# Patient Record
Sex: FEMALE | URBAN - METROPOLITAN AREA
[De-identification: names, ages, dates, MRNs, and addresses within clinical notes are randomized per-mention and may not be internally consistent; named-entity substitution may affect disease eponyms.]

---

## 2017-02-06 ENCOUNTER — EMERGENCY (EMERGENCY)
Facility: HOSPITAL | Age: 82
LOS: 0 days | Discharge: HOME | End: 2017-02-06
Attending: EMERGENCY MEDICINE | Admitting: EMERGENCY MEDICINE
Payer: MEDICARE

## 2017-02-06 VITALS
HEART RATE: 69 BPM | TEMPERATURE: 98 F | DIASTOLIC BLOOD PRESSURE: 58 MMHG | OXYGEN SATURATION: 93 % | SYSTOLIC BLOOD PRESSURE: 141 MMHG | RESPIRATION RATE: 20 BRPM

## 2017-02-06 VITALS
RESPIRATION RATE: 18 BRPM | DIASTOLIC BLOOD PRESSURE: 63 MMHG | HEART RATE: 73 BPM | OXYGEN SATURATION: 98 % | SYSTOLIC BLOOD PRESSURE: 113 MMHG | TEMPERATURE: 98 F

## 2017-02-06 DIAGNOSIS — E03.9 HYPOTHYROIDISM, UNSPECIFIED: ICD-10-CM

## 2017-02-06 DIAGNOSIS — I48.91 UNSPECIFIED ATRIAL FIBRILLATION: ICD-10-CM

## 2017-02-06 DIAGNOSIS — R06.00 DYSPNEA, UNSPECIFIED: ICD-10-CM

## 2017-02-06 DIAGNOSIS — D68.51 ACTIVATED PROTEIN C RESISTANCE: ICD-10-CM

## 2017-02-06 DIAGNOSIS — Z79.01 LONG TERM (CURRENT) USE OF ANTICOAGULANTS: ICD-10-CM

## 2017-02-06 DIAGNOSIS — I10 ESSENTIAL (PRIMARY) HYPERTENSION: ICD-10-CM

## 2017-02-06 LAB
ALBUMIN SERPL ELPH-MCNC: 3.2 G/DL — LOW (ref 3.3–5)
ALP SERPL-CCNC: 57 U/L — SIGNIFICANT CHANGE UP (ref 40–120)
ALT FLD-CCNC: 21 U/L — SIGNIFICANT CHANGE UP (ref 12–78)
ANION GAP SERPL CALC-SCNC: 8 MMOL/L — SIGNIFICANT CHANGE UP (ref 5–17)
APTT BLD: 38.1 SEC — HIGH (ref 27.5–37.4)
AST SERPL-CCNC: 20 U/L — SIGNIFICANT CHANGE UP (ref 15–37)
BASOPHILS # BLD AUTO: 0.1 K/UL — SIGNIFICANT CHANGE UP (ref 0–0.2)
BASOPHILS NFR BLD AUTO: 1.1 % — SIGNIFICANT CHANGE UP (ref 0–2)
BILIRUB SERPL-MCNC: 0.4 MG/DL — SIGNIFICANT CHANGE UP (ref 0.2–1.2)
BUN SERPL-MCNC: 20 MG/DL — SIGNIFICANT CHANGE UP (ref 7–23)
CALCIUM SERPL-MCNC: 8.7 MG/DL — SIGNIFICANT CHANGE UP (ref 8.5–10.1)
CHLORIDE SERPL-SCNC: 108 MMOL/L — SIGNIFICANT CHANGE UP (ref 96–108)
CK SERPL-CCNC: 57 U/L — SIGNIFICANT CHANGE UP (ref 26–192)
CO2 SERPL-SCNC: 27 MMOL/L — SIGNIFICANT CHANGE UP (ref 22–31)
CREAT SERPL-MCNC: 1.11 MG/DL — SIGNIFICANT CHANGE UP (ref 0.5–1.3)
EOSINOPHIL # BLD AUTO: 0.4 K/UL — SIGNIFICANT CHANGE UP (ref 0–0.5)
EOSINOPHIL NFR BLD AUTO: 5.2 % — SIGNIFICANT CHANGE UP (ref 0–6)
GLUCOSE SERPL-MCNC: 119 MG/DL — HIGH (ref 70–99)
HCT VFR BLD CALC: 39.2 % — SIGNIFICANT CHANGE UP (ref 34.5–45)
HGB BLD-MCNC: 12.7 G/DL — SIGNIFICANT CHANGE UP (ref 11.5–15.5)
INR BLD: 2.33 RATIO — HIGH (ref 0.88–1.16)
LYMPHOCYTES # BLD AUTO: 2.1 K/UL — SIGNIFICANT CHANGE UP (ref 1–3.3)
LYMPHOCYTES # BLD AUTO: 26.6 % — SIGNIFICANT CHANGE UP (ref 13–44)
MCHC RBC-ENTMCNC: 29.5 PG — SIGNIFICANT CHANGE UP (ref 27–34)
MCHC RBC-ENTMCNC: 32.4 GM/DL — SIGNIFICANT CHANGE UP (ref 32–36)
MCV RBC AUTO: 91.1 FL — SIGNIFICANT CHANGE UP (ref 80–100)
MONOCYTES # BLD AUTO: 0.8 K/UL — SIGNIFICANT CHANGE UP (ref 0–0.9)
MONOCYTES NFR BLD AUTO: 9.9 % — SIGNIFICANT CHANGE UP (ref 2–14)
NEUTROPHILS # BLD AUTO: 4.6 K/UL — SIGNIFICANT CHANGE UP (ref 1.8–7.4)
NEUTROPHILS NFR BLD AUTO: 57.2 % — SIGNIFICANT CHANGE UP (ref 43–77)
NT-PROBNP SERPL-SCNC: 1982 PG/ML — HIGH (ref 0–450)
PLATELET # BLD AUTO: 274 K/UL — SIGNIFICANT CHANGE UP (ref 150–400)
POTASSIUM SERPL-MCNC: 4.5 MMOL/L — SIGNIFICANT CHANGE UP (ref 3.5–5.3)
POTASSIUM SERPL-SCNC: 4.5 MMOL/L — SIGNIFICANT CHANGE UP (ref 3.5–5.3)
PROT SERPL-MCNC: 6.7 GM/DL — SIGNIFICANT CHANGE UP (ref 6–8.3)
PROTHROM AB SERPL-ACNC: 25.9 SEC — HIGH (ref 10–13.1)
RBC # BLD: 4.3 M/UL — SIGNIFICANT CHANGE UP (ref 3.8–5.2)
RBC # FLD: 14.2 % — SIGNIFICANT CHANGE UP (ref 10.3–14.5)
SODIUM SERPL-SCNC: 143 MMOL/L — SIGNIFICANT CHANGE UP (ref 135–145)
TROPONIN I SERPL-MCNC: <0.015 NG/ML — SIGNIFICANT CHANGE UP (ref 0.01–0.04)
WBC # BLD: 8 K/UL — SIGNIFICANT CHANGE UP (ref 3.8–10.5)
WBC # FLD AUTO: 8 K/UL — SIGNIFICANT CHANGE UP (ref 3.8–10.5)

## 2017-02-06 PROCEDURE — 99285 EMERGENCY DEPT VISIT HI MDM: CPT

## 2017-02-06 PROCEDURE — 71275 CT ANGIOGRAPHY CHEST: CPT | Mod: 26

## 2017-02-06 PROCEDURE — 71010: CPT | Mod: 26

## 2017-02-06 RX ORDER — DRONEDARONE 400 MG/1
400 TABLET, FILM COATED ORAL
Qty: 0 | Refills: 0 | Status: DISCONTINUED | OUTPATIENT
Start: 2017-02-06 | End: 2017-02-07

## 2017-02-06 RX ORDER — WARFARIN SODIUM 2.5 MG/1
2.5 TABLET ORAL ONCE
Qty: 0 | Refills: 0 | Status: COMPLETED | OUTPATIENT
Start: 2017-02-06 | End: 2017-02-06

## 2017-02-06 RX ORDER — FUROSEMIDE 40 MG
40 TABLET ORAL ONCE
Qty: 0 | Refills: 0 | Status: COMPLETED | OUTPATIENT
Start: 2017-02-06 | End: 2017-02-06

## 2017-02-06 RX ORDER — METOPROLOL TARTRATE 50 MG
50 TABLET ORAL ONCE
Qty: 0 | Refills: 0 | Status: COMPLETED | OUTPATIENT
Start: 2017-02-06 | End: 2017-02-06

## 2017-02-06 RX ORDER — POTASSIUM CHLORIDE 20 MEQ
20 PACKET (EA) ORAL ONCE
Qty: 0 | Refills: 0 | Status: COMPLETED | OUTPATIENT
Start: 2017-02-06 | End: 2017-02-06

## 2017-02-06 RX ORDER — ASPIRIN/CALCIUM CARB/MAGNESIUM 324 MG
325 TABLET ORAL DAILY
Qty: 0 | Refills: 0 | Status: DISCONTINUED | OUTPATIENT
Start: 2017-02-06 | End: 2017-02-06

## 2017-02-06 RX ORDER — ASPIRIN/CALCIUM CARB/MAGNESIUM 324 MG
325 TABLET ORAL ONCE
Qty: 0 | Refills: 0 | Status: DISCONTINUED | OUTPATIENT
Start: 2017-02-06 | End: 2017-02-06

## 2017-02-06 RX ADMIN — Medication 325 MILLIGRAM(S): at 18:29

## 2017-02-06 RX ADMIN — WARFARIN SODIUM 2.5 MILLIGRAM(S): 2.5 TABLET ORAL at 20:23

## 2017-02-06 RX ADMIN — Medication 20 MILLIEQUIVALENT(S): at 20:23

## 2017-02-06 RX ADMIN — Medication 50 MILLIGRAM(S): at 20:23

## 2017-02-06 RX ADMIN — Medication 40 MILLIGRAM(S): at 20:23

## 2017-02-06 NOTE — ED PROVIDER NOTE - MEDICAL DECISION MAKING DETAILS
trop negative, no evidence of decompensated CHF, appropriated for discharge home with outpatient cardiology follow-up

## 2017-02-06 NOTE — ED PROVIDER NOTE - PROGRESS NOTE DETAILS
ED attending Dr. Newby met with patient and agrees with resident plan and assessment. 82 y/o female with PMHx of afib s/p ablation, factor V Leiden, on coumadin, hypothyroid, DVT, HTN presents to the ED sent in from Dr. Holloway office c/o worsening SOB over the last 3 weeks, worse with exertion but is now constant. She reports intermittent chest pain over the last 3 weeks. She states that she had a echocardiogram and her coumadin checked in Dr. Holloway office today. PMD Dr. Stacy, Cardiologist Dr. Holloway. Pts echo today showed EF of 55-60%. ALKA Greene am scribing for and in the presence of Dr. Newby for the following sections: Results CT and lab results discussed with pt and daughter at bedside. Pt without PNA, no evidence of PE, symptoms presents for approximately 2-3 weeks, negative troponin, pt appropriate for d/c home, advised to double dose of lasix for today, f/u with cardiologist tomorrow morning. Copy of ED work up provided to pt to bring to her doctor and precautions of when to return to the ED given and understood. ALKA Greene am scribing for and in the presence of Dr. Newby for the following sections: Results, Progress note

## 2017-02-06 NOTE — ED ADULT NURSE NOTE - OBJECTIVE STATEMENT
PT IS A/O 3 WAS SEND FROM HER DOCTOR OFFICE. PT C/O SOB,LUNGS B/L CLEAR ANKLE PITTING EDEMA +1 ,PT STATES SLIGHT SWELLING  THAN THE USUAL.MONITOR NSR HR 64.PT HAS NO C/O CHEST PAIN

## 2017-02-06 NOTE — ED PROVIDER NOTE - OBJECTIVE STATEMENT
81F hx afib s/p ablation on coumadin, hypothyroidism, HTN presents from cardiologist's office (Dr. Reynaga) for progressively worsening SOB.  Dyspnea was originally intermittent and worse with exertion, now constant.  Waxing and waning in severity.  Associated with intermittent chest pain.  Unchanged b/l LE swelling. 81F hx afib s/p ablation, Factor V Leiden c/o DVT/PE? on coumadin, hypothyroidism, HTN presents from cardiologist's office (Dr. Reynaga) for progressively worsening SOB.  Dyspnea was originally intermittent and worse with exertion, now constant.  Waxing and waning in severity.  Associated with intermittent chest pain.  Unchanged b/l LE swelling.

## 2017-02-06 NOTE — ED ADULT NURSE REASSESSMENT NOTE - NS ED NURSE REASSESS COMMENT FT1
Received patient in bed, stable, A&Ox4, family at bedside. CTA chest completed. Pending results. Medicated as ordered. Will continue monitoring patient.

## 2017-02-07 RX ORDER — ASPIRIN/CALCIUM CARB/MAGNESIUM 324 MG
325 TABLET ORAL DAILY
Qty: 0 | Refills: 0 | Status: DISCONTINUED | OUTPATIENT
Start: 2017-02-07 | End: 2017-02-06

## 2017-02-15 NOTE — ASU PATIENT PROFILE, ADULT - MEDICATIONS TO HOLD
pt previously instructed at MD office to hold coumadin night before cath; pt instructed to hold lasix in a.m. of cath

## 2017-02-16 ENCOUNTER — OUTPATIENT (OUTPATIENT)
Dept: OUTPATIENT SERVICES | Facility: HOSPITAL | Age: 82
LOS: 1 days | Discharge: HOME | End: 2017-02-16
Payer: MEDICARE

## 2017-02-16 VITALS
SYSTOLIC BLOOD PRESSURE: 95 MMHG | HEIGHT: 63 IN | HEART RATE: 68 BPM | TEMPERATURE: 97 F | DIASTOLIC BLOOD PRESSURE: 72 MMHG | WEIGHT: 195.11 LBS | RESPIRATION RATE: 18 BRPM | OXYGEN SATURATION: 95 %

## 2017-02-16 LAB
BLD GP AB SCN SERPL QL: SIGNIFICANT CHANGE UP
INR BLD: 1.7 RATIO — HIGH (ref 0.88–1.16)
PROTHROM AB SERPL-ACNC: 18.8 SEC — HIGH (ref 10–13.1)
TYPE + AB SCN PNL BLD: SIGNIFICANT CHANGE UP

## 2017-02-16 PROCEDURE — 93010 ELECTROCARDIOGRAM REPORT: CPT

## 2017-02-16 RX ORDER — WARFARIN SODIUM 2.5 MG/1
2.5 TABLET ORAL ONCE
Qty: 0 | Refills: 0 | Status: COMPLETED | OUTPATIENT
Start: 2017-02-16 | End: 2017-02-16

## 2017-02-16 RX ORDER — FUROSEMIDE 40 MG
40 TABLET ORAL
Qty: 0 | Refills: 0 | Status: DISCONTINUED | OUTPATIENT
Start: 2017-02-16 | End: 2017-02-17

## 2017-02-16 RX ORDER — LEVOTHYROXINE SODIUM 125 MCG
25 TABLET ORAL DAILY
Qty: 0 | Refills: 0 | Status: DISCONTINUED | OUTPATIENT
Start: 2017-02-16 | End: 2017-02-17

## 2017-02-16 RX ORDER — ASPIRIN/CALCIUM CARB/MAGNESIUM 324 MG
81 TABLET ORAL DAILY
Qty: 0 | Refills: 0 | Status: DISCONTINUED | OUTPATIENT
Start: 2017-02-16 | End: 2017-02-17

## 2017-02-16 RX ORDER — DRONEDARONE 400 MG/1
400 TABLET, FILM COATED ORAL
Qty: 0 | Refills: 0 | Status: DISCONTINUED | OUTPATIENT
Start: 2017-02-16 | End: 2017-02-17

## 2017-02-16 RX ORDER — POTASSIUM CHLORIDE 20 MEQ
20 PACKET (EA) ORAL
Qty: 0 | Refills: 0 | Status: DISCONTINUED | OUTPATIENT
Start: 2017-02-16 | End: 2017-02-17

## 2017-02-16 RX ORDER — METOPROLOL TARTRATE 50 MG
50 TABLET ORAL DAILY
Qty: 0 | Refills: 0 | Status: DISCONTINUED | OUTPATIENT
Start: 2017-02-16 | End: 2017-02-17

## 2017-02-16 RX ORDER — CLOPIDOGREL BISULFATE 75 MG/1
75 TABLET, FILM COATED ORAL DAILY
Qty: 0 | Refills: 0 | Status: DISCONTINUED | OUTPATIENT
Start: 2017-02-16 | End: 2017-02-17

## 2017-02-16 RX ADMIN — Medication 81 MILLIGRAM(S): at 18:40

## 2017-02-16 RX ADMIN — Medication 20 MILLIEQUIVALENT(S): at 19:44

## 2017-02-16 RX ADMIN — Medication 40 MILLIGRAM(S): at 19:44

## 2017-02-16 RX ADMIN — WARFARIN SODIUM 2.5 MILLIGRAM(S): 2.5 TABLET ORAL at 22:11

## 2017-02-16 RX ADMIN — Medication 0.5 MILLIGRAM(S): at 22:41

## 2017-02-16 RX ADMIN — DRONEDARONE 400 MILLIGRAM(S): 400 TABLET, FILM COATED ORAL at 22:10

## 2017-02-16 RX ADMIN — CLOPIDOGREL BISULFATE 75 MILLIGRAM(S): 75 TABLET, FILM COATED ORAL at 18:40

## 2017-02-16 NOTE — H&P ADULT - ASSESSMENT
This is a 81 year old female PMH COPD  HTN obesity Factor 5 leiden ,multiple PE's , Afib with ablation in 11/16 . Pt reports increasing SOB  leg edema with increase in wt 8- 10 lbsover the last few weeks. Pt to cardiologist cardiac testing was done. pt recommended to have a cardiac cath   a/p CAD   PRE-PROCEDURE ASSESSMENT    -Patient seen and examined  -Labs reviewed  -Pre-procedure teaching completed with patient and family  -Informed consent witnessed  -Questions answered

## 2017-02-16 NOTE — H&P ADULT - HISTORY OF PRESENT ILLNESS
This is a 81 year old female PMH COPD  HTN obesity Factor 5 leiden ,multiple PE's , Afib with ablation in 11/16 . Pt reports increasing SOB  leg edema with increase in wt 8- 10 lbsover the last few weeks. Pt to cardiologist cardiac testing was done. pt recommended to have a cardiac cath This is a 81 year old female PMH COPD  HTN obesity Factor 5 leiden ,multiple PE's , Afib with ablation in 11/16 . Pt reports increasing SOB  leg edema with increase in wt 8- 10 lbsover the last few weeks. Pt recently to  for SOB a recent CT of chest was negative for any new PE  Pt to cardiologist cardiac testing was done. pt recommended to have a cardiac cath

## 2017-02-16 NOTE — PROGRESS NOTE ADULT - SUBJECTIVE AND OBJECTIVE BOX
I feel ok mild chest discomfort   VSS EKG no changes   R groin chad- close arterial site no evidence of bleeding   r venous sheath in place no evidence of bleeding +pp + edema a t baseline   s/p Cardiac cath s/p LAD      One Vessel coronary artery disease (LAD) .   Normal LV systolic function. Estimated LV ejection fraction is 65 %.   No aortic valve stenosis.     Severe pulmonary artery hypertension.  a/p -observe overnight on tele    -vitals signs, labs, diet and activity per post procedure orders  -ambulate ad glo post bed rest  - asa/plavix reinforced   - asa/plavix/ coumadin therapy till INR >2 then plavix coumadin   -iv hydration  -encouraged PO fluids  -plan of care discussed with patient, family and MD  -likely d/c in AM  -post procedure and d/c instructions reviewed  -follow up with MD in 1-2 weeks

## 2017-02-16 NOTE — H&P ADULT - NSHPPHYSICALEXAM_GEN_ALL_CORE
:PHYSICAL EXAM:    Constitutional: NAD, well-groomed, well-developed  Neuro: Alert and oriented x 3 Gait steady Speech clear No focal deficits  Neck: No JVD No bruit  Respiratory: CTAB  Cardiovascular: S1 and S2, RRR, + 2/3 LETTY   Gastrointestinal: BS+, soft, NT/ND  Extremities: No clubbing cyanosis or 2 + pitting edema  Vascular: 2+ peripheral pulses  Psychiatric: Normal mood, normal affect  Musculoskeletal: 5/5 strength b/l upper and lower extremities  skin bruise on l knee

## 2017-02-16 NOTE — H&P ADULT - PMH
Atrial fibrillation    CHF (congestive heart failure)    Factor 5 Leiden mutation, heterozygous    Pulmonary emboli

## 2017-02-17 VITALS — HEART RATE: 63 BPM

## 2017-02-17 LAB
ANION GAP SERPL CALC-SCNC: 8 MMOL/L — SIGNIFICANT CHANGE UP (ref 5–17)
BASOPHILS # BLD AUTO: 0.1 K/UL — SIGNIFICANT CHANGE UP (ref 0–0.2)
BASOPHILS NFR BLD AUTO: 1 % — SIGNIFICANT CHANGE UP (ref 0–2)
BUN SERPL-MCNC: 24 MG/DL — HIGH (ref 7–23)
CALCIUM SERPL-MCNC: 8.6 MG/DL — SIGNIFICANT CHANGE UP (ref 8.5–10.1)
CHLORIDE SERPL-SCNC: 105 MMOL/L — SIGNIFICANT CHANGE UP (ref 96–108)
CHOLEST SERPL-MCNC: 192 MG/DL — SIGNIFICANT CHANGE UP (ref 10–199)
CO2 SERPL-SCNC: 30 MMOL/L — SIGNIFICANT CHANGE UP (ref 22–31)
CREAT SERPL-MCNC: 1.23 MG/DL — SIGNIFICANT CHANGE UP (ref 0.5–1.3)
EOSINOPHIL # BLD AUTO: 0.7 K/UL — HIGH (ref 0–0.5)
EOSINOPHIL NFR BLD AUTO: 7.2 % — HIGH (ref 0–6)
GLUCOSE SERPL-MCNC: 109 MG/DL — HIGH (ref 70–99)
HCT VFR BLD CALC: 38.2 % — SIGNIFICANT CHANGE UP (ref 34.5–45)
HDLC SERPL-MCNC: 56 MG/DL — SIGNIFICANT CHANGE UP (ref 40–125)
HGB BLD-MCNC: 12.4 G/DL — SIGNIFICANT CHANGE UP (ref 11.5–15.5)
INR BLD: 1.64 RATIO — HIGH (ref 0.88–1.16)
LIPID PNL WITH DIRECT LDL SERPL: 116 MG/DL — SIGNIFICANT CHANGE UP
LYMPHOCYTES # BLD AUTO: 2.5 K/UL — SIGNIFICANT CHANGE UP (ref 1–3.3)
LYMPHOCYTES # BLD AUTO: 26.2 % — SIGNIFICANT CHANGE UP (ref 13–44)
MCHC RBC-ENTMCNC: 30 PG — SIGNIFICANT CHANGE UP (ref 27–34)
MCHC RBC-ENTMCNC: 32.6 GM/DL — SIGNIFICANT CHANGE UP (ref 32–36)
MCV RBC AUTO: 92.2 FL — SIGNIFICANT CHANGE UP (ref 80–100)
MONOCYTES # BLD AUTO: 1 K/UL — HIGH (ref 0–0.9)
MONOCYTES NFR BLD AUTO: 10.4 % — SIGNIFICANT CHANGE UP (ref 2–14)
NEUTROPHILS # BLD AUTO: 5.3 K/UL — SIGNIFICANT CHANGE UP (ref 1.8–7.4)
NEUTROPHILS NFR BLD AUTO: 55.2 % — SIGNIFICANT CHANGE UP (ref 43–77)
PLATELET # BLD AUTO: 305 K/UL — SIGNIFICANT CHANGE UP (ref 150–400)
POTASSIUM SERPL-MCNC: 4.5 MMOL/L — SIGNIFICANT CHANGE UP (ref 3.5–5.3)
POTASSIUM SERPL-SCNC: 4.5 MMOL/L — SIGNIFICANT CHANGE UP (ref 3.5–5.3)
PROTHROM AB SERPL-ACNC: 18.1 SEC — HIGH (ref 10–13.1)
RBC # BLD: 4.15 M/UL — SIGNIFICANT CHANGE UP (ref 3.8–5.2)
RBC # FLD: 14.4 % — SIGNIFICANT CHANGE UP (ref 10.3–14.5)
SODIUM SERPL-SCNC: 143 MMOL/L — SIGNIFICANT CHANGE UP (ref 135–145)
TOTAL CHOLESTEROL/HDL RATIO MEASUREMENT: 3.4 RATIO — SIGNIFICANT CHANGE UP (ref 3.3–7.1)
TRIGL SERPL-MCNC: 98 MG/DL — SIGNIFICANT CHANGE UP (ref 10–149)
WBC # BLD: 9.6 K/UL — SIGNIFICANT CHANGE UP (ref 3.8–10.5)
WBC # FLD AUTO: 9.6 K/UL — SIGNIFICANT CHANGE UP (ref 3.8–10.5)

## 2017-02-17 PROCEDURE — 93010 ELECTROCARDIOGRAM REPORT: CPT

## 2017-02-17 RX ORDER — CLOPIDOGREL BISULFATE 75 MG/1
1 TABLET, FILM COATED ORAL
Qty: 90 | Refills: 3 | OUTPATIENT
Start: 2017-02-17 | End: 2018-02-11

## 2017-02-17 RX ORDER — ATORVASTATIN CALCIUM 80 MG/1
1 TABLET, FILM COATED ORAL
Qty: 30 | Refills: 1 | OUTPATIENT
Start: 2017-02-17 | End: 2017-04-17

## 2017-02-17 RX ORDER — DRONEDARONE 400 MG/1
1 TABLET, FILM COATED ORAL
Qty: 60 | Refills: 0 | OUTPATIENT
Start: 2017-02-17 | End: 2017-03-19

## 2017-02-17 RX ADMIN — DRONEDARONE 400 MILLIGRAM(S): 400 TABLET, FILM COATED ORAL at 10:02

## 2017-02-17 RX ADMIN — Medication 81 MILLIGRAM(S): at 10:02

## 2017-02-17 RX ADMIN — Medication 50 MILLIGRAM(S): at 05:53

## 2017-02-17 RX ADMIN — Medication 20 MILLIEQUIVALENT(S): at 05:36

## 2017-02-17 RX ADMIN — Medication 40 MILLIGRAM(S): at 05:36

## 2017-02-17 RX ADMIN — Medication 25 MICROGRAM(S): at 05:36

## 2017-02-17 RX ADMIN — CLOPIDOGREL BISULFATE 75 MILLIGRAM(S): 75 TABLET, FILM COATED ORAL at 10:02

## 2017-02-17 NOTE — DISCHARGE NOTE ADULT - PATIENT PORTAL LINK FT
“You can access the [employer] Patient Portal, offered by [facility], by registering with the following website: [email or web address]”

## 2017-02-17 NOTE — DISCHARGE NOTE ADULT - HOSPITAL COURSE
This is a 81 year old female PMH COPD  HTN obesity Factor 5 leiden ,multiple PE's , Afib with ablation in 11/16 . Pt reports increasing SOB  leg edema with increase in wt 8- 10 lbs over the few weeks. Pt recently to  for SOB a recent CT of chest was negative for any new PE  Pt to cardiologist cardiac testing was done. Patient  recommended to have a cardiac cath   s/p LHC and LUCY stent LAD   One Vessel coronary artery disease (LAD) .   Normal LV systolic function. Estimated LV ejection fraction is 65 %.   No aortic valve stenosis.  asa/plavix/ coumdin therapy   start a cholesterol medication   continue lasix, potassium and multaq as before

## 2017-02-17 NOTE — DISCHARGE NOTE ADULT - CARE PLAN
Principal Discharge DX:	CAD (coronary artery disease)  Goal:	no symptoms of chest pain or sob Principal Discharge DX:	CAD (coronary artery disease)  Goal:	no symptoms of chest pain or sob  Instructions for follow-up, activity and diet:	no salt diet low fat low cholesterol   exercise daily   if any increase shortness of breath or chest discomfort seek medical attention     Take Asprin 81 mg , Plavix 75 mg and  Coumadin 2.5 daily   have coumadin level check on 2/21 at Dr Kidd office   once the INR is 2 or greater the aspirin will be discontinued  start cholesterol medication   no salt diet   weight self daily notify cardiologist ifchange in condition

## 2017-02-17 NOTE — DISCHARGE NOTE ADULT - MEDICATION SUMMARY - MEDICATIONS TO TAKE
I will START or STAY ON the medications listed below when I get home from the hospital:    aspirin 81 mg oral tablet  -- 1 tab(s) by mouth once a day  -- Indication: For CORONARY ARTERY DISEASE    Multaq 400 mg oral tablet  -- 2 tab(s) by mouth once a day  -- Indication: For atrial fibrillation    Coumadin  -- 2.5 milligram(s) by mouth once a day  -- Indication: For Pulmonary emboli    LORazepam 0.5 mg oral tablet  -- 1 tab(s) by mouth every 12 hours, As needed, Anxiety  -- Indication: For mood    clopidogrel 75 mg oral tablet  -- 1 tab(s) by mouth once a day  -- Indication: For stent    metoprolol succinate 50 mg oral tablet, extended release  --  by mouth 2 times a day  -- Indication: For blood pressure     furosemide 40 mg oral tablet  --  by mouth 2 times a day  -- Indication: For water pill     Klor-Con M20 oral tablet, extended release  -- 1 tab(s) by mouth 2 times a day  -- Indication: For replacement    levothyroxine  -- 25 microgram(s) by mouth once a day  -- Indication: For thyroid I will START or STAY ON the medications listed below when I get home from the hospital:    aspirin 81 mg oral tablet  -- 1 tab(s) by mouth once a day  -- Indication: For CORONARY ARTERY DISEASE    Multaq 400 mg oral tablet  -- 2 tab(s) by mouth once a day  -- Indication: For atrial fibrillation    Coumadin  -- 2.5 milligram(s) by mouth once a day  -- Indication: For Pulmonary emboli    LORazepam 0.5 mg oral tablet  -- 1 tab(s) by mouth every 12 hours, As needed, Anxiety  -- Indication: For mood    atorvastatin 20 mg oral tablet  -- 1 tab(s) by mouth once a day  -- Avoid grapefruit and grapefruit juice while taking this medication.  Do not take this drug if you are pregnant.  It is very important that you take or use this exactly as directed.  Do not skip doses or discontinue unless directed by your doctor.  Obtain medical advice before taking any non-prescription drugs as some may affect the action of this medication.  Take with food or milk.    -- Indication: For Cholesterol    clopidogrel 75 mg oral tablet  -- 1 tab(s) by mouth once a day  -- Indication: For stent    metoprolol succinate 50 mg oral tablet, extended release  --  by mouth 2 times a day  -- Indication: For blood pressure     furosemide 40 mg oral tablet  --  by mouth 2 times a day  -- Indication: For water pill     Klor-Con M20 oral tablet, extended release  -- 1 tab(s) by mouth 2 times a day  -- Indication: For replacement    levothyroxine  -- 25 microgram(s) by mouth once a day  -- Indication: For thyroid

## 2017-02-25 DIAGNOSIS — I48.0 PAROXYSMAL ATRIAL FIBRILLATION: ICD-10-CM

## 2017-02-25 DIAGNOSIS — Z87.891 PERSONAL HISTORY OF NICOTINE DEPENDENCE: ICD-10-CM

## 2017-02-25 DIAGNOSIS — I34.0 NONRHEUMATIC MITRAL (VALVE) INSUFFICIENCY: ICD-10-CM

## 2017-02-25 DIAGNOSIS — Z79.01 LONG TERM (CURRENT) USE OF ANTICOAGULANTS: ICD-10-CM

## 2017-02-25 DIAGNOSIS — E66.9 OBESITY, UNSPECIFIED: ICD-10-CM

## 2017-02-25 DIAGNOSIS — I25.10 ATHEROSCLEROTIC HEART DISEASE OF NATIVE CORONARY ARTERY WITHOUT ANGINA PECTORIS: ICD-10-CM

## 2017-02-25 DIAGNOSIS — I27.2 OTHER SECONDARY PULMONARY HYPERTENSION: ICD-10-CM

## 2017-02-25 DIAGNOSIS — R06.02 SHORTNESS OF BREATH: ICD-10-CM

## 2017-02-25 DIAGNOSIS — I50.9 HEART FAILURE, UNSPECIFIED: ICD-10-CM

## 2017-02-25 DIAGNOSIS — E03.9 HYPOTHYROIDISM, UNSPECIFIED: ICD-10-CM

## 2017-02-25 DIAGNOSIS — Z86.711 PERSONAL HISTORY OF PULMONARY EMBOLISM: ICD-10-CM

## 2017-02-25 DIAGNOSIS — J44.9 CHRONIC OBSTRUCTIVE PULMONARY DISEASE, UNSPECIFIED: ICD-10-CM

## 2017-02-25 DIAGNOSIS — D68.2 HEREDITARY DEFICIENCY OF OTHER CLOTTING FACTORS: ICD-10-CM

## 2017-02-25 DIAGNOSIS — I11.0 HYPERTENSIVE HEART DISEASE WITH HEART FAILURE: ICD-10-CM

## 2017-05-22 PROBLEM — Z00.00 ENCOUNTER FOR PREVENTIVE HEALTH EXAMINATION: Status: ACTIVE | Noted: 2017-05-22

## 2017-11-04 RX ORDER — METOPROLOL TARTRATE 50 MG
0 TABLET ORAL
Qty: 0 | Refills: 0 | COMMUNITY

## 2017-11-04 RX ORDER — LEVOTHYROXINE SODIUM 125 MCG
0 TABLET ORAL
Qty: 0 | Refills: 0 | COMMUNITY

## 2017-11-04 RX ORDER — WARFARIN SODIUM 2.5 MG/1
0 TABLET ORAL
Qty: 0 | Refills: 0 | COMMUNITY

## 2018-04-29 ENCOUNTER — EMERGENCY (EMERGENCY)
Facility: HOSPITAL | Age: 83
LOS: 0 days | Discharge: HOME | End: 2018-04-29
Attending: EMERGENCY MEDICINE | Admitting: EMERGENCY MEDICINE
Payer: MEDICARE

## 2018-04-29 VITALS
SYSTOLIC BLOOD PRESSURE: 125 MMHG | HEART RATE: 68 BPM | OXYGEN SATURATION: 95 % | DIASTOLIC BLOOD PRESSURE: 51 MMHG | TEMPERATURE: 98 F | RESPIRATION RATE: 18 BRPM

## 2018-04-29 VITALS
DIASTOLIC BLOOD PRESSURE: 70 MMHG | HEART RATE: 73 BPM | SYSTOLIC BLOOD PRESSURE: 170 MMHG | OXYGEN SATURATION: 99 % | RESPIRATION RATE: 17 BRPM | TEMPERATURE: 97 F

## 2018-04-29 DIAGNOSIS — I48.91 UNSPECIFIED ATRIAL FIBRILLATION: ICD-10-CM

## 2018-04-29 DIAGNOSIS — Z79.01 LONG TERM (CURRENT) USE OF ANTICOAGULANTS: ICD-10-CM

## 2018-04-29 DIAGNOSIS — K80.20 CALCULUS OF GALLBLADDER WITHOUT CHOLECYSTITIS WITHOUT OBSTRUCTION: ICD-10-CM

## 2018-04-29 DIAGNOSIS — I25.10 ATHEROSCLEROTIC HEART DISEASE OF NATIVE CORONARY ARTERY WITHOUT ANGINA PECTORIS: ICD-10-CM

## 2018-04-29 DIAGNOSIS — I50.9 HEART FAILURE, UNSPECIFIED: ICD-10-CM

## 2018-04-29 DIAGNOSIS — Z95.5 PRESENCE OF CORONARY ANGIOPLASTY IMPLANT AND GRAFT: ICD-10-CM

## 2018-04-29 DIAGNOSIS — Z79.82 LONG TERM (CURRENT) USE OF ASPIRIN: ICD-10-CM

## 2018-04-29 DIAGNOSIS — Z86.711 PERSONAL HISTORY OF PULMONARY EMBOLISM: ICD-10-CM

## 2018-04-29 LAB
ADD ON TEST-SPECIMEN IN LAB: SIGNIFICANT CHANGE UP
ALBUMIN SERPL ELPH-MCNC: 3.4 G/DL — SIGNIFICANT CHANGE UP (ref 3.3–5)
ALP SERPL-CCNC: 54 U/L — SIGNIFICANT CHANGE UP (ref 40–120)
ALT FLD-CCNC: 17 U/L — SIGNIFICANT CHANGE UP (ref 12–78)
ANION GAP SERPL CALC-SCNC: 8 MMOL/L — SIGNIFICANT CHANGE UP (ref 5–17)
APPEARANCE UR: CLEAR — SIGNIFICANT CHANGE UP
APPEARANCE UR: CLEAR — SIGNIFICANT CHANGE UP
APTT BLD: 36.5 SEC — SIGNIFICANT CHANGE UP (ref 27.5–37.4)
AST SERPL-CCNC: 17 U/L — SIGNIFICANT CHANGE UP (ref 15–37)
BACTERIA # UR AUTO: (no result)
BASOPHILS # BLD AUTO: 0.08 K/UL — SIGNIFICANT CHANGE UP (ref 0–0.2)
BASOPHILS NFR BLD AUTO: 1.1 % — SIGNIFICANT CHANGE UP (ref 0–2)
BILIRUB SERPL-MCNC: 0.4 MG/DL — SIGNIFICANT CHANGE UP (ref 0.2–1.2)
BILIRUB UR-MCNC: NEGATIVE — SIGNIFICANT CHANGE UP
BILIRUB UR-MCNC: NEGATIVE — SIGNIFICANT CHANGE UP
BUN SERPL-MCNC: 28 MG/DL — HIGH (ref 7–23)
CALCIUM SERPL-MCNC: 8.8 MG/DL — SIGNIFICANT CHANGE UP (ref 8.5–10.1)
CHLORIDE SERPL-SCNC: 103 MMOL/L — SIGNIFICANT CHANGE UP (ref 96–108)
CK SERPL-CCNC: 76 U/L — SIGNIFICANT CHANGE UP (ref 26–192)
CO2 SERPL-SCNC: 31 MMOL/L — SIGNIFICANT CHANGE UP (ref 22–31)
COLOR SPEC: YELLOW — SIGNIFICANT CHANGE UP
COLOR SPEC: YELLOW — SIGNIFICANT CHANGE UP
CREAT SERPL-MCNC: 1.12 MG/DL — SIGNIFICANT CHANGE UP (ref 0.5–1.3)
DIFF PNL FLD: (no result)
DIFF PNL FLD: (no result)
EOSINOPHIL # BLD AUTO: 0.99 K/UL — HIGH (ref 0–0.5)
EOSINOPHIL NFR BLD AUTO: 13.1 % — HIGH (ref 0–6)
EPI CELLS # UR: (no result)
GLUCOSE SERPL-MCNC: 154 MG/DL — HIGH (ref 70–99)
GLUCOSE UR QL: NEGATIVE MG/DL — SIGNIFICANT CHANGE UP
GLUCOSE UR QL: NEGATIVE MG/DL — SIGNIFICANT CHANGE UP
HCT VFR BLD CALC: 40.5 % — SIGNIFICANT CHANGE UP (ref 34.5–45)
HGB BLD-MCNC: 13.2 G/DL — SIGNIFICANT CHANGE UP (ref 11.5–15.5)
IMM GRANULOCYTES NFR BLD AUTO: 0.1 % — SIGNIFICANT CHANGE UP (ref 0–1.5)
INR BLD: 1.93 RATIO — HIGH (ref 0.88–1.16)
KETONES UR-MCNC: NEGATIVE — SIGNIFICANT CHANGE UP
KETONES UR-MCNC: NEGATIVE — SIGNIFICANT CHANGE UP
LEUKOCYTE ESTERASE UR-ACNC: (no result)
LEUKOCYTE ESTERASE UR-ACNC: NEGATIVE — SIGNIFICANT CHANGE UP
LIDOCAIN IGE QN: 115 U/L — SIGNIFICANT CHANGE UP (ref 73–393)
LYMPHOCYTES # BLD AUTO: 2.5 K/UL — SIGNIFICANT CHANGE UP (ref 1–3.3)
LYMPHOCYTES # BLD AUTO: 33.2 % — SIGNIFICANT CHANGE UP (ref 13–44)
MCHC RBC-ENTMCNC: 29.8 PG — SIGNIFICANT CHANGE UP (ref 27–34)
MCHC RBC-ENTMCNC: 32.6 GM/DL — SIGNIFICANT CHANGE UP (ref 32–36)
MCV RBC AUTO: 91.4 FL — SIGNIFICANT CHANGE UP (ref 80–100)
MONOCYTES # BLD AUTO: 0.84 K/UL — SIGNIFICANT CHANGE UP (ref 0–0.9)
MONOCYTES NFR BLD AUTO: 11.2 % — SIGNIFICANT CHANGE UP (ref 2–14)
NEUTROPHILS # BLD AUTO: 3.11 K/UL — SIGNIFICANT CHANGE UP (ref 1.8–7.4)
NEUTROPHILS NFR BLD AUTO: 41.3 % — LOW (ref 43–77)
NITRITE UR-MCNC: NEGATIVE — SIGNIFICANT CHANGE UP
NITRITE UR-MCNC: NEGATIVE — SIGNIFICANT CHANGE UP
NRBC # BLD: 0 /100 WBCS — SIGNIFICANT CHANGE UP (ref 0–0)
NT-PROBNP SERPL-SCNC: 711 PG/ML — HIGH (ref 0–450)
PH UR: 7 — SIGNIFICANT CHANGE UP (ref 5–8)
PH UR: 7 — SIGNIFICANT CHANGE UP (ref 5–8)
PLATELET # BLD AUTO: 243 K/UL — SIGNIFICANT CHANGE UP (ref 150–400)
POTASSIUM SERPL-MCNC: 4.5 MMOL/L — SIGNIFICANT CHANGE UP (ref 3.5–5.3)
POTASSIUM SERPL-SCNC: 4.5 MMOL/L — SIGNIFICANT CHANGE UP (ref 3.5–5.3)
PROT SERPL-MCNC: 7.1 GM/DL — SIGNIFICANT CHANGE UP (ref 6–8.3)
PROT UR-MCNC: 15 MG/DL
PROT UR-MCNC: NEGATIVE MG/DL — SIGNIFICANT CHANGE UP
PROTHROM AB SERPL-ACNC: 21.1 SEC — HIGH (ref 9.8–12.7)
RBC # BLD: 4.43 M/UL — SIGNIFICANT CHANGE UP (ref 3.8–5.2)
RBC # FLD: 15 % — HIGH (ref 10.3–14.5)
RBC CASTS # UR COMP ASSIST: SIGNIFICANT CHANGE UP /HPF (ref 0–4)
SODIUM SERPL-SCNC: 142 MMOL/L — SIGNIFICANT CHANGE UP (ref 135–145)
SP GR SPEC: 1.01 — SIGNIFICANT CHANGE UP (ref 1.01–1.02)
SP GR SPEC: 1.01 — SIGNIFICANT CHANGE UP (ref 1.01–1.02)
TROPONIN I SERPL-MCNC: <0.015 NG/ML — SIGNIFICANT CHANGE UP (ref 0.01–0.04)
TROPONIN I SERPL-MCNC: <0.015 NG/ML — SIGNIFICANT CHANGE UP (ref 0.01–0.04)
UROBILINOGEN FLD QL: NEGATIVE MG/DL — SIGNIFICANT CHANGE UP
UROBILINOGEN FLD QL: NEGATIVE MG/DL — SIGNIFICANT CHANGE UP
WBC # BLD: 7.53 K/UL — SIGNIFICANT CHANGE UP (ref 3.8–10.5)
WBC # FLD AUTO: 7.53 K/UL — SIGNIFICANT CHANGE UP (ref 3.8–10.5)
WBC UR QL: (no result)

## 2018-04-29 PROCEDURE — 93010 ELECTROCARDIOGRAM REPORT: CPT

## 2018-04-29 PROCEDURE — 76705 ECHO EXAM OF ABDOMEN: CPT | Mod: 26

## 2018-04-29 PROCEDURE — 99285 EMERGENCY DEPT VISIT HI MDM: CPT

## 2018-04-29 PROCEDURE — 71045 X-RAY EXAM CHEST 1 VIEW: CPT | Mod: 26

## 2018-04-29 RX ORDER — SODIUM CHLORIDE 9 MG/ML
3 INJECTION INTRAMUSCULAR; INTRAVENOUS; SUBCUTANEOUS ONCE
Qty: 0 | Refills: 0 | Status: COMPLETED | OUTPATIENT
Start: 2018-04-29 | End: 2018-04-29

## 2018-04-29 RX ORDER — HYDROMORPHONE HYDROCHLORIDE 2 MG/ML
0.5 INJECTION INTRAMUSCULAR; INTRAVENOUS; SUBCUTANEOUS ONCE
Qty: 0 | Refills: 0 | Status: DISCONTINUED | OUTPATIENT
Start: 2018-04-29 | End: 2018-04-29

## 2018-04-29 RX ORDER — FAMOTIDINE 10 MG/ML
20 INJECTION INTRAVENOUS ONCE
Qty: 0 | Refills: 0 | Status: COMPLETED | OUTPATIENT
Start: 2018-04-29 | End: 2018-04-29

## 2018-04-29 RX ORDER — ONDANSETRON 8 MG/1
4 TABLET, FILM COATED ORAL ONCE
Qty: 0 | Refills: 0 | Status: COMPLETED | OUTPATIENT
Start: 2018-04-29 | End: 2018-04-29

## 2018-04-29 RX ORDER — SODIUM CHLORIDE 9 MG/ML
500 INJECTION INTRAMUSCULAR; INTRAVENOUS; SUBCUTANEOUS ONCE
Qty: 0 | Refills: 0 | Status: COMPLETED | OUTPATIENT
Start: 2018-04-29 | End: 2018-04-29

## 2018-04-29 RX ADMIN — SODIUM CHLORIDE 500 MILLILITER(S): 9 INJECTION INTRAMUSCULAR; INTRAVENOUS; SUBCUTANEOUS at 08:22

## 2018-04-29 RX ADMIN — FAMOTIDINE 20 MILLIGRAM(S): 10 INJECTION INTRAVENOUS at 09:21

## 2018-04-29 RX ADMIN — HYDROMORPHONE HYDROCHLORIDE 0.5 MILLIGRAM(S): 2 INJECTION INTRAMUSCULAR; INTRAVENOUS; SUBCUTANEOUS at 09:52

## 2018-04-29 RX ADMIN — ONDANSETRON 4 MILLIGRAM(S): 8 TABLET, FILM COATED ORAL at 09:21

## 2018-04-29 RX ADMIN — SODIUM CHLORIDE 3 MILLILITER(S): 9 INJECTION INTRAMUSCULAR; INTRAVENOUS; SUBCUTANEOUS at 08:22

## 2018-04-29 NOTE — ED PROVIDER NOTE - CHPI ED SYMPTOMS NEG
no abdominal distension/no chills/no diarrhea/no vomiting/no blood in stool/no burning urination/no fever/no hematuria/no dysuria

## 2018-04-29 NOTE — ED PROVIDER NOTE - MEDICAL DECISION MAKING DETAILS
81 yo WF, mult PMH incl. gallstones, CAD, PE on warfarin, p/w upper abd pain, N s/p ate greasy food, mena radiation into back, lower chest.  Plan: ekg, labs (incl. Summer, BNP, lipase, urine), npo, cxr, cautious IVF, IV Zofran/Pepcid.  Consider GB sono.

## 2018-04-29 NOTE — ED PROVIDER NOTE - PROGRESS NOTE DETAILS
Dr. Luna:  Labs results reviewed.  Reevaluated patient at bedside.  Patient feeling much improved, + tolerating po fluids & crackers well w/o pain, N/V.  troponin negative X 2, repeat U/A negative.  Abd omen non-tender.   Discussed the results of all diagnostic testing in ED and copies of all reports given.   An opportunity to ask questions was given.  Discussed the importance of prompt, close medical follow-up.  Patient will return with any changes, concerns or persistent / worsening symptoms.  Understanding of all instructions verbalized. Dr. Luna:  Labs results reviewed.  Reevaluated patient at bedside.  Patient feeling much improved, + tolerating po fluids & crackers well w/o pain, N/V.  troponin negative X 2, repeat U/A negative.  Abdomen non-tender, no mass palp.   Discussed the results of all diagnostic testing in ED and copies of all reports given.   An opportunity to ask questions was given.  Discussed the importance of prompt, close medical follow-up.  Patient will return with any changes, concerns or persistent / worsening symptoms.  Understanding of all instructions verbalized.

## 2018-04-29 NOTE — ED PROVIDER NOTE - CONSTITUTIONAL, MLM
normal... Overweight elderly WF, awake, alert, oriented to person, place, time/situation, no respiratory discomfort, no sentence shortening, + mildly ill-appearing though non-toxic.

## 2018-04-29 NOTE — ED PROVIDER NOTE - MUSCULOSKELETAL, MLM
Neck: NT, supple.  Back: no focal tender.  CORREIA x 4.  B/L LE nonpitting edema, R > L, w/o focal tenderness (pt states is chronic).

## 2018-04-29 NOTE — ED PROVIDER NOTE - OBJECTIVE STATEMENT
81 yo WF, PMH includes gallstones, s/p appy & ANJALI, CAD w/ + cor stent 2/2017, s/p ablation for AF 11/2016, R knee arthro. sx '12 complicated by DVT/PE on warfarin, BIB daughter ambulatory c/o'ing upper abdominal pain, N.   Onset 4/27 janie, ~ 4 hrs. s/p ate "greasy, oily" Chinese food.  Pain in LUQ, epigastrium, + radiates into back, + aching, waxing/waning, 4 - 6/10 severity, not exacerbated by movement, + loss appetite.  + Associated N.  No assoc. V, F/C, cp, SOB, urine c/o's, hematuria, blood in stools.  Last BM yesterday, normal.  Pt poorly able to sleep last night due to pain.  though pt denied cp, she admitted during my exam + epigastric pain radiating up somewhat into low chest.  Pt last ate 7 AM, toast.  Alls: sulfa, macrodantin, ibuprofen, flu vaccine.    PCP: Regis.   Cardio: Amie.

## 2018-04-29 NOTE — ED ADULT NURSE NOTE - CAS EDN DISCHARGE ASSESSMENT
Alert and oriented to person, place and time/Patient baseline mental status/No adverse reaction to first time med in ED/Awake/Symptoms improved

## 2018-04-29 NOTE — ED ADULT NURSE NOTE - OBJECTIVE STATEMENT
Pt is a 82y female, A &O x 3, VSS, presents to ED w/ epigastic pain since eating chinese food several days ago, radiates to back, pt states hx of "gallbladder Attacks" reports pain is exactly like prior episodes, + nausea, denies vomiting, denies chest pain, SOB. In no apparent distress, bed rails up, will continue to monitor.

## 2018-04-29 NOTE — ED ADULT TRIAGE NOTE - CHIEF COMPLAINT QUOTE
pt states left upper quad abd pain radiating into left upper flank since last night w/ nausea no vomiting or diarrhea.

## 2018-04-30 ENCOUNTER — INPATIENT (INPATIENT)
Facility: HOSPITAL | Age: 83
LOS: 4 days | Discharge: HOME | End: 2018-05-05
Attending: SURGERY | Admitting: SURGERY
Payer: MEDICARE

## 2018-04-30 VITALS
WEIGHT: 197.09 LBS | RESPIRATION RATE: 18 BRPM | HEART RATE: 68 BPM | SYSTOLIC BLOOD PRESSURE: 160 MMHG | DIASTOLIC BLOOD PRESSURE: 63 MMHG | HEIGHT: 63 IN | TEMPERATURE: 99 F | OXYGEN SATURATION: 94 %

## 2018-04-30 DIAGNOSIS — Z98.890 OTHER SPECIFIED POSTPROCEDURAL STATES: Chronic | ICD-10-CM

## 2018-04-30 DIAGNOSIS — Z90.710 ACQUIRED ABSENCE OF BOTH CERVIX AND UTERUS: Chronic | ICD-10-CM

## 2018-04-30 DIAGNOSIS — N60.09 SOLITARY CYST OF UNSPECIFIED BREAST: Chronic | ICD-10-CM

## 2018-04-30 LAB
ALBUMIN SERPL ELPH-MCNC: 3.1 G/DL — LOW (ref 3.3–5)
ALP SERPL-CCNC: 50 U/L — SIGNIFICANT CHANGE UP (ref 40–120)
ALT FLD-CCNC: 19 U/L — SIGNIFICANT CHANGE UP (ref 12–78)
ANION GAP SERPL CALC-SCNC: 5 MMOL/L — SIGNIFICANT CHANGE UP (ref 5–17)
APTT BLD: 38.4 SEC — HIGH (ref 27.5–37.4)
AST SERPL-CCNC: 20 U/L — SIGNIFICANT CHANGE UP (ref 15–37)
BASOPHILS # BLD AUTO: 0.09 K/UL — SIGNIFICANT CHANGE UP (ref 0–0.2)
BASOPHILS NFR BLD AUTO: 1.3 % — SIGNIFICANT CHANGE UP (ref 0–2)
BILIRUB SERPL-MCNC: 0.4 MG/DL — SIGNIFICANT CHANGE UP (ref 0.2–1.2)
BLD GP AB SCN SERPL QL: SIGNIFICANT CHANGE UP
BUN SERPL-MCNC: 21 MG/DL — SIGNIFICANT CHANGE UP (ref 7–23)
CALCIUM SERPL-MCNC: 9 MG/DL — SIGNIFICANT CHANGE UP (ref 8.5–10.1)
CHLORIDE SERPL-SCNC: 107 MMOL/L — SIGNIFICANT CHANGE UP (ref 96–108)
CO2 SERPL-SCNC: 31 MMOL/L — SIGNIFICANT CHANGE UP (ref 22–31)
CREAT SERPL-MCNC: 0.96 MG/DL — SIGNIFICANT CHANGE UP (ref 0.5–1.3)
CULTURE RESULTS: SIGNIFICANT CHANGE UP
EOSINOPHIL # BLD AUTO: 1.04 K/UL — HIGH (ref 0–0.5)
EOSINOPHIL NFR BLD AUTO: 15.2 % — HIGH (ref 0–6)
GLUCOSE SERPL-MCNC: 105 MG/DL — HIGH (ref 70–99)
HCT VFR BLD CALC: 40.5 % — SIGNIFICANT CHANGE UP (ref 34.5–45)
HGB BLD-MCNC: 12.8 G/DL — SIGNIFICANT CHANGE UP (ref 11.5–15.5)
IMM GRANULOCYTES NFR BLD AUTO: 0.1 % — SIGNIFICANT CHANGE UP (ref 0–1.5)
INR BLD: 2.05 RATIO — HIGH (ref 0.88–1.16)
LIDOCAIN IGE QN: 98 U/L — SIGNIFICANT CHANGE UP (ref 73–393)
LYMPHOCYTES # BLD AUTO: 2.07 K/UL — SIGNIFICANT CHANGE UP (ref 1–3.3)
LYMPHOCYTES # BLD AUTO: 30.2 % — SIGNIFICANT CHANGE UP (ref 13–44)
MAGNESIUM SERPL-MCNC: 2.6 MG/DL — SIGNIFICANT CHANGE UP (ref 1.6–2.6)
MCHC RBC-ENTMCNC: 29.1 PG — SIGNIFICANT CHANGE UP (ref 27–34)
MCHC RBC-ENTMCNC: 31.6 GM/DL — LOW (ref 32–36)
MCV RBC AUTO: 92 FL — SIGNIFICANT CHANGE UP (ref 80–100)
MONOCYTES # BLD AUTO: 0.89 K/UL — SIGNIFICANT CHANGE UP (ref 0–0.9)
MONOCYTES NFR BLD AUTO: 13 % — SIGNIFICANT CHANGE UP (ref 2–14)
NEUTROPHILS # BLD AUTO: 2.76 K/UL — SIGNIFICANT CHANGE UP (ref 1.8–7.4)
NEUTROPHILS NFR BLD AUTO: 40.2 % — LOW (ref 43–77)
NRBC # BLD: 0 /100 WBCS — SIGNIFICANT CHANGE UP (ref 0–0)
PLATELET # BLD AUTO: 244 K/UL — SIGNIFICANT CHANGE UP (ref 150–400)
POTASSIUM SERPL-MCNC: 4.7 MMOL/L — SIGNIFICANT CHANGE UP (ref 3.5–5.3)
POTASSIUM SERPL-SCNC: 4.7 MMOL/L — SIGNIFICANT CHANGE UP (ref 3.5–5.3)
PROT SERPL-MCNC: 6.7 GM/DL — SIGNIFICANT CHANGE UP (ref 6–8.3)
PROTHROM AB SERPL-ACNC: 22.5 SEC — HIGH (ref 9.8–12.7)
RBC # BLD: 4.4 M/UL — SIGNIFICANT CHANGE UP (ref 3.8–5.2)
RBC # FLD: 15.2 % — HIGH (ref 10.3–14.5)
SODIUM SERPL-SCNC: 143 MMOL/L — SIGNIFICANT CHANGE UP (ref 135–145)
SPECIMEN SOURCE: SIGNIFICANT CHANGE UP
TYPE + AB SCN PNL BLD: SIGNIFICANT CHANGE UP
WBC # BLD: 6.86 K/UL — SIGNIFICANT CHANGE UP (ref 3.8–10.5)
WBC # FLD AUTO: 6.86 K/UL — SIGNIFICANT CHANGE UP (ref 3.8–10.5)

## 2018-04-30 PROCEDURE — 78226 HEPATOBILIARY SYSTEM IMAGING: CPT | Mod: 26

## 2018-04-30 PROCEDURE — 99285 EMERGENCY DEPT VISIT HI MDM: CPT

## 2018-04-30 RX ORDER — ONDANSETRON 8 MG/1
4 TABLET, FILM COATED ORAL EVERY 6 HOURS
Qty: 0 | Refills: 0 | Status: DISCONTINUED | OUTPATIENT
Start: 2018-04-30 | End: 2018-05-02

## 2018-04-30 RX ORDER — LEVOTHYROXINE SODIUM 125 MCG
25 TABLET ORAL DAILY
Qty: 0 | Refills: 0 | Status: DISCONTINUED | OUTPATIENT
Start: 2018-04-30 | End: 2018-05-02

## 2018-04-30 RX ORDER — FUROSEMIDE 40 MG
40 TABLET ORAL
Qty: 0 | Refills: 0 | Status: DISCONTINUED | OUTPATIENT
Start: 2018-04-30 | End: 2018-05-01

## 2018-04-30 RX ORDER — FUROSEMIDE 40 MG
0 TABLET ORAL
Qty: 0 | Refills: 0 | COMMUNITY

## 2018-04-30 RX ORDER — METRONIDAZOLE 500 MG
500 TABLET ORAL EVERY 8 HOURS
Qty: 0 | Refills: 0 | Status: DISCONTINUED | OUTPATIENT
Start: 2018-04-30 | End: 2018-05-02

## 2018-04-30 RX ORDER — EZETIMIBE 10 MG/1
10 TABLET ORAL AT BEDTIME
Qty: 0 | Refills: 0 | Status: DISCONTINUED | OUTPATIENT
Start: 2018-04-30 | End: 2018-05-02

## 2018-04-30 RX ORDER — MORPHINE SULFATE 50 MG/1
2 CAPSULE, EXTENDED RELEASE ORAL EVERY 6 HOURS
Qty: 0 | Refills: 0 | Status: DISCONTINUED | OUTPATIENT
Start: 2018-04-30 | End: 2018-05-01

## 2018-04-30 RX ORDER — POTASSIUM CHLORIDE 20 MEQ
20 PACKET (EA) ORAL
Qty: 0 | Refills: 0 | Status: DISCONTINUED | OUTPATIENT
Start: 2018-04-30 | End: 2018-05-01

## 2018-04-30 RX ORDER — CIPROFLOXACIN LACTATE 400MG/40ML
VIAL (ML) INTRAVENOUS
Qty: 0 | Refills: 0 | Status: DISCONTINUED | OUTPATIENT
Start: 2018-04-30 | End: 2018-05-02

## 2018-04-30 RX ORDER — CIPROFLOXACIN LACTATE 400MG/40ML
400 VIAL (ML) INTRAVENOUS EVERY 12 HOURS
Qty: 0 | Refills: 0 | Status: DISCONTINUED | OUTPATIENT
Start: 2018-05-01 | End: 2018-05-02

## 2018-04-30 RX ORDER — SODIUM CHLORIDE 9 MG/ML
1000 INJECTION INTRAMUSCULAR; INTRAVENOUS; SUBCUTANEOUS
Qty: 0 | Refills: 0 | Status: DISCONTINUED | OUTPATIENT
Start: 2018-04-30 | End: 2018-05-02

## 2018-04-30 RX ORDER — POTASSIUM CHLORIDE 20 MEQ
1 PACKET (EA) ORAL
Qty: 0 | Refills: 0 | COMMUNITY

## 2018-04-30 RX ORDER — METRONIDAZOLE 500 MG
TABLET ORAL
Qty: 0 | Refills: 0 | Status: DISCONTINUED | OUTPATIENT
Start: 2018-04-30 | End: 2018-05-02

## 2018-04-30 RX ORDER — METRONIDAZOLE 500 MG
500 TABLET ORAL ONCE
Qty: 0 | Refills: 0 | Status: COMPLETED | OUTPATIENT
Start: 2018-04-30 | End: 2018-04-30

## 2018-04-30 RX ORDER — WARFARIN SODIUM 2.5 MG/1
2.5 TABLET ORAL
Qty: 0 | Refills: 0 | COMMUNITY

## 2018-04-30 RX ORDER — METOPROLOL TARTRATE 50 MG
0 TABLET ORAL
Qty: 0 | Refills: 0 | COMMUNITY

## 2018-04-30 RX ORDER — DRONEDARONE 400 MG/1
2 TABLET, FILM COATED ORAL
Qty: 0 | Refills: 0 | COMMUNITY

## 2018-04-30 RX ORDER — HYDROMORPHONE HYDROCHLORIDE 2 MG/ML
0.5 INJECTION INTRAMUSCULAR; INTRAVENOUS; SUBCUTANEOUS ONCE
Qty: 0 | Refills: 0 | Status: DISCONTINUED | OUTPATIENT
Start: 2018-04-30 | End: 2018-04-30

## 2018-04-30 RX ORDER — PHYTONADIONE (VIT K1) 5 MG
2.5 TABLET ORAL ONCE
Qty: 0 | Refills: 0 | Status: COMPLETED | OUTPATIENT
Start: 2018-04-30 | End: 2018-04-30

## 2018-04-30 RX ORDER — MORPHINE SULFATE 50 MG/1
2 CAPSULE, EXTENDED RELEASE ORAL ONCE
Qty: 0 | Refills: 0 | Status: DISCONTINUED | OUTPATIENT
Start: 2018-04-30 | End: 2018-04-30

## 2018-04-30 RX ORDER — HEPARIN SODIUM 5000 [USP'U]/ML
5000 INJECTION INTRAVENOUS; SUBCUTANEOUS EVERY 8 HOURS
Qty: 0 | Refills: 0 | Status: DISCONTINUED | OUTPATIENT
Start: 2018-04-30 | End: 2018-05-02

## 2018-04-30 RX ORDER — ONDANSETRON 8 MG/1
4 TABLET, FILM COATED ORAL ONCE
Qty: 0 | Refills: 0 | Status: COMPLETED | OUTPATIENT
Start: 2018-04-30 | End: 2018-04-30

## 2018-04-30 RX ORDER — SODIUM CHLORIDE 9 MG/ML
1000 INJECTION, SOLUTION INTRAVENOUS
Qty: 0 | Refills: 0 | Status: DISCONTINUED | OUTPATIENT
Start: 2018-04-30 | End: 2018-05-02

## 2018-04-30 RX ORDER — METOPROLOL TARTRATE 50 MG
50 TABLET ORAL DAILY
Qty: 0 | Refills: 0 | Status: DISCONTINUED | OUTPATIENT
Start: 2018-04-30 | End: 2018-05-02

## 2018-04-30 RX ORDER — FUROSEMIDE 40 MG
40 TABLET ORAL
Qty: 0 | Refills: 0 | Status: DISCONTINUED | OUTPATIENT
Start: 2018-04-30 | End: 2018-04-30

## 2018-04-30 RX ORDER — CIPROFLOXACIN LACTATE 400MG/40ML
400 VIAL (ML) INTRAVENOUS ONCE
Qty: 0 | Refills: 0 | Status: COMPLETED | OUTPATIENT
Start: 2018-04-30 | End: 2018-04-30

## 2018-04-30 RX ADMIN — Medication 2.5 MILLIGRAM(S): at 18:46

## 2018-04-30 RX ADMIN — HYDROMORPHONE HYDROCHLORIDE 0.5 MILLIGRAM(S): 2 INJECTION INTRAMUSCULAR; INTRAVENOUS; SUBCUTANEOUS at 08:58

## 2018-04-30 RX ADMIN — MORPHINE SULFATE 2 MILLIGRAM(S): 50 CAPSULE, EXTENDED RELEASE ORAL at 12:49

## 2018-04-30 RX ADMIN — Medication 200 MILLIGRAM(S): at 18:42

## 2018-04-30 RX ADMIN — Medication 40 MILLIGRAM(S): at 18:42

## 2018-04-30 RX ADMIN — ONDANSETRON 4 MILLIGRAM(S): 8 TABLET, FILM COATED ORAL at 06:50

## 2018-04-30 RX ADMIN — HYDROMORPHONE HYDROCHLORIDE 0.5 MILLIGRAM(S): 2 INJECTION INTRAMUSCULAR; INTRAVENOUS; SUBCUTANEOUS at 06:50

## 2018-04-30 RX ADMIN — Medication 50 MILLIGRAM(S): at 18:42

## 2018-04-30 RX ADMIN — Medication 20 MILLIEQUIVALENT(S): at 18:42

## 2018-04-30 RX ADMIN — HYDROMORPHONE HYDROCHLORIDE 0.5 MILLIGRAM(S): 2 INJECTION INTRAMUSCULAR; INTRAVENOUS; SUBCUTANEOUS at 07:11

## 2018-04-30 RX ADMIN — Medication 100 MILLIGRAM(S): at 17:56

## 2018-04-30 RX ADMIN — HEPARIN SODIUM 5000 UNIT(S): 5000 INJECTION INTRAVENOUS; SUBCUTANEOUS at 21:10

## 2018-04-30 RX ADMIN — HYDROMORPHONE HYDROCHLORIDE 0.5 MILLIGRAM(S): 2 INJECTION INTRAMUSCULAR; INTRAVENOUS; SUBCUTANEOUS at 11:51

## 2018-04-30 RX ADMIN — SODIUM CHLORIDE 75 MILLILITER(S): 9 INJECTION INTRAMUSCULAR; INTRAVENOUS; SUBCUTANEOUS at 18:44

## 2018-04-30 RX ADMIN — SODIUM CHLORIDE 50 MILLILITER(S): 9 INJECTION, SOLUTION INTRAVENOUS at 13:03

## 2018-04-30 NOTE — H&P ADULT - NSHPLABSRESULTS_GEN_ALL_CORE
Labs:                          12.8   6.86  )-----------( 244      ( 30 Apr 2018 06:38 )             40.5       04-30    143  |  107  |  21  ----------------------------<  105<H>  4.7   |  31  |  0.96    Ca    9.0      30 Apr 2018 06:38  Mg     2.6     04-30    TPro  6.7  /  Alb  3.1<L>  /  TBili  0.4  /  DBili  x   /  AST  20  /  ALT  19  /  AlkPhos  50  04-30      PT/INR - ( 30 Apr 2018 06:38 )   PT: 22.5 sec;   INR: 2.05 ratio      < from: NM Hepatobiliary Scan w/wo Gall Bladder (04.30.18 @ 16:03) >  < from: US Abdomen Limited (04.29.18 @ 11:10) >    IMPRESSION:    Large calcified gallstone without secondary signs of acute cholecystitis   or biliary dilatation.        < end of copied text >        COMPARISON: No previous hepatobiliary scan  for comparison    FINDINGS: There is prompt, homogeneous uptake of radiotracer by the   hepatocytes. Activity is first seen in the bowel at 20 minutes. The   gallbladder is not seen at any time during the study. There is good   clearance of activity from the liver by the end of the study.    IMPRESSION: Abnormal hepatobiliary scan.    In the proper clinical setting, findings are consistent with acute   cholecystitis.      < end of copied text >           PTT - ( 30 Apr 2018 06:38 )  PTT:38.4 sec

## 2018-04-30 NOTE — H&P ADULT - NSHPREVIEWOFSYSTEMS_GEN_ALL_CORE
ROS:.  [X] A ten-point review of systems was otherwise negative except as noted.  Systemic:	[ ] Fever	[ ] Chills	[ ] Night sweats    [ ] Fatigue	[ ] Other  [] Cardiovascular:  [] Pulmonary:  [] Renal/Urologic:  [] Gastrointestinal: abdominal pain, vomiting  [] Metabolic:  [] Neurologic:  [] Hematologic:  [] ENT:  [] Ophthalmologic:  [] Musculoskeletal:    [ ] Due to altered mental status/intubation, subjective information were not able to be obtained from the patient. History was obtained, to the extent possible, from review of the chart and collateral sources of information.

## 2018-04-30 NOTE — ED ADULT NURSE REASSESSMENT NOTE - NS ED NURSE REASSESS COMMENT FT1
Patient care received from MAGI SUNSHINE. Patient A&Ox4, resting comfortably in bed. VSS, denies pain/discomfort. Medications and IVF administered as prescribed. No s/s of distress present. Plan of care reviewed. Safety & comfort measures in place, will continue to monitor.

## 2018-04-30 NOTE — ED ADULT NURSE REASSESSMENT NOTE - NS ED NURSE REASSESS COMMENT FT1
Pt has no complaints of nausea / pain at this time. Aware of NPO status at midnight for sx tomorrow. No acute distress noted at this time.

## 2018-04-30 NOTE — ED PROVIDER NOTE - OBJECTIVE STATEMENT
81yo female c/o abdominal pain.  Patient has h/o Factor V Leiden, multiple PEs in the past, on life long coumadin, with h/o CAD, Afib, CHF returns to ED with abdominal pain. Patient seen 4/29 for same pain.  Patient ate chinese food Thursday evening.  Four hours later developed abdominal pain which has been increasing in intensity since then without breaks in pain.  Patient hasn't really eaten since Thursday.  Seen in ED yesterday, US showed gallstone, normal labs.  Pt went home, did not eat, but continued to have increasing pain.  took a left over Percocet around 11p which provided a little relief for her to sleep.  Woke at 4a with 10/10 pain.  Nausea without vomiting this morning.  No diarrhea.  No fever.  PMD Dr Stacy.

## 2018-04-30 NOTE — ED ADULT NURSE NOTE - OBJECTIVE STATEMENT
Pt presents to the ED with daughter complaining of worsening abdominal pain since being seen in the hospital yesterday.  Pt states that she was scheduled to see her PCP today "but the pain got too bad".  Complains of intermittent nausea but no vomiting. Denies dizziness, fever, chills, SOB or chest pain.

## 2018-04-30 NOTE — H&P ADULT - HISTORY OF PRESENT ILLNESS
82 Y old female with upper abdominal pain for 3-4 days was seen in ER yesterday was sent home presented again since she was not able to eat due to pain. Pain started  after eating chines pain, mostly in epigastric < RUQ radiate radiate to her back No vomiting but has nausea.  No fever may have had chills. No diarrhea. No sick contacts. H/O o off and on RUQ pain, H/O known gallstones for 30 years. No Chest pain ,no SOB, H/O PE X 3 on coumadin.

## 2018-04-30 NOTE — H&P ADULT - NSHPPHYSICALEXAM_GEN_ALL_CORE
Vital Signs Last 24 Hrs  T(C): 36.9 (30 Apr 2018 22:45), Max: 37.4 (30 Apr 2018 18:38)  T(F): 98.5 (30 Apr 2018 22:45), Max: 99.3 (30 Apr 2018 18:38)  HR: 75 (30 Apr 2018 22:45) (62 - 76)  BP: 117/55 (30 Apr 2018 22:45) (117/55 - 166/65)  BP(mean): --  RR: 20 (30 Apr 2018 18:38) (18 - 20)  SpO2: 95% (30 Apr 2018 22:45) (90% - 96%)    PHYSICAL EXAM:  Constitutional: NAD  Eyes:  WNL  ENMT:  WNL  Neck:  WNL, non tender  Back: Non tender  Respiratory: CTABL  Cardiovascular:  S1+S2+0  Gastrointestinal: Soft, ND , tenderness in RUQ .  Genitourinary:  WNL  Extremities: NV intact  Vascular:  Intact  Neurological: No focal neurological deficit,  CN, motor and sensory system grossly intact.  Skin: WNL  Musculoskeletal: WNL  Psychiatric: Grossly WNL

## 2018-04-30 NOTE — H&P ADULT - PSH
Cyst of breast, unspecified laterality  S/P excision  H/O cardiac radiofrequency ablation    H/O knee surgery    H/O: hysterectomy

## 2018-04-30 NOTE — H&P ADULT - ASSESSMENT
82 Y  old female with multiple medical problems, on coumadin, H/o CHP, PE, with cholelithiasis, and acute cholecystitis    Pain control  SVT prophylaxis  Hold Coumadin  IV hydration  IV antibiotics  medical consult  Cardiology consult  Po vitamin K  Resume other home meds  PT  Possible OR in am pending optimized INR and medial /cardiac clearenc

## 2018-05-01 LAB
-  AMIKACIN: SIGNIFICANT CHANGE UP
-  AMOXICILLIN/CLAVULANIC ACID: SIGNIFICANT CHANGE UP
-  AMPICILLIN/SULBACTAM: SIGNIFICANT CHANGE UP
-  AMPICILLIN: SIGNIFICANT CHANGE UP
-  AZTREONAM: SIGNIFICANT CHANGE UP
-  CEFAZOLIN: SIGNIFICANT CHANGE UP
-  CEFEPIME: SIGNIFICANT CHANGE UP
-  CEFOXITIN: SIGNIFICANT CHANGE UP
-  CEFTRIAXONE: SIGNIFICANT CHANGE UP
-  CIPROFLOXACIN: SIGNIFICANT CHANGE UP
-  ERTAPENEM: SIGNIFICANT CHANGE UP
-  GENTAMICIN: SIGNIFICANT CHANGE UP
-  IMIPENEM: SIGNIFICANT CHANGE UP
-  LEVOFLOXACIN: SIGNIFICANT CHANGE UP
-  MEROPENEM: SIGNIFICANT CHANGE UP
-  NITROFURANTOIN: SIGNIFICANT CHANGE UP
-  PIPERACILLIN/TAZOBACTAM: SIGNIFICANT CHANGE UP
-  TIGECYCLINE: SIGNIFICANT CHANGE UP
-  TOBRAMYCIN: SIGNIFICANT CHANGE UP
-  TRIMETHOPRIM/SULFAMETHOXAZOLE: SIGNIFICANT CHANGE UP
ALBUMIN SERPL ELPH-MCNC: 3.4 G/DL — SIGNIFICANT CHANGE UP (ref 3.3–5)
ALP SERPL-CCNC: 54 U/L — SIGNIFICANT CHANGE UP (ref 40–120)
ALT FLD-CCNC: 18 U/L — SIGNIFICANT CHANGE UP (ref 12–78)
ANION GAP SERPL CALC-SCNC: 6 MMOL/L — SIGNIFICANT CHANGE UP (ref 5–17)
AST SERPL-CCNC: 19 U/L — SIGNIFICANT CHANGE UP (ref 15–37)
BILIRUB SERPL-MCNC: 0.5 MG/DL — SIGNIFICANT CHANGE UP (ref 0.2–1.2)
BUN SERPL-MCNC: 19 MG/DL — SIGNIFICANT CHANGE UP (ref 7–23)
CALCIUM SERPL-MCNC: 8.5 MG/DL — SIGNIFICANT CHANGE UP (ref 8.5–10.1)
CHLORIDE SERPL-SCNC: 105 MMOL/L — SIGNIFICANT CHANGE UP (ref 96–108)
CO2 SERPL-SCNC: 31 MMOL/L — SIGNIFICANT CHANGE UP (ref 22–31)
CREAT SERPL-MCNC: 1.07 MG/DL — SIGNIFICANT CHANGE UP (ref 0.5–1.3)
CULTURE RESULTS: SIGNIFICANT CHANGE UP
GLUCOSE BLDC GLUCOMTR-MCNC: 99 MG/DL — SIGNIFICANT CHANGE UP (ref 70–99)
GLUCOSE SERPL-MCNC: 109 MG/DL — HIGH (ref 70–99)
HCT VFR BLD CALC: 41.9 % — SIGNIFICANT CHANGE UP (ref 34.5–45)
HGB BLD-MCNC: 13.3 G/DL — SIGNIFICANT CHANGE UP (ref 11.5–15.5)
INR BLD: 1.91 RATIO — HIGH (ref 0.88–1.16)
MCHC RBC-ENTMCNC: 29.5 PG — SIGNIFICANT CHANGE UP (ref 27–34)
MCHC RBC-ENTMCNC: 31.7 GM/DL — LOW (ref 32–36)
MCV RBC AUTO: 92.9 FL — SIGNIFICANT CHANGE UP (ref 80–100)
METHOD TYPE: SIGNIFICANT CHANGE UP
NRBC # BLD: 0 /100 WBCS — SIGNIFICANT CHANGE UP (ref 0–0)
ORGANISM # SPEC MICROSCOPIC CNT: SIGNIFICANT CHANGE UP
ORGANISM # SPEC MICROSCOPIC CNT: SIGNIFICANT CHANGE UP
PLATELET # BLD AUTO: 261 K/UL — SIGNIFICANT CHANGE UP (ref 150–400)
POTASSIUM SERPL-MCNC: 4.3 MMOL/L — SIGNIFICANT CHANGE UP (ref 3.5–5.3)
POTASSIUM SERPL-SCNC: 4.3 MMOL/L — SIGNIFICANT CHANGE UP (ref 3.5–5.3)
PROT SERPL-MCNC: 7.2 GM/DL — SIGNIFICANT CHANGE UP (ref 6–8.3)
PROTHROM AB SERPL-ACNC: 20.9 SEC — HIGH (ref 9.8–12.7)
RBC # BLD: 4.51 M/UL — SIGNIFICANT CHANGE UP (ref 3.8–5.2)
RBC # FLD: 15.1 % — HIGH (ref 10.3–14.5)
SODIUM SERPL-SCNC: 142 MMOL/L — SIGNIFICANT CHANGE UP (ref 135–145)
SPECIMEN SOURCE: SIGNIFICANT CHANGE UP
WBC # BLD: 8.35 K/UL — SIGNIFICANT CHANGE UP (ref 3.8–10.5)
WBC # FLD AUTO: 8.35 K/UL — SIGNIFICANT CHANGE UP (ref 3.8–10.5)

## 2018-05-01 PROCEDURE — 93010 ELECTROCARDIOGRAM REPORT: CPT

## 2018-05-01 PROCEDURE — 99232 SBSQ HOSP IP/OBS MODERATE 35: CPT | Mod: 57

## 2018-05-01 RX ORDER — DOCUSATE SODIUM 100 MG
100 CAPSULE ORAL
Qty: 0 | Refills: 0 | Status: DISCONTINUED | OUTPATIENT
Start: 2018-05-01 | End: 2018-05-02

## 2018-05-01 RX ORDER — MORPHINE SULFATE 50 MG/1
6 CAPSULE, EXTENDED RELEASE ORAL EVERY 4 HOURS
Qty: 0 | Refills: 0 | Status: DISCONTINUED | OUTPATIENT
Start: 2018-05-01 | End: 2018-05-02

## 2018-05-01 RX ORDER — SENNA PLUS 8.6 MG/1
2 TABLET ORAL AT BEDTIME
Qty: 0 | Refills: 0 | Status: DISCONTINUED | OUTPATIENT
Start: 2018-05-01 | End: 2018-05-02

## 2018-05-01 RX ORDER — MORPHINE SULFATE 50 MG/1
4 CAPSULE, EXTENDED RELEASE ORAL EVERY 4 HOURS
Qty: 0 | Refills: 0 | Status: DISCONTINUED | OUTPATIENT
Start: 2018-05-01 | End: 2018-05-02

## 2018-05-01 RX ADMIN — HEPARIN SODIUM 5000 UNIT(S): 5000 INJECTION INTRAVENOUS; SUBCUTANEOUS at 14:03

## 2018-05-01 RX ADMIN — Medication 100 MILLIGRAM(S): at 09:07

## 2018-05-01 RX ADMIN — Medication 25 MICROGRAM(S): at 05:44

## 2018-05-01 RX ADMIN — Medication 100 MILLIGRAM(S): at 21:07

## 2018-05-01 RX ADMIN — MORPHINE SULFATE 2 MILLIGRAM(S): 50 CAPSULE, EXTENDED RELEASE ORAL at 01:03

## 2018-05-01 RX ADMIN — SENNA PLUS 2 TABLET(S): 8.6 TABLET ORAL at 21:07

## 2018-05-01 RX ADMIN — Medication 100 MILLIGRAM(S): at 14:03

## 2018-05-01 RX ADMIN — Medication 50 MILLIGRAM(S): at 05:44

## 2018-05-01 RX ADMIN — Medication 200 MILLIGRAM(S): at 17:23

## 2018-05-01 RX ADMIN — MORPHINE SULFATE 2 MILLIGRAM(S): 50 CAPSULE, EXTENDED RELEASE ORAL at 09:06

## 2018-05-01 RX ADMIN — Medication 200 MILLIGRAM(S): at 05:50

## 2018-05-01 RX ADMIN — MORPHINE SULFATE 2 MILLIGRAM(S): 50 CAPSULE, EXTENDED RELEASE ORAL at 01:37

## 2018-05-01 RX ADMIN — MORPHINE SULFATE 4 MILLIGRAM(S): 50 CAPSULE, EXTENDED RELEASE ORAL at 10:24

## 2018-05-01 RX ADMIN — MORPHINE SULFATE 6 MILLIGRAM(S): 50 CAPSULE, EXTENDED RELEASE ORAL at 21:12

## 2018-05-01 RX ADMIN — EZETIMIBE 10 MILLIGRAM(S): 10 TABLET ORAL at 21:06

## 2018-05-01 RX ADMIN — Medication 20 MILLIEQUIVALENT(S): at 05:44

## 2018-05-01 RX ADMIN — Medication 40 MILLIGRAM(S): at 05:44

## 2018-05-01 RX ADMIN — HEPARIN SODIUM 5000 UNIT(S): 5000 INJECTION INTRAVENOUS; SUBCUTANEOUS at 21:07

## 2018-05-01 RX ADMIN — Medication 100 MILLIGRAM(S): at 17:23

## 2018-05-01 RX ADMIN — ONDANSETRON 4 MILLIGRAM(S): 8 TABLET, FILM COATED ORAL at 09:07

## 2018-05-01 RX ADMIN — SODIUM CHLORIDE 75 MILLILITER(S): 9 INJECTION INTRAMUSCULAR; INTRAVENOUS; SUBCUTANEOUS at 09:07

## 2018-05-01 NOTE — CONSULT NOTE ADULT - SUBJECTIVE AND OBJECTIVE BOX
CC: abd pain    82-year-old she has a history of hypertension, high cholesterol, moderate mitral regurgitation, factor V Leiden mutation, history of pulmonary embolism 3 and 6 years ago on A/C, COPD, paroxysmal atrial fibrillation status post ablation, including pulmonary vein isolation, coronary artery disease status post drug-eluting stent to mid LAD February 2017, moderate to severe pulmonary hypertension , she was admitted with complains of abdominal pain, nausea and loss of appetite. She's been diagnosed to have cholelithiasis and acute cholecystitis. And she will need surgery. She denies any exertional chest pain, palpitations or dizziness. Dyspnea with moderate exertion but overall exercise tolerance has been stable. No recent angina or congestive heart failure. Since admission her nausea has resolved but she still complains of right upper quadrant pain on and off. Patient states she's been having this right upper quadrant pain on and off for the past 3-4 days PTA. Currently she is comfortable and is in no acute distress.      5/1: only complaining of right shoulder pain-likely referred pain from acute ivana. VSS    ICU Vital Signs Last 24 Hrs  T(C): 36.6 (01 May 2018 11:51), Max: 37.4 (30 Apr 2018 18:38)  T(F): 97.8 (01 May 2018 11:51), Max: 99.3 (30 Apr 2018 18:38)  HR: 61 (01 May 2018 11:51) (61 - 79)  BP: 126/45 (01 May 2018 11:51) (117/55 - 166/65)  BP(mean): --  ABP: --  ABP(mean): --  RR: 18 (01 May 2018 11:51) (17 - 20)  SpO2: 96% (01 May 2018 11:51) (90% - 96%)          PHYSICAL EXAM:    Constitutional: NAD, awake and alert, well-developed  HEENT: PERR, EOMI, Normal Hearing, MMM  Neck: Soft and supple, No LAD, No JVD  Respiratory: Breath sounds are clear bilaterally, No wheezing, rales or rhonchi  Cardiovascular: S1 and S2, regular rate and rhythm, no Murmurs, gallops or rubs  Gastrointestinal: RUQ tenderness  Extremities: No peripheral edema  Vascular: 2+ peripheral pulses  Neurological: A/O x 3, no focal deficits  Musculoskeletal: 5/5 strength b/l upper and lower extremities  Skin: No rashes

## 2018-05-01 NOTE — PROGRESS NOTE ADULT - ATTENDING COMMENTS
Pt seen and examined at bedside with chaperone. Pt is AAOx3, pt in no acute distress. Pt has acute cholecystitis. Pt explained the surgical procedures in both medical terminology and in lay terms that the patient understood, laparascopic possible open cholecystectomy, any surgeries deemed medically necessary. Pt explained in both medical terminology and in lay terms that the patient understood, the benefits and alternatives of surgery including non-operative management. Pt explained at length in both medical terminology and in lay terms that the patient understood, the associated risks of surgery including but not limited to infection, bleeding, nerve/organ injury, postoperative pain, poor wound healing, scar formation both internally and externally, risk of seroma/hematoma/abcess/biloma formation, risk of hernia development, risk of injury to liver and/or biliary tree/ductal system, risk of need for further GI/IR/Surgery intervention as required. Pt explained in both medical terminology and in lay terms that the patient understood, the associated chad and post operative risks of cardiac/cns/respiratory insult or injury, risk of death. Pt understood all of the above. All questions were answered. Pt gave informed consent for surgery.

## 2018-05-01 NOTE — CONSULT NOTE ADULT - SUBJECTIVE AND OBJECTIVE BOX
Patient is a 82y old  Female who presents with a chief complaint of Abdominal pain  & nausea.       HPI:  Patient is a 82-year-old she has a history of hypertension, high cholesterol, moderate mitral regurgitation, factor V Leiden mutation, history of pulmonary embolism in the past, COPD, paroxysmal atrial fibrillation status post ablation, including pulmonary vein isolation, coronary artery disease status post drug-eluting stent to mid LAD 2017, moderate to severe pulmonary hypertension , she was admitted with complains of abdominal pain, nausea and loss of appetite. She's been diagnosed to have cholelithiasis and acute cholecystitis. And she will need surgery. She denies any exertional chest pain, palpitations or dizziness. Dyspnea with moderate exertion but overall exercise tolerance has been stable. No recent angina or congestive heart failure. Since admission her nausea has resolved but she still complains of right upper quadrant pain on and off. Patient states she's been having this right upper quadrant pain on and off for the past 3-4 days, but on the day of admission the symptoms worsen. Currently she is comfortable and is in no acute distress.     Xray Chest 1 View AP/PA. (18   Findings:    The heart is mildly enlarged.  The lungs are grossly clear. The apices   and hemidiaphragms are unremarkable. Degenerative changes of the   visualized osseous structures.        Impression:    No acute disease    No significant interval change as compared to  2017      PAST MEDICAL & SURGICAL HISTORY:  Factor 5 Leiden mutation, heterozygous  Pulmonary emboli in the past .  Atrial fibrillation  H/O cardiac radiofrequency ablation for A Fib  Cyst of breast, unspecified laterality: S/P excision  H/O knee surgery  H/O: hysterectomy  severe pulmonary hypertension  COPD      PREVIOUS DIAGNOSTIC TESTING:      ECHO  FINDINGS: 2018 lVEF 60-65%,, moderate mitral regurgitation, severe pulmonary hypertension      MEDICATIONS  (STANDING):  ciprofloxacin   IVPB      ciprofloxacin   IVPB 400 milliGRAM(s) IV Intermittent every 12 hours  ezetimibe 10 milliGRAM(s) Oral at bedtime  heparin  Injectable 5000 Unit(s) SubCutaneous every 8 hours  lactated ringers. 1000 milliLiter(s) (50 mL/Hr) IV Continuous <Continuous>  levothyroxine 25 MICROGram(s) Oral daily  metoprolol succinate ER 50 milliGRAM(s) Oral daily  metroNIDAZOLE  IVPB 500 milliGRAM(s) IV Intermittent every 8 hours  metroNIDAZOLE  IVPB      sodium chloride 0.9%. 1000 milliLiter(s) (75 mL/Hr) IV Continuous <Continuous>    MEDICATIONS  (PRN):  morphine  - Injectable 2 milliGRAM(s) IV Push every 6 hours PRN Moderate Pain (4 - 6)  ondansetron Injectable 4 milliGRAM(s) IV Push every 6 hours PRN Nausea      FAMILY HISTORY:  No pertinent family history in first degree relatives      SOCIAL HISTORY:  no recent history of smoking or any alcohol consumption.    REVIEW OF SYSTEM:  Pertinent items are noted in HPI.  Constitutional	Negative for chills, fevers, sweats.    Eyes: 	Negative for visual disturbance.  Ears, nose, mouth, throat, and face: Negative for epistaxis, nasal congestion, sore throat .  Neck:	Negative for enlargement, pain and difficulty in swallowing  Respiration : Negative for cough,  pleuritic chest pain and wheezing , dyspnea with moderate exertion.  Cardiovascular: Negative for chest pain,  and palpitations    Gastrointestinal : Notabl for abdominal pain & nausea . No  vomiting  Genitourinary: Negative for dysuria, frequency and urinary incontinence .  Skin: Negative for  rash, pruritus, swelling, dryness .  	  Hematologic/lymphatic: Negative for bleeding and easy bruising  Musculoskeletal: Negative for arthralgias, back pain and muscle weakness.  Neurological: Negative for dizziness, headaches, seizures and tremors  Behavioral/Psych: Negative for mood change, depression.  Endocrine:	Negative for blurry vision, polydipsia and polyuria, diaphoresis.   Allergic/Immunologic:	Negative for anaphylaxis, angioedema and urticaria.      Vital Signs Last 24 Hrs  T(C): 36.4 (01 May 2018 05:26), Max: 37.4 (2018 18:38)  T(F): 97.5 (01 May 2018 05:26), Max: 99.3 (2018 18:38)  HR: 61 (01 May 2018 09:06) (61 - 79)  BP: 137/52 (01 May 2018 09:06) (117/55 - 166/65)  BP(mean): --  RR: 17 (01 May 2018 05:26) (17 - 20)  SpO2: 94% (01 May 2018 05:26) (90% - 96%)        PHYSICAL EXAM  General Appearance: cooperative, no acute distress,   HEENT: PERRL, conjunctiva clear, EOM's intact, non injected pharynx, no exudate .   Neck: Supple, , no adenopathy, thyroid: not enlarged, no carotid bruit or JVD  Back: Symmetric, no  tenderness,no soft tissue tenderness  Lungs: Clear to auscultation bilateral,no adventitious breath sounds, normal   expiratory phase  Heart: Regular rate and rhythm, S1, S2 normal, 1/6 holosystolic murmur,  no rub or gallop  Abdomen: Soft,  RUQ tender, bowel sounds active , no hepatosplenomegaly  Extremities: no cyanosis or edema, no joint swelling  Skin: Skin color, texture normal, no rashes   Neurologic: Alert and oriented X3 , cranial nerves intact, sensory and motor normal,            ECG: NSR RBBB NSST changes        LABS:                          13.3   8.35  )-----------( 261      ( 01 May 2018 08:12 )             41.9     05-    142  |  105  |  19  ----------------------------<  109<H>  4.3   |  31  |  1.07    Ca    8.5      01 May 2018 08:12  Mg     2.6     04-30    TPro  7.2  /  Alb  3.4  /  TBili  0.5  /  DBili  x   /  AST  19  /  ALT  18  /  AlkPhos  54  05-01    CARDIAC MARKERS ( 2018 12:40 )  <0.015 ng/mL / x     / x     / x     / x            Pro BNP  711  @ 08:13  D Dimer  --  @ 08:13    PT/INR - ( 01 May 2018 08:12 )   PT: 20.9 sec;   INR: 1.91 ratio         PTT - ( 2018 06:38 )  PTT:38.4 sec  Urinalysis Basic - ( 2018 11:38 )    Color: Yellow / Appearance: Clear / S.010 / pH: x  Gluc: x / Ketone: Negative  / Bili: Negative / Urobili: Negative mg/dL   Blood: x / Protein: Negative mg/dL / Nitrite: Negative   Leuk Esterase: Negative / RBC: 0-2 /HPF / WBC x   Sq Epi: x / Non Sq Epi: x / Bacteria: x

## 2018-05-02 ENCOUNTER — RESULT REVIEW (OUTPATIENT)
Age: 83
End: 2018-05-02

## 2018-05-02 LAB
ALBUMIN SERPL ELPH-MCNC: 2.7 G/DL — LOW (ref 3.3–5)
ALP SERPL-CCNC: 47 U/L — SIGNIFICANT CHANGE UP (ref 40–120)
ALT FLD-CCNC: 23 U/L — SIGNIFICANT CHANGE UP (ref 12–78)
ANION GAP SERPL CALC-SCNC: 8 MMOL/L — SIGNIFICANT CHANGE UP (ref 5–17)
APTT BLD: 28.3 SEC — SIGNIFICANT CHANGE UP (ref 27.5–37.4)
AST SERPL-CCNC: 29 U/L — SIGNIFICANT CHANGE UP (ref 15–37)
BILIRUB SERPL-MCNC: 0.3 MG/DL — SIGNIFICANT CHANGE UP (ref 0.2–1.2)
BUN SERPL-MCNC: 15 MG/DL — SIGNIFICANT CHANGE UP (ref 7–23)
CALCIUM SERPL-MCNC: 8.5 MG/DL — SIGNIFICANT CHANGE UP (ref 8.5–10.1)
CHLORIDE SERPL-SCNC: 106 MMOL/L — SIGNIFICANT CHANGE UP (ref 96–108)
CO2 SERPL-SCNC: 24 MMOL/L — SIGNIFICANT CHANGE UP (ref 22–31)
CREAT SERPL-MCNC: 0.87 MG/DL — SIGNIFICANT CHANGE UP (ref 0.5–1.3)
GLUCOSE SERPL-MCNC: 104 MG/DL — HIGH (ref 70–99)
HCT VFR BLD CALC: 38.3 % — SIGNIFICANT CHANGE UP (ref 34.5–45)
HGB BLD-MCNC: 12.1 G/DL — SIGNIFICANT CHANGE UP (ref 11.5–15.5)
INR BLD: 1.48 RATIO — HIGH (ref 0.88–1.16)
MCHC RBC-ENTMCNC: 29.3 PG — SIGNIFICANT CHANGE UP (ref 27–34)
MCHC RBC-ENTMCNC: 31.6 GM/DL — LOW (ref 32–36)
MCV RBC AUTO: 92.7 FL — SIGNIFICANT CHANGE UP (ref 80–100)
NRBC # BLD: 0 /100 WBCS — SIGNIFICANT CHANGE UP (ref 0–0)
PLATELET # BLD AUTO: 193 K/UL — SIGNIFICANT CHANGE UP (ref 150–400)
POTASSIUM SERPL-MCNC: 4.1 MMOL/L — SIGNIFICANT CHANGE UP (ref 3.5–5.3)
POTASSIUM SERPL-SCNC: 4.1 MMOL/L — SIGNIFICANT CHANGE UP (ref 3.5–5.3)
PROT SERPL-MCNC: 6 GM/DL — SIGNIFICANT CHANGE UP (ref 6–8.3)
PROTHROM AB SERPL-ACNC: 16.1 SEC — HIGH (ref 9.8–12.7)
RBC # BLD: 4.13 M/UL — SIGNIFICANT CHANGE UP (ref 3.8–5.2)
RBC # FLD: 15 % — HIGH (ref 10.3–14.5)
SODIUM SERPL-SCNC: 138 MMOL/L — SIGNIFICANT CHANGE UP (ref 135–145)
WBC # BLD: 7.5 K/UL — SIGNIFICANT CHANGE UP (ref 3.8–10.5)
WBC # FLD AUTO: 7.5 K/UL — SIGNIFICANT CHANGE UP (ref 3.8–10.5)

## 2018-05-02 PROCEDURE — 47562 LAPAROSCOPIC CHOLECYSTECTOMY: CPT | Mod: 62

## 2018-05-02 PROCEDURE — 88304 TISSUE EXAM BY PATHOLOGIST: CPT | Mod: 26

## 2018-05-02 PROCEDURE — 93970 EXTREMITY STUDY: CPT | Mod: 26

## 2018-05-02 RX ORDER — DOCUSATE SODIUM 100 MG
100 CAPSULE ORAL
Qty: 0 | Refills: 0 | Status: DISCONTINUED | OUTPATIENT
Start: 2018-05-02 | End: 2018-05-05

## 2018-05-02 RX ORDER — PANTOPRAZOLE SODIUM 20 MG/1
40 TABLET, DELAYED RELEASE ORAL
Qty: 0 | Refills: 0 | Status: DISCONTINUED | OUTPATIENT
Start: 2018-05-02 | End: 2018-05-05

## 2018-05-02 RX ORDER — HEPARIN SODIUM 5000 [USP'U]/ML
5000 INJECTION INTRAVENOUS; SUBCUTANEOUS EVERY 12 HOURS
Qty: 0 | Refills: 0 | Status: DISCONTINUED | OUTPATIENT
Start: 2018-05-02 | End: 2018-05-04

## 2018-05-02 RX ORDER — ONDANSETRON 8 MG/1
4 TABLET, FILM COATED ORAL ONCE
Qty: 0 | Refills: 0 | Status: COMPLETED | OUTPATIENT
Start: 2018-05-02 | End: 2018-05-02

## 2018-05-02 RX ORDER — PROCHLORPERAZINE MALEATE 5 MG
10 TABLET ORAL ONCE
Qty: 0 | Refills: 0 | Status: COMPLETED | OUTPATIENT
Start: 2018-05-02 | End: 2018-05-02

## 2018-05-02 RX ORDER — OXYCODONE HYDROCHLORIDE 5 MG/1
5 TABLET ORAL EVERY 4 HOURS
Qty: 0 | Refills: 0 | Status: DISCONTINUED | OUTPATIENT
Start: 2018-05-02 | End: 2018-05-02

## 2018-05-02 RX ORDER — OXYCODONE HYDROCHLORIDE 5 MG/1
10 TABLET ORAL EVERY 4 HOURS
Qty: 0 | Refills: 0 | Status: DISCONTINUED | OUTPATIENT
Start: 2018-05-02 | End: 2018-05-02

## 2018-05-02 RX ORDER — METOPROLOL TARTRATE 50 MG
50 TABLET ORAL DAILY
Qty: 0 | Refills: 0 | Status: DISCONTINUED | OUTPATIENT
Start: 2018-05-02 | End: 2018-05-05

## 2018-05-02 RX ORDER — OXYCODONE HYDROCHLORIDE 5 MG/1
5 TABLET ORAL EVERY 4 HOURS
Qty: 0 | Refills: 0 | Status: CANCELLED | OUTPATIENT
Start: 2018-05-10 | End: 2018-05-05

## 2018-05-02 RX ORDER — SODIUM CHLORIDE 9 MG/ML
1000 INJECTION INTRAMUSCULAR; INTRAVENOUS; SUBCUTANEOUS
Qty: 0 | Refills: 0 | Status: DISCONTINUED | OUTPATIENT
Start: 2018-05-02 | End: 2018-05-04

## 2018-05-02 RX ORDER — MORPHINE SULFATE 50 MG/1
2 CAPSULE, EXTENDED RELEASE ORAL EVERY 4 HOURS
Qty: 0 | Refills: 0 | Status: DISCONTINUED | OUTPATIENT
Start: 2018-05-02 | End: 2018-05-05

## 2018-05-02 RX ORDER — LEVOTHYROXINE SODIUM 125 MCG
25 TABLET ORAL DAILY
Qty: 0 | Refills: 0 | Status: DISCONTINUED | OUTPATIENT
Start: 2018-05-02 | End: 2018-05-05

## 2018-05-02 RX ORDER — ACETAMINOPHEN 500 MG
1000 TABLET ORAL ONCE
Qty: 0 | Refills: 0 | Status: COMPLETED | OUTPATIENT
Start: 2018-05-02 | End: 2018-05-02

## 2018-05-02 RX ORDER — HYDROMORPHONE HYDROCHLORIDE 2 MG/ML
0.5 INJECTION INTRAMUSCULAR; INTRAVENOUS; SUBCUTANEOUS
Qty: 0 | Refills: 0 | Status: DISCONTINUED | OUTPATIENT
Start: 2018-05-02 | End: 2018-05-04

## 2018-05-02 RX ORDER — SENNA PLUS 8.6 MG/1
2 TABLET ORAL AT BEDTIME
Qty: 0 | Refills: 0 | Status: DISCONTINUED | OUTPATIENT
Start: 2018-05-02 | End: 2018-05-05

## 2018-05-02 RX ORDER — SODIUM CHLORIDE 9 MG/ML
1000 INJECTION INTRAMUSCULAR; INTRAVENOUS; SUBCUTANEOUS
Qty: 0 | Refills: 0 | Status: DISCONTINUED | OUTPATIENT
Start: 2018-05-02 | End: 2018-05-02

## 2018-05-02 RX ADMIN — Medication 100 MILLIGRAM(S): at 21:55

## 2018-05-02 RX ADMIN — Medication 100 MILLIGRAM(S): at 05:56

## 2018-05-02 RX ADMIN — HEPARIN SODIUM 5000 UNIT(S): 5000 INJECTION INTRAVENOUS; SUBCUTANEOUS at 21:59

## 2018-05-02 RX ADMIN — HYDROMORPHONE HYDROCHLORIDE 0.5 MILLIGRAM(S): 2 INJECTION INTRAMUSCULAR; INTRAVENOUS; SUBCUTANEOUS at 18:07

## 2018-05-02 RX ADMIN — SODIUM CHLORIDE 75 MILLILITER(S): 9 INJECTION INTRAMUSCULAR; INTRAVENOUS; SUBCUTANEOUS at 18:38

## 2018-05-02 RX ADMIN — ONDANSETRON 4 MILLIGRAM(S): 8 TABLET, FILM COATED ORAL at 06:29

## 2018-05-02 RX ADMIN — Medication 200 MILLIGRAM(S): at 05:56

## 2018-05-02 RX ADMIN — HYDROMORPHONE HYDROCHLORIDE 0.5 MILLIGRAM(S): 2 INJECTION INTRAMUSCULAR; INTRAVENOUS; SUBCUTANEOUS at 18:45

## 2018-05-02 RX ADMIN — MORPHINE SULFATE 4 MILLIGRAM(S): 50 CAPSULE, EXTENDED RELEASE ORAL at 14:49

## 2018-05-02 RX ADMIN — Medication 25 MICROGRAM(S): at 05:58

## 2018-05-02 RX ADMIN — Medication 10 MILLIGRAM(S): at 20:30

## 2018-05-02 RX ADMIN — Medication 50 MILLIGRAM(S): at 05:56

## 2018-05-02 RX ADMIN — Medication 1000 MILLIGRAM(S): at 22:09

## 2018-05-02 RX ADMIN — MORPHINE SULFATE 6 MILLIGRAM(S): 50 CAPSULE, EXTENDED RELEASE ORAL at 06:31

## 2018-05-02 RX ADMIN — HYDROMORPHONE HYDROCHLORIDE 0.5 MILLIGRAM(S): 2 INJECTION INTRAMUSCULAR; INTRAVENOUS; SUBCUTANEOUS at 18:17

## 2018-05-02 RX ADMIN — Medication 100 MILLIGRAM(S): at 07:10

## 2018-05-02 RX ADMIN — ONDANSETRON 4 MILLIGRAM(S): 8 TABLET, FILM COATED ORAL at 19:18

## 2018-05-02 RX ADMIN — Medication 400 MILLIGRAM(S): at 18:10

## 2018-05-02 RX ADMIN — Medication 100 MILLIGRAM(S): at 14:26

## 2018-05-02 NOTE — BRIEF OPERATIVE NOTE - PROCEDURE
<<-----Click on this checkbox to enter Procedure Laparoscopic cholecystectomy  05/02/2018    Active  SPERVEEN

## 2018-05-02 NOTE — CONSULT NOTE ADULT - SUBJECTIVE AND OBJECTIVE BOX
ICU Vital Signs Last 24 Hrs  T(C): 36.3 (02 May 2018 12:06), Max: 36.6 (02 May 2018 05:31)  T(F): 97.4 (02 May 2018 12:06), Max: 97.8 (02 May 2018 05:31)  HR: 61 (02 May 2018 12:06) (58 - 61)  BP: 138/55 (02 May 2018 12:06) (114/46 - 138/55)  BP(mean): --  ABP: --  ABP(mean): --  RR: 18 (02 May 2018 12:06) (17 - 18)  SpO2: 96% (02 May 2018 12:06) (92% - 97%)  CC: abd pain    82-year-old she has a history of hypertension, high cholesterol, moderate mitral regurgitation, factor V Leiden mutation, history of pulmonary embolism 3 and 6 years ago on A/C, COPD, paroxysmal atrial fibrillation status post ablation, including pulmonary vein isolation, coronary artery disease status post drug-eluting stent to mid LAD February 2017, moderate to severe pulmonary hypertension , she was admitted with complains of abdominal pain, nausea and loss of appetite. She's been diagnosed to have cholelithiasis and acute cholecystitis. And she will need surgery. She denies any exertional chest pain, palpitations or dizziness. Dyspnea with moderate exertion but overall exercise tolerance has been stable. No recent angina or congestive heart failure. Since admission her nausea has resolved but she still complains of right upper quadrant pain on and off. Patient states she's been having this right upper quadrant pain on and off for the past 3-4 days PTA. Currently she is comfortable and is in no acute distress.      5/1: only complaining of right shoulder pain-likely referred pain from acute ivana. VSS  5/2: no o/n events            PHYSICAL EXAM:    Constitutional: NAD, awake and alert, well-developed  HEENT: PERR, EOMI, Normal Hearing, MMM  Neck: Soft and supple, No LAD, No JVD  Respiratory: Breath sounds are clear bilaterally, No wheezing, rales or rhonchi  Cardiovascular: S1 and S2, regular rate and rhythm, no Murmurs, gallops or rubs  Gastrointestinal: RUQ tenderness  Extremities: No peripheral edema  Vascular: 2+ peripheral pulses  Neurological: A/O x 3, no focal deficits  Musculoskeletal: 5/5 strength b/l upper and lower extremities  Skin: No rashes

## 2018-05-02 NOTE — BRIEF OPERATIVE NOTE - PRE-OP DX
Calculus of gallbladder with acute cholecystitis and obstruction  05/02/2018    Active  Yen Emmanuel

## 2018-05-02 NOTE — CDI QUERY NOTE - NSCDICHFTXTBX_GEN_ALL_CORE_HH
Documentation on chart of CHF. Pt. on Lasix 20 mgs po at home and 40 mgs po in the hospital. Echo performed in April, 2018 showed EF of 60-65%.  on admission. Please indicate the type of CHF and acuity for your patient in the progress notes.  A) Chronic Diastolic CHF  B) Acute Diastolic CHF  C) No indication for CHF  D) Other ( Please specify condition)

## 2018-05-03 LAB
ANION GAP SERPL CALC-SCNC: 8 MMOL/L — SIGNIFICANT CHANGE UP (ref 5–17)
BUN SERPL-MCNC: 14 MG/DL — SIGNIFICANT CHANGE UP (ref 7–23)
CALCIUM SERPL-MCNC: 8.6 MG/DL — SIGNIFICANT CHANGE UP (ref 8.5–10.1)
CHLORIDE SERPL-SCNC: 110 MMOL/L — HIGH (ref 96–108)
CO2 SERPL-SCNC: 22 MMOL/L — SIGNIFICANT CHANGE UP (ref 22–31)
CREAT SERPL-MCNC: 0.85 MG/DL — SIGNIFICANT CHANGE UP (ref 0.5–1.3)
GLUCOSE SERPL-MCNC: 99 MG/DL — SIGNIFICANT CHANGE UP (ref 70–99)
HCT VFR BLD CALC: 39.3 % — SIGNIFICANT CHANGE UP (ref 34.5–45)
HGB BLD-MCNC: 12.5 G/DL — SIGNIFICANT CHANGE UP (ref 11.5–15.5)
INR BLD: 1.22 RATIO — HIGH (ref 0.88–1.16)
MCHC RBC-ENTMCNC: 30.1 PG — SIGNIFICANT CHANGE UP (ref 27–34)
MCHC RBC-ENTMCNC: 31.8 GM/DL — LOW (ref 32–36)
MCV RBC AUTO: 94.7 FL — SIGNIFICANT CHANGE UP (ref 80–100)
NRBC # BLD: 0 /100 WBCS — SIGNIFICANT CHANGE UP (ref 0–0)
PLATELET # BLD AUTO: 238 K/UL — SIGNIFICANT CHANGE UP (ref 150–400)
POTASSIUM SERPL-MCNC: 4.5 MMOL/L — SIGNIFICANT CHANGE UP (ref 3.5–5.3)
POTASSIUM SERPL-SCNC: 4.5 MMOL/L — SIGNIFICANT CHANGE UP (ref 3.5–5.3)
PROTHROM AB SERPL-ACNC: 13.2 SEC — HIGH (ref 9.8–12.7)
RBC # BLD: 4.15 M/UL — SIGNIFICANT CHANGE UP (ref 3.8–5.2)
RBC # FLD: 15.3 % — HIGH (ref 10.3–14.5)
SODIUM SERPL-SCNC: 140 MMOL/L — SIGNIFICANT CHANGE UP (ref 135–145)
WBC # BLD: 9.7 K/UL — SIGNIFICANT CHANGE UP (ref 3.8–10.5)
WBC # FLD AUTO: 9.7 K/UL — SIGNIFICANT CHANGE UP (ref 3.8–10.5)

## 2018-05-03 RX ORDER — OXYCODONE HYDROCHLORIDE 5 MG/1
5 TABLET ORAL ONCE
Qty: 0 | Refills: 0 | Status: DISCONTINUED | OUTPATIENT
Start: 2018-05-03 | End: 2018-05-03

## 2018-05-03 RX ORDER — OXYCODONE HYDROCHLORIDE 5 MG/1
5 TABLET ORAL EVERY 4 HOURS
Qty: 0 | Refills: 0 | Status: CANCELLED | OUTPATIENT
Start: 2018-05-18 | End: 2018-05-05

## 2018-05-03 RX ORDER — ASPIRIN/CALCIUM CARB/MAGNESIUM 324 MG
81 TABLET ORAL DAILY
Qty: 0 | Refills: 0 | Status: DISCONTINUED | OUTPATIENT
Start: 2018-05-03 | End: 2018-05-05

## 2018-05-03 RX ADMIN — SODIUM CHLORIDE 75 MILLILITER(S): 9 INJECTION INTRAMUSCULAR; INTRAVENOUS; SUBCUTANEOUS at 21:53

## 2018-05-03 RX ADMIN — MORPHINE SULFATE 2 MILLIGRAM(S): 50 CAPSULE, EXTENDED RELEASE ORAL at 22:03

## 2018-05-03 RX ADMIN — Medication 100 MILLIGRAM(S): at 05:23

## 2018-05-03 RX ADMIN — OXYCODONE HYDROCHLORIDE 5 MILLIGRAM(S): 5 TABLET ORAL at 10:30

## 2018-05-03 RX ADMIN — SODIUM CHLORIDE 75 MILLILITER(S): 9 INJECTION INTRAMUSCULAR; INTRAVENOUS; SUBCUTANEOUS at 07:54

## 2018-05-03 RX ADMIN — OXYCODONE HYDROCHLORIDE 5 MILLIGRAM(S): 5 TABLET ORAL at 17:19

## 2018-05-03 RX ADMIN — HYDROMORPHONE HYDROCHLORIDE 0.5 MILLIGRAM(S): 2 INJECTION INTRAMUSCULAR; INTRAVENOUS; SUBCUTANEOUS at 07:01

## 2018-05-03 RX ADMIN — HEPARIN SODIUM 5000 UNIT(S): 5000 INJECTION INTRAVENOUS; SUBCUTANEOUS at 05:22

## 2018-05-03 RX ADMIN — PANTOPRAZOLE SODIUM 40 MILLIGRAM(S): 20 TABLET, DELAYED RELEASE ORAL at 05:24

## 2018-05-03 RX ADMIN — HYDROMORPHONE HYDROCHLORIDE 0.5 MILLIGRAM(S): 2 INJECTION INTRAMUSCULAR; INTRAVENOUS; SUBCUTANEOUS at 06:28

## 2018-05-03 RX ADMIN — HEPARIN SODIUM 5000 UNIT(S): 5000 INJECTION INTRAVENOUS; SUBCUTANEOUS at 17:20

## 2018-05-03 RX ADMIN — Medication 100 MILLIGRAM(S): at 17:20

## 2018-05-03 RX ADMIN — Medication 25 MICROGRAM(S): at 05:23

## 2018-05-03 RX ADMIN — Medication 81 MILLIGRAM(S): at 12:07

## 2018-05-03 NOTE — CONSULT NOTE ADULT - SUBJECTIVE AND OBJECTIVE BOX
CC: abd pain    82-year-old she has a history of hypertension, high cholesterol, moderate mitral regurgitation, factor V Leiden mutation, history of pulmonary embolism 3 and 6 years ago on A/C, COPD, paroxysmal atrial fibrillation status post ablation, including pulmonary vein isolation, coronary artery disease status post drug-eluting stent to mid LAD February 2017, moderate to severe pulmonary hypertension , she was admitted with complains of abdominal pain, nausea and loss of appetite. She's been diagnosed to have cholelithiasis and acute cholecystitis. And she will need surgery. She denies any exertional chest pain, palpitations or dizziness. Dyspnea with moderate exertion but overall exercise tolerance has been stable. No recent angina or congestive heart failure. Since admission her nausea has resolved but she still complains of right upper quadrant pain on and off. Patient states she's been having this right upper quadrant pain on and off for the past 3-4 days PTA. Currently she is comfortable and is in no acute distress.      5/1: only complaining of right shoulder pain-likely referred pain from acute ivana. VSS  5/2: no o/n events  5/3: POD #1 for lap ivana. No o/n or procedural complications. complains of abd cramping from insufflation. No flatus as of yet          ICU Vital Signs Last 24 Hrs  T(C): 36.7 (03 May 2018 11:43), Max: 36.8 (03 May 2018 09:55)  T(F): 98 (03 May 2018 11:43), Max: 98.2 (03 May 2018 09:55)  HR: 71 (03 May 2018 11:43) (50 - 71)  BP: 139/43 (03 May 2018 11:43) (100/37 - 158/53)  BP(mean): --  ABP: --  ABP(mean): --  RR: 18 (03 May 2018 11:43) (9 - 19)  SpO2: 99% (03 May 2018 11:43) (94% - 100%)    PHYSICAL EXAM:    Constitutional: NAD, awake and alert, well-developed  HEENT: PERR, EOMI, Normal Hearing, MMM  Neck: Soft and supple, No LAD, No JVD  Respiratory: Breath sounds are clear bilaterally, No wheezing, rales or rhonchi  Cardiovascular: S1 and S2, regular rate and rhythm, no Murmurs, gallops or rubs  Gastrointestinal: mild distention, +BS  Extremities: No peripheral edema  Vascular: 2+ peripheral pulses  Neurological: A/O x 3, no focal deficits  Musculoskeletal: 5/5 strength b/l upper and lower extremities  Skin: No rashes

## 2018-05-04 LAB
HCT VFR BLD CALC: 40.3 % — SIGNIFICANT CHANGE UP (ref 34.5–45)
HGB BLD-MCNC: 12.8 G/DL — SIGNIFICANT CHANGE UP (ref 11.5–15.5)
MCHC RBC-ENTMCNC: 29.4 PG — SIGNIFICANT CHANGE UP (ref 27–34)
MCHC RBC-ENTMCNC: 31.8 GM/DL — LOW (ref 32–36)
MCV RBC AUTO: 92.6 FL — SIGNIFICANT CHANGE UP (ref 80–100)
NRBC # BLD: 0 /100 WBCS — SIGNIFICANT CHANGE UP (ref 0–0)
PLATELET # BLD AUTO: 239 K/UL — SIGNIFICANT CHANGE UP (ref 150–400)
RBC # BLD: 4.35 M/UL — SIGNIFICANT CHANGE UP (ref 3.8–5.2)
RBC # FLD: 15.3 % — HIGH (ref 10.3–14.5)
WBC # BLD: 9.21 K/UL — SIGNIFICANT CHANGE UP (ref 3.8–10.5)
WBC # FLD AUTO: 9.21 K/UL — SIGNIFICANT CHANGE UP (ref 3.8–10.5)

## 2018-05-04 PROCEDURE — 99223 1ST HOSP IP/OBS HIGH 75: CPT

## 2018-05-04 RX ORDER — ENOXAPARIN SODIUM 100 MG/ML
90 INJECTION SUBCUTANEOUS
Qty: 10 | Refills: 0 | OUTPATIENT
Start: 2018-05-04 | End: 2018-05-08

## 2018-05-04 RX ORDER — WARFARIN SODIUM 2.5 MG/1
5 TABLET ORAL DAILY
Qty: 0 | Refills: 0 | Status: DISCONTINUED | OUTPATIENT
Start: 2018-05-04 | End: 2018-05-05

## 2018-05-04 RX ORDER — WARFARIN SODIUM 2.5 MG/1
2.5 TABLET ORAL ONCE
Qty: 0 | Refills: 0 | Status: DISCONTINUED | OUTPATIENT
Start: 2018-05-04 | End: 2018-05-04

## 2018-05-04 RX ORDER — ENOXAPARIN SODIUM 100 MG/ML
90 INJECTION SUBCUTANEOUS EVERY 12 HOURS
Qty: 0 | Refills: 0 | Status: DISCONTINUED | OUTPATIENT
Start: 2018-05-04 | End: 2018-05-05

## 2018-05-04 RX ADMIN — Medication 100 MILLIGRAM(S): at 17:28

## 2018-05-04 RX ADMIN — MORPHINE SULFATE 2 MILLIGRAM(S): 50 CAPSULE, EXTENDED RELEASE ORAL at 23:47

## 2018-05-04 RX ADMIN — Medication 25 MICROGRAM(S): at 05:55

## 2018-05-04 RX ADMIN — MORPHINE SULFATE 2 MILLIGRAM(S): 50 CAPSULE, EXTENDED RELEASE ORAL at 15:00

## 2018-05-04 RX ADMIN — PANTOPRAZOLE SODIUM 40 MILLIGRAM(S): 20 TABLET, DELAYED RELEASE ORAL at 05:55

## 2018-05-04 RX ADMIN — MORPHINE SULFATE 2 MILLIGRAM(S): 50 CAPSULE, EXTENDED RELEASE ORAL at 14:25

## 2018-05-04 RX ADMIN — ENOXAPARIN SODIUM 90 MILLIGRAM(S): 100 INJECTION SUBCUTANEOUS at 17:28

## 2018-05-04 RX ADMIN — WARFARIN SODIUM 5 MILLIGRAM(S): 2.5 TABLET ORAL at 21:50

## 2018-05-04 RX ADMIN — Medication 50 MILLIGRAM(S): at 05:55

## 2018-05-04 RX ADMIN — Medication 100 MILLIGRAM(S): at 05:54

## 2018-05-04 RX ADMIN — SENNA PLUS 2 TABLET(S): 8.6 TABLET ORAL at 21:50

## 2018-05-04 RX ADMIN — Medication 81 MILLIGRAM(S): at 11:57

## 2018-05-04 RX ADMIN — HEPARIN SODIUM 5000 UNIT(S): 5000 INJECTION INTRAVENOUS; SUBCUTANEOUS at 05:54

## 2018-05-04 NOTE — CONSULT NOTE ADULT - SUBJECTIVE AND OBJECTIVE BOX
ICU Vital Signs Last 24 Hrs  T(C): 37.1 (04 May 2018 11:49), Max: 37.1 (04 May 2018 04:51)  T(F): 98.8 (04 May 2018 11:49), Max: 98.8 (04 May 2018 11:49)  HR: 69 (04 May 2018 11:49) (69 - 81)  BP: 138/53 (04 May 2018 11:49) (126/68 - 147/50)  BP(mean): --  ABP: --  ABP(mean): --  RR: 18 (04 May 2018 11:49) (18 - 19)  SpO2: 98% (04 May 2018 11:49) (94% - 100%)    CC: abd pain    82-year-old she has a history of hypertension, high cholesterol, moderate mitral regurgitation, factor V Leiden mutation, history of pulmonary embolism 3 and 6 years ago on A/C, COPD, paroxysmal atrial fibrillation status post ablation, including pulmonary vein isolation, coronary artery disease status post drug-eluting stent to mid LAD February 2017, moderate to severe pulmonary hypertension , she was admitted with complains of abdominal pain, nausea and loss of appetite. She's been diagnosed to have cholelithiasis and acute cholecystitis. And she will need surgery. She denies any exertional chest pain, palpitations or dizziness. Dyspnea with moderate exertion but overall exercise tolerance has been stable. No recent angina or congestive heart failure. Since admission her nausea has resolved but she still complains of right upper quadrant pain on and off. Patient states she's been having this right upper quadrant pain on and off for the past 3-4 days PTA. Currently she is comfortable and is in no acute distress.      5/1: only complaining of right shoulder pain-likely referred pain from acute ivana. VSS  5/2: no o/n events  5/3: POD #1 for lap ivana. No o/n or procedural complications. complains of abd cramping from insufflation. No flatus as of yet  5/4: POD #2. no bm, + flatus.             PHYSICAL EXAM:    Constitutional: NAD, awake and alert, well-developed  HEENT: PERR, EOMI, Normal Hearing, MMM  Neck: Soft and supple, No LAD, No JVD  Respiratory: Breath sounds are clear bilaterally, No wheezing, rales or rhonchi  Cardiovascular: S1 and S2, regular rate and rhythm, no Murmurs, gallops or rubs  Gastrointestinal: mild distention, +BS  Extremities: No peripheral edema  Vascular: 2+ peripheral pulses  Neurological: A/O x 3, no focal deficits  Musculoskeletal: 5/5 strength b/l upper and lower extremities  Skin: No rashes

## 2018-05-04 NOTE — PHYSICAL THERAPY INITIAL EVALUATION ADULT - LEVEL OF INDEPENDENCE: SIT/STAND, REHAB EVAL
supervision/stand-by assist/+transient incisional discomfort =2/10 upon coming to upright standing from seated position

## 2018-05-04 NOTE — PHYSICAL THERAPY INITIAL EVALUATION ADULT - MARITAL STATUS
/dtr & son in law [employer] are emergency contacts dtr & son in law [employer] are emergency contacts; has a son in [place] who will be visiting to assist pt during recuperative phase/

## 2018-05-04 NOTE — PHYSICAL THERAPY INITIAL EVALUATION ADULT - CRITERIA FOR SKILLED THERAPEUTIC INTERVENTIONS
anticipated equipment needs at discharge/rehab potential/predicted duration of therapy intervention/therapy frequency/functional limitations in following categories/anticipated discharge recommendation

## 2018-05-04 NOTE — PHYSICAL THERAPY INITIAL EVALUATION ADULT - GENERAL OBSERVATIONS, REHAB EVAL
seated out of bed in chair in afternoon on 3rd attempt to engage in PT EValuation,reports ambulated using RW in [place] to nursing station with NA earlier and feeling much better than yesterday

## 2018-05-05 ENCOUNTER — TRANSCRIPTION ENCOUNTER (OUTPATIENT)
Age: 83
End: 2018-05-05

## 2018-05-05 VITALS — WEIGHT: 216.05 LBS

## 2018-05-05 LAB
ANION GAP SERPL CALC-SCNC: 4 MMOL/L — LOW (ref 5–17)
APPEARANCE UR: (no result)
APTT BLD: 38.3 SEC — HIGH (ref 27.5–37.4)
BACTERIA # UR AUTO: (no result)
BILIRUB UR-MCNC: (no result)
BUN SERPL-MCNC: 12 MG/DL — SIGNIFICANT CHANGE UP (ref 7–23)
CALCIUM SERPL-MCNC: 8.8 MG/DL — SIGNIFICANT CHANGE UP (ref 8.5–10.1)
CHLORIDE SERPL-SCNC: 107 MMOL/L — SIGNIFICANT CHANGE UP (ref 96–108)
CO2 SERPL-SCNC: 30 MMOL/L — SIGNIFICANT CHANGE UP (ref 22–31)
COLOR SPEC: (no result)
CREAT SERPL-MCNC: 0.94 MG/DL — SIGNIFICANT CHANGE UP (ref 0.5–1.3)
DIFF PNL FLD: (no result)
EPI CELLS # UR: (no result)
GLUCOSE SERPL-MCNC: 108 MG/DL — HIGH (ref 70–99)
GLUCOSE UR QL: NEGATIVE MG/DL — SIGNIFICANT CHANGE UP
HCT VFR BLD CALC: 41.1 % — SIGNIFICANT CHANGE UP (ref 34.5–45)
HGB BLD-MCNC: 13 G/DL — SIGNIFICANT CHANGE UP (ref 11.5–15.5)
INR BLD: 1.26 RATIO — HIGH (ref 0.88–1.16)
KETONES UR-MCNC: (no result)
LEUKOCYTE ESTERASE UR-ACNC: (no result)
MCHC RBC-ENTMCNC: 29.3 PG — SIGNIFICANT CHANGE UP (ref 27–34)
MCHC RBC-ENTMCNC: 31.6 GM/DL — LOW (ref 32–36)
MCV RBC AUTO: 92.6 FL — SIGNIFICANT CHANGE UP (ref 80–100)
NITRITE UR-MCNC: POSITIVE
NRBC # BLD: 0 /100 WBCS — SIGNIFICANT CHANGE UP (ref 0–0)
PH UR: 5 — SIGNIFICANT CHANGE UP (ref 5–8)
PLATELET # BLD AUTO: 253 K/UL — SIGNIFICANT CHANGE UP (ref 150–400)
POTASSIUM SERPL-MCNC: 3.9 MMOL/L — SIGNIFICANT CHANGE UP (ref 3.5–5.3)
POTASSIUM SERPL-SCNC: 3.9 MMOL/L — SIGNIFICANT CHANGE UP (ref 3.5–5.3)
PROT UR-MCNC: 100 MG/DL
PROTHROM AB SERPL-ACNC: 13.7 SEC — HIGH (ref 9.8–12.7)
RBC # BLD: 4.44 M/UL — SIGNIFICANT CHANGE UP (ref 3.8–5.2)
RBC # FLD: 15.4 % — HIGH (ref 10.3–14.5)
RBC CASTS # UR COMP ASSIST: >50 /HPF (ref 0–4)
SODIUM SERPL-SCNC: 141 MMOL/L — SIGNIFICANT CHANGE UP (ref 135–145)
SP GR SPEC: 1.02 — SIGNIFICANT CHANGE UP (ref 1.01–1.02)
UROBILINOGEN FLD QL: 1 MG/DL
WBC # BLD: 9.45 K/UL — SIGNIFICANT CHANGE UP (ref 3.8–10.5)
WBC # FLD AUTO: 9.45 K/UL — SIGNIFICANT CHANGE UP (ref 3.8–10.5)
WBC UR QL: >50

## 2018-05-05 PROCEDURE — 99233 SBSQ HOSP IP/OBS HIGH 50: CPT

## 2018-05-05 RX ORDER — OXYCODONE HYDROCHLORIDE 5 MG/1
1 TABLET ORAL
Qty: 16 | Refills: 0 | OUTPATIENT
Start: 2018-05-05 | End: 2018-05-08

## 2018-05-05 RX ORDER — CIPROFLOXACIN LACTATE 400MG/40ML
400 VIAL (ML) INTRAVENOUS EVERY 12 HOURS
Qty: 0 | Refills: 0 | Status: DISCONTINUED | OUTPATIENT
Start: 2018-05-05 | End: 2018-05-05

## 2018-05-05 RX ORDER — ENOXAPARIN SODIUM 100 MG/ML
90 INJECTION SUBCUTANEOUS
Qty: 5 | Refills: 0 | OUTPATIENT
Start: 2018-05-05 | End: 2018-05-09

## 2018-05-05 RX ORDER — PANTOPRAZOLE SODIUM 20 MG/1
1 TABLET, DELAYED RELEASE ORAL
Qty: 0 | Refills: 0 | COMMUNITY
Start: 2018-05-05

## 2018-05-05 RX ORDER — WARFARIN SODIUM 2.5 MG/1
6 TABLET ORAL DAILY
Qty: 0 | Refills: 0 | Status: DISCONTINUED | OUTPATIENT
Start: 2018-05-05 | End: 2018-05-05

## 2018-05-05 RX ORDER — MOXIFLOXACIN HYDROCHLORIDE TABLETS, 400 MG 400 MG/1
1 TABLET, FILM COATED ORAL
Qty: 6 | Refills: 0 | OUTPATIENT
Start: 2018-05-05 | End: 2018-05-07

## 2018-05-05 RX ADMIN — ENOXAPARIN SODIUM 90 MILLIGRAM(S): 100 INJECTION SUBCUTANEOUS at 05:32

## 2018-05-05 RX ADMIN — Medication 50 MILLIGRAM(S): at 05:32

## 2018-05-05 RX ADMIN — MORPHINE SULFATE 2 MILLIGRAM(S): 50 CAPSULE, EXTENDED RELEASE ORAL at 00:30

## 2018-05-05 RX ADMIN — Medication 25 MICROGRAM(S): at 05:31

## 2018-05-05 RX ADMIN — PANTOPRAZOLE SODIUM 40 MILLIGRAM(S): 20 TABLET, DELAYED RELEASE ORAL at 05:35

## 2018-05-05 RX ADMIN — Medication 100 MILLIGRAM(S): at 05:31

## 2018-05-05 RX ADMIN — Medication 81 MILLIGRAM(S): at 11:05

## 2018-05-05 NOTE — DISCHARGE NOTE ADULT - MEDICATION SUMMARY - MEDICATIONS TO STOP TAKING
I will STOP taking the medications listed below when I get home from the hospital:    Coumadin  -- 2.5 milligram(s) by mouth once a day    Multaq 400 mg oral tablet  -- 2 tab(s) by mouth once a day    furosemide 40 mg oral tablet  --  by mouth 2 times a day    clopidogrel 75 mg oral tablet  -- 1 tab(s) by mouth once a day    LORazepam 0.5 mg oral tablet  -- 1 tab(s) by mouth every 12 hours, As needed, Anxiety    dronedarone 400 mg oral tablet  -- 1 tab(s) by mouth 2 times a day  -- Avoid grapefruit and grapefruit juice while taking this medication.  Do not take this drug if you are pregnant.  Obtain medical advice before taking any non-prescription drugs as some may affect the action of this medication.  Take with food.

## 2018-05-05 NOTE — CONSULT NOTE ADULT - CONSULT REASON
med clearance for OR
preoperative evaluation
DVT/PE prophylaxis, risk stratification and Anticoagulation Management
med clearance for OR

## 2018-05-05 NOTE — CONSULT NOTE ADULT - ASSESSMENT
82-year-old she has a history of hypertension, high cholesterol, moderate mitral regurgitation, factor V Leiden mutation, history of pulmonary embolism 3 and 6 years ago on A/C, COPD, paroxysmal atrial fibrillation status post ablation, including pulmonary vein isolation, coronary artery disease status post drug-eluting stent to mid LAD February 2017, moderate to severe pulmonary hypertension , she was admitted with complains of abdominal pain, nausea and loss of appetite. She's been diagnosed to have cholelithiasis and acute cholecystitis. And she will need surgery. She denies any exertional chest pain, palpitations or dizziness. Dyspnea with moderate exertion but overall exercise tolerance has been stable. No recent angina or congestive heart failure. Since admission her nausea has resolved but she still complains of right upper quadrant pain on and off. Patient states she's been having this right upper quadrant pain on and off for the past 3-4 days PTA. Currently she is comfortable and is in no acute distress.    #Acute cholecystitis->Care per primary team, pain control. OR today  No absolute CI for surgery at hand however patient stands at moderate thrombotic risk given hx of Factor V Leiden. Hx of PE was 3 and 6 years ago.   Resume A/C after sx clear  Pain control  Aggressive ambulation/PT    #PAF  s/p ablation    #COPD  has no active complaints  Cont meds    #Chronic diastolic CHF
82-year-old she has a history of hypertension, high cholesterol, moderate mitral regurgitation, factor V Leiden mutation, history of pulmonary embolism 3 and 6 years ago on A/C, COPD, paroxysmal atrial fibrillation status post ablation, including pulmonary vein isolation, coronary artery disease status post drug-eluting stent to mid LAD February 2017, moderate to severe pulmonary hypertension , she was admitted with complains of abdominal pain, nausea and loss of appetite. She's been diagnosed to have cholelithiasis and acute cholecystitis. And she will need surgery. She denies any exertional chest pain, palpitations or dizziness. Dyspnea with moderate exertion but overall exercise tolerance has been stable. No recent angina or congestive heart failure. Since admission her nausea has resolved but she still complains of right upper quadrant pain on and off. Patient states she's been having this right upper quadrant pain on and off for the past 3-4 days PTA. Currently she is comfortable and is in no acute distress.    #Acute cholecystitis->Care per primary team, pain control. For OR in am.   No absolute CI for surgery at hand however patient stands at moderate thrombotic risk given hx of Factor V Leiden. Hx of PE was 3 and 6 years ago. Can temporilry suspend A/C, cont DVT prophylaxis and resume anticoagulation soon after surgery (when cleared)    #PAF  s/p ablation    #COPD  has no active complaints  Cont meds
82-year-old she has a history of hypertension, high cholesterol, moderate mitral regurgitation, factor V Leiden mutation, history of pulmonary embolism 3 and 6 years ago on A/C, COPD, paroxysmal atrial fibrillation status post ablation, including pulmonary vein isolation, coronary artery disease status post drug-eluting stent to mid LAD February 2017, moderate to severe pulmonary hypertension , she was admitted with complains of abdominal pain, nausea and loss of appetite. She's been diagnosed to have cholelithiasis and acute cholecystitis. And she will need surgery. She denies any exertional chest pain, palpitations or dizziness. Dyspnea with moderate exertion but overall exercise tolerance has been stable. No recent angina or congestive heart failure. Since admission her nausea has resolved but she still complains of right upper quadrant pain on and off. Patient states she's been having this right upper quadrant pain on and off for the past 3-4 days PTA. Currently she is comfortable and is in no acute distress.    #Acute cholecystitis->Care per primary team, pain control. OR today  No absolute CI for surgery at hand however patient stands at moderate thrombotic risk given hx of Factor V Leiden. Hx of PE was 3 and 6 years ago. Can temporilry suspend A/C, cont DVT prophylaxis and resume anticoagulation soon after surgery (when cleared)    #PAF  s/p ablation    #COPD  has no active complaints  Cont meds    #Chronic diastolic CHF
82-year-old she has a history of hypertension, high cholesterol, moderate mitral regurgitation, factor V Leiden mutation, history of pulmonary embolism 3 and 6 years ago on A/C, COPD, paroxysmal atrial fibrillation status post ablation, including pulmonary vein isolation, coronary artery disease status post drug-eluting stent to mid LAD February 2017, moderate to severe pulmonary hypertension , she was admitted with complains of abdominal pain, nausea and loss of appetite. She's been diagnosed to have cholelithiasis and acute cholecystitis. And she will need surgery. She denies any exertional chest pain, palpitations or dizziness. Dyspnea with moderate exertion but overall exercise tolerance has been stable. No recent angina or congestive heart failure. Since admission her nausea has resolved but she still complains of right upper quadrant pain on and off. Patient states she's been having this right upper quadrant pain on and off for the past 3-4 days PTA. Currently she is comfortable and is in no acute distress.    #Acute cholecystitis->Care per primary team, pain control. OR today  No absolute CI for surgery at hand however patient stands at moderate thrombotic risk given hx of Factor V Leiden. Hx of PE was 3 and 6 years ago. Can temporilry suspend A/C, cont DVT prophylaxis and resume anticoagulation soon after surgery (when cleared)  Monitor H/H  Pain control  Aggressive ambulation/PT    #PAF  s/p ablation    #COPD  has no active complaints  Cont meds    #Chronic diastolic CHF
82-year-old she has a history of hypertension, high cholesterol, moderate mitral regurgitation, factor V Leiden mutation, history of pulmonary embolism 3 and 6 years ago on A/C, COPD, paroxysmal atrial fibrillation status post ablation, including pulmonary vein isolation, coronary artery disease status post drug-eluting stent to mid LAD February 2017, moderate to severe pulmonary hypertension , she was admitted with complains of abdominal pain, nausea and loss of appetite. She's been diagnosed to have cholelithiasis and acute cholecystitis. And she will need surgery. She denies any exertional chest pain, palpitations or dizziness. Dyspnea with moderate exertion but overall exercise tolerance has been stable. No recent angina or congestive heart failure. Since admission her nausea has resolved but she still complains of right upper quadrant pain on and off. Patient states she's been having this right upper quadrant pain on and off for the past 3-4 days PTA. Currently she is comfortable and is in no acute distress.    #Acute cholecystitis->Care per primary team, pain control. s/p lap ivana  resume LT AC  Pain control  Aggressive ambulation/PT    #PAF  s/p ablation    #COPD  has no active complaints  Cont meds    #Chronic diastolic CHF    #UTI  has hx of frequent utis  start cipro 500 po bid for 3 days duration  cultures to be sent and may be adjusted based on c/s if needed  pyridium prn  d/w surgery service
Acute cholecystitis.  History of coronary artery disease status post stent to mid LAD February 2017 no recent angina or congestive heart failure.  History of factor V Leiden mutation, history of pulmonary embolism in the past.  COPD.  History of severe pulmonary hypertension.  Hypertension.  Paroxysmal atrial fibrillation status post pulmonary vein isolation.  Suggest  Overall cardiac status is stable there is no absolute contraindication from cardiac standpoint of view for surgery.  Continue with metoprolol.  Hold Lasix for a few days.  Patient has been on warfarin use vitamin K or FFP if required.  Restart anticoagulation postoperatively.  Early eburnation.  All risks options discussed with patient and her daughter.  Postoperatively observe patient on monitored floor.
83 yo female S/P lap ivana, POD#2, h/o Factor V leiden with numerous DVT/PE    d/c sq heparin    start treatment dose of Lovenox 90 mg sq q 12h, Ok with Surgery    Coumadin 5 mg po tonight  daily Pt/INr, CBC    BLE venodynes    INC mobility as april

## 2018-05-05 NOTE — CONSULT NOTE ADULT - SUBJECTIVE AND OBJECTIVE BOX
ICU Vital Signs Last 24 Hrs  T(C): 37 (05 May 2018 12:18), Max: 37 (05 May 2018 12:18)  T(F): 98.6 (05 May 2018 12:18), Max: 98.6 (05 May 2018 12:18)  HR: 66 (05 May 2018 12:18) (66 - 71)  BP: 124/49 (05 May 2018 12:18) (124/49 - 148/64)  BP(mean): --  ABP: --  ABP(mean): --  RR: 20 (05 May 2018 12:18) (18 - 20)  SpO2: 95% (05 May 2018 12:18) (92% - 97%)      CC: abd pain    82-year-old she has a history of hypertension, high cholesterol, moderate mitral regurgitation, factor V Leiden mutation, history of pulmonary embolism 3 and 6 years ago on A/C, COPD, paroxysmal atrial fibrillation status post ablation, including pulmonary vein isolation, coronary artery disease status post drug-eluting stent to mid LAD February 2017, moderate to severe pulmonary hypertension , she was admitted with complains of abdominal pain, nausea and loss of appetite. She's been diagnosed to have cholelithiasis and acute cholecystitis. And she will need surgery. She denies any exertional chest pain, palpitations or dizziness. Dyspnea with moderate exertion but overall exercise tolerance has been stable. No recent angina or congestive heart failure. Since admission her nausea has resolved but she still complains of right upper quadrant pain on and off. Patient states she's been having this right upper quadrant pain on and off for the past 3-4 days PTA. Currently she is comfortable and is in no acute distress.      5/1: only complaining of right shoulder pain-likely referred pain from acute ivana. VSS  5/2: no o/n events  5/3: POD #1 for lap ivana. No o/n or procedural complications. complains of abd cramping from insufflation. No flatus as of yet  5/4: POD #2. no bm, + flatus.   5/5: has dysuria            PHYSICAL EXAM:    Constitutional: NAD, awake and alert, well-developed  HEENT: PERR, EOMI, Normal Hearing, MMM  Neck: Soft and supple, No LAD, No JVD  Respiratory: Breath sounds are clear bilaterally, No wheezing, rales or rhonchi  Cardiovascular: S1 and S2, regular rate and rhythm, no Murmurs, gallops or rubs  Gastrointestinal: mild distention, +BS  Extremities: No peripheral edema  Vascular: 2+ peripheral pulses  Neurological: A/O x 3, no focal deficits  Musculoskeletal: 5/5 strength b/l upper and lower extremities  Skin: No rashes

## 2018-05-05 NOTE — DISCHARGE NOTE ADULT - CARE PROVIDER_API CALL
Yen Emmanuel (JAYLEEN), Surgery; Surgical Critical Care  [address] [facility]  [address] [place] [address]  Phone: [phone]  Fax: [phone]    Praful Holloway), Cardiovascular Disease; Internal Medicine  [address] [place] [address]  Phone: [phone]  Fax: [phone]    Nancy Marc), Cardiology; Interventional Cardiology  [address] [facility]  [place] [address]  Phone: [phone]  Fax: [phone]    Amrit Stacy), Internal Medicine  [address] [place] [address]  Phone: [phone]  Fax: [phone]

## 2018-05-05 NOTE — PROGRESS NOTE ADULT - SUBJECTIVE AND OBJECTIVE BOX
CC:Patient is a 82y old  Female who presents with a chief complaint of Abdominal pain (30 Apr 2018 15:00)      Subjective:  Pt seen and examined at bedside with chaperone. Pt is AAOx3, pt in no acute distress. Pt states mild c/o right upper quadrant abd pain since admission. Pt denied c/o fever, chills, chest pain, SOB, N/V/D, extremity pain or dysfunction, hemoptysis, hematemesis, hematuria, hematochexia, headache, diplopia, vertigo, dizzyness. Pt tolerating diet, (+) void, (+) ambulation, (+) bowel function    ROS:  abd pain, otherwise negative ROS    Vital Signs Last 24 Hrs  T(C): 36.4 (01 May 2018 05:26), Max: 37.4 (30 Apr 2018 18:38)  T(F): 97.5 (01 May 2018 05:26), Max: 99.3 (30 Apr 2018 18:38)  HR: 61 (01 May 2018 09:06) (61 - 79)  BP: 137/52 (01 May 2018 09:06) (117/55 - 166/65)  BP(mean): --  RR: 17 (01 May 2018 05:26) (17 - 20)  SpO2: 94% (01 May 2018 05:26) (90% - 96%)    Labs:      CARDIAC MARKERS ( 29 Apr 2018 12:40 )  <0.015 ng/mL / x     / x     / x     / x                                13.3   8.35  )-----------( 261      ( 01 May 2018 08:12 )             41.9     CBC Full  -  ( 01 May 2018 08:12 )  WBC Count : 8.35 K/uL  Hemoglobin : 13.3 g/dL  Hematocrit : 41.9 %  Platelet Count - Automated : 261 K/uL  Mean Cell Volume : 92.9 fl  Mean Cell Hemoglobin : 29.5 pg  Mean Cell Hemoglobin Concentration : 31.7 gm/dL  Auto Neutrophil # : x  Auto Lymphocyte # : x  Auto Monocyte # : x  Auto Eosinophil # : x  Auto Basophil # : x  Auto Neutrophil % : x  Auto Lymphocyte % : x  Auto Monocyte % : x  Auto Eosinophil % : x  Auto Basophil % : x    05-01    142  |  105  |  19  ----------------------------<  109<H>  4.3   |  31  |  1.07    Ca    8.5      01 May 2018 08:12  Mg     2.6     04-30    TPro  7.2  /  Alb  3.4  /  TBili  0.5  /  DBili  x   /  AST  19  /  ALT  18  /  AlkPhos  54  05-01    LIVER FUNCTIONS - ( 01 May 2018 08:12 )  Alb: 3.4 g/dL / Pro: 7.2 gm/dL / ALK PHOS: 54 U/L / ALT: 18 U/L / AST: 19 U/L / GGT: x           PT/INR - ( 01 May 2018 08:12 )   PT: 20.9 sec;   INR: 1.91 ratio         PTT - ( 30 Apr 2018 06:38 )  PTT:38.4 sec      Meds:  ciprofloxacin   IVPB      ciprofloxacin   IVPB 400 milliGRAM(s) IV Intermittent every 12 hours  ezetimibe 10 milliGRAM(s) Oral at bedtime  heparin  Injectable 5000 Unit(s) SubCutaneous every 8 hours  lactated ringers. 1000 milliLiter(s) IV Continuous <Continuous>  levothyroxine 25 MICROGram(s) Oral daily  metoprolol succinate ER 50 milliGRAM(s) Oral daily  metroNIDAZOLE  IVPB 500 milliGRAM(s) IV Intermittent every 8 hours  metroNIDAZOLE  IVPB      morphine  - Injectable 4 milliGRAM(s) IV Push every 4 hours PRN  morphine  - Injectable 6 milliGRAM(s) IV Push every 4 hours PRN  ondansetron Injectable 4 milliGRAM(s) IV Push every 6 hours PRN  sodium chloride 0.9%. 1000 milliLiter(s) IV Continuous <Continuous>      Radiology:    < from: NM Hepatobiliary Scan w/wo Gall Bladder (04.30.18 @ 16:03) >  EXAM:  NM HEPATOBILIARY IMG                            PROCEDURE DATE:  04/30/2018          INTERPRETATION:  RADIOPHARMACEUTICAL: 3.4 mCi Tc-99m-Mebrofenin, I.V.    CLINICAL INFORMATION: 82 year old female with right upper quadrant   abdominal painand cholelithiasis    TECHNIQUE: Dynamic imaging of the anterior abdomen was performed for 2   hours following injection of radiotracer. Static images of the abdomen in   the anterior, right lateral and right anterior oblique views were   obtained immediately thereafter.    COMPARISON: No previous hepatobiliary scan  for comparison    FINDINGS: There is prompt, homogeneous uptake of radiotracer by the   hepatocytes. Activity is first seen in the bowel at 20 minutes. The   gallbladder is not seen at any time during the study. There is good   clearance of activity from the liver by the end of the study.    IMPRESSION: Abnormal hepatobiliary scan.    In the proper clinical setting, findings are consistent with acute   cholecystitis.      Dr. Bishnu Rossi was notified of these results by Dr. Winters via   telephone on 4/30/2018 at approximately 4:00 PM, with read back.      < end of copied text >    Physical exam:  Pt is aaox3  Pt in no acute distress  Resp: CTAB  CVS: S1S2(+)  ABD: bowel sounds (+), soft, non distended, no rebound, no guarding, no rigidity, no skin changes to exam. Mild right upper quadrant tenderness to deep palpation exam, negative cook's sign to exam  EXT: no calf tenderness or edema to exam b/l, on VTE prophylaxis  Skin: no skin changes to exam, no jaundice or icteric sclera b/l
HPI:  Patient is a 82-year-old she has a history of hypertension, high cholesterol, moderate mitral regurgitation, factor V Leiden mutation, history of pulmonary embolism in the past, COPD, paroxysmal atrial fibrillation status post ablation, including pulmonary vein isolation, coronary artery disease status post drug-eluting stent to mid LAD February 2017, moderate to severe pulmonary hypertension , she was admitted with complains of abdominal pain, nausea and loss of appetite. She's been diagnosed to have cholelithiasis and acute cholecystitis. She is status post surgery and he is hemodynamically stable. She denies any chest pain or shortness of breath. She is in NSR on tele.     Xray Chest 1 View AP/PA. (04.29.18   Findings:    The heart is mildly enlarged.  The lungs are grossly clear. The apices   and hemidiaphragms are unremarkable. Degenerative changes of the   visualized osseous structures.        Impression:    No acute disease    No significant interval change as compared to  2/6/2017      PAST MEDICAL & SURGICAL HISTORY:  Factor 5 Leiden mutation, heterozygous  Pulmonary emboli in the past .  Atrial fibrillation  H/O cardiac radiofrequency ablation for A Fib  Cyst of breast, unspecified laterality: S/P excision  H/O knee surgery  H/O: hysterectomy  severe pulmonary hypertension  COPD      PREVIOUS DIAGNOSTIC TESTING:      ECHO  FINDINGS: April 2018 lVEF 60-65%,, moderate mitral regurgitation, severe pulmonary hypertension      MEDICATIONS  (STANDING):  docusate sodium 100 milliGRAM(s) Oral two times a day  heparin  Injectable 5000 Unit(s) SubCutaneous every 12 hours  levothyroxine 25 MICROGram(s) Oral daily  metoprolol succinate ER 50 milliGRAM(s) Oral daily  oxyCODONE    IR 5 milliGRAM(s) Oral once  pantoprazole    Tablet 40 milliGRAM(s) Oral before breakfast  senna 2 Tablet(s) Oral at bedtime  sodium chloride 0.9%. 1000 milliLiter(s) (75 mL/Hr) IV Continuous <Continuous>    MEDICATIONS  (PRN):  HYDROmorphone  Injectable 0.5 milliGRAM(s) IV Push every 10 minutes PRN Moderate Pain (4 - 6)  morphine  - Injectable 2 milliGRAM(s) IV Push every 4 hours PRN Severe Pain (7 - 10)        REVIEW OF SYSTEM:  Pertinent items are noted in HPI.  Constitutional	Negative for chills, fevers, sweats.    Eyes: 	Negative for visual disturbance.  Ears, nose, mouth, throat, and face: Negative for epistaxis, nasal congestion, sore throat .  Neck:	Negative for enlargement, pain and difficulty in swallowing  Respiration : Negative for cough, dyspnea on exertion, pleuritic chest pain and wheezing  Cardiovascular: Negative for chest pain, dyspnea and palpitations    Gastrointestinal : Negative for abdominal pain, diarrhea, nausea and vomiting  Genitourinary: Negative for dysuria, frequency and urinary incontinence .  Skin: Negative for  rash, pruritus, swelling, dryness .  	  Hematologic/lymphatic: Negative for bleeding and easy bruising  Musculoskeletal: Negative for arthralgias, back pain and muscle weakness.  Neurological: Negative for dizziness, headaches, seizures and tremors  Behavioral/Psych: Negative for mood change, depression.  Endocrine:	Negative for blurry vision, polydipsia and polyuria, diaphoresis.   Allergic/Immunologic:	Negative for anaphylaxis, angioedema and urticaria.      Vital Signs Last 24 Hrs  T(C): 36.8 (03 May 2018 09:55), Max: 36.8 (03 May 2018 09:55)  T(F): 98.2 (03 May 2018 09:55), Max: 98.2 (03 May 2018 09:55)  HR: 67 (03 May 2018 09:00) (50 - 67)  BP: 134/41 (03 May 2018 09:00) (100/37 - 158/53)  BP(mean): --  RR: 9 (03 May 2018 09:00) (9 - 19)  SpO2: 99% (03 May 2018 09:00) (94% - 100%)          PHYSICAL EXAM  General Appearance: cooperative, no acute distress,   HEENT: PERRL, conjunctiva clear, EOM's intact, non injected pharynx, no exudate .  Neck: Supple, , no adenopathy, thyroid: not enlarged, no carotid bruit or JVD    Lungs: Clear to auscultation bilateral,no adventitious breath sounds, normal   expiratory phase  Heart: Regular rate and rhythm, S1, S2 normal, no murmur, rub or gallop  Abdomen: Soft, mildly tender, bowel sounds active , no hepatosplenomegaly  Extremities: no cyanosis or edema, no joint swelling  Skin: Skin color, texture normal, no rashes   Neurologic: Alert and oriented X3 , cranial nerves intact, sensory and motor normal,        INTERPRETATION OF TELEMETRY: NSR      LABS:                          12.5   9.70  )-----------( 238      ( 03 May 2018 07:15 )             39.3     05-03    140  |  110<H>  |  14  ----------------------------<  99  4.5   |  22  |  0.85    Ca    8.6      03 May 2018 07:15    TPro  6.0  /  Alb  2.7<L>  /  TBili  0.3  /  DBili  x   /  AST  29  /  ALT  23  /  AlkPhos  47  05-02          Pro BNP  711 04-29 @ 08:13  D Dimer  -- 04-29 @ 08:13    PT/INR - ( 03 May 2018 07:15 )   PT: 13.2 sec;   INR: 1.22 ratio         PTT - ( 02 May 2018 08:04 )  PTT:28.3 sec          RADIOLOGY & ADDITIONAL STUDIES:
Patient is a 82y old  Female who presents with a chief complaint of Abdominal pain (30 Apr 2018 15:00)      HPI:  82 Y old female with upper abdominal pain for 3-4 days was seen in ER yesterday was sent home presented again since she was not able to eat due to pain. Pain started  after eating chines pain, mostly in epigastric < RUQ radiate radiate to her back No vomiting but has nausea.  No fever may have had chills. No diarrhea. No sick contacts. H/O o off and on RUQ pain, H/O known gallstones for 30 years. No Chest pain ,no SOB, H/O PE X 3 on coumadin. (30 Apr 2018 15:00)  Pt seen and examined, NAD, mild RUQ pain, no fever no nausea.  ROS:.  [X] A ten-point review of systems was otherwise negative except as noted.  Systemic:	[ ] Fever	[ ] Chills	[ ] Night sweats    [ ] Fatigue	[ ] Other  [] Cardiovascular:  [] Pulmonary:  [] Renal/Urologic:  [] Gastrointestinal: abdominal pain, vomiting  [] Metabolic:  [] Neurologic:  [] Hematologic:  [] ENT:  [] Ophthalmologic:  [] Musculoskeletal:    [ ] Due to altered mental status/intubation, subjective information were not able to be obtained from the patient. History was obtained, to the extent possible, from review of the chart and collateral sources of information.    All other system review is negative .  PAST MEDICAL & SURGICAL HISTORY:  Factor 5 Leiden mutation, heterozygous  Pulmonary emboli  CHF (congestive heart failure)  Atrial fibrillation  H/O cardiac radiofrequency ablation  Cyst of breast, unspecified laterality: S/P excision  H/O knee surgery  H/O: hysterectomy    FAMILY HISTORY:  No pertinent family history in first degree relatives    Social HX:   Alcohol: Denied  Smoking: Denied  Drug Use: Denied        Vital Signs Last 24 Hrs  T(C): 36.3 (02 May 2018 12:06), Max: 36.6 (02 May 2018 05:31)  T(F): 97.4 (02 May 2018 12:06), Max: 97.8 (02 May 2018 05:31)  HR: 61 (02 May 2018 12:06) (58 - 61)  BP: 138/55 (02 May 2018 12:06) (114/46 - 138/55)  BP(mean): --  RR: 18 (02 May 2018 12:06) (17 - 18)  SpO2: 96% (02 May 2018 12:06) (92% - 97%)    I&O's Summary    01 May 2018 07:01  -  02 May 2018 07:00  --------------------------------------------------------  IN: 1000 mL / OUT: 0 mL / NET: 1000 mL        PHYSICAL EXAM:  Constitutional: NAD  Eyes:  WNL  ENMT:  WNL  Neck:  WNL, non tender  Back: Non tender  Respiratory: CTABL  Cardiovascular:  S1+S2+0  Gastrointestinal: Soft, ND , mild RUQ tenderness  Genitourinary:  WNL  Extremities: NV intact  Vascular:  Intact  Neurological: No focal neurological deficit,  CN, motor and sensory system grossly intact.  Skin: WNL  Musculoskeletal: WNL  Psychiatric: Grossly WNL        Labs:                          12.1   7.50  )-----------( 193      ( 02 May 2018 08:04 )             38.3       05-02    138  |  106  |  15  ----------------------------<  104<H>  4.1   |  24  |  0.87    Ca    8.5      02 May 2018 08:04    TPro  6.0  /  Alb  2.7<L>  /  TBili  0.3  /  DBili  x   /  AST  29  /  ALT  23  /  AlkPhos  47  05-02      PT/INR - ( 02 May 2018 08:04 )   PT: 16.1 sec;   INR: 1.48 ratio         PTT - ( 02 May 2018 08:04 )  PTT:28.3 sec
CC:Patient is a 82y old  Female who presents with a chief complaint of Abdominal pain (30 Apr 2018 15:00)      Subjective:  Pt seen and examined at bedside with chaperone. Pt is AAOx3, pt in no acute distress. (+) incisional pain control with meds. Pt denied c/o fever, chills, chest pain, SOB, N/V/D, extremity pain or dysfunction, hemoptysis, hematemesis, hematuria, hematochexia, headache, diplopia, vertigo, dizzyness. Pt tolerating diet, (+) void, (+) ambulation with assistance, (+) bowel function, incentive spirometry encouraged    ROS:  abd incisional pain, otherwise negative ROS    Vital Signs Last 24 Hrs  T(C): 36.7 (03 May 2018 11:43), Max: 36.8 (03 May 2018 09:55)  T(F): 98 (03 May 2018 11:43), Max: 98.2 (03 May 2018 09:55)  HR: 71 (03 May 2018 11:43) (50 - 71)  BP: 139/43 (03 May 2018 11:43) (100/37 - 158/53)  BP(mean): --  RR: 18 (03 May 2018 11:43) (9 - 19)  SpO2: 99% (03 May 2018 11:43) (94% - 100%)    Labs:                                12.5   9.70  )-----------( 238      ( 03 May 2018 07:15 )             39.3     CBC Full  -  ( 03 May 2018 07:15 )  WBC Count : 9.70 K/uL  Hemoglobin : 12.5 g/dL  Hematocrit : 39.3 %  Platelet Count - Automated : 238 K/uL  Mean Cell Volume : 94.7 fl  Mean Cell Hemoglobin : 30.1 pg  Mean Cell Hemoglobin Concentration : 31.8 gm/dL  Auto Neutrophil # : x  Auto Lymphocyte # : x  Auto Monocyte # : x  Auto Eosinophil # : x  Auto Basophil # : x  Auto Neutrophil % : x  Auto Lymphocyte % : x  Auto Monocyte % : x  Auto Eosinophil % : x  Auto Basophil % : x    05-03    140  |  110<H>  |  14  ----------------------------<  99  4.5   |  22  |  0.85    Ca    8.6      03 May 2018 07:15    TPro  6.0  /  Alb  2.7<L>  /  TBili  0.3  /  DBili  x   /  AST  29  /  ALT  23  /  AlkPhos  47  05-02    LIVER FUNCTIONS - ( 02 May 2018 08:04 )  Alb: 2.7 g/dL / Pro: 6.0 gm/dL / ALK PHOS: 47 U/L / ALT: 23 U/L / AST: 29 U/L / GGT: x           PT/INR - ( 03 May 2018 07:15 )   PT: 13.2 sec;   INR: 1.22 ratio         PTT - ( 02 May 2018 08:04 )  PTT:28.3 sec      Meds:  aspirin enteric coated 81 milliGRAM(s) Oral daily  docusate sodium 100 milliGRAM(s) Oral two times a day  heparin  Injectable 5000 Unit(s) SubCutaneous every 12 hours  HYDROmorphone  Injectable 0.5 milliGRAM(s) IV Push every 10 minutes PRN  levothyroxine 25 MICROGram(s) Oral daily  metoprolol succinate ER 50 milliGRAM(s) Oral daily  morphine  - Injectable 2 milliGRAM(s) IV Push every 4 hours PRN  oxyCODONE    IR 5 milliGRAM(s) Oral once  pantoprazole    Tablet 40 milliGRAM(s) Oral before breakfast  senna 2 Tablet(s) Oral at bedtime  sodium chloride 0.9%. 1000 milliLiter(s) IV Continuous <Continuous>      Radiology:      Physical exam:  Pt is aaox3  Pt in no acute distress  Resp: CTAB  CVS: S1S2(+)  ABD: bowel sounds (+), soft, non distended, no rebound, no guarding, no rigidity, no skin changes to exam. Incision sites are clean, dry, intact, no cellulitis, no d/c, no purulence, no fluctuance. (+) appropriate incisional tenderness to exam  EXT: no calf tenderness or edema to exam b/l, on VTE prophylaxis  Skin: no skin changes to exam, no jaundice or icteric sclera b/l
CC:Patient is a 82y old  Female who presents with a chief complaint of Abdominal pain (30 Apr 2018 15:00)      Subjective:  Pt seen and examined at bedside with chaperone. Pt is AAOx3, pt in no acute distress. Pt states tolerated abd incisional pain with meds, pt c/o dysuria, states she knows she has a UTI. Pt currently on cipro for UTI symptoms. Pt denied c/o fever, chills, chest pain, SOB, N/V/D, extremity pain or dysfunction, hemoptysis, hematemesis, hematuria, hematochexia, headache, diplopia, vertigo, dizzyness. Pt tolerating diet, (+) void, (+) ambulation, (+) bowel function, (+) incentive spirometry    ROS:  UTI symptoms, abd inicisional pain, otherwise negative ROS    Vital Signs Last 24 Hrs  T(C): 37 (05 May 2018 12:18), Max: 37 (05 May 2018 12:18)  T(F): 98.6 (05 May 2018 12:18), Max: 98.6 (05 May 2018 12:18)  HR: 66 (05 May 2018 12:18) (66 - 71)  BP: 124/49 (05 May 2018 12:18) (124/49 - 148/64)  BP(mean): --  RR: 20 (05 May 2018 12:18) (18 - 20)  SpO2: 95% (05 May 2018 12:18) (92% - 97%)    Labs:                      13.0   9.45  )-----------( 253      ( 05 May 2018 08:11 )             41.1     CBC Full  -  ( 05 May 2018 08:11 )  WBC Count : 9.45 K/uL  Hemoglobin : 13.0 g/dL  Hematocrit : 41.1 %  Platelet Count - Automated : 253 K/uL  Mean Cell Volume : 92.6 fl  Mean Cell Hemoglobin : 29.3 pg  Mean Cell Hemoglobin Concentration : 31.6 gm/dL  Auto Neutrophil # : x  Auto Lymphocyte # : x  Auto Monocyte # : x  Auto Eosinophil # : x  Auto Basophil # : x  Auto Neutrophil % : x  Auto Lymphocyte % : x  Auto Monocyte % : x  Auto Eosinophil % : x  Auto Basophil % : x    05-05    141  |  107  |  12  ----------------------------<  108<H>  3.9   |  30  |  0.94    Ca    8.8      05 May 2018 08:11        PT/INR - ( 05 May 2018 08:11 )   PT: 13.7 sec;   INR: 1.26 ratio         PTT - ( 05 May 2018 08:11 )  PTT:38.3 sec      Meds:  aspirin enteric coated 81 milliGRAM(s) Oral daily  ciprofloxacin   IVPB 400 milliGRAM(s) IV Intermittent every 12 hours  docusate sodium 100 milliGRAM(s) Oral two times a day  enoxaparin Injectable 90 milliGRAM(s) SubCutaneous every 12 hours  levothyroxine 25 MICROGram(s) Oral daily  metoprolol succinate ER 50 milliGRAM(s) Oral daily  morphine  - Injectable 2 milliGRAM(s) IV Push every 4 hours PRN  pantoprazole    Tablet 40 milliGRAM(s) Oral before breakfast  senna 2 Tablet(s) Oral at bedtime  warfarin 5 milliGRAM(s) Oral daily      Radiology:      Physical exam:  Pt is aaox3  Pt in no acute distress  Resp: CTAB  CVS: S1S2(+)  ABD: bowel sounds (+), soft, non distended, no rebound, no guarding, no rigidity, no skin changes to exam. Incision sites clean, dry, intact, no cellulitis, no d/c, no purulence, no fluctuance. (+) appropriate incisional tenderness to exam  EXT: no calf tenderness or edema to exam b/l, on VTE prophylaxis  Skin: no skin changes to exam, no jaundice or icteric sclera b/l
HPI:  82 Y old female with upper abdominal pain for 3-4 days was seen in ER yesterday was sent home presented again since she was not able to eat due to pain. Pain started  after eating chinese food, mostly in epigastric < RUQ radiate radiate to her back No vomiting but has nausea.  No fever may have had chills. No diarrhea. No sick contacts. H/O o off and on RUQ pain, H/O known gallstones for 30 years. No Chest pain ,no SOB, H/O PE X 3 on coumadin due to factor V leide, Now on 5 east s/p Lap ivana      Patient is a 82y old  Female who presents with a chief complaint of Abdominal pain (30 Apr 2018 15:00)      Consulted by Dr. Guillory  for VTE prophylaxis, risk stratification, and anticoagulation management.    PAST MEDICAL & SURGICAL HISTORY:  Factor 5 Leiden mutation, heterozygous  Pulmonary emboli  CHF (congestive heart failure)  Atrial fibrillation  H/O cardiac radiofrequency ablation  Cyst of breast, unspecified laterality: S/P excision  H/O knee surgery  H/O: hysterectomy      Dopplers neg for DVT    CrCl: 70.9    Caprini VTE Risk Score: #15  IMPROVE Bleeding Risk Score    Falls Risk:   High ( x )  Mod (  )  Low (  )    5/5/18: Patient seen at bedside with daughter- discussed coumadin- current INR 1.26---> to go home today- advised to take 6mg total then resume 3mg tomorrow- to be seen by Dr. Holloway on Monday for INR; continue Lovenox injections Q12hr    FAMILY HISTORY:  No pertinent family history in first degree relatives    Denies any personal or familial history of clotting or bleeding disorders.    Allergies    Ceftin (Other (Moderate))  eggs (Unknown)  flu vaccines (Other)  Macrodantin (Other)  Motrin (Other)  sulfa drugs (Other)    Intolerances        REVIEW OF SYSTEMS    (  )Fever	     (  )Constipation	(  )SOB				(  )Headache	(  )Dysuria  (  )Chills	     (  )Melena	(  )Dyspnea present on exertion	                    (  )Dizziness                    (  )Polyuria  (  )Nausea	     (  )Hematochezia	(  )Cough			                    (  )Syncope   	(  )Hematuria  (  )Vomiting    (  )Chest Pain	(  )Wheezing			(  )Weakness  (  )Diarrhea     (  )Palpitations	(  )Anorexia			(  )Myalgia    All other review of systems negative: Yes          PHYSICAL EXAM:    Constitutional: Appears Well    Neurological: A& O x 3    Skin: Warm    Respiratory and Thorax: normal effort; Breath sounds: normal; No rales/wheezing/rhonchi  	  Cardiovascular: S1, S2, regular, NMBR	    Gastrointestinal: BS + x 4Q, nontender	steristrips on abd clean dry and intact    Genitourinary:  Bladder nondistended, nontender    Musculoskeletal:   General Right:   no muscle/joint tenderness,   normal tone, no joint swelling,   ROM: limited/full	    General Left:   no muscle/joint tenderness,   normal tone, no joint swelling,   ROM: limited/full  ct    Lower extrems:   Right: no calf tenderness              negative abby's sign               + pedal pulses    Left:   no calf tenderness              negative abby's sign               + pedal pulses                          13.0   9.45  )-----------( 253      ( 05 May 2018 08:11 )             41.1       05-05    141  |  107  |  12  ----------------------------<  108<H>  3.9   |  30  |  0.94    Ca    8.8      05 May 2018 08:11        PT/INR - ( 05 May 2018 08:11 )   PT: 13.7 sec;   INR: 1.26 ratio         PTT - ( 05 May 2018 08:11 )  PTT:38.3 sec                          12.5   9.70  )-----------( 238      ( 03 May 2018 07:15 )             39.3       05-03    140  |  110<H>  |  14  ----------------------------<  99  4.5   |  22  |  0.85    Ca    8.6      03 May 2018 07:15        PT/INR - ( 03 May 2018 07:15 )   PT: 13.2 sec;   INR: 1.22 ratio         	MEDICATIONS  (STANDING):  aspirin enteric coated 81 milliGRAM(s) Oral daily  ciprofloxacin   IVPB 400 milliGRAM(s) IV Intermittent every 12 hours  docusate sodium 100 milliGRAM(s) Oral two times a day  enoxaparin Injectable 90 milliGRAM(s) SubCutaneous every 12 hours  levothyroxine 25 MICROGram(s) Oral daily  metoprolol succinate ER 50 milliGRAM(s) Oral daily  pantoprazole    Tablet 40 milliGRAM(s) Oral before breakfast  senna 2 Tablet(s) Oral at bedtime  warfarin 6 milliGRAM(s) Oral daily    DVT Prophylaxis:  LMWH                   ( x )  Heparin SQ           (  )  Coumadin             ( x )  Xarelto                  (  )  Eliquis                   (  )  Venodynes           ( x )  Ambulation          (  )  UFH                       (  )  Contraindicated  (  )
HPI:  Patient is a 82-year-old she has a history of hypertension, high cholesterol, moderate mitral regurgitation, factor V Leiden mutation, history of pulmonary embolism in the past, COPD, paroxysmal atrial fibrillation status post ablation, including pulmonary vein isolation, coronary artery disease status post drug-eluting stent to mid LAD February 2017, moderate to severe pulmonary hypertension , she was admitted with complains of abdominal pain, nausea and loss of appetite. She's been diagnosed to have cholelithiasis and acute cholecystitis. And she will need surgery. She denies any exertional chest pain, palpitations or dizziness. Dyspnea with moderate exertion but overall exercise tolerance has been stable. No recent angina or congestive heart failure. Since admission her nausea has resolved but she still complains of right upper quadrant pain on and off. Patient states she's been having this right upper quadrant pain on and off for the past 3-4 days prior to admission , she is awaiting surgery .     Xray Chest 1 View AP/PA. (04.29.18   Findings:    The heart is mildly enlarged.  The lungs are grossly clear. The apices   and hemidiaphragms are unremarkable. Degenerative changes of the   visualized osseous structures.        Impression:    No acute disease    No significant interval change as compared to  2/6/2017      PAST MEDICAL & SURGICAL HISTORY:  Factor 5 Leiden mutation, heterozygous  Pulmonary emboli in the past .  Atrial fibrillation  H/O cardiac radiofrequency ablation for A Fib  Cyst of breast, unspecified laterality: S/P excision  H/O knee surgery  H/O: hysterectomy  severe pulmonary hypertension  COPD      PREVIOUS DIAGNOSTIC TESTING:      ECHO  FINDINGS: April 2018 lVEF 60-65%,, moderate mitral regurgitation, severe pulmonary hypertension      MEDICATIONS  (STANDING):  ciprofloxacin   IVPB      ciprofloxacin   IVPB 400 milliGRAM(s) IV Intermittent every 12 hours  docusate sodium 100 milliGRAM(s) Oral two times a day  ezetimibe 10 milliGRAM(s) Oral at bedtime  heparin  Injectable 5000 Unit(s) SubCutaneous every 8 hours  lactated ringers. 1000 milliLiter(s) (50 mL/Hr) IV Continuous <Continuous>  levothyroxine 25 MICROGram(s) Oral daily  metoprolol succinate ER 50 milliGRAM(s) Oral daily  metroNIDAZOLE  IVPB 500 milliGRAM(s) IV Intermittent every 8 hours  metroNIDAZOLE  IVPB      senna 2 Tablet(s) Oral at bedtime  sodium chloride 0.9%. 1000 milliLiter(s) (75 mL/Hr) IV Continuous <Continuous>    MEDICATIONS  (PRN):  morphine  - Injectable 4 milliGRAM(s) IV Push every 4 hours PRN Moderate Pain (4 - 6)  morphine  - Injectable 6 milliGRAM(s) IV Push every 4 hours PRN Severe Pain (7 - 10)  ondansetron Injectable 4 milliGRAM(s) IV Push every 6 hours PRN Nausea        REVIEW OF SYSTEM:  Pertinent items are noted in HPI.  Constitutional	Negative for chills, fevers, sweats.    Eyes: 	Negative for visual disturbance.  Ears, nose, mouth, throat, and face: Negative for epistaxis, nasal congestion, sore throat .  Neck:	Negative for enlargement, pain and difficulty in swallowing  Respiration : Negative for cough, dyspnea on exertion, pleuritic chest pain and wheezing  Cardiovascular: Negative for chest pain, dyspnea and palpitations    Gastrointestinal : Negative for abdominal pain, diarrhea, nausea and vomiting  Genitourinary: Negative for dysuria, frequency and urinary incontinence .  Skin: Negative for  rash, pruritus, swelling, dryness .  	  Hematologic/lymphatic: Negative for bleeding and easy bruising  Musculoskeletal: Negative for arthralgias, back pain and muscle weakness.  Neurological: Negative for dizziness, headaches, seizures and tremors  Behavioral/Psych: Negative for mood change, depression.  Endocrine:	Negative for blurry vision, polydipsia and polyuria, diaphoresis.   Allergic/Immunologic:	Negative for anaphylaxis, angioedema and urticaria.      Vital Signs Last 24 Hrs  T(C): 36.6 (02 May 2018 05:31), Max: 36.6 (01 May 2018 11:51)  T(F): 97.8 (02 May 2018 05:31), Max: 97.8 (01 May 2018 11:51)  HR: 59 (02 May 2018 05:31) (58 - 61)  BP: 114/46 (02 May 2018 05:31) (114/46 - 129/81)  BP(mean): --  RR: 17 (02 May 2018 05:31) (17 - 18)  SpO2: 97% (02 May 2018 05:31) (92% - 97%)        PHYSICAL EXAM  General Appearance: cooperative, no acute distress,   HEENT: PERRL, conjunctiva clear, EOM's intact, non injected pharynx, no exudate .  Neck: Supple, , no adenopathy, thyroid: not enlarged, no carotid bruit or JVD  Back: Symmetric, no  tenderness,no soft tissue tenderness  Lungs: Clear to auscultation bilateral,no adventitious breath sounds, normal   expiratory phase  Heart: Regular rate and rhythm, S1, S2 normal, no murmur, rub or gallop  Abdomen: Soft, mild RUQ tenderness   , bowel sounds active , no hepatosplenomegaly  Extremities: no cyanosis or edema, no joint swelling  Skin: Skin color, texture normal, no rashes   Neurologic: Alert and oriented X3 , cranial nerves intact, sensory and motor normal,            LABS:                          12.1   7.50  )-----------( 193      ( 02 May 2018 08:04 )             38.3     05-02    138  |  106  |  15  ----------------------------<  104<H>  4.1   |  24  |  0.87    Ca    8.5      02 May 2018 08:04    TPro  6.0  /  Alb  2.7<L>  /  TBili  0.3  /  DBili  x   /  AST  29  /  ALT  23  /  AlkPhos  47  05-02          Pro BNP  711 04-29 @ 08:13  D Dimer  -- 04-29 @ 08:13    PT/INR - ( 02 May 2018 08:04 )   PT: 16.1 sec;   INR: 1.48 ratio         PTT - ( 02 May 2018 08:04 )  PTT:28.3 sec
Patient is a 82y old  Female who presents with a chief complaint of Abdominal pain (30 Apr 2018 15:00)      HPI:  82 Y old female with upper abdominal pain for 3-4 days was seen in ER yesterday was sent home presented again since she was not able to eat due to pain. Pain started  after eating chines pain, mostly in epigastric < RUQ radiate radiate to her back No vomiting but has nausea.  No fever may have had chills. No diarrhea. No sick contacts. H/O o off and on RUQ pain, H/O known gallstones for 30 years. No Chest pain ,no SOB, H/O PE X 3 on coumadin. (30 Apr 2018 15:00)  4/5: pt seen and examined, NAD, pain well controlled, no fever, no nausea, tolerating diet.  ROS:.  [X] A ten-point review of systems was otherwise negative except as noted.  Systemic:	[ ] Fever	[ ] Chills	[ ] Night sweats    [ ] Fatigue	[ ] Other  [] Cardiovascular:  [] Pulmonary:  [] Renal/Urologic:  [] Gastrointestinal: abdominal pain, vomiting  [] Metabolic:  [] Neurologic:  [] Hematologic:  [] ENT:  [] Ophthalmologic:  [] Musculoskeletal:    [ ] Due to altered mental status/intubation, subjective information were not able to be obtained from the patient. History was obtained, to the extent possible, from review of the chart and collateral sources of information.    All other system review is negative .  PAST MEDICAL & SURGICAL HISTORY:  Factor 5 Leiden mutation, heterozygous  Pulmonary emboli  CHF (congestive heart failure)  Atrial fibrillation  H/O cardiac radiofrequency ablation  Cyst of breast, unspecified laterality: S/P excision  H/O knee surgery  H/O: hysterectomy    FAMILY HISTORY:  No pertinent family history in first degree relatives    Social HX:   Alcohol: Denied  Smoking: Denied  Drug Use: Denied        Vital Signs Last 24 Hrs  T(C): 37.1 (04 May 2018 11:49), Max: 37.1 (04 May 2018 04:51)  T(F): 98.8 (04 May 2018 11:49), Max: 98.8 (04 May 2018 11:49)  HR: 69 (04 May 2018 11:49) (69 - 81)  BP: 138/53 (04 May 2018 11:49) (126/68 - 147/50)  BP(mean): --  RR: 18 (04 May 2018 11:49) (18 - 19)  SpO2: 98% (04 May 2018 11:49) (94% - 100%)    I&O's Summary    03 May 2018 07:01  -  04 May 2018 07:00  --------------------------------------------------------  IN: 823 mL / OUT: 0 mL / NET: 823 mL    04 May 2018 07:01  -  04 May 2018 13:46  --------------------------------------------------------  IN: 480 mL / OUT: 0 mL / NET: 480 mL        PHYSICAL EXAM:  Constitutional: NAD  Eyes:  WNL  ENMT:  WNL  Neck:  WNL, non tender  Back: Non tender  Respiratory: CTABL  Cardiovascular:  S1+S2+0  Gastrointestinal: Soft, ND , appropriately tender, dressing CDI.  Genitourinary:  WNL  Extremities: NV intact  Vascular:  Intact  Neurological: No focal neurological deficit,  CN, motor and sensory system grossly intact.  Skin: WNL, B/L LE swelling at base line.  Musculoskeletal: WNL  Psychiatric: Grossly WNL        Labs:                          12.5   9.70  )-----------( 238      ( 03 May 2018 07:15 )             39.3       05-03    140  |  110<H>  |  14  ----------------------------<  99  4.5   |  22  |  0.85    Ca    8.6      03 May 2018 07:15        PT/INR - ( 03 May 2018 07:15 )   PT: 13.2 sec;   INR: 1.22 ratio

## 2018-05-05 NOTE — DISCHARGE NOTE ADULT - NS AS ACTIVITY OBS
take all appropriate fall risk precautions/Walking-Indoors allowed/No Heavy lifting/straining/Do not make important decisions/Do not drive or operate machinery/Walking-Outdoors allowed/Stairs allowed

## 2018-05-05 NOTE — CONSULT NOTE ADULT - CONSULT REQUESTED DATE/TIME
01-May-2018 09:22
01-May-2018 16:37
02-May-2018 13:17
03-May-2018 12:44
05-May-2018 13:05
04-May-2018 15:09
04-May-2018 13:12

## 2018-05-05 NOTE — PROGRESS NOTE ADULT - ASSESSMENT
82 Y old female with cholelithiasis, acute cholecystitis, no fever, INR 1.48 today    Continue IV hydration  Continue IV antibiotics  Medicine following  Cardiology following  A/C on hold  Home meds  OR today for laparoscopic cholecystectomy
A/P:  Acute cholecystitis  Cardiology on consult, no contraindications for operative intervention  Pending medical clearance for operative intervention  NPO, IV hydration  IV antibiotics  GI/DVT prophylaxis  Pain control  Incentive spirometry  Serial abd exams  Medical comorbidities of Factor 5 Leiden mutation, Pulmonary emboli in the past, Atrial fibrillation, H/O cardiac radiofrequency ablation for A Fib, severe pulmonary hypertension, CAD, COPD  F/U labs, will reverse coagulopathy secondary to coumadin prior to operative intervention  Pt for surgical intervention of cholecystectomy  Pt aware of and agrees with all of the above
Acute cholecystitis s/p surgery  History of coronary artery disease status post stent to mid LAD February 2017 no recent angina or congestive heart failure.  History of factor V Leiden mutation, history of pulmonary embolism in the past.  COPD.  History of severe pulmonary hypertension.  Hypertension.  Paroxysmal atrial fibrillation status post pulmonary vein isolation.  Suggest  Continue with current cardiac medication.  Restart  Lasix  3 days. days.  Restart Warfarin to adjust INR 2.5  Ambulate.  Discussed with patient and her daughter.
82 Y old female with multiple medical problems, on A/C S/p laparoscopic cholecystectomy POD 2, doing well    Pain control  DVT prophylaxis  Start Coumadin, A/C  consult  medicine consult  Cardiology following  IVL  Diet as tolerated  PT, OOB, ambulate  Resume home meds  D/C planning pending therapeutic INR   Stable from surgical stand point.
83 yo female S/P lap ivana, POD#2, h/o Factor V leiden with numerous DVT/PE    d/c sq heparin    start treatment dose of Lovenox 90 mg sq q 12h, Ok with Surgery  Plan:  ::Increase coumadin to 6mg PO tonight at home then 3mg PO daily until seen by Dr. Holloway Monday for INR  ::INR Monday    Will follow.
A/P:  Acute cholecystitis, s/p laparascopic cholecystectomy 5/2/18  Cardiology on consult, cleared for med surg floor placement post operatively  Diet as tolerated  GI/DVT prophylaxis  Pain control  Incentive spirometry  Serial abd exams  Medical comorbidities of Factor 5 Leiden mutation, Pulmonary emboli in the past, Atrial fibrillation, H/O cardiac radiofrequency ablation for A Fib, severe pulmonary hypertension, CAD, COPD  Cont current care and meds  Anticoagulation service consult pending   for d/c planing  Pt aware of and agrees with all of the above
A/P:  Acute cholecystitis, s/p laparascopic cholecystectomy 5/2/18  UTI, on antibiotics  Cardiology on consult  Hospitalist on consult, antibiotics for UTI for 3 days (cipro)  Diet as tolerated  GI/DVT prophylaxis  Pain control  Incentive spirometry  Serial abd exams benign  Medical comorbidities of Factor 5 Leiden mutation, Pulmonary emboli in the past, Atrial fibrillation, H/O cardiac radiofrequency ablation for A Fib, severe pulmonary hypertension, CAD, COPD  Cont current care and meds  Anticoagulation service on consult, can go home with lovenox  Outpatient follow up with her cardiologist in 2-4 days for coumadin anticoagulation monitoring and treatment   for d/c planing  Pt aware of and agrees with all of the above
Acute cholecystitis.  History of coronary artery disease status post stent to mid LAD February 2017 no recent angina or congestive heart failure.  History of factor V Leiden mutation, history of pulmonary embolism in the past.  COPD.  History of severe pulmonary hypertension.  Hypertension.  Paroxysmal atrial fibrillation status post pulmonary vein isolation.  Suggest  Overall cardiac status is stable there is no absolute contraindication from cardiac standpoint of view for surgery.  Continue with metoprolol.  Hold Lasix for a few days.  Patient has been on warfarin check INR.   Restart anticoagulation postoperatively.  Early eburnation.  All risks options discussed with patient and her daughter.  Postoperatively observe patient on monitored floor.

## 2018-05-05 NOTE — DISCHARGE NOTE ADULT - CARE PLAN
Principal Discharge DX:	Calculus of gallbladder with acute cholecystitis without obstruction  Goal:	s/p cholecystectomy  Assessment and plan of treatment:	outpatient follow up with PMD, cardiology for coumadin anticoagulation monitoring and medical management of comorbidities

## 2018-05-05 NOTE — DISCHARGE NOTE ADULT - ADDITIONAL INSTRUCTIONS
outpatient follow up with PMD, and cardiology for coumadin anticoagulation monitoring and medical management of comorbidities. Please seek immediate medical attention for worsening abdominal pain, skin or eye color changes, chest pain, shortness of breath, any adverse changes to health. Please take all approrpitate fall risk precautions

## 2018-05-05 NOTE — DISCHARGE NOTE ADULT - PATIENT PORTAL LINK FT
You can access the [facility], offered by [facility], by registering with the following website: [email or web address]

## 2018-05-05 NOTE — DISCHARGE NOTE ADULT - HOSPITAL COURSE
Pt with cholecystitis, underwent laparascopic cholecystectomy. Pt developed UTI, on antibiotic therapy. pt stable and cleared for d/c by hospitalist, cardiac, surgical specialists. Outpatient follow up and instructions as relayed to pt and family

## 2018-05-05 NOTE — DISCHARGE NOTE ADULT - PLAN OF CARE
s/p cholecystectomy outpatient follow up with PMD, cardiology for coumadin anticoagulation monitoring and medical management of comorbidities

## 2018-05-05 NOTE — DISCHARGE NOTE ADULT - MEDICATION SUMMARY - MEDICATIONS TO TAKE
I will START or STAY ON the medications listed below when I get home from the hospital:    aspirin 81 mg oral tablet  -- 1 tab(s) by mouth once a day  -- Indication: For Cardioprotective    oxyCODONE 5 mg oral tablet  -- 1 tab(s) by mouth every 6 hours, As Needed -for moderate pain MDD:4 tabs   -- Caution federal law prohibits the transfer of this drug to any person other  than the person for whom it was prescribed.  It is very important that you take or use this exactly as directed.  Do not skip doses or discontinue unless directed by your doctor.  May cause drowsiness.  Alcohol may intensify this effect.  Use care when operating dangerous machinery.  This prescription cannot be refilled.  Using more of this medication than prescribed may cause serious breathing problems.    -- Indication: For pain    enoxaparin 100 mg/mL injectable solution  -- 90 milligram(s) subcutaneously every 12 hours   -- It is very important that you take or use this exactly as directed.  Do not skip doses or discontinue unless directed by your doctor.    -- Indication: For anticoagulation bridging    Coumadin  -- 3 milligram(s) by mouth once a day (at bedtime)  -- Indication: For anticoagulation    Ativan 0.5 mg oral tablet  -- 1 tab(s) by mouth 3 times a day, As Needed anxiety  -- Indication: For anxiety    ZyrTEC 10 mg oral tablet  -- 1 tab(s) by mouth once a day (at bedtime)  -- Indication: For allergy    Zetia 10 mg oral tablet  -- 1 tab(s) by mouth once a day (at bedtime)  -- Indication: For Hyperlipidemia    metoprolol succinate 50 mg oral tablet, extended release  -- 1 tab(s) by mouth once a day  -- Indication: For Htn    furosemide 20 mg oral tablet  -- 1 tab(s) by mouth once a day  -- Indication: For diuretic    Klor-Con 10 oral tablet, extended release  -- 1 tab(s) by mouth once a day  -- Indication: For nutritional supplement    CoQ10 300 mg oral capsule  -- 1 cap(s) by mouth once a day  -- Indication: For nutritional supplement    pantoprazole 40 mg oral delayed release tablet  -- 1 tab(s) by mouth once a day (before a meal)  -- Indication: For gerd    Cipro 500 mg oral tablet  -- 1 tab(s) by mouth 2 times a day   -- Avoid prolonged or excessive exposure to direct and/or artificial sunlight while taking this medication.  Check with your doctor before becoming pregnant.  Do not take dairy products, antacids, or iron preparations within one hour of this medication.  Finish all this medication unless otherwise directed by prescriber.  Medication should be taken with plenty of water.    -- Indication: For uti    levothyroxine  -- 25 microgram(s) by mouth once a day  -- Indication: For Hypothyroidism

## 2018-05-07 LAB
-  AMIKACIN: SIGNIFICANT CHANGE UP
-  AMOXICILLIN/CLAVULANIC ACID: SIGNIFICANT CHANGE UP
-  AMPICILLIN/SULBACTAM: SIGNIFICANT CHANGE UP
-  AMPICILLIN: SIGNIFICANT CHANGE UP
-  AZTREONAM: SIGNIFICANT CHANGE UP
-  CEFAZOLIN: SIGNIFICANT CHANGE UP
-  CEFEPIME: SIGNIFICANT CHANGE UP
-  CEFOXITIN: SIGNIFICANT CHANGE UP
-  CEFTRIAXONE: SIGNIFICANT CHANGE UP
-  CIPROFLOXACIN: SIGNIFICANT CHANGE UP
-  ERTAPENEM: SIGNIFICANT CHANGE UP
-  GENTAMICIN: SIGNIFICANT CHANGE UP
-  LEVOFLOXACIN: SIGNIFICANT CHANGE UP
-  MEROPENEM: SIGNIFICANT CHANGE UP
-  NITROFURANTOIN: SIGNIFICANT CHANGE UP
-  PIPERACILLIN/TAZOBACTAM: SIGNIFICANT CHANGE UP
-  TOBRAMYCIN: SIGNIFICANT CHANGE UP
-  TRIMETHOPRIM/SULFAMETHOXAZOLE: SIGNIFICANT CHANGE UP
CULTURE RESULTS: SIGNIFICANT CHANGE UP
METHOD TYPE: SIGNIFICANT CHANGE UP
ORGANISM # SPEC MICROSCOPIC CNT: SIGNIFICANT CHANGE UP
ORGANISM # SPEC MICROSCOPIC CNT: SIGNIFICANT CHANGE UP
SPECIMEN SOURCE: SIGNIFICANT CHANGE UP

## 2018-05-08 LAB — SURGICAL PATHOLOGY FINAL REPORT - CH: SIGNIFICANT CHANGE UP

## 2018-05-09 ENCOUNTER — APPOINTMENT (OUTPATIENT)
Dept: SURGERY | Facility: CLINIC | Age: 83
End: 2018-05-09

## 2018-05-10 RX ORDER — OXYCODONE HYDROCHLORIDE 5 MG/1
1 TABLET ORAL
Qty: 20 | Refills: 0 | OUTPATIENT
Start: 2018-05-10 | End: 2018-05-14

## 2018-05-11 ENCOUNTER — EMERGENCY (EMERGENCY)
Facility: HOSPITAL | Age: 83
LOS: 0 days | Discharge: HOME | End: 2018-05-11
Attending: EMERGENCY MEDICINE | Admitting: EMERGENCY MEDICINE
Payer: MEDICARE

## 2018-05-11 VITALS — WEIGHT: 199.96 LBS | HEIGHT: 63 IN

## 2018-05-11 VITALS
TEMPERATURE: 98 F | SYSTOLIC BLOOD PRESSURE: 160 MMHG | DIASTOLIC BLOOD PRESSURE: 76 MMHG | OXYGEN SATURATION: 97 % | RESPIRATION RATE: 17 BRPM | HEART RATE: 67 BPM

## 2018-05-11 DIAGNOSIS — D68.51 ACTIVATED PROTEIN C RESISTANCE: ICD-10-CM

## 2018-05-11 DIAGNOSIS — I50.9 HEART FAILURE, UNSPECIFIED: ICD-10-CM

## 2018-05-11 DIAGNOSIS — I11.0 HYPERTENSIVE HEART DISEASE WITH HEART FAILURE: ICD-10-CM

## 2018-05-11 DIAGNOSIS — Z90.710 ACQUIRED ABSENCE OF BOTH CERVIX AND UTERUS: Chronic | ICD-10-CM

## 2018-05-11 DIAGNOSIS — I27.20 PULMONARY HYPERTENSION, UNSPECIFIED: ICD-10-CM

## 2018-05-11 DIAGNOSIS — K80.01 CALCULUS OF GALLBLADDER WITH ACUTE CHOLECYSTITIS WITH OBSTRUCTION: ICD-10-CM

## 2018-05-11 DIAGNOSIS — Z95.5 PRESENCE OF CORONARY ANGIOPLASTY IMPLANT AND GRAFT: ICD-10-CM

## 2018-05-11 DIAGNOSIS — I50.32 CHRONIC DIASTOLIC (CONGESTIVE) HEART FAILURE: ICD-10-CM

## 2018-05-11 DIAGNOSIS — Z98.890 OTHER SPECIFIED POSTPROCEDURAL STATES: Chronic | ICD-10-CM

## 2018-05-11 DIAGNOSIS — N60.09 SOLITARY CYST OF UNSPECIFIED BREAST: Chronic | ICD-10-CM

## 2018-05-11 DIAGNOSIS — R07.9 CHEST PAIN, UNSPECIFIED: ICD-10-CM

## 2018-05-11 DIAGNOSIS — I48.0 PAROXYSMAL ATRIAL FIBRILLATION: ICD-10-CM

## 2018-05-11 DIAGNOSIS — R21 RASH AND OTHER NONSPECIFIC SKIN ERUPTION: ICD-10-CM

## 2018-05-11 DIAGNOSIS — I34.0 NONRHEUMATIC MITRAL (VALVE) INSUFFICIENCY: ICD-10-CM

## 2018-05-11 DIAGNOSIS — I48.91 UNSPECIFIED ATRIAL FIBRILLATION: ICD-10-CM

## 2018-05-11 DIAGNOSIS — Z86.711 PERSONAL HISTORY OF PULMONARY EMBOLISM: ICD-10-CM

## 2018-05-11 DIAGNOSIS — I25.10 ATHEROSCLEROTIC HEART DISEASE OF NATIVE CORONARY ARTERY WITHOUT ANGINA PECTORIS: ICD-10-CM

## 2018-05-11 DIAGNOSIS — B02.9 ZOSTER WITHOUT COMPLICATIONS: ICD-10-CM

## 2018-05-11 DIAGNOSIS — Z79.01 LONG TERM (CURRENT) USE OF ANTICOAGULANTS: ICD-10-CM

## 2018-05-11 DIAGNOSIS — N39.0 URINARY TRACT INFECTION, SITE NOT SPECIFIED: ICD-10-CM

## 2018-05-11 LAB
ALBUMIN SERPL ELPH-MCNC: 3.5 G/DL — SIGNIFICANT CHANGE UP (ref 3.3–5)
ALP SERPL-CCNC: 67 U/L — SIGNIFICANT CHANGE UP (ref 40–120)
ALT FLD-CCNC: 31 U/L — SIGNIFICANT CHANGE UP (ref 12–78)
ANION GAP SERPL CALC-SCNC: 7 MMOL/L — SIGNIFICANT CHANGE UP (ref 5–17)
APTT BLD: 42.3 SEC — HIGH (ref 27.5–37.4)
AST SERPL-CCNC: 47 U/L — HIGH (ref 15–37)
BASOPHILS # BLD AUTO: 0.06 K/UL — SIGNIFICANT CHANGE UP (ref 0–0.2)
BASOPHILS NFR BLD AUTO: 0.8 % — SIGNIFICANT CHANGE UP (ref 0–2)
BILIRUB SERPL-MCNC: 0.7 MG/DL — SIGNIFICANT CHANGE UP (ref 0.2–1.2)
BUN SERPL-MCNC: 12 MG/DL — SIGNIFICANT CHANGE UP (ref 7–23)
CALCIUM SERPL-MCNC: 8.7 MG/DL — SIGNIFICANT CHANGE UP (ref 8.5–10.1)
CHLORIDE SERPL-SCNC: 98 MMOL/L — SIGNIFICANT CHANGE UP (ref 96–108)
CK SERPL-CCNC: 107 U/L — SIGNIFICANT CHANGE UP (ref 26–192)
CO2 SERPL-SCNC: 27 MMOL/L — SIGNIFICANT CHANGE UP (ref 22–31)
CREAT SERPL-MCNC: 1 MG/DL — SIGNIFICANT CHANGE UP (ref 0.5–1.3)
EOSINOPHIL # BLD AUTO: 1.23 K/UL — HIGH (ref 0–0.5)
EOSINOPHIL NFR BLD AUTO: 15.8 % — HIGH (ref 0–6)
GLUCOSE SERPL-MCNC: 119 MG/DL — HIGH (ref 70–99)
HCT VFR BLD CALC: 41.6 % — SIGNIFICANT CHANGE UP (ref 34.5–45)
HGB BLD-MCNC: 13.6 G/DL — SIGNIFICANT CHANGE UP (ref 11.5–15.5)
IMM GRANULOCYTES NFR BLD AUTO: 0.3 % — SIGNIFICANT CHANGE UP (ref 0–1.5)
INR BLD: 2.97 RATIO — HIGH (ref 0.88–1.16)
LIDOCAIN IGE QN: 66 U/L — LOW (ref 73–393)
LYMPHOCYTES # BLD AUTO: 1.6 K/UL — SIGNIFICANT CHANGE UP (ref 1–3.3)
LYMPHOCYTES # BLD AUTO: 20.6 % — SIGNIFICANT CHANGE UP (ref 13–44)
MCHC RBC-ENTMCNC: 29.7 PG — SIGNIFICANT CHANGE UP (ref 27–34)
MCHC RBC-ENTMCNC: 32.7 GM/DL — SIGNIFICANT CHANGE UP (ref 32–36)
MCV RBC AUTO: 90.8 FL — SIGNIFICANT CHANGE UP (ref 80–100)
MONOCYTES # BLD AUTO: 1.2 K/UL — HIGH (ref 0–0.9)
MONOCYTES NFR BLD AUTO: 15.4 % — HIGH (ref 2–14)
NEUTROPHILS # BLD AUTO: 3.66 K/UL — SIGNIFICANT CHANGE UP (ref 1.8–7.4)
NEUTROPHILS NFR BLD AUTO: 47.1 % — SIGNIFICANT CHANGE UP (ref 43–77)
NRBC # BLD: 0 /100 WBCS — SIGNIFICANT CHANGE UP (ref 0–0)
NT-PROBNP SERPL-SCNC: 4872 PG/ML — HIGH (ref 0–450)
PLATELET # BLD AUTO: 236 K/UL — SIGNIFICANT CHANGE UP (ref 150–400)
POTASSIUM SERPL-MCNC: 4.6 MMOL/L — SIGNIFICANT CHANGE UP (ref 3.5–5.3)
POTASSIUM SERPL-SCNC: 4.6 MMOL/L — SIGNIFICANT CHANGE UP (ref 3.5–5.3)
PROT SERPL-MCNC: 7.9 GM/DL — SIGNIFICANT CHANGE UP (ref 6–8.3)
PROTHROM AB SERPL-ACNC: 32.8 SEC — HIGH (ref 9.8–12.7)
RBC # BLD: 4.58 M/UL — SIGNIFICANT CHANGE UP (ref 3.8–5.2)
RBC # FLD: 14.9 % — HIGH (ref 10.3–14.5)
SODIUM SERPL-SCNC: 132 MMOL/L — LOW (ref 135–145)
TROPONIN I SERPL-MCNC: <0.015 NG/ML — SIGNIFICANT CHANGE UP (ref 0.01–0.04)
WBC # BLD: 7.77 K/UL — SIGNIFICANT CHANGE UP (ref 3.8–10.5)
WBC # FLD AUTO: 7.77 K/UL — SIGNIFICANT CHANGE UP (ref 3.8–10.5)

## 2018-05-11 PROCEDURE — 93010 ELECTROCARDIOGRAM REPORT: CPT

## 2018-05-11 PROCEDURE — 99285 EMERGENCY DEPT VISIT HI MDM: CPT

## 2018-05-11 PROCEDURE — 74177 CT ABD & PELVIS W/CONTRAST: CPT | Mod: 26

## 2018-05-11 PROCEDURE — 71045 X-RAY EXAM CHEST 1 VIEW: CPT | Mod: 26

## 2018-05-11 PROCEDURE — 71275 CT ANGIOGRAPHY CHEST: CPT | Mod: 26

## 2018-05-11 RX ORDER — SODIUM CHLORIDE 9 MG/ML
3 INJECTION INTRAMUSCULAR; INTRAVENOUS; SUBCUTANEOUS ONCE
Qty: 0 | Refills: 0 | Status: COMPLETED | OUTPATIENT
Start: 2018-05-11 | End: 2018-05-11

## 2018-05-11 RX ORDER — VALACYCLOVIR 500 MG/1
1 TABLET, FILM COATED ORAL
Qty: 21 | Refills: 0 | OUTPATIENT
Start: 2018-05-11 | End: 2018-05-17

## 2018-05-11 RX ORDER — FUROSEMIDE 40 MG
40 TABLET ORAL ONCE
Qty: 0 | Refills: 0 | Status: COMPLETED | OUTPATIENT
Start: 2018-05-11 | End: 2018-05-11

## 2018-05-11 RX ORDER — VALACYCLOVIR 500 MG/1
1000 TABLET, FILM COATED ORAL ONCE
Qty: 0 | Refills: 0 | Status: COMPLETED | OUTPATIENT
Start: 2018-05-11 | End: 2018-05-11

## 2018-05-11 RX ORDER — TRAMADOL HYDROCHLORIDE 50 MG/1
50 TABLET ORAL ONCE
Qty: 0 | Refills: 0 | Status: DISCONTINUED | OUTPATIENT
Start: 2018-05-11 | End: 2018-05-11

## 2018-05-11 RX ORDER — GABAPENTIN 400 MG/1
300 CAPSULE ORAL ONCE
Qty: 0 | Refills: 0 | Status: COMPLETED | OUTPATIENT
Start: 2018-05-11 | End: 2018-05-11

## 2018-05-11 RX ORDER — MORPHINE SULFATE 50 MG/1
2 CAPSULE, EXTENDED RELEASE ORAL ONCE
Qty: 0 | Refills: 0 | Status: DISCONTINUED | OUTPATIENT
Start: 2018-05-11 | End: 2018-05-11

## 2018-05-11 RX ORDER — KETOROLAC TROMETHAMINE 30 MG/ML
30 SYRINGE (ML) INJECTION ONCE
Qty: 0 | Refills: 0 | Status: DISCONTINUED | OUTPATIENT
Start: 2018-05-11 | End: 2018-05-11

## 2018-05-11 RX ORDER — GABAPENTIN 400 MG/1
1 CAPSULE ORAL
Qty: 21 | Refills: 0 | OUTPATIENT
Start: 2018-05-11 | End: 2018-05-17

## 2018-05-11 RX ORDER — ONDANSETRON 8 MG/1
4 TABLET, FILM COATED ORAL ONCE
Qty: 0 | Refills: 0 | Status: COMPLETED | OUTPATIENT
Start: 2018-05-11 | End: 2018-05-11

## 2018-05-11 RX ADMIN — SODIUM CHLORIDE 3 MILLILITER(S): 9 INJECTION INTRAMUSCULAR; INTRAVENOUS; SUBCUTANEOUS at 05:14

## 2018-05-11 RX ADMIN — GABAPENTIN 300 MILLIGRAM(S): 400 CAPSULE ORAL at 12:08

## 2018-05-11 RX ADMIN — VALACYCLOVIR 1000 MILLIGRAM(S): 500 TABLET, FILM COATED ORAL at 05:39

## 2018-05-11 RX ADMIN — TRAMADOL HYDROCHLORIDE 50 MILLIGRAM(S): 50 TABLET ORAL at 05:40

## 2018-05-11 RX ADMIN — MORPHINE SULFATE 2 MILLIGRAM(S): 50 CAPSULE, EXTENDED RELEASE ORAL at 10:14

## 2018-05-11 RX ADMIN — Medication 30 MILLIGRAM(S): at 05:40

## 2018-05-11 RX ADMIN — ONDANSETRON 4 MILLIGRAM(S): 8 TABLET, FILM COATED ORAL at 10:15

## 2018-05-11 RX ADMIN — Medication 40 MILLIGRAM(S): at 05:40

## 2018-05-11 NOTE — ED PROVIDER NOTE - HEME LYMPH, MLM
No adenopathy or splenomegaly. No cervical or inguinal lymphadenopathy. 1+ pitting edema lower legs bilaterally

## 2018-05-11 NOTE — ED PROVIDER NOTE - CONSTITUTIONAL, MLM
normal... Well appearing, well nourished, awake, alert, oriented to person, place, time/situation and in no apparent distress.  Speaking full sentences

## 2018-05-11 NOTE — ED ADULT TRIAGE NOTE - CHIEF COMPLAINT QUOTE
dull chest pain starting tonight, also reports mid back pain starting one week ago. no respiratory distress, dull chest pain starting tonight, also reports mid back pain starting one week ago. no respiratory distress. cardiologist: dr. melton dull chest pain starting tonight, also reports mid back pain starting one week ago. no respiratory distress. cardiologist: dr. melton. recent admission for laparoscopic cholecystectomy on 5/2/18.

## 2018-05-11 NOTE — ED PROVIDER NOTE - SKIN, MLM
Skin normal color for race, warm, dry and intact. +7-8 vesicular lesions on LUQ of abdomen with surrounding erythema.  No rash on back.

## 2018-05-11 NOTE — ED ADULT NURSE NOTE - OBJECTIVE STATEMENT
pt c/o dull, midsternal chest pain starting tonight. no acute distress noted, no sob. recently had a lap ivana on 5/2/18. also reports back pain since the procedure. pt a&ox3.

## 2018-05-11 NOTE — ED PROVIDER NOTE - OBJECTIVE STATEMENT
Pt. is an 83 yo F with a hx of CAD, cardiac stent, CHF, HTN, hysterectomy, knee surgery, atrial fibrillation on coumadin, factor 5 leiden mutation, hx of PEs BIB daughter for left sided upper back pain X years but worsened over the past week.  Pt. is post op 7 days from cholecystectomy.  Noticing rash on left upper abdomen for a few days. Denies fever.  +decreased PO intake due to the pain.  Took oxycodone last night and still couldn't sleep.  Saw PMD Wishner in office yesterday for post op check.  +leg swelling  Pt. told daugher she is now having left sided chest pain and heaviness and c/o exertional SOB.  PMd Wishner; Cards Amie

## 2018-05-11 NOTE — ED ADULT TRIAGE NOTE - MODE OF ARRIVAL
02/16/18 1021   Child Life   Location Surgery  (Ear tubes)   Intervention Family Support   Family Support Comment This CCLS met Linares and parents upon arrival to the surgery center.  Linares was fussy in room and light wand was provided for distration.  Spoke with parents about surgery process, preparing dad for PPI experience. CCLS not present for induction.    Growth and Development Comment age appropriate   Anxiety Appropriate   Fears/Concerns medical procedures;new situations;separation   Techniques Used to [place]/Comfort/Calm family presence;diversional activity   Methods to Gain Cooperation distractions   Able to Shift Focus From Anxiety Easy   Outcomes/Follow Up Continue to Follow/Support      Private Vehicle

## 2018-05-11 NOTE — ED PROVIDER NOTE - PROGRESS NOTE DETAILS
Attending MD Cedillo: Sign out from Dr. Mandujano, CTA chest and 2nd trop pending.  If negative, pt can be d/c.

## 2018-05-11 NOTE — ED PROVIDER NOTE - MEDICAL DECISION MAKING DETAILS
Post op chest pain and left upper back pain.  Will check labs to r/o ACS, CHF exacerbation; will check CTA chest to r/o PE.  Will give valtrex for rash on left abdomen.  Pain could be early zoster infection.

## 2018-05-11 NOTE — ED ADULT NURSE NOTE - CHIEF COMPLAINT QUOTE
dull chest pain starting tonight, also reports mid back pain starting one week ago. no respiratory distress. cardiologist: dr. melton. recent admission for laparoscopic cholecystectomy on 5/2/18.

## 2018-08-13 PROBLEM — I26.99 OTHER PULMONARY EMBOLISM WITHOUT ACUTE COR PULMONALE: Chronic | Status: ACTIVE | Noted: 2017-02-16

## 2018-08-13 PROBLEM — I50.9 HEART FAILURE, UNSPECIFIED: Chronic | Status: ACTIVE | Noted: 2017-02-09

## 2018-08-13 PROBLEM — I48.91 UNSPECIFIED ATRIAL FIBRILLATION: Chronic | Status: ACTIVE | Noted: 2017-02-09

## 2018-08-13 PROBLEM — D68.51 ACTIVATED PROTEIN C RESISTANCE: Chronic | Status: ACTIVE | Noted: 2017-02-16

## 2018-08-17 ENCOUNTER — OUTPATIENT (OUTPATIENT)
Dept: OUTPATIENT SERVICES | Facility: HOSPITAL | Age: 83
LOS: 1 days | Discharge: HOME | End: 2018-08-17
Payer: MEDICARE

## 2018-08-17 DIAGNOSIS — Z98.890 OTHER SPECIFIED POSTPROCEDURAL STATES: Chronic | ICD-10-CM

## 2018-08-17 DIAGNOSIS — N60.09 SOLITARY CYST OF UNSPECIFIED BREAST: Chronic | ICD-10-CM

## 2018-08-17 DIAGNOSIS — Z90.710 ACQUIRED ABSENCE OF BOTH CERVIX AND UTERUS: Chronic | ICD-10-CM

## 2018-08-17 DIAGNOSIS — Z86.711 PERSONAL HISTORY OF PULMONARY EMBOLISM: ICD-10-CM

## 2018-08-17 PROCEDURE — 71046 X-RAY EXAM CHEST 2 VIEWS: CPT | Mod: 26

## 2018-08-17 PROCEDURE — 78582 LUNG VENTILAT&PERFUS IMAGING: CPT | Mod: 26

## 2018-09-20 RX ORDER — WARFARIN SODIUM 2.5 MG/1
3 TABLET ORAL
Qty: 0 | Refills: 0 | COMMUNITY

## 2018-09-20 RX ORDER — FUROSEMIDE 40 MG
1 TABLET ORAL
Qty: 0 | Refills: 0 | COMMUNITY

## 2018-09-20 NOTE — H&P ADULT - NSHPREVIEWOFSYSTEMS_GEN_ALL_CORE
CONSTITUTIONAL: No fever, weight loss, or fatigue  EYES: No eye pain, visual disturbances, or discharge  ENMT:  No difficulty hearing, tinnitus, vertigo; No sinus or throat pain  NECK: No pain or stiffness  BREASTS: No pain, masses, or nipple discharge  RESPIRATORY: +No shortness of breath on exertion  CARDIOVASCULAR: No chest pain, palpitations, dizziness, or leg swelling  GASTROINTESTINAL: No abdominal or epigastric pain. No nausea, vomiting, or hematemesis; No diarrhea or constipation. No melena or hematochezia.  GENITOURINARY: No dysuria, frequency, hematuria, or incontinence  NEUROLOGICAL: No headaches, memory loss, loss of strength, numbness, or tremors  SKIN: No itching, burning, rashes, or lesions   LYMPH NODES: No enlarged glands  ENDOCRINE: No heat or cold intolerance; No hair loss  MUSCULOSKELETAL: No joint pain or swelling; No muscle, back, or extremity pain  PSYCHIATRIC: No depression, anxiety, mood swings, or difficulty sleeping  HEME/LYMPH: No easy bruising, or bleeding gums  ALLERGY AND IMMUNOLOGIC: No hives or eczema

## 2018-09-20 NOTE — H&P ADULT - PMH
Atrial fibrillation    CAD (coronary artery disease)    CHF (congestive heart failure)    Factor 5 Leiden mutation, heterozygous    Pulmonary emboli    Stented coronary artery Atrial fibrillation    CAD (coronary artery disease)    CHF (congestive heart failure)    COPD (chronic obstructive pulmonary disease)    Factor 5 Leiden mutation, heterozygous    HTN (hypertension)    Hypothyroid    Mitral regurgitation    Pulmonary emboli    Stented coronary artery

## 2018-09-20 NOTE — ASU PATIENT PROFILE, ADULT - PMH
Atrial fibrillation    CAD (coronary artery disease)    CHF (congestive heart failure)    COPD (chronic obstructive pulmonary disease)    Factor 5 Leiden mutation, heterozygous    HTN (hypertension)    Hypothyroid    Mitral regurgitation    Pulmonary emboli    Stented coronary artery

## 2018-09-20 NOTE — ASU PATIENT PROFILE, ADULT - REASON FOR ADMISSION, PROFILE
I was getting more and more short of breath recently and had a stent in the past so Dr. Marc sent me here for an angiogram

## 2018-09-20 NOTE — H&P ADULT - ASSESSMENT
This is a 83 y.o female with PMHx of HTN, HLD, mod MR factor V Leiden, PE (on Eliquis) COPD, Afib, s/p ablation, CAD, s/p PCI (2017), severe Pul. HTN was referred for Rt heat cath.

## 2018-09-20 NOTE — H&P ADULT - NSHPPHYSICALEXAM_GEN_ALL_CORE
PHYSICAL EXAM:  GENERAL: NAD, well-groomed, well-developed  HEAD:  Atraumatic, Normocephalic  EYES: EOMI, PERRLA, conjunctiva and sclera clear  ENMT: No tonsillar erythema, exudates, or enlargement; Moist mucous membranes, Good dentition, No lesions  NECK: Supple, No JVD, Normal thyroid  NERVOUS SYSTEM:  Alert & Oriented X3, Good concentration; Motor Strength 5/5 B/L upper and lower extremities; DTRs 2+ intact and symmetric  CHEST/LUNG: Clear to auscultation bilaterally; No rales, rhonchi, wheezing, or rubs  HEART: Regular rate and rhythm; No murmurs, rubs, or gallops  ABDOMEN: Soft, Nontender, Nondistended; Bowel sounds present  EXTREMITIES:  2+ Peripheral Pulses, No clubbing, cyanosis, or edema  LYMPH: No lymphadenopathy noted  SKIN: No rashes or lesions

## 2018-09-20 NOTE — ASU PATIENT PROFILE, ADULT - PSH
Cyst of breast, unspecified laterality  S/P excision  H/O cardiac radiofrequency ablation    H/O knee surgery    H/O: hysterectomy    S/P ablation of atrial fibrillation

## 2018-09-20 NOTE — H&P ADULT - PROBLEM SELECTOR PLAN 1
- plan for Rt heart cath   - risks and benefits were discussed with patient, all questions were answered   - informed consent signed, pt verbalized understanding

## 2018-09-20 NOTE — H&P ADULT - HISTORY OF PRESENT ILLNESS
This is a 83 y.o female with PMHx of HTN, HLD, mod MR factor V Leiden, PE (on Eliquis), COPD, Afib, s/p ablation, CAD, s/p PCI (2017), severe Pul. HTN was referred for Rt heat cath. This is a 83 y.o female with PMHx of HTN, HLD, mod MR factor V Leiden, PE (on Eliquis), COPD, Afib, s/p ablation, CAD, s/p PCI (2017), severe Pul. HTN was referred for Rt heat cath for further evaluation

## 2018-09-21 ENCOUNTER — OUTPATIENT (OUTPATIENT)
Dept: OUTPATIENT SERVICES | Facility: HOSPITAL | Age: 83
LOS: 1 days | Discharge: HOME | End: 2018-09-21

## 2018-09-21 VITALS
HEART RATE: 57 BPM | RESPIRATION RATE: 18 BRPM | SYSTOLIC BLOOD PRESSURE: 142 MMHG | OXYGEN SATURATION: 93 % | DIASTOLIC BLOOD PRESSURE: 67 MMHG

## 2018-09-21 VITALS — WEIGHT: 190.92 LBS | HEIGHT: 63 IN

## 2018-09-21 DIAGNOSIS — N60.09 SOLITARY CYST OF UNSPECIFIED BREAST: Chronic | ICD-10-CM

## 2018-09-21 DIAGNOSIS — Z90.710 ACQUIRED ABSENCE OF BOTH CERVIX AND UTERUS: Chronic | ICD-10-CM

## 2018-09-21 DIAGNOSIS — Z98.890 OTHER SPECIFIED POSTPROCEDURAL STATES: Chronic | ICD-10-CM

## 2018-09-21 DIAGNOSIS — I26.99 OTHER PULMONARY EMBOLISM WITHOUT ACUTE COR PULMONALE: ICD-10-CM

## 2018-09-21 DIAGNOSIS — I27.20 PULMONARY HYPERTENSION, UNSPECIFIED: ICD-10-CM

## 2018-09-21 NOTE — PROGRESS NOTE ADULT - ASSESSMENT
Patient now s/p angiogram that revealed Moderate pulmonary artery hypertension.   Normal PCWP   Normal RA pressure and RVEDP   Normal resting CO.     No change in PA pressure with Nitric Oxide.     Recommendations     Manage with medical therapy.    - DC home  - f/u with Dr in 1-2 weeks

## 2018-09-21 NOTE — PACU DISCHARGE NOTE - COMMENTS
Pt. and dtr. verb. agreement and understanding to and teach back to written and verbal discharge instructions and medication list.  Pt. discharged to home via private auto accompanied by dtr.

## 2018-09-21 NOTE — PROGRESS NOTE ADULT - SUBJECTIVE AND OBJECTIVE BOX
Cardiology NP     Patient is a 83y old  Female who presents with a chief complaint of Rt. heart cath (20 Sep 2018 16:59)      HPI:  This is a 83 y.o female with PMHx of HTN, HLD, mod MR factor V Leiden, PE (on Eliquis), COPD, Afib, s/p ablation, CAD, s/p PCI (2017), severe Pul. HTN was referred for Rt heat cath for further evaluation (20 Sep 2018 16:59)      PAST MEDICAL & SURGICAL HISTORY:  HTN (hypertension)  Mitral regurgitation  COPD (chronic obstructive pulmonary disease)  Hypothyroid  Stented coronary artery  CAD (coronary artery disease)  Factor 5 Leiden mutation, heterozygous  Pulmonary emboli  CHF (congestive heart failure)  Atrial fibrillation  S/P ablation of atrial fibrillation  H/O cardiac radiofrequency ablation  Cyst of breast, unspecified laterality: S/P excision  H/O knee surgery  H/O: hysterectomy  No significant past surgical history      MEDICATIONS  (STANDING):    MEDICATIONS  (PRN):      Allergies    Ceftin (Other (Moderate))  eggs (Unknown)  flu vaccines (Other)  Macrodantin (Other)  Motrin (Other)  sulfa drugs (Other)    Intolerances        REVIEW OF SYSTEMS: As mentioned in HPI all others Negative     Vital Signs Last 24 Hrs  T(C): 36 (21 Sep 2018 07:44), Max: 36 (21 Sep 2018 07:44)  T(F): 96.8 (21 Sep 2018 07:44), Max: 96.8 (21 Sep 2018 07:44)  HR: 55 (21 Sep 2018 07:44) (55 - 55)  BP: 168/79 (21 Sep 2018 07:44) (168/79 - 168/79)  BP(mean): 101 (21 Sep 2018 07:44) (101 - 101)  RR: 18 (21 Sep 2018 07:44) (18 - 18)  SpO2: 93% (21 Sep 2018 07:44) (93% - 93%)    PHYSICAL EXAM:  NERVOUS SYSTEM:  Alert & Oriented X3  CHEST/LUNG: Clear to auscultation bilaterally  HEART: Regular rate and rhythm  ABDOMEN: Soft, Nontender  EXTREMITIES:  + Peripheral Pulses, No edema  SKIN: right femoral cath site without bleeding or hematoma

## 2018-09-25 DIAGNOSIS — D68.2 HEREDITARY DEFICIENCY OF OTHER CLOTTING FACTORS: ICD-10-CM

## 2018-09-25 DIAGNOSIS — I27.20 PULMONARY HYPERTENSION, UNSPECIFIED: ICD-10-CM

## 2018-09-25 DIAGNOSIS — J44.9 CHRONIC OBSTRUCTIVE PULMONARY DISEASE, UNSPECIFIED: ICD-10-CM

## 2018-09-25 DIAGNOSIS — I25.10 ATHEROSCLEROTIC HEART DISEASE OF NATIVE CORONARY ARTERY WITHOUT ANGINA PECTORIS: ICD-10-CM

## 2018-09-25 DIAGNOSIS — Z88.2 ALLERGY STATUS TO SULFONAMIDES: ICD-10-CM

## 2018-09-25 DIAGNOSIS — E78.5 HYPERLIPIDEMIA, UNSPECIFIED: ICD-10-CM

## 2018-09-25 DIAGNOSIS — Z88.8 ALLERGY STATUS TO OTHER DRUGS, MEDICAMENTS AND BIOLOGICAL SUBSTANCES: ICD-10-CM

## 2018-09-25 DIAGNOSIS — Z95.5 PRESENCE OF CORONARY ANGIOPLASTY IMPLANT AND GRAFT: ICD-10-CM

## 2018-09-25 DIAGNOSIS — I48.91 UNSPECIFIED ATRIAL FIBRILLATION: ICD-10-CM

## 2018-09-25 DIAGNOSIS — I10 ESSENTIAL (PRIMARY) HYPERTENSION: ICD-10-CM

## 2018-09-25 DIAGNOSIS — Z91.012 ALLERGY TO EGGS: ICD-10-CM

## 2018-09-25 DIAGNOSIS — E03.9 HYPOTHYROIDISM, UNSPECIFIED: ICD-10-CM

## 2019-01-21 NOTE — DISCHARGE NOTE ADULT - CARE PROVIDER_API CALL
PROGRESS NOTE    Subjective:       Patient ID: Arlene Webster is a 65 y.o. female.    Chief Complaint:  Follow-up    Left Breast Cancer  Dia2011  Bilateral mastectomy 10/3/2011([facility])  Completed TAC x 6 2012  28mm tumor, 20/21 LNs pos.  T2N3M0 Stage IIIC  ER70%, IL 90%, Her2 neg  Completed XRT 2012.  Began antiestrogen 2012    History of Present Illness:   Arlene Webster is a 65 y.o. female who presents with a history of breast cancer here for follow up. Patient is feeling well.  No new complaints.     Family and Social history reviewed and is unchanged from 10/21/2011      ROS:  Review of Systems   Constitutional: Negative for fever.   Respiratory: Negative for shortness of breath.    Cardiovascular: Negative for chest pain and leg swelling.   Gastrointestinal: Negative for abdominal pain and blood in stool.   Genitourinary: Negative for hematuria.   Skin: Negative for rash.          Current Outpatient Medications:     ascorbic acid (VITAMIN C) 1000 MG tablet, Take 1,000 mg by mouth once daily., Disp: , Rfl:     aspirin (ECOTRIN) 81 MG EC tablet, Take 81 mg by mouth once daily., Disp: , Rfl:     BIOTIN ORAL, Take by mouth., Disp: , Rfl:     calcium citrate-vitamin D 200-200 mg-unit Tab, Take 1 tablet by mouth once daily., Disp: , Rfl:     clonazePAM (KLONOPIN) 1 MG tablet, Take 2 mg by mouth every evening., Disp: , Rfl:     cod liver oil Cap, Take 1 capsule by mouth once daily., Disp: , Rfl:     cyanocobalamin (VITAMIN B-12) 1000 MCG tablet, Take 100 mcg by mouth once daily., Disp: , Rfl:     dapagliflozin (FARXIGA) 10 mg Tab, Take 10 mg by mouth once daily., Disp: , Rfl:     diclofenac sodium 1 % Gel, Apply 2 g topically once daily. 4 Gm on affected knee qid prn, Disp: 1 Tube, Rfl: 3    dulaglutide (TRULICITY) 1.5 mg/0.5 mL PnIj, Inject 1.5 mg into the skin every 7 days., Disp: , Rfl:     flaxseed oil 1,000 mg Cap, Take 1  capsule by mouth once daily., Disp: , Rfl:     folic acid (FOLVITE) 800 MCG Tab, Take 800 mcg by mouth once daily., Disp: , Rfl:     glucosamine-chondroitin 500-400 mg tablet, Take 2 tablets by mouth once daily., Disp: , Rfl:     LACTOBAC NO.41/BIFIDOBACT NO.7 (PROBIOTIC-10 ORAL), Take by mouth., Disp: , Rfl:     levothyroxine (SYNTHROID) 200 MCG tablet, Take 200 mcg by mouth before breakfast. , Disp: , Rfl:     lisinopril 10 MG tablet, , Disp: , Rfl:     MULTIVITS-MIN/FA/CA CARB/VIT K (ONE-A-DAY WOMEN'S 50+ ORAL), Take 1 tablet by mouth once daily., Disp: , Rfl:     pantoprazole (PROTONIX) 40 MG tablet, Take 1 tablet (40 mg total) by mouth 2 (two) times daily., Disp: 60 tablet, Rfl: 1    pregabalin (LYRICA) 75 MG capsule, Take 1 capsule (75 mg total) by mouth 2 (two) times daily., Disp: 180 capsule, Rfl: 0    pyridoxine (VITAMIN B-6) 200 mg Tab, Take 1 tablet by mouth once daily., Disp: , Rfl:     tamoxifen (NOLVADEX) 20 MG Tab, Take 1 tablet (20 mg total) by mouth once daily., Disp: 90 tablet, Rfl: 3    VIBERZI 100 mg Tab, , Disp: , Rfl:     vit C-vit E-copper-ZnOx-lutein (PRESERVISION LUTEIN) 226-200-5-0.8 mg-unit-mg-mg Cap, Take 1 tablet by mouth 2 (two) times daily., Disp: , Rfl:     VITAMIN B COMPLEX ORAL, Take 1 capsule by mouth., Disp: , Rfl:     vitamin E 400 UNIT capsule, Take 400 Units by mouth once daily., Disp: , Rfl:         Objective:       Physical Examination:     BP (!) 131/56   Pulse 79   Temp 98.4 °F (36.9 °C)   Resp 20   Wt 84.2 kg (185 lb 11.2 oz)   BMI 29.97 kg/m²     Physical Exam   Constitutional: She appears well-developed and well-nourished.   HENT:   Head: Normocephalic and atraumatic.   Right Ear: External ear normal.   Left Ear: External ear normal.   Mouth/Throat: Oropharynx is clear and moist.   Eyes: Conjunctivae are normal. Pupils are equal, round, and reactive to light.   Neck: No tracheal deviation present. No thyromegaly present.   Cardiovascular: Normal rate,  regular rhythm and normal heart sounds.   Pulmonary/Chest: Effort normal and breath sounds normal.       Abdominal: Soft. Bowel sounds are normal. She exhibits no distension and no mass. There is no tenderness.   Musculoskeletal: She exhibits no edema.   Neurological:   Neuro intact througout   Skin: No rash noted.   Psychiatric: She has a normal mood and affect. Her behavior is normal. Judgment and thought content normal.       Labs:   No results found for this or any previous visit (from the past 336 hour(s)).  CMP  Sodium   Date Value Ref Range Status   01/08/2019 138 134 - 144 mmol/L      Potassium   Date Value Ref Range Status   01/08/2019 4.1 3.5 - 5.0 mmol/L      Chloride   Date Value Ref Range Status   01/08/2019 102 98 - 110 mmol/L      CO2   Date Value Ref Range Status   01/08/2019 27.4 22.8 - 31.6 mmol/L      Glucose   Date Value Ref Range Status   01/08/2019 117 (H) 70 - 99 mg/dL      BUN, Bld   Date Value Ref Range Status   01/08/2019 15 8 - 20 mg/dL      BUN   Date Value Ref Range Status   01/09/2018 15 7 - 21 mg/dL Final     Creatinine   Date Value Ref Range Status   01/08/2019 0.85 0.60 - 1.40 mg/dL      Calcium   Date Value Ref Range Status   01/08/2019 9.4 7.7 - 10.4 mg/dL      Total Protein   Date Value Ref Range Status   01/08/2019 7.1 6.0 - 8.2 g/dL      Albumin   Date Value Ref Range Status   01/08/2019 3.9 3.1 - 4.7 g/dL      Total Bilirubin   Date Value Ref Range Status   01/08/2019 0.8 0.3 - 1.0 mg/dL      Alkaline Phosphatase   Date Value Ref Range Status   01/08/2019 71 40 - 104 IU/L      AST   Date Value Ref Range Status   01/08/2019 57 (H) 10 - 40 IU/L      ALT   Date Value Ref Range Status   01/09/2018 68 (H) 7 - 56 unit/L Final     Anion Gap   Date Value Ref Range Status   01/09/2018 18 9 - 18 mEq/L Final     eGFR if    Date Value Ref Range Status   07/07/2017 107 > OR = 60 mL/min/1.73m2 Final     eGFR if non    Date Value Ref Range Status   07/07/2017  92 > OR = 60 mL/min/1.73m2 Final     Lab Results   Component Value Date    CEA 1.9 07/10/2018     No results found for: PSA        Assessment/Plan:     Problem List Items Addressed This Visit     Malignant neoplasm of upper-outer quadrant of left breast in female, estrogen receptor positive (Chronic)     Patient is doing well.  Exam is negative and she remains on Tamoxifen which she is tolerating well. Will continue treatment and will have her back in six months.           Relevant Orders    CBC auto differential    Comprehensive metabolic panel    CEA    Cancer antigen 15-3    Cancer antigen 27.29    X-Ray Chest PA And Lateral          Discussion:       Follow-up in about 6 months (around 7/21/2019).      Electronically signed by Yunier De La O       Praful Holloway (MD), Cardiovascular Disease; Internal Medicine  [address] [place] [address]  Phone: [phone]  Fax: [phone]

## 2019-10-31 NOTE — DISCHARGE NOTE ADULT - NSCORESITESY/N_GEN_A_CORE_RD
October 31, 2019      Irma Chua MD  [address]  [place] [address]           [facility]  [address]  [facility] [address]  Phone: [phone]          Patient: Jared Brannon   MR Number: [id number]   YOB: 2019   Date of Visit: 2019       Dear Dr. Irma Chua:    Thank you for referring Jared Brannon to me for evaluation. Attached you will find relevant portions of my assessment and plan of care.    If you have questions, please do not hesitate to call me. I look forward to following Jared Brannno along with you.    Sincerely,    Cora Arroyo MD    Enclosure  CC:  No Recipients    If you would like to receive this communication electronically, please contact [email or web address] or [phone] to request more information on [facility] Link access.    For providers and/or their staff who would like to refer a patient to [facility], please contact us through our one-stop-shop provider referral line, [facility], at [phone].    If you feel you have received this communication in error or would no longer like to receive these types of communications, please e-mail [email or web address]          Yes

## 2019-12-19 PROBLEM — J44.9 CHRONIC OBSTRUCTIVE PULMONARY DISEASE, UNSPECIFIED: Chronic | Status: ACTIVE | Noted: 2018-09-21

## 2019-12-19 PROBLEM — I25.10 ATHEROSCLEROTIC HEART DISEASE OF NATIVE CORONARY ARTERY WITHOUT ANGINA PECTORIS: Chronic | Status: ACTIVE | Noted: 2018-09-20

## 2019-12-19 PROBLEM — I34.0 NONRHEUMATIC MITRAL (VALVE) INSUFFICIENCY: Chronic | Status: ACTIVE | Noted: 2018-09-21

## 2019-12-19 PROBLEM — E03.9 HYPOTHYROIDISM, UNSPECIFIED: Chronic | Status: ACTIVE | Noted: 2018-09-21

## 2019-12-19 PROBLEM — Z95.5 PRESENCE OF CORONARY ANGIOPLASTY IMPLANT AND GRAFT: Chronic | Status: ACTIVE | Noted: 2018-09-20

## 2019-12-19 PROBLEM — I10 ESSENTIAL (PRIMARY) HYPERTENSION: Chronic | Status: ACTIVE | Noted: 2018-09-21

## 2019-12-20 ENCOUNTER — APPOINTMENT (OUTPATIENT)
Dept: OPHTHALMOLOGY | Facility: CLINIC | Age: 84
End: 2019-12-20
Payer: MEDICARE

## 2019-12-20 ENCOUNTER — EMERGENCY (EMERGENCY)
Facility: HOSPITAL | Age: 84
LOS: 0 days | Discharge: HOME | End: 2019-12-20
Attending: EMERGENCY MEDICINE
Payer: MEDICARE

## 2019-12-20 ENCOUNTER — NON-APPOINTMENT (OUTPATIENT)
Age: 84
End: 2019-12-20

## 2019-12-20 VITALS
TEMPERATURE: 98 F | RESPIRATION RATE: 16 BRPM | HEIGHT: 63 IN | HEART RATE: 77 BPM | WEIGHT: 190.04 LBS | OXYGEN SATURATION: 98 % | DIASTOLIC BLOOD PRESSURE: 114 MMHG | SYSTOLIC BLOOD PRESSURE: 160 MMHG

## 2019-12-20 VITALS
OXYGEN SATURATION: 95 % | TEMPERATURE: 100 F | SYSTOLIC BLOOD PRESSURE: 188 MMHG | RESPIRATION RATE: 16 BRPM | HEART RATE: 85 BPM | DIASTOLIC BLOOD PRESSURE: 89 MMHG

## 2019-12-20 DIAGNOSIS — Z98.890 OTHER SPECIFIED POSTPROCEDURAL STATES: Chronic | ICD-10-CM
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
## 2019-12-20 DIAGNOSIS — J44.9 CHRONIC OBSTRUCTIVE PULMONARY DISEASE, UNSPECIFIED: ICD-10-CM

## 2019-12-20 DIAGNOSIS — Z88.2 ALLERGY STATUS TO SULFONAMIDES: ICD-10-CM

## 2019-12-20 DIAGNOSIS — E03.9 HYPOTHYROIDISM, UNSPECIFIED: ICD-10-CM

## 2019-12-20 DIAGNOSIS — J02.9 ACUTE PHARYNGITIS, UNSPECIFIED: ICD-10-CM

## 2019-12-20 DIAGNOSIS — I10 ESSENTIAL (PRIMARY) HYPERTENSION: ICD-10-CM

## 2019-12-20 DIAGNOSIS — Z90.710 ACQUIRED ABSENCE OF BOTH CERVIX AND UTERUS: Chronic | ICD-10-CM

## 2019-12-20 DIAGNOSIS — N60.09 SOLITARY CYST OF UNSPECIFIED BREAST: Chronic | ICD-10-CM

## 2019-12-20 DIAGNOSIS — Z95.5 PRESENCE OF CORONARY ANGIOPLASTY IMPLANT AND GRAFT: ICD-10-CM

## 2019-12-20 DIAGNOSIS — I25.10 ATHEROSCLEROTIC HEART DISEASE OF NATIVE CORONARY ARTERY WITHOUT ANGINA PECTORIS: ICD-10-CM

## 2019-12-20 DIAGNOSIS — Z86.711 PERSONAL HISTORY OF PULMONARY EMBOLISM: ICD-10-CM

## 2019-12-20 DIAGNOSIS — D68.51 ACTIVATED PROTEIN C RESISTANCE: ICD-10-CM
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
## 2019-12-20 DIAGNOSIS — T50.995A ADVERSE EFFECT OF OTHER DRUGS, MEDICAMENTS AND BIOLOGICAL SUBSTANCES, INITIAL ENCOUNTER: ICD-10-CM

## 2019-12-20 DIAGNOSIS — Z88.1 ALLERGY STATUS TO OTHER ANTIBIOTIC AGENTS STATUS: ICD-10-CM

## 2019-12-20 DIAGNOSIS — Z91.012 ALLERGY TO EGGS: ICD-10-CM

## 2019-12-20 DIAGNOSIS — T37.5X5A ADVERSE EFFECT OF ANTIVIRAL DRUGS, INITIAL ENCOUNTER: ICD-10-CM

## 2019-12-20 LAB
ANION GAP SERPL CALC-SCNC: 3 MMOL/L — LOW (ref 5–17)
BASOPHILS # BLD AUTO: 0.05 K/UL — SIGNIFICANT CHANGE UP (ref 0–0.2)
BASOPHILS NFR BLD AUTO: 0.3 % — SIGNIFICANT CHANGE UP (ref 0–2)
BUN SERPL-MCNC: 27 MG/DL — HIGH (ref 7–23)
CALCIUM SERPL-MCNC: 9 MG/DL — SIGNIFICANT CHANGE UP (ref 8.5–10.1)
CHLORIDE SERPL-SCNC: 108 MMOL/L — SIGNIFICANT CHANGE UP (ref 96–108)
CO2 SERPL-SCNC: 32 MMOL/L — HIGH (ref 22–31)
CREAT SERPL-MCNC: 1 MG/DL — SIGNIFICANT CHANGE UP (ref 0.5–1.3)
EOSINOPHIL # BLD AUTO: 0.54 K/UL — HIGH (ref 0–0.5)
EOSINOPHIL NFR BLD AUTO: 3.1 % — SIGNIFICANT CHANGE UP (ref 0–6)
GLUCOSE SERPL-MCNC: 105 MG/DL — HIGH (ref 70–99)
HCT VFR BLD CALC: 41.8 % — SIGNIFICANT CHANGE UP (ref 34.5–45)
HGB BLD-MCNC: 13.8 G/DL — SIGNIFICANT CHANGE UP (ref 11.5–15.5)
IMM GRANULOCYTES NFR BLD AUTO: 0.3 % — SIGNIFICANT CHANGE UP (ref 0–1.5)
LYMPHOCYTES # BLD AUTO: 1.11 K/UL — SIGNIFICANT CHANGE UP (ref 1–3.3)
LYMPHOCYTES # BLD AUTO: 6.4 % — LOW (ref 13–44)
MCHC RBC-ENTMCNC: 30.1 PG — SIGNIFICANT CHANGE UP (ref 27–34)
MCHC RBC-ENTMCNC: 33 GM/DL — SIGNIFICANT CHANGE UP (ref 32–36)
MCV RBC AUTO: 91.1 FL — SIGNIFICANT CHANGE UP (ref 80–100)
MONOCYTES # BLD AUTO: 1.04 K/UL — HIGH (ref 0–0.9)
MONOCYTES NFR BLD AUTO: 6 % — SIGNIFICANT CHANGE UP (ref 2–14)
NEUTROPHILS # BLD AUTO: 14.47 K/UL — HIGH (ref 1.8–7.4)
NEUTROPHILS NFR BLD AUTO: 83.9 % — HIGH (ref 43–77)
PLATELET # BLD AUTO: 254 K/UL — SIGNIFICANT CHANGE UP (ref 150–400)
POTASSIUM SERPL-MCNC: 4.6 MMOL/L — SIGNIFICANT CHANGE UP (ref 3.5–5.3)
POTASSIUM SERPL-SCNC: 4.6 MMOL/L — SIGNIFICANT CHANGE UP (ref 3.5–5.3)
RBC # BLD: 4.59 M/UL — SIGNIFICANT CHANGE UP (ref 3.8–5.2)
RBC # FLD: 14 % — SIGNIFICANT CHANGE UP (ref 10.3–14.5)
S PYO AG SPEC QL IA: NEGATIVE — SIGNIFICANT CHANGE UP
SODIUM SERPL-SCNC: 143 MMOL/L — SIGNIFICANT CHANGE UP (ref 135–145)
WBC # BLD: 17.27 K/UL — HIGH (ref 3.8–10.5)
WBC # FLD AUTO: 17.27 K/UL — HIGH (ref 3.8–10.5)

## 2019-12-20 PROCEDURE — 87880 STREP A ASSAY W/OPTIC: CPT

## 2019-12-20 PROCEDURE — 36415 COLL VENOUS BLD VENIPUNCTURE: CPT

## 2019-12-20 PROCEDURE — 85025 COMPLETE CBC W/AUTO DIFF WBC: CPT

## 2019-12-20 PROCEDURE — 99284 EMERGENCY DEPT VISIT MOD MDM: CPT

## 2019-12-20 PROCEDURE — 96375 TX/PRO/DX INJ NEW DRUG ADDON: CPT

## 2019-12-20 PROCEDURE — 96374 THER/PROPH/DIAG INJ IV PUSH: CPT

## 2019-12-20 PROCEDURE — 92004 COMPRE OPH EXAM NEW PT 1/>: CPT

## 2019-12-20 PROCEDURE — 70491 CT SOFT TISSUE NECK W/DYE: CPT | Mod: 26

## 2019-12-20 PROCEDURE — 70491 CT SOFT TISSUE NECK W/DYE: CPT

## 2019-12-20 PROCEDURE — 87070 CULTURE OTHR SPECIMN AEROBIC: CPT

## 2019-12-20 PROCEDURE — 99284 EMERGENCY DEPT VISIT MOD MDM: CPT | Mod: 25

## 2019-12-20 PROCEDURE — 80048 BASIC METABOLIC PNL TOTAL CA: CPT

## 2019-12-20 RX ORDER — NYSTATIN 500MM UNIT
500000 POWDER (EA) MISCELLANEOUS ONCE
Refills: 0 | Status: COMPLETED | OUTPATIENT
Start: 2019-12-20 | End: 2019-12-20

## 2019-12-20 RX ORDER — DEXAMETHASONE 0.5 MG/5ML
10 ELIXIR ORAL ONCE
Refills: 0 | Status: COMPLETED | OUTPATIENT
Start: 2019-12-20 | End: 2019-12-20

## 2019-12-20 RX ORDER — FAMOTIDINE 10 MG/ML
1 INJECTION INTRAVENOUS
Qty: 14 | Refills: 0
Start: 2019-12-20 | End: 2019-12-26

## 2019-12-20 RX ORDER — DIPHENHYDRAMINE HCL 50 MG
1 CAPSULE ORAL
Qty: 21 | Refills: 0
Start: 2019-12-20 | End: 2019-12-26

## 2019-12-20 RX ORDER — NYSTATIN 500MM UNIT
4 POWDER (EA) MISCELLANEOUS
Qty: 48 | Refills: 0
Start: 2019-12-20 | End: 2019-12-21

## 2019-12-20 RX ORDER — DEXAMETHASONE 0.5 MG/5ML
10 ELIXIR ORAL ONCE
Refills: 0 | Status: DISCONTINUED | OUTPATIENT
Start: 2019-12-20 | End: 2019-12-20

## 2019-12-20 RX ORDER — METOPROLOL TARTRATE 50 MG
50 TABLET ORAL DAILY
Refills: 0 | Status: DISCONTINUED | OUTPATIENT
Start: 2019-12-20 | End: 2019-12-20

## 2019-12-20 RX ORDER — DIPHENHYDRAMINE HCL 50 MG
50 CAPSULE ORAL ONCE
Refills: 0 | Status: COMPLETED | OUTPATIENT
Start: 2019-12-20 | End: 2019-12-20

## 2019-12-20 RX ORDER — FAMOTIDINE 10 MG/ML
20 INJECTION INTRAVENOUS ONCE
Refills: 0 | Status: COMPLETED | OUTPATIENT
Start: 2019-12-20 | End: 2019-12-20

## 2019-12-20 RX ORDER — SODIUM CHLORIDE 9 MG/ML
500 INJECTION INTRAMUSCULAR; INTRAVENOUS; SUBCUTANEOUS ONCE
Refills: 0 | Status: COMPLETED | OUTPATIENT
Start: 2019-12-20 | End: 2019-12-20

## 2019-12-20 RX ADMIN — Medication 500000 UNIT(S): at 18:07

## 2019-12-20 RX ADMIN — Medication 102 MILLIGRAM(S): at 14:29

## 2019-12-20 RX ADMIN — Medication 50 MILLIGRAM(S): at 14:28

## 2019-12-20 RX ADMIN — Medication 50 MILLIGRAM(S): at 19:35

## 2019-12-20 RX ADMIN — FAMOTIDINE 20 MILLIGRAM(S): 10 INJECTION INTRAVENOUS at 14:29

## 2019-12-20 RX ADMIN — SODIUM CHLORIDE 9999 MILLILITER(S): 9 INJECTION INTRAMUSCULAR; INTRAVENOUS; SUBCUTANEOUS at 14:29

## 2019-12-20 NOTE — ED ADULT NURSE NOTE - OBJECTIVE STATEMENT
Pt A&Ox4 BIBEMS for allergic reaction.  Pt states she was at the eye doctor receiving eye drops.  Pt reports feeling throat pain, swelling and difficulty swallowing approx 30 min later.  Pt states she was being treated with abx last week for a cold.  Pt denies fever and chills.  Pt denies chest pain and SOB.

## 2019-12-20 NOTE — ED PROVIDER NOTE - ENMT, MLM
Airway patent, Nasal mucosa clear. +throat erythematous and swelling. +submandibular swelling with adenopathy.

## 2019-12-20 NOTE — ED PROVIDER NOTE - PROGRESS NOTE DETAILS
83 yo female with a PMH of htn, copd, hypothyroid, cad, stent placement, Factor V leiden on coumadin, PE, A fib presents with trouble swallowing and neck swelling. Pt state she had a cold last week and was placed on a Z pack, developed thrush and placed on nystatin with resolution. Today she went to the eye doctor and had Zirgan and acyclovir placed in the R eye and felt it going down her throat. 15 min later she noticed the symptoms. Went out to eat and noticed there was trouble swallowing. Went to her PMD who sent her to the ER for evaluation. +cough, residual from the cold. Denies ear pain, fever, sob, trouble breathing, abd pain, n/v. Labs, CT neck, meds, Reeval. -Migel Kern PA-C Pt feels better. States the pain she had in her throat has resolved. States she will f/u with her PMD tomorrow and will give her ENT referral to reevaluate the throat pain. Breathing and swallowing without difficulties. Pt able to tolerate saliva and PO. Denies sob, cp, or throat swelling. Will d/c home with pepcid, benadryl, and prednisone and 2 days of nystatin so she can see her pmd in the meantime. Pt aware and agrees with plan. -Migel Kern PA-C Bernarda CHAUDHARY for ED attending, Dr. Liz: Pt feels much better, throat swelling subsided, is able to eat and drink, her voice has returned to normal, and she is requesting d/c home. Discussed possible admission for observation but pt refuses admission. Stressed necessity for continued use of steroids and antihistamines for the next few days, and that if anything worsens to return to ER. I Susan Monet attest that this documentation has been prepared under the direction and in the presence of Doctor Liz. documentation by scribe under my direction.--MD Sariah

## 2019-12-20 NOTE — ED PROVIDER NOTE - CARE PROVIDER_API CALL
Jose Miguel Harris)  Otolaryngology  [address] [place] [address]  Phone: [phone]  Fax: [phone]  Follow Up Time:

## 2019-12-20 NOTE — ED PROVIDER NOTE - NSFOLLOWUPINSTRUCTIONS_ED_ALL_ED_FT
Follow up with your doctor tomorrow and with the ENT specialist in 3 days. Take the medication as directed.   Return to the ER for any new or other concerns.      General Allergic Reaction    WHAT YOU NEED TO KNOW:    An allergic reaction is your body's response to an allergen. Allergens include medicines, food, insect stings, animal dander, mold, latex, chemicals, and dust mites. Pollen from trees, grass, and weeds can also cause an allergic reaction. An allergic reaction can range from mild to severe.    DISCHARGE INSTRUCTIONS:    Call 911 for signs or symptoms of anaphylaxis, such as trouble breathing, swelling in your mouth or throat, or wheezing. You may also have itching, a rash, hives, or feel like you are going to faint.    Return to the emergency department if:     You have a skin rash, hives, swelling, or itching that is starting to get worse.      Your throat tightens, or your lips or tongue swell.      You have trouble swallowing or speaking.      You have worsening nausea, diarrhea, or abdominal cramps, or you are vomiting.      You have chest pain or tightness.    Contact your healthcare provider if:     You have questions or concerns about your condition or care.        Medicines: You may need any of the following:     Medicines may be given to relieve certain allergy symptoms such as itching, sneezing, and swelling. You may take them as a pill or use drops in your nose or eyes. Topical treatments may be given to put directly on your skin to help decrease itching or swelling.      Take your medicine as directed. Contact your healthcare provider if you think your medicine is not helping or if you have side effects. Tell him of her if you are allergic to any medicine. Keep a list of the medicines, vitamins, and herbs you take. Include the amounts, and when and why you take them. Bring the list or the pill bottles to follow-up visits. Carry your medicine list with you in case of an emergency.    Follow up with your healthcare provider as directed: Write down your questions so you remember to ask them during your visits.

## 2019-12-20 NOTE — ED ADULT TRIAGE NOTE - CHIEF COMPLAINT QUOTE
Patient states she was at eye doctor today and had 2 different eye drops (Zyrgan and Acyclovir). Patient felt throat pain after receiving drop and now has redness and swelling of throat. Patient denies SOB.

## 2019-12-20 NOTE — ED PROVIDER NOTE - PATIENT PORTAL LINK FT
You can access the FollowMyHealth Patient Portal offered by [facility] by registering at the following website: [email or web address]. By joining [facility]’s FollowMyHealth portal, you will also be able to view your health information using other applications (apps) compatible with our system.

## 2019-12-20 NOTE — ED PROVIDER NOTE - OBJECTIVE STATEMENT
85 y/o F with a PMHx of HTN, COPD, hypothyroid, CAD s/p stent, Factor V Leiden, PE, CHF, Afib presents to the ED BIBEMS regarding an allergic reaction. Pt states she was at the eye doctor today and had 2 different eye drops (Zirgan and Acyclovir). Pt then felt throat pain after 30 minutes and now has redness and swelling of the throat. Pt sates she had a cold last week and was put on Azithromycin which she has since finished. Denies SOB.

## 2019-12-20 NOTE — ED PROVIDER NOTE - ATTENDING CONTRIBUTION TO CARE
Dr. Evans: I performed a face to face bedside interview with patient regarding history of present illness, review of symptoms and past medical history. I completed an independent physical exam.  I have discussed patient's plan of care with PA.   I agree with note as stated above, having amended the EMR as needed to reflect my findings.   This includes HISTORY OF PRESENT ILLNESS, HIV, PAST MEDICAL/SURGICAL/FAMILY/SOCIAL HISTORY, ALLERGIES AND HOME MEDICATIONS, REVIEW OF SYSTEMS, PHYSICAL EXAM, and any PROGRESS NOTES during the time I functioned as the attending physician for this patient.  Gen:  Well appearning in NAD  Head:  NC/AT. throat no erythema no swelling pt with hot potato voice, no drooling  Resp: No distress   Ext: no deformities  Skin: warm and dry as visualized

## 2019-12-22 LAB
CULTURE RESULTS: SIGNIFICANT CHANGE UP
SPECIMEN SOURCE: SIGNIFICANT CHANGE UP

## 2019-12-27 ENCOUNTER — NON-APPOINTMENT (OUTPATIENT)
Age: 84
End: 2019-12-27

## 2019-12-27 ENCOUNTER — APPOINTMENT (OUTPATIENT)
Dept: OPHTHALMOLOGY | Facility: CLINIC | Age: 84
End: 2019-12-27
Payer: MEDICARE

## 2019-12-27 PROCEDURE — 92012 INTRM OPH EXAM EST PATIENT: CPT

## 2020-01-03 NOTE — DISCHARGE NOTE ADULT - PLAN OF CARE
no symptoms of chest pain or sob no salt diet low fat low cholesterol   exercise daily   if any increase shortness of breath or chest discomfort seek medical attention     Take Asprin 81 mg , Plavix 75 mg and  Coumadin 2.5 daily   have coumadin level check on 2/21 at Dr Kidd office   once the INR is 2 or greater the aspirin will be discontinued  start cholesterol medication   no salt diet   weight self daily notify cardiologist ifchange in condition no

## 2020-01-10 ENCOUNTER — NON-APPOINTMENT (OUTPATIENT)
Age: 85
End: 2020-01-10

## 2020-01-10 ENCOUNTER — APPOINTMENT (OUTPATIENT)
Dept: OPHTHALMOLOGY | Facility: CLINIC | Age: 85
End: 2020-01-10
Payer: MEDICARE

## 2020-01-10 PROCEDURE — 92012 INTRM OPH EXAM EST PATIENT: CPT

## 2020-03-11 ENCOUNTER — APPOINTMENT (OUTPATIENT)
Dept: OPHTHALMOLOGY | Facility: CLINIC | Age: 85
End: 2020-03-11
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
## 2021-04-18 ENCOUNTER — EMERGENCY (EMERGENCY)
Facility: HOSPITAL | Age: 86
LOS: 0 days | Discharge: HOME | End: 2021-04-18
Attending: EMERGENCY MEDICINE
Payer: MEDICARE

## 2021-04-18 VITALS
HEART RATE: 56 BPM | TEMPERATURE: 98 F | RESPIRATION RATE: 15 BRPM | DIASTOLIC BLOOD PRESSURE: 48 MMHG | OXYGEN SATURATION: 98 % | SYSTOLIC BLOOD PRESSURE: 133 MMHG

## 2021-04-18 VITALS — HEIGHT: 63 IN | WEIGHT: 190.04 LBS

## 2021-04-18 DIAGNOSIS — J44.9 CHRONIC OBSTRUCTIVE PULMONARY DISEASE, UNSPECIFIED: ICD-10-CM

## 2021-04-18 DIAGNOSIS — Z79.01 LONG TERM (CURRENT) USE OF ANTICOAGULANTS: ICD-10-CM

## 2021-04-18 DIAGNOSIS — M79.10 MYALGIA, UNSPECIFIED SITE: ICD-10-CM

## 2021-04-18 DIAGNOSIS — Z88.7 ALLERGY STATUS TO SERUM AND VACCINE: ICD-10-CM

## 2021-04-18 DIAGNOSIS — I27.20 PULMONARY HYPERTENSION, UNSPECIFIED: ICD-10-CM

## 2021-04-18 DIAGNOSIS — Z98.890 OTHER SPECIFIED POSTPROCEDURAL STATES: Chronic | ICD-10-CM

## 2021-04-18 DIAGNOSIS — Z79.82 LONG TERM (CURRENT) USE OF ASPIRIN: ICD-10-CM

## 2021-04-18 DIAGNOSIS — Z88.2 ALLERGY STATUS TO SULFONAMIDES: ICD-10-CM

## 2021-04-18 DIAGNOSIS — Z86.711 PERSONAL HISTORY OF PULMONARY EMBOLISM: ICD-10-CM

## 2021-04-18 DIAGNOSIS — N60.09 SOLITARY CYST OF UNSPECIFIED BREAST: Chronic | ICD-10-CM

## 2021-04-18 DIAGNOSIS — I11.0 HYPERTENSIVE HEART DISEASE WITH HEART FAILURE: ICD-10-CM

## 2021-04-18 DIAGNOSIS — Z95.5 PRESENCE OF CORONARY ANGIOPLASTY IMPLANT AND GRAFT: ICD-10-CM

## 2021-04-18 DIAGNOSIS — Z99.81 DEPENDENCE ON SUPPLEMENTAL OXYGEN: ICD-10-CM

## 2021-04-18 DIAGNOSIS — I25.10 ATHEROSCLEROTIC HEART DISEASE OF NATIVE CORONARY ARTERY WITHOUT ANGINA PECTORIS: ICD-10-CM

## 2021-04-18 DIAGNOSIS — I48.91 UNSPECIFIED ATRIAL FIBRILLATION: ICD-10-CM

## 2021-04-18 DIAGNOSIS — Z90.710 ACQUIRED ABSENCE OF BOTH CERVIX AND UTERUS: Chronic | ICD-10-CM

## 2021-04-18 DIAGNOSIS — I50.9 HEART FAILURE, UNSPECIFIED: ICD-10-CM

## 2021-04-18 DIAGNOSIS — Z88.1 ALLERGY STATUS TO OTHER ANTIBIOTIC AGENTS STATUS: ICD-10-CM

## 2021-04-18 LAB
ALBUMIN SERPL ELPH-MCNC: 3.9 G/DL — SIGNIFICANT CHANGE UP (ref 3.3–5)
ALP SERPL-CCNC: 63 U/L — SIGNIFICANT CHANGE UP (ref 40–120)
ALT FLD-CCNC: 19 U/L — SIGNIFICANT CHANGE UP (ref 12–78)
ANION GAP SERPL CALC-SCNC: 4 MMOL/L — LOW (ref 5–17)
APPEARANCE UR: CLEAR — SIGNIFICANT CHANGE UP
APTT BLD: 31.1 SEC — SIGNIFICANT CHANGE UP (ref 27.5–35.5)
AST SERPL-CCNC: 21 U/L — SIGNIFICANT CHANGE UP (ref 15–37)
BASOPHILS # BLD AUTO: 0.06 K/UL — SIGNIFICANT CHANGE UP (ref 0–0.2)
BASOPHILS NFR BLD AUTO: 0.7 % — SIGNIFICANT CHANGE UP (ref 0–2)
BILIRUB SERPL-MCNC: 0.7 MG/DL — SIGNIFICANT CHANGE UP (ref 0.2–1.2)
BILIRUB UR-MCNC: NEGATIVE — SIGNIFICANT CHANGE UP
BUN SERPL-MCNC: 28 MG/DL — HIGH (ref 7–23)
CALCIUM SERPL-MCNC: 9.5 MG/DL — SIGNIFICANT CHANGE UP (ref 8.5–10.1)
CHLORIDE SERPL-SCNC: 102 MMOL/L — SIGNIFICANT CHANGE UP (ref 96–108)
CK SERPL-CCNC: 184 U/L — SIGNIFICANT CHANGE UP (ref 26–192)
CO2 SERPL-SCNC: 29 MMOL/L — SIGNIFICANT CHANGE UP (ref 22–31)
COLOR SPEC: YELLOW — SIGNIFICANT CHANGE UP
CREAT SERPL-MCNC: 1.05 MG/DL — SIGNIFICANT CHANGE UP (ref 0.5–1.3)
DIFF PNL FLD: ABNORMAL
EOSINOPHIL # BLD AUTO: 0.54 K/UL — HIGH (ref 0–0.5)
EOSINOPHIL NFR BLD AUTO: 6.2 % — HIGH (ref 0–6)
GLUCOSE SERPL-MCNC: 110 MG/DL — HIGH (ref 70–99)
GLUCOSE UR QL: NEGATIVE MG/DL — SIGNIFICANT CHANGE UP
HCT VFR BLD CALC: 41 % — SIGNIFICANT CHANGE UP (ref 34.5–45)
HGB BLD-MCNC: 13.3 G/DL — SIGNIFICANT CHANGE UP (ref 11.5–15.5)
IMM GRANULOCYTES NFR BLD AUTO: 0.1 % — SIGNIFICANT CHANGE UP (ref 0–1.5)
INR BLD: 1.31 RATIO — HIGH (ref 0.88–1.16)
KETONES UR-MCNC: NEGATIVE — SIGNIFICANT CHANGE UP
LEUKOCYTE ESTERASE UR-ACNC: ABNORMAL
LYMPHOCYTES # BLD AUTO: 1.68 K/UL — SIGNIFICANT CHANGE UP (ref 1–3.3)
LYMPHOCYTES # BLD AUTO: 19.2 % — SIGNIFICANT CHANGE UP (ref 13–44)
MAGNESIUM SERPL-MCNC: 2.4 MG/DL — SIGNIFICANT CHANGE UP (ref 1.6–2.6)
MCHC RBC-ENTMCNC: 29 PG — SIGNIFICANT CHANGE UP (ref 27–34)
MCHC RBC-ENTMCNC: 32.4 GM/DL — SIGNIFICANT CHANGE UP (ref 32–36)
MCV RBC AUTO: 89.5 FL — SIGNIFICANT CHANGE UP (ref 80–100)
MONOCYTES # BLD AUTO: 0.92 K/UL — HIGH (ref 0–0.9)
MONOCYTES NFR BLD AUTO: 10.5 % — SIGNIFICANT CHANGE UP (ref 2–14)
NEUTROPHILS # BLD AUTO: 5.55 K/UL — SIGNIFICANT CHANGE UP (ref 1.8–7.4)
NEUTROPHILS NFR BLD AUTO: 63.3 % — SIGNIFICANT CHANGE UP (ref 43–77)
NITRITE UR-MCNC: NEGATIVE — SIGNIFICANT CHANGE UP
PH UR: 5 — SIGNIFICANT CHANGE UP (ref 5–8)
PHOSPHATE SERPL-MCNC: 3.6 MG/DL — SIGNIFICANT CHANGE UP (ref 2.5–4.5)
PLATELET # BLD AUTO: 287 K/UL — SIGNIFICANT CHANGE UP (ref 150–400)
POTASSIUM SERPL-MCNC: 4.3 MMOL/L — SIGNIFICANT CHANGE UP (ref 3.5–5.3)
POTASSIUM SERPL-SCNC: 4.3 MMOL/L — SIGNIFICANT CHANGE UP (ref 3.5–5.3)
PROT SERPL-MCNC: 8.1 GM/DL — SIGNIFICANT CHANGE UP (ref 6–8.3)
PROT UR-MCNC: NEGATIVE MG/DL — SIGNIFICANT CHANGE UP
PROTHROM AB SERPL-ACNC: 15 SEC — HIGH (ref 10.6–13.6)
RBC # BLD: 4.58 M/UL — SIGNIFICANT CHANGE UP (ref 3.8–5.2)
RBC # FLD: 14.2 % — SIGNIFICANT CHANGE UP (ref 10.3–14.5)
SODIUM SERPL-SCNC: 135 MMOL/L — SIGNIFICANT CHANGE UP (ref 135–145)
SP GR SPEC: 1.01 — SIGNIFICANT CHANGE UP (ref 1.01–1.02)
UROBILINOGEN FLD QL: NEGATIVE MG/DL — SIGNIFICANT CHANGE UP
WBC # BLD: 8.76 K/UL — SIGNIFICANT CHANGE UP (ref 3.8–10.5)
WBC # FLD AUTO: 8.76 K/UL — SIGNIFICANT CHANGE UP (ref 3.8–10.5)

## 2021-04-18 PROCEDURE — 96374 THER/PROPH/DIAG INJ IV PUSH: CPT

## 2021-04-18 PROCEDURE — 87086 URINE CULTURE/COLONY COUNT: CPT

## 2021-04-18 PROCEDURE — 80053 COMPREHEN METABOLIC PANEL: CPT

## 2021-04-18 PROCEDURE — 85730 THROMBOPLASTIN TIME PARTIAL: CPT

## 2021-04-18 PROCEDURE — 99284 EMERGENCY DEPT VISIT MOD MDM: CPT | Mod: 25

## 2021-04-18 PROCEDURE — 82550 ASSAY OF CK (CPK): CPT

## 2021-04-18 PROCEDURE — 99285 EMERGENCY DEPT VISIT HI MDM: CPT

## 2021-04-18 PROCEDURE — 93010 ELECTROCARDIOGRAM REPORT: CPT

## 2021-04-18 PROCEDURE — 96376 TX/PRO/DX INJ SAME DRUG ADON: CPT

## 2021-04-18 PROCEDURE — 85610 PROTHROMBIN TIME: CPT

## 2021-04-18 PROCEDURE — 84100 ASSAY OF PHOSPHORUS: CPT

## 2021-04-18 PROCEDURE — 85025 COMPLETE CBC W/AUTO DIFF WBC: CPT

## 2021-04-18 PROCEDURE — 81001 URINALYSIS AUTO W/SCOPE: CPT

## 2021-04-18 PROCEDURE — 83735 ASSAY OF MAGNESIUM: CPT

## 2021-04-18 PROCEDURE — 93005 ELECTROCARDIOGRAM TRACING: CPT

## 2021-04-18 PROCEDURE — 36415 COLL VENOUS BLD VENIPUNCTURE: CPT

## 2021-04-18 RX ORDER — MORPHINE SULFATE 50 MG/1
4 CAPSULE, EXTENDED RELEASE ORAL ONCE
Refills: 0 | Status: DISCONTINUED | OUTPATIENT
Start: 2021-04-18 | End: 2021-04-18

## 2021-04-18 RX ORDER — OXYCODONE AND ACETAMINOPHEN 5; 325 MG/1; MG/1
1 TABLET ORAL
Qty: 15 | Refills: 0
Start: 2021-04-18

## 2021-04-18 RX ADMIN — MORPHINE SULFATE 4 MILLIGRAM(S): 50 CAPSULE, EXTENDED RELEASE ORAL at 10:29

## 2021-04-18 RX ADMIN — MORPHINE SULFATE 4 MILLIGRAM(S): 50 CAPSULE, EXTENDED RELEASE ORAL at 11:39

## 2021-04-18 RX ADMIN — MORPHINE SULFATE 4 MILLIGRAM(S): 50 CAPSULE, EXTENDED RELEASE ORAL at 11:01

## 2021-04-18 NOTE — ED PROVIDER NOTE - NSFOLLOWUPINSTRUCTIONS_ED_ALL_ED_FT
Please call and follow up with your doctor in 1-3 days.  Please discuss with your doctor about you medications.    use percocet 1 - 2pills every 6 hours as need for pain.      Musculoskeletal Pain    WHAT YOU NEED TO KNOW:    Musculoskeletal pain can occur in muscles, bones, ligaments, tendons, or nerves. The pain can be dull, achy, or sharp. You may have pain and tenderness to the touch as well. The pain can occur anywhere in your body. Musculoskeletal pain can be from an injury, or a medical condition such as polymyositis.    DISCHARGE INSTRUCTIONS:    Return to the emergency department if:   •You have severe pain when you move the area.      •You lose feeling in the area.      •You have new or worse pain or swelling in the area. Your skin may feel tight.      Call your doctor or pain specialist if:   •You have a fever.      •You have pain that does not get better with treatment.      •You have trouble sleeping because of your pain.      •Your painful area becomes more tender, red, and warm to the touch.      •You have less movement of the painful area.      •You have questions or concerns about your condition or care.      Self-care:   •Rest as directed. Avoid activity that causes pain. You may be able to return to normal activity when you can move without pain. Follow directions for rest and activity. You are at risk for injury for 3 weeks after your symptoms go away.      •Ice the painful area to decrease pain and swelling. Use an ice pack, or put ice in a plastic bag and cover it with a towel. Always put a cloth between the ice and your skin. Apply the ice as often as directed for the first 24 to 48 hours.      •Apply compression to the area, if directed. Your healthcare provider may want you to use a splint, brace, or elastic bandage. Compression helps decrease pain and swelling in an arm or leg. A splint, brace, or bandage will also help protect the painful area when you move around.  How to Wrap an Elastic Bandage           •Elevate a painful arm or leg to reduce swelling and pain. Elevate your limb while you are sitting or lying. Prop a painful leg on pillows to keep it above the level of your heart.         Elevate Leg           Medicines: You may need any of the following:  •NSAIDs help decrease swelling and pain or fever. This medicine is available with or without a doctor's order. NSAIDs can cause stomach bleeding or kidney problems in certain people. If you take blood thinner medicine, always ask your healthcare provider if NSAIDs are safe for you. Always read the medicine label and follow directions.      •Acetaminophen decreases pain and fever. It is available without a doctor's order. Ask how much to take and how often to take it. Follow directions. Read the labels of all other medicines you are using to see if they also contain acetaminophen, or ask your doctor or pharmacist. Acetaminophen can cause liver damage if not taken correctly. Do not use more than 4 grams (4,000 milligrams) total of acetaminophen in one day.       •Muscle relaxers help relax your muscles to decrease pain and muscle spasms.      •Steroids may be given to decrease redness, pain, and swelling.      •Take your medicine as directed. Contact your healthcare provider if you think your medicine is not helping or if you have side effects. Tell him or her if you are allergic to any medicine. Keep a list of the medicines, vitamins, and herbs you take. Include the amounts, and when and why you take them. Bring the list or the pill bottles to follow-up visits. Carry your medicine list with you in case of an emergency.      Follow up with your doctor or pain specialist as directed: You may need more tests to help healthcare providers find the cause of your muscle pain. You may need physical therapy to learn muscle strengthening exercises. Write down your questions so you remember to ask them during your visits.

## 2021-04-18 NOTE — ED PROVIDER NOTE - PROGRESS NOTE DETAILS
Bernarda SABA for ED attending Dr. Ortiz: Pt updated on results, pain is feeling better, awaiting UA, likely discharge with percocet. Attending Ortiz, Pt daughter called and updated on pt status.

## 2021-04-18 NOTE — ED PROVIDER NOTE - MUSCULOSKELETAL, MLM
Spine appears normal, range of motion is not limited, no muscle or joint tenderness. nontender palpation of spine.

## 2021-04-18 NOTE — ED PROVIDER NOTE - NS_ATTENDINGSCRIBE_ED_ALL_ED
Please try taking claritin 10mg every day. See if this helps your congestion.  It appears your bilirubin has been mildly elevated on and off since at least 1997.  I do not think we need to worry about it.    
I personally performed the service described in the documentation recorded by the scribe in my presence, and it accurately and completely records my words and actions.

## 2021-04-18 NOTE — ED PROVIDER NOTE - CLINICAL SUMMARY MEDICAL DECISION MAKING FREE TEXT BOX
pt p/w general body aches, possible rx to new medication, plan: check labs, pain control pt p/w general body aches, possible reaction to new medication, plan: check labs, pain control pt p/w general body aches, possible reaction to tadalfil, plan: check labs, pain control pt p/w general body aches, possible reaction to tadalafil, plan: check labs, pain control

## 2021-04-18 NOTE — ED ADULT TRIAGE NOTE - CHIEF COMPLAINT QUOTE
Pt. to the ED BIB Family C/O General Pain in legs and Hips- Pt. states she is Having a medication reaction-- Denies CP and SOB + Cardiac hx on Eliquis and Pulmonary Hypertension

## 2021-04-18 NOTE — ED ADULT NURSE NOTE - NSIMPLEMENTINTERV_GEN_ALL_ED
Implemented All Fall with Harm Risk Interventions:  [place] to call system. Call bell, personal items and telephone within reach. Instruct patient to call for assistance. Room bathroom lighting operational. Non-slip footwear when patient is off stretcher. Physically safe environment: no spills, clutter or unnecessary equipment. Stretcher in lowest position, wheels locked, appropriate side rails in place. Provide visual cue, wrist band, yellow gown, etc. Monitor gait and stability. Monitor for mental status changes and reorient to person, place, and time. Review medications for side effects contributing to fall risk. Reinforce activity limits and safety measures with patient and family. Provide visual clues: red socks.

## 2021-04-18 NOTE — ED PROVIDER NOTE - OBJECTIVE STATEMENT
86 y/o female with PMHx of pulmonary HTN, COPD, hypothyroid, mitral regurgitation, CAD, stented coronary artery, CHF, Afib presents to ED for body aches.  Pt had recent change in medications - started taking tadalafil for pulmonary HTN 3 days ago and had onset of diffuse body aches, worse in lower back and bilateral lower extremities and fatigue, persistent for past 3 days. Pt states pain is relieved by walking and worsened with rest. Pt states she felt a similar pain when she started taking statins. Denies exposure to COVID, did not take COVID vaccine. On 3L o2 at home. Cardiac cath x2 weeks ago. Denies any recent injuries, trauma, hx of chronic back pain, SOB, chest pain. No OTC pain medications taken PTA. Allergies: ceftin, macrodantin, motrin, sulfa, flu vaccines. 84 y/o female with PMHx of pulmonary HTN, COPD, hypothyroid, mitral regurgitation, CAD, stented coronary artery, CHF, Afib presents to ED for body aches.  Pt had recent change in medications - started taking tadalafil for pulmonary HTN 3 days ago and had onset of diffuse body aches, worse in lower back and bilateral lower extremities and fatigue, persistent for past 3 days. Pt states pain is relieved by walking and worsened with rest. States pain is similar to when she started taking statins. Denies exposure to COVID, did not take COVID vaccine. On 3L o2 at home. Cardiac cath x2 weeks ago. Denies any recent injuries, trauma, hx of chronic back pain, SOB, chest pain. No OTC pain medications taken PTA. Allergies: ceftin, macrodantin, motrin, sulfa, flu vaccines.

## 2021-04-18 NOTE — ED PROVIDER NOTE - PATIENT PORTAL LINK FT
You can access the FollowMyHealth Patient Portal offered by [facility] by registering at the following website: [email or web address]. By joining [employer]’s FollowMyHealth portal, you will also be able to view your health information using other applications (apps) compatible with our system.

## 2021-04-20 LAB
CULTURE RESULTS: SIGNIFICANT CHANGE UP
SPECIMEN SOURCE: SIGNIFICANT CHANGE UP

## 2021-04-21 NOTE — ED POST DISCHARGE NOTE - DETAILS
Spoke with pt. STates her pain has improved but still feels tired. Has called her PMD to set up an appointment. Denies any fever, abd pain, urinary symptoms but advised to go to her pmd to have her urine retested. Pt aware and agrees with plan. Strict return precautions given. Pt states she will not return but will go to her doctor. -Migel Kern PA-C

## 2021-04-30 NOTE — PACU DISCHARGE NOTE - HYDRATION STATUS:
Satisfactory
No respiratory distress. No stridor, Lungs sounds clear with good aeration bilaterally.

## 2021-07-11 DIAGNOSIS — U07.1 COVID-19 VIRUS DETECTED: ICD-10-CM

## 2021-09-03 ENCOUNTER — NON-APPOINTMENT (OUTPATIENT)
Age: 86
End: 2021-09-03

## 2021-09-03 ENCOUNTER — APPOINTMENT (OUTPATIENT)
Dept: OPHTHALMOLOGY | Facility: CLINIC | Age: 86
End: 2021-09-03
Payer: MEDICARE

## 2021-09-03 PROCEDURE — 92083 EXTENDED VISUAL FIELD XM: CPT

## 2021-09-03 PROCEDURE — 92133 CPTRZD OPH DX IMG PST SGM ON: CPT

## 2021-09-03 PROCEDURE — 99214 OFFICE O/P EST MOD 30 MIN: CPT

## 2022-01-12 NOTE — PHYSICAL THERAPY INITIAL EVALUATION ADULT - ADL SKILLS, REHAB EVAL
independent Dorsal Nasal Flap Text: The defect edges were debeveled with a #15 scalpel blade.  Given the location of the defect and the proximity to free margins a dorsal nasal flap was deemed most appropriate.  Using a sterile surgical marker, an appropriate dorsal nasal flap was drawn around the defect.    The area thus outlined was incised deep to adipose tissue with a #15 scalpel blade.  The skin margins were undermined to an appropriate distance in all directions utilizing iris scissors.

## 2022-03-15 NOTE — CONSULT NOTE ADULT - NSHPATTENDINGPLANDISCUSS_GEN_ALL_CORE
pt, family and nursing
pt, family and nursing, gen surg
Propranolol Pregnancy And Lactation Text: This medication is Pregnancy Category C and it isn't known if it is safe during pregnancy. It is excreted in breast milk.

## 2023-06-04 ENCOUNTER — INPATIENT (INPATIENT)
Facility: HOSPITAL | Age: 88
LOS: 1 days | Discharge: HOME | DRG: 177 | End: 2023-06-06
Attending: STUDENT IN AN ORGANIZED HEALTH CARE EDUCATION/TRAINING PROGRAM | Admitting: INTERNAL MEDICINE
Payer: MEDICARE

## 2023-06-04 VITALS
TEMPERATURE: 99 F | HEART RATE: 65 BPM | SYSTOLIC BLOOD PRESSURE: 154 MMHG | DIASTOLIC BLOOD PRESSURE: 39 MMHG | RESPIRATION RATE: 18 BRPM | OXYGEN SATURATION: 93 %

## 2023-06-04 DIAGNOSIS — Z98.890 OTHER SPECIFIED POSTPROCEDURAL STATES: Chronic | ICD-10-CM

## 2023-06-04 DIAGNOSIS — U07.1 COVID-19: ICD-10-CM

## 2023-06-04 DIAGNOSIS — Z90.710 ACQUIRED ABSENCE OF BOTH CERVIX AND UTERUS: Chronic | ICD-10-CM

## 2023-06-04 DIAGNOSIS — R05.9 COUGH, UNSPECIFIED: ICD-10-CM

## 2023-06-04 DIAGNOSIS — N60.09 SOLITARY CYST OF UNSPECIFIED BREAST: Chronic | ICD-10-CM

## 2023-06-04 LAB
ALBUMIN SERPL ELPH-MCNC: 3.4 G/DL — SIGNIFICANT CHANGE UP (ref 3.3–5)
ALP SERPL-CCNC: 50 U/L — SIGNIFICANT CHANGE UP (ref 40–120)
ALT FLD-CCNC: 18 U/L — SIGNIFICANT CHANGE UP (ref 12–78)
ANION GAP SERPL CALC-SCNC: 5 MMOL/L — SIGNIFICANT CHANGE UP (ref 5–17)
APTT BLD: 32.7 SEC — SIGNIFICANT CHANGE UP (ref 27.5–35.5)
AST SERPL-CCNC: 20 U/L — SIGNIFICANT CHANGE UP (ref 15–37)
BASE EXCESS BLDV CALC-SCNC: 2.8 MMOL/L — SIGNIFICANT CHANGE UP (ref -2–3)
BASOPHILS # BLD AUTO: 0.04 K/UL — SIGNIFICANT CHANGE UP (ref 0–0.2)
BASOPHILS NFR BLD AUTO: 0.5 % — SIGNIFICANT CHANGE UP (ref 0–2)
BILIRUB SERPL-MCNC: 0.5 MG/DL — SIGNIFICANT CHANGE UP (ref 0.2–1.2)
BUN SERPL-MCNC: 25 MG/DL — HIGH (ref 7–23)
CALCIUM SERPL-MCNC: 8.9 MG/DL — SIGNIFICANT CHANGE UP (ref 8.5–10.1)
CHLORIDE SERPL-SCNC: 103 MMOL/L — SIGNIFICANT CHANGE UP (ref 96–108)
CO2 SERPL-SCNC: 29 MMOL/L — SIGNIFICANT CHANGE UP (ref 22–31)
CREAT SERPL-MCNC: 1.25 MG/DL — SIGNIFICANT CHANGE UP (ref 0.5–1.3)
D DIMER BLD IA.RAPID-MCNC: <150 NG/ML DDU — SIGNIFICANT CHANGE UP
EGFR: 42 ML/MIN/1.73M2 — LOW
EOSINOPHIL # BLD AUTO: 0.05 K/UL — SIGNIFICANT CHANGE UP (ref 0–0.5)
EOSINOPHIL NFR BLD AUTO: 0.6 % — SIGNIFICANT CHANGE UP (ref 0–6)
FIBRINOGEN PPP-MCNC: 427 MG/DL — SIGNIFICANT CHANGE UP (ref 200–435)
GLUCOSE SERPL-MCNC: 92 MG/DL — SIGNIFICANT CHANGE UP (ref 70–99)
HCO3 BLDV-SCNC: 29 MMOL/L — SIGNIFICANT CHANGE UP (ref 22–29)
HCT VFR BLD CALC: 39.3 % — SIGNIFICANT CHANGE UP (ref 34.5–45)
HGB BLD-MCNC: 13.1 G/DL — SIGNIFICANT CHANGE UP (ref 11.5–15.5)
IMM GRANULOCYTES NFR BLD AUTO: 0.4 % — SIGNIFICANT CHANGE UP (ref 0–0.9)
INR BLD: 1.54 RATIO — HIGH (ref 0.88–1.16)
LACTATE SERPL-SCNC: 0.8 MMOL/L — SIGNIFICANT CHANGE UP (ref 0.7–2)
LDH SERPL L TO P-CCNC: 166 U/L — SIGNIFICANT CHANGE UP (ref 84–241)
LYMPHOCYTES # BLD AUTO: 0.94 K/UL — LOW (ref 1–3.3)
LYMPHOCYTES # BLD AUTO: 11.3 % — LOW (ref 13–44)
MCHC RBC-ENTMCNC: 30.6 PG — SIGNIFICANT CHANGE UP (ref 27–34)
MCHC RBC-ENTMCNC: 33.3 GM/DL — SIGNIFICANT CHANGE UP (ref 32–36)
MCV RBC AUTO: 91.8 FL — SIGNIFICANT CHANGE UP (ref 80–100)
MONOCYTES # BLD AUTO: 1.42 K/UL — HIGH (ref 0–0.9)
MONOCYTES NFR BLD AUTO: 17.1 % — HIGH (ref 2–14)
NEUTROPHILS # BLD AUTO: 5.84 K/UL — SIGNIFICANT CHANGE UP (ref 1.8–7.4)
NEUTROPHILS NFR BLD AUTO: 70.1 % — SIGNIFICANT CHANGE UP (ref 43–77)
NT-PROBNP SERPL-SCNC: 799 PG/ML — HIGH (ref 0–450)
PCO2 BLDV: 52 MMHG — HIGH (ref 39–42)
PH BLDV: 7.36 — SIGNIFICANT CHANGE UP (ref 7.32–7.43)
PLATELET # BLD AUTO: 195 K/UL — SIGNIFICANT CHANGE UP (ref 150–400)
PO2 BLDV: 51 MMHG — HIGH (ref 25–45)
POTASSIUM SERPL-MCNC: 4.1 MMOL/L — SIGNIFICANT CHANGE UP (ref 3.5–5.3)
POTASSIUM SERPL-SCNC: 4.1 MMOL/L — SIGNIFICANT CHANGE UP (ref 3.5–5.3)
PROT SERPL-MCNC: 7.4 GM/DL — SIGNIFICANT CHANGE UP (ref 6–8.3)
PROTHROM AB SERPL-ACNC: 17.9 SEC — HIGH (ref 10.5–13.4)
RAPID RVP RESULT: DETECTED
RBC # BLD: 4.28 M/UL — SIGNIFICANT CHANGE UP (ref 3.8–5.2)
RBC # FLD: 14.4 % — SIGNIFICANT CHANGE UP (ref 10.3–14.5)
SAO2 % BLDV: 83 % — SIGNIFICANT CHANGE UP (ref 67–88)
SARS-COV-2 RNA SPEC QL NAA+PROBE: DETECTED
SODIUM SERPL-SCNC: 137 MMOL/L — SIGNIFICANT CHANGE UP (ref 135–145)
TROPONIN I, HIGH SENSITIVITY RESULT: 23.36 NG/L — SIGNIFICANT CHANGE UP
WBC # BLD: 8.32 K/UL — SIGNIFICANT CHANGE UP (ref 3.8–10.5)
WBC # FLD AUTO: 8.32 K/UL — SIGNIFICANT CHANGE UP (ref 3.8–10.5)

## 2023-06-04 PROCEDURE — 71045 X-RAY EXAM CHEST 1 VIEW: CPT | Mod: 26

## 2023-06-04 PROCEDURE — 94761 N-INVAS EAR/PLS OXIMETRY MLT: CPT

## 2023-06-04 PROCEDURE — G0378: CPT

## 2023-06-04 PROCEDURE — 99285 EMERGENCY DEPT VISIT HI MDM: CPT

## 2023-06-04 PROCEDURE — 36415 COLL VENOUS BLD VENIPUNCTURE: CPT

## 2023-06-04 PROCEDURE — 87086 URINE CULTURE/COLONY COUNT: CPT

## 2023-06-04 PROCEDURE — 85025 COMPLETE CBC W/AUTO DIFF WBC: CPT

## 2023-06-04 PROCEDURE — 84145 PROCALCITONIN (PCT): CPT

## 2023-06-04 PROCEDURE — 80053 COMPREHEN METABOLIC PANEL: CPT

## 2023-06-04 PROCEDURE — 93010 ELECTROCARDIOGRAM REPORT: CPT

## 2023-06-04 PROCEDURE — 94640 AIRWAY INHALATION TREATMENT: CPT

## 2023-06-04 PROCEDURE — 81001 URINALYSIS AUTO W/SCOPE: CPT

## 2023-06-04 RX ORDER — ALBUTEROL 90 UG/1
0 AEROSOL, METERED ORAL
Qty: 0 | Refills: 0 | DISCHARGE

## 2023-06-04 RX ORDER — PREGABALIN 225 MG/1
1 CAPSULE ORAL
Qty: 0 | Refills: 0 | DISCHARGE

## 2023-06-04 RX ORDER — IPRATROPIUM BROMIDE 0.2 MG/ML
1 SOLUTION, NON-ORAL INHALATION ONCE
Refills: 0 | Status: DISCONTINUED | OUTPATIENT
Start: 2023-06-04 | End: 2023-06-04

## 2023-06-04 RX ORDER — ACETAMINOPHEN 500 MG
650 TABLET ORAL ONCE
Refills: 0 | Status: DISCONTINUED | OUTPATIENT
Start: 2023-06-04 | End: 2023-06-06

## 2023-06-04 RX ORDER — BUDESONIDE AND FORMOTEROL FUMARATE DIHYDRATE 160; 4.5 UG/1; UG/1
0 AEROSOL RESPIRATORY (INHALATION)
Qty: 0 | Refills: 0 | DISCHARGE

## 2023-06-04 RX ORDER — APIXABAN 2.5 MG/1
1 TABLET, FILM COATED ORAL
Qty: 0 | Refills: 0 | DISCHARGE

## 2023-06-04 RX ORDER — METOPROLOL TARTRATE 50 MG
1 TABLET ORAL
Qty: 0 | Refills: 0 | DISCHARGE

## 2023-06-04 RX ORDER — UBIDECARENONE 100 MG
1 CAPSULE ORAL
Qty: 0 | Refills: 0 | DISCHARGE

## 2023-06-04 RX ORDER — EZETIMIBE 10 MG/1
1 TABLET ORAL
Qty: 0 | Refills: 0 | DISCHARGE

## 2023-06-04 RX ORDER — LEVOTHYROXINE SODIUM 125 MCG
25 TABLET ORAL
Qty: 0 | Refills: 0 | DISCHARGE

## 2023-06-04 RX ORDER — POTASSIUM CHLORIDE 20 MEQ
1 PACKET (EA) ORAL
Qty: 0 | Refills: 0 | DISCHARGE

## 2023-06-04 RX ORDER — ACETAMINOPHEN 500 MG
650 TABLET ORAL ONCE
Refills: 0 | Status: COMPLETED | OUTPATIENT
Start: 2023-06-04 | End: 2023-06-04

## 2023-06-04 RX ORDER — CETIRIZINE HYDROCHLORIDE 10 MG/1
1 TABLET ORAL
Qty: 0 | Refills: 0 | DISCHARGE

## 2023-06-04 RX ORDER — DEXAMETHASONE 0.5 MG/5ML
6 ELIXIR ORAL ONCE
Refills: 0 | Status: COMPLETED | OUTPATIENT
Start: 2023-06-04 | End: 2023-06-04

## 2023-06-04 RX ORDER — ALBUTEROL 90 UG/1
2 AEROSOL, METERED ORAL ONCE
Refills: 0 | Status: COMPLETED | OUTPATIENT
Start: 2023-06-04 | End: 2023-06-04

## 2023-06-04 RX ORDER — TIOTROPIUM BROMIDE 18 UG/1
2 CAPSULE ORAL; RESPIRATORY (INHALATION) DAILY
Refills: 0 | Status: DISCONTINUED | OUTPATIENT
Start: 2023-06-04 | End: 2023-06-06

## 2023-06-04 RX ORDER — FUROSEMIDE 40 MG
2 TABLET ORAL
Qty: 0 | Refills: 0 | DISCHARGE

## 2023-06-04 RX ORDER — ASPIRIN/CALCIUM CARB/MAGNESIUM 324 MG
1 TABLET ORAL
Qty: 0 | Refills: 0 | DISCHARGE

## 2023-06-04 RX ADMIN — ALBUTEROL 2 PUFF(S): 90 AEROSOL, METERED ORAL at 21:59

## 2023-06-04 RX ADMIN — TIOTROPIUM BROMIDE 2 PUFF(S): 18 CAPSULE ORAL; RESPIRATORY (INHALATION) at 21:59

## 2023-06-04 RX ADMIN — Medication 100 MILLIGRAM(S): at 21:55

## 2023-06-04 RX ADMIN — Medication 6 MILLIGRAM(S): at 21:55

## 2023-06-04 NOTE — ED PROVIDER NOTE - SKIN, MLM
Skin normal color for race, warm, dry and intact. No evidence of rash. No cyanosis or clubbing. No tactile warmth.

## 2023-06-04 NOTE — ED ADULT TRIAGE NOTE - CHIEF COMPLAINT QUOTE
Patient presents to ED for shortness of breath that started today. Pt's daughter states that pt tested positive for COVID today with a home test kit. Pt endorses fever/chills. Denies chest pain. Pt hx of COPD, wears oxygen at night. SPO2 in triage is 92%

## 2023-06-04 NOTE — ED PROVIDER NOTE - CONSTITUTIONAL, MLM
normal... Elderly overweight white female, alert, mild respiratory distress, frequent dry cough, ill appearing, but non-toxic.

## 2023-06-04 NOTE — ED PROVIDER NOTE - MUSCULOSKELETAL, MLM
Spine appears normal, range of motion is not limited, no muscle or joint tenderness. MAEx4 no focal swelling, tenderness

## 2023-06-04 NOTE — ED PROVIDER NOTE - CLINICAL SUMMARY MEDICAL DECISION MAKING FREE TEXT BOX
88 y/o female multiple PMHx including COPD on Vaso O2 QHS, HTN, hypothyroid, CAD with coronary stent, F5 Leiden mutation with prior PE on Eliquis, Afib s/p ablation, CHF BIB private car via daughter c/o increased SOB, cough, flu/COVID type symptoms, onset yesterday. Pt hypoxic upon ED arrival, lungs decreased breath sounds diffusely. Pt mildly ill appearing. High clinical suspicion for COVID infection with COPD exacerbation. Plan EKG, CXR, labs including RVP/COVID swab, OVID inflammatory markers, BNP, troponin, blood cultures, lactate, BGP on room air. O2 supplement, Albuterol/Spiriva, MDI Tessalon Perles, Decadron IV, monitor observe, reassess, expect medicine admission if proves COVID positive. 86 y/o female multiple PMHx including COPD on Vaso O2 QHS, HTN, hypothyroid, CAD with coronary stent, F5 Leiden mutation with prior PE on Eliquis, Afib s/p ablation, CHF BIB private car via daughter c/o increased SOB, cough, flu/COVID type symptoms, onset yesterday. Pt hypoxic upon ED arrival, lungs decreased breath sounds diffusely. Pt mildly ill appearing. High clinical suspicion for COVID infection with COPD exacerbation, home rapid test +.   Plan: EKG, CXR, labs including RVP/COVID swab, OVID inflammatory markers, BNP, troponin, blood cultures, lactate, BGP on room air. O2 supplement, Albuterol/Spiriva, MDI Tessalon Perles, Decadron IV, monitor observe, reassess, expect medicine admission if proves COVID positive.    22:50, IVETT Luna MD:  Labs notable for INR 1.5 (+Eliquis), ; normal CBC, DD, Troponin, lactate, Fg.  CXR ? L ling. infilt.  VBG [id number] & PO2 51 on 2 lpm NC.  Official RVP/COVID still pending.  Pt not outpt candidate.  Alin Valerio aware of Med admission.

## 2023-06-04 NOTE — ED PROVIDER NOTE - ST/T WAVE
TWI anterior and inferolateral, maybe due to RBBB TWI anterior and inferolateral, may be due to RBBB

## 2023-06-04 NOTE — ED PROVIDER NOTE - CARE PLAN
1 Principal Discharge DX:	2019 novel coronavirus disease (COVID-19)  Secondary Diagnosis:	COPD with acute exacerbation  Secondary Diagnosis:	Acute respiratory failure with hypoxia

## 2023-06-04 NOTE — ED PROVIDER NOTE - NEUROLOGICAL, MLM
Alert and oriented, no focal deficits, no motor or sensory deficits. Cranial nerves intact, normal speech.

## 2023-06-04 NOTE — ED PROVIDER NOTE - OBJECTIVE STATEMENT
88 y/o female multiple PMHx including COPD on Vaso O2 QHS, HTN, hypothyroid, CAD with coronary stent, F5 Leiden mutation with prior PE on Eliquis, Afib s/p ablation, CHF BIB private car via daughter c/o increased SOB, cough, flu/COVID type symptoms, onset yesterday symptoms: increased cough, mildly productive of clear sputum, increased SOB, significant general weakness, fatigue, malaise, diffuse myalgias, HA, mild diarrhea. Pt returned from vacation at [place] May 31st not wearing mask in airport nor in plane, +COVID infection 2 years ago treated with MAB outpatient therapy. Pt not COVID vaccinated due to multiple allergies including to eggs and flu vaccines. PCP: Dr. Stacy. Pulmonary: [facility]icar. Walking to triage, pt noted to desaturate to 90-92%. 86 y/o female multiple PMHx including COPD on [place] O2 QHS, HTN, hypothyroid, CAD with coronary stent, F5 Leiden mutation with prior PE on Eliquis, AFib s/p ablation, CHF, BIB private car via daughter c/o increased SOB, cough, Flu/COVID type symptoms.  Symptoms onset yesterday: increased cough, mildly productive of clear sputum, increased SOB, significant general weakness, fatigue, malaise, diffuse myalgias, HA, mild diarrhea. Pt returned from vacation at [place] May 31st not wearing mask in airport nor in plane, +COVID infection 2 years ago treated with MAB outpatient therapy. Pt not COVID vaccinated due to multiple allergies including to eggs and flu vaccines.  Walking to triage, pt noted to desaturate to 90-92%.  PCP: Dr. Stacy. Pulmonary: [facility].

## 2023-06-04 NOTE — ED PROVIDER NOTE - NSICDXPASTMEDICALHX_GEN_ALL_CORE_FT
PAST MEDICAL HISTORY:  Atrial fibrillation     CAD (coronary artery disease)     CHF (congestive heart failure)     COPD (chronic obstructive pulmonary disease)     Factor 5 Leiden mutation, heterozygous     HTN (hypertension)     Hypothyroid     Mitral regurgitation     Pulmonary emboli     Stented coronary artery

## 2023-06-05 LAB
APPEARANCE UR: CLEAR — SIGNIFICANT CHANGE UP
BACTERIA # UR AUTO: ABNORMAL
BILIRUB UR-MCNC: NEGATIVE — SIGNIFICANT CHANGE UP
COLOR SPEC: YELLOW — SIGNIFICANT CHANGE UP
CRP SERPL-MCNC: 18 MG/L — HIGH
DIFF PNL FLD: ABNORMAL
EPI CELLS # UR: SIGNIFICANT CHANGE UP
FERRITIN SERPL-MCNC: 169 NG/ML — HIGH (ref 15–150)
GLUCOSE UR QL: NEGATIVE — SIGNIFICANT CHANGE UP
KETONES UR-MCNC: ABNORMAL
LEUKOCYTE ESTERASE UR-ACNC: ABNORMAL
NITRITE UR-MCNC: NEGATIVE — SIGNIFICANT CHANGE UP
PH UR: 6.5 — SIGNIFICANT CHANGE UP (ref 5–8)
PROCALCITONIN SERPL-MCNC: 0.09 NG/ML — SIGNIFICANT CHANGE UP (ref 0.02–0.1)
PROT UR-MCNC: NEGATIVE — SIGNIFICANT CHANGE UP
RBC CASTS # UR COMP ASSIST: ABNORMAL /HPF (ref 0–4)
SP GR SPEC: 1.01 — SIGNIFICANT CHANGE UP (ref 1.01–1.02)
UROBILINOGEN FLD QL: NEGATIVE — SIGNIFICANT CHANGE UP
WBC UR QL: SIGNIFICANT CHANGE UP /HPF (ref 0–5)

## 2023-06-05 PROCEDURE — 99223 1ST HOSP IP/OBS HIGH 75: CPT

## 2023-06-05 PROCEDURE — 99497 ADVNCD CARE PLAN 30 MIN: CPT | Mod: 25

## 2023-06-05 RX ORDER — METOPROLOL TARTRATE 50 MG
25 TABLET ORAL
Refills: 0 | Status: DISCONTINUED | OUTPATIENT
Start: 2023-06-05 | End: 2023-06-06

## 2023-06-05 RX ORDER — LEVOTHYROXINE SODIUM 125 MCG
25 TABLET ORAL DAILY
Refills: 0 | Status: DISCONTINUED | OUTPATIENT
Start: 2023-06-05 | End: 2023-06-06

## 2023-06-05 RX ORDER — APIXABAN 2.5 MG/1
5 TABLET, FILM COATED ORAL
Refills: 0 | Status: DISCONTINUED | OUTPATIENT
Start: 2023-06-05 | End: 2023-06-06

## 2023-06-05 RX ORDER — DEXAMETHASONE 0.5 MG/5ML
6 ELIXIR ORAL DAILY
Refills: 0 | Status: DISCONTINUED | OUTPATIENT
Start: 2023-06-06 | End: 2023-06-06

## 2023-06-05 RX ORDER — ALBUTEROL 90 UG/1
2 AEROSOL, METERED ORAL EVERY 4 HOURS
Refills: 0 | Status: DISCONTINUED | OUTPATIENT
Start: 2023-06-05 | End: 2023-06-06

## 2023-06-05 RX ORDER — LANOLIN ALCOHOL/MO/W.PET/CERES
3 CREAM (GRAM) TOPICAL AT BEDTIME
Refills: 0 | Status: DISCONTINUED | OUTPATIENT
Start: 2023-06-05 | End: 2023-06-06

## 2023-06-05 RX ORDER — ATORVASTATIN CALCIUM 80 MG/1
10 TABLET, FILM COATED ORAL AT BEDTIME
Refills: 0 | Status: DISCONTINUED | OUTPATIENT
Start: 2023-06-05 | End: 2023-06-06

## 2023-06-05 RX ORDER — FUROSEMIDE 40 MG
40 TABLET ORAL DAILY
Refills: 0 | Status: DISCONTINUED | OUTPATIENT
Start: 2023-06-05 | End: 2023-06-06

## 2023-06-05 RX ORDER — SPIRONOLACTONE 25 MG/1
25 TABLET, FILM COATED ORAL DAILY
Refills: 0 | Status: DISCONTINUED | OUTPATIENT
Start: 2023-06-05 | End: 2023-06-06

## 2023-06-05 RX ORDER — ONDANSETRON 8 MG/1
4 TABLET, FILM COATED ORAL EVERY 8 HOURS
Refills: 0 | Status: DISCONTINUED | OUTPATIENT
Start: 2023-06-05 | End: 2023-06-06

## 2023-06-05 RX ORDER — BUDESONIDE AND FORMOTEROL FUMARATE DIHYDRATE 160; 4.5 UG/1; UG/1
2 AEROSOL RESPIRATORY (INHALATION)
Refills: 0 | Status: DISCONTINUED | OUTPATIENT
Start: 2023-06-05 | End: 2023-06-06

## 2023-06-05 RX ADMIN — ALBUTEROL 2 PUFF(S): 90 AEROSOL, METERED ORAL at 15:39

## 2023-06-05 RX ADMIN — Medication 40 MILLIGRAM(S): at 10:59

## 2023-06-05 RX ADMIN — ATORVASTATIN CALCIUM 10 MILLIGRAM(S): 80 TABLET, FILM COATED ORAL at 20:56

## 2023-06-05 RX ADMIN — BUDESONIDE AND FORMOTEROL FUMARATE DIHYDRATE 2 PUFF(S): 160; 4.5 AEROSOL RESPIRATORY (INHALATION) at 20:09

## 2023-06-05 RX ADMIN — Medication 650 MILLIGRAM(S): at 00:00

## 2023-06-05 RX ADMIN — ALBUTEROL 2 PUFF(S): 90 AEROSOL, METERED ORAL at 20:08

## 2023-06-05 RX ADMIN — APIXABAN 5 MILLIGRAM(S): 2.5 TABLET, FILM COATED ORAL at 20:56

## 2023-06-05 RX ADMIN — SPIRONOLACTONE 25 MILLIGRAM(S): 25 TABLET, FILM COATED ORAL at 10:58

## 2023-06-05 RX ADMIN — Medication 25 MICROGRAM(S): at 10:58

## 2023-06-05 RX ADMIN — Medication 25 MILLIGRAM(S): at 10:58

## 2023-06-05 NOTE — H&P ADULT - NSHPLABSRESULTS_GEN_ALL_CORE
13.1   8.32  )-----------( 195      ( 2023 21:29 )             39.3       CBC Full  -  ( 2023 21:29 )  WBC Count : 8.32 K/uL  RBC Count : 4.28 M/uL  Hemoglobin : 13.1 g/dL  Hematocrit : 39.3 %  Platelet Count - Automated : 195 K/uL  Mean Cell Volume : 91.8 fl  Mean Cell Hemoglobin : 30.6 pg  Mean Cell Hemoglobin Concentration : 33.3 gm/dL  Auto Neutrophil # : 5.84 K/uL  Auto Lymphocyte # : 0.94 K/uL  Auto Monocyte # : 1.42 K/uL  Auto Eosinophil # : 0.05 K/uL  Auto Basophil # : 0.04 K/uL  Auto Neutrophil % : 70.1 %  Auto Lymphocyte % : 11.3 %  Auto Monocyte % : 17.1 %  Auto Eosinophil % : 0.6 %  Auto Basophil % : 0.5 %      06-04    137  |  103  |  25<H>  ----------------------------<  92  4.1   |  29  |  1.25    Ca    8.9      2023 21:29    TPro  7.4  /  Alb  3.4  /  TBili  0.5  /  DBili  x   /  AST  20  /  ALT  18  /  AlkPhos  50  06-04      LIVER FUNCTIONS - ( 2023 21:29 )  Alb: 3.4 g/dL / Pro: 7.4 gm/dL / ALK PHOS: 50 U/L / ALT: 18 U/L / AST: 20 U/L / GGT: x             PT/INR - ( 2023 21:29 )   PT: 17.9 sec;   INR: 1.54 ratio         PTT - ( 2023 21:29 )  PTT:32.7 sec          Urinalysis Basic - ( 2023 01:22 )    Color: Yellow / Appearance: Clear / S.010 / pH: x  Gluc: x / Ketone: Trace  / Bili: Negative / Urobili: Negative   Blood: x / Protein: Negative / Nitrite: Negative   Leuk Esterase: Trace / RBC: 11-25 /HPF / WBC 0-2 /HPF   Sq Epi: x / Non Sq Epi: x / Bacteria: Occasional            MEDICATIONS  (STANDING):  atorvastatin 10 milliGRAM(s) Oral at bedtime  budesonide  80 MICROgram(s)/formoterol 4.5 MICROgram(s) Inhaler 2 Puff(s) Inhalation two times a day  furosemide    Tablet 40 milliGRAM(s) Oral daily  levothyroxine 25 MICROGram(s) Oral daily  metoprolol tartrate 25 milliGRAM(s) Oral two times a day  spironolactone 25 milliGRAM(s) Oral daily  tiotropium 2.5 MICROgram(s) Inhaler 2 Puff(s) Inhalation daily

## 2023-06-05 NOTE — ED ADULT NURSE REASSESSMENT NOTE - NS ED NURSE REASSESS COMMENT FT1
Pt received from MAGI Slater. Pt VSS at this time, no fever. no complaints at this time, Safety and comfort measures maintained. Airborne precautions in place. Pt admitted, awaiting to go upstairs.

## 2023-06-05 NOTE — H&P ADULT - ASSESSMENT
88 y/o female multiple PMHx including COPD on Vaso O2 QHS, HTN, hypothyroid, CAD with coronary stent, F5 Leiden mutation with prior PE on Eliquis, Afib s/p ablation, CHF BIB private car via daughter c/o increased SOB, cough    A/P  Acute on chronic hypoxic respiratory failure secondary to COVID PNA   Mild COPD exacerbation  Hx CHF EF unknown - appears compensated , although BNP mildly elevated   Chronic B/L Lower extremity edema     Admit to med surg  declining remdesevir as she is allergic to many meds and does not want to try a new one- explained risks vs benefits  cw dexamethasone 6mg IV daily  albuterol, symbicort, spiriva  continue diuretics, lasix and aldactone   D dimer neg ,  on eliquis at home - unlikely acute  PE  Unlikely bacterial PNA( no leukocytosis, recently completed course of abx 10 days ago)  check procalcitonin  Pulm  and cardio consults      Hx CAD  stable, no chest pain  Hx paroxysmal Afib currently sinus   Hx PE , factor 5 Leiden  Occasional PAC on EKG ,likely from COVID PNA   trop neg  no acute ischemia on EKG   EKG sinus rhythm 65bpm, P RBBB TWI inferior lateral leads similarto EKG 4/18/21, has new PAC's  Cw BB, statin, eliquis     Abnormal UA  f/u Ucx  no dysuria    Vte prophylaxis  eliquis    [facility]- full code-                 86 y/o female multiple PMHx including COPD on Vaso O2 QHS, HTN, hypothyroid, CAD with coronary stent, F5 Leiden mutation with prior PE on Eliquis, Afib s/p ablation, CHF BIB private car via daughter c/o increased SOB, cough    A/P  Acute on chronic hypoxic respiratory failure secondary to COVID PNA   Mild COPD exacerbation  Hx CHF EF unknown - appears compensated , although   Chronic B/L Lower extremity edema     Admit to med surg  declining remdesevir as she is allergic to many meds and does not want to try a new one- explained risks vs benefits  cw dexamethasone 6mg IV daily  albuterol, symbicort, spiriva  continue diuretics, lasix and aldactone   D dimer neg ,  on eliquis at home - unlikely acute  PE  CXR reveiwed  Unlikely bacterial PNA( no leukocytosis, recently completed course of abx 10 days ago)  check procalcitonin  Pulm  and cardio consults      Hx CAD  stable, no chest pain  Hx paroxysmal Afib currently sinus   Hx PE , factor 5 Leiden  Occasional PAC on EKG ,likely from COVID PNA   trop neg  no acute ischemia on EKG   EKG sinus rhythm 65bpm, P RBBB TWI inferior lateral leads similarto EKG 4/18/21, has new PAC's  Cw BB, statin, eliquis     Abnormal UA  f/u Ucx  no dysuria    Vte prophylaxis  eliquis    GO- full code-

## 2023-06-05 NOTE — H&P ADULT - NSHPPHYSICALEXAM_GEN_ALL_CORE
PHYSICAL EXAM:    Daily Height in cm: 160.02 (04 Jun 2023 20:42)    Daily     ICU Vital Signs Last 24 Hrs  T(C): 36.5 (05 Jun 2023 07:27), Max: 37.4 (04 Jun 2023 20:40)  T(F): 97.7 (05 Jun 2023 07:27), Max: 99.4 (04 Jun 2023 20:40)  HR: 65 (05 Jun 2023 07:27) (58 - 66)  BP: 138/52 (05 Jun 2023 07:27) (117/48 - 155/68)  BP(mean): 68 (05 Jun 2023 05:44) (65 - 68)  ABP: --  ABP(mean): --  RR: 18 (05 Jun 2023 07:27) (16 - 18)  SpO2: 92% (05 Jun 2023 07:27) (92% - 95%)    O2 Parameters below as of 05 Jun 2023 07:27  Patient On (Oxygen Delivery Method): room air            Constitutional: NAD, comfortable , speaks in full stences on nasal cannula, sitting up by side of bed , has breakfast  HEENT: Atraumatic, ALBERT, Normal, No congestion  Respiratory: decreased breath sounds diffusely  no rhonchi/wheeze  Cardiovascular: S1S2; regular  Gastrointestinal: Abdomen soft, non tender, Bowel Ssounds present  Extremities: 1+ edema B/L LE chroni, peripheral pulses present  Neurological: AAO x 3, no gross focal motor deficits  Skin: Non cellulitic, no rash, ulcers

## 2023-06-05 NOTE — GOALS OF CARE CONVERSATION - ADVANCED CARE PLANNING - CONVERSATION DETAILS
Patient says she is not ready to talk about wishes regarding resuscitation yet as she is not in that situation now  I let her know that hence at this time she will be Full code meaning if her heart stops she will receive CPR and intubation mechanical ventilator- patient agreed    Full code

## 2023-06-05 NOTE — ED ADULT NURSE NOTE - NSFALLUNIVINTERV_ED_ALL_ED
Please fill out patient survey if you receive one. We would like to hear back from you, good or bad.     Bed/Stretcher in lowest position, wheels locked, appropriate side rails in place/Call bell, personal items and telephone in reach/Instruct patient to call for assistance before getting out of bed/chair/stretcher/Non-slip footwear applied when patient is off stretcher/[place] to call system/Physically safe environment - no spills, clutter or unnecessary equipment/Purposeful proactive rounding/Room/bathroom lighting operational, light cord in reach

## 2023-06-05 NOTE — ED ADULT NURSE NOTE - OBJECTIVE STATEMENT
Patient presents to ED for shortness of breath that started today. Pt's daughter states that pt tested positive for COVID today with a home test kit. Pt endorses fever/chills. Denies chest pain. Pt hx of COPD, wears oxygen at night

## 2023-06-05 NOTE — H&P ADULT - HISTORY OF PRESENT ILLNESS
88 y/o female multiple PMHx including COPD on Vaso O2 QHS, HTN, hypothyroid, CAD with coronary stent, F5 Leiden mutation with prior PE on Eliquis, Afib s/p ablation, CHF BIB private car via daughter c/o increased SOB, cough, flu/COVID type symptoms, onset yesterday symptoms: increased cough, mildly productive of clear sputum, increased SOB, significant general weakness, fatigue, malaise, diffuse myalgias, HA, mild diarrhea. Pt returned from vacation at [employer] May 31st not wearing mask in airport nor in plane, +COVID infection 2 years ago treated with MAB outpatient therapy. Pt not COVID vaccinated due to multiple allergies including to eggs and flu vaccines. PCP: Dr. Stacy. Pulmonary: M[facility]icar. Walking to triage, pt noted to desaturate to 90-92%         Pt hypoxic upon ED arrival, lungs decreased breath sounds diffusely. Pt mildly ill appearing. High clinical suspicion for COVID infection with COPD exacerbation, home rapid test +.   Plan: EKG, CXR, labs including RVP/COVID swab, OVID inflammatory markers, BNP, troponin, blood cultures, lactate, BGP on room air.  88 y/o female multiple PMHx including COPD on Vaso O2 QHS, HTN, hypothyroid, CAD with coronary stent, F5 Leiden mutation with prior PE on Eliquis, Afib s/p ablation, CHF BIB private car via daughter c/o increased SOB, cough, flu like symptoms, worsened symptoms  2 days ago  : increased cough, mildly productive of clear sputum, increased SOB, significant general weakness, fatigue, malaise, diffuse myalgias, HA, mild diarrhea. Pt returned from vacation at [place] May 31st not wearing mask in airport nor in plane, +COVID infection 2 years ago treated with MAB outpatient therapy. Pt not COVID vaccinated due to multiple allergies including to eggs and flu vaccines  Walking to triage, pt noted to desaturate to 90-92%  In [place] around 10 days ago completed treatment with  oral antibiotics as outpatient  for cold and cold symptoms   home rapid test +. covid        Pt hypoxic upon ED arrival, EKG reviewed, CXR reviewed , COVID positive , [id number], lactaete wnl    ROS currently feels better after inhaler treatment, comfortable speaks full snetences , has cough, no abd pain , no chest pain , no dizziness, no palpitations    family hx - Father  heart disease  social hx - non smoker, occasional wine socially , denies recreational drug use

## 2023-06-05 NOTE — ED ADULT NURSE NOTE - NSICDXPASTSURGICALHX_GEN_ALL_CORE_FT
PAST SURGICAL HISTORY:  Cyst of breast, unspecified laterality S/P excision    H/O cardiac radiofrequency ablation     H/O knee surgery     H/O: hysterectomy     S/P ablation of atrial fibrillation

## 2023-06-05 NOTE — PATIENT PROFILE ADULT - FALL HARM RISK - HARM RISK INTERVENTIONS

## 2023-06-06 ENCOUNTER — TRANSCRIPTION ENCOUNTER (OUTPATIENT)
Age: 88
End: 2023-06-06

## 2023-06-06 VITALS — OXYGEN SATURATION: 95 %

## 2023-06-06 LAB
ALBUMIN SERPL ELPH-MCNC: 3.1 G/DL — LOW (ref 3.3–5)
ALP SERPL-CCNC: 48 U/L — SIGNIFICANT CHANGE UP (ref 40–120)
ALT FLD-CCNC: 18 U/L — SIGNIFICANT CHANGE UP (ref 12–78)
ANION GAP SERPL CALC-SCNC: 5 MMOL/L — SIGNIFICANT CHANGE UP (ref 5–17)
AST SERPL-CCNC: 18 U/L — SIGNIFICANT CHANGE UP (ref 15–37)
BASOPHILS # BLD AUTO: 0.02 K/UL — SIGNIFICANT CHANGE UP (ref 0–0.2)
BASOPHILS NFR BLD AUTO: 0.4 % — SIGNIFICANT CHANGE UP (ref 0–2)
BILIRUB SERPL-MCNC: 0.3 MG/DL — SIGNIFICANT CHANGE UP (ref 0.2–1.2)
BUN SERPL-MCNC: 27 MG/DL — HIGH (ref 7–23)
CALCIUM SERPL-MCNC: 8.9 MG/DL — SIGNIFICANT CHANGE UP (ref 8.5–10.1)
CHLORIDE SERPL-SCNC: 107 MMOL/L — SIGNIFICANT CHANGE UP (ref 96–108)
CO2 SERPL-SCNC: 29 MMOL/L — SIGNIFICANT CHANGE UP (ref 22–31)
CREAT SERPL-MCNC: 1.18 MG/DL — SIGNIFICANT CHANGE UP (ref 0.5–1.3)
EGFR: 45 ML/MIN/1.73M2 — LOW
EOSINOPHIL # BLD AUTO: 0.06 K/UL — SIGNIFICANT CHANGE UP (ref 0–0.5)
EOSINOPHIL NFR BLD AUTO: 1.1 % — SIGNIFICANT CHANGE UP (ref 0–6)
GLUCOSE SERPL-MCNC: 107 MG/DL — HIGH (ref 70–99)
HCT VFR BLD CALC: 40.1 % — SIGNIFICANT CHANGE UP (ref 34.5–45)
HGB BLD-MCNC: 13.4 G/DL — SIGNIFICANT CHANGE UP (ref 11.5–15.5)
IMM GRANULOCYTES NFR BLD AUTO: 0.2 % — SIGNIFICANT CHANGE UP (ref 0–0.9)
LYMPHOCYTES # BLD AUTO: 1.69 K/UL — SIGNIFICANT CHANGE UP (ref 1–3.3)
LYMPHOCYTES # BLD AUTO: 29.8 % — SIGNIFICANT CHANGE UP (ref 13–44)
MCHC RBC-ENTMCNC: 30.7 PG — SIGNIFICANT CHANGE UP (ref 27–34)
MCHC RBC-ENTMCNC: 33.4 GM/DL — SIGNIFICANT CHANGE UP (ref 32–36)
MCV RBC AUTO: 91.8 FL — SIGNIFICANT CHANGE UP (ref 80–100)
MONOCYTES # BLD AUTO: 0.96 K/UL — HIGH (ref 0–0.9)
MONOCYTES NFR BLD AUTO: 16.9 % — HIGH (ref 2–14)
NEUTROPHILS # BLD AUTO: 2.94 K/UL — SIGNIFICANT CHANGE UP (ref 1.8–7.4)
NEUTROPHILS NFR BLD AUTO: 51.6 % — SIGNIFICANT CHANGE UP (ref 43–77)
PLATELET # BLD AUTO: 211 K/UL — SIGNIFICANT CHANGE UP (ref 150–400)
POTASSIUM SERPL-MCNC: 3.5 MMOL/L — SIGNIFICANT CHANGE UP (ref 3.5–5.3)
POTASSIUM SERPL-SCNC: 3.5 MMOL/L — SIGNIFICANT CHANGE UP (ref 3.5–5.3)
PROCALCITONIN SERPL-MCNC: 0.07 NG/ML — SIGNIFICANT CHANGE UP (ref 0.02–0.1)
PROT SERPL-MCNC: 7.4 GM/DL — SIGNIFICANT CHANGE UP (ref 6–8.3)
RBC # BLD: 4.37 M/UL — SIGNIFICANT CHANGE UP (ref 3.8–5.2)
RBC # FLD: 14.4 % — SIGNIFICANT CHANGE UP (ref 10.3–14.5)
SODIUM SERPL-SCNC: 141 MMOL/L — SIGNIFICANT CHANGE UP (ref 135–145)
WBC # BLD: 5.68 K/UL — SIGNIFICANT CHANGE UP (ref 3.8–10.5)
WBC # FLD AUTO: 5.68 K/UL — SIGNIFICANT CHANGE UP (ref 3.8–10.5)

## 2023-06-06 PROCEDURE — 99239 HOSP IP/OBS DSCHRG MGMT >30: CPT

## 2023-06-06 RX ORDER — POTASSIUM CHLORIDE 20 MEQ
20 PACKET (EA) ORAL
Refills: 0 | Status: COMPLETED | OUTPATIENT
Start: 2023-06-06 | End: 2023-06-06

## 2023-06-06 RX ORDER — DEXAMETHASONE 0.5 MG/5ML
1 ELIXIR ORAL
Qty: 6 | Refills: 0
Start: 2023-06-06 | End: 2023-06-11

## 2023-06-06 RX ORDER — TIOTROPIUM BROMIDE AND OLODATEROL 3.124; 2.736 UG/1; UG/1
2 SPRAY, METERED RESPIRATORY (INHALATION) DAILY
Refills: 0 | Status: DISCONTINUED | OUTPATIENT
Start: 2023-06-06 | End: 2023-06-06

## 2023-06-06 RX ADMIN — APIXABAN 5 MILLIGRAM(S): 2.5 TABLET, FILM COATED ORAL at 09:51

## 2023-06-06 RX ADMIN — Medication 6 MILLIGRAM(S): at 09:57

## 2023-06-06 RX ADMIN — Medication 25 MICROGRAM(S): at 05:24

## 2023-06-06 RX ADMIN — BUDESONIDE AND FORMOTEROL FUMARATE DIHYDRATE 2 PUFF(S): 160; 4.5 AEROSOL RESPIRATORY (INHALATION) at 09:23

## 2023-06-06 RX ADMIN — Medication 20 MILLIEQUIVALENT(S): at 11:55

## 2023-06-06 RX ADMIN — Medication 40 MILLIGRAM(S): at 05:24

## 2023-06-06 RX ADMIN — Medication 25 MILLIGRAM(S): at 09:51

## 2023-06-06 RX ADMIN — ALBUTEROL 2 PUFF(S): 90 AEROSOL, METERED ORAL at 09:20

## 2023-06-06 RX ADMIN — TIOTROPIUM BROMIDE 2 PUFF(S): 18 CAPSULE ORAL; RESPIRATORY (INHALATION) at 09:18

## 2023-06-06 RX ADMIN — Medication 20 MILLIEQUIVALENT(S): at 09:51

## 2023-06-06 NOTE — CONSULT NOTE ADULT - SUBJECTIVE AND OBJECTIVE BOX
Patient is a 87y old  Female who presents with a chief complaint of dyspnea, cough (2023 07:46)      HPI:  86 y/o female multiple PMHx including COPD,  F5 Leiden mutation with prior PE on Eliquis, Afib s/p ablation, CHF BIB private car via daughter c/o increased SOB, cough, flu like symptoms, worsened symptoms  2 days ago  : increased cough, mildly productive of clear sputum, increased SOB, significant general weakness, fatigue, malaise, diffuse myalgias, HA, mild diarrhea. Pt returned from vacation at [place] May 31st not wearing mask in airport nor in plane, +COVID infection 2 years ago treated with MAB outpatient therapy. Pt not COVID vaccinated due to multiple allergies including to eggs and flu vaccines  Walking to triage, pt noted to desaturate to 90-92%  Went to the ER in [place], returned ,  developed symptoms of COVID  Pt hypoxic upon ED arrival, EKG reviewed, CXR reviewed , COVID positive , [id number]  She is now on Eliquis 5mg BID, Decadron, Stiolto    family hx - Father  heart disease  social hx - non smoker, occasional wine socially , denies recreational drug use    (2023 07:46)      PAST MEDICAL & SURGICAL HISTORY:  Atrial fibrillation      CHF (congestive heart failure)      Pulmonary emboli      Factor 5 Leiden mutation, heterozygous      CAD (coronary artery disease)      Stented coronary artery      Hypothyroid      COPD (chronic obstructive pulmonary disease)      Mitral regurgitation      HTN (hypertension)      H/O: hysterectomy      H/O knee surgery      Cyst of breast, unspecified laterality  S/P excision      H/O cardiac radiofrequency ablation      S/P ablation of atrial fibrillation          PREVIOUS DIAGNOSTIC TESTING:      MEDICATIONS  (STANDING):  apixaban 5 milliGRAM(s) Oral two times a day  atorvastatin 10 milliGRAM(s) Oral at bedtime  budesonide  80 MICROgram(s)/formoterol 4.5 MICROgram(s) Inhaler 2 Puff(s) Inhalation two times a day  dexAMETHasone  Injectable 6 milliGRAM(s) IV Push daily  furosemide    Tablet 40 milliGRAM(s) Oral daily  levothyroxine 25 MICROGram(s) Oral daily  metoprolol tartrate 25 milliGRAM(s) Oral two times a day  potassium chloride    Tablet ER 20 milliEquivalent(s) Oral every 2 hours  spironolactone 25 milliGRAM(s) Oral daily  tiotropium 2.5 MICROgram(s) Inhaler 2 Puff(s) Inhalation daily    MEDICATIONS  (PRN):  acetaminophen     Tablet .. 650 milliGRAM(s) Oral once PRN Temp greater or equal to 38C (100.4F), Mild Pain (1 - 3)  albuterol    90 MICROgram(s) HFA Inhaler 2 Puff(s) Inhalation every 4 hours PRN for shortness of breath and/or wheezing  aluminum hydroxide/magnesium hydroxide/simethicone Suspension 30 milliLiter(s) Oral every 4 hours PRN Dyspepsia  LORazepam     Tablet 0.5 milliGRAM(s) Oral three times a day PRN Anxiety  melatonin 3 milliGRAM(s) Oral at bedtime PRN Insomnia  ondansetron Injectable 4 milliGRAM(s) IV Push every 8 hours PRN Nausea and/or Vomiting      FAMILY HISTORY:  No pertinent family history in first degree relatives        SOCIAL HISTORY:  ***    REVIEW OF SYSTEM:  rhinitis, cough, dyspnea (she states has resolved)    Vital Signs Last 24 Hrs  T(C): 37.1 (2023 20:53), Max: 37.1 (2023 20:53)  T(F): 98.8 (2023 20:53), Max: 98.8 (2023 20:53)  HR: 66 (2023 20:53) (58 - 70)  BP: 141/49 (2023 20:53) (141/49 - 149/63)  BP(mean): --  RR: 18 (2023 20:53) (17 - 18)  SpO2: 93% (2023 20:53) (93% - 95%)    Parameters below as of 2023 20:53  Patient On (Oxygen Delivery Method): room air        I&O's Summary    PHYSICAL EXAM  General Appearance: cooperative, no acute distress,   HEENT: PERRL, conjunctiva clear, EOM's intact, non injected pharynx, no exudate, TM   normal  Neck: Supple, , no adenopathy, thyroid: not enlarged, no carotid bruit or JVD  Back: Symmetric, no  tenderness,no soft tissue tenderness  Lungs: clear lexi  Heart: Regular rate and rhythm, S1, S2 normal, no murmur, rub or gallop  Abdomen: Soft, non-tender, bowel sounds active , no hepatosplenomegaly  Extremities: no cyanosis or edema, no joint swelling  Skin: Skin color, texture normal, no rashes   Neurologic: Alert and oriented X3 , cranial nerves intact, sensory and motor normal,    ECG:    LABS:                          13.4   5.68  )-----------( 211      ( 2023 06:55 )             40.1     -    141  |  107  |  27<H>  ----------------------------<  107<H>  3.5   |  29  |  1.18    Ca    8.9      2023 06:55    TPro  7.4  /  Alb  3.1<L>  /  TBili  0.3  /  DBili  x   /  AST  18  /  ALT  18  /  AlkPhos  48  -          Pro BNP  --  @ 21:29  D Dimer  <150  @ 21:29    PT/INR - ( 2023 21:29 )   PT: 17.9 sec;   INR: 1.54 ratio         PTT - ( 2023 21:29 )  PTT:32.7 sec  Urinalysis Basic - ( 2023 01:22 )    Color: Yellow / Appearance: Clear / S.010 / pH: x  Gluc: x / Ketone: Trace  / Bili: Negative / Urobili: Negative   Blood: x / Protein: Negative / Nitrite: Negative   Leuk Esterase: Trace / RBC: 11-25 /HPF / WBC 0-2 /HPF   Sq Epi: x / Non Sq Epi: x / Bacteria: Occasional            RADIOLOGY & ADDITIONAL STUDIES:    
   Patient is a 87y old  Female who presents with a chief complaint of dyspnea, cough (2023 08:43)    ________________________________  SKeegan DEL CASTILLO is a 87y year old Female with a past medical history of paroxysmal atrial fibrillation on anticoagulation status post ablation in the past, coronary disease status post PCI to the mid LAD  with repeat cardiac catheter  showing nonobstructive CAD, mitral valve regurgitation, COPD on oxygen at night, hypertension, who is unvaccinated for COVID.        She was brought in by her daughter daughter c/o increased SOB, cough, flu like symptoms, worsened symptoms  2 days ago  : increased cough, mildly productive of clear sputum, increased SOB, significant general weakness, fatigue, malaise, diffuse myalgias, HA, mild diarrhea. Pt returned from vacation at [place] May 31st not wearing mask in airport nor in plane, +COVID infection 2 years ago treated with MAB outpatient therapy. Pt not COVID vaccinated due to multiple allergies including to eggs and flu vaccines  Walking to triage, pt noted to desaturate to 90-92%  In [place] around 10 days ago completed treatment with  oral antibiotics as outpatient  for cold and cold symptoms   home rapid test +. covid        EKG shows sinus rhythm with PVCs.  Denies any chest discomfort or palpitations.  Shortness of breath improving.Troponin negative.  BNP mildly elevated.      PREVIOUS CARDIAC WORKUP:    Echocardiogram  Stress Test  Cardiac Catheterization  ________________________________  Review of systems: A 10 point review of system has been performed, and is negative except for what has been mentioned in the above history of present illness.     PAST MEDICAL & SURGICAL HISTORY:  Atrial fibrillation      CHF (congestive heart failure)      Pulmonary emboli      Factor 5 Leiden mutation, heterozygous      CAD (coronary artery disease)      Stented coronary artery      Hypothyroid      COPD (chronic obstructive pulmonary disease)      Mitral regurgitation      HTN (hypertension)      H/O: hysterectomy      H/O knee surgery      Cyst of breast, unspecified laterality  S/P excision      H/O cardiac radiofrequency ablation      S/P ablation of atrial fibrillation        FAMILY HISTORY:  No pertinent family history in first degree relatives         SOCIAL HISTORY: The patient denies any tobacco abuse, alcohol abuse or illicit drug use.    ALLERGIES:  eggs (Unknown)  Motrin (Other)  Macrodantin (Other)  flu vaccines (Other)  Ceftin (Other (Moderate))  sulfa drugs (Other)    Home Medications:  Albuterol (Eqv-ProAir HFA) 90 mcg/inh inhalation aerosol: 2 puff(s) inhaled every 4 hours as needed for  shortness of breath and/or wheezing (2023 23:50)  Ativan 0.5 mg oral tablet: 1 tab(s) orally 3 times a day, As Needed anxiety (2023 23:48)  Eliquis 5 mg oral tablet: 1 tab(s) orally 2 times a day (2023 09:13)  furosemide 40 mg oral tablet: 1 tab(s) orally once a day (2023 23:50)  levothyroxine 25 mcg (0.025 mg) oral tablet: 1 tab(s) orally once a day (2023 23:51)  Livalo 2 mg oral tablet: 1 tab(s) orally once a day (2023 23:50)  metoprolol tartrate 25 mg oral tablet: 1 tab(s) orally 2 times a day (2023 23:51)  spironolactone 25 mg oral tablet: 1 tab(s) orally once a day (2023 23:50)    MEDICATIONS  (STANDING):  apixaban 5 milliGRAM(s) Oral two times a day  atorvastatin 10 milliGRAM(s) Oral at bedtime  dexAMETHasone  Injectable 6 milliGRAM(s) IV Push daily  furosemide    Tablet 40 milliGRAM(s) Oral daily  levothyroxine 25 MICROGram(s) Oral daily  metoprolol tartrate 25 milliGRAM(s) Oral two times a day  potassium chloride    Tablet ER 20 milliEquivalent(s) Oral every 2 hours  spironolactone 25 milliGRAM(s) Oral daily  tiotropium 2.5 MICROgram(s)/olodaterol 2.5 MICROgram(s) (STIOLTO) Inhaler 2 Puff(s) Inhalation daily    MEDICATIONS  (PRN):  acetaminophen     Tablet .. 650 milliGRAM(s) Oral once PRN Temp greater or equal to 38C (100.4F), Mild Pain (1 - 3)  albuterol    90 MICROgram(s) HFA Inhaler 2 Puff(s) Inhalation every 4 hours PRN for shortness of breath and/or wheezing  aluminum hydroxide/magnesium hydroxide/simethicone Suspension 30 milliLiter(s) Oral every 4 hours PRN Dyspepsia  LORazepam     Tablet 0.5 milliGRAM(s) Oral three times a day PRN Anxiety  melatonin 3 milliGRAM(s) Oral at bedtime PRN Insomnia  ondansetron Injectable 4 milliGRAM(s) IV Push every 8 hours PRN Nausea and/or Vomiting    Vital Signs Last 24 Hrs  T(C): 36.5 (2023 08:15), Max: 37.1 (2023 20:53)  T(F): 97.7 (2023 08:15), Max: 98.8 (2023 20:53)  HR: 56 (2023 08:15) (56 - 66)  BP: 144/58 (2023 08:15) (141/49 - 144/58)  BP(mean): --  RR: 18 (2023 08:15) (17 - 18)  SpO2: 95% (2023 09:30) (93% - 95%)    Parameters below as of 2023 09:30  Patient On (Oxygen Delivery Method): room air      I&O's Summary    ________________________________  GENERAL APPEARANCE:  No acute distress  HEAD: normocephalic, atraumatic  NECK: supple, no jugular venous distention, no carotid bruit    HEART: Regular rate and rhythm, S1, S2 normal, 1/6 murmur    CHEST:  No anterior chest wall tenderness    LUNGS: Coarse    ABDOMEN: soft, nontender, nondistended, with positive bowel sounds appreciated  EXTREMITIES: no edema.   NEURO: Alert and oriented x3  PSYC:  Normal affect  SKIN:  Dry  ________________________________   TELEMETRY: Not on telemetry    ECG: Sinus rhythm, right bundle branch block    LABS:                        13.4   5.68  )-----------( 211      ( 2023 06:55 )             40.1             06-    141  |  107  |  27<H>  ----------------------------<  107<H>  3.5   |  29  |  1.18    Ca    8.9      2023 06:55    TPro  7.4  /  Alb  3.1<L>  /  TBili  0.3  /  DBili  x   /  AST  18  /  ALT  18  /  AlkPhos  48  06-06      LIVER FUNCTIONS - ( 2023 06:55 )  Alb: 3.1 g/dL / Pro: 7.4 gm/dL / ALK PHOS: 48 U/L / ALT: 18 U/L / AST: 18 U/L / GGT: x         PT/INR - ( 2023 21:29 )   PT: 17.9 sec;   INR: 1.54 ratio         PTT - ( 2023 21:29 )  PTT:32.7 sec  Urinalysis Basic - ( 2023 01:22 )    Color: Yellow / Appearance: Clear / S.010 / pH: x  Gluc: x / Ketone: Trace  / Bili: Negative / Urobili: Negative   Blood: x / Protein: Negative / Nitrite: Negative   Leuk Esterase: Trace / RBC: 11-25 /HPF / WBC 0-2 /HPF   Sq Epi: x / Non Sq Epi: x / Bacteria: Occasional      Pro BNP  -- 06-04 @ 21:29  D Dimer  <150 06-04 @ 21:29    PT/INR - ( 2023 21:29 )   PT: 17.9 sec;   INR: 1.54 ratio         PTT - ( 2023 21:29 )  PTT:32.7 sec  Urinalysis Basic - ( 2023 01:22 )    Color: Yellow / Appearance: Clear / S.010 / pH: x  Gluc: x / Ketone: Trace  / Bili: Negative / Urobili: Negative   Blood: x / Protein: Negative / Nitrite: Negative   Leuk Esterase: Trace / RBC: 11-25 /HPF / WBC 0-2 /HPF   Sq Epi: x / Non Sq Epi: x / Bacteria: Occasional       ________________________________    RADIOLOGY & ADDITIONAL STUDIES:   ________________________________    ASSESSMENT:  CAD status post PCI to the LAD in -cardiac cath in  showed nonobstructive disease  Paroxysmal atrial fibrillation status post ablation  COVID  Hypertension  Mitral valve regurgitation  History of factor V Leiden  Severe pulmonary hypertension-negative vasoreactivity study      PLAN:  In summary, this is a 87y Female with a past medical history of CAD, admitted for hypoxia due to COVID.  Shortness of breath improving.  No chest discomfort.  Prior coronary angiogram showed patent stent with severe pulm hypertension.  No need for repeat cardiac testing at this time.  Continue anticoagulation.  Continue metoprolol.  Continue oral diuretic, does not appear volume overloaded.  Management of COVID-19 per pulmonary and primary team.  I will follow as needed.        ____________________________________________  (Dragon Dictation software used). Thank you for allowing me to participate in the care of your patient. Please contact me should any questions arise.    NEFTALI Forte DO, FACC  Office: [phone]

## 2023-06-06 NOTE — DISCHARGE NOTE PROVIDER - NSDCMRMEDTOKEN_GEN_ALL_CORE_FT
Albuterol (Eqv-ProAir HFA) 90 mcg/inh inhalation aerosol: 2 puff(s) inhaled every 4 hours as needed for  shortness of breath and/or wheezing  Ativan 0.5 mg oral tablet: 1 tab(s) orally 3 times a day, As Needed anxiety  dexAMETHasone 6 mg oral tablet: 1 tab(s) orally once a day  Eliquis 5 mg oral tablet: 1 tab(s) orally 2 times a day  furosemide 40 mg oral tablet: 1 tab(s) orally once a day  levothyroxine 25 mcg (0.025 mg) oral tablet: 1 tab(s) orally once a day  Livalo 2 mg oral tablet: 1 tab(s) orally once a day  metoprolol tartrate 25 mg oral tablet: 1 tab(s) orally 2 times a day  spironolactone 25 mg oral tablet: 1 tab(s) orally once a day

## 2023-06-06 NOTE — CONSULT NOTE ADULT - ASSESSMENT
1) COPD  2) Dyspnea  3) COVID  4) PE  5) FVL    86 y/o female multiple PMHx including COPD,  F5 Leiden mutation with prior PE on Eliquis, Afib s/p ablation, CHF BIB private car via daughter c/o increased SOB, cough, flu like symptoms, worsened symptoms  2 days ago  : increased cough, mildly productive of clear sputum, increased SOB, significant general weakness, fatigue, malaise, diffuse myalgias, HA, mild diarrhea. Pt returned from vacation at [place] May 31st not wearing mask in airport nor in plane, +COVID infection 2 years ago treated with MAB outpatient therapy. Pt not COVID vaccinated due to multiple allergies including to eggs and flu vaccines  Walking to triage, pt noted to desaturate to 90-92%  Went to the ER in [place], returned 5/31, 6/2 developed symptoms of COVID  Pt hypoxic upon ED arrival, EKG reviewed, CXR reviewed , COVID positive , [id number]  She is now on Eliquis 5mg BID, Decadron, Stiolto  Agree with current treatment  Assess for Home O2 prior to discharge  Will follow up as an outpatient

## 2023-06-06 NOTE — DISCHARGE NOTE PROVIDER - CARE PROVIDER_API CALL
Mario Sinclair  Pulmonary Disease  [address] [place] [address]  Phone: [phone]  Fax: [phone]  Follow Up Time:     Amrit Stacy  Internal Medicine  [address] [place] [address]  Phone: [phone]  Fax: [phone]  Follow Up Time:

## 2023-06-06 NOTE — DISCHARGE NOTE PROVIDER - HOSPITAL COURSE
86 y/o female multiple PMHx including COPD on Vaso O2 QHS, HTN, hypothyroid, CAD with coronary stent, F5 Leiden mutation with prior PE on Eliquis, Afib s/p ablation, CHF BIB private car via daughter c/o increased SOB, cough, flu like symptoms, worsened symptoms  2 days ago  : increased cough, mildly productive of clear sputum, increased SOB, significant general weakness, fatigue, malaise, diffuse myalgias, HA, mild diarrhea. Pt returned from vacation at [place] May 31st not wearing mask in airport nor in plane, +COVID infection 2 years ago treated with MAB outpatient therapy. Pt not COVID vaccinated due to multiple allergies including to eggs and flu vaccines. Walking to triage, pt noted to desaturate to 90-92%. In [place] around 10 days ago completed treatment with  oral antibiotics as outpatient  for cold and cold symptoms, home rapid test +. covid  Pt hypoxic upon ED arrival, EKG reviewed, CXR reviewed , COVID positive , [id number], lactate  wnl    Subjective: Patient seen this AM, no overnight issues reported. Pulse ox exercise noted. No requirements for home O2.     Vital Signs Last 24 Hrs  T(C): 36.5 (06 Jun 2023 08:15), Max: 37.1 (05 Jun 2023 20:53)  T(F): 97.7 (06 Jun 2023 08:15), Max: 98.8 (05 Jun 2023 20:53)  HR: 56 (06 Jun 2023 08:15) (56 - 66)  BP: 144/58 (06 Jun 2023 08:15) (141/49 - 144/58)  RR: 18 (06 Jun 2023 08:15) (17 - 18)  SpO2: 95% (06 Jun 2023 09:30) (93% - 95%)    Parameters below as of 06 Jun 2023 09:30  Patient On (Oxygen Delivery Method): room air    PHYSICAL EXAM:  GENERAL: NAD, lying in bed comfortably  HEAD:  Atraumatic, Normocephalic  EYES: conjunctiva and sclera clear  ENT: Moist mucous membranes  NECK: Supple, No JVD  CHEST/LUNG: Clear to auscultation bilaterally; No rales, rhonchi. Unlabored respirations  HEART: Regular rate and rhythm; No murmurs, rubs, or gallops  ABDOMEN: Bowel sounds present; Soft, Nontender, Nondistended.   EXTREMITIES:  2+ Peripheral Pulses, brisk capillary refill. No clubbing, cyanosis, or edema  NERVOUS SYSTEM:  Alert & Oriented X3, speech clear. No deficits   MSK: FROM all 4 extremities, full and equal strength

## 2023-06-06 NOTE — DISCHARGE NOTE NURSING/CASE MANAGEMENT/SOCIAL WORK - NSDCPEFALRISK_GEN_ALL_CORE
For information on Fall & Injury Prevention, visit: [email or web address] OR  [email or web address] OR  https://www.cdc.gov/steadi/patient.html

## 2023-06-06 NOTE — DISCHARGE NOTE PROVIDER - NSDCCPCAREPLAN_GEN_ALL_CORE_FT
PRINCIPAL DISCHARGE DIAGNOSIS  Diagnosis: Acute respiratory failure with hypoxia  Assessment and Plan of Treatment: Admitted for hypoxic, found to have covid-19. Treated with dexamethasone with improvement. Continue symptomatic treatment, tylenol as needed for pain/fever, robitussin for cough, adequate hydration. Continue Dexamethasone for 6 more days. Return to ER for worsening symptoms. You are being discharged with  pulse oximeter to monitor your oxygen levels.      SECONDARY DISCHARGE DIAGNOSES  Diagnosis: 2019 novel coronavirus disease (COVID-19)  Assessment and Plan of Treatment:

## 2023-06-06 NOTE — DISCHARGE NOTE NURSING/CASE MANAGEMENT/SOCIAL WORK - PATIENT PORTAL LINK FT
You can access the FollowMyHealth Patient Portal offered by [facility] by registering at the following website: [email or web address]. By joining [employer]’s FollowMyHealth portal, you will also be able to view your health information using other applications (apps) compatible with our system.

## 2023-06-07 LAB
CULTURE RESULTS: SIGNIFICANT CHANGE UP
SPECIMEN SOURCE: SIGNIFICANT CHANGE UP

## 2023-06-10 LAB
CULTURE RESULTS: SIGNIFICANT CHANGE UP
CULTURE RESULTS: SIGNIFICANT CHANGE UP
SPECIMEN SOURCE: SIGNIFICANT CHANGE UP
SPECIMEN SOURCE: SIGNIFICANT CHANGE UP

## 2023-06-12 DIAGNOSIS — I34.0 NONRHEUMATIC MITRAL (VALVE) INSUFFICIENCY: ICD-10-CM

## 2023-06-12 DIAGNOSIS — Z95.5 PRESENCE OF CORONARY ANGIOPLASTY IMPLANT AND GRAFT: ICD-10-CM

## 2023-06-12 DIAGNOSIS — R05.9 COUGH, UNSPECIFIED: ICD-10-CM

## 2023-06-12 DIAGNOSIS — I50.9 HEART FAILURE, UNSPECIFIED: ICD-10-CM

## 2023-06-12 DIAGNOSIS — Z79.82 LONG TERM (CURRENT) USE OF ASPIRIN: ICD-10-CM

## 2023-06-12 DIAGNOSIS — I11.0 HYPERTENSIVE HEART DISEASE WITH HEART FAILURE: ICD-10-CM

## 2023-06-12 DIAGNOSIS — D68.51 ACTIVATED PROTEIN C RESISTANCE: ICD-10-CM

## 2023-06-12 DIAGNOSIS — J12.82 PNEUMONIA DUE TO CORONAVIRUS DISEASE 2019: ICD-10-CM

## 2023-06-12 DIAGNOSIS — I25.10 ATHEROSCLEROTIC HEART DISEASE OF NATIVE CORONARY ARTERY WITHOUT ANGINA PECTORIS: ICD-10-CM

## 2023-06-12 DIAGNOSIS — Z88.2 ALLERGY STATUS TO SULFONAMIDES: ICD-10-CM

## 2023-06-12 DIAGNOSIS — E03.9 HYPOTHYROIDISM, UNSPECIFIED: ICD-10-CM

## 2023-06-12 DIAGNOSIS — Z79.52 LONG TERM (CURRENT) USE OF SYSTEMIC STEROIDS: ICD-10-CM

## 2023-06-12 DIAGNOSIS — Z79.01 LONG TERM (CURRENT) USE OF ANTICOAGULANTS: ICD-10-CM

## 2023-06-12 DIAGNOSIS — U07.1 COVID-19: ICD-10-CM

## 2023-06-12 DIAGNOSIS — Z88.6 ALLERGY STATUS TO ANALGESIC AGENT: ICD-10-CM

## 2023-06-12 DIAGNOSIS — J96.21 ACUTE AND CHRONIC RESPIRATORY FAILURE WITH HYPOXIA: ICD-10-CM

## 2023-06-12 DIAGNOSIS — Z88.1 ALLERGY STATUS TO OTHER ANTIBIOTIC AGENTS STATUS: ICD-10-CM

## 2023-06-12 DIAGNOSIS — J44.1 CHRONIC OBSTRUCTIVE PULMONARY DISEASE WITH (ACUTE) EXACERBATION: ICD-10-CM

## 2023-06-12 DIAGNOSIS — Z91.012 ALLERGY TO EGGS: ICD-10-CM

## 2023-06-12 DIAGNOSIS — J44.0 CHRONIC OBSTRUCTIVE PULMONARY DISEASE WITH (ACUTE) LOWER RESPIRATORY INFECTION: ICD-10-CM

## 2023-06-12 DIAGNOSIS — Z86.711 PERSONAL HISTORY OF PULMONARY EMBOLISM: ICD-10-CM

## 2023-06-12 DIAGNOSIS — I48.0 PAROXYSMAL ATRIAL FIBRILLATION: ICD-10-CM

## 2023-06-12 DIAGNOSIS — Z88.7 ALLERGY STATUS TO SERUM AND VACCINE: ICD-10-CM

## 2023-06-12 DIAGNOSIS — Z28.310 UNVACCINATED FOR COVID-19: ICD-10-CM

## 2023-10-03 NOTE — ASU PATIENT PROFILE, ADULT - PRO ARRIVE FROM
home Hydroquinone Counseling:  Patient advised that medication may result in skin irritation, lightening (hypopigmentation), dryness, and burning.  In the event of skin irritation, the patient was advised to reduce the amount of the drug applied or use it less frequently.  Rarely, spots that are treated with hydroquinone can become darker (pseudoochronosis).  Should this occur, patient instructed to stop medication and call the office. The patient verbalized understanding of the proper use and possible adverse effects of hydroquinone.  All of the patient's questions and concerns were addressed.

## 2023-10-06 RX ORDER — CIPROFLOXACIN LACTATE 400MG/40ML
1 VIAL (ML) INTRAVENOUS
Refills: 0 | DISCHARGE
Start: 2023-10-06 | End: 2023-10-11

## 2023-10-18 ENCOUNTER — OUTPATIENT (OUTPATIENT)
Dept: OUTPATIENT SERVICES | Facility: HOSPITAL | Age: 88
LOS: 1 days | End: 2023-10-18
Payer: MEDICARE

## 2023-10-18 ENCOUNTER — APPOINTMENT (OUTPATIENT)
Dept: ULTRASOUND IMAGING | Facility: CLINIC | Age: 88
End: 2023-10-18
Payer: MEDICARE

## 2023-10-18 DIAGNOSIS — Z98.890 OTHER SPECIFIED POSTPROCEDURAL STATES: Chronic | ICD-10-CM

## 2023-10-18 DIAGNOSIS — Z90.710 ACQUIRED ABSENCE OF BOTH CERVIX AND UTERUS: Chronic | ICD-10-CM

## 2023-10-18 DIAGNOSIS — N60.09 SOLITARY CYST OF UNSPECIFIED BREAST: Chronic | ICD-10-CM

## 2023-10-18 DIAGNOSIS — R10.9 UNSPECIFIED ABDOMINAL PAIN: ICD-10-CM

## 2023-10-18 PROCEDURE — 76775 US EXAM ABDO BACK WALL LIM: CPT | Mod: 26

## 2023-10-18 PROCEDURE — 76775 US EXAM ABDO BACK WALL LIM: CPT

## 2023-11-07 ENCOUNTER — EMERGENCY (EMERGENCY)
Facility: HOSPITAL | Age: 88
LOS: 0 days | Discharge: HOME | End: 2023-11-08
Attending: EMERGENCY MEDICINE
Payer: MEDICARE

## 2023-11-07 VITALS — WEIGHT: 179.02 LBS

## 2023-11-07 DIAGNOSIS — Z98.890 OTHER SPECIFIED POSTPROCEDURAL STATES: Chronic | ICD-10-CM

## 2023-11-07 DIAGNOSIS — Z91.012 ALLERGY TO EGGS: ICD-10-CM

## 2023-11-07 DIAGNOSIS — Z95.5 PRESENCE OF CORONARY ANGIOPLASTY IMPLANT AND GRAFT: ICD-10-CM

## 2023-11-07 DIAGNOSIS — I48.91 UNSPECIFIED ATRIAL FIBRILLATION: ICD-10-CM

## 2023-11-07 DIAGNOSIS — R06.02 SHORTNESS OF BREATH: ICD-10-CM

## 2023-11-07 DIAGNOSIS — R59.9 ENLARGED LYMPH NODES, UNSPECIFIED: ICD-10-CM

## 2023-11-07 DIAGNOSIS — Z88.2 ALLERGY STATUS TO SULFONAMIDES: ICD-10-CM

## 2023-11-07 DIAGNOSIS — Z86.711 PERSONAL HISTORY OF PULMONARY EMBOLISM: ICD-10-CM

## 2023-11-07 DIAGNOSIS — N60.09 SOLITARY CYST OF UNSPECIFIED BREAST: Chronic | ICD-10-CM

## 2023-11-07 DIAGNOSIS — Z88.8 ALLERGY STATUS TO OTHER DRUGS, MEDICAMENTS AND BIOLOGICAL SUBSTANCES: ICD-10-CM

## 2023-11-07 DIAGNOSIS — D68.51 ACTIVATED PROTEIN C RESISTANCE: ICD-10-CM

## 2023-11-07 DIAGNOSIS — I25.10 ATHEROSCLEROTIC HEART DISEASE OF NATIVE CORONARY ARTERY WITHOUT ANGINA PECTORIS: ICD-10-CM

## 2023-11-07 DIAGNOSIS — Z79.01 LONG TERM (CURRENT) USE OF ANTICOAGULANTS: ICD-10-CM

## 2023-11-07 DIAGNOSIS — I50.32 CHRONIC DIASTOLIC (CONGESTIVE) HEART FAILURE: ICD-10-CM

## 2023-11-07 DIAGNOSIS — R07.89 OTHER CHEST PAIN: ICD-10-CM

## 2023-11-07 DIAGNOSIS — J44.9 CHRONIC OBSTRUCTIVE PULMONARY DISEASE, UNSPECIFIED: ICD-10-CM

## 2023-11-07 DIAGNOSIS — I27.20 PULMONARY HYPERTENSION, UNSPECIFIED: ICD-10-CM

## 2023-11-07 DIAGNOSIS — I11.0 HYPERTENSIVE HEART DISEASE WITH HEART FAILURE: ICD-10-CM

## 2023-11-07 DIAGNOSIS — E03.9 HYPOTHYROIDISM, UNSPECIFIED: ICD-10-CM

## 2023-11-07 DIAGNOSIS — Z90.710 ACQUIRED ABSENCE OF BOTH CERVIX AND UTERUS: Chronic | ICD-10-CM

## 2023-11-07 LAB
ALBUMIN SERPL ELPH-MCNC: 2.8 G/DL — LOW (ref 3.3–5)
ALBUMIN SERPL ELPH-MCNC: 2.8 G/DL — LOW (ref 3.3–5)
ALP SERPL-CCNC: 46 U/L — SIGNIFICANT CHANGE UP (ref 40–120)
ALP SERPL-CCNC: 46 U/L — SIGNIFICANT CHANGE UP (ref 40–120)
ALT FLD-CCNC: 12 U/L — SIGNIFICANT CHANGE UP (ref 12–78)
ALT FLD-CCNC: 12 U/L — SIGNIFICANT CHANGE UP (ref 12–78)
ANION GAP SERPL CALC-SCNC: 4 MMOL/L — LOW (ref 5–17)
ANION GAP SERPL CALC-SCNC: 4 MMOL/L — LOW (ref 5–17)
AST SERPL-CCNC: 11 U/L — LOW (ref 15–37)
AST SERPL-CCNC: 11 U/L — LOW (ref 15–37)
BASE EXCESS BLDV CALC-SCNC: 5.2 MMOL/L — HIGH (ref -2–3)
BASE EXCESS BLDV CALC-SCNC: 5.2 MMOL/L — HIGH (ref -2–3)
BASOPHILS # BLD AUTO: 0.05 K/UL — SIGNIFICANT CHANGE UP (ref 0–0.2)
BASOPHILS # BLD AUTO: 0.05 K/UL — SIGNIFICANT CHANGE UP (ref 0–0.2)
BASOPHILS NFR BLD AUTO: 0.5 % — SIGNIFICANT CHANGE UP (ref 0–2)
BASOPHILS NFR BLD AUTO: 0.5 % — SIGNIFICANT CHANGE UP (ref 0–2)
BILIRUB SERPL-MCNC: 0.6 MG/DL — SIGNIFICANT CHANGE UP (ref 0.2–1.2)
BILIRUB SERPL-MCNC: 0.6 MG/DL — SIGNIFICANT CHANGE UP (ref 0.2–1.2)
BUN SERPL-MCNC: 28 MG/DL — HIGH (ref 7–23)
BUN SERPL-MCNC: 28 MG/DL — HIGH (ref 7–23)
CALCIUM SERPL-MCNC: 8.9 MG/DL — SIGNIFICANT CHANGE UP (ref 8.5–10.1)
CALCIUM SERPL-MCNC: 8.9 MG/DL — SIGNIFICANT CHANGE UP (ref 8.5–10.1)
CHLORIDE SERPL-SCNC: 104 MMOL/L — SIGNIFICANT CHANGE UP (ref 96–108)
CHLORIDE SERPL-SCNC: 104 MMOL/L — SIGNIFICANT CHANGE UP (ref 96–108)
CO2 SERPL-SCNC: 30 MMOL/L — SIGNIFICANT CHANGE UP (ref 22–31)
CO2 SERPL-SCNC: 30 MMOL/L — SIGNIFICANT CHANGE UP (ref 22–31)
CREAT SERPL-MCNC: 1.22 MG/DL — SIGNIFICANT CHANGE UP (ref 0.5–1.3)
CREAT SERPL-MCNC: 1.22 MG/DL — SIGNIFICANT CHANGE UP (ref 0.5–1.3)
EGFR: 43 ML/MIN/1.73M2 — LOW
EGFR: 43 ML/MIN/1.73M2 — LOW
EOSINOPHIL # BLD AUTO: 0.35 K/UL — SIGNIFICANT CHANGE UP (ref 0–0.5)
EOSINOPHIL # BLD AUTO: 0.35 K/UL — SIGNIFICANT CHANGE UP (ref 0–0.5)
EOSINOPHIL NFR BLD AUTO: 3.7 % — SIGNIFICANT CHANGE UP (ref 0–6)
EOSINOPHIL NFR BLD AUTO: 3.7 % — SIGNIFICANT CHANGE UP (ref 0–6)
GAS PNL BLDV: SIGNIFICANT CHANGE UP
GAS PNL BLDV: SIGNIFICANT CHANGE UP
GLUCOSE SERPL-MCNC: 127 MG/DL — HIGH (ref 70–99)
GLUCOSE SERPL-MCNC: 127 MG/DL — HIGH (ref 70–99)
HCO3 BLDV-SCNC: 32 MMOL/L — HIGH (ref 22–29)
HCO3 BLDV-SCNC: 32 MMOL/L — HIGH (ref 22–29)
HCT VFR BLD CALC: 36.6 % — SIGNIFICANT CHANGE UP (ref 34.5–45)
HCT VFR BLD CALC: 36.6 % — SIGNIFICANT CHANGE UP (ref 34.5–45)
HGB BLD-MCNC: 12.2 G/DL — SIGNIFICANT CHANGE UP (ref 11.5–15.5)
HGB BLD-MCNC: 12.2 G/DL — SIGNIFICANT CHANGE UP (ref 11.5–15.5)
IMM GRANULOCYTES NFR BLD AUTO: 0.2 % — SIGNIFICANT CHANGE UP (ref 0–0.9)
IMM GRANULOCYTES NFR BLD AUTO: 0.2 % — SIGNIFICANT CHANGE UP (ref 0–0.9)
LIDOCAIN IGE QN: 18 U/L — SIGNIFICANT CHANGE UP (ref 13–75)
LIDOCAIN IGE QN: 18 U/L — SIGNIFICANT CHANGE UP (ref 13–75)
LYMPHOCYTES # BLD AUTO: 1.1 K/UL — SIGNIFICANT CHANGE UP (ref 1–3.3)
LYMPHOCYTES # BLD AUTO: 1.1 K/UL — SIGNIFICANT CHANGE UP (ref 1–3.3)
LYMPHOCYTES # BLD AUTO: 11.6 % — LOW (ref 13–44)
LYMPHOCYTES # BLD AUTO: 11.6 % — LOW (ref 13–44)
MAGNESIUM SERPL-MCNC: 2.4 MG/DL — SIGNIFICANT CHANGE UP (ref 1.6–2.6)
MAGNESIUM SERPL-MCNC: 2.4 MG/DL — SIGNIFICANT CHANGE UP (ref 1.6–2.6)
MCHC RBC-ENTMCNC: 30.3 PG — SIGNIFICANT CHANGE UP (ref 27–34)
MCHC RBC-ENTMCNC: 30.3 PG — SIGNIFICANT CHANGE UP (ref 27–34)
MCHC RBC-ENTMCNC: 33.3 GM/DL — SIGNIFICANT CHANGE UP (ref 32–36)
MCHC RBC-ENTMCNC: 33.3 GM/DL — SIGNIFICANT CHANGE UP (ref 32–36)
MCV RBC AUTO: 90.8 FL — SIGNIFICANT CHANGE UP (ref 80–100)
MCV RBC AUTO: 90.8 FL — SIGNIFICANT CHANGE UP (ref 80–100)
MONOCYTES # BLD AUTO: 1.34 K/UL — HIGH (ref 0–0.9)
MONOCYTES # BLD AUTO: 1.34 K/UL — HIGH (ref 0–0.9)
MONOCYTES NFR BLD AUTO: 14.1 % — HIGH (ref 2–14)
MONOCYTES NFR BLD AUTO: 14.1 % — HIGH (ref 2–14)
NEUTROPHILS # BLD AUTO: 6.61 K/UL — SIGNIFICANT CHANGE UP (ref 1.8–7.4)
NEUTROPHILS # BLD AUTO: 6.61 K/UL — SIGNIFICANT CHANGE UP (ref 1.8–7.4)
NEUTROPHILS NFR BLD AUTO: 69.9 % — SIGNIFICANT CHANGE UP (ref 43–77)
NEUTROPHILS NFR BLD AUTO: 69.9 % — SIGNIFICANT CHANGE UP (ref 43–77)
NT-PROBNP SERPL-SCNC: 943 PG/ML — HIGH (ref 0–450)
NT-PROBNP SERPL-SCNC: 943 PG/ML — HIGH (ref 0–450)
PCO2 BLDV: 52 MMHG — HIGH (ref 39–42)
PCO2 BLDV: 52 MMHG — HIGH (ref 39–42)
PH BLDV: 7.39 — SIGNIFICANT CHANGE UP (ref 7.32–7.43)
PH BLDV: 7.39 — SIGNIFICANT CHANGE UP (ref 7.32–7.43)
PLATELET # BLD AUTO: 223 K/UL — SIGNIFICANT CHANGE UP (ref 150–400)
PLATELET # BLD AUTO: 223 K/UL — SIGNIFICANT CHANGE UP (ref 150–400)
PO2 BLDV: 44 MMHG — SIGNIFICANT CHANGE UP (ref 25–45)
PO2 BLDV: 44 MMHG — SIGNIFICANT CHANGE UP (ref 25–45)
POTASSIUM SERPL-MCNC: 4.3 MMOL/L — SIGNIFICANT CHANGE UP (ref 3.5–5.3)
POTASSIUM SERPL-MCNC: 4.3 MMOL/L — SIGNIFICANT CHANGE UP (ref 3.5–5.3)
POTASSIUM SERPL-SCNC: 4.3 MMOL/L — SIGNIFICANT CHANGE UP (ref 3.5–5.3)
POTASSIUM SERPL-SCNC: 4.3 MMOL/L — SIGNIFICANT CHANGE UP (ref 3.5–5.3)
PROT SERPL-MCNC: 6.8 GM/DL — SIGNIFICANT CHANGE UP (ref 6–8.3)
PROT SERPL-MCNC: 6.8 GM/DL — SIGNIFICANT CHANGE UP (ref 6–8.3)
RBC # BLD: 4.03 M/UL — SIGNIFICANT CHANGE UP (ref 3.8–5.2)
RBC # BLD: 4.03 M/UL — SIGNIFICANT CHANGE UP (ref 3.8–5.2)
RBC # FLD: 13.7 % — SIGNIFICANT CHANGE UP (ref 10.3–14.5)
RBC # FLD: 13.7 % — SIGNIFICANT CHANGE UP (ref 10.3–14.5)
SAO2 % BLDV: 78 % — SIGNIFICANT CHANGE UP (ref 67–88)
SAO2 % BLDV: 78 % — SIGNIFICANT CHANGE UP (ref 67–88)
SODIUM SERPL-SCNC: 138 MMOL/L — SIGNIFICANT CHANGE UP (ref 135–145)
SODIUM SERPL-SCNC: 138 MMOL/L — SIGNIFICANT CHANGE UP (ref 135–145)
TROPONIN I, HIGH SENSITIVITY RESULT: 19.28 NG/L — SIGNIFICANT CHANGE UP
TROPONIN I, HIGH SENSITIVITY RESULT: 19.28 NG/L — SIGNIFICANT CHANGE UP
TROPONIN I, HIGH SENSITIVITY RESULT: 19.59 NG/L — SIGNIFICANT CHANGE UP
TROPONIN I, HIGH SENSITIVITY RESULT: 19.59 NG/L — SIGNIFICANT CHANGE UP
WBC # BLD: 9.47 K/UL — SIGNIFICANT CHANGE UP (ref 3.8–10.5)
WBC # BLD: 9.47 K/UL — SIGNIFICANT CHANGE UP (ref 3.8–10.5)
WBC # FLD AUTO: 9.47 K/UL — SIGNIFICANT CHANGE UP (ref 3.8–10.5)
WBC # FLD AUTO: 9.47 K/UL — SIGNIFICANT CHANGE UP (ref 3.8–10.5)

## 2023-11-07 PROCEDURE — 83880 ASSAY OF NATRIURETIC PEPTIDE: CPT

## 2023-11-07 PROCEDURE — 80053 COMPREHEN METABOLIC PANEL: CPT

## 2023-11-07 PROCEDURE — 93306 TTE W/DOPPLER COMPLETE: CPT | Mod: 26

## 2023-11-07 PROCEDURE — 84484 ASSAY OF TROPONIN QUANT: CPT

## 2023-11-07 PROCEDURE — 36415 COLL VENOUS BLD VENIPUNCTURE: CPT

## 2023-11-07 PROCEDURE — 93970 EXTREMITY STUDY: CPT | Mod: 26

## 2023-11-07 PROCEDURE — 93005 ELECTROCARDIOGRAM TRACING: CPT

## 2023-11-07 PROCEDURE — 96374 THER/PROPH/DIAG INJ IV PUSH: CPT | Mod: XU

## 2023-11-07 PROCEDURE — 82803 BLOOD GASES ANY COMBINATION: CPT

## 2023-11-07 PROCEDURE — 97116 GAIT TRAINING THERAPY: CPT | Mod: GP

## 2023-11-07 PROCEDURE — 93010 ELECTROCARDIOGRAM REPORT: CPT

## 2023-11-07 PROCEDURE — 71275 CT ANGIOGRAPHY CHEST: CPT | Mod: 26,MD

## 2023-11-07 PROCEDURE — 99285 EMERGENCY DEPT VISIT HI MDM: CPT | Mod: 25

## 2023-11-07 PROCEDURE — 83690 ASSAY OF LIPASE: CPT

## 2023-11-07 PROCEDURE — 93970 EXTREMITY STUDY: CPT

## 2023-11-07 PROCEDURE — 80048 BASIC METABOLIC PNL TOTAL CA: CPT

## 2023-11-07 PROCEDURE — 83735 ASSAY OF MAGNESIUM: CPT

## 2023-11-07 PROCEDURE — 93306 TTE W/DOPPLER COMPLETE: CPT

## 2023-11-07 PROCEDURE — 71275 CT ANGIOGRAPHY CHEST: CPT | Mod: MD

## 2023-11-07 PROCEDURE — 85025 COMPLETE CBC W/AUTO DIFF WBC: CPT

## 2023-11-07 PROCEDURE — 99285 EMERGENCY DEPT VISIT HI MDM: CPT

## 2023-11-07 PROCEDURE — 85027 COMPLETE CBC AUTOMATED: CPT

## 2023-11-07 PROCEDURE — 97162 PT EVAL MOD COMPLEX 30 MIN: CPT | Mod: GP

## 2023-11-07 PROCEDURE — 99053 MED SERV 10PM-8AM 24 HR FAC: CPT

## 2023-11-07 PROCEDURE — G0378: CPT

## 2023-11-07 PROCEDURE — 71045 X-RAY EXAM CHEST 1 VIEW: CPT | Mod: 26

## 2023-11-07 PROCEDURE — 71045 X-RAY EXAM CHEST 1 VIEW: CPT

## 2023-11-07 PROCEDURE — 99233 SBSQ HOSP IP/OBS HIGH 50: CPT

## 2023-11-07 RX ORDER — ACETAMINOPHEN 500 MG
650 TABLET ORAL EVERY 6 HOURS
Refills: 0 | Status: DISCONTINUED | OUTPATIENT
Start: 2023-11-07 | End: 2023-11-08

## 2023-11-07 RX ORDER — APIXABAN 2.5 MG/1
1 TABLET, FILM COATED ORAL
Refills: 0 | DISCHARGE

## 2023-11-07 RX ORDER — LEVOTHYROXINE SODIUM 125 MCG
1 TABLET ORAL
Refills: 0 | DISCHARGE

## 2023-11-07 RX ORDER — ATORVASTATIN CALCIUM 80 MG/1
10 TABLET, FILM COATED ORAL AT BEDTIME
Refills: 0 | Status: DISCONTINUED | OUTPATIENT
Start: 2023-11-07 | End: 2023-11-08

## 2023-11-07 RX ORDER — SPIRONOLACTONE 25 MG/1
1 TABLET, FILM COATED ORAL
Refills: 0 | DISCHARGE

## 2023-11-07 RX ORDER — ALBUTEROL 90 UG/1
2 AEROSOL, METERED ORAL
Refills: 0 | DISCHARGE

## 2023-11-07 RX ORDER — LEVOTHYROXINE SODIUM 125 MCG
25 TABLET ORAL DAILY
Refills: 0 | Status: DISCONTINUED | OUTPATIENT
Start: 2023-11-07 | End: 2023-11-08

## 2023-11-07 RX ORDER — PITAVASTATIN CALCIUM 1.04 MG/1
1 TABLET, FILM COATED ORAL
Refills: 0 | DISCHARGE

## 2023-11-07 RX ORDER — FUROSEMIDE 40 MG
1 TABLET ORAL
Refills: 0 | DISCHARGE

## 2023-11-07 RX ORDER — METOPROLOL TARTRATE 50 MG
50 TABLET ORAL DAILY
Refills: 0 | Status: DISCONTINUED | OUTPATIENT
Start: 2023-11-07 | End: 2023-11-08

## 2023-11-07 RX ORDER — METOPROLOL TARTRATE 50 MG
1 TABLET ORAL
Refills: 0 | DISCHARGE

## 2023-11-07 RX ORDER — SPIRONOLACTONE 25 MG/1
25 TABLET, FILM COATED ORAL DAILY
Refills: 0 | Status: DISCONTINUED | OUTPATIENT
Start: 2023-11-07 | End: 2023-11-08

## 2023-11-07 RX ORDER — FUROSEMIDE 40 MG
40 TABLET ORAL DAILY
Refills: 0 | Status: DISCONTINUED | OUTPATIENT
Start: 2023-11-07 | End: 2023-11-08

## 2023-11-07 RX ORDER — TRAMADOL HYDROCHLORIDE 50 MG/1
25 TABLET ORAL EVERY 6 HOURS
Refills: 0 | Status: DISCONTINUED | OUTPATIENT
Start: 2023-11-07 | End: 2023-11-08

## 2023-11-07 RX ORDER — ACETAMINOPHEN 500 MG
1000 TABLET ORAL ONCE
Refills: 0 | Status: COMPLETED | OUTPATIENT
Start: 2023-11-07 | End: 2023-11-07

## 2023-11-07 RX ORDER — APIXABAN 2.5 MG/1
5 TABLET, FILM COATED ORAL ONCE
Refills: 0 | Status: COMPLETED | OUTPATIENT
Start: 2023-11-07 | End: 2023-11-07

## 2023-11-07 RX ORDER — ALBUTEROL 90 UG/1
2 AEROSOL, METERED ORAL EVERY 4 HOURS
Refills: 0 | Status: DISCONTINUED | OUTPATIENT
Start: 2023-11-07 | End: 2023-11-08

## 2023-11-07 RX ORDER — APIXABAN 2.5 MG/1
5 TABLET, FILM COATED ORAL EVERY 12 HOURS
Refills: 0 | Status: DISCONTINUED | OUTPATIENT
Start: 2023-11-07 | End: 2023-11-08

## 2023-11-07 RX ORDER — POTASSIUM CHLORIDE 20 MEQ
1 PACKET (EA) ORAL
Refills: 0 | DISCHARGE

## 2023-11-07 RX ADMIN — ATORVASTATIN CALCIUM 10 MILLIGRAM(S): 80 TABLET, FILM COATED ORAL at 21:13

## 2023-11-07 RX ADMIN — Medication 1 DROP(S): at 22:00

## 2023-11-07 RX ADMIN — Medication 50 MILLIGRAM(S): at 12:58

## 2023-11-07 RX ADMIN — Medication 40 MILLIGRAM(S): at 12:58

## 2023-11-07 RX ADMIN — Medication 650 MILLIGRAM(S): at 14:57

## 2023-11-07 RX ADMIN — TRAMADOL HYDROCHLORIDE 25 MILLIGRAM(S): 50 TABLET ORAL at 12:59

## 2023-11-07 RX ADMIN — APIXABAN 5 MILLIGRAM(S): 2.5 TABLET, FILM COATED ORAL at 14:56

## 2023-11-07 RX ADMIN — Medication 25 MICROGRAM(S): at 14:56

## 2023-11-07 RX ADMIN — Medication 400 MILLIGRAM(S): at 05:38

## 2023-11-07 RX ADMIN — SPIRONOLACTONE 25 MILLIGRAM(S): 25 TABLET, FILM COATED ORAL at 12:58

## 2023-11-07 NOTE — ED PROVIDER NOTE - PROGRESS NOTE DETAILS
Marcela CARTER: Patient with hx of CAD, stents, patient of Dr. Holloway, here with substernal cp, SOB, ESTEVES, given hx of stents, high pre test probability for ACS, will admit. CT angio of chest was done to rule out PE, which was negative. Patient agreeable with admission. Spoke with Dr. Henri Armenta who is agreeable with admission, OBS tele, chuck.

## 2023-11-07 NOTE — CONSULT NOTE ADULT - SUBJECTIVE AND OBJECTIVE BOX
Patient is a 88y old  Female who presents with a chief complaint of Chest pain (07 Nov 2023 11:04)    ________________________________  SKeegan DEL CASTILLO is a 88y year old Female with a past medical history of mitral valve regurgitation, history of PE with factor V Leiden, paroxysmal atrial fibrillation on anticoagulation status post ablation, coronary disease status post PCI to the LAD in February 2017 with repeat cardiac catheterization in March 2022 showing nonobstructive coronary disease with patent stents, severe pulmonary hypertension, with no response to nitric oxide, systemic hypertension, hyperlipidemia who presented for evaluation of left-sided chest pain.  She noted a lump in the left breast, which has been tender.  She admits to pleuritic chest pain.  Cardiac enzymes negative.  EKG shows sinus rhythm with chronic right bundle branch block.  Recently treated with antibiotic for URI.    PREVIOUS CARDIAC WORKUP:    Echocardiogram 12/15/22  --The interventricular septum is mildly hypertrophied.  --The inferolateral (posterior) wall is mild hypertrophied.  --There is mild left ventricular hypertrophy.  --LV global wall motion is normal.  --LV ejection fraction (65 %) is normal.  --Indeterminate left ventricular diastolic function.  --The global LV longitudinal strain using the speckle tracking technology is -17.6 %.  -- Right atrium is normal in size (RA volume index is 19 ml/m²).  --The aortic root is normal in size (3.10 cm). Ascending aorta is normal in size; its diameter   is 2.80 cm (1.5 cm/m²).  --The aortic valve is mildly calcified. There is no aortic regurgitation.  --There is mild to moderate mitral regurgitation.  --There is mild to moderate tricuspid regurgitation.  --There is mild pulmonic regurgitation.  --The right atrial pressure is 6 - 10 mm Hg. There is moderate pulmonary hypertension.  --There is no pericardial effusion.      ________________________________  Review of systems: A 10 point review of system has been performed, and is negative except for what has been mentioned in the above history of present illness.     PAST MEDICAL & SURGICAL HISTORY:  Atrial fibrillation      CHF (congestive heart failure)      Pulmonary emboli      Factor 5 Leiden mutation, heterozygous      CAD (coronary artery disease)      Stented coronary artery      Hypothyroid      COPD (chronic obstructive pulmonary disease)      Mitral regurgitation      HTN (hypertension)      H/O: hysterectomy      H/O knee surgery      Cyst of breast, unspecified laterality  S/P excision      H/O cardiac radiofrequency ablation      S/P ablation of atrial fibrillation        FAMILY HISTORY:  FHx: diabetes mellitus (Father)    FHx: heart disease (Father)     SOCIAL HISTORY: Noncontributory  ALLERGIES:  flu vaccines (Other)  Ceftin (Other (Moderate))  nisoldipine (Unknown)  sulfa drugs (Other)  tadalafil (Muscle Pain)  Macrodantin (Other)  Repatha (Unknown)  eggs (Anaphylaxis)  Motrin (Other)    Home Medications:  Albuterol (Eqv-ProAir HFA) 90 mcg/inh inhalation aerosol: 2 puff(s) inhaled every 4 hours as needed for  shortness of breath and/or wheezing (07 Nov 2023 08:04)  Artificial Tears ophthalmic solution: 1 drop(s) in each eye 3 times a day as needed for  dry eyes (07 Nov 2023 08:02)  Ativan 0.5 mg oral tablet: 1 tab(s) orally 3 times a day, As Needed anxiety (07 Nov 2023 08:04)  ciprofloxacin 500 mg oral tablet: 1 tab(s) orally 2 times a day x 7 days ***Course Complete*** (07 Nov 2023 08:02)  Eliquis 5 mg oral tablet: 1 tab(s) orally 2 times a day (07 Nov 2023 08:04)  furosemide 40 mg oral tablet: 1 tab(s) orally once a day (07 Nov 2023 08:04)  levothyroxine 25 mcg (0.025 mg) oral tablet: 1 tab(s) orally once a day (07 Nov 2023 08:04)  Livalo 2 mg oral tablet: 1 tab(s) orally once a day (07 Nov 2023 08:04)  Metoprolol Succinate ER 50 mg oral tablet, extended release: 1 tab(s) orally once a day (07 Nov 2023 08:05)  potassium chloride 99 mg oral tablet: 1 tab(s) orally 2 times a day (07 Nov 2023 08:05)  predniSONE 10 mg oral tablet: 1 tab(s) orally as directed x 4 days ***Course Complete*** (07 Nov 2023 08:05)  spironolactone 25 mg oral tablet: 1 tab(s) orally once a day (07 Nov 2023 08:04)    MEDICATIONS  (STANDING):  apixaban 5 milliGRAM(s) Oral every 12 hours  artificial tears (preservative free) Ophthalmic Solution 1 Drop(s) Both EYES three times a day  atorvastatin 10 milliGRAM(s) Oral at bedtime  furosemide    Tablet 40 milliGRAM(s) Oral daily  levothyroxine 25 MICROGram(s) Oral daily  metoprolol succinate ER 50 milliGRAM(s) Oral daily  spironolactone 25 milliGRAM(s) Oral daily    MEDICATIONS  (PRN):  acetaminophen     Tablet .. 650 milliGRAM(s) Oral every 6 hours PRN Mild Pain (1 - 3)  albuterol    90 MICROgram(s) HFA Inhaler 2 Puff(s) Inhalation every 4 hours PRN for shortness of breath and/or wheezing  traMADol 25 milliGRAM(s) Oral every 6 hours PRN Moderate Pain (4 - 6)    Vital Signs Last 24 Hrs  T(C): 36.8 (07 Nov 2023 08:55), Max: 36.9 (07 Nov 2023 06:00)  T(F): 98.2 (07 Nov 2023 08:55), Max: 98.4 (07 Nov 2023 06:00)  HR: 68 (07 Nov 2023 08:55) (63 - 75)  BP: 146/54 (07 Nov 2023 08:55) (125/52 - 146/54)  BP(mean): 81 (07 Nov 2023 07:31) (74 - 81)  RR: 18 (07 Nov 2023 08:55) (17 - 18)  SpO2: 96% (07 Nov 2023 08:55) (94% - 98%)    Parameters below as of 07 Nov 2023 08:55  Patient On (Oxygen Delivery Method): nasal cannula  O2 Flow (L/min): 2    I&O's Summary    ________________________________  GENERAL APPEARANCE:  No acute distress  HEAD: normocephalic, atraumatic  NECK: supple, no jugular venous distention, no carotid bruit    HEART: Regular rate and rhythm, S1, S2 normal, 1/6 murmur    CHEST:  No anterior chest wall tenderness    LUNGS: Minimal rales    ABDOMEN: soft, nontender, nondistended, with positive bowel sounds appreciated  EXTREMITIES: no edema.   NEURO: Alert and oriented x3  PSYC:  Normal affect  SKIN:  Dry  ________________________________   TELEMETRY: Sinus rhythm    ECG: Sinus rhythm, right bundle branch block (chronic)    LABS:                        12.2   9.47  )-----------( 223      ( 07 Nov 2023 03:28 )             36.6             11-07    138  |  104  |  28<H>  ----------------------------<  127<H>  4.3   |  30  |  1.22    Ca    8.9      07 Nov 2023 03:28  Mg     2.4     11-07    TPro  6.8  /  Alb  2.8<L>  /  TBili  0.6  /  DBili  x   /  AST  11<L>  /  ALT  12  /  AlkPhos  46  11-07      LIVER FUNCTIONS - ( 07 Nov 2023 03:28 )  Alb: 2.8 g/dL / Pro: 6.8 gm/dL / ALK PHOS: 46 U/L / ALT: 12 U/L / AST: 11 U/L / GGT: x           Urinalysis Basic - ( 07 Nov 2023 03:28 )    Color: x / Appearance: x / SG: x / pH: x  Gluc: 127 mg/dL / Ketone: x  / Bili: x / Urobili: x   Blood: x / Protein: x / Nitrite: x   Leuk Esterase: x / RBC: x / WBC x   Sq Epi: x / Non Sq Epi: x / Bacteria: x          Urinalysis Basic - ( 07 Nov 2023 03:28 )    Color: x / Appearance: x / SG: x / pH: x  Gluc: 127 mg/dL / Ketone: x  / Bili: x / Urobili: x   Blood: x / Protein: x / Nitrite: x   Leuk Esterase: x / RBC: x / WBC x   Sq Epi: x / Non Sq Epi: x / Bacteria: x             ________________________________    RADIOLOGY & ADDITIONAL STUDIES:    IMPRESSION:  No evidence of deep venous thrombosis in either lower extremity.    1. No pulmonary embolism.  2. Scattered areas of mosaic attenuation throughout the lungs which is   due to air trapping versus small airways or small vessels disease.  3. Left axillary lymphadenopathy which is infectious, inflammatory or   neoplastic in etiology.    ________________________________    ASSESSMENT:    Chest pain, likely noncardiac  CAD status post PCI to the LAD in February 2017 with cardiac catheterization March 2022 showing patent stent  Paroxysmal atrial fibrillation on anticoagulation: Status post ablation  History of PE with factor V Leiden on anticoagulation  Chronic diastolic heart failure  Severe pulm hypertension: Negative vasoreactivity study  Systemic hypertension  Hyperlipidemia      PLAN:  In summary, this is a 88y Female with a past medical history of CAD, admitted for chest pain.  Echo pending.  Doubt ACS.  Continue anticoagulation.  In sinus rhythm.  Continue beta-blocker.  Continue Lasix and Aldactone.  Appears euvolemic.      ____________________________________________  (Dragon Dictation software used). Thank you for allowing me to participate in the care of your patient. Please contact me should any questions arise.    NEFTALI Forte DO, [facility]  Office: [phone]

## 2023-11-07 NOTE — ED ADULT NURSE NOTE - OBJECTIVE STATEMENT
pt presents to er c/o shortness of breath. pt reports she has hx of PE, DVT on eliquis and thinks she has another clot in her lungs. pt reports pain in chest b/l and across her back. pt reports normally wearing 3L NC at home to bed- pt satting 100% on room air at this time

## 2023-11-07 NOTE — H&P ADULT - HISTORY OF PRESENT ILLNESS
incomplete note  incomplete note    87 y/o F with PMH of Hypertension, HLD, coronary artery disease s/p stent in 2017, MR, Factor V Leiden mutation, presents to  ED with chest pain.     In ED, vitals stable. Labs stable. Trops negative x2. CTA negative for PE. Admitted to OBS TELE due to high pre test probability for ACS. incomplete    89 y/o F with PMH of Hypertension, HLD, CAD s/p stent in 2017, ?HF (unspecified type), Atrial Fibrillation s/p ablation, MR, Factor V Leiden mutation with previous PE, presents to  ED with chest pain. Patient reports her chest pain started yesterday but continued to worsen throughout the day. She was afraid she had a PE which is what prompted her to get evaluated. The chest pain is mostly present below her breasts and wraps around her torso. The pain is present at all times. Not better or worse with any particular movement. She denies SOB but reportedly had it when she first arrived to ED. No injury/trauma prior to onset of pain. ROS negative for headache, dizziness, palpitations SOB, abdominal pain, N/V/D, orthopnea, LE swelling. Of note she started noticing a painful lump in her left axilla on Saturday. She saw her PCP yesterday who ordered further imaging to evaluate. No recent URI. She did complete a course of Cipro for UTI 1-2 weeks ago.     In ED, vitals stable. Labs stable. Trops negative x2. CTA negative for PE. Admitted to OBS TELE due to high pre test probability for ACS. 89 y/o F with PMH of Hypertension, HLD, CAD s/p stent in 2017, ?HF (unspecified type), Atrial Fibrillation s/p ablation, MR, Factor V Leiden mutation with previous PE, presents to  ED with chest pain. Patient reports her chest pain started yesterday but continued to worsen throughout the day. She was afraid she had a PE which is what prompted her to get evaluated. The chest pain is mostly present below her breasts and wraps around her torso. The pain is present at all times. Not better or worse with any particular movement. She denies SOB but reportedly had it when she first arrived to ED. No injury/trauma prior to onset of pain. ROS negative for headache, dizziness, palpitations SOB, abdominal pain, N/V/D, orthopnea, LE swelling. Of note she started noticing a painful lump in her left axilla on Saturday. She saw her PCP yesterday who ordered further imaging to evaluate. No recent URI. She did complete a course of Cipro for UTI 1-2 weeks ago.     In ED, vitals stable. Labs stable. Trops negative x2. CTA negative for PE. EKG similar to previous, sinus rhythm with no acute changes. Admitted to OBS TELE due to high pre test probability for ACS.

## 2023-11-07 NOTE — PATIENT PROFILE ADULT - NSPROPTRIGHTNOTIFY_GEN_A_NUR
Jono is a 34 year old year old female here for an OB check.  She is      .  Gestational Age: 35w3d.  Concerns today include:     She reports fetal movement.   She denies bleeding.  She denies cramping.  She denies nausea.  She denies breast tenderness.    GLU:1+  KUSUM: neg  KET: trace  S.020  BLO:neg  PH: 7.0  PRO: neg  URO: 0.2  NIT: neg LEUK:   1+    If your visit includes an exam today, would you like an assistant to be present in the room during that time?no       declines

## 2023-11-07 NOTE — H&P ADULT - NSICDXFAMILYHX_GEN_ALL_CORE_FT
FAMILY HISTORY:  Father  Still living? Unknown  FHx: diabetes mellitus, Age at diagnosis: Age Unknown  FHx: heart disease, Age at diagnosis: Age Unknown

## 2023-11-07 NOTE — PHYSICAL THERAPY INITIAL EVALUATION ADULT - WEIGHT-BEARING RESTRICTIONS: GAIT, REHAB EVAL
Referred to Kayleigh Fransico.  
full weight-bearing

## 2023-11-07 NOTE — ED PROVIDER NOTE - CLINICAL SUMMARY MEDICAL DECISION MAKING FREE TEXT BOX
chest pain, hx of stents, r/o ACS. Patient with hx of CAD, stents, patient of Dr. Holloway, here with substernal cp, SOB, ESTEVES, given hx of stents, high pre test probability for ACS, will admit. CT angio of chest was done to rule out PE, which was negative. Patient agreeable with admission. Spoke with Dr. Henri Armenta who is agreeable with admission, OBS tele, chuck.

## 2023-11-07 NOTE — PHARMACOTHERAPY INTERVENTION NOTE - COMMENTS
Medication history complete, reviewed medication and allergies with patient and and list provided and confirmed with DrFirst.

## 2023-11-07 NOTE — H&P ADULT - CONVERSATION DETAILS
Reviewed code status. Patient reports she is NOT willing to fill out a MOLST or document code status at this time. She does report she likely would not want CPR or end up on a ventilator in the future, HOWEVER she is not ready to document this. I did make her aware that she will be full code at this time since she is not ready to give verbal or written consent to fill out a MOLST. Patient understands and agrees. Revisit MOLST when patient is ready.     Code status: FULL CODE

## 2023-11-07 NOTE — ED ADULT TRIAGE NOTE - CHIEF COMPLAINT QUOTE
pt bib wheelchair to triage c/o sob since this evening. pt wears 3L nc to sleep. allergic to motrin, tadalafil. pmh CHF, HTN, blood clotts.

## 2023-11-07 NOTE — ED PROVIDER NOTE - RESPIRATORY, MLM
bilateral breath sounds, bilateral basilar wheezing bilateral breath sounds, bilateral basilar wheezing. Tachypnea. No retractions.

## 2023-11-07 NOTE — H&P ADULT - ASSESSMENT
87 y/o F with PMH of Hypertension, HLD, coronary artery disease s/p stent in 2017, MR, Factor V Leiden mutation, presents to  ED with chest pain.  87 y/o F with PMH of Hypertension, HLD, CAD s/p stent in 2017, ?HR, unspecified type, Atrial Fibrillation s/p ablation, MR, Factor V Leiden mutation with previous PE, presents to  ED with chest pain. Admitted for ACS workup.     #Chest pain  - Less likely ACS. Trops neg x2. EKG ____  - CTA ruled out PE  - Given persistent chest pain and location, would r/o pericarditis or effusion   - Echocardiogram ordered   - Cardiology recommendations appreciated  - Admit to OBS TELE    #History of atrial fibrillation s/p ablation  - Continue Eliquis 5 BID and Toprol 50     #Factor 5 Leiden with prior PE  - Continue Eliquis     #Heart failure, unspecified type  - Unspecified type, no previous TTE in chart  - Home medications include Toprol, Spironolactone and Lasix  - Continue home mediations  - Obtain TTE    #Hypothyroidism  - Continue home Synthroid    ADMIT TO OBS TELE   87 y/o F with PMH of Hypertension, HLD, CAD s/p stent in 2017, ?HR, unspecified type, Atrial Fibrillation s/p ablation, MR, Factor V Leiden mutation with previous PE, presents to  ED with chest pain. Admitted for ACS workup.     #Chest pain  - Less likely ACS. Trops neg x2. EKG similar to previous, sinus rhythm with no acute changes  - CTA ruled out PE  - Given persistent chest pain and location, would r/o pericarditis or effusion   - Echocardiogram ordered   - Cardiology consulted, recs appreciated  - Admit to OBS TELE    #History of atrial fibrillation s/p ablation  - Continue Eliquis 5 BID and Toprol 50     #Factor 5 Leiden with prior PE  - Continue Eliquis     #Heart failure, unspecified type  - Unspecified type, no previous TTE in chart  - Home medications include Toprol, Spironolactone and Lasix  - Continue home mediations  - Obtain TTE    #Hypothyroidism  - Continue home Synthroid    ADMIT TO OBS TELE  FULL CODE  DVT ppx: Eliquis

## 2023-11-07 NOTE — ED PROVIDER NOTE - DIFFERENTIAL DIAGNOSIS
Differential Diagnosis chest pain, hx of stents, r/o ACS. Patient with hx of CAD, stents, patient of Dr. Holloway, here with substernal cp, SOB, ESTEVES, given hx of stents, high pre test probability for ACS, will admit. CT angio of chest was done to rule out PE, which was negative. Patient agreeable with admission. Spoke with Dr. Henri Armenta who is agreeable with admission, OBS tele, chuck.

## 2023-11-07 NOTE — ED PROVIDER NOTE - EYES, MLM
Clear bilaterally, pupils equal, round and reactive to light. Clear bilaterally, pupils equal, round and reactive to accomodation. Visual fields intact x 4 quadrants.

## 2023-11-07 NOTE — ED PROVIDER NOTE - OBJECTIVE STATEMENT
88 year old female with PMH of PE on Eliquis, Afib, CHF, factor 5 leyden, CAD, stents who presents with cc of substernal cp, rad to left shoulder, worsened on exertion. No fever or chills. No melena or hematochezia. No visual or focal neurological complaints. No recent trauma. No saddle anesthesia. Ambulatory. Tolerating PO. No recent trauma. 88 year old female with PMH of PE on Eliquis, Afib, CHF, factor 5 leyden, CAD, stents who presents with cc of substernal cp, rad to left shoulder, worsened on exertion. No fever or chills. No melena or hematochezia. No visual or focal neurological complaints. No recent trauma. No saddle anesthesia. Ambulatory. Tolerating PO. No recent trauma. No abdominal pain. no motor weakness of arms or legs. No syncope. No recent trauma. No fever or chills.

## 2023-11-07 NOTE — H&P ADULT - NSHPLABSRESULTS_GEN_ALL_CORE
LABS:                          12.2   9.47  )-----------( 223      ( 07 Nov 2023 03:28 )             36.6     11-07    138  |  104  |  28<H>  ----------------------------<  127<H>  4.3   |  30  |  1.22    Ca    8.9      07 Nov 2023 03:28  Mg     2.4     11-07    TPro  6.8  /  Alb  2.8<L>  /  TBili  0.6  /  DBili  x   /  AST  11<L>  /  ALT  12  /  AlkPhos  46  11-07      CT Angio Chest PE Protocol w/ IV Cont (11.07.23 @ 04:19)   FINDINGS:    LUNGS AND AIRWAYS: Patent central airways. Scattered areas of mosaic   attenuation throughout the lungs which is due to air trapping versus   smallairways or small vessels disease. Bilateral upper lobe linear   atelectasis. Bibasilar subsegmental atelectasis.  PLEURA: No pleural effusion.  MEDIASTINUM AND KASSI: No lymphadenopathy.  VESSELS: No pulmonary embolism.  HEART: Heart size is enlarged.No pericardial effusion. Left anterior   descending coronary artery stent.  CHEST WALL AND LOWER NECK: Enlarged left axillary lymph nodes measuring   up to 2.7 x 2.6 cm.  VISUALIZED UPPER ABDOMEN: Right renal contour defects likely due to   scarring.Small hiatal hernia.  BONES: Degenerative changes of the spine.    IMPRESSION:  1. No pulmonary embolism.  2. Scattered areas of mosaic attenuation throughout the lungs which is   due to air trapping versus small airways or small vessels disease.  3. Left axillary lymphadenopathy which is infectious, inflammatory or   neoplastic in etiology.

## 2023-11-07 NOTE — H&P ADULT - NSHPPHYSICALEXAM_GEN_ALL_CORE
VITALS:   T(C): 36.8 (11-07-23 @ 08:55), Max: 36.9 (11-07-23 @ 06:00)  HR: 68 (11-07-23 @ 08:55) (63 - 75)  BP: 146/54 (11-07-23 @ 08:55) (125/52 - 146/54)  RR: 18 (11-07-23 @ 08:55) (17 - 18)  SpO2: 96% (11-07-23 @ 08:55) (94% - 98%)    PHYSICAL EXAM:  GENERAL: NAD, well-groomed, well-developed  HEAD:  Atraumatic, Normocephalic  EYES: EOMI, PERRLA, conjunctiva and sclera clear  ENMT: No tonsillar erythema, exudates, or enlargement; Moist mucous membranes  NECK: Supple, No JVD, Normal thyroid  HEART: Regular rate and rhythm; No murmurs, rubs, or gallops  RESPIRATORY: Unlabored respirations. CTA B/L, No W/R/R  ABDOMEN: Soft, Nontender, Nondistended; Bowel sounds present  NEUROLOGY: A&Ox3, nonfocal, moving all extremities  EXTREMITIES:  2+ Peripheral Pulses, No clubbing, cyanosis, or edema  SKIN: warm, dry, normal color, no rash or abnormal lesions VITALS:   T(C): 36.8 (11-07-23 @ 08:55), Max: 36.9 (11-07-23 @ 06:00)  HR: 68 (11-07-23 @ 08:55) (63 - 75)  BP: 146/54 (11-07-23 @ 08:55) (125/52 - 146/54)  RR: 18 (11-07-23 @ 08:55) (17 - 18)  SpO2: 96% (11-07-23 @ 08:55) (94% - 98%)    PHYSICAL EXAM:  GENERAL: Appears well, no acute distress  HEAD:  Atraumatic, Normocephalic  EYES: EOMI, PERRLA, conjunctiva and sclera clear  ENMT: No tonsillar erythema, exudates, or enlargement; MMM  NECK: Supple, No JVD  HEART: Regular rate and rhythm; No murmurs, rubs, or gallops  RESPIRATORY: Unlabored respirations. CTA B/L, No W/R/R  ABDOMEN: Soft, Nontender, Nondistended; +BS  NEUROLOGY: A&Ox3, nonfocal, moving all extremities  EXTREMITIES:  2+ Peripheral Pulses, No clubbing, cyanosis, or edema. Tender lymph node L axilla   SKIN: warm, dry, normal color, no rash or abnormal lesions

## 2023-11-07 NOTE — PATIENT PROFILE ADULT - NSTRANSFEREYEGLASSESPAIRS_GEN_A_NUR
ED Nurse Note:



patient brought into ED by grandmother c/o fever. temp at triage is 99.9 F 

patient has been ahving coughing and runny nose, sorethroat for 3 weeks, on and 
off. 

alert and awake, interacts with grandmother,
1 pair

## 2023-11-07 NOTE — PHYSICAL THERAPY INITIAL EVALUATION ADULT - PERTINENT HX OF CURRENT PROBLEM, REHAB EVAL
88 year old female with PMH of PE on Eliquis, Afib, CHF, factor 5 leyden, CAD, stents who presents with cc of substernal cp, rad to left shoulder, worsened on exertion. No fever or chills. No melena or hematochezia. No visual or focal neurological complaints. No recent trauma. No saddle anesthesia. Ambulatory. Tolerating PO. No recent trauma.

## 2023-11-07 NOTE — ED ADULT NURSE NOTE - NSFALLRISKASMTTYPE_ED_ALL_ED
"SUBJECTIVE:   Erich Craven is a 81 year old male who presents for Preventive Visit.  Weight loss noted with associated diuresis and reduction of his peripheral edema.  His muscle cramping has resolved.  Scheduled for follow-up reevaluation of the transition from excessive potassium replacement and the initiation of Spironolactone.  Patient reports that his reduced leg swelling is associated with less fatigue and he would like to resume walking and wishes to have a letter to have a service dog.  Bilateral shoulder soreness, right significantly greater than the left.  Previous shoulder injection on the right helped with increasing range of motion and functional capacity.  Are you in the first 12 months of your Medicare coverage?  No    Healthy Habits:    In general, how would you rate your overall health?  Fair    Frequency of exercise:  4-5 days/week    Duration of exercise:  15-30 minutes    Do you usually eat at least 4 servings of fruit and vegetables a day, include whole grains    & fiber and avoid regularly eating high fat or \"junk\" foods?  Yes    Taking medications regularly:  Yes    Barriers to taking medications:  None    Medication side effects:  Not applicable    Ability to successfully perform activities of daily living:  No assistance needed    Home Safety:  No safety concerns identified    Hearing Impairment:  No hearing concerns    In the past 6 months, have you been bothered by leaking of urine?  No    In general, how would you rate your overall mental or emotional health?  Very good      PHQ-2 Total Score:    Additional concerns today:  Yes    Do you feel safe in your environment? Yes    Have you ever done Advance Care Planning? (For example, a Health Directive, POLST, or a discussion with a medical provider or your loved ones about your wishes): No, advance care planning information given to patient to review.  Patient plans to discuss their wishes with loved ones or provider.        Fall risk   "     Cognitive Screening   1) Repeat 3 items (Leader, Season, Table)    2) Clock draw: NORMAL  3) 3 item recall: Recalls 2 objects   Results: NORMAL clock, 1-2 items recalled: COGNITIVE IMPAIRMENT LESS LIKELY    Mini-CogTM Copyright S Jese. Licensed by the author for use in [facility]; reprinted with permission ([email or web address]). All rights reserved.      Do you have sleep apnea, excessive snoring or daytime drowsiness?: yes    Reviewed and updated as needed this visit by clinical staff         Reviewed and updated as needed this visit by Provider        Social History     Tobacco Use     Smoking status: Former Smoker     Packs/day: 0.50     Years: 25.00     Pack years: 12.50     Smokeless tobacco: Never Used     Tobacco comment: quit in 1989   Substance Use Topics     Alcohol use: No     Frequency: Never     Comment: quit in 1989         No flowsheet data found.            Current providers sharing in care for this patient include:   Patient Care Team:  Dontrell Gómez MD as PCP - General (Internal Medicine)  Dontrell Gómez MD as Assigned PCP  Speaker, SEFERINO Parks as Cardiac Rehabilitation Therapist    The following health maintenance items are reviewed in Epic and correct as of today:  Health Maintenance   Topic Date Due     HF ACTION PLAN  1939     ADVANCE CARE PLANNING  1939     COPD ACTION PLAN  1939     ZOSTER IMMUNIZATION (2 of 3) 08/18/2008     MEDICARE ANNUAL WELLNESS VISIT  03/26/2020     INFLUENZA VACCINE (1) 09/01/2020     BMP  02/03/2021     CBC  02/10/2021     FALL RISK ASSESSMENT  07/13/2021     ALT  07/14/2021     LIPID  07/14/2021     DTAP/TDAP/TD IMMUNIZATION (4 - Td) 04/23/2022     TSH W/FREE T4 REFLEX  Completed     SPIROMETRY  Completed     PHQ-2  Completed     PNEUMOCOCCAL IMMUNIZATION 65+ LOW/MEDIUM RISK  Completed     IPV IMMUNIZATION  Aged Out     MENINGITIS IMMUNIZATION  Aged Out     HEPATITIS B IMMUNIZATION  Aged Out     Current Outpatient Medications    Medication Sig Dispense Refill     acetaminophen (TYLENOL) 325 MG tablet Take 325-650 mg by mouth every 6 hours as needed for mild pain       Acetylcarnitine HCl (ACETYL L-CARNITINE) 500 MG CAPS Take 1 capsule by mouth 2 times daily       albuterol (PROAIR HFA/PROVENTIL HFA/VENTOLIN HFA) 108 (90 Base) MCG/ACT inhaler Inhale 2 puffs into the lungs every 4 hours as needed for shortness of breath / dyspnea or wheezing 1 Inhaler 0     albuterol (PROVENTIL) (2.5 MG/3ML) 0.083% neb solution INHALE 1 VIAL BY NEBULIZATION EVERY 4 HOURS AS NEEDED FOR SHORTNESS OF BREATH/DYSPNEA OR WHEEZING. 75 mL 3     aspirin (ASA) 81 MG chewable tablet Take 1 tablet by mouth daily       atorvastatin (LIPITOR) 40 MG tablet Take 1 tablet (40 mg) by mouth every evening 90 tablet 3     carvedilol (COREG) 12.5 MG tablet Take 1 tablet (12.5 mg) by mouth 2 times daily 180 tablet 3     clonazePAM (KLONOPIN) 0.5 MG tablet TAKE 1 TABLET BY MOUTH TWICE A DAY AS NEEDED 60 tablet 2     co-enzyme Q-10 100 MG CAPS capsule Take 100 mg by mouth daily       diltiazem ER COATED BEADS (CARDIZEM CD/CARTIA XT) 300 MG 24 hr capsule Take 1 capsule (300 mg) by mouth daily 90 capsule 0     furosemide (LASIX) 20 MG tablet TAKE 3 TABLETS BY MOUTH TWICE DAILY. (Patient taking differently: TAKE 2 TABLETS BY MOUTH TWICE DAILY.) 540 tablet 3     isosorbide mononitrate (IMDUR) 30 MG 24 hr tablet Take 1 tablet (30 mg) by mouth daily 90 tablet 3     levothyroxine (SYNTHROID/LEVOTHROID) 175 MCG tablet Take 1 tablet (175 mcg) by mouth daily Additional 1/2 tablet on Sundays 90 tablet 3     lisinopril (PRINIVIL/ZESTRIL) 40 MG tablet Take 1 tablet (40 mg) by mouth daily 90 tablet 3     metolazone (ZAROXOLYN) 2.5 MG tablet Take 1 tablet (2.5 mg) by mouth 2 times daily 180 tablet 3     Multiple vitamin TABS Take 1 tablet by mouth daily       order for DME Oxygen 2 Li/min  Continuous. At night bled into PAP device. In the daytime, NC with bubbler: requiring portability 1 Device 0      potassium chloride ER (K-TAB) 20 MEQ CR tablet Take 2 tablets (40 mEq) by mouth 2 times daily (Patient taking differently: Take 40 mEq by mouth 2 times daily Patient reports taking 3 times) 360 tablet 3     Psyllium (METAMUCIL PO) Take by mouth 2 times daily       spironolactone (ALDACTONE) 25 MG tablet 2 tabs po qd 60 tablet 4     tamsulosin (FLOMAX) 0.4 MG capsule Take 1 capsule (0.4 mg) by mouth daily 90 capsule 3     Taurine 500 MG CAPS Take 500 mg by mouth 2 times daily       venlafaxine (EFFEXOR) 37.5 MG tablet Take 1 tablet (37.5 mg) by mouth 2 times daily 180 tablet 3     warfarin ANTICOAGULANT (COUMADIN) 5 MG tablet TAKE 1.5 TABLETS ON WEDNESDAYS AND TAKE 1 TABLET ALL OTHER DAYS OR AS DIRECTED BY THE INR CLINIC 95 tablet 1     Allergies   Allergen Reactions     No Known Allergies          Review of Systems  CONSTITUTIONAL: NEGATIVE for fever, chills, ongoing weight loss   INTEGUMENTARY/SKIN: NEGATIVE for worrisome rashes, moles or lesions  HEENT frontal stress headaches relieved with Tylenol, anticipating seasonal allergies in August treated with Claritin  EYES: NEGATIVE for vision changes or irritation  Reviewed need for a follow-up eye exam  ENT/MOUTH: NEGATIVE for ear, mouth and throat problems  RESP: NEGATIVE for significant cough or SOB  BREAST: NEGATIVE for masses, tenderness or discharge  CV: NEGATIVE for chest pain, palpitations or peripheral edema  Walking in the neighborhood every morning for 15 minutes  GI: NEGATIVE for nausea, abdominal pain, heartburn treated with over-the-counter medications, or change in bowel habits  : NEGATIVE for frequency, dysuria, or hematuria, nocturia x3  MUSCULOSKELETAL: NEGATIVE for significant arthralgias or myalgia  NEURO: NEGATIVE for weakness, dizziness or paresthesias  ENDOCRINE: NEGATIVE for temperature intolerance, skin/hair changes  HEME: NEGATIVE for bleeding problems  PSYCHIATRIC: NEGATIVE for changes in mood or affect    OBJECTIVE:   There were no  vitals taken for this visit. Estimated body mass index is 40.6 kg/m  as calculated from the following:    Height as of 7/29/20: 1.829 m (6').    Weight as of 7/29/20: 135.8 kg (299 lb 6 oz).  Physical Exam /70 (BP Location: Right arm, Patient Position: Sitting, Cuff Size: Adult Large)   Pulse 74   Temp 97.1  F (36.2  C) (Temporal)   Ht 1.829 m (6')   Wt 133.6 kg (294 lb 8 oz)   SpO2 96%   BMI 39.94 kg/m      GENERAL: Morbidly obese, alert and no distress  EYES: Eyes grossly normal to inspection, PERRL and conjunctivae and sclerae normal  HENT: ear canals and TM's normal, nose and mouth without ulcers or lesions  NECK: no adenopathy, no asymmetry, masses, or scars and thyroid normal to palpation  RESP: lungs clear to auscultation - no rales, rhonchi or wheezes  CV: regular rate and rhythm, normal S1 S2, no S3 or S4, no murmur, click or rub, 2+ pretibial peripheral edema and peripheral pulses strong  ABDOMEN: soft, nontender, no hepatosplenomegaly, no masses and bowel sounds normal   testicles were normal bilaterally without inguinal masses or herniorrhaphy  Rectal anus was unremarkable except for a lax sphincter prostate gland was smooth but incomplete  MS: no gross musculoskeletal defects noted, no edema  SKIN: no suspicious lesions or rashes  NEURO: Normal strength and tone, mentation intact and speech normal  PSYCH: mentation appears normal, affect normal/bright  Feet clean and dry    Procedure right shoulder: Landmarks were evaluated for a posterior approach to the glenohumeral joint with skin marking.  Skin was prepped with Betadine and alcohol and using a 21-gauge 2 inch needle 60 mg of Medrol with Marcaine and lidocaine were injected without difficulty    ASSESSMENT / PLAN:   1. Morbid obesity (H)  Resuming a form of activity will help with weight reduction, strengthening and fall risk  - Urine Microscopic  - Urine Culture Aerobic Bacterial    2. Chronic diastolic heart failure (H)  Reevaluate  labs as the patient is at significant risk of prerenal azotemia secondary to his needed diuretic therapy  - CBC with platelets  - BNP-N terminal pro  - Urine Culture Aerobic Bacterial  - Magnesium    3. Chronic obstructive pulmonary disease, unspecified COPD type (H)  Complicating his ongoing heart failure  - CBC with platelets  - Spirometry, Breathing Capacity: Normal Order, Clinic Performed  - Urine Culture Aerobic Bacterial    4. Paroxysmal A-fib (H)  Managing well with current therapy  - Urine Culture Aerobic Bacterial    5. Lymphedema  Ongoing diuresis  - CBC with platelets  - Urine Culture Aerobic Bacterial    6. RIN (obstructive sleep apnea)  Continue CPAP  - Urine Culture Aerobic Bacterial    7. Essential hypertension  Well-controlled with current therapy- CBC with platelets  - UA reflex to Microscopic and Culture  - Comprehensive metabolic panel  - Urine Culture Aerobic Bacterial    8. Deep vein thrombosis (DVT) of distal vein of lower extremity, unspecified chronicity, unspecified laterality (H)    - Urine Culture Aerobic Bacterial    9. Coronary artery disease involving native heart without angina pectoris, unspecified vessel or lesion type  Congestive heart failure with ongoing diuresis without symptoms of coronary insufficiency  - Urine Culture Aerobic Bacterial    10. Hyperglycemia    - Hemoglobin A1c  - Urine Culture Aerobic Bacterial    11. Hyperlipidemia LDL goal <100    - Lipid panel reflex to direct LDL Fasting  - Urine Culture Aerobic Bacterial    12. Screening for hypothyroidism    - TSH with free T4 reflex  - Hemoglobin A1c  - Urine Culture Aerobic Bacterial    13. Routine general medical examination at a health care facility    - Urine Culture Aerobic Bacterial    14. Dysuria    - Urine Culture Aerobic Bacterial    15. Muscle cramps    - CRP, inflammation  - CK total   16.  Primary osteoarthritis of the right shoulder  Joint injection with Medrol and the patient was recommended to resume his  shoulder exercises.    COUNSELING:  Reviewed preventive health counseling, as reflected in patient instructions    Estimated body mass index is 40.6 kg/m  as calculated from the following:    Height as of 7/29/20: 1.829 m (6').    Weight as of 7/29/20: 135.8 kg (299 lb 6 oz).         reports that he has quit smoking. He has a 12.50 pack-year smoking history. He has never used smokeless tobacco.      Appropriate preventive services were discussed with this patient, including applicable screening as appropriate for cardiovascular disease, diabetes, osteopenia/osteoporosis, and glaucoma.  As appropriate for age/gender, discussed screening for colorectal cancer, prostate cancer, breast cancer, and cervical cancer. Checklist reviewing preventive services available has been given to the patient.    Reviewed patients plan of care and provided an AVS. The  for Erich meets the Care Plan requirement. This Care Plan has been established and reviewed with the Patient.    Counseling Resources:  ATP IV Guidelines  Pooled Cohorts Equation Calculator  Breast Cancer Risk Calculator  FRAX Risk Assessment  ICSI Preventive Guidelines  Dietary Guidelines for Americans, 2010  USDA's MyPlate  ASA Prophylaxis  Lung CA Screening    Dontrell Gómez MD  [facility]    Identified Health Risks:   Initial (On Arrival)

## 2023-11-08 ENCOUNTER — TRANSCRIPTION ENCOUNTER (OUTPATIENT)
Age: 88
End: 2023-11-08

## 2023-11-08 VITALS
TEMPERATURE: 98 F | HEART RATE: 56 BPM | RESPIRATION RATE: 18 BRPM | DIASTOLIC BLOOD PRESSURE: 47 MMHG | OXYGEN SATURATION: 97 % | SYSTOLIC BLOOD PRESSURE: 143 MMHG

## 2023-11-08 LAB
ANION GAP SERPL CALC-SCNC: 2 MMOL/L — LOW (ref 5–17)
ANION GAP SERPL CALC-SCNC: 2 MMOL/L — LOW (ref 5–17)
BUN SERPL-MCNC: 20 MG/DL — SIGNIFICANT CHANGE UP (ref 7–23)
BUN SERPL-MCNC: 20 MG/DL — SIGNIFICANT CHANGE UP (ref 7–23)
CALCIUM SERPL-MCNC: 8.7 MG/DL — SIGNIFICANT CHANGE UP (ref 8.5–10.1)
CALCIUM SERPL-MCNC: 8.7 MG/DL — SIGNIFICANT CHANGE UP (ref 8.5–10.1)
CHLORIDE SERPL-SCNC: 104 MMOL/L — SIGNIFICANT CHANGE UP (ref 96–108)
CHLORIDE SERPL-SCNC: 104 MMOL/L — SIGNIFICANT CHANGE UP (ref 96–108)
CO2 SERPL-SCNC: 29 MMOL/L — SIGNIFICANT CHANGE UP (ref 22–31)
CO2 SERPL-SCNC: 29 MMOL/L — SIGNIFICANT CHANGE UP (ref 22–31)
CREAT SERPL-MCNC: 1.09 MG/DL — SIGNIFICANT CHANGE UP (ref 0.5–1.3)
CREAT SERPL-MCNC: 1.09 MG/DL — SIGNIFICANT CHANGE UP (ref 0.5–1.3)
EGFR: 49 ML/MIN/1.73M2 — LOW
EGFR: 49 ML/MIN/1.73M2 — LOW
GLUCOSE SERPL-MCNC: 119 MG/DL — HIGH (ref 70–99)
GLUCOSE SERPL-MCNC: 119 MG/DL — HIGH (ref 70–99)
HCT VFR BLD CALC: 35.6 % — SIGNIFICANT CHANGE UP (ref 34.5–45)
HCT VFR BLD CALC: 35.6 % — SIGNIFICANT CHANGE UP (ref 34.5–45)
HGB BLD-MCNC: 11.7 G/DL — SIGNIFICANT CHANGE UP (ref 11.5–15.5)
HGB BLD-MCNC: 11.7 G/DL — SIGNIFICANT CHANGE UP (ref 11.5–15.5)
MAGNESIUM SERPL-MCNC: 2.5 MG/DL — SIGNIFICANT CHANGE UP (ref 1.6–2.6)
MAGNESIUM SERPL-MCNC: 2.5 MG/DL — SIGNIFICANT CHANGE UP (ref 1.6–2.6)
MCHC RBC-ENTMCNC: 30.5 PG — SIGNIFICANT CHANGE UP (ref 27–34)
MCHC RBC-ENTMCNC: 30.5 PG — SIGNIFICANT CHANGE UP (ref 27–34)
MCHC RBC-ENTMCNC: 32.9 GM/DL — SIGNIFICANT CHANGE UP (ref 32–36)
MCHC RBC-ENTMCNC: 32.9 GM/DL — SIGNIFICANT CHANGE UP (ref 32–36)
MCV RBC AUTO: 93 FL — SIGNIFICANT CHANGE UP (ref 80–100)
MCV RBC AUTO: 93 FL — SIGNIFICANT CHANGE UP (ref 80–100)
PLATELET # BLD AUTO: 220 K/UL — SIGNIFICANT CHANGE UP (ref 150–400)
PLATELET # BLD AUTO: 220 K/UL — SIGNIFICANT CHANGE UP (ref 150–400)
POTASSIUM SERPL-MCNC: 4.7 MMOL/L — SIGNIFICANT CHANGE UP (ref 3.5–5.3)
POTASSIUM SERPL-MCNC: 4.7 MMOL/L — SIGNIFICANT CHANGE UP (ref 3.5–5.3)
POTASSIUM SERPL-SCNC: 4.7 MMOL/L — SIGNIFICANT CHANGE UP (ref 3.5–5.3)
POTASSIUM SERPL-SCNC: 4.7 MMOL/L — SIGNIFICANT CHANGE UP (ref 3.5–5.3)
RBC # BLD: 3.83 M/UL — SIGNIFICANT CHANGE UP (ref 3.8–5.2)
RBC # BLD: 3.83 M/UL — SIGNIFICANT CHANGE UP (ref 3.8–5.2)
RBC # FLD: 13.9 % — SIGNIFICANT CHANGE UP (ref 10.3–14.5)
RBC # FLD: 13.9 % — SIGNIFICANT CHANGE UP (ref 10.3–14.5)
SODIUM SERPL-SCNC: 135 MMOL/L — SIGNIFICANT CHANGE UP (ref 135–145)
SODIUM SERPL-SCNC: 135 MMOL/L — SIGNIFICANT CHANGE UP (ref 135–145)
WBC # BLD: 8.32 K/UL — SIGNIFICANT CHANGE UP (ref 3.8–10.5)
WBC # BLD: 8.32 K/UL — SIGNIFICANT CHANGE UP (ref 3.8–10.5)
WBC # FLD AUTO: 8.32 K/UL — SIGNIFICANT CHANGE UP (ref 3.8–10.5)
WBC # FLD AUTO: 8.32 K/UL — SIGNIFICANT CHANGE UP (ref 3.8–10.5)

## 2023-11-08 PROCEDURE — 99233 SBSQ HOSP IP/OBS HIGH 50: CPT

## 2023-11-08 RX ORDER — CYCLOBENZAPRINE HYDROCHLORIDE 10 MG/1
5 TABLET, FILM COATED ORAL THREE TIMES A DAY
Refills: 0 | Status: DISCONTINUED | OUTPATIENT
Start: 2023-11-08 | End: 2023-11-08

## 2023-11-08 RX ORDER — LIDOCAINE 4 G/100G
1 CREAM TOPICAL EVERY 24 HOURS
Refills: 0 | Status: DISCONTINUED | OUTPATIENT
Start: 2023-11-08 | End: 2023-11-08

## 2023-11-08 RX ORDER — CYCLOBENZAPRINE HYDROCHLORIDE 10 MG/1
1 TABLET, FILM COATED ORAL
Qty: 15 | Refills: 0
Start: 2023-11-08 | End: 2023-11-12

## 2023-11-08 RX ADMIN — TRAMADOL HYDROCHLORIDE 25 MILLIGRAM(S): 50 TABLET ORAL at 06:25

## 2023-11-08 RX ADMIN — TRAMADOL HYDROCHLORIDE 25 MILLIGRAM(S): 50 TABLET ORAL at 00:17

## 2023-11-08 RX ADMIN — LIDOCAINE 1 PATCH: 4 CREAM TOPICAL at 14:06

## 2023-11-08 RX ADMIN — Medication 40 MILLIGRAM(S): at 11:35

## 2023-11-08 RX ADMIN — CYCLOBENZAPRINE HYDROCHLORIDE 5 MILLIGRAM(S): 10 TABLET, FILM COATED ORAL at 16:16

## 2023-11-08 RX ADMIN — TRAMADOL HYDROCHLORIDE 25 MILLIGRAM(S): 50 TABLET ORAL at 17:52

## 2023-11-08 RX ADMIN — TRAMADOL HYDROCHLORIDE 25 MILLIGRAM(S): 50 TABLET ORAL at 01:17

## 2023-11-08 RX ADMIN — Medication 25 MICROGRAM(S): at 06:25

## 2023-11-08 RX ADMIN — Medication 50 MILLIGRAM(S): at 11:34

## 2023-11-08 RX ADMIN — SPIRONOLACTONE 25 MILLIGRAM(S): 25 TABLET, FILM COATED ORAL at 11:34

## 2023-11-08 RX ADMIN — APIXABAN 5 MILLIGRAM(S): 2.5 TABLET, FILM COATED ORAL at 11:35

## 2023-11-08 RX ADMIN — Medication 1 DROP(S): at 06:17

## 2023-11-08 RX ADMIN — APIXABAN 5 MILLIGRAM(S): 2.5 TABLET, FILM COATED ORAL at 00:11

## 2023-11-08 NOTE — DISCHARGE NOTE PROVIDER - CARE PROVIDERS DIRECT ADDRESSES
,DirectAddress_Unknown,DirectAddress_Unknown
L foot: diffuse swelling, erythema and tenderness/warmth. incision site at base of 5th intact with steristrip in place. no purulence or crepitus.

## 2023-11-08 NOTE — DISCHARGE NOTE PROVIDER - NSDCCPTREATMENT_GEN_ALL_CORE_FT
PRINCIPAL PROCEDURE  Procedure: CT chest for pulmonary embolism  Findings and Treatment: FINDINGS:  LUNGS AND AIRWAYS: Patent central airways. Scattered areas of mosaic   attenuation throughout the lungs which is due to air trapping versus   smallairways or small vessels disease. Bilateral upper lobe linear   atelectasis. Bibasilar subsegmental atelectasis.  PLEURA: No pleural effusion.  MEDIASTINUM AND KASSI: No lymphadenopathy.  VESSELS: No pulmonary embolism.  HEART: Heart size is enlarged.No pericardial effusion. Left anterior   descending coronary artery stent.  CHEST WALL AND LOWER NECK: Enlarged left axillary lymph nodes measuring   up to 2.7 x 2.6 cm.  VISUALIZED UPPER ABDOMEN: Right renal contour defects likely due to   scarring.Small hiatal hernia.  BONES: Degenerative changes of the spine.  IMPRESSION:  1. No pulmonary embolism.  2. Scattered areas of mosaic attenuation throughout the lungs which is due to air trapping versus small airways or small vessels disease.  3. Left axillary lymphadenopathy which is infectious, inflammatory or   neoplastic in etiology.      SECONDARY PROCEDURE  Procedure: Transthoracic echocardiography (TTE)  Findings and Treatment: Findings   Mitral Valve   The mitral valve leaflets appear thickened.   Moderate mitral regurgitation.   Aortic Valve   Normal aortic valve structure and function.   Tricuspid Valve   Normal appearing tricuspid valve structure.   Moderate tricuspid valve regurgitation.   Severe pulmonary hypertension.   Pulmonic Valve   Normal appearing pulmonic valve structure.   Mild pulmonic valvular regurgitation.   Left Atrium   The left atrium is moderately dilated.   Left Ventricle   The left ventricle is normal in size, wall thickness, wall motion and   contractility. Estimated left ventricular ejection fraction is 55-60 %.   Right Atrium   Normal appearing right atrium.   Right Ventricle   Normal appearing right ventricle structure and function.   Pericardial Effusion   No evidence of pericardial effusion.   Pleural Effusion   No evidence of pleural effusion.   Miscellaneous   The IVC appears normal.   Impression   Summary:   The left ventricle is normal in size, wall thickness, wall motion and   contractility. Estimated left ventricular ejection fraction is 55-60 %.   Normal appearing right ventricle structure and function.   The left atrium is moderately dilated.   Moderate mitral regurgitation.   Mild pulmonic valvular regurgitation.   Moderate tricuspid valve regurgitation.   Severe pulmonary hypertension.

## 2023-11-08 NOTE — DISCHARGE NOTE PROVIDER - HOSPITAL COURSE
INCOMPLETE NOTE    89 y/o F with PMH of Hypertension, HLD, CAD s/p stent in 2017, ?HR, unspecified type, Atrial Fibrillation s/p ablation, MR, Factor V Leiden mutation with previous PE, presents to  ED with chest pain. CTA negative for PE. Trops negative, EKG sinus rhythm with chronic RBBB. TTE showed severe Pulmonary HTN.     Hospital course (by problem):   #Chest pain  - Less likely ACS. Trops neg x2. EKG similar to previous, sinus rhythm with no acute changes  - CTA ruled out PE  - Echocardiogram showed Severe Pulmonary HTN, normal LV size, motion, contractility. EF 55-60%. +moderately dilated left atrium. +Moderate MR and TR. Mild Pulmonic Regurgitation.   - Cardiology following inpatient, cleared for discharge    #Tender lymphadenopathy, Left Axilla  - PCP aware, plan for Mammogram and Ultrasound this week     #History of atrial fibrillation s/p ablation  - Continue Eliquis 5 BID and Toprol 50     #Factor 5 Leiden with prior PE  - No PE on CTA  - Continue Eliquis     #Chronic diastolic heart failure  #Severe pulm hypertension  TTE done 11/7/23:  The left ventricle is normal in size, wall thickness, wall motion and contractility. Estimated left ventricular ejection fraction is 55-60 %.  The left atrium is moderately dilated.  Moderate mitral regurgitation.  Mild pulmonic valvular regurgitation.  Moderate tricuspid valve regurgitation.  Severe pulmonary hypertension.  - Continue home Toprol, Lasix, Spironolactone     #Hypothyroidism  - Continue home Synthroid    Patient was discharged to: Home    New medications: n/a  Changes to old medications: n/a  Medications that were stopped: n/a    Items to follow up as outpatient: Follow up with PCP for left axilla lymphadenopathy. Cardiology follow up    Physical exam at the time of discharge:  VITALS:   T(C): 36.6 (11-07-23 @ 20:20), Max: 36.6 (11-07-23 @ 20:20)  HR: 65 (11-07-23 @ 20:20) (65 - 65)  BP: 140/55 (11-07-23 @ 20:20) (140/55 - 140/55)  RR: 18 (11-07-23 @ 20:20) (18 - 18)  SpO2: 95% (11-07-23 @ 20:20) (95% - 95%)    PHYSICAL EXAM:  GENERAL: Appears well, no acute distress  HEAD:  Atraumatic, Normocephalic  EYES: EOMI, PERRLA, conjunctiva and sclera clear  ENMT: No tonsillar erythema, exudates, or enlargement; MMM  NECK: Supple, No JVD  HEART: Regular rate and rhythm; No murmurs, rubs, or gallops  RESPIRATORY: Unlabored respirations. CTA B/L, No W/R/R  ABDOMEN: Soft, Nontender, Nondistended; +BS  NEUROLOGY: A&Ox3, nonfocal, moving all extremities  EXTREMITIES:  2+ Peripheral Pulses, No clubbing, cyanosis, or edema. Tender lymph node L axilla   SKIN: warm, dry, normal color, no rash or abnormal lesions 87 y/o F with PMH of Hypertension, HLD, CAD s/p stent in 2017, ?HR, unspecified type, Atrial Fibrillation s/p ablation, MR, Factor V Leiden mutation with previous PE, presents to  ED with chest pain. CTA negative for PE. Trops negative, EKG sinus rhythm with chronic RBBB. TTE showed severe Pulmonary HTN.     Hospital course (by problem):   #Chest pain  - Less likely ACS. Trops neg x2. EKG similar to previous, sinus rhythm with no acute changes  - CTA ruled out PE  - Echocardiogram showed Severe Pulmonary HTN, normal LV size, motion, contractility. EF 55-60%. +moderately dilated left atrium. +Moderate MR and TR. Mild Pulmonic Regurgitation.   - Cardiology following inpatient, cleared for discharge  - Chest pain likely MSK related as her mid back muscles were tender to touch. Responded to Flexeril 5 mg. Will discharge on 5 days of Flexeril. Discussed with patient and daughter the risks of drowsiness/confusion in elderly patients, they understand and will be cautious. No adverse reactions while inpatient.   - Discussed discharge planning with daughter and patient     #Tender lymphadenopathy, Left Axilla  - PCP aware, plan for Mammogram and Ultrasound this week     #History of atrial fibrillation s/p ablation  - Continue Eliquis 5 BID and Toprol 50     #Factor 5 Leiden with prior PE  - No PE on CTA  - Continue Eliquis     #Chronic diastolic heart failure  #Severe pulm hypertension  TTE done 11/7/23:  The left ventricle is normal in size, wall thickness, wall motion and contractility. Estimated left ventricular ejection fraction is 55-60 %.  The left atrium is moderately dilated.  Moderate mitral regurgitation.  Mild pulmonic valvular regurgitation.  Moderate tricuspid valve regurgitation.  Severe pulmonary hypertension.  - Similar to previous echo   - Continue home Toprol, Lasix, Spironolactone     #Hypothyroidism  - Continue home Synthroid    Patient was discharged to: Home    New medications: Flexeril 5 mg tid prn for 5 days   Changes to old medications: n/a  Medications that were stopped: n/a    Items to follow up as outpatient: Follow up with PCP for left axilla lymphadenopathy. Cardiology follow up    Physical exam at the time of discharge:  VITALS:   T(C): 36.6 (11-07-23 @ 20:20), Max: 36.6 (11-07-23 @ 20:20)  HR: 65 (11-07-23 @ 20:20) (65 - 65)  BP: 140/55 (11-07-23 @ 20:20) (140/55 - 140/55)  RR: 18 (11-07-23 @ 20:20) (18 - 18)  SpO2: 95% (11-07-23 @ 20:20) (95% - 95%)    PHYSICAL EXAM:  GENERAL: Appears well, no acute distress  HEAD:  Atraumatic, Normocephalic  EYES: EOMI, PERRLA, conjunctiva and sclera clear  ENMT: No tonsillar erythema, exudates, or enlargement; MMM  NECK: Supple, No JVD  HEART: Regular rate and rhythm; No murmurs, rubs, or gallops  RESPIRATORY: Unlabored respirations. CTA B/L, No W/R/R  ABDOMEN: Soft, Nontender, Nondistended; +BS  NEUROLOGY: A&Ox3, nonfocal, moving all extremities  EXTREMITIES:  2+ Peripheral Pulses, No clubbing, cyanosis, or edema. Tender lymph node L axilla   SKIN: warm, dry, normal color, no rash or abnormal lesions

## 2023-11-08 NOTE — DISCHARGE NOTE PROVIDER - CARE PROVIDER_API CALL
Praful Holloway  Cardiovascular Disease  [address] [place] [address]  Phone: [phone]  Fax: [phone]  Established Patient  Follow Up Time: 7-10 Days    Karla Tracy  [address] [address]  Phone: [phone]  Fax: [phone]  Established Patient  Follow Up Time: 7-10 Days

## 2023-11-08 NOTE — DISCHARGE NOTE NURSING/CASE MANAGEMENT/SOCIAL WORK - PATIENT PORTAL LINK FT
You can access the FollowMyHealth Patient Portal offered by [facility] by registering at the following website: [email or web address]. By joining [employer]’s FollowMyHealth portal, you will also be able to view your health information using other applications (apps) compatible with our system.

## 2023-11-08 NOTE — PROGRESS NOTE ADULT - SUBJECTIVE AND OBJECTIVE BOX
The patient was seen and examined.  No acute events overnight.  EF normal echo.  Continues have pain.  Cardiac work-up essentially negative.    ________________________________  SKeegan DEL CASTILLO is a 88y year old Female with a past medical history of mitral valve regurgitation, history of PE with factor V Leiden, paroxysmal atrial fibrillation on anticoagulation status post ablation, coronary disease status post PCI to the LAD in February 2017 with repeat cardiac catheterization in March 2022 showing nonobstructive coronary disease with patent stents, severe pulmonary hypertension, with no response to nitric oxide, systemic hypertension, hyperlipidemia who presented for evaluation of left-sided chest pain.  She noted a lump in the left breast, which has been tender.  She admits to pleuritic chest pain.  Cardiac enzymes negative.  EKG shows sinus rhythm with chronic right bundle branch block.  Recently treated with antibiotic for URI.    PREVIOUS CARDIAC WORKUP:    Echocardiogram 12/15/22  --The interventricular septum is mildly hypertrophied.  --The inferolateral (posterior) wall is mild hypertrophied.  --There is mild left ventricular hypertrophy.  --LV global wall motion is normal.  --LV ejection fraction (65 %) is normal.  --Indeterminate left ventricular diastolic function.  --The global LV longitudinal strain using the speckle tracking technology is -17.6 %.  -- Right atrium is normal in size (RA volume index is 19 ml/m²).  --The aortic root is normal in size (3.10 cm). Ascending aorta is normal in size; its diameter   is 2.80 cm (1.5 cm/m²).  --The aortic valve is mildly calcified. There is no aortic regurgitation.  --There is mild to moderate mitral regurgitation.  --There is mild to moderate tricuspid regurgitation.  --There is mild pulmonic regurgitation.  --The right atrial pressure is 6 - 10 mm Hg. There is moderate pulmonary hypertension.  --There is no pericardial effusion.      ________________________________  Review of systems: A 10 point review of system has been performed, and is negative except for what has been mentioned in the above history of present illness.     PAST MEDICAL & SURGICAL HISTORY:  Atrial fibrillation      CHF (congestive heart failure)      Pulmonary emboli      Factor 5 Leiden mutation, heterozygous      CAD (coronary artery disease)      Stented coronary artery      Hypothyroid      COPD (chronic obstructive pulmonary disease)      Mitral regurgitation      HTN (hypertension)      H/O: hysterectomy      H/O knee surgery      Cyst of breast, unspecified laterality  S/P excision      H/O cardiac radiofrequency ablation      S/P ablation of atrial fibrillation        FAMILY HISTORY:  FHx: diabetes mellitus (Father)    FHx: heart disease (Father)     SOCIAL HISTORY: Noncontributory  ALLERGIES:  flu vaccines (Other)  Ceftin (Other (Moderate))  nisoldipine (Unknown)  sulfa drugs (Other)  tadalafil (Muscle Pain)  Macrodantin (Other)  Repatha (Unknown)  eggs (Anaphylaxis)  Motrin (Other)    Home Medications:  Albuterol (Eqv-ProAir HFA) 90 mcg/inh inhalation aerosol: 2 puff(s) inhaled every 4 hours as needed for  shortness of breath and/or wheezing (07 Nov 2023 08:04)  Artificial Tears ophthalmic solution: 1 drop(s) in each eye 3 times a day as needed for  dry eyes (07 Nov 2023 08:02)  Ativan 0.5 mg oral tablet: 1 tab(s) orally 3 times a day, As Needed anxiety (07 Nov 2023 08:04)  ciprofloxacin 500 mg oral tablet: 1 tab(s) orally 2 times a day x 7 days ***Course Complete*** (07 Nov 2023 08:02)  Eliquis 5 mg oral tablet: 1 tab(s) orally 2 times a day (07 Nov 2023 08:04)  furosemide 40 mg oral tablet: 1 tab(s) orally once a day (07 Nov 2023 08:04)  levothyroxine 25 mcg (0.025 mg) oral tablet: 1 tab(s) orally once a day (07 Nov 2023 08:04)  Livalo 2 mg oral tablet: 1 tab(s) orally once a day (07 Nov 2023 08:04)  Metoprolol Succinate ER 50 mg oral tablet, extended release: 1 tab(s) orally once a day (07 Nov 2023 08:05)  potassium chloride 99 mg oral tablet: 1 tab(s) orally 2 times a day (07 Nov 2023 08:05)  predniSONE 10 mg oral tablet: 1 tab(s) orally as directed x 4 days ***Course Complete*** (07 Nov 2023 08:05)  spironolactone 25 mg oral tablet: 1 tab(s) orally once a day (07 Nov 2023 08:04)     MEDICATIONS  (STANDING):  apixaban 5 milliGRAM(s) Oral every 12 hours  artificial tears (preservative free) Ophthalmic Solution 1 Drop(s) Both EYES three times a day  atorvastatin 10 milliGRAM(s) Oral at bedtime  furosemide    Tablet 40 milliGRAM(s) Oral daily  levothyroxine 25 MICROGram(s) Oral daily  lidocaine   4% Patch 1 Patch Transdermal every 24 hours  metoprolol succinate ER 50 milliGRAM(s) Oral daily  spironolactone 25 milliGRAM(s) Oral daily    MEDICATIONS  (PRN):  acetaminophen     Tablet .. 650 milliGRAM(s) Oral every 6 hours PRN Mild Pain (1 - 3)  albuterol    90 MICROgram(s) HFA Inhaler 2 Puff(s) Inhalation every 4 hours PRN for shortness of breath and/or wheezing  cyclobenzaprine 5 milliGRAM(s) Oral three times a day PRN Muscle Spasm  traMADol 25 milliGRAM(s) Oral every 6 hours PRN Moderate Pain (4 - 6)      Vital Signs Last 24 Hrs  T(C): 36.8 (08 Nov 2023 09:05), Max: 36.8 (08 Nov 2023 09:05)  T(F): 98.2 (08 Nov 2023 09:05), Max: 98.2 (08 Nov 2023 09:05)  HR: 56 (08 Nov 2023 09:05) (56 - 65)  BP: 143/47 (08 Nov 2023 09:05) (140/55 - 143/47)  BP(mean): --  RR: 18 (08 Nov 2023 09:05) (18 - 18)  SpO2: 97% (08 Nov 2023 09:05) (95% - 97%)    Parameters below as of 08 Nov 2023 09:05  Patient On (Oxygen Delivery Method): room air  O2 Flow (L/min): 2    I&O's Summary    ________________________________  GENERAL APPEARANCE:  No acute distress  HEAD: normocephalic, atraumatic  NECK: supple, no jugular venous distention, no carotid bruit    HEART: Regular rate and rhythm, S1, S2 normal, 1/6 murmur    CHEST:  No anterior chest wall tenderness    LUNGS: Minimal rales    ABDOMEN: soft, nontender, nondistended, with positive bowel sounds appreciated  EXTREMITIES: no edema.   NEURO: Alert and oriented x3  PSYC:  Normal affect  SKIN:  Dry  ________________________________   TELEMETRY: Sinus rhythm    ECG: Sinus rhythm, right bundle branch block (chronic)    LABS:                        12.2   9.47  )-----------( 223      ( 07 Nov 2023 03:28 )             36.6             11-07    138  |  104  |  28<H>  ----------------------------<  127<H>  4.3   |  30  |  1.22    Ca    8.9      07 Nov 2023 03:28  Mg     2.4     11-07    TPro  6.8  /  Alb  2.8<L>  /  TBili  0.6  /  DBili  x   /  AST  11<L>  /  ALT  12  /  AlkPhos  46  11-07      LIVER FUNCTIONS - ( 07 Nov 2023 03:28 )  Alb: 2.8 g/dL / Pro: 6.8 gm/dL / ALK PHOS: 46 U/L / ALT: 12 U/L / AST: 11 U/L / GGT: x           Urinalysis Basic - ( 07 Nov 2023 03:28 )    Color: x / Appearance: x / SG: x / pH: x  Gluc: 127 mg/dL / Ketone: x  / Bili: x / Urobili: x   Blood: x / Protein: x / Nitrite: x   Leuk Esterase: x / RBC: x / WBC x   Sq Epi: x / Non Sq Epi: x / Bacteria: x          Urinalysis Basic - ( 07 Nov 2023 03:28 )    Color: x / Appearance: x / SG: x / pH: x  Gluc: 127 mg/dL / Ketone: x  / Bili: x / Urobili: x   Blood: x / Protein: x / Nitrite: x   Leuk Esterase: x / RBC: x / WBC x   Sq Epi: x / Non Sq Epi: x / Bacteria: x             ________________________________    RADIOLOGY & ADDITIONAL STUDIES:    IMPRESSION:  No evidence of deep venous thrombosis in either lower extremity.    1. No pulmonary embolism.  2. Scattered areas of mosaic attenuation throughout the lungs which is   due to air trapping versus small airways or small vessels disease.  3. Left axillary lymphadenopathy which is infectious, inflammatory or   neoplastic in etiology.    ________________________________    ASSESSMENT:    Chest pain, likely noncardiac  CAD status post PCI to the LAD in February 2017 with cardiac catheterization March 2022 showing patent stent  Paroxysmal atrial fibrillation on anticoagulation: Status post ablation  History of PE with factor V Leiden on anticoagulation  Chronic diastolic heart failure  Severe pulm hypertension: Negative vasoreactivity study  Systemic hypertension  Hyperlipidemia      PLAN:  In summary, this is a 88y Female with a past medical history of CAD, admitted for chest pain.  Pain likely noncardiac.  EF normal echo.  Enzymes negative.Pain management per primary.  Pulmonary hypertension chronic. Similar to prior echo.  Continue Lasix and Aldactone.  Appears euvolemic.  Can be discharged from cardiac standpoint.  ____________________________________________  (Dragon Dictation software used). Thank you for allowing me to participate in the care of your patient. Please contact me should any questions arise.    NEFTALI Forte DO, [facility]  Office: [phone]

## 2023-11-08 NOTE — DISCHARGE NOTE PROVIDER - NSDCFUSCHEDAPPT_GEN_ALL_CORE_FT
[facility]  [address]  Scheduled Appointment: 11/09/2023    [facility]  [id number]  [place]  Scheduled Appointment: 11/09/2023

## 2023-11-08 NOTE — DISCHARGE NOTE PROVIDER - NSDCFUADDAPPT_GEN_ALL_CORE_FT
Please follow up with your Cardiologist, Dr. Holloway, within 2 weeks of discharge.    Please follow up with your PCP within 2 weeks of discharge.

## 2023-11-08 NOTE — DISCHARGE NOTE PROVIDER - NSDCCPCAREPLAN_GEN_ALL_CORE_FT
PRINCIPAL DISCHARGE DIAGNOSIS  Diagnosis: Chest pain  Assessment and Plan of Treatment: Call your local emergency number (911 in the [place]) or have someone call if:  You have any of the following signs of a heart attack:  -Squeezing, pressure, or pain in your chest  You may also have any of the following:  -Discomfort or pain in your back, neck, jaw, stomach, or arm  -Shortness of breath  -Nausea or vomiting  -Lightheadedness or a sudden cold sweat  When should I seek immediate care?  -You have chest discomfort that gets worse, even with medicine.  -You cough or vomit blood.  -Your bowel movements are black or bloody.  -You cannot stop vomiting, or it hurts to swallow.  When should I call my doctor?  You have questions or concerns about your condition or care.      SECONDARY DISCHARGE DIAGNOSES  Diagnosis: Lymphadenopathy, axillary  Assessment and Plan of Treatment: Please follow up with your Primary Care Doctor for further workup of your tender lymph node. Please follow up with your Mammogram and Ultrasound scheduled for tomorrow 11/9/23.     PRINCIPAL DISCHARGE DIAGNOSIS  Diagnosis: Chest pain  Assessment and Plan of Treatment: You presented to the hospital with chest pain. We did a full workup which was negative for a cardiac cause. You had tenderness in your mid back muscles which is likely what is causing the pain. You responded to the pain medication given. You can take the Flexeril 5 mg every 8 hours as needed. We reviewed the side effects, some of which include drowsiness and confusion. Please stop taking this medication if you develop any serious side effects.   Call your local emergency number (911 in the [place]) or have someone call if:  You have any of the following signs of a heart attack:  -Squeezing, pressure, or pain in your chest  You may also have any of the following:  -Discomfort or pain in your back, neck, jaw, stomach, or arm  -Shortness of breath  -Nausea or vomiting  -Lightheadedness or a sudden cold sweat  When should I seek immediate care?  -You have chest discomfort that gets worse, even with medicine.  -You cough or vomit blood.  -Your bowel movements are black or bloody.  -You cannot stop vomiting, or it hurts to swallow.  When should I call my doctor?  You have questions or concerns about your condition or care.      SECONDARY DISCHARGE DIAGNOSES  Diagnosis: Lymphadenopathy, axillary  Assessment and Plan of Treatment: Please follow up with your Primary Care Doctor for further workup of your tender lymph node. Please follow up with your Mammogram and Ultrasound scheduled for tomorrow 11/9/23.

## 2023-11-08 NOTE — DISCHARGE NOTE PROVIDER - NSDCMRMEDTOKEN_GEN_ALL_CORE_FT
Albuterol (Eqv-ProAir HFA) 90 mcg/inh inhalation aerosol: 2 puff(s) inhaled every 4 hours as needed for  shortness of breath and/or wheezing  Artificial Tears ophthalmic solution: 1 drop(s) in each eye 3 times a day as needed for  dry eyes  Ativan 0.5 mg oral tablet: 1 tab(s) orally 3 times a day, As Needed anxiety  ciprofloxacin 500 mg oral tablet: 1 tab(s) orally 2 times a day x 7 days ***Course Complete***  Eliquis 5 mg oral tablet: 1 tab(s) orally 2 times a day  furosemide 40 mg oral tablet: 1 tab(s) orally once a day  levothyroxine 25 mcg (0.025 mg) oral tablet: 1 tab(s) orally once a day  Livalo 2 mg oral tablet: 1 tab(s) orally once a day  Metoprolol Succinate ER 50 mg oral tablet, extended release: 1 tab(s) orally once a day  potassium chloride 99 mg oral tablet: 1 tab(s) orally 2 times a day  predniSONE 10 mg oral tablet: 1 tab(s) orally as directed x 4 days ***Course Complete***  spironolactone 25 mg oral tablet: 1 tab(s) orally once a day   Albuterol (Eqv-ProAir HFA) 90 mcg/inh inhalation aerosol: 2 puff(s) inhaled every 4 hours as needed for  shortness of breath and/or wheezing  Artificial Tears ophthalmic solution: 1 drop(s) in each eye 3 times a day as needed for  dry eyes  Ativan 0.5 mg oral tablet: 1 tab(s) orally 3 times a day, As Needed anxiety  cyclobenzaprine 5 mg oral tablet: 1 tab(s) orally 3 times a day as needed for Muscle Spasm MDD: 15 mg  Eliquis 5 mg oral tablet: 1 tab(s) orally 2 times a day  furosemide 40 mg oral tablet: 1 tab(s) orally once a day  levothyroxine 25 mcg (0.025 mg) oral tablet: 1 tab(s) orally once a day  Livalo 2 mg oral tablet: 1 tab(s) orally once a day  Metoprolol Succinate ER 50 mg oral tablet, extended release: 1 tab(s) orally once a day  potassium chloride 99 mg oral tablet: 1 tab(s) orally 2 times a day  spironolactone 25 mg oral tablet: 1 tab(s) orally once a day

## 2023-11-08 NOTE — DISCHARGE NOTE PROVIDER - PROVIDER TOKENS
PROVIDER:[TOKEN:[[id number]],FOLLOWUP:[7-10 Days],ESTABLISHEDPATIENT:[T]],PROVIDER:[TOKEN:[[id number]],FOLLOWUP:[7-10 Days],ESTABLISHEDPATIENT:[T]]

## 2023-11-15 ENCOUNTER — NON-APPOINTMENT (OUTPATIENT)
Age: 88
End: 2023-11-15

## 2023-11-27 PROBLEM — Z00.00 ENCOUNTER FOR PREVENTIVE HEALTH EXAMINATION: Status: ACTIVE | Noted: 2023-11-27

## 2023-11-30 ENCOUNTER — APPOINTMENT (OUTPATIENT)
Dept: ULTRASOUND IMAGING | Facility: CLINIC | Age: 88
End: 2023-11-30

## 2023-11-30 ENCOUNTER — APPOINTMENT (OUTPATIENT)
Dept: ULTRASOUND IMAGING | Facility: CLINIC | Age: 88
End: 2023-11-30
Payer: MEDICARE

## 2023-11-30 ENCOUNTER — APPOINTMENT (OUTPATIENT)
Dept: MAMMOGRAPHY | Facility: CLINIC | Age: 88
End: 2023-11-30

## 2023-11-30 ENCOUNTER — OUTPATIENT (OUTPATIENT)
Dept: OUTPATIENT SERVICES | Facility: HOSPITAL | Age: 88
LOS: 1 days | End: 2023-11-30
Payer: MEDICARE

## 2023-11-30 DIAGNOSIS — Z12.39 ENCOUNTER FOR OTHER SCREENING FOR MALIGNANT NEOPLASM OF BREAST: ICD-10-CM

## 2023-11-30 DIAGNOSIS — Z98.890 OTHER SPECIFIED POSTPROCEDURAL STATES: Chronic | ICD-10-CM

## 2023-11-30 DIAGNOSIS — Z90.710 ACQUIRED ABSENCE OF BOTH CERVIX AND UTERUS: Chronic | ICD-10-CM

## 2023-11-30 DIAGNOSIS — N60.09 SOLITARY CYST OF UNSPECIFIED BREAST: Chronic | ICD-10-CM

## 2023-11-30 PROCEDURE — 88312 SPECIAL STAINS GROUP 1: CPT

## 2023-11-30 PROCEDURE — A4648: CPT

## 2023-11-30 PROCEDURE — 77065 DX MAMMO INCL CAD UNI: CPT

## 2023-11-30 PROCEDURE — 88312 SPECIAL STAINS GROUP 1: CPT | Mod: 26

## 2023-11-30 PROCEDURE — 19083 BX BREAST 1ST LESION US IMAG: CPT

## 2023-11-30 PROCEDURE — 88305 TISSUE EXAM BY PATHOLOGIST: CPT | Mod: 26

## 2023-11-30 PROCEDURE — 19083 BX BREAST 1ST LESION US IMAG: CPT | Mod: LT

## 2023-11-30 PROCEDURE — 77065 DX MAMMO INCL CAD UNI: CPT | Mod: 26,LT

## 2023-11-30 PROCEDURE — 88305 TISSUE EXAM BY PATHOLOGIST: CPT

## 2024-07-14 NOTE — ED PROVIDER NOTE - NS ED MD EM SELECTION
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 Complex

## 2024-11-11 ENCOUNTER — INPATIENT (INPATIENT)
Facility: HOSPITAL | Age: 89
LOS: 2 days | Discharge: NURSING, REHAB OR LONG-TERM CARE | DRG: 66 | End: 2024-11-14
Attending: HOSPITALIST | Admitting: FAMILY MEDICINE
Payer: MEDICARE

## 2024-11-11 VITALS — WEIGHT: 194.45 LBS

## 2024-11-11 DIAGNOSIS — Z90.710 ACQUIRED ABSENCE OF BOTH CERVIX AND UTERUS: Chronic | ICD-10-CM

## 2024-11-11 DIAGNOSIS — Z98.890 OTHER SPECIFIED POSTPROCEDURAL STATES: Chronic | ICD-10-CM

## 2024-11-11 DIAGNOSIS — I63.9 CEREBRAL INFARCTION, UNSPECIFIED: ICD-10-CM

## 2024-11-11 DIAGNOSIS — N60.09 SOLITARY CYST OF UNSPECIFIED BREAST: Chronic | ICD-10-CM

## 2024-11-11 LAB
ALBUMIN SERPL ELPH-MCNC: 3.7 G/DL — SIGNIFICANT CHANGE UP (ref 3.3–5)
ALP SERPL-CCNC: 47 U/L — SIGNIFICANT CHANGE UP (ref 40–120)
ALT FLD-CCNC: 12 U/L — SIGNIFICANT CHANGE UP (ref 12–78)
ANION GAP SERPL CALC-SCNC: 8 MMOL/L — SIGNIFICANT CHANGE UP (ref 5–17)
APPEARANCE UR: CLEAR — SIGNIFICANT CHANGE UP
APTT BLD: 31.3 SEC — SIGNIFICANT CHANGE UP (ref 24.5–35.6)
AST SERPL-CCNC: 16 U/L — SIGNIFICANT CHANGE UP (ref 15–37)
BACTERIA # UR AUTO: NEGATIVE /HPF — SIGNIFICANT CHANGE UP
BASOPHILS # BLD AUTO: 0.07 K/UL — SIGNIFICANT CHANGE UP (ref 0–0.2)
BASOPHILS NFR BLD AUTO: 0.7 % — SIGNIFICANT CHANGE UP (ref 0–2)
BILIRUB SERPL-MCNC: 0.4 MG/DL — SIGNIFICANT CHANGE UP (ref 0.2–1.2)
BILIRUB UR-MCNC: NEGATIVE — SIGNIFICANT CHANGE UP
BUN SERPL-MCNC: 38 MG/DL — HIGH (ref 7–23)
CALCIUM SERPL-MCNC: 9.6 MG/DL — SIGNIFICANT CHANGE UP (ref 8.5–10.1)
CAST: 0 /LPF — SIGNIFICANT CHANGE UP (ref 0–4)
CHLORIDE SERPL-SCNC: 108 MMOL/L — SIGNIFICANT CHANGE UP (ref 96–108)
CO2 SERPL-SCNC: 27 MMOL/L — SIGNIFICANT CHANGE UP (ref 22–31)
COLOR SPEC: YELLOW — SIGNIFICANT CHANGE UP
CREAT SERPL-MCNC: 1.33 MG/DL — HIGH (ref 0.5–1.3)
DIFF PNL FLD: ABNORMAL
EGFR: 38 ML/MIN/1.73M2 — LOW
EOSINOPHIL # BLD AUTO: 0.33 K/UL — SIGNIFICANT CHANGE UP (ref 0–0.5)
EOSINOPHIL NFR BLD AUTO: 3.3 % — SIGNIFICANT CHANGE UP (ref 0–6)
GLUCOSE SERPL-MCNC: 102 MG/DL — HIGH (ref 70–99)
GLUCOSE UR QL: NEGATIVE MG/DL — SIGNIFICANT CHANGE UP
HCT VFR BLD CALC: 39.8 % — SIGNIFICANT CHANGE UP (ref 34.5–45)
HGB BLD-MCNC: 13 G/DL — SIGNIFICANT CHANGE UP (ref 11.5–15.5)
IMM GRANULOCYTES NFR BLD AUTO: 0.3 % — SIGNIFICANT CHANGE UP (ref 0–0.9)
INR BLD: 1.4 RATIO — HIGH (ref 0.85–1.16)
KETONES UR-MCNC: NEGATIVE MG/DL — SIGNIFICANT CHANGE UP
LEUKOCYTE ESTERASE UR-ACNC: ABNORMAL
LYMPHOCYTES # BLD AUTO: 1.31 K/UL — SIGNIFICANT CHANGE UP (ref 1–3.3)
LYMPHOCYTES # BLD AUTO: 13.3 % — SIGNIFICANT CHANGE UP (ref 13–44)
MCHC RBC-ENTMCNC: 30.7 PG — SIGNIFICANT CHANGE UP (ref 27–34)
MCHC RBC-ENTMCNC: 32.7 G/DL — SIGNIFICANT CHANGE UP (ref 32–36)
MCV RBC AUTO: 94.1 FL — SIGNIFICANT CHANGE UP (ref 80–100)
MONOCYTES # BLD AUTO: 1.03 K/UL — HIGH (ref 0–0.9)
MONOCYTES NFR BLD AUTO: 10.4 % — SIGNIFICANT CHANGE UP (ref 2–14)
NEUTROPHILS # BLD AUTO: 7.09 K/UL — SIGNIFICANT CHANGE UP (ref 1.8–7.4)
NEUTROPHILS NFR BLD AUTO: 72 % — SIGNIFICANT CHANGE UP (ref 43–77)
NITRITE UR-MCNC: NEGATIVE — SIGNIFICANT CHANGE UP
PH UR: 7 — SIGNIFICANT CHANGE UP (ref 5–8)
PLATELET # BLD AUTO: 291 K/UL — SIGNIFICANT CHANGE UP (ref 150–400)
POTASSIUM SERPL-MCNC: 4.4 MMOL/L — SIGNIFICANT CHANGE UP (ref 3.5–5.3)
POTASSIUM SERPL-SCNC: 4.4 MMOL/L — SIGNIFICANT CHANGE UP (ref 3.5–5.3)
PROT SERPL-MCNC: 7.5 GM/DL — SIGNIFICANT CHANGE UP (ref 6–8.3)
PROT UR-MCNC: NEGATIVE MG/DL — SIGNIFICANT CHANGE UP
PROTHROM AB SERPL-ACNC: 16 SEC — HIGH (ref 9.9–13.4)
RBC # BLD: 4.23 M/UL — SIGNIFICANT CHANGE UP (ref 3.8–5.2)
RBC # FLD: 14.3 % — SIGNIFICANT CHANGE UP (ref 10.3–14.5)
RBC CASTS # UR COMP ASSIST: 14 /HPF — HIGH (ref 0–4)
SODIUM SERPL-SCNC: 143 MMOL/L — SIGNIFICANT CHANGE UP (ref 135–145)
SP GR SPEC: >1.03 — HIGH (ref 1–1.03)
SQUAMOUS # UR AUTO: 2 /HPF — SIGNIFICANT CHANGE UP (ref 0–5)
TROPONIN I, HIGH SENSITIVITY RESULT: 22.88 NG/L — SIGNIFICANT CHANGE UP
UROBILINOGEN FLD QL: 0.2 MG/DL — SIGNIFICANT CHANGE UP (ref 0.2–1)
WBC # BLD: 9.86 K/UL — SIGNIFICANT CHANGE UP (ref 3.8–10.5)
WBC # FLD AUTO: 9.86 K/UL — SIGNIFICANT CHANGE UP (ref 3.8–10.5)
WBC UR QL: 3 /HPF — SIGNIFICANT CHANGE UP (ref 0–5)

## 2024-11-11 PROCEDURE — 0042T: CPT | Mod: MC

## 2024-11-11 PROCEDURE — 97163 PT EVAL HIGH COMPLEX 45 MIN: CPT | Mod: GP

## 2024-11-11 PROCEDURE — 71045 X-RAY EXAM CHEST 1 VIEW: CPT | Mod: 26

## 2024-11-11 PROCEDURE — 99291 CRITICAL CARE FIRST HOUR: CPT

## 2024-11-11 PROCEDURE — 80061 LIPID PANEL: CPT

## 2024-11-11 PROCEDURE — 70450 CT HEAD/BRAIN W/O DYE: CPT | Mod: 26,59,MC

## 2024-11-11 PROCEDURE — 92526 ORAL FUNCTION THERAPY: CPT | Mod: GN

## 2024-11-11 PROCEDURE — 80053 COMPREHEN METABOLIC PANEL: CPT

## 2024-11-11 PROCEDURE — 83036 HEMOGLOBIN GLYCOSYLATED A1C: CPT

## 2024-11-11 PROCEDURE — 70551 MRI BRAIN STEM W/O DYE: CPT | Mod: MC

## 2024-11-11 PROCEDURE — 70498 CT ANGIOGRAPHY NECK: CPT | Mod: 26,MC

## 2024-11-11 PROCEDURE — 97530 THERAPEUTIC ACTIVITIES: CPT | Mod: GP

## 2024-11-11 PROCEDURE — 92507 TX SP LANG VOICE COMM INDIV: CPT | Mod: GN

## 2024-11-11 PROCEDURE — 97535 SELF CARE MNGMENT TRAINING: CPT | Mod: GO

## 2024-11-11 PROCEDURE — 83735 ASSAY OF MAGNESIUM: CPT

## 2024-11-11 PROCEDURE — 84100 ASSAY OF PHOSPHORUS: CPT

## 2024-11-11 PROCEDURE — 70496 CT ANGIOGRAPHY HEAD: CPT | Mod: 26,MC

## 2024-11-11 PROCEDURE — 93010 ELECTROCARDIOGRAM REPORT: CPT

## 2024-11-11 PROCEDURE — 85027 COMPLETE CBC AUTOMATED: CPT

## 2024-11-11 PROCEDURE — 36415 COLL VENOUS BLD VENIPUNCTURE: CPT

## 2024-11-11 PROCEDURE — 97166 OT EVAL MOD COMPLEX 45 MIN: CPT | Mod: GO

## 2024-11-11 PROCEDURE — 93306 TTE W/DOPPLER COMPLETE: CPT

## 2024-11-11 PROCEDURE — 97116 GAIT TRAINING THERAPY: CPT | Mod: GP

## 2024-11-11 RX ORDER — SODIUM CHLORIDE 9 MG/ML
3 INJECTION, SOLUTION INTRAMUSCULAR; INTRAVENOUS; SUBCUTANEOUS ONCE
Refills: 0 | Status: COMPLETED | OUTPATIENT
Start: 2024-11-11 | End: 2024-11-11

## 2024-11-11 RX ORDER — SODIUM CHLORIDE 9 MG/ML
1000 INJECTION, SOLUTION INTRAMUSCULAR; INTRAVENOUS; SUBCUTANEOUS ONCE
Refills: 0 | Status: COMPLETED | OUTPATIENT
Start: 2024-11-11 | End: 2024-11-11

## 2024-11-11 RX ADMIN — SODIUM CHLORIDE 3 MILLILITER(S): 9 INJECTION, SOLUTION INTRAMUSCULAR; INTRAVENOUS; SUBCUTANEOUS at 20:25

## 2024-11-11 RX ADMIN — SODIUM CHLORIDE 1000 MILLILITER(S): 9 INJECTION, SOLUTION INTRAMUSCULAR; INTRAVENOUS; SUBCUTANEOUS at 20:35

## 2024-11-11 NOTE — ED ADULT TRIAGE NOTE - CHIEF COMPLAINT QUOTE
pt bib daughter from home alone c/o L facial droop at 7pm as per daughter. as per daughter pt slid out of bed at 1830. pts last known well by daughter was at 1230 this afternoon. -loc -head strike. on eliquis. allergic to motrin, sulfa, nisoldipine, tadalafil. dr. thorpe present for neuro evaluation. code stroke ordered.

## 2024-11-11 NOTE — ED PROVIDER NOTE - CLINICAL SUMMARY MEDICAL DECISION MAKING FREE TEXT BOX
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 Code stroke called on arrival. Not candidate for tpa. Neuro consult labs ekg admission

## 2024-11-11 NOTE — ED ADULT NURSE NOTE - NS ED NOTE  TALK SOMEONE YN
[FreeTextEntry1] : 48 yo male with ankylosing spondylitis, who presents today for cardiac evaluation of intermittent chest pain, which began in Dec 2022. Patient denies dyspnea, palpitations, syncope, edema, melena, hematochezia, or hematemesis. He reports undergoing a stress test in 2009, which was negative. He also reports intermittent elevated BP readings, but does not carry a diagnosis of hypertension.\par \par Rheum: Veronica Chavez
No

## 2024-11-11 NOTE — ED PROVIDER NOTE - PROGRESS NOTE DETAILS
Spoke to Angelica Martinez telestroke. States pt is out of window and stroke is nondisabling. Will lookd at angio and call me if any actionable findings. Reji CARTER

## 2024-11-11 NOTE — PHARMACOTHERAPY INTERVENTION NOTE - COMMENTS
Medication history complete. Medications and allergies reviewed with patient and confirmed with . List provided.

## 2024-11-11 NOTE — ED PROVIDER NOTE - CRITICAL CARE ATTENDING CONTRIBUTION TO CARE
Critical care time spent evaluating and reevaluating patient, ordering and interpreeting diagnostics, ordering therapeutics, speaking to admitting and consulting MD/PAs, speaking to family, reviewing old records where available and documenting. Reji CARTER

## 2024-11-11 NOTE — ED PROVIDER NOTE - OBJECTIVE STATEMENT
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

## 2024-11-11 NOTE — ED ADULT NURSE NOTE - NSFALLHARMRISKINTERV_ED_ALL_ED

## 2024-11-11 NOTE — ED PROVIDER NOTE - ENMT, MLM
Airway patent, Nasal mucosa clear. Mouth with normal mucosa. Throat has no vesicles, no oropharyngeal exudates and uvula is midline.facial droop noted

## 2024-11-11 NOTE — ED ADULT NURSE NOTE - OBJECTIVE STATEMENT
pt. is a 89 y/o female a&o x4 ambulatory into ED with complaints left side facial droop. 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 pt. denies headaches, endorsing overall weakness. pt. FAIL dysphagia screening, MD Mendoza made aware. NIH stroke scale 2

## 2024-11-12 ENCOUNTER — RESULT REVIEW (OUTPATIENT)
Age: 89
End: 2024-11-12

## 2024-11-12 LAB
A1C WITH ESTIMATED AVERAGE GLUCOSE RESULT: 6.1 % — HIGH (ref 4–5.6)
ALBUMIN SERPL ELPH-MCNC: 3.2 G/DL — LOW (ref 3.3–5)
ALP SERPL-CCNC: 50 U/L — SIGNIFICANT CHANGE UP (ref 40–120)
ALT FLD-CCNC: 18 U/L — SIGNIFICANT CHANGE UP (ref 12–78)
ANION GAP SERPL CALC-SCNC: 7 MMOL/L — SIGNIFICANT CHANGE UP (ref 5–17)
AST SERPL-CCNC: 20 U/L — SIGNIFICANT CHANGE UP (ref 15–37)
BILIRUB SERPL-MCNC: 0.6 MG/DL — SIGNIFICANT CHANGE UP (ref 0.2–1.2)
BUN SERPL-MCNC: 27 MG/DL — HIGH (ref 7–23)
CALCIUM SERPL-MCNC: 9.1 MG/DL — SIGNIFICANT CHANGE UP (ref 8.5–10.1)
CHLORIDE SERPL-SCNC: 112 MMOL/L — HIGH (ref 96–108)
CHOLEST SERPL-MCNC: 154 MG/DL — SIGNIFICANT CHANGE UP
CO2 SERPL-SCNC: 24 MMOL/L — SIGNIFICANT CHANGE UP (ref 22–31)
CREAT SERPL-MCNC: 1.1 MG/DL — SIGNIFICANT CHANGE UP (ref 0.5–1.3)
EGFR: 48 ML/MIN/1.73M2 — LOW
ESTIMATED AVERAGE GLUCOSE: 128 MG/DL — HIGH (ref 68–114)
GLUCOSE SERPL-MCNC: 101 MG/DL — HIGH (ref 70–99)
HCT VFR BLD CALC: 36.5 % — SIGNIFICANT CHANGE UP (ref 34.5–45)
HDLC SERPL-MCNC: 53 MG/DL — SIGNIFICANT CHANGE UP
HGB BLD-MCNC: 11.9 G/DL — SIGNIFICANT CHANGE UP (ref 11.5–15.5)
LIPID PNL WITH DIRECT LDL SERPL: 85 MG/DL — SIGNIFICANT CHANGE UP
MAGNESIUM SERPL-MCNC: 2.3 MG/DL — SIGNIFICANT CHANGE UP (ref 1.6–2.6)
MCHC RBC-ENTMCNC: 30.5 PG — SIGNIFICANT CHANGE UP (ref 27–34)
MCHC RBC-ENTMCNC: 32.6 G/DL — SIGNIFICANT CHANGE UP (ref 32–36)
MCV RBC AUTO: 93.6 FL — SIGNIFICANT CHANGE UP (ref 80–100)
NON HDL CHOLESTEROL: 101 MG/DL — SIGNIFICANT CHANGE UP
PHOSPHATE SERPL-MCNC: 2.4 MG/DL — LOW (ref 2.5–4.5)
PLATELET # BLD AUTO: 273 K/UL — SIGNIFICANT CHANGE UP (ref 150–400)
POTASSIUM SERPL-MCNC: 3.8 MMOL/L — SIGNIFICANT CHANGE UP (ref 3.5–5.3)
POTASSIUM SERPL-SCNC: 3.8 MMOL/L — SIGNIFICANT CHANGE UP (ref 3.5–5.3)
PROT SERPL-MCNC: 6.6 GM/DL — SIGNIFICANT CHANGE UP (ref 6–8.3)
RBC # BLD: 3.9 M/UL — SIGNIFICANT CHANGE UP (ref 3.8–5.2)
RBC # FLD: 14.5 % — SIGNIFICANT CHANGE UP (ref 10.3–14.5)
SODIUM SERPL-SCNC: 143 MMOL/L — SIGNIFICANT CHANGE UP (ref 135–145)
TRIGL SERPL-MCNC: 82 MG/DL — SIGNIFICANT CHANGE UP
WBC # BLD: 8.96 K/UL — SIGNIFICANT CHANGE UP (ref 3.8–10.5)
WBC # FLD AUTO: 8.96 K/UL — SIGNIFICANT CHANGE UP (ref 3.8–10.5)

## 2024-11-12 PROCEDURE — 99223 1ST HOSP IP/OBS HIGH 75: CPT

## 2024-11-12 PROCEDURE — 93306 TTE W/DOPPLER COMPLETE: CPT | Mod: 26

## 2024-11-12 PROCEDURE — 70551 MRI BRAIN STEM W/O DYE: CPT | Mod: 26

## 2024-11-12 RX ORDER — GABAPENTIN 300 MG/1
200 CAPSULE ORAL AT BEDTIME
Refills: 0 | Status: DISCONTINUED | OUTPATIENT
Start: 2024-11-12 | End: 2024-11-14

## 2024-11-12 RX ORDER — FUROSEMIDE 40 MG
40 TABLET ORAL DAILY
Refills: 0 | Status: DISCONTINUED | OUTPATIENT
Start: 2024-11-12 | End: 2024-11-14

## 2024-11-12 RX ORDER — ASPIRIN/MAG CARB/ALUMINUM AMIN 325 MG
81 TABLET ORAL DAILY
Refills: 0 | Status: DISCONTINUED | OUTPATIENT
Start: 2024-11-12 | End: 2024-11-12

## 2024-11-12 RX ORDER — ALBUTEROL 90 MCG
2 AEROSOL (GRAM) INHALATION EVERY 6 HOURS
Refills: 0 | Status: DISCONTINUED | OUTPATIENT
Start: 2024-11-12 | End: 2024-11-14

## 2024-11-12 RX ORDER — METOPROLOL TARTRATE 50 MG
25 TABLET ORAL
Refills: 0 | Status: DISCONTINUED | OUTPATIENT
Start: 2024-11-12 | End: 2024-11-14

## 2024-11-12 RX ORDER — GABAPENTIN 300 MG/1
100 CAPSULE ORAL DAILY
Refills: 0 | Status: DISCONTINUED | OUTPATIENT
Start: 2024-11-12 | End: 2024-11-14

## 2024-11-12 RX ORDER — POTASSIUM CHLORIDE 10 MEQ
20 TABLET, EXTENDED RELEASE ORAL DAILY
Refills: 0 | Status: DISCONTINUED | OUTPATIENT
Start: 2024-11-12 | End: 2024-11-14

## 2024-11-12 RX ORDER — ASPIRIN/MAG CARB/ALUMINUM AMIN 325 MG
81 TABLET ORAL DAILY
Refills: 0 | Status: DISCONTINUED | OUTPATIENT
Start: 2024-11-12 | End: 2024-11-14

## 2024-11-12 RX ORDER — APIXABAN 5 MG/1
5 TABLET, FILM COATED ORAL
Refills: 0 | Status: DISCONTINUED | OUTPATIENT
Start: 2024-11-12 | End: 2024-11-14

## 2024-11-12 RX ORDER — LEVOTHYROXINE SODIUM 88 MCG
25 TABLET ORAL DAILY
Refills: 0 | Status: DISCONTINUED | OUTPATIENT
Start: 2024-11-12 | End: 2024-11-14

## 2024-11-12 RX ORDER — CLOPIDOGREL 75 MG/1
75 TABLET ORAL DAILY
Refills: 0 | Status: DISCONTINUED | OUTPATIENT
Start: 2024-11-12 | End: 2024-11-12

## 2024-11-12 RX ORDER — LORAZEPAM 2 MG
0.5 TABLET ORAL DAILY
Refills: 0 | Status: DISCONTINUED | OUTPATIENT
Start: 2024-11-12 | End: 2024-11-14

## 2024-11-12 RX ORDER — SPIRONOLACTONE 100 MG
25 TABLET ORAL DAILY
Refills: 0 | Status: DISCONTINUED | OUTPATIENT
Start: 2024-11-12 | End: 2024-11-14

## 2024-11-12 RX ADMIN — Medication 25 MILLIGRAM(S): at 21:57

## 2024-11-12 RX ADMIN — GABAPENTIN 200 MILLIGRAM(S): 300 CAPSULE ORAL at 21:57

## 2024-11-12 RX ADMIN — GABAPENTIN 100 MILLIGRAM(S): 300 CAPSULE ORAL at 14:33

## 2024-11-12 RX ADMIN — Medication 25 MILLIGRAM(S): at 14:33

## 2024-11-12 RX ADMIN — Medication 20 MILLIEQUIVALENT(S): at 14:34

## 2024-11-12 RX ADMIN — APIXABAN 5 MILLIGRAM(S): 5 TABLET, FILM COATED ORAL at 14:33

## 2024-11-12 RX ADMIN — Medication 20 MILLIGRAM(S): at 21:57

## 2024-11-12 RX ADMIN — Medication 25 MILLIGRAM(S): at 14:34

## 2024-11-12 RX ADMIN — Medication 40 MILLIGRAM(S): at 14:33

## 2024-11-12 RX ADMIN — APIXABAN 5 MILLIGRAM(S): 5 TABLET, FILM COATED ORAL at 21:57

## 2024-11-12 NOTE — H&P ADULT - ASSESSMENT
#Rule out Stroke/TIA  - Initial CT head without acute intracranial hemorrhage, mass effect or transcortical/territorial infarct and perfusion studies without LVO, hemodynamically significant stenosis, and no notable perfusion deficit  - Passed dysphagia screen   - Continue tele monitoring and neuro checks q4  - Continue Plavix 75 mg QD and ASA 81 mg QD for 21 days followed by ASA monotherapy   - Continue  high-intensity statin to a goal LDL of less than 70  - Allow for permissive HTN to 220/110 x 24 hours then gradual normotension  - F/u lipid panel and A1C  - F/u ECHO   - F/u MRI brain   - F/u neuro consult   - F/u cardio consult   - F/u PT consult    #Paroxysmal A-fib  – Continue metoprolol tartrate 25 mg twice daily  – Continue Eliquis 5 mg twice daily    #Chronic diastolic heart failure  – Continue spironolactone 25 mg daily  – Continue metoprolol tartrate 25 mg twice daily  – Continue furosemide 40 mg daily  - ECHO from November 2023 with EF of 55 to 60% - severe pulmonary HTN     #Hyperlipidemia  – At home is on Livalo 2 mg daily  – Will continue atorvastatin 10 mg while inpatient    #DVT prophylaxis  – On Eliquis

## 2024-11-12 NOTE — SWALLOW BEDSIDE ASSESSMENT ADULT - COMMENTS
The pt was admitted to  after falling out of bed at which time a left facial droop/slurred speech were demonstrated. 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 This profile is superimposed upon a history of Factor V Leiden with past PE, atrial fibrillation status post ablation, CAD s/p stent placement, pulmonary hypertension, mitral regurgitation, CHF, COPD, HTN, HLD, hypothyroidism, prior knee surgery and past hysterectomy. See below for additional prior medical information.

## 2024-11-12 NOTE — SWALLOW BEDSIDE ASSESSMENT ADULT - ASR SWALLOW LINGUAL MOBILITY
Lingual ROM/strength/agility were mildly reduced on the left and a mild left tongue drift was apparent.

## 2024-11-12 NOTE — SWALLOW BEDSIDE ASSESSMENT ADULT - NS SPL SWALLOW CLINIC TRIAL FT
The pt demonstrated neurogenic Oropharyngeal Dysphagia of mild to moderate severity which subjectively appeared to be a grossly functional condition with a restricted inventory of modified food consistencies. Labial grading on utensils was somewhat reduced on the left. Bolus formation/transfer were mildly to moderately prolonged but mechanically functional with some of the above modified food textures. Piecemeal deglutition was evident. Mild+ tongue debris noted with easy to chew foods on left > right.  Swallow trigger timely to minimally latent and laryngeal lift on palpation during swallowing trials was mildly to moderately reduced but felt to be grossly functional with some of the above modified food textures as well. Overt aspiration signs/coughing demonstrated with thin liquids, despite cues to employ compensatory swallowing maneuvers. NO behavioral aspiration signs exhibited with mildly thick liquids, puree foods, & easy to chew foods. Coarse solids not offered given severity of Dysphagia. Suggest an Easy To Chew Diet with Mildly Thick Liquids as patient appeared clinically tolerant of these food consistencies from an oropharyngeal swallowing perspective and food textures on this diet accommodates her dysphagic features. Pt's Oropharyngeal Dysphagia is in setting of acute infarct in right Corona Radiata region.

## 2024-11-12 NOTE — CONSULT NOTE ADULT - NS ATTEND AMEND GEN_ALL_CORE FT
Patient discussed with MAYELA Leblanc, and examined at the bedside.     Daughter was at the bedside providing collateral.      On exam:   GEN: NAD  CV: rRR, S1, S2  PULM :CTAB  NEURO:   AWake, alert, oriented, speaking fluently.   Pupils 3-2mm, symmetric, full EOM, full VF's, L sided facial weakness, and dysarthria.  Tongue midline  MOTOR: L arm drift, L leg drift.   SENSORY: inatct symmetrically to light touch    MRi brain images reviewed: R basal ganglia and caudate stroke.    CTA H&N images reviewed: no significant cervical carotid stenosis, no large vessel occlusion.    AP: 89 year with afib on eliquis, factor V leiden, HTN, HLD, pulmonary HTN, prsenting with new onset L sided weakness.  On exam, has weakness in the L face, arm, and leg.  imaging showing a R basal ganglia stroke.    Has multiple risk factors for stroke.    -continue eliquis 5mg BId  -increase statin, goal LDL <70mg/DL  -PT/OT/SLP appreciated  -please page or call neurology with any acute neuro changes, or other issues we can help address.

## 2024-11-12 NOTE — PROGRESS NOTE ADULT - ASSESSMENT
#Acute CVA  - MRI: + CVA in R corona radiata  - Initial CT head without acute intracranial hemorrhage, mass effect or transcortical/territorial infarct and perfusion studies without LVO, hemodynamically significant stenosis, and no notable perfusion deficit  - Case d/w neurology-> pt already on DOAC  - No indication for addition of anti-plt at this will not provide added benefit and increase her liklihood of hemorrhagic conversion  - increase to high intensity statin  - Allow for permissive HTN to 220/110 x 24 hours then gradual normotension  - F/u lipid panel and A1C  - F/u ECHO   - F/u cardio consult , case discussed brisfly with cardio, will review prior echo  - Acute rehab?..    #Paroxysmal A-fib  – Continue metoprolol tartrate 25 mg twice daily  – Continue Eliquis 5 mg twice daily    #Chronic diastolic heart failure  – Continue spironolactone 25 mg daily  – Continue metoprolol tartrate 25 mg twice daily  – Continue furosemide 40 mg daily  - ECHO from November 2023 with EF of 55 to 60% - severe pulmonary HTN , unchanged from prior echo    #Hyperlipidemia  – At home is on Livalo 2 mg daily  – Will continue atorvastatin 10 mg while inpatient    #DVT prophylaxis  – On Eliquis    Dispo: speech and swallow, PT eval for acute rehab. Hopeful for dc in next 24 hours #Acute CVA  - MRI: + CVA in R corona radiata  - Initial CT head without acute intracranial hemorrhage, mass effect or transcortical/territorial infarct and perfusion studies without LVO, hemodynamically significant stenosis, and no notable perfusion deficit  - Case d/w neurology-> pt already on DOAC  - asa 81 po qd  - increase to high intensity statin  - Allow for permissive HTN to 220/110 x 24 hours then gradual normotension  - F/u lipid panel and A1C  - F/u ECHO   - F/u cardio consult , case discussed briefly with cardio, will review prior echo  - Acute rehab?..    #Paroxysmal A-fib  – Continue metoprolol tartrate 25 mg twice daily  – Continue Eliquis 5 mg twice daily    #Chronic diastolic heart failure  – Continue spironolactone 25 mg daily  – Continue metoprolol tartrate 25 mg twice daily  – Continue furosemide 40 mg daily  - ECHO from November 2023 with EF of 55 to 60% - severe pulmonary HTN , unchanged from prior echo    #Hyperlipidemia  – At home is on Livalo 2 mg daily  – Will continue atorvastatin 10 mg while inpatient    #DVT prophylaxis  – On Eliquis    Dispo: speech and swallow, PT eval for acute rehab. Hopeful for dc in next 24 hours

## 2024-11-12 NOTE — H&P ADULT - NSHPPOADEEPVENOUSTHROMB_GEN_A_CORE
Jarad Macias is a 57 year old male.  HPI:   Patient is here for follow-up appointment for essential hypertension.  Denies any symptoms.  He does not exercise regularly but he has very physical job.  He denies any chest pain, shortness of breath, dizziness, lightheadedness, syncope or near syncope.    He continues to use testosterone which some physician prescribes him.  Current Outpatient Medications   Medication Sig Dispense Refill   • candesartan-hydrochlorothiazide (ATACAND HCT) 16-12.5 MG per tablet Take 1 tablet by mouth daily. 90 tablet 3   • testosterone 1% (ANDROGEL) 25mg/2.5g gel packet      • pantoprazole (PROTONIX) 40 MG tablet Take 40 mg by mouth daily.     • ipratropium (ATROVENT) 0.06 % nasal spray      • fluticasone (FLONASE) 50 MCG/ACT nasal spray SHAKE LIQUID AND USE 2 SPRAYS IN EACH NOSTRIL DAILY     • Cholecalciferol (VITAMIN D-3) 5000 units Tab Take 1 tablet by mouth daily.     • budesonide-formoterol (SYMBICORT) 160-4.5 MCG/ACT inhaler TAKE 2 PUFFS BY MOUTH TWICE DAILY AS NEEDED     • Ascorbic Acid (VITAMIN C) 1000 MG tablet Take 1,000 mg by mouth daily.     • albuterol (PROVENTIL HFA) 108 (90 Base) MCG/ACT inhaler Inhale 2 puffs into the lungs.     • 7-keto DHEA Powder 100 mg.     • lisinopril-hydroCHLOROthiazide (PRINZIDE,ZESTORETIC) 20-12.5 MG per tablet Take 0.5 tablets by mouth daily.     • hydrOXYzine (ATARAX) 10 MG tablet Take 10 mg by mouth daily.       No current facility-administered medications for this visit.       Past Medical History:   Diagnosis Date   • Essential hypertension       Social History:  Social History     Tobacco Use   • Smoking status: Never Smoker   • Smokeless tobacco: Never Used   • Tobacco comment: Never used tobacco. denies smoking   Substance Use Topics   • Alcohol use: Never     Frequency: Never   • Drug use: Not on file        REVIEW OF SYSTEMS:   GENERAL HEALTH: feels well otherwise  SKIN: denies any unusual skin lesions or rashes  RESPIRATORY: denies  shortness of breath with exertion  CARDIOVASCULAR: denies chest pain on exertion  GI: denies abdominal pain and denies heartburn  NEURO: denies headaches  All other systems reviewed and negative.  EXAM:     Visit Vitals  /80 (BP Location: [facility], Patient Position: Sitting, Cuff Size: Regular)   Pulse 78   Ht 6' 3\" (1.905 m)   Wt 98.9 kg (218 lb)   SpO2 95%   BMI 27.25 kg/m²     GENERAL: well developed, well nourished,in no apparent distress  SKIN: no rashes,no suspicious lesions  HEENT: atraumatic, normocephalic,ears and throat are clear  NECK: supple,no adenopathy,no bruits  LUNGS: clear to auscultation  CARDIO: RRR without murmur  GI: good BS's,no masses, HSM or tenderness  EXTREMITIES: no cyanosis, clubbing or edema        ASSESSMENT AND PLAN:     1. Essential hypertension  Well-controlled.  Will check a fasting lipid profile.  Goal LDL less than 130  - LIPID PANEL WITH REFLEX; Future         The patient indicates understanding of these issues and agrees to the plan.  The patient is asked to return in 12 months or sooner if needed.        Dom Wetzel MD  4/19/2019  9:49 AM    Digital transcription software was utilized to produce this note. The note may not have been fully proofread for transcription errors to expedite patient care. Also typographical errors will likely remain.     no

## 2024-11-12 NOTE — SWALLOW BEDSIDE ASSESSMENT ADULT - ASR SWALLOW RECOMMEND DIAG
DEFER MBS GIVEN OBSERVED CLINICAL TOLERANCE TO SUGGESTED FOOD TEXTURES, AND CONSIDERING HER REDUCED STIMULABILITY FOR EFFECTIVE USE OF COMPENSATORY SWALLOW MANEUVERS.

## 2024-11-12 NOTE — H&P ADULT - NSHPPHYSICALEXAM_GEN_ALL_CORE
Vital Signs Last 24 Hrs  T(C): 36.4 (12 Nov 2024 02:13), Max: 37.2 (11 Nov 2024 20:47)  T(F): 97.5 (12 Nov 2024 02:13), Max: 98.9 (11 Nov 2024 20:47)  HR: 66 (12 Nov 2024 02:13) (66 - 89)  BP: 169/58 (12 Nov 2024 02:13) (154/62 - 184/64)  BP(mean): 101 (12 Nov 2024 01:25) (86 - 103)  RR: 18 (12 Nov 2024 02:13) (13 - 20)  SpO2: 92% (12 Nov 2024 02:13) (92% - 96%)    Parameters below as of 12 Nov 2024 02:13  Patient On (Oxygen Delivery Method): room air    GENERAL: NAD, lying in bed comfortably NC in place   HEAD:  Atraumatic  ENT: Moist mucous membranes  NECK: Supple  CHEST/LUNG:  Unlabored respirations  HEART: Regular rate and rhythm  ABDOMEN:  Soft, Nontender  EXTREMITIES:  No clubbing, cyanosis, or edema  NERVOUS SYSTEM:  Alert & Oriented X3, speech clear. No deficits   SKIN: No rashes or lesions

## 2024-11-12 NOTE — H&P ADULT - HISTORY OF PRESENT ILLNESS
Patient is an 88 y/o F with a PMH of R eye blindness, mitral valve regurgitation, history of PE with factor V Leiden, chronic   diastolic HF, paroxysmal atrial fibrillation on anticoagulation status post ablation, coronary disease status post PCI to the LAD in February 2017 with repeat cardiac catheterization in March 2022 showing nonobstructive coronary disease with patent stents,   severe pulmonary hypertension (with no response to nitric oxide), HTN and hyperlipidemia presenting to  ED on 11/11/24 brought in by   daughter for L sided facial droop. Patient went out to lunch at 12 pm the day of admission with her daughter. Daughter reports that   the patient was in her normal state of health until 12:30 pm when she last saw her mother. Around 6 pm the patient slipped out of bed  and was unable to get back up. She called her daughter and when the she arrived noted her mother did not seem like herself and had a   L sided facial droop.       Upon arrival to the ED vitals were, /62 HR 71 RR 20 T98.9F and SpO2 of 92% on room air.  HEAD CT: No evidence of an acute intracranial hemorhage, midline shift or hydrocephalus.  NECK CTA: No hemodynamic significant narowing within the neck.  BRAIN CTA: No proximal large vessel occlusion.  CT PERFUSION: No areas of ischemia identified.  She received 1 L normal saline bolus x 1.   Patient is an 88 y/o F with a PMH of  mitral valve regurgitation, history of PE with factor V Leiden, chronic diastolic HF, paroxysmal atrial fibrillation on anticoagulation status post ablation, coronary disease status post PCI to the LAD in February 2017 with repeat cardiac catheterization in March 2022 showing nonobstructive coronary disease with patent stents, severe pulmonary hypertension (with no response to nitric oxide), HTN and hyperlipidemia presenting to  ED on 11/11/24 brought in by daughter for L sided facial droop.     Patient went out to lunch at 12 pm the day of admission with her daughter. Daughter reports that the patient was in her normal state of health until 12:30 pm when she last saw her mother. Around 6 pm the patient slipped out of bedand was unable to get back up. She called her daughter and when the she arrived noted her mother did not seem like herself and had a L sided facial droop.     Upon arrival to the ED vitals were, /62 HR 71 RR 20 T98.9F and SpO2 of 92% on room air.  HEAD CT: No evidence of an acute intracranial hemorhage, midline shift or hydrocephalus.  NECK CTA: No hemodynamic significant narowing within the neck.  BRAIN CTA: No proximal large vessel occlusion.  CT PERFUSION: No areas of ischemia identified.  She received 1 L normal saline bolus x 1.    Currently, patient denies chest pain, palpitations, shortness of breath, headache, dizziness, nausea, vomiting, numbness or tingling. Reports she feels like she has returned to her baseline, Concerned about her restless legs medication - which her daughter will be bringing in the AM. Lives alone at home - daughter close by and checks in on her. Uses 3L of oxygen when sleeping. Uses a cane as her assistive walking device. Does not note increased swelling in her lower extremities.

## 2024-11-12 NOTE — SWALLOW BEDSIDE ASSESSMENT ADULT - SWALLOW EVAL: PROGNOSIS
2) The pt demonstrates neurogenic Oropharyngeal Dysphagia of mild to moderate severity which subjectively appeared to be a grossly functional condition with a restricted inventory of modified food consistencies. Overt aspiration signs/coughing demonstrated with thin liquids, despite cues to employ compensatory swallowing maneuvers. NO behavioral aspiration signs exhibited with mildly thick liquids, puree foods, & easy to chew foods. Coarse solids not offered given severity of Dysphagia. Pt's Oropharyngeal Dysphagia is in setting of acute infarct in right Corona Radiata region.

## 2024-11-12 NOTE — SWALLOW BEDSIDE ASSESSMENT ADULT - SWALLOW EVAL: DIAGNOSIS
1) On encounter, a left facial droop was evident. The pt was alert. She was interactive but internally distractible at times. The pt was able to verbalize during communicative probes and during Q/A dyads. At these times, pt's verbalizations were linguistically intact and contextually appropriate. However, her speech was marked by mild articulatory slurring/distortion consistent with functional Dysarthria which is in setting of acute infarct in right Corona Radiata region. 1) On encounter, a left facial droop was evident. The pt was alert. She was interactive but internally distractible at times. The pt was able to verbalize during communicative probes and during Q/A dyads. At these times, pt's verbalizations were linguistically intact and contextually appropriate. However, her speech output was marked by mild articulatory slurring/distortion consistent with functional Dysarthria which is in setting of acute infarct in right Corona Radiata region.

## 2024-11-12 NOTE — PHYSICAL THERAPY INITIAL EVALUATION ADULT - GENERAL OBSERVATIONS, REHAB EVAL
Pt. found supine in bed with restless B/LLE's noted +tele L facial drop agreeable to PT. Bed mob with Min A, sit to stand to RW Mod A, amb with RW Mod A 15 ft vc's to keep L knee in ext and wgt shifting.

## 2024-11-12 NOTE — PHYSICAL THERAPY INITIAL EVALUATION ADULT - PERTINENT HX OF CURRENT PROBLEM, REHAB EVAL
Patient is an 88 y/o F with a PMH of  mitral valve regurgitation, history of PE with factor V Leiden, chronic diastolic HF, paroxysmal atrial fibrillation on anticoagulation status post ablation, coronary disease status post PCI to the LAD in February 2017 with repeat cardiac catheterization in March 2022 showing nonobstructive coronary disease with patent stents, severe pulmonary hypertension (with no response to nitric oxide), HTN and hyperlipidemia presenting to  ED on 11/11/24 brought in by daughter for L sided facial droop.   Initial CT head without acute intracranial hemorrhage, mass effect or transcortical/territorial infarct and perfusion studies without LVO, hemodynamically significant stenosis, and no notable perfusion deficit  F/u MRI brain Patient is an 88 y/o F with a PMH of  mitral valve regurgitation, history of PE with factor V Leiden, chronic diastolic HF, paroxysmal atrial fibrillation on anticoagulation status post ablation, coronary disease status post PCI to the LAD in February 2017 with repeat cardiac catheterization in March 2022 showing nonobstructive coronary disease with patent stents, severe pulmonary hypertension (with no response to nitric oxide), HTN and hyperlipidemia presenting to  ED on 11/11/24 brought in by daughter for L sided facial droop.   Initial CT head without acute intracranial hemorrhage, mass effect or transcortical/territorial infarct and perfusion studies without LVO, hemodynamically significant stenosis, and no notable perfusion deficit. NIHSS: 6  MRI brain is + for R CR acute stroke

## 2024-11-12 NOTE — PATIENT PROFILE ADULT - FUNCTIONAL ASSESSMENT - DAILY ACTIVITY 3.
Occupational Therapy    Visit Type: treatment  SUBJECTIVE  Patient agreed to participate in therapy this date.  RN in agreement to work with patient for therapy session.  Patient verbally agrees to allow the following to be present during session: student  \"I'm feeling better than yesterday.\"  Patient / Family Goal: maximize function    Pain     Location: Left abdomen    Pain severity now: Did not rate.    OBJECTIVE     Cognitive Status   Orientation    - Oriented to: person, place, time and situation  Functional Communication   - Overall Communication Status: within functional limits   - Forms of Communication: verbal  Attention Span    - Attention: intact  Following Direction   - follows all commands and directions consistently  Transition Between Tasks   - transitions without difficulty  Memory   - intact  Safety Awareness/Insight   - intact and good awareness of safety precautions  Awareness of Deficits   - fully aware of deficits      Sitting Balance  (JUAN JOSE = base of support)  Static      - Trial 1 details: modified independent, without UE support and with back unsupported  Dynamic      - Trial 1 details: reaches forward, reaches across midline, reaches with 2 hands, with back unsupported, without UE support and modified independent    Standing Balance  (JUAN JOSE = base of support)  Firm Surface: Double Leg      - Static, Eyes Open       - Trial 1 details: stand by assist, with double UE support and supervision       Bed Mobility  Sidelying to sit: Head of bed raised.  - Supine to sit: modified independent (Head of bed raised)  Transfers  Assistive devices: gait belt, 2-wheeled walker  - Sit to stand: contact guard/touching/steadying assist  - Stand to sit: contact guard/touching/steadying assist      Functional Ambulation  - Assistance: contact guard/touching/steadying assist  - Assistive device: gait belt and 2-wheeled walker  - Distance (ft):15; 15  - Surface: even  Activities of Daily Living (ADLs)  Grooming/Oral  Hygiene:   - Grooming assist: stand by assist and supervision       - Oral hygiene assist: stand by assist  - Position: standing at sink  - Assist needed for: supervision/safety and standing with assistive device  Lower Body Dressing:   - Footwear:       - Assistance: modified independent       - Position: chair       - Type: socks  - Assist needed for: supervision/safety  Toileting:   - Toilet transfer:        - Assist: contact guard/touching/steadying assist, with tactile cues and with verbal cues       - Device: gait belt and 2-wheeled walker  - Assist: contact guard/touching/steadying assist  - Position: sitting and standing  - Equipment: commode over toilet  Tactile and verbal cues used to encourage proper body positioning prior to descending during toilet transfer.     Interventions    Treatment provided: activity tolerance, ADL training, balance retraining, body mechanics, compensatory techniques, energy conservation, functional ambulation, gait training, postural re-education, safety training, positioning and transfer training Patient educated on the importance of using his shower seat while at home including decreased risk for falls and improved participation within bathing tasks.  Skilled input: tactile instruction/cues, verbal instruction/cues and facilitation  Verbal Consent: Writer verbally educated and received verbal consent for hand placement, positioning of patient, and techniques to be performed today from patient for clothing adjustments for techniques, therapist position for techniques and hand placement and palpation for techniques as described above and how they are pertinent to the patient's plan of care.         Education:   - Present and ready to learn: patient  Education provided during session:  - Role of occupational therapy, plan of care, fall and safety precautions while in hospital    - Results of above outlined education: Verbalizes understanding and Demonstrates  understanding    ASSESSMENT   Progress: progressing toward goals and goals met    Discharge needs based on today's assessment:  - Current level of function: slightly below baseline level of function  - Therapy needs at discharge: therapy 1-3 times per week  - Activities of daily living (ADLs) requiring support at discharge: ambulation, transfers, dressing and toileting  - Impairments that require further therapy intervention: activity tolerance and balance  AM-PAC  - Prior Level of Function: IND/MOD I ([facility] 22-24)       Key: MOD A=moderate assistance, IND/MOD I=independent/modified independent  - Generalized Current Level of Function     - Current Self-Cares: 22       Scoring Key= >21 Modified Independent; 20-21 Supervision; 18-19 Minimal assist; 13-18 Moderate assist; 9-12 Max assist; <9 Total assist    1=unable, 2=a lot, 3=a little, 4=none  1. Understand 10 minute speech  2. Understand familiar people during conversation  3. Remember to take meds  4. Remember where items are placed  5. Remember list of 4-5 errands w/out writing it down  6. Completing complicated task (checkbook, med management)  AM-PAC Cognition Screen:  Cognition Score: 24/24  Cognition Interpretation: no impairment suspected    Therapy Diagnosis:   Decreased ability to perform self care tasks and functional transfers. While patient is slightly below baseline level of function and did not meet all of his goals, he is being discharged from occupational therapy due to having necessary ADL equipment and spouse present at home.        PLAN (while hospitalized)  Suggestions for next session as indicated:   OT Frequency: DC OT      PT/OT Mobility Equipment for Discharge: patient owns walker  PT/OT ADL Equipment for Discharge: owns shower seat, grab bars in shower      Agreement to plan and goals: patient agrees with goals and treatment plan      GOALS  Long Term Goals: (to be met by time of discharge from hospital)  Grooming: Patient will complete  grooming tasks in standing and at sink supervision.  Status: met   Toileting: Patient will complete toileting supervision.  Status: progressing/ongoing  Toilet transfer: Patient will complete toilet transfer with gait belt and least restrictive device, supervision.   Status: progressing/ongoing    Documented in the chart in the following areas: Assessment/Plan.    Patient at End of Session:   Location: in chair  Safety measures: call light within reach and alarm system in place/re-engaged  Handoff to: nurse (Tiffanie-in person)      Care approved by and performed under the direction of on-site therapist. Student’s note read and approved.  Alexandra Zepeda, OTR/L        Therapy procedure time and total treatment time can be found documented on the Time Entry flowsheet   3 = A little assistance

## 2024-11-12 NOTE — SWALLOW BEDSIDE ASSESSMENT ADULT - SWALLOW EVAL: SECRETION MANAGEMENT
Oral saliva management was somewhat reduced on the left and strength of volitional cough was mildly+ decreased.

## 2024-11-12 NOTE — SWALLOW BEDSIDE ASSESSMENT ADULT - SWALLOW EVAL: RECOMMENDED FEEDING/EATING TECHNIQUES
present PO to right of ora cavity/check mouth frequently for oral residue/pocketing/crush medication (when feasible)/position upright (90 degrees)/small sips/bites

## 2024-11-12 NOTE — CONSULT NOTE ADULT - SUBJECTIVE AND OBJECTIVE BOX
Patient is a 89y old  Female who presents with a chief complaint of rule out stroke/tia (12 Nov 2024 13:05)    ________________________________  SKeegan DEL CASTILLO is a 89y year old Female with a past medical history of mitral valve regurgitation, history of factor V Leiden with prior PE, history of A-fib status post ablation, on anticoagulation with Eliquis, chronic diastolic heart failure, severe pulm hypertension, coronary disease status post PCI to the LAD in February 2017 with repeat cardiac catheterization in March 2022 showing nonobstructive coronary disease with patent stents, severe pulmonary hypertension (with no response to nitric oxide), HTN and hyperlipidemia presenting to  ED on 11/11/24 brought in by daughter for L sided facial droop.     Patient seen and echo.  Weakness resolved.  Echo showed severe pulm hypertension consistent with prior study.  No chest discomfort.  No palpitations.  No shortness of breath.    PREVIOUS CARDIAC WORKUP:    Echocardiogram 11/12/24      1. Left ventricular cavity is normal in size. Left ventricular systolic function is normal with an ejection fraction of 60 % by Manley's method of disks.   2. Normal right ventricular systolic function.   3. Moderate to severe mitral regurgitation.   4. Mildto moderate pulmonic regurgitation.   5. Moderate tricuspid regurgitation.   6. Severe pulmonary hypertension.   7. There is mild calcification of the mitral valve annulus.   8. Findings were discussed with  and  on 11/12/2024 at 9:20 am.       ________________________________  Review of systems: A 10 point review of system has been performed, and is negative except for what has been mentioned in the above history of present illness.     PAST MEDICAL & SURGICAL HISTORY:  Atrial fibrillation      CHF (congestive heart failure)      Pulmonary emboli      Factor 5 Leiden mutation, heterozygous      CAD (coronary artery disease)      Stented coronary artery      Hypothyroid      COPD (chronic obstructive pulmonary disease)      Mitral regurgitation      HTN (hypertension)      H/O: hysterectomy      H/O knee surgery      Cyst of breast, unspecified laterality  S/P excision      H/O cardiac radiofrequency ablation      S/P ablation of atrial fibrillation        FAMILY HISTORY:  FHx: diabetes mellitus (Father)    FHx: heart disease (Father)         SOCIAL HISTORY: The patient denies any tobacco abuse, alcohol abuse or illicit drug use.    ALLERGIES:  Motrin (Other)  sulfa drugs (Other)  Ceftin (Other (Moderate))  tadalafil (Muscle Pain)  eggs (Anaphylaxis)  flu vaccines (Other)  Macrodantin (Other)  nisoldipine (Unknown)  Repatha (Unknown)    Home Medications:  Albuterol (Eqv-Proventil HFA) 90 mcg/inh inhalation aerosol: 2 puff(s) inhaled every 6 hours (11 Nov 2024 23:40)  Ativan 0.5 mg oral tablet: 1 tab(s) orally once a day as needed for (11 Nov 2024 23:35)  Eliquis 5 mg oral tablet: 1 tab(s) orally 2 times a day (11 Nov 2024 23:37)  furosemide 40 mg oral tablet: 1 tab(s) orally once a day (11 Nov 2024 23:37)  gabapentin 100 mg oral capsule: 1 cap(s) orally once a day (in the morning) (11 Nov 2024 23:41)  gabapentin 100 mg oral capsule: 2 cap(s) orally once a day (in the evening) (11 Nov 2024 23:39)  levothyroxine 25 mcg (0.025 mg) oral tablet: 1 tab(s) orally once a day (11 Nov 2024 23:37)  Livalo 2 mg oral tablet: 1 tab(s) orally once a day (11 Nov 2024 23:37)  metoprolol tartrate 25 mg oral tablet: 1 tab(s) orally 2 times a day (11 Nov 2024 23:40)  potassium chloride 20 mEq oral tablet, extended release: 1 tab(s) orally once a day (11 Nov 2024 23:40)  spironolactone 25 mg oral tablet: 1 tab(s) orally once a day (11 Nov 2024 23:37)    MEDICATIONS  (STANDING):  apixaban 5 milliGRAM(s) Oral two times a day  atorvastatin 20 milliGRAM(s) Oral at bedtime  furosemide    Tablet 40 milliGRAM(s) Oral daily  gabapentin 100 milliGRAM(s) Oral daily  gabapentin 200 milliGRAM(s) Oral at bedtime  levothyroxine 25 MICROGram(s) Oral daily  metoprolol tartrate 25 milliGRAM(s) Oral two times a day  potassium chloride    Tablet ER 20 milliEquivalent(s) Oral daily  spironolactone 25 milliGRAM(s) Oral daily    MEDICATIONS  (PRN):  albuterol    90 MICROgram(s) HFA Inhaler 2 Puff(s) Inhalation every 6 hours PRN Shortness of Breath and/or Wheezing  LORazepam     Tablet 0.5 milliGRAM(s) Oral daily PRN Anxiety    Vital Signs Last 24 Hrs  T(C): 36.3 (12 Nov 2024 15:45), Max: 37.2 (11 Nov 2024 20:47)  T(F): 97.3 (12 Nov 2024 15:45), Max: 98.9 (11 Nov 2024 20:47)  HR: 57 (12 Nov 2024 15:45) (57 - 89)  BP: 144/62 (12 Nov 2024 15:45) (144/62 - 184/64)  BP(mean): 88 (12 Nov 2024 15:45) (86 - 103)  RR: 18 (12 Nov 2024 15:45) (13 - 20)  SpO2: 94% (12 Nov 2024 15:45) (92% - 96%)    Parameters below as of 12 Nov 2024 15:45  Patient On (Oxygen Delivery Method): room air      I&O's Summary    ________________________________  GENERAL APPEARANCE:  No acute distress  HEAD: normocephalic, atraumatic  NECK: supple, no jugular venous distention, no carotid bruit    HEART: Regular rate and rhythm, S1, S2 normal, 1/6 murmur    CHEST:  No anterior chest wall tenderness    LUNGS:  Clear to auscultation, without any wheezing, rhonchi or rales    ABDOMEN: soft, nontender, nondistended, with positive bowel sounds appreciated  EXTREMITIES: Mild lower extremity edema edema.   NEURO: Alert and oriented x3  PSYC:  Normal affect  SKIN:  Dry  ________________________________   TELEMETRY: Sinus rhythm    ECG: Sinus rhythm, right bundle branch block    LABS:                        11.9   8.96  )-----------( 273      ( 12 Nov 2024 07:39 )             36.5             11-12    143  |  112[H]  |  27[H]  ----------------------------<  101[H]  3.8   |  24  |  1.10    Ca    9.1      12 Nov 2024 07:39  Phos  2.4     11-12  Mg     2.3     11-12    TPro  6.6  /  Alb  3.2[L]  /  TBili  0.6  /  DBili  x   /  AST  20  /  ALT  18  /  AlkPhos  50  11-12      Lipid Panel  Chl 154  HDL 53  LDL --  Trg 82  LIVER FUNCTIONS - ( 12 Nov 2024 07:39 )  Alb: 3.2 g/dL / Pro: 6.6 gm/dL / ALK PHOS: 50 U/L / ALT: 18 U/L / AST: 20 U/L / GGT: x         PT/INR - ( 11 Nov 2024 20:12 )   PT: 16.0 sec;   INR: 1.40 ratio         PTT - ( 11 Nov 2024 20:12 )  PTT:31.3 sec  Urinalysis Basic - ( 12 Nov 2024 07:39 )    Color: x / Appearance: x / SG: x / pH: x  Gluc: 101 mg/dL / Ketone: x  / Bili: x / Urobili: x   Blood: x / Protein: x / Nitrite: x   Leuk Esterase: x / RBC: x / WBC x   Sq Epi: x / Non Sq Epi: x / Bacteria: x        PT/INR - ( 11 Nov 2024 20:12 )   PT: 16.0 sec;   INR: 1.40 ratio         PTT - ( 11 Nov 2024 20:12 )  PTT:31.3 sec  Urinalysis Basic - ( 12 Nov 2024 07:39 )    Color: x / Appearance: x / SG: x / pH: x  Gluc: 101 mg/dL / Ketone: x  / Bili: x / Urobili: x   Blood: x / Protein: x / Nitrite: x   Leuk Esterase: x / RBC: x / WBC x   Sq Epi: x / Non Sq Epi: x / Bacteria: x             ________________________________    RADIOLOGY & ADDITIONAL STUDIES: INTERPRETATION:  Clinical indication: TIA.    MRI of the brain was from sagittal T1 axial T1 and T2 T2 FLAIR diffusion   and gradient echo sequence. Coronal T2-weighted sequence was performed as   well.    Parenchymal volume loss and chronic microvessel ischemic changes are   identified    There is no acute hemorrhage mass or mass effect seen.    There is abnormal T2 prolongation restricted diffusion seen involving the   right corona radiata region. These findings are compatible with an acute   infarct. No hemorrhagic transformation is seen. No significant shift or   herniation is seen.    The large vessels demonstrate normal flow voids    Partial empty sella is seen.    IMPRESSION: Acute infarct as described above.        IMPRESSION:  HEAD CT: No evidence of an acute intracranial hemorhage, midline shift or   hydrocephalus.  NECK CTA: No hemodynamic significant narowing within the neck.  BRAIN CTA: No proximal large vessel occlusion.  CT PERFUSION: No areas of ischemia identified.    Preliminary head CT findings discussed with Dr. HUDSON  at 8:23 PM on   11/11/2024    --- End of Report ---      INTERPRETATION:  TECHNIQUE: Single portable view of the chest.    COMPARISON:  11/7/2023    CLINICAL HISTORY: Stroke Code    FINDINGS:    Single frontal view of the chest demonstrates left basilar atelectasis.   The cardiomediastinal silhouette is enlarged. No acute osseous   abnormalities. Overlying EKG leads and wires are noted    IMPRESSION: Left basilar atelectasis.      ________________________________    ASSESSMENT:  Paroxysmal atrial fibrillation on anticoagulation  Acute CVA  History of CAD status post prior PCI  Severe pulm hypertension with no response to nitric oxide  History of PE with factor V Leiden    PLAN:  In summary, this is a 89y Female with a past medical history of severe pulm hypertension, coronary disease, paroxysmal atrial fibrillation status post ablation, admitted for weakness.  MRI showed CVA.  On anticoagulation with Eliquis For Hx of PE and atrial fibrillation.  Possible atherosclerotic stroke. Add aspirin.  Has a history of statin intolerance.  Monitor on atorvastatin.  Allergic to Repatha.  ____________________________________________  (Dragon Dictation software used). Thank you for allowing me to participate in the care of your patient. Please contact me should any questions arise.    NEFTALI Forte DO, [facility]  Office: [phone]

## 2024-11-12 NOTE — SWALLOW BEDSIDE ASSESSMENT ADULT - SWALLOW EVAL: RECOMMENDED DIET
SUGGEST AN EASY TO CHEW DIET WITH MILDLY THICK LIQUIDS AS THIS IS CURRENTLY THE LEAST RESTRICTIVE ORAL DIET CONSISTENCIES FROM AN OROPHARYNGEAL SWALLOWING PERSPECTIVE ON EXAM.

## 2024-11-12 NOTE — CONSULT NOTE ADULT - SUBJECTIVE AND OBJECTIVE BOX
CC: R/O TIA/Stroke      HPI:  88 y/o F with a PMH of PE with factor V Leiden def, paroxysmal atrial fibrillation on Apixaban, CAD, severe pulm HTN on home O2, HTN, HLD, RLS, presents to  ED on 11/11/24 brought in by daughter for L sided facial droop. Patient went out to lunch at 12 pm the day of admission with her daughter. Daughter reports that the patient was in her normal state of health until 12:30 pm when she last saw her mother. Around 6 pm the patient slipped out of bed and was unable to get back up. She called her daughter and when the she arrived noted her mother did not seem like herself and had a L sided facial droop. In the ED code stroke was called.  CT head/CTA/CTP was neg for acute bleed, ischemia, LVO, infarct.  Telestroke was called.  Patient was OOTW for IV thrombolytics.  MRI brain is + for R CR acute stroke.            PAST MEDICAL & SURGICAL HISTORY:  Atrial fibrillation      CHF (congestive heart failure)      Pulmonary emboli      Factor 5 Leiden mutation, heterozygous      CAD (coronary artery disease)      Stented coronary artery      Hypothyroid      COPD (chronic obstructive pulmonary disease)      Mitral regurgitation      HTN (hypertension)      H/O: hysterectomy      H/O knee surgery      Cyst of breast, unspecified laterality  S/P excision      H/O cardiac radiofrequency ablation      S/P ablation of atrial fibrillation          FAMILY HISTORY:  FHx: diabetes mellitus (Father)  FHx: heart disease (Father)        Social Hx:  Drinks 1/2 bottle wine on the weekends.  Denies smoking, drug use.  Lives alone, daughter lives close by.  Walks with cane at baseline.      MEDICATIONS  (STANDING):  apixaban 5 milliGRAM(s) Oral two times a day  atorvastatin 10 milliGRAM(s) Oral at bedtime  furosemide    Tablet 40 milliGRAM(s) Oral daily  gabapentin 200 milliGRAM(s) Oral at bedtime  gabapentin 100 milliGRAM(s) Oral daily  levothyroxine 25 MICROGram(s) Oral daily  metoprolol tartrate 25 milliGRAM(s) Oral two times a day  potassium chloride    Tablet ER 20 milliEquivalent(s) Oral daily  spironolactone 25 milliGRAM(s) Oral daily       Allergies  Motrin (Other)  sulfa drugs (Other)  Ceftin (Other (Moderate))  tadalafil (Muscle Pain)  eggs (Anaphylaxis)  flu vaccines (Other)  Macrodantin (Other)  nisoldipine (Unknown)  Repatha (Unknown)        ROS: Pertinent positives in HPI, all other ROS were reviewed and are negative.      Vital Signs Last 24 Hrs  T(C): 36.9 (12 Nov 2024 06:17), Max: 37.2 (11 Nov 2024 20:47)  T(F): 98.4 (12 Nov 2024 06:17), Max: 98.9 (11 Nov 2024 20:47)  HR: 66 (12 Nov 2024 06:17) (66 - 89)  BP: 149/54 (12 Nov 2024 06:17) (149/54 - 184/64)  BP(mean): 101 (12 Nov 2024 01:25) (86 - 103)  RR: 18 (12 Nov 2024 06:17) (13 - 20)  SpO2: 92% (12 Nov 2024 06:17) (92% - 96%)    Parameters below as of 12 Nov 2024 06:17  Patient On (Oxygen Delivery Method): room air      GEN:   Constitutional: awake, fatigued, NAD  HEAD: Normocephalic  Neck: Supple.  Extremities:  no edema  Musculoskeletal:  no abnormal movements  Skin: No rashes    Neurological exam:  HF: A x O x 3. Appropriately interactive, normal affect. Speech fluent, No Aphasia or paraphasic errors. Naming /repetition intact   CN: IMELDA, R eyelid slight ptosis, EOMI, VFF, facial sensation normal, L facial weakness, tongue midline, Palate moves equally  Motor: LUE/LLE drift, Strength 3+/5 LUE, 4-/5 LLE, R side 5-/5, normal bulk and tone, no tremors  Sens: Intact to light touch   Reflexes: Symmetric,  BJ 2+, BR 2+, KJ absent+, AJ absent+, downgoing toes b/l  Coord:  L FNFA, LLE dysmetria  Gait/Balance: Cannot test    NIHSS: 6          Labs:   11-12    143  |  112[H]  |  27[H]  ----------------------------<  101[H]  3.8   |  24  |  1.10    Ca    9.1      12 Nov 2024 07:39  Phos  2.4     11-12  Mg     2.3     11-12    TPro  6.6  /  Alb  3.2[L]  /  TBili  0.6  /  DBili  x   /  AST  20  /  ALT  18  /  AlkPhos  50  11-12    11-12 Chol 154 LDL -- HDL 53 Trig 82                          11.9   8.96  )-----------( 273      ( 12 Nov 2024 07:39 )             36.5       Radiology:  < from: MR Head No Cont (11.12.24 @ 11:06) >    MRI of the brain was from sagittal T1 axial T1 and T2 T2 FLAIR diffusion   and gradient echo sequence. Coronal T2-weighted sequence was performed as   well.    Parenchymal volume loss and chronic microvessel ischemic changes are   identified    There is no acute hemorrhage mass or mass effect seen.    There is abnormal T2 prolongation restricted diffusion seen involving the   right corona radiata region. These findings are compatible with an acute   infarct. No hemorrhagic transformation is seen. No significant shift or   herniation is seen.    The large vessels demonstrate normal flow voids    Partial empty sella is seen.    IMPRESSION: Acute infarct as described above.    < from: CT Brain Perfusion Maps Stroke (11.11.24 @ 20:24) >  IMPRESSION:  HEAD CT: No evidence of an acute intracranial hemorhage, midline shift or   hydrocephalus.  NECK CTA: No hemodynamic significant narowing within the neck.  BRAIN CTA: No proximal large vessel occlusion.  CT PERFUSION: No areas of ischemia identified.    < from: TTE W or WO Ultrasound Enhancing Agent (11.12.24 @ 09:47) >  CONCLUSIONS:      1. Left ventricular cavity is normal in size. Left ventricular systolic function is normal with an ejection fraction of 60 % by Manley's method of disks.   2. Normal right ventricular systolic function.   3. Moderate to severe mitral regurgitation.   4. Mildto moderate pulmonic regurgitation.   5. Moderate tricuspid regurgitation.   6. Severe pulmonary hypertension.   7. There is mild calcification of the mitral valve annulus.   8. Findings were discussed with  and  on 11/12/2024 at 9:20 am.

## 2024-11-12 NOTE — PATIENT PROFILE ADULT - FALL HARM RISK - RISK INTERVENTIONS

## 2024-11-12 NOTE — CONSULT NOTE ADULT - ASSESSMENT
89 y/o F with a PMH of PE with factor V Leiden def, paroxysmal atrial fibrillation on Apixaban, CAD, severe pulm HTN on home O2, HTN, HLD, RLS, presents to  ED on 11/11/24 brought in by daughter for L sided facial droop. Patient went out to lunch at 12 pm the day of admission with her daughter. Daughter reports that the patient was in her normal state of health until 12:30 pm when she last saw her mother. Around 6 pm the patient slipped out of bed and was unable to get back up. She called her daughter and when the she arrived noted her mother did not seem like herself and had a L sided facial droop. In the ED code stroke was called.  CT head/CTA/CTP was neg for acute bleed, ischemia, LVO, infarct.  Telestroke was called.  Patient was OOTW for IV thrombolytics.  MRI brain is + for R CR acute stroke.        #Acute R CR stroke      Recommendations  -Continue on Eliquis  -Increase Atorvastatin to 20mg QD-LDL is 85-needs to be <70  -Check A1c (goal should be <5.7  -permissive HTN x 24 hours then gradual normotension to goal SBP <140  -F/U outpatient with Neurology  --DVT prophylaxis  -Acute rehab/PT/OT/SLP eval  -will sign off-please call or page for any questions    D/W Dr. Bass, patient, Dr. Contreras

## 2024-11-12 NOTE — SWALLOW BEDSIDE ASSESSMENT ADULT - SLP GENERAL OBSERVATIONS
On encounter, a left facial droop was evident. The pt was alert. She was interactive but internally distractible at times. The pt was able to verbalize during Q/A dyads. At these times, pt's verbalizations were linguistically intact and contextually appropriate. However, her speech output was marked by mild articulatory slurring/distortion consistent with functional Dysarthria which is in setting of acute infarct in right Corona Radiata region. Response effectiveness during Q/A dyads was functional. Speech integrity was enhanced when over articulation strategies employed.

## 2024-11-12 NOTE — SWALLOW BEDSIDE ASSESSMENT ADULT - ASR SWALLOW LABIAL MOBILITY
Labial ROM/strength/agility were mildly to moderately reduced on the left and a left lip droop was apparent.

## 2024-11-13 ENCOUNTER — TRANSCRIPTION ENCOUNTER (OUTPATIENT)
Age: 89
End: 2024-11-13

## 2024-11-13 PROCEDURE — 99239 HOSP IP/OBS DSCHRG MGMT >30: CPT

## 2024-11-13 PROCEDURE — 99221 1ST HOSP IP/OBS SF/LOW 40: CPT

## 2024-11-13 RX ORDER — ACETAMINOPHEN 500 MG
650 TABLET ORAL EVERY 6 HOURS
Refills: 0 | Status: COMPLETED | OUTPATIENT
Start: 2024-11-13 | End: 2024-11-13

## 2024-11-13 RX ORDER — ASPIRIN/MAG CARB/ALUMINUM AMIN 325 MG
1 TABLET ORAL
Qty: 0 | Refills: 0 | DISCHARGE
Start: 2024-11-13

## 2024-11-13 RX ADMIN — Medication 40 MILLIGRAM(S): at 10:53

## 2024-11-13 RX ADMIN — APIXABAN 5 MILLIGRAM(S): 5 TABLET, FILM COATED ORAL at 21:21

## 2024-11-13 RX ADMIN — GABAPENTIN 100 MILLIGRAM(S): 300 CAPSULE ORAL at 10:53

## 2024-11-13 RX ADMIN — Medication 650 MILLIGRAM(S): at 21:21

## 2024-11-13 RX ADMIN — APIXABAN 5 MILLIGRAM(S): 5 TABLET, FILM COATED ORAL at 10:53

## 2024-11-13 RX ADMIN — Medication 20 MILLIEQUIVALENT(S): at 10:53

## 2024-11-13 RX ADMIN — Medication 20 MILLIGRAM(S): at 21:21

## 2024-11-13 RX ADMIN — Medication 81 MILLIGRAM(S): at 06:18

## 2024-11-13 RX ADMIN — Medication 25 MICROGRAM(S): at 06:06

## 2024-11-13 RX ADMIN — Medication 25 MILLIGRAM(S): at 10:53

## 2024-11-13 RX ADMIN — Medication 25 MILLIGRAM(S): at 21:21

## 2024-11-13 RX ADMIN — GABAPENTIN 200 MILLIGRAM(S): 300 CAPSULE ORAL at 21:21

## 2024-11-13 NOTE — CONSULT NOTE ADULT - ASSESSMENT
ASSESSMENT/PLAN  89yFemale with functional deficits after  Pain - Tylenol  DVT PPX - SCDs  Rehab -    Recommend ACUTE inpatient rehabilitation for the functional deficits consisting of 3 hours of therapy/day & 24 hour RN/daily PMR physician for comorbid medical management. Patient will be able to tolerate 3 hours a day.   Recommend [facility], patient DOES NOT meet acute inpatient rehabilitation criteria. Patient needs a more prolonged stay to achieve transition to community.    Expect patient to achieve functional goals for DC HOME with OUTPATIENT   Expect patient to achieve functional goals for DC HOME with HOME CARE   Follow up with CONCUSSION PROGRAM - Call [phone] for an appointment    Will continue to follow. Functional progress will determine ongoing rehab dispo recommendations, which may change.    Continue bedside therapy as well as OOB throughout the day with mobilization by staff to maintain cardiopulmonary function and prevention of secondary complications related to debility.     Discussed with rehab team.  ASSESSMENT/PLAN  89yFemale with functional deficits after right CVA  Pain - Tylenol  DVT PPX - SCDs  Rehab -    Recommend ACUTE inpatient rehabilitation for the functional deficits consisting of 3 hours of therapy/day & 24 hour RN/daily PMR physician for comorbid medical management. Patient will be able to tolerate 3 hours a day.     Will continue to follow. Functional progress will determine ongoing rehab dispo recommendations, which may change.    Continue bedside therapy as well as OOB throughout the day with mobilization by staff to maintain cardiopulmonary function and prevention of secondary complications related to debility.

## 2024-11-13 NOTE — DISCHARGE NOTE PROVIDER - CARE PROVIDER_API CALL
Praful Holloway  Cardiovascular Disease  [address] [place] [address]  Phone: [phone]  Fax: [phone]  Follow Up Time:

## 2024-11-13 NOTE — CONSULT NOTE ADULT - SUBJECTIVE AND OBJECTIVE BOX
89yF was admitted on 11-11    Patient is a 89y old  Female who presents with a chief complaint of rule out stroke/tia (12 Nov 2024 16:42)    HPI:  Patient is an 89 y/o F with a PMH of  mitral valve regurgitation, history of PE with factor V Leiden, chronic diastolic HF, paroxysmal atrial fibrillation on anticoagulation status post ablation, coronary disease status post PCI to the LAD in February 2017 with repeat cardiac catheterization in March 2022 showing nonobstructive coronary disease with patent stents, severe pulmonary hypertension (with no response to nitric oxide), HTN and hyperlipidemia presenting to  ED on 11/11/24 brought in by daughter for L sided facial droop.     Patient went out to lunch at 12 pm the day of admission with her daughter. Daughter reports that the patient was in her normal state of health until 12:30 pm when she last saw her mother. Around 6 pm the patient slipped out of bedand was unable to get back up. She called her daughter and when the she arrived noted her mother did not seem like herself and had a L sided facial droop.     Upon arrival to the ED vitals were, /62 HR 71 RR 20 T98.9F and SpO2 of 92% on room air.  HEAD CT: No evidence of an acute intracranial hemorhage, midline shift or hydrocephalus.  NECK CTA: No hemodynamic significant narowing within the neck.  BRAIN CTA: No proximal large vessel occlusion.  CT PERFUSION: No areas of ischemia identified.  She received 1 L normal saline bolus x 1.    Currently, patient denies chest pain, palpitations, shortness of breath, headache, dizziness, nausea, vomiting, numbness or tingling. Reports she feels like she has returned to her baseline, Concerned about her restless legs medication - which her daughter will be bringing in the AM. Lives alone at home - daughter close by and checks in on her. Uses 3L of oxygen when sleeping. Uses a cane as her assistive walking device. Does not note increased swelling in her lower extremities.     Imaging performed:  CT ANGIO NECK STROKE PROTCL 11/11/2024  CT BRAIN PERFUSION MAPS STROKE   CT BRAIN STROKE PROTOCOL   CT ANGIO BRAIN STROKE     HEAD CT: No evidence of an acute intracranial hemorhage, midline shift or hydrocephalus.  NECK CTA: No hemodynamic significant narowing within the neck.  BRAIN CTA: No proximal large vessel occlusion.  CT PERFUSION: No areas of ischemia identified.    MRI Brain 11/12/2024  There is abnormal T2 prolongation restricted diffusion seen involving the right corona radiata region. These findings are compatible with an acute infarct. No hemorrhagic transformation is seen. No significant shift or herniation is seen.    REVIEW OF SYSTEMS  Constitutional - No fever, No weight loss, No fatigue  HEENT - No eye pain, No visual disturbances, No difficulty hearing, No tinnitus, No vertigo, No neck pain  Respiratory - No cough, No wheezing, No shortness of breath  Cardiovascular - No chest pain, No palpitations  Gastrointestinal - No abdominal pain, No nausea, No vomiting, No diarrhea, No constipation  Genitourinary - No dysuria, No frequency, No hematuria, No incontinence  Neurological - No headaches, No memory loss, No loss of strength, No numbness, No tremors  Skin - No itching, No rashes, No lesions   Endocrine - No temperature intolerance  Musculoskeletal - No joint pain, No joint swelling, No muscle pain  Psychiatric - No depression, No anxiety    VITALS  T(C): 36.6 (11-13-24 @ 04:38), Max: 36.8 (11-12-24 @ 20:40)  HR: 62 (11-13-24 @ 04:38) (57 - 74)  BP: 151/66 (11-13-24 @ 04:38) (144/62 - 157/51)  RR: 17 (11-13-24 @ 04:38) (17 - 18)  SpO2: 92% (11-13-24 @ 04:38) (92% - 95%)  Wt(kg): --    PAST MEDICAL & SURGICAL HISTORY  Atrial fibrillation    CHF (congestive heart failure)    Pulmonary emboli    Factor 5 Leiden mutation, heterozygous    CAD (coronary artery disease)    Stented coronary artery    Hypothyroid    COPD (chronic obstructive pulmonary disease)    Mitral regurgitation    HTN (hypertension)    No significant past surgical history    H/O: hysterectomy    H/O knee surgery    Cyst of breast, unspecified laterality    H/O cardiac radiofrequency ablation    S/P ablation of atrial fibrillation      SOCIAL HISTORY - as per documentation/history  Smoking - None  EtOH - None  Drugs - None    FUNCTIONAL HISTORY  Lives   Independent    CURRENT FUNCTIONAL STATUS      FAMILY HISTORY   No pertinent family history in first degree relatives    FHx: diabetes mellitus (Father)    FHx: heart disease (Father)        RECENT LABS - Reviewed  CBC Full  -  ( 12 Nov 2024 07:39 )  WBC Count : 8.96 K/uL  RBC Count : 3.90 M/uL  Hemoglobin : 11.9 g/dL  Hematocrit : 36.5 %  Platelet Count - Automated : 273 K/uL  Mean Cell Volume : 93.6 fl  Mean Cell Hemoglobin : 30.5 pg  Mean Cell Hemoglobin Concentration : 32.6 g/dL  Auto Neutrophil # : x  Auto Lymphocyte # : x  Auto Monocyte # : x  Auto Eosinophil # : x  Auto Basophil # : x  Auto Neutrophil % : x  Auto Lymphocyte % : x  Auto Monocyte % : x  Auto Eosinophil % : x  Auto Basophil % : x    11-12    143  |  112[H]  |  27[H]  ----------------------------<  101[H]  3.8   |  24  |  1.10    Ca    9.1      12 Nov 2024 07:39  Phos  2.4     11-12  Mg     2.3     11-12    TPro  6.6  /  Alb  3.2[L]  /  TBili  0.6  /  DBili  x   /  AST  20  /  ALT  18  /  AlkPhos  50  11-12    Urinalysis Basic - ( 12 Nov 2024 07:39 )    Color: x / Appearance: x / SG: x / pH: x  Gluc: 101 mg/dL / Ketone: x  / Bili: x / Urobili: x   Blood: x / Protein: x / Nitrite: x   Leuk Esterase: x / RBC: x / WBC x   Sq Epi: x / Non Sq Epi: x / Bacteria: x        ALLERGIES  Motrin (Other)  sulfa drugs (Other)  Ceftin (Other (Moderate))  tadalafil (Muscle Pain)  eggs (Anaphylaxis)  flu vaccines (Other)  Macrodantin (Other)  nisoldipine (Unknown)  Repatha (Unknown)      MEDICATIONS   albuterol    90 MICROgram(s) HFA Inhaler 2 Puff(s) Inhalation every 6 hours PRN  apixaban 5 milliGRAM(s) Oral two times a day  aspirin  chewable 81 milliGRAM(s) Oral daily  atorvastatin 20 milliGRAM(s) Oral at bedtime  furosemide    Tablet 40 milliGRAM(s) Oral daily  gabapentin 200 milliGRAM(s) Oral at bedtime  gabapentin 100 milliGRAM(s) Oral daily  levothyroxine 25 MICROGram(s) Oral daily  LORazepam     Tablet 0.5 milliGRAM(s) Oral daily PRN  metoprolol tartrate 25 milliGRAM(s) Oral two times a day  potassium chloride    Tablet ER 20 milliEquivalent(s) Oral daily  spironolactone 25 milliGRAM(s) Oral daily      ----------------------------------------------------------------------------------------  PHYSICAL EXAM  Constitutional - NAD, Comfortable  HEENT - NCAT, EOMI  Neck - Supple, No limited ROM  Chest - Breathing comfortably, No wheezing  Cardiovascular - S1S2   Abdomen - Soft   Extremities - No C/C/E, No calf tenderness   Neurologic Exam -                    Cognitive - AAO to self, place, date, year, situation     Communication - Fluent, No dysarthria     Cranial Nerves - CN 2-12 intact     Motor - No focal deficits                    LEFT    UE - ShAB 5/5, EF 5/5, EE 5/5, WE 5/5,  5/5                    RIGHT UE - ShAB 5/5, EF 5/5, EE 5/5, WE 5/5,  5/5                    LEFT    LE - HF 5/5, KE 5/5, DF 5/5, PF 5/5                    RIGHT LE - HF 5/5, KE 5/5, DF 5/5, PF 5/5        Sensory - Intact to LT     Reflexes - DTR Intact, No primitive reflexive     Coordination - FTN intact     OculoVestibular - No saccades, No nystagmus, VOR         Balance - WNL Static  Psychiatric - Mood stable, Affect WNL  ----------------------------------------------------------------------------------------   89yF was admitted on 11-11    Patient is a 89y old  Female who presents with a chief complaint of rule out stroke/tia (12 Nov 2024 16:42)    HPI:  Patient is an 89 y/o F with a PMH of  mitral valve regurgitation, history of PE with factor V Leiden, chronic diastolic HF, paroxysmal atrial fibrillation on anticoagulation status post ablation, coronary disease status post PCI to the LAD in February 2017 with repeat cardiac catheterization in March 2022 showing nonobstructive coronary disease with patent stents, severe pulmonary hypertension (with no response to nitric oxide), HTN and hyperlipidemia presenting to  ED on 11/11/24 brought in by daughter for L sided facial droop.     Patient went out to lunch at 12 pm the day of admission with her daughter. Daughter reports that the patient was in her normal state of health until 12:30 pm when she last saw her mother. Around 6 pm the patient slipped out of bedand was unable to get back up. She called her daughter and when the she arrived noted her mother did not seem like herself and had a L sided facial droop.     Upon arrival to the ED vitals were, /62 HR 71 RR 20 T98.9F and SpO2 of 92% on room air.  HEAD CT: No evidence of an acute intracranial hemorhage, midline shift or hydrocephalus.  NECK CTA: No hemodynamic significant narowing within the neck.  BRAIN CTA: No proximal large vessel occlusion.  CT PERFUSION: No areas of ischemia identified.  She received 1 L normal saline bolus x 1.    Currently, patient denies chest pain, palpitations, shortness of breath, headache, dizziness, nausea, vomiting, numbness or tingling. Reports she feels like she has returned to her baseline, Concerned about her restless legs medication - which her daughter will be bringing in the AM. Lives alone at home - daughter close by and checks in on her. Uses 3L of oxygen when sleeping. Uses a cane as her assistive walking device. Does not note increased swelling in her lower extremities.     Imaging performed:  CT ANGIO NECK STROKE PROTCL 11/11/2024  CT BRAIN PERFUSION MAPS STROKE   CT BRAIN STROKE PROTOCOL   CT ANGIO BRAIN STROKE     HEAD CT: No evidence of an acute intracranial hemorhage, midline shift or hydrocephalus.  NECK CTA: No hemodynamic significant narowing within the neck.  BRAIN CTA: No proximal large vessel occlusion.  CT PERFUSION: No areas of ischemia identified.    MRI Brain 11/12/2024  There is abnormal T2 prolongation restricted diffusion seen involving the right corona radiata region. These findings are compatible with an acute infarct. No hemorrhagic transformation is seen. No significant shift or herniation is seen.    REVIEW OF SYSTEMS  Constitutional - No fever, No weight loss, No fatigue  HEENT - No eye pain, No visual disturbances, No difficulty hearing, No tinnitus, No vertigo, No neck pain  Respiratory - No cough, No wheezing, No shortness of breath  Cardiovascular - No chest pain, No palpitations  Gastrointestinal - No abdominal pain, No nausea, No vomiting, No diarrhea, No constipation  Genitourinary - No dysuria, No frequency, No hematuria, No incontinence  Neurological - No headaches, No memory loss, No loss of strength, No numbness, No tremors  Skin - No itching, No rashes, No lesions   Endocrine - No temperature intolerance  Musculoskeletal - No joint pain, No joint swelling, No muscle pain  Psychiatric - No depression, No anxiety    VITALS  T(C): 36.6 (11-13-24 @ 04:38), Max: 36.8 (11-12-24 @ 20:40)  HR: 62 (11-13-24 @ 04:38) (57 - 74)  BP: 151/66 (11-13-24 @ 04:38) (144/62 - 157/51)  RR: 17 (11-13-24 @ 04:38) (17 - 18)  SpO2: 92% (11-13-24 @ 04:38) (92% - 95%)  Wt(kg): --    PAST MEDICAL & SURGICAL HISTORY  Atrial fibrillation    CHF (congestive heart failure)    Pulmonary emboli    Factor 5 Leiden mutation, heterozygous    CAD (coronary artery disease)    Stented coronary artery    Hypothyroid    COPD (chronic obstructive pulmonary disease)    Mitral regurgitation    HTN (hypertension)    No significant past surgical history    H/O: hysterectomy    H/O knee surgery    Cyst of breast, unspecified laterality    H/O cardiac radiofrequency ablation    S/P ablation of atrial fibrillation      SOCIAL HISTORY - as per documentation/history  Smoking - None  EtOH - None  Drugs - None    FUNCTIONAL HISTORY  Lives alone in an apartment. 0 steps to enter    CURRENT FUNCTIONAL STATUS  Stands with minimum assist    FAMILY HISTORY   No pertinent family history in first degree relatives    FHx: diabetes mellitus (Father)    FHx: heart disease (Father)      RECENT LABS - Reviewed  CBC Full  -  ( 12 Nov 2024 07:39 )  WBC Count : 8.96 K/uL  RBC Count : 3.90 M/uL  Hemoglobin : 11.9 g/dL  Hematocrit : 36.5 %  Platelet Count - Automated : 273 K/uL  Mean Cell Volume : 93.6 fl  Mean Cell Hemoglobin : 30.5 pg  Mean Cell Hemoglobin Concentration : 32.6 g/dL  Auto Neutrophil # : x  Auto Lymphocyte # : x  Auto Monocyte # : x  Auto Eosinophil # : x  Auto Basophil # : x  Auto Neutrophil % : x  Auto Lymphocyte % : x  Auto Monocyte % : x  Auto Eosinophil % : x  Auto Basophil % : x    11-12    143  |  112[H]  |  27[H]  ----------------------------<  101[H]  3.8   |  24  |  1.10    Ca    9.1      12 Nov 2024 07:39  Phos  2.4     11-12  Mg     2.3     11-12    TPro  6.6  /  Alb  3.2[L]  /  TBili  0.6  /  DBili  x   /  AST  20  /  ALT  18  /  AlkPhos  50  11-12    Urinalysis Basic - ( 12 Nov 2024 07:39 )    Color: x / Appearance: x / SG: x / pH: x  Gluc: 101 mg/dL / Ketone: x  / Bili: x / Urobili: x   Blood: x / Protein: x / Nitrite: x   Leuk Esterase: x / RBC: x / WBC x   Sq Epi: x / Non Sq Epi: x / Bacteria: x        ALLERGIES  Motrin (Other)  sulfa drugs (Other)  Ceftin (Other (Moderate))  tadalafil (Muscle Pain)  eggs (Anaphylaxis)  flu vaccines (Other)  Macrodantin (Other)  nisoldipine (Unknown)  Repatha (Unknown)      MEDICATIONS   albuterol    90 MICROgram(s) HFA Inhaler 2 Puff(s) Inhalation every 6 hours PRN  apixaban 5 milliGRAM(s) Oral two times a day  aspirin  chewable 81 milliGRAM(s) Oral daily  atorvastatin 20 milliGRAM(s) Oral at bedtime  furosemide    Tablet 40 milliGRAM(s) Oral daily  gabapentin 200 milliGRAM(s) Oral at bedtime  gabapentin 100 milliGRAM(s) Oral daily  levothyroxine 25 MICROGram(s) Oral daily  LORazepam     Tablet 0.5 milliGRAM(s) Oral daily PRN  metoprolol tartrate 25 milliGRAM(s) Oral two times a day  potassium chloride    Tablet ER 20 milliEquivalent(s) Oral daily  spironolactone 25 milliGRAM(s) Oral daily      ----------------------------------------------------------------------------------------  PHYSICAL EXAM  Constitutional - NAD, Comfortable  HEENT - NCAT, EOMI  Neck - Supple, No limited ROM  Chest - Breathing comfortably, No wheezing  Cardiovascular - S1S2   Abdomen - Soft   Extremities - No C/C/E, No calf tenderness   Neurologic Exam -                    Cognitive - AAO to self, place, date, year, situation     Communication - Fluent, No dysarthria     Cranial Nerves - CN 2-12 intact     Motor - No focal deficits                    LEFT    UE - ShAB 3-/5, EF 3-/5, EE 3-/5, WE 3-/5,  3-/5                    RIGHT UE - ShAB 4/5, EF 4/5, EE 4/5, WE 4/5,  4/5                    LEFT    LE - HF 3-/5, KE 3-/5, DF 3-/5, PF 3-/5                    RIGHT LE - HF 4/5, KE 4/5, DF 4/5, PF 4/5        Sensory - Intact to LT     Reflexes - DTR Intact, No primitive reflexive     Coordination - FTN intact right poor left     OculoVestibular - No saccades, No nystagmus, VOR         Balance - retropulsion on standing  Psychiatric - Mood stable, Affect WNL  ----------------------------------------------------------------------------------------

## 2024-11-13 NOTE — DISCHARGE NOTE PROVIDER - NSDCCPCAREPLAN_GEN_ALL_CORE_FT
PRINCIPAL DISCHARGE DIAGNOSIS  Diagnosis: Cerebrovascular accident (CVA)  Assessment and Plan of Treatment:

## 2024-11-13 NOTE — DISCHARGE NOTE PROVIDER - HOSPITAL COURSE
Patient is an 89 y/o F with a PMH of  mitral valve regurgitation, history of PE with factor V Leiden, chronic diastolic HF, paroxysmal atrial fibrillation on anticoagulation status post ablation, coronary disease status post PCI to the LAD in February 2017 with repeat cardiac catheterization in March 2022 showing nonobstructive coronary disease with patent stents, severe pulmonary hypertension (with no response to nitric oxide), HTN and hyperlipidemia presenting to  ED on 11/11/24 brought in by daughter for L sided facial droop.     Patient went out to lunch at 12 pm the day of admission with her daughter. Daughter reports that the patient was in her normal state of health until 12:30 pm when she last saw her mother. Around 6 pm the patient slipped out of bedand was unable to get back up. She called her daughter and when the she arrived noted her mother did not seem like herself and had a L sided facial droop.     Upon arrival to the ED vitals were, /62 HR 71 RR 20 T98.9F and SpO2 of 92% on room air.  HEAD CT: No evidence of an acute intracranial hemorhage, midline shift or hydrocephalus.  NECK CTA: No hemodynamic significant narowing within the neck.  BRAIN CTA: No proximal large vessel occlusion.  CT PERFUSION: No areas of ischemia identified.  She received 1 L normal saline bolus x 1.  PHYSICAL EXAM:    Constitutional: NAD, awake but readily falls back to sleep, follows all simple commands and answers Qs appropriately  HEENT: PERR, EOMI, Normal Hearing, MMM  Neck: Soft and supple, No LAD, No JVD  Respiratory: Breath sounds are clear bilaterally, No wheezing, rales or rhonchi  Cardiovascular: S1 and S2, regular rate and rhythm, no Murmurs, gallops or rubs  Gastrointestinal: Bowel Sounds present, soft, nontender, nondistended, no guarding, no rebound  Extremities: No peripheral edema  Vascular: 2+ peripheral pulses  Neurological: A/O x 3, no focal deficits  Musculoskeletal: 4/5 motor to LUE/LLE. + L eft facial droop  Skin: No rashesLABS: All Labs Reviewed:< from: MR Head No Cont (11.12.24 @ 11:06) >    MRI of the brain was from sagittal T1 axial T1 and T2 T2 FLAIR diffusion   and gradient echo sequence. Coronal T2-weighted sequence was performed as   well.    Parenchymal volume loss and chronic microvessel ischemic changes are   identified    There is no acute hemorrhage mass or mass effect seen.    There is abnormal T2 prolongation restricted diffusion seen involving the   right corona radiata region. These findings are compatible with an acute   infarct. No hemorrhagic transformation is seen. No significant shift or   herniation is seen.    The large vessels demonstrate normal flow voids    Partial empty sella is seen.    IMPRESSION: Acute infarct as described above.  < from: TTE W or WO Ultrasound Enhancing Agent (11.12.24 @ 09:47) >        1. Left ventricular cavity is normal in size. Left ventricular systolic function is normal with an ejection fraction of 60 % by Manley's method of disks.   2. Normal right ventricular systolic function.   3. Moderate to severe mitral regurgitation.   4. Mildto moderate pulmonic regurgitation.   5. Moderate tricuspid regurgitation.   6. Severe pulmonary hypertension.   7. There is mild calcification of the mitral valve annulus.      Assessment and Plan:   · Assessment    #Acute CVA  - MRI: + CVA in R corona radiata  - Initial CT head without acute intracranial hemorrhage, mass effect or transcortical/territorial infarct and perfusion studies without LVO, hemodynamically significant stenosis, and no notable perfusion deficit  - asa 81 po qd  - increase to high intensity statin. D/W PT and dtr, pt has intolerance to statin, will need close monitoring. (has true allergy to Repatha)  - BP control  - echo reviewed  - Low Afib burden. Currently in NSR. No indication for NEAL. D/W Cardiology at length  - Acute rehab, awaiting auth    #Paroxysmal A-fib  – Continue metoprolol tartrate 25 mg twice daily  – Continue Eliquis 5 mg twice daily    #Chronic diastolic heart failure  – Continue spironolactone 25 mg daily  – Continue metoprolol tartrate 25 mg twice daily  – Continue furosemide 40 mg daily  - ECHO from November 2023 with EF of 55 to 60% - severe pulmonary HTN , unchanged from prior echo    #Hyperlipidemia  – Will continue atorvastatin 20 mg while inpatient    #DVT prophylaxis  – On Eliquis    Time spent: 75 min  DC  today to Acute rehab

## 2024-11-13 NOTE — DISCHARGE NOTE PROVIDER - NSDCMRMEDTOKEN_GEN_ALL_CORE_FT
Albuterol (Eqv-Proventil HFA) 90 mcg/inh inhalation aerosol: 2 puff(s) inhaled every 6 hours  aspirin 81 mg oral tablet, chewable: 1 tab(s) orally once a day  Ativan 0.5 mg oral tablet: 1 tab(s) orally once a day as needed for  atorvastatin 20 mg oral tablet: 1 tab(s) orally once a day (at bedtime)  Eliquis 5 mg oral tablet: 1 tab(s) orally 2 times a day  furosemide 40 mg oral tablet: 1 tab(s) orally once a day  gabapentin 100 mg oral capsule: 1 cap(s) orally once a day (in the morning)  gabapentin 100 mg oral capsule: 2 cap(s) orally once a day (in the evening)  levothyroxine 25 mcg (0.025 mg) oral tablet: 1 tab(s) orally once a day  metoprolol tartrate 25 mg oral tablet: 1 tab(s) orally 2 times a day  potassium chloride 20 mEq oral tablet, extended release: 1 tab(s) orally once a day  spironolactone 25 mg oral tablet: 1 tab(s) orally once a day

## 2024-11-14 ENCOUNTER — INPATIENT (INPATIENT)
Facility: HOSPITAL | Age: 88
LOS: 14 days | Discharge: OTHER INSTITUTION | DRG: 66 | End: 2024-11-29
Attending: PHYSICAL MEDICINE & REHABILITATION | Admitting: PHYSICAL MEDICINE & REHABILITATION
Payer: MEDICARE

## 2024-11-14 ENCOUNTER — TRANSCRIPTION ENCOUNTER (OUTPATIENT)
Age: 89
End: 2024-11-14

## 2024-11-14 VITALS
TEMPERATURE: 98 F | HEART RATE: 68 BPM | OXYGEN SATURATION: 94 % | RESPIRATION RATE: 18 BRPM | DIASTOLIC BLOOD PRESSURE: 54 MMHG | SYSTOLIC BLOOD PRESSURE: 152 MMHG

## 2024-11-14 VITALS
RESPIRATION RATE: 16 BRPM | SYSTOLIC BLOOD PRESSURE: 147 MMHG | WEIGHT: 187.83 LBS | TEMPERATURE: 98 F | HEART RATE: 62 BPM | DIASTOLIC BLOOD PRESSURE: 83 MMHG | OXYGEN SATURATION: 96 % | HEIGHT: 63 IN

## 2024-11-14 DIAGNOSIS — Z90.710 ACQUIRED ABSENCE OF BOTH CERVIX AND UTERUS: Chronic | ICD-10-CM

## 2024-11-14 DIAGNOSIS — Z98.890 OTHER SPECIFIED POSTPROCEDURAL STATES: Chronic | ICD-10-CM

## 2024-11-14 DIAGNOSIS — N60.09 SOLITARY CYST OF UNSPECIFIED BREAST: Chronic | ICD-10-CM

## 2024-11-14 DIAGNOSIS — I63.9 CEREBRAL INFARCTION, UNSPECIFIED: ICD-10-CM

## 2024-11-14 LAB — SARS-COV-2 RNA SPEC QL NAA+PROBE: SIGNIFICANT CHANGE UP

## 2024-11-14 PROCEDURE — 99232 SBSQ HOSP IP/OBS MODERATE 35: CPT

## 2024-11-14 RX ORDER — SPIRONOLACTONE 25 MG
25 TABLET ORAL DAILY
Refills: 0 | Status: DISCONTINUED | OUTPATIENT
Start: 2024-11-15 | End: 2024-11-15

## 2024-11-14 RX ORDER — SENNOSIDES 8.6 MG
2 TABLET ORAL AT BEDTIME
Refills: 0 | Status: DISCONTINUED | OUTPATIENT
Start: 2024-11-14 | End: 2024-11-29

## 2024-11-14 RX ORDER — CALCIUM CARBONATE 500(1250)
1 TABLET ORAL
Refills: 0 | Status: DISCONTINUED | OUTPATIENT
Start: 2024-11-14 | End: 2024-11-29

## 2024-11-14 RX ORDER — FUROSEMIDE 40 MG/1
40 TABLET ORAL DAILY
Refills: 0 | Status: DISCONTINUED | OUTPATIENT
Start: 2024-11-15 | End: 2024-11-15

## 2024-11-14 RX ORDER — METOPROLOL TARTRATE 100 MG/1
25 TABLET, FILM COATED ORAL
Refills: 0 | Status: DISCONTINUED | OUTPATIENT
Start: 2024-11-14 | End: 2024-11-15

## 2024-11-14 RX ORDER — PANTOPRAZOLE SODIUM 40 MG/1
40 TABLET, DELAYED RELEASE ORAL
Refills: 0 | Status: DISCONTINUED | OUTPATIENT
Start: 2024-11-14 | End: 2024-11-14

## 2024-11-14 RX ORDER — POLYETHYLENE GLYCOL 3350 17 G/17G
17 POWDER, FOR SOLUTION ORAL DAILY
Refills: 0 | Status: DISCONTINUED | OUTPATIENT
Start: 2024-11-15 | End: 2024-11-29

## 2024-11-14 RX ORDER — POTASSIUM CHLORIDE 600 MG/1
20 TABLET, EXTENDED RELEASE ORAL DAILY
Refills: 0 | Status: DISCONTINUED | OUTPATIENT
Start: 2024-11-15 | End: 2024-11-15

## 2024-11-14 RX ORDER — ACETAMINOPHEN 500 MG
650 TABLET ORAL EVERY 6 HOURS
Refills: 0 | Status: DISCONTINUED | OUTPATIENT
Start: 2024-11-14 | End: 2024-11-14

## 2024-11-14 RX ORDER — ALBUTEROL 90 MCG
2 AEROSOL (GRAM) INHALATION EVERY 6 HOURS
Refills: 0 | Status: DISCONTINUED | OUTPATIENT
Start: 2024-11-14 | End: 2024-11-29

## 2024-11-14 RX ORDER — FAMOTIDINE 20 MG/1
20 TABLET, FILM COATED ORAL DAILY
Refills: 0 | Status: DISCONTINUED | OUTPATIENT
Start: 2024-11-14 | End: 2024-11-19

## 2024-11-14 RX ORDER — POLYETHYLENE GLYCOL 3350 17 G/17G
17 POWDER, FOR SOLUTION ORAL DAILY
Refills: 0 | Status: DISCONTINUED | OUTPATIENT
Start: 2024-11-14 | End: 2024-11-14

## 2024-11-14 RX ORDER — ACETAMINOPHEN 500MG 500 MG/1
650 TABLET, COATED ORAL EVERY 6 HOURS
Refills: 0 | Status: DISCONTINUED | OUTPATIENT
Start: 2024-11-14 | End: 2024-11-17

## 2024-11-14 RX ORDER — ACETAMINOPHEN, DIPHENHYDRAMINE HCL, PHENYLEPHRINE HCL 325; 25; 5 MG/1; MG/1; MG/1
3 TABLET ORAL AT BEDTIME
Refills: 0 | Status: DISCONTINUED | OUTPATIENT
Start: 2024-11-14 | End: 2024-11-29

## 2024-11-14 RX ORDER — APIXABAN 2.5 MG/1
5 TABLET, FILM COATED ORAL
Refills: 0 | Status: DISCONTINUED | OUTPATIENT
Start: 2024-11-14 | End: 2024-11-29

## 2024-11-14 RX ORDER — LORAZEPAM 2 MG/1
0.5 TABLET ORAL DAILY
Refills: 0 | Status: DISCONTINUED | OUTPATIENT
Start: 2024-11-14 | End: 2024-11-21

## 2024-11-14 RX ORDER — GABAPENTIN 300 MG/1
200 CAPSULE ORAL AT BEDTIME
Refills: 0 | Status: DISCONTINUED | OUTPATIENT
Start: 2024-11-14 | End: 2024-11-17

## 2024-11-14 RX ORDER — GABAPENTIN 300 MG/1
100 CAPSULE ORAL DAILY
Refills: 0 | Status: DISCONTINUED | OUTPATIENT
Start: 2024-11-15 | End: 2024-11-17

## 2024-11-14 RX ORDER — LEVOTHYROXINE SODIUM 150 MCG
25 TABLET ORAL DAILY
Refills: 0 | Status: DISCONTINUED | OUTPATIENT
Start: 2024-11-15 | End: 2024-11-29

## 2024-11-14 RX ADMIN — APIXABAN 5 MILLIGRAM(S): 2.5 TABLET, FILM COATED ORAL at 21:33

## 2024-11-14 RX ADMIN — Medication 2 TABLET(S): at 21:32

## 2024-11-14 RX ADMIN — Medication 40 MILLIGRAM(S): at 09:32

## 2024-11-14 RX ADMIN — ACETAMINOPHEN, DIPHENHYDRAMINE HCL, PHENYLEPHRINE HCL 3 MILLIGRAM(S): 325; 25; 5 TABLET ORAL at 21:33

## 2024-11-14 RX ADMIN — ACETAMINOPHEN 500MG 650 MILLIGRAM(S): 500 TABLET, COATED ORAL at 20:49

## 2024-11-14 RX ADMIN — GABAPENTIN 200 MILLIGRAM(S): 300 CAPSULE ORAL at 21:32

## 2024-11-14 RX ADMIN — Medication 25 MILLIGRAM(S): at 09:32

## 2024-11-14 RX ADMIN — METOPROLOL TARTRATE 25 MILLIGRAM(S): 100 TABLET, FILM COATED ORAL at 18:24

## 2024-11-14 RX ADMIN — Medication 25 MILLIGRAM(S): at 09:33

## 2024-11-14 RX ADMIN — APIXABAN 5 MILLIGRAM(S): 5 TABLET, FILM COATED ORAL at 09:33

## 2024-11-14 RX ADMIN — Medication 81 MILLIGRAM(S): at 06:19

## 2024-11-14 RX ADMIN — Medication 2 PUFF(S): at 22:12

## 2024-11-14 RX ADMIN — ACETAMINOPHEN 500MG 650 MILLIGRAM(S): 500 TABLET, COATED ORAL at 21:49

## 2024-11-14 RX ADMIN — Medication 25 MICROGRAM(S): at 06:19

## 2024-11-14 RX ADMIN — Medication 20 MILLIEQUIVALENT(S): at 09:33

## 2024-11-14 RX ADMIN — GABAPENTIN 100 MILLIGRAM(S): 300 CAPSULE ORAL at 09:33

## 2024-11-14 RX ADMIN — Medication 10 MILLIGRAM(S): at 21:33

## 2024-11-14 NOTE — OCCUPATIONAL THERAPY INITIAL EVALUATION ADULT - MODALITIES TREATMENT COMMENTS
Pt left seated in bedside chair, RN notified, chair alarmed, VSS, lines intact, items in reach, needs met.

## 2024-11-14 NOTE — H&P ADULT - ATTENDING COMMENTS
Progress note amended to include my discussions with patient, resident, hospitalist, RN, SW, PT and my findings    Patient seen in room. She was sitting in wheelchair, left LE restless, moving it around, reports having generalized discomfort/myalgia ("when you touch me like that") that she insists is from statin. Pain is not radicular, present in both legs. She states it's from the statin--states she has been on statins in the past, has tried different meds within group including an injectable and has always had reaction, from myalgia to swelling "the size of a grapefruit" (injectable). We discussed that statin/ASA combination is more effective in reducing stroke risk, she verbalizes understanding but does not want the statin regardless. She reports this is a common problem and  she's familiar. Last LDL 85 on 11/12    BP currently within acceptable range. HR 48-74, asymptomatic. 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 Has discomfort with ER and IR rotation bilaterally, right at 25 degrees and left 35. motor right elbow flexor/ext and wrist flex/ext grasp 5/5. left elbow flexor 4/5 extensor 4/5 gross grasp 4-/5. left HF 4+/5 quad 5/5 ankle PF and DF 5/5. RLE 5/5. no ecchymoses, no swelling no erythema calves. No cord palpable,. sensation intact to LT.    EOMI, no nystagmus. +trace left facial droop, tongue midline word finding for conversational speech WFL, no significant dysarthria. Mood mildly irritable, frustrated, and some of the restlessness in the LE resolves with distraction. Neuropsychology and rec therapy added for support    - Labs reviewed, ANNALEE developing/inc CR. Discussed with hospitalist, will hold diuretics (lasix and spironolactone). Renal consult requested  - BMP ordered fo Gisselle  - statin dc as above  - comprehensive rehab evaluation in progress    RECENT LABS    Vital Signs Last 24 Hrs  T(C): 36.3 (15 Nov 2024 07:37), Max: 36.7 (14 Nov 2024 19:54)  T(F): 97.4 (15 Nov 2024 07:37), Max: 98.1 (14 Nov 2024 19:54)  HR: 48 (15 Nov 2024 07:37) (48 - 74)  BP: 143/60 (15 Nov 2024 07:37) (134/73 - 147/83)  BP(mean): --  RR: 16 (15 Nov 2024 07:37) (15 - 16)  SpO2: 98% (15 Nov 2024 07:37) (94% - 98%)    Parameters below as of 15 Nov 2024 07:37  Patient On (Oxygen Delivery Method): room air                              13.5   10.30 )-----------( 280      ( 15 Nov 2024 09:55 )             40.7     11-15    140  |  102  |  31[H]  ----------------------------<  189[H]  3.7   |  26  |  1.50[H]    Ca    9.5      15 Nov 2024 09:55    TPro  7.4  /  Alb  3.4  /  TBili  0.5  /  DBili  x   /  AST  20  /  ALT  18  /  AlkPhos  53  11-15      Urinalysis Basic - ( 15 Nov 2024 09:55 )    Color: x / Appearance: x / SG: x / pH: x  Gluc: 189 mg/dL / Ketone: x  / Bili: x / Urobili: x   Blood: x / Protein: x / Nitrite: x   Leuk Esterase: x / RBC: x / WBC x   Sq Epi: x / Non Sq Epi: x / Bacteria: x      CAPILLARY BLOOD GLUCOSE

## 2024-11-14 NOTE — OCCUPATIONAL THERAPY INITIAL EVALUATION ADULT - VISUAL ASSESSMENT: TRACKING
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 L eye visual field appears WFL. smooth pursuits intact.

## 2024-11-14 NOTE — H&P ADULT - NSHPREVIEWOFSYSTEMS_GEN_ALL_CORE
REVIEW OF SYSTEMS  Constitutional: No fever, No Chills, No fatigue  HEENT: No eye pain, No difficulty hearing. Reports peripheral loss of vision in right eye   Pulm: No cough,  No shortness of breath  Cardio: No chest pain, No palpitations  GI:  No abdominal pain, No nausea, No vomiting, No diarrhea, +constipation, last BM 11/11  : No dysuria, No frequency, No hematuria  Neuro: No headaches, No memory loss, No loss of strength, No numbness, No tremors  Skin: No itching, No rashes, No lesions   Endo: No temperature intolerance  MSK: No joint pain, No joint swelling,, No Neck or back pain, States that she is starting to have bilat LE pain since being placed on statin this hospital admission (reports a h/o muscle pain in legs in the past due to statin use and thus stopped taking statins)  Psych:  No depression, No anxiety REVIEW OF SYSTEMS  Constitutional: No fever, No Chills, +fatgiue  HEENT: No eye pain, No difficulty hearing. Reports peripheral loss of vision in right eye   Pulm: No cough,  No shortness of breath  Cardio: No chest pain, No palpitations  GI:  No abdominal pain, No nausea, No vomiting, No diarrhea, +constipation, last BM 11/11  : No dysuria, No frequency, No hematuria  Neuro: No headaches, No memory loss, , No tremors +left sided weakness   Skin: No itching, No rashes, No lesions   Endo: No temperature intolerance  MSK: No joint pain, No joint swelling,, +myaglia LE  States that she is starting to have bilat LE pain since being placed on statin this hospital admission (reports a h/o muscle pain in legs in the past due to statin use and thus stopped taking statins)  Psych:  No depression, No anxiety

## 2024-11-14 NOTE — H&P ADULT - NSHPLABSRESULTS_GEN_ALL_CORE
< from: MR Head No Cont (11.12.24 @ 11:06) >    PROCEDURE DATE:  11/12/2024          INTERPRETATION:  Clinical indication: TIA.    MRI of the brain was from sagittal T1 axial T1 and T2 T2 FLAIR diffusion   and gradient echo sequence. Coronal T2-weighted sequence was performed as   well.    Parenchymal volume loss and chronic microvessel ischemic changes are   identified    There is no acute hemorrhage mass or mass effect seen.    There is abnormal T2 prolongation restricted diffusion seen involving the   right corona radiata region. These findings are compatible with an acute   infarct. No hemorrhagic transformation is seen. No significant shift or   herniation is seen.    The large vessels demonstrate normal flow voids    Partial empty sella is seen.    IMPRESSION: Acute infarct as described above.    --- End of Report ---            DIMAS GARCIA MD; Attending Radiologist  This document has been electronically signed. Nov 12 2024 11:10AM    < end of copied text >    Echocardiogram 11/12/24      1. Left ventricular cavity is normal in size. Left ventricular systolic function is normal with an ejection fraction of 60 % by Manley's method of disks.   2. Normal right ventricular systolic function.   3. Moderate to severe mitral regurgitation.   4. Mildto moderate pulmonic regurgitation.   5. Moderate tricuspid regurgitation.   6. Severe pulmonary hypertension.   7. There is mild calcification of the mitral valve annulus.   8. Findings were discussed with  and  on 11/12/2024 at 9:20 am.      Comprehensive Metabolic Panel in AM (11.12.24 @ 07:39)   Sodium: 143 mmol/L  Potassium: 3.8 mmol/L  Chloride: 112 mmol/L  Carbon Dioxide: 24 mmol/L  Anion Gap: 7 mmol/L  Blood Urea Nitrogen: 27 mg/dL  Creatinine: 1.10 mg/dL  Glucose: 101 mg/dL  Calcium: 9.1 mg/dL  Protein Total: 6.6 gm/dL  Albumin: 3.2 g/dL  Bilirubin Total: 0.6 mg/dL  Alkaline Phosphatase: 50 U/L  Aspartate Aminotransferase (AST/SGOT): 20 U/L  Alanine Aminotransferase (ALT/SGPT): 18 U/L  eGFR: 48: The estimated glomerular filtration rate (eGFR) calculation is based on Complete Blood Count in AM (11.12.24 @ 07:39)   WBC Count: 8.96 K/uL  RBC Count: 3.90 M/uL  Hemoglobin: 11.9 g/dL  Hematocrit: 36.5 %  Mean Cell Volume: 93.6 fl  Mean Cell Hemoglobin: 30.5 pg  Mean Cell Hemoglobin Conc: 32.6 g/dL  Red Cell Distrib Width: 14.5 %  Platelet Count - Automated: 273 K/uL

## 2024-11-14 NOTE — PROGRESS NOTE ADULT - ASSESSMENT
#Acute CVA  - -ct supportive care   -d/c today to rehab   -medrec updated, chart reviewed     #Paroxysmal A-fib  – Continue metoprolol tartrate 25 mg twice daily  – Continue Eliquis 5 mg twice daily    #Chronic diastolic heart failure  – Continue spironolactone 25 mg daily  – Continue metoprolol tartrate 25 mg twice daily  – Continue furosemide 40 mg daily  - ECHO from November 2023 with EF of 55 to 60% - severe pulmonary HTN , unchanged from prior echo    #Hyperlipidemia  – At home is on Livalo 2 mg daily  – Will continue atorvastatin 10 mg while inpatient    #DVT prophylaxis  – On Eliqu      #d/c today, time spent 51 minutes

## 2024-11-14 NOTE — PATIENT PROFILE ADULT - FALL HARM RISK - RISK INTERVENTIONS

## 2024-11-14 NOTE — OCCUPATIONAL THERAPY INITIAL EVALUATION ADULT - PERTINENT HX OF CURRENT PROBLEM, REHAB EVAL
Per EMR, pt is an 88 y/o female with a PMHx of mitral valve regurgitation, history of PE with factor V Leiden, chronic diastolic HF, paroxysmal atrial fibrillation on anticoagulation status post ablation, coronary disease status post PCI to the LAD in February 2017 with repeat cardiac catheterization in March 2022 showing nonobstructive coronary disease with patent stents, severe pulmonary hypertension (with no response to nitric oxide), HTN and hyperlipidemia presenting to  ED on 11/11/24 brought in by daughter for L sided facial droop. Initial CT head without acute intracranial hemorrhage, mass effect or transcortical/territorial infarct and perfusion studies without LVO, hemodynamically significant stenosis, and no notable perfusion deficit.    MRI head: Acute infarct as described above.

## 2024-11-14 NOTE — H&P ADULT - NSHPPHYSICALEXAM_GEN_ALL_CORE
PHYSICAL EXAM  Constitutional - NAD, Comfortable  HEENT -  EOMI  Neck - Supple, No limited ROM  Chest - CTA bilaterally, No wheeze, No rhonchi, No crackles  Cardiovascular - RRR, S1S2, No murmurs  Abdomen - BS+, Soft, NTND  Extremities - No C/C/E, No calf tenderness   Skin-no rash or skin breakdown       Neurologic Exam -                   HIF  - Awake, Alert, AAO to self, place, date, year, situation      No aphasia, normal attention, normal concentration, +mild dysarthria      Cranial Nerves - left sided facial droop, EOMI, tongue midline, PERRLA     Motor - bilat shoulders limited arom (old injuries), Right UE and LE 5-/5.   Left UE 4-/5,  Right LE 4/5.  Negative babinski   Negative hoffmans                             Sensory - Intact to LT bilat uE/OE                      Cortical sensory modalities intact     Reflexes - DTR Intact, 2+ bilat BR/biceps, no hyperreflexia  in LEs             Psychiatric - Mood stable, Affect WNL PHYSICAL EXAM  Constitutional - NAD, mildly anxious and restless, reports discomfort in LE  HEENT -  EOMI    Chest - CTA bilaterally, No wheeze, No rhonchi, No crackles  Cardiovascular - RRR, S1S2, No murmurs  Abdomen - BS+, Soft, NTND  Extremities - No C/C/E, No calf tenderness   Skin-no rash or skin breakdown     Neurologic Exam -                   HIF  - Awake, Alert, AAO to self, place, date, year, situation      No aphasia, normal attention, normal concentration, +mild dysarthria      Cranial Nerves - left sided facial droop, EOMI, tongue midline, PERRLA     Motor - bilat shoulders limited arom (old RTC injuries), right shoulder abduction 3/.5 left 3/5, PROM with limitations in ER/IR bilaterally due to pain, Right elbow flexor/ext, wrist flex/ext and grasp 5/5 and LE 5-/5.   Left UE 4-/5 (elbow distally),  Right LE 4/5.  Negative babinski   Negative hoffmans                             Sensory - Intact to LT bilat uE/OE                      Cortical sensory modalities intact     Reflexes - DTR Intact, 2+ bilat BR/biceps, no hyperreflexia  in LEs

## 2024-11-14 NOTE — OCCUPATIONAL THERAPY INITIAL EVALUATION ADULT - PRECAUTIONS/LIMITATIONS, REHAB EVAL
diet: moderately thick liquids- easy to chew/cardiac precautions/fall precautions/seizure precautions/vision precautions

## 2024-11-14 NOTE — DISCHARGE NOTE NURSING/CASE MANAGEMENT/SOCIAL WORK - FINANCIAL ASSISTANCE
[facility] provides services at a reduced cost to those who are determined to be eligible through [facility]’s financial assistance program. Information regarding [facility]’s financial assistance program can be found by going to [email or web address] or by calling [phone].

## 2024-11-14 NOTE — H&P ADULT - ASSESSMENT
ASSESSMENT/PLAN  89 year-old female with a PMH of mitral valve regurgitation, history of PE with factor V Leiden, chronic diastolic HF, paroxysmal atrial fibrillation on anticoagulation status post ablation, coronary disease status post PCI to the LAD in February 2017 with repeat cardiac catheterization in March 2022 showing nonobstructive coronary disease with patent stents, severe pulmonary hypertension (with no response to nitric oxide), HTN and hyperlipidemia  with Gait Instability, ADL impairments and Functional impairments.    · Assessment	      #Gait Instability, ADL impairments and Functional impairments  - Start Comprehensive Rehab Program of PT/OT/SLP    #Acute CVA  - MRI: + CVA in R corona radiata  - Initial CT head without acute intracranial hemorrhage, mass effect or transcortical/territorial infarct and perfusion studies without LVO, hemodynamically significant stenosis, and no notable perfusion deficit  - ASA 81 po qd added by cardiology   - Lipitor increased by neurology   - BP control, s/p 24 hours of permissive hypertension, goal SBP < 140  - Start comprehensive PT, OT, SLP program    #Paroxysmal A-fib  – Continue metoprolol tartrate 25 mg twice daily  – Continue Eliquis 5 mg twice daily    #Chronic diastolic heart failure/Pulm HTN  – Continue spironolactone 25 mg daily  – Continue metoprolol tartrate 25 mg twice daily  – Continue furosemide 40 mg daily  - ECHO from November 2023 with EF of 55 to 60% - severe pulmonary HTN , echo done at  unchanged from prior echo in 11/2023  -3L oxygen via [place] overnight at home???      #Hyperlipidemia  – Lipitor   -On Livalo 2mg at home, allergic to Repatha    #Hypothyroidism  -Synthroid 25mcg AM    #DVT prophylaxis  – On Eliquis      #Pain control  - Tylenol PRN    #GI/Bowel Mgmt   - Continue Senna at bedtime   - Miralax     #Bladder management  - Continue to monitor PVR q 8 hours (SC if > 400)  -Monitor UO      #Skin:  -***    FEN   - Diet - **   - Dysphagia  SLP - evaluation and treatment    Precautions / PROPHYLAXIS:   - Falls, Cardiac, Seizure   - ortho: Weight bearing status: WBAT   - Lungs: Aspiration, Incentive Spirometer   - Pressure injury/Skin: Turn Q2hrs while in bed, OOB to Chair, PT/OT        MEDICAL PROGNOSIS: GOOD              REHAB POTENTIAL: GOOD              ESTIMATED DISPOSITION: HOME WITH HOME CARE              ELOS: 10-14 Days   EXPECTED THERAPY:     P.T. 1hr/day       O.T. 1hr/day      S.L.P. 1hr/day       P&O Unnecessary       EXP FREQUENCY: 5 days per 7 day period     PRESCREEN COMPARISON:   I have reviewed the prescreen information and I have found no relevant changes between the preadmission screening and my post admission evaluation     RATIONALE FOR INPATIENT ADMISSION - Patient demonstrates the following: (check all that apply)  [X] Medically appropriate for rehabilitation admission  [X] Has attainable rehab goals with an appropriate initial discharge plan  [X] Has rehabilitation potential (expected to make a significant improvement within a reasonable period of time)   [X] Requires close medical management by a rehab physician, rehab nursing care, Hospitalist and comprehensive interdisciplinary team (including PT, OT, & or SLP, Prosthetics and Orthotics)   89 year-old female with a PMH HTN, HLD, hypothyroidism, PAF, chronic diastolic HF, CAD s/p PCI, PE with factor V Leiden, severe pulmonary hypertension who presented to  11/11/24 with left facial droop, left sided weakness secondary to right corona radiata CVA    # CVA in R corona radiata  - MRI: There is abnormal T2 prolongation restricted diffusion seen involving the right corona radiata region. These findings are compatible with an acute infarct. No hemorrhagic transformation is seen. No significant shift or herniation is seen.  - ASA 81 po qd added by cardiology   - Lipitor increased by neurology   - BP goal SBP < 140  - Start comprehensive PT, OT, SLP program 3 hours daily 5 x week  - Precautions: cardiac, fall, AC    # Paroxysmal A-fib  – Continue metoprolol tartrate 25 mg twice daily  – Continue Eliquis 5 mg twice daily  - monitor vitals, hospitalist consult    # Chronic diastolic heart failure  # Pulm HTN  - ECHO from November 2023 with EF of 55 to 60% - severe pulmonary HTN , echo done at  unchanged from prior echo in 11/2023  – Continue spironolactone 25 mg daily  – Continue metoprolol tartrate 25 mg twice daily  – Continue furosemide 40 mg daily  - 3L oxygen via [place] overnight at home???  - hospitalist consult    # Hyperlipidemia  - On Livalo 2mg at home, allergic to Repatha  – Lipitor     # Hypothyroidism  -Synthroid 25mcg AM    # Pain control  - Tylenol PRN    # GI/Bowel Mgmt   - Continue Senna at bedtime   - Miralax     # Bladder management  - Continue to monitor PVR q 8 hours (SC if > 400)  - Monitor UO    # Skin:  -***    # FEN   - Diet - seen by SLP 11/12: Easy To Chew Diet with Mildly Thick Liquids  - Dysphagia  SLP - evaluation and treatment  - monitor hydration status on thickened liquids    # DVT prophylaxis  – On Eliquis    # LABS  CBC CMP 11/15    Praful Holloway  Cardiovascular Disease  [address] [place] [address]  Phone: [phone]  Fax: [phone]  Follow Up Time:   89 year-old female with a PMH HTN, HLD, hypothyroidism, PAF, chronic diastolic HF, CAD s/p PCI, PE with factor V Leiden, severe pulmonary hypertension who presented to  11/11/24 with left facial droop, left sided weakness secondary to right corona radiata CVA    # CVA in R corona radiata  - MRI: There is abnormal T2 prolongation restricted diffusion seen involving the right corona radiata region. These findings are compatible with an acute infarct. No hemorrhagic transformation is seen. No significant shift or herniation is seen.  - ASA 81 po qd added by cardiology   - Lipitor added by neurology   - BP goal SBP < 140  - Start comprehensive PT, OT, SLP program 3 hours daily 5 x week  - Precautions: cardiac, fall, AC    # Paroxysmal A-fib  – Continue metoprolol tartrate 25 mg twice daily  – Continue Eliquis 5 mg twice daily  - monitor vitals, hospitalist consult    # Chronic diastolic heart failure  # Pulm HTN  - ECHO from November 2023 with EF of 55 to 60% - severe pulmonary HTN , echo done at  unchanged from prior echo in 11/2023  – Continue spironolactone 25 mg daily  – Continue metoprolol tartrate 25 mg twice daily  – Continue furosemide 40 mg daily  - 3L oxygen via [place] overnight at home, no need for oxygen during daytime hours  - hospitalist consult  -Daily weights     # Hyperlipidemia  - On Livalo 2mg at home, allergic to Repatha  – Lipitor 20mg recommended by neurology. LDL 85, LDL needs to be under 70 per neuro. Patient refusing 20mg lipitor tonight due to intermittent leg cramping/pain. Will accept 10mg lipitor tonight-order placed and will need to address with medicine and primary rehab team  -RD consult to provide education on Heart Healthy diet     # Hypothyroidism  -Synthroid 25mcg AM    # Pain control  - Tylenol PRN    # GI/Bowel Mgmt   - Continue Senna at bedtime   - Miralax daily    # Bladder management  - Continue to monitor PVR q 8 hours (SC if > 400)  - Monitor UO    # Skin:  -No skin breakdown on admission   [place] pressure relieving measures    # FEN   - Diet - seen by SLP 11/12: Easy To Chew Diet with Mildly Thick Liquids  - Dysphagia  SLP - evaluation and treatment  - monitor hydration status on thickened liquids    # DVT prophylaxis  – On Eliquis    # LABS  CBC CMP 11/15    Praful Holloway  Cardiovascular Disease  [address] [place] [address]  Phone: [phone]  Fax: [phone]  Follow Up Time:   89 year-old female with a PMH HTN, HLD, hypothyroidism, PAF, chronic diastolic HF, CAD s/p PCI, PE with factor V Leiden, severe pulmonary hypertension who presented to  11/11/24 with left facial droop, left sided weakness secondary to right corona radiata CVA    # CVA in R corona radiata  - MRI: There is abnormal T2 prolongation restricted diffusion seen involving the right corona radiata region. These findings are compatible with an acute infarct. No hemorrhagic transformation is seen. No significant shift or herniation is seen.  - ASA 81 po qd added by cardiology   - Lipitor added by neurology. Patient refuses statin; states she has been on multiple variations of statin including injectable in past , and has always had poor reaction. We discussed the increased risk of secondary stroke on ASA only; she verbalizes understanding but refuses. DC 11/15  - BP goal SBP < 140  - Start comprehensive PT, OT, SLP program 3 hours daily 5 x week  - Precautions: cardiac, fall, AC    # frustration, irritability, anxiety, exacerbated by use of statin and myalgia  - statin dc after discussion this morning risks/benefits, patient's prior history and wishes  - neuropsychology support  - rec therapy    # Paroxysmal A-fib  – Continue metoprolol tartrate 25 mg twice daily  – Continue Eliquis 5 mg twice daily  - monitor vitals, hospitalist consult    # Chronic diastolic heart failure  # Pulm HTN  - ECHO from November 2023 with EF of 55 to 60% - severe pulmonary HTN , echo done at  unchanged from prior echo in 11/2023  – Continue spironolactone 25 mg daily--> held 11/15 due to ANNALEE  – Continue metoprolol tartrate 25 mg twice daily  – Continue furosemide 40 mg daily --> held 11/15 due to ANNALEE  - 3L oxygen via [place] overnight at home, no need for oxygen during daytime hours  - hospitalist consult  - BP  (134/73 - 147/83) 11/15    # Hyperlipidemia  - On Livalo 2mg at home, allergic to Repatha  – Lipitor 20mg recommended by neurology. LDL 85, LDL needs to be under 70 per neuro. Patient refusing, aware of increased stroke risk off statin, dc 11/15  - RD consult to provide education on Heart Healthy diet     # Hypothyroidism  - Synthroid 25mcg AM    # ANNALEE  - BUn/Cr 31/1.5 11/15 (prior 27/1.0)  - Hospitalist appreciated. Hold lasix and spironolactone 11/15  - renal consult    # Pain control  - Tylenol PRN    # GI/Bowel Mgmt   - Continue Senna at bedtime   - Miralax daily    # Bladder management  - Continue to monitor PVR q 8 hours (SC if > 400)  - Monitor UO    # Skin:  -No skin breakdown on admission   [place] pressure relieving measures    # FEN   - Diet - seen by SLP 11/12: Easy To Chew Diet with Mildly Thick Liquids  - Dysphagia  SLP - evaluation and treatment  - monitor hydration status on thickened liquids    # DVT prophylaxis  – On Eliquis    # LABS  CBC CMP 11/15  renal consult  BMP 11/16    Praful Holloway  Cardiovascular Disease  [address] [place] [address]  Phone: [phone]  Fax: [phone]  Follow Up Time:   89 year-old female with a PMH HTN, HLD, hypothyroidism, PAF, chronic diastolic HF, CAD s/p PCI, PE with factor V Leiden, severe pulmonary hypertension who presented to  11/11/24 with left facial droop, left sided weakness secondary to right corona radiata CVA    # 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 These findings are compatible with an acute infarct. No hemorrhagic transformation is seen. No significant shift or herniation is seen.  - ASA 81 po qd added by cardiology   - Lipitor added by neurology. Patient refuses statin; states she has been on multiple variations of statin including injectable in past , and has always had poor reaction. We discussed the increased risk of secondary stroke on ASA only; she verbalizes understanding but refuses. DC 11/15  - BP goal SBP < 140  - Start comprehensive PT, OT, SLP program 3 hours daily 5 x week  - Precautions: cardiac, fall, AC    # frustration, irritability, anxiety, exacerbated by use of statin and myalgia  - statin dc after discussion this morning risks/benefits, patient's prior history and wishes  - neuropsychology support  - rec therapy    # Paroxysmal A-fib  – Continue metoprolol tartrate 25 mg twice daily  – Continue Eliquis 5 mg twice daily  - monitor vitals, hospitalist consult    # Chronic diastolic heart failure  # Pulm HTN  - ECHO from November 2023 with EF of 55 to 60% - severe pulmonary HTN , echo done at  unchanged from prior echo in 11/2023  – Continue spironolactone 25 mg daily--> held 11/15 due to ANNALEE  – Continue metoprolol tartrate 25 mg twice daily  – Continue furosemide 40 mg daily --> held 11/15 due to ANNALEE  - 3L oxygen via [place] overnight at home, no need for oxygen during daytime hours  - hospitalist consult  - BP  (134/73 - 147/83) 11/15    # Hyperlipidemia  - On Livalo 2mg at home, allergic to Repatha  – Lipitor 20mg recommended by neurology. LDL 85, LDL needs to be under 70 per neuro. Patient refusing, aware of increased stroke risk off statin, dc 11/15  - RD consult to provide education on Heart Healthy diet     # Hypothyroidism  - Synthroid 25mcg AM    # ANNALEE  - BUn/Cr 31/1.5 11/15 (prior 27/1.0)  - Hospitalist appreciated. Hold lasix and spironolactone 11/15  - renal consult    # Pain control  - Tylenol PRN    # GI/Bowel Mgmt   - Continue Senna at bedtime   - Miralax daily    # Bladder management  - Continue to monitor PVR q 8 hours (SC if > 400)  - Monitor UO    # Skin:  -No skin breakdown on admission   [place] pressure relieving measures    # FEN   - Diet - seen by SLP 11/12: Easy To Chew Diet with Mildly Thick Liquids  - Dysphagia  SLP - evaluation and treatment  - monitor hydration status on thickened liquids    # DVT prophylaxis  – On Eliquis    # LABS  CBC CMP 11/15  renal consult  BMP 11/16    Praful Holloway  Cardiovascular Disease  [address] [place] [address]  Phone: [phone]  Fax: [phone]  Follow Up Time:

## 2024-11-14 NOTE — H&P ADULT - NSHPSOCIALHISTORY_GEN_ALL_CORE
SOCIAL HISTORY  Smoking - Denied  EtOH - Denied   Drugs - Denied    FUNCTIONAL HISTORY  Lives in alone in an apartment and has 0 stairs to enter.   Prior Level of Function: Independent in ADLs and ambulation mod I with cane    CURRENT FUNCTIONAL STATUS  - Bed Mobility: min A   - Transfers: min to mod A   - Gait: mod A 15ft with RW   - ADLs: mod A for UBD/LBD, mod A for toileting, min A for grooming SOCIAL HISTORY  Smoking - Denied  EtOH - Denied   Drugs - Denied    FUNCTIONAL HISTORY  Lives in alone in an apartment and has 0 stairs to enter. + drives  Prior Level of Function: Independent in ADLs and ambulation mod I with cane    CURRENT FUNCTIONAL STATUS  - Bed Mobility: min A   - Transfers: min to mod A   - Gait: mod A 15ft with RW   - ADLs: mod A for UBD/LBD, mod A for toileting, min A for grooming

## 2024-11-14 NOTE — H&P ADULT - HISTORY OF PRESENT ILLNESS
This is an 89 y/o Female with a PMHx of  mitral valve regurgitation, history of PE with factor V Leiden, hypothyroidism, chronic diastolic HF, paroxysmal atrial fibrillation on anticoagulation status post ablation, coronary disease status post PCI to the LAD in February 2017 with repeat cardiac catheterization in March 2022 showing nonobstructive coronary disease with patent stents, severe pulmonary hypertension (with no response to nitric oxide), HTN and hyperlipidemia presenting to  ED on 11/11/24 brought in by daughter for Left sided facial droop.     Patient went out to lunch at 12 pm the day of admission with her daughter. Daughter reported that the patient was in her normal state of health until 12:30 pm when she last saw her mother. Around 6 pm the patient slipped out of bed and was unable to get back up. She called her daughter and when the she arrived noted her mother did not seem like herself and had a Left sided facial droop.   In the ED CT/CTA head and CTA of neck obtained: No evidence of an acute intracranial hemorrhage midline shift or hydrocephalus. No hemodynamic significant narrowing within the neck. No proximal large vessel occlusion.  Subsequently an MRI of the brain revealed an "abnormal T2 prolongation restricted diffusion seen involving the right corona radiata region. These findings are compatible with an acute infarct. No hemorrhagic transformation is seen. No significant shift or herniation is seen."  Patient was admitted. Hospitalist, cardiology and neurology saw patient. Patient was managed medically. She remained medically stable and was seen by PT and OT with recommendations for acute inpatient rehab.   Patient deemed medically stable for discharge to [facility] on 11/14/24 for acute IPR.          This is an 89 y/o Female with a PMHx HTN, HLD, chronic diastolic HF, paroxysmal atrial fibrillation (on anticoagulation, s/p ablation), CAD status post PCI to the LAD 2017 with repeat cardiac catheterization in 3/2022 showing nonobstructive coronary disease with patent stents, severe pulmonary hypertension,, mitral valve regurgitation, history of PE with factor V Leiden, hypothyroidism, who presented to  ED on 11/11/24 with Left sided facial droop.     Patient went out to lunch at 12 pm the day of admission with her daughter. Around 6 pm the patient slipped out of bed and was unable to get back up. She called her daughter and when the she arrived noted her mother did not seem like herself and had a Left sided facial droop.  CT/CTA head and CTA of neck without evidence of an acute intracranial hemorrhage midline shift or hydrocephalus. There was no hemodynamic significant narrowing within the neck.or proximal large vessel occlusion noted.  MRI showed "abnormal T2 prolongation restricted diffusion seen involving the right corona radiata region. These findings are compatible with an acute infarct. No hemorrhagic transformation is seen. No significant shift or herniation is seen."    Patient was managed medically and was deemed stable for discharge to [facility] on 11/14/24 for acute IRF.          This is an 89 y/o Female with a PMHx HTN, HLD, chronic diastolic HF, paroxysmal atrial fibrillation (on anticoagulation, s/p ablation), CAD status post PCI to the LAD 2017 with repeat cardiac catheterization in 3/2022 showing nonobstructive coronary disease with patent stents, severe pulmonary hypertension, hypothyroidism, mitral valve regurgitation, history of PE with factor V Leiden, hypothyroidism, who presented to  ED on 11/11/24 with Left sided facial droop.     Patient went out to lunch at 12 pm the day of admission with her daughter. Around 6 pm the patient slipped out of bed and was unable to get back up. She called her daughter and when the she arrived noted her mother did not seem like herself and had a Left sided facial droop.  CT/CTA head and CTA of neck without evidence of an acute intracranial hemorrhage midline shift or hydrocephalus. There was no hemodynamic significant narrowing within the neck. or proximal large vessel occlusion noted.  MRI showed "abnormal T2 prolongation restricted diffusion seen involving the right corona radiata region. These findings are compatible with an acute infarct. No hemorrhagic transformation is seen. No significant shift or herniation is seen."    Patient was managed medically and was deemed stable for discharge to [facility] on 11/14/24 for acute IRF.          This is an 88 y/o Female RH dominant with a PMHx HTN, HLD, chronic diastolic HF, paroxysmal atrial fibrillation (on anticoagulation, s/p ablation), CAD status post PCI to the LAD 2017 with repeat cardiac catheterization in 3/2022 showing nonobstructive coronary disease with patent stents, severe pulmonary hypertension, hypothyroidism, mitral valve regurgitation, history of PE with factor V Leiden, hypothyroidism, who presented to  ED on 11/11/24 with Left sided facial droop.     Patient went out to lunch at 12 pm the day of admission with her daughter. Around 6 pm the patient slipped out of bed and was unable to get back up. She called her daughter and when the she arrived noted her mother did not seem like herself and had a Left sided facial droop.  CT/CTA head and CTA of neck without evidence of an acute intracranial hemorrhage midline shift or hydrocephalus. There was no hemodynamic significant narrowing within the neck. or proximal large vessel occlusion noted.  MRI showed "abnormal T2 prolongation restricted diffusion seen involving the right corona radiata region. These findings are compatible with an acute infarct. No hemorrhagic transformation is seen. No significant shift or herniation is seen."    Patient was managed medically and was deemed stable for discharge to [facility] on 11/14/24 for acute IRF.

## 2024-11-14 NOTE — PROGRESS NOTE ADULT - NSPROGADDITIONALINFOA_GEN_ALL_CORE
I spent a total of 51  minutes in face-to-face time with the patient and on the floor managing patient including coordination of care. Overt 50% of the time spent in discussion of the diagnosis, counseling and treatment plan.

## 2024-11-14 NOTE — OCCUPATIONAL THERAPY INITIAL EVALUATION ADULT - FINE MOTOR COORDINATION TRAINING, OT EVAL
Pt will perform left hand manual dexterity tasks with 80% accuracy in order to increase independence with fxl tasks like managing containers/zippers etc in 2 weeks.

## 2024-11-14 NOTE — DISCHARGE NOTE NURSING/CASE MANAGEMENT/SOCIAL WORK - PATIENT PORTAL LINK FT
You can access the FollowMyHealth Patient Portal offered by [facility] by registering at the following website: [email or web address]. By joining [facility]’s FollowMyHealth portal, you will also be able to view your health information using other applications (apps) compatible with our system.

## 2024-11-14 NOTE — OCCUPATIONAL THERAPY INITIAL EVALUATION ADULT - SHORT TERM MEMORY, REHAB EVAL
pt scored a 26/30 on MMSE indicating no cognitive impairment, however pt easily distractible with impaired delayed recall/intact

## 2024-11-14 NOTE — OCCUPATIONAL THERAPY INITIAL EVALUATION ADULT - MD ORDER
"OT Evaluate and Treat"- MD orders received. Chart reviewed, contents noted, conferred with RN- cleared for OT IE, VS: 163/69. Please refer to Therapeutic Interventions under Adult A & I for future documentation and treatment notes. "OT Evaluate and Treat"- MD orders received. Chart reviewed, contents noted, conferred with RN- cleared for OT IE, VS: 163/69. Please refer to Therapeutic Interventions under Adult A & I for future documentation and treatment notes. Pt when performing fxl mobility with LOB, L knee buckling, heavy left lateral lean, decreased insight to situation when occurring- moderate multimodal cues to correct.

## 2024-11-14 NOTE — PROGRESS NOTE ADULT - SUBJECTIVE AND OBJECTIVE BOX
Patient is an 88 y/o F with a PMH of  mitral valve regurgitation, history of PE with factor V Leiden, chronic diastolic HF, paroxysmal atrial fibrillation on anticoagulation status post ablation, coronary disease status post PCI to the LAD in February 2017 with repeat cardiac catheterization in March 2022 showing nonobstructive coronary disease with patent stents, severe pulmonary hypertension (with no response to nitric oxide), HTN and hyperlipidemia presenting to  ED on 11/11/24 brought in by daughter for L sided facial droop.     Patient went out to lunch at 12 pm the day of admission with her daughter. Daughter reports that the patient was in her normal state of health until 12:30 pm when she last saw her mother. Around 6 pm the patient slipped out of bedand was unable to get back up. She called her daughter and when the she arrived noted her mother did not seem like herself and had a L sided facial droop.     Upon arrival to the ED vitals were, /62 HR 71 RR 20 T98.9F and SpO2 of 92% on room air.  HEAD CT: No evidence of an acute intracranial hemorhage, midline shift or hydrocephalus.  NECK CTA: No hemodynamic significant narowing within the neck.  BRAIN CTA: No proximal large vessel occlusion.  CT PERFUSION: No areas of ischemia identified.  She received 1 L normal saline bolus x 1.    Currently, patient denies chest pain, palpitations, shortness of breath, headache, dizziness, nausea, vomiting, numbness or tingling. Reports she feels like she has returned to her baseline, Concerned about her restless legs medication - which her daughter will be bringing in the AM. Lives alone at home - daughter close by and checks in on her. Uses 3L of oxygen when sleeping. Uses a cane as her assistive walking device. Does not note increased swelling in her lower extremities.    (12 Nov 2024 02:03)        ICU Vital Signs Last 24 Hrs  T(C): 36.9 (12 Nov 2024 06:17), Max: 37.2 (11 Nov 2024 20:47)  T(F): 98.4 (12 Nov 2024 06:17), Max: 98.9 (11 Nov 2024 20:47)  HR: 66 (12 Nov 2024 06:17) (66 - 89)  BP: 149/54 (12 Nov 2024 06:17) (149/54 - 184/64)  BP(mean): 101 (12 Nov 2024 01:25) (86 - 103)  ABP: --  ABP(mean): --  RR: 18 (12 Nov 2024 06:17) (13 - 20)  SpO2: 92% (12 Nov 2024 06:17) (92% - 96%)    O2 Parameters below as of 12 Nov 2024 06:17  Patient On (Oxygen Delivery Method): room air          PHYSICAL EXAM:    Constitutional: NAD, awake but readily falls back to sleep, follows all simple commands and answers Qs appropriately  HEENT: PERR, EOMI, Normal Hearing, MMM  Neck: Soft and supple, No LAD, No JVD  Respiratory: Breath sounds are clear bilaterally, No wheezing, rales or rhonchi  Cardiovascular: S1 and S2, regular rate and rhythm, no Murmurs, gallops or rubs  Gastrointestinal: Bowel Sounds present, soft, nontender, nondistended, no guarding, no rebound  Extremities: No peripheral edema  Vascular: 2+ peripheral pulses  Neurological: A/O x 3, no focal deficits  Musculoskeletal: 4/5 motor to LUE/LLE. + L eft facial droop  Skin: No rashes    MEDICATIONS:  MEDICATIONS  (STANDING):  apixaban 5 milliGRAM(s) Oral two times a day  atorvastatin 20 milliGRAM(s) Oral at bedtime  furosemide    Tablet 40 milliGRAM(s) Oral daily  gabapentin 100 milliGRAM(s) Oral daily  gabapentin 200 milliGRAM(s) Oral at bedtime  levothyroxine 25 MICROGram(s) Oral daily  metoprolol tartrate 25 milliGRAM(s) Oral two times a day  potassium chloride    Tablet ER 20 milliEquivalent(s) Oral daily  spironolactone 25 milliGRAM(s) Oral daily      LABS: All Labs Reviewed:                        11.9   8.96  )-----------( 273      ( 12 Nov 2024 07:39 )             36.5     11-12    143  |  112[H]  |  27[H]  ----------------------------<  101[H]  3.8   |  24  |  1.10    Ca    9.1      12 Nov 2024 07:39  Phos  2.4     11-12  Mg     2.3     11-12    TPro  6.6  /  Alb  3.2[L]  /  TBili  0.6  /  DBili  x   /  AST  20  /  ALT  18  /  AlkPhos  50  11-12    PT/INR - ( 11 Nov 2024 20:12 )   PT: 16.0 sec;   INR: 1.40 ratio         PTT - ( 11 Nov 2024 20:12 )  PTT:31.3 sec          Blood Culture:     RADIOLOGY/EKG: reviewed        < from: MR Head No Cont (11.12.24 @ 11:06) >    MRI of the brain was from sagittal T1 axial T1 and T2 T2 FLAIR diffusion   and gradient echo sequence. Coronal T2-weighted sequence was performed as   well.    Parenchymal volume loss and chronic microvessel ischemic changes are   identified    There is no acute hemorrhage mass or mass effect seen.    There is abnormal T2 prolongation restricted diffusion seen involving the   right corona radiata region. These findings are compatible with an acute   infarct. No hemorrhagic transformation is seen. No significant shift or   herniation is seen.    The large vessels demonstrate normal flow voids    Partial empty sella is seen.    IMPRESSION: Acute infarct as described above.    --- End of Report ---    < end of copied text >      < from: TTE W or WO Ultrasound Enhancing Agent (11.12.24 @ 09:47) >     CONCLUSIONS:      1. Left ventricular cavity is normal in size. Left ventricular systolic function is normal with an ejection fraction of 60 % by Manley's method of disks.   2. Normal right ventricular systolic function.   3. Moderate to severe mitral regurgitation.   4. Mildto moderate pulmonic regurgitation.   5. Moderate tricuspid regurgitation.   6. Severe pulmonary hypertension.   7. There is mild calcification of the mitral valve annulus.   8. Findings were discussed with  and  on 11/12/2024 at 9:20 am.    < end of copied text >  
The patient was seen and examined. No acute events overnight.  No chest pain.  No shortness of breath.  No palpitations.  c/o leg pain    Initial Consult HPI    ________________________________  SKeegan DEL CASTILLO is a 89y year old Female with a past medical history of mitral valve regurgitation, history of factor V Leiden with prior PE, history of A-fib status post ablation, on anticoagulation with Eliquis, chronic diastolic heart failure, severe pulm hypertension, coronary disease status post PCI to the LAD in February 2017 with repeat cardiac catheterization in March 2022 showing nonobstructive coronary disease with patent stents, severe pulmonary hypertension (with no response to nitric oxide), HTN and hyperlipidemia presenting to  ED on 11/11/24 brought in by daughter for L sided facial droop.     Patient seen and echo.  Weakness resolved.  Echo showed severe pulm hypertension consistent with prior study.  No chest discomfort.  No palpitations.  No shortness of breath.    PREVIOUS CARDIAC WORKUP:    Echocardiogram 11/12/24      1. Left ventricular cavity is normal in size. Left ventricular systolic function is normal with an ejection fraction of 60 % by Manley's method of disks.   2. Normal right ventricular systolic function.   3. Moderate to severe mitral regurgitation.   4. Mildto moderate pulmonic regurgitation.   5. Moderate tricuspid regurgitation.   6. Severe pulmonary hypertension.   7. There is mild calcification of the mitral valve annulus.   8. Findings were discussed with  and  on 11/12/2024 at 9:20 am.       ________________________________  Review of systems: A 10 point review of system has been performed, and is negative except for what has been mentioned in the above history of present illness.     PAST MEDICAL & SURGICAL HISTORY:  Atrial fibrillation      CHF (congestive heart failure)      Pulmonary emboli      Factor 5 Leiden mutation, heterozygous      CAD (coronary artery disease)      Stented coronary artery      Hypothyroid      COPD (chronic obstructive pulmonary disease)      Mitral regurgitation      HTN (hypertension)      H/O: hysterectomy      H/O knee surgery      Cyst of breast, unspecified laterality  S/P excision      H/O cardiac radiofrequency ablation      S/P ablation of atrial fibrillation        FAMILY HISTORY:  FHx: diabetes mellitus (Father)    FHx: heart disease (Father)         SOCIAL HISTORY: The patient denies any tobacco abuse, alcohol abuse or illicit drug use.    ALLERGIES:  Motrin (Other)  sulfa drugs (Other)  Ceftin (Other (Moderate))  tadalafil (Muscle Pain)  eggs (Anaphylaxis)  flu vaccines (Other)  Macrodantin (Other)  nisoldipine (Unknown)  Repatha (Unknown)    Home Medications:  Albuterol (Eqv-Proventil HFA) 90 mcg/inh inhalation aerosol: 2 puff(s) inhaled every 6 hours (11 Nov 2024 23:40)  Ativan 0.5 mg oral tablet: 1 tab(s) orally once a day as needed for (11 Nov 2024 23:35)  Eliquis 5 mg oral tablet: 1 tab(s) orally 2 times a day (11 Nov 2024 23:37)  furosemide 40 mg oral tablet: 1 tab(s) orally once a day (11 Nov 2024 23:37)  gabapentin 100 mg oral capsule: 1 cap(s) orally once a day (in the morning) (11 Nov 2024 23:41)  gabapentin 100 mg oral capsule: 2 cap(s) orally once a day (in the evening) (11 Nov 2024 23:39)  levothyroxine 25 mcg (0.025 mg) oral tablet: 1 tab(s) orally once a day (11 Nov 2024 23:37)  Livalo 2 mg oral tablet: 1 tab(s) orally once a day (11 Nov 2024 23:37)  metoprolol tartrate 25 mg oral tablet: 1 tab(s) orally 2 times a day (11 Nov 2024 23:40)  potassium chloride 20 mEq oral tablet, extended release: 1 tab(s) orally once a day (11 Nov 2024 23:40)  spironolactone 25 mg oral tablet: 1 tab(s) orally once a day (11 Nov 2024 23:37)    MEDICATIONS  (STANDING):  apixaban 5 milliGRAM(s) Oral two times a day  aspirin  chewable 81 milliGRAM(s) Oral daily  atorvastatin 20 milliGRAM(s) Oral at bedtime  furosemide    Tablet 40 milliGRAM(s) Oral daily  gabapentin 200 milliGRAM(s) Oral at bedtime  gabapentin 100 milliGRAM(s) Oral daily  levothyroxine 25 MICROGram(s) Oral daily  metoprolol tartrate 25 milliGRAM(s) Oral two times a day  potassium chloride    Tablet ER 20 milliEquivalent(s) Oral daily  spironolactone 25 milliGRAM(s) Oral daily    MEDICATIONS  (PRN):  acetaminophen     Tablet .. 650 milliGRAM(s) Oral every 6 hours PRN Mild Pain (1 - 3)  albuterol    90 MICROgram(s) HFA Inhaler 2 Puff(s) Inhalation every 6 hours PRN Shortness of Breath and/or Wheezing  LORazepam     Tablet 0.5 milliGRAM(s) Oral daily PRN Anxiety      Vital Signs Last 24 Hrs  T(C): 36.7 (14 Nov 2024 19:54), Max: 36.7 (14 Nov 2024 08:21)  T(F): 98.1 (14 Nov 2024 19:54), Max: 98.1 (14 Nov 2024 08:21)  HR: 60 (14 Nov 2024 19:54) (60 - 74)  BP: 146/77 (14 Nov 2024 19:54) (134/73 - 159/63)  BP(mean): --  RR: 15 (14 Nov 2024 19:54) (15 - 18)  SpO2: 94% (14 Nov 2024 19:54) (94% - 96%)    Parameters below as of 14 Nov 2024 08:21  Patient On (Oxygen Delivery Method): room air      I&O's Summary      ________________________________  GENERAL APPEARANCE:  No acute distress  HEAD: normocephalic, atraumatic  NECK: supple, no jugular venous distention, no carotid bruit    HEART: Regular rate and rhythm, S1, S2 normal, 1/6 murmur    CHEST:  No anterior chest wall tenderness    LUNGS:  Clear to auscultation, without any wheezing, rhonchi or rales    ABDOMEN: soft, nontender, nondistended, with positive bowel sounds appreciated  EXTREMITIES: Mild lower extremity edema edema.   NEURO: Alert and oriented x3  PSYC:  Normal affect  SKIN:  Dry  ________________________________   TELEMETRY: Sinus rhythm    ECG: Sinus rhythm, right bundle branch block    LABS:              PT/INR - ( 11 Nov 2024 20:12 )   PT: 16.0 sec;   INR: 1.40 ratio         PTT - ( 11 Nov 2024 20:12 )  PTT:31.3 sec  Urinalysis Basic - ( 12 Nov 2024 07:39 )    Color: x / Appearance: x / SG: x / pH: x  Gluc: 101 mg/dL / Ketone: x  / Bili: x / Urobili: x   Blood: x / Protein: x / Nitrite: x   Leuk Esterase: x / RBC: x / WBC x   Sq Epi: x / Non Sq Epi: x / Bacteria: x             ________________________________    RADIOLOGY & ADDITIONAL STUDIES: INTERPRETATION:  Clinical indication: TIA.    MRI of the brain was from sagittal T1 axial T1 and T2 T2 FLAIR diffusion   and gradient echo sequence. Coronal T2-weighted sequence was performed as   well.    Parenchymal volume loss and chronic microvessel ischemic changes are   identified    There is no acute hemorrhage mass or mass effect seen.    There is abnormal T2 prolongation restricted diffusion seen involving the   right corona radiata region. These findings are compatible with an acute   infarct. No hemorrhagic transformation is seen. No significant shift or   herniation is seen.    The large vessels demonstrate normal flow voids    Partial empty sella is seen.    IMPRESSION: Acute infarct as described above.        IMPRESSION:  HEAD CT: No evidence of an acute intracranial hemorhage, midline shift or   hydrocephalus.  NECK CTA: No hemodynamic significant narowing within the neck.  BRAIN CTA: No proximal large vessel occlusion.  CT PERFUSION: No areas of ischemia identified.    Preliminary head CT findings discussed with Dr. HUDSON  at 8:23 PM on   11/11/2024    --- End of Report ---      INTERPRETATION:  TECHNIQUE: Single portable view of the chest.    COMPARISON:  11/7/2023    CLINICAL HISTORY: Stroke Code    FINDINGS:    Single frontal view of the chest demonstrates left basilar atelectasis.   The cardiomediastinal silhouette is enlarged. No acute osseous   abnormalities. Overlying EKG leads and wires are noted    IMPRESSION: Left basilar atelectasis.      ________________________________    ASSESSMENT:  Paroxysmal atrial fibrillation on anticoagulation  Acute CVA  History of CAD status post prior PCI  Severe pulm hypertension with no response to nitric oxide  History of PE with factor V Leiden    PLAN:  In summary, this is a 89y Female with a past medical history of severe pulm hypertension, coronary disease, paroxysmal atrial fibrillation status post ablation, admitted for weakness. MRI showed CVA.  On anticoagulation with Eliquis and ASA. No bleeding  Declined statin  Cannot tolerate repatha  Zetia on DC   ____________________________________________  (Dragon Dictation software used). Thank you for allowing me to participate in the care of your patient. Please contact me should any questions arise.    NEFTALI Forte DO, [facility]  Office: [phone]     
  Patient is an 89 y/o F with a PMH of  mitral valve regurgitation, history of PE with factor V Leiden, chronic diastolic HF, paroxysmal atrial fibrillation on anticoagulation status post ablation, coronary disease status post PCI to the LAD in February 2017 with repeat cardiac catheterization in March 2022 showing nonobstructive coronary disease with patent stents, severe pulmonary hypertension (with no response to nitric oxide), HTN and hyperlipidemia presenting to  ED on 11/11/24 brought in by daughter for L sided facial droop.      ICU Vital Signs Last 24 Hrs  Vital Signs Last 24 Hrs  T(C): 36.7 (14 Nov 2024 08:21), Max: 36.7 (14 Nov 2024 08:21)  T(F): 98.1 (14 Nov 2024 08:21), Max: 98.1 (14 Nov 2024 08:21)  HR: 68 (14 Nov 2024 08:21) (59 - 68)  BP: 152/54 (14 Nov 2024 08:21) (149/53 - 159/63)  BP(mean): --  RR: 18 (14 Nov 2024 08:21) (18 - 18)  SpO2: 94% (14 Nov 2024 08:21) (94% - 95%)    Parameters below as of 14 Nov 2024 08:21  Patient On (Oxygen Delivery Method): room air            PHYSICAL EXAM:    Constitutional: NAD, awake but readily falls back to sleep, follows all simple commands and answers Qs appropriately  HEENT: PERR, EOMI, Normal Hearing, MMM  Neck: Soft and supple, No LAD, No JVD  Respiratory: Breath sounds are clear bilaterally, No wheezing, rales or rhonchi  Cardiovascular: S1 and S2, regular rate and rhythm, no Murmurs, gallops or rubs  Gastrointestinal: Bowel Sounds present, soft, nontender, nondistended, no guarding, no rebound  Extremities: No peripheral edema  Vascular: 2+ peripheral pulses  Neurological: A/O x 3  Musculoskeletal: 4/5 motor to LUE/LLE. + L eft facial droop  Skin: No rashes  potassium chloride    Tablet ER 20 milliEquivalent(s) Oral daily  spironolactone 25 milliGRAM(s) Oral daily                  Urinalysis with Rflx Culture (collected 11 Nov 2024 20:12)              CAPILLARY BLOOD GLUCOSE          Urinalysis with Rflx Culture (collected 11 Nov 2024 20:12)      MEDICATIONS  (STANDING):  apixaban 5 milliGRAM(s) Oral two times a day  aspirin  chewable 81 milliGRAM(s) Oral daily  atorvastatin 20 milliGRAM(s) Oral at bedtime  furosemide    Tablet 40 milliGRAM(s) Oral daily  gabapentin 100 milliGRAM(s) Oral daily  gabapentin 200 milliGRAM(s) Oral at bedtime  levothyroxine 25 MICROGram(s) Oral daily  metoprolol tartrate 25 milliGRAM(s) Oral two times a day  potassium chloride    Tablet ER 20 milliEquivalent(s) Oral daily  spironolactone 25 milliGRAM(s) Oral daily    MEDICATIONS  (PRN):  albuterol    90 MICROgram(s) HFA Inhaler 2 Puff(s) Inhalation every 6 hours PRN Shortness of Breath and/or Wheezing  LORazepam     Tablet 0.5 milliGRAM(s) Oral daily PRN Anxiety

## 2024-11-15 LAB
ALBUMIN SERPL ELPH-MCNC: 3.4 G/DL — SIGNIFICANT CHANGE UP (ref 3.3–5)
ALP SERPL-CCNC: 53 U/L — SIGNIFICANT CHANGE UP (ref 40–120)
ALT FLD-CCNC: 18 U/L — SIGNIFICANT CHANGE UP (ref 10–45)
ANION GAP SERPL CALC-SCNC: 12 MMOL/L — SIGNIFICANT CHANGE UP (ref 5–17)
AST SERPL-CCNC: 20 U/L — SIGNIFICANT CHANGE UP (ref 10–40)
BILIRUB SERPL-MCNC: 0.5 MG/DL — SIGNIFICANT CHANGE UP (ref 0.2–1.2)
BUN SERPL-MCNC: 31 MG/DL — HIGH (ref 7–23)
CALCIUM SERPL-MCNC: 9.5 MG/DL — SIGNIFICANT CHANGE UP (ref 8.4–10.5)
CHLORIDE SERPL-SCNC: 102 MMOL/L — SIGNIFICANT CHANGE UP (ref 96–108)
CO2 SERPL-SCNC: 26 MMOL/L — SIGNIFICANT CHANGE UP (ref 22–31)
CREAT SERPL-MCNC: 1.5 MG/DL — HIGH (ref 0.5–1.3)
EGFR: 33 ML/MIN/1.73M2 — LOW
GLUCOSE SERPL-MCNC: 189 MG/DL — HIGH (ref 70–99)
HCT VFR BLD CALC: 40.7 % — SIGNIFICANT CHANGE UP (ref 34.5–45)
HGB BLD-MCNC: 13.5 G/DL — SIGNIFICANT CHANGE UP (ref 11.5–15.5)
MCHC RBC-ENTMCNC: 31.2 PG — SIGNIFICANT CHANGE UP (ref 27–34)
MCHC RBC-ENTMCNC: 33.2 G/DL — SIGNIFICANT CHANGE UP (ref 32–36)
MCV RBC AUTO: 94 FL — SIGNIFICANT CHANGE UP (ref 80–100)
NRBC # BLD: 0 /100 WBCS — SIGNIFICANT CHANGE UP (ref 0–0)
PLATELET # BLD AUTO: 280 K/UL — SIGNIFICANT CHANGE UP (ref 150–400)
POTASSIUM SERPL-MCNC: 3.7 MMOL/L — SIGNIFICANT CHANGE UP (ref 3.5–5.3)
POTASSIUM SERPL-SCNC: 3.7 MMOL/L — SIGNIFICANT CHANGE UP (ref 3.5–5.3)
PROT SERPL-MCNC: 7.4 G/DL — SIGNIFICANT CHANGE UP (ref 6–8.3)
RBC # BLD: 4.33 M/UL — SIGNIFICANT CHANGE UP (ref 3.8–5.2)
RBC # FLD: 14.4 % — SIGNIFICANT CHANGE UP (ref 10.3–14.5)
SODIUM SERPL-SCNC: 140 MMOL/L — SIGNIFICANT CHANGE UP (ref 135–145)
WBC # BLD: 10.3 K/UL — SIGNIFICANT CHANGE UP (ref 3.8–10.5)
WBC # FLD AUTO: 10.3 K/UL — SIGNIFICANT CHANGE UP (ref 3.8–10.5)

## 2024-11-15 PROCEDURE — 99223 1ST HOSP IP/OBS HIGH 75: CPT

## 2024-11-15 PROCEDURE — 90832 PSYTX W PT 30 MINUTES: CPT

## 2024-11-15 PROCEDURE — 99222 1ST HOSP IP/OBS MODERATE 55: CPT

## 2024-11-15 RX ORDER — METOPROLOL TARTRATE 100 MG/1
12.5 TABLET, FILM COATED ORAL
Refills: 0 | Status: DISCONTINUED | OUTPATIENT
Start: 2024-11-16 | End: 2024-11-29

## 2024-11-15 RX ADMIN — Medication 81 MILLIGRAM(S): at 12:49

## 2024-11-15 RX ADMIN — Medication 25 MICROGRAM(S): at 05:32

## 2024-11-15 RX ADMIN — Medication 25 MILLIGRAM(S): at 05:32

## 2024-11-15 RX ADMIN — GABAPENTIN 200 MILLIGRAM(S): 300 CAPSULE ORAL at 21:50

## 2024-11-15 RX ADMIN — APIXABAN 5 MILLIGRAM(S): 2.5 TABLET, FILM COATED ORAL at 21:50

## 2024-11-15 RX ADMIN — APIXABAN 5 MILLIGRAM(S): 2.5 TABLET, FILM COATED ORAL at 10:09

## 2024-11-15 RX ADMIN — Medication 2 PUFF(S): at 08:28

## 2024-11-15 RX ADMIN — GABAPENTIN 100 MILLIGRAM(S): 300 CAPSULE ORAL at 12:54

## 2024-11-15 RX ADMIN — Medication 2 TABLET(S): at 21:50

## 2024-11-15 RX ADMIN — FUROSEMIDE 40 MILLIGRAM(S): 40 TABLET ORAL at 05:32

## 2024-11-15 RX ADMIN — METOPROLOL TARTRATE 25 MILLIGRAM(S): 100 TABLET, FILM COATED ORAL at 17:41

## 2024-11-15 RX ADMIN — FAMOTIDINE 20 MILLIGRAM(S): 20 TABLET, FILM COATED ORAL at 12:49

## 2024-11-15 RX ADMIN — ACETAMINOPHEN 500MG 650 MILLIGRAM(S): 500 TABLET, COATED ORAL at 06:33

## 2024-11-15 RX ADMIN — POLYETHYLENE GLYCOL 3350 17 GRAM(S): 17 POWDER, FOR SOLUTION ORAL at 12:49

## 2024-11-15 RX ADMIN — ACETAMINOPHEN 500MG 650 MILLIGRAM(S): 500 TABLET, COATED ORAL at 05:33

## 2024-11-15 RX ADMIN — POTASSIUM CHLORIDE 20 MILLIEQUIVALENT(S): 600 TABLET, EXTENDED RELEASE ORAL at 12:49

## 2024-11-15 RX ADMIN — METOPROLOL TARTRATE 25 MILLIGRAM(S): 100 TABLET, FILM COATED ORAL at 05:32

## 2024-11-15 RX ADMIN — Medication 2 PUFF(S): at 15:49

## 2024-11-15 NOTE — DIETITIAN INITIAL EVALUATION ADULT - ORAL INTAKE PTA/DIET HISTORY
Pt reports low appetite/intake PTA. Pt reports cooking all her meals herself and typically following a lower carb diet. Pt reports star fruit allergy - hives around mouth.

## 2024-11-15 NOTE — CONSULT NOTE ADULT - SUBJECTIVE AND OBJECTIVE BOX
Patient is a 89y old  Female who presents with a chief complaint of Cerebral infarction     (15 Nov 2024 11:21)      HPI:  This is an 89 y/o Female with a PMHx HTN, HLD, chronic diastolic HF, paroxysmal atrial fibrillation (on anticoagulation, s/p ablation), CAD status post PCI to the LAD 2017 with repeat cardiac catheterization in 3/2022 showing nonobstructive coronary disease with patent stents, severe pulmonary hypertension, hypothyroidism, mitral valve regurgitation, history of PE with factor V Leiden, hypothyroidism, who presented to  ED on 11/11/24 with Left sided facial droop.     Patient went out to lunch at 12 pm the day of admission with her daughter. Around 6 pm the patient slipped out of bed and was unable to get back up. She called her daughter and when the she arrived noted her mother did not seem like herself and had a Left sided facial droop.  CT/CTA head and CTA of neck without evidence of an acute intracranial hemorrhage midline shift or hydrocephalus. There was no hemodynamic significant narrowing within the neck. or proximal large vessel occlusion noted.  MRI showed "abnormal T2 prolongation restricted diffusion seen involving the right corona radiata region. These findings are compatible with an acute infarct. No hemorrhagic transformation is seen. No significant shift or herniation is seen."    Patient was managed medically and was deemed stable for discharge to [facility] on 11/14/24 for acute IRF.          (14 Nov 2024 13:35)      TODAY:  Patient seen and examined in [facility]  No overnight event    PAST MEDICAL & SURGICAL HISTORY:  Atrial fibrillation  CHF (congestive heart failure)  Pulmonary emboli  Factor 5 Leiden mutation, heterozygous  CAD (coronary artery disease)  Stented coronary artery  Hypothyroid  COPD (chronic obstructive pulmonary disease)  Mitral regurgitation  HTN (hypertension)  H/O: hysterectomy  H/O knee surgery  Cyst of breast, unspecified laterality  S/P excision  H/O cardiac radiofrequency ablation  S/P ablation of atrial fibrillation      Family history- non contributory   Smoking - Denied  EtOH - Denied   Drugs - Denied          ALLERGIES:  Allergies    Macrodantin (Other)  Motrin (Other)  statins (Muscle Pain)  Repatha (Unknown)  flu vaccines (Other)  nisoldipine (Unknown)  sulfa drugs (Other)  Ceftin (Other (Moderate))  tadalafil (Muscle Pain)      REVIEW OF SYSTEMS:  CONSTITUTIONAL: No fever, weight loss, or fatigue  EYES: No eye pain, visual disturbances, or discharge  RESPIRATORY: No cough, wheezing, chills or hemoptysis; No shortness of breath  CARDIOVASCULAR: No chest pain, palpitations, dizziness, or leg swelling  GASTROINTESTINAL: No abdominal or epigastric pain. No nausea, vomiting, or hematemesis; No diarrhea or constipation. No melena or hematochezia.  GENITOURINARY: No dysuria, frequency, hematuria, or incontinence  NEUROLOGICAL: No headaches, memory loss, loss of strength, numbness, or tremors  SKIN: No itching, burning, rashes, or lesions   MUSCULOSKELETAL: No joint pain or swelling; No muscle, back, or extremity pain  PSYCHIATRIC: No depression, anxiety, mood swings, or difficulty sleeping    ALL OTHER SYSTEMS REVIEWED AND ARE NEGATIVE    VITAL SIGNS:  T(C): 36.3 (11-15-24 @ 07:37), Max: 36.7 (11-14-24 @ 19:54)  HR: 48 (11-15-24 @ 07:37) (48 - 74)  BP: 143/60 (11-15-24 @ 07:37) (134/73 - 147/83)  RR: 16 (11-15-24 @ 07:37) (15 - 16)  SpO2: 98% (11-15-24 @ 07:37) (94% - 98%)  Wt(kg): --Vital Signs Last 24 Hrs  T(C): 36.3 (15 Nov 2024 07:37), Max: 36.7 (14 Nov 2024 19:54)  T(F): 97.4 (15 Nov 2024 07:37), Max: 98.1 (14 Nov 2024 19:54)  HR: 48 (15 Nov 2024 07:37) (48 - 74)  BP: 143/60 (15 Nov 2024 07:37) (134/73 - 147/83)  BP(mean): --  RR: 16 (15 Nov 2024 07:37) (15 - 16)  SpO2: 98% (15 Nov 2024 07:37) (94% - 98%)    Parameters below as of 15 Nov 2024 07:37  Patient On (Oxygen Delivery Method): room air        PHYSICAL EXAM  Constitutional - NAD, Comfortable  HEENT -  EOMI  Neck - Supple, No limited ROM  Chest - CTA bilaterally, No wheeze, No rhonchi, No crackles  Cardiovascular - RRR, S1S2, No murmurs  Abdomen - BS+, Soft, NTND  Extremities - No C/C/E, No calf tenderness   Skin-no rash or skin breakdown   neurology-  Awake, Alert, AAO to self, place, +mild dysarthria   Psychiatric - Mood stable, Affect WNL    LABS:                        13.5   10.30 )-----------( 280      ( 15 Nov 2024 09:55 )             40.7     11-15    140  |  102  |  31[H]  ----------------------------<  189[H]  3.7   |  26  |  1.50[H]    Ca    9.5      15 Nov 2024 09:55    TPro  7.4  /  Alb  3.4  /  TBili  0.5  /  DBili  x   /  AST  20  /  ALT  18  /  AlkPhos  53  11-15      Urinalysis Basic - ( 15 Nov 2024 09:55 )    Color: x / Appearance: x / SG: x / pH: x  Gluc: 189 mg/dL / Ketone: x  / Bili: x / Urobili: x   Blood: x / Protein: x / Nitrite: x   Leuk Esterase: x / RBC: x / WBC x   Sq Epi: x / Non Sq Epi: x / Bacteria: x       CAPILLARY BLOOD GLUCOSE            Urinalysis Basic - ( 15 Nov 2024 09:55 )    Color: x / Appearance: x / SG: x / pH: x  Gluc: 189 mg/dL / Ketone: x  / Bili: x / Urobili: x   Blood: x / Protein: x / Nitrite: x   Leuk Esterase: x / RBC: x / WBC x   Sq Epi: x / Non Sq Epi: x / Bacteria: x          Previous Consultant(s) Notes Reviewed:  YES  Care Discussed with Consultants/Other Providers YES  Previous Imaging Personally Reviewed:  YES   -3

## 2024-11-15 NOTE — CONSULT NOTE ADULT - REASON FOR ADMISSION
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

## 2024-11-15 NOTE — DIETITIAN INITIAL EVALUATION ADULT - ADD RECOMMEND
1. Continue Current Nutrition Care Regimen at This Time.   2. Continue with Current Diet; Defer texture/consistency to Speech Language Pathologist prn.   3. RD to remain available.

## 2024-11-15 NOTE — PROGRESS NOTE ADULT - SUBJECTIVE AND OBJECTIVE BOX
Pt was seen for 30 minutes for supportive tx. Pt c/o fatigue. Reviewed chart, approached Pt for an initial consult, introduced the role of neuropsychology in the rehab team, and explained the nature and purpose of the consult. Pt is an 88 y/o female with a PMHx of HTN, HLD, hypothyroidism, PAF, chronic diastolic HF, CAD s/p PCI, PE with factor V Leiden, severe pulmonary hypertension who presented to  11/11/24 with left facial droop, left sided weakness secondary to right corona radiata CVA. Pt agreed to participate; she was well related and open in discussing her current medical condition and resulting limitations. Session focused on establishing rapport with Pt, collecting relevant biographical information, and assessing her current emotional functioning. Pt provided significant information about her medical hx and social hx. Moreover, Pt answered all questions during the clinical interview in order to elicit emotional symptoms. Pt reported experiencing fatigue, poor sleep, low energy and decreased appetite since she suffered this, her 2nd CVA (1st took place in 2021).  Support and encouragement were provided.

## 2024-11-15 NOTE — DIETITIAN INITIAL EVALUATION ADULT - NS FNS DIET ORDER
Diet, Regular:   DASH/TLC {Sodium & Cholesterol Restricted}  Easy to Chew (EASYTOCHEW)  Mildly Thick Liquids (MILDTHICKLIQS) (11-14-24 @ 17:44)

## 2024-11-15 NOTE — PROGRESS NOTE ADULT - ASSESSMENT
Pt alert, fair attentive, relatively intact expressive language, thought processes - goal-directed, no abnormal thought contents were noticed, depressed affect, fatigued mood, denied AH/VH, denied SI/HI/I/P, calm behavior. Plan: Continue the assessment process.

## 2024-11-15 NOTE — CONSULT NOTE ADULT - SUBJECTIVE AND OBJECTIVE BOX
Encounter Date: 6/19/2024    SCRIBE #1 NOTE: I, Marcial Fields, am scribing for, and in the presence of,  Alayna Holdsworth, PA-C. I have scribed the following portions of the note - Other sections scribed: HPI, ROS.       History     Chief Complaint   Patient presents with    Chest Pain     C/O CHEST DISCOMFORT SINCE LAST WEEK AFTER BEING STUNG BY BEES     55 y.o. male, with a PMHx of DM I, HLD, HTN, presents to the ED with fluctuating right sided chest pain x 1 week. Patient reports associated headache, SOB, polydipsia, polyuria, urinary frequency and urgency, and intermittent visual disturbances.  Pt states the pain is an intermittent sharp/stabbing pain that is an 8-9/10 pain intensity but is currently a 7/10. He has not attempted Tx and his triggers are unknown. He is moderately compliant with his medications. No other exacerbating or alleviating factors. Denies trauma, recent falls, recent long car rides, heavy lifting, or recent surgeries. He also denies, fever, gait problem, n/v, abdominal pain, constipation, dizziness, lightheadedness, body aches, LE edema, or other associated symptoms. He has not followed up with PCP regarding uncontrolled blood sugar levels.      He notes previous visit last week for similar complaint sp bee stings    Per Chart Review: 6/12/24  Angioedema sp bee sting with associated SOB that was treated and patient was discharged.     The history is provided by the patient. No  was used.     Review of patient's allergies indicates:  No Known Allergies  Past Medical History:   Diagnosis Date    Diabetes mellitus     Diabetes mellitus type I     Hyperlipidemia associated with type 2 diabetes mellitus 9/27/2023    Hypertension      Past Surgical History:   Procedure Laterality Date    COLONOSCOPY  01/28/2022    COLONOSCOPY N/A 01/28/2022    Procedure: COLONOSCOPY;  Surgeon: Don Mon MD;  Location: [facility];  Service: Endoscopy;  Laterality: N/A;    INCISION AND  DRAINAGE OF HAND Right 3/4/2024    Procedure: INCISION AND DRAINAGE, HAND;  Surgeon: Wilfrido Prado MD;  Location: [facility];  Service: Orthopedics;  Laterality: Right;  possible I&D depending on how incision looks morning of, cultures    INCISION AND DRAINAGE OF WOUND  03/04/2024    Middle finger and thumb    OPEN REDUCTION AND INTERNAL FIXATION (ORIF) OF INJURY OF FINGER  03/04/2024    Middle finger    OPEN REDUCTION AND INTERNAL FIXATION (ORIF) OF INJURY OF FINGER Right 3/4/2024    Procedure: ORIF, FINGER;  Surgeon: Wilfrido Prado MD;  Location: [facility] OR;  Service: Orthopedics;  Laterality: Right;  right middle finger, c-arm, accumed    right knee       Family History   Problem Relation Name Age of Onset    Kidney disease Mother      Hypertension Father      Diabetes type II Father       Social History     Tobacco Use    Smoking status: Never    Smokeless tobacco: Never   Substance Use Topics    Alcohol use: Yes     Comment: occasionally    Drug use: No     Review of Systems   Constitutional:  Negative for chills, diaphoresis and fever.   HENT:  Negative for congestion, rhinorrhea and sore throat.    Eyes:  Positive for visual disturbance (Intermittent blurry; not currently).   Respiratory:  Positive for shortness of breath. Negative for cough.    Cardiovascular:  Positive for chest pain (fluctuating right side). Negative for leg swelling.   Gastrointestinal:  Negative for abdominal pain, constipation, diarrhea, nausea and vomiting.   Endocrine: Positive for polydipsia and polyuria.   Genitourinary:  Positive for frequency and urgency. Negative for decreased urine volume, difficulty urinating, dysuria and hematuria.   Musculoskeletal:  Negative for gait problem and myalgias.   Neurological:  Positive for headaches. Negative for dizziness, weakness, light-headedness and numbness.       Physical Exam     Initial Vitals [06/19/24 2101]   BP Pulse Resp Temp SpO2   (!) 197/83 76 18 98 °F (36.7 °C) 98 %      MAP  NEPHROLOGY CONSULTATION    CHIEF COMPLAINT: CVA    HPI:  Pt is 89 yo F with a PMH HTN, HLD, chronic diastolic HF, paroxysmal atrial fibrillation (on anticoagulation, s/p ablation), CAD status post PCI to the LAD 2017 with repeat cardiac catheterization in 3/2022 showing nonobstructive coronary disease with patent stents, severe pulmonary hypertension, hypothyroidism, mitral valve regurgitation, history of PE with factor V Leiden, hypothyroidism, who presented to  ED on 11/11/24 with L facial droop. CT/CTA head and CTA of neck without evidence of an acute intracranial hemorrhage midline shift or hydrocephalus. There was no hemodynamic significant narrowing within the neck or proximal large vessel occlusion noted. MRI showed infarct involving R corona radiata region. Adm to AR at [facility] 11/14. Noted to have rising Cr.    ROS:  as above    Allergies:  Macrodantin (Other)  Motrin (Other)  statins (Muscle Pain)  Repatha (Unknown)  flu vaccines (Other)  nisoldipine (Unknown)  sulfa drugs (Other)  Ceftin (Other (Moderate))  tadalafil (Muscle Pain)    PAST MEDICAL & SURGICAL HISTORY:  Atrial fibrillation  CHF (congestive heart failure)  Pulmonary emboli  Factor 5 Leiden mutation, heterozygous  CAD (coronary artery disease)  Stented coronary artery  Hypothyroid  COPD (chronic obstructive pulmonary disease)  Mitral regurgitation  HTN (hypertension)  H/O: hysterectomy  H/O knee surgery  Cyst of breast, unspecified laterality  S/P excision  H/O cardiac radiofrequency ablation  S/P ablation of atrial fibrillation    SOCIAL HISTORY:  negative    FAMILY HISTORY:  diabetes mellitus (Father)  heart disease (Father)    MEDICATIONS  (STANDING):  albuterol    90 MICROgram(s) HFA Inhaler 2 Puff(s) Inhalation every 6 hours  apixaban 5 milliGRAM(s) Oral two times a day  aspirin  chewable 81 milliGRAM(s) Oral daily  famotidine    Tablet 20 milliGRAM(s) Oral daily  gabapentin 200 milliGRAM(s) Oral at bedtime  gabapentin 100 milliGRAM(s) Oral daily  levothyroxine 25 MICROGram(s) Oral daily  metoprolol tartrate 25 milliGRAM(s) Oral two times a day  polyethylene glycol 3350 17 Gram(s) Oral daily  potassium chloride    Tablet ER 20 milliEquivalent(s) Oral daily  senna 2 Tablet(s) Oral at bedtime    Vital Signs Last 24 Hrs  T(C): 36.3 (11-15-24 @ 07:37), Max: 36.7 (11-14-24 @ 19:54)  T(F): 97.4 (11-15-24 @ 07:37), Max: 98.1 (11-14-24 @ 19:54)  HR: 48 (11-15-24 @ 07:37) (48 - 74)  BP: 143/60 (11-15-24 @ 07:37) (134/73 - 147/83)  RR: 16 (11-15-24 @ 07:37) (15 - 16)  SpO2: 98% (11-15-24 @ 07:37) (94% - 98%)    I&O's Detail    14 Nov 2024 07:01  -  15 Nov 2024 07:00  --------------------------------------------------------  OUT:    Voided (mL): 900 mL  Total OUT: 900 mL    LABS:                        13.5   10.30 )-----------( 280      ( 15 Nov 2024 09:55 )             40.7     11-15    140  |  102  |  31[H]  ----------------------------<  189[H]  3.7   |  26  |  1.50[H]    Ca    9.5      15 Nov 2024 09:55    TPro  7.4  /  Alb  3.4  /  TBili  0.5  /  DBili  x   /  AST  20  /  ALT  18  /  AlkPhos  53  11-15    LIVER FUNCTIONS - ( 15 Nov 2024 09:55 )  Alb: 3.4 g/dL / Pro: 7.4 g/dL / ALK PHOS: 53 U/L / ALT: 18 U/L / AST: 20 U/L / GGT: x           A/P:    full consult to follow    [phone]               --         Physical Exam    Nursing note and vitals reviewed.  Constitutional: He appears well-developed and well-nourished. He is not diaphoretic. He is active. He does not appear ill. No distress.   HENT:   Head: Normocephalic and atraumatic.   Right Ear: External ear normal.   Left Ear: External ear normal.   Nose: Nose normal.   Mouth/Throat: Uvula is midline, oropharynx is clear and moist and mucous membranes are normal. No uvula swelling.   No angioedema   Eyes: Lids are normal. Pupils are equal, round, and reactive to light. Right eye exhibits no discharge. Left eye exhibits no discharge. No scleral icterus.   Neck: Phonation normal. Neck supple.   Normal range of motion.   Full passive range of motion without pain.     Cardiovascular:  Normal rate and regular rhythm.           Pulses:       Radial pulses are 2+ on the right side and 2+ on the left side.   Pulmonary/Chest: Effort normal and breath sounds normal. No respiratory distress. He exhibits no tenderness and no bony tenderness.   Abdominal: He exhibits no distension.   Musculoskeletal:         General: Normal range of motion.      Cervical back: Full passive range of motion without pain, normal range of motion and neck supple.      Right lower leg: Normal. No swelling, tenderness or bony tenderness. No edema.      Left lower leg: Normal. No swelling, tenderness or bony tenderness. No edema.     Neurological: He is alert and oriented to person, place, and time. He has normal strength. No sensory deficit. GCS eye subscore is 4. GCS verbal subscore is 5. GCS motor subscore is 6.   Skin: Skin is dry. Capillary refill takes less than 2 seconds.         ED Course   Procedures  Labs Reviewed   POCT URINALYSIS W/O SCOPE - Abnormal; Notable for the following components:       Result Value    Glucose, UA 3+ (*)     Blood, UA Trace-intact (*)     All other components within normal limits   POCT GLUCOSE - Abnormal; Notable for the following components:     POCT Glucose 397 (*)     All other components within normal limits   POCT CMP - Abnormal; Notable for the following components:    POC BUN 25 (*)     POC Creatinine 1.4 (*)     POC Glucose 373 (*)     All other components within normal limits   ISTAT PROCEDURE - Abnormal; Notable for the following components:    POC HCO3 28.2 (*)     POC BE 3 (*)     POC TCO2 30 (*)     All other components within normal limits   ISTAT PROCEDURE - Abnormal; Notable for the following components:    POC PTWBT 14.8 (*)     All other components within normal limits   POCT GLUCOSE - Abnormal; Notable for the following components:    POCT Glucose 306 (*)     All other components within normal limits   POCT GLUCOSE - Abnormal; Notable for the following components:    POCT Glucose 299 (*)     All other components within normal limits   POCT GLUCOSE - Abnormal; Notable for the following components:    POCT Glucose 214 (*)     All other components within normal limits   TROPONIN ISTAT   POCT CBC   POCT URINALYSIS W/O SCOPE   POCT CMP   POCT PROTIME-INR   POCT TROPONIN   POCT B-TYPE NATRIURETIC PEPTIDE (BNP)   POCT B-TYPE NATRIURETIC PEPTIDE (BNP)   POCT GLUCOSE MONITORING CONTINUOUS   POCT GLUCOSE MONITORING CONTINUOUS     EKG Readings: (Independently Interpreted)   EKG showed normal sinus rhythm with 69 bpm. MA interval 136 ms. QRS 80 ms.  ms. No stemi noted. Signed by Dr. Lima.       Imaging Results              X-Ray Chest PA And Lateral (Final result)  Result time 06/19/24 22:28:19      Final result by David Ruiz MD (06/19/24 22:28:19)                   Impression:      No acute process.      Electronically signed by: David Ruiz MD  Date:    06/19/2024  Time:    22:28               Narrative:    EXAMINATION:  XR CHEST PA AND LATERAL    CLINICAL HISTORY:  Chest Pain.    TECHNIQUE:  PA and lateral views of the chest were performed.    COMPARISON:  06/12/2024.    FINDINGS:  The trachea is unremarkable.  The  cardiomediastinal silhouette is within normal limits.  The hilar structures are unremarkable.  There is no evidence of free air beneath the hemidiaphragms.  There are no pleural effusions.  There is no evidence of a pneumothorax.  There is no evidence of pneumomediastinum.  No airspace opacity is present.  There are degenerative changes in the osseous structures.                                       Medications   sodium chloride 0.9% bolus 1,000 mL 1,000 mL (0 mLs Intravenous Stopped 6/19/24 2300)   acetaminophen tablet 1,000 mg (1,000 mg Oral Given 6/19/24 2156)   sodium chloride 0.9% bolus 1,000 mL 1,000 mL (0 mLs Intravenous Stopped 6/20/24 0020)   insulin regular injection 6 Units 0.06 mL (6 Units Intravenous Given 6/20/24 0046)     Medical Decision Making  55 y.o. male, with a PMHx of DM I, HLD, HTN, presents to the ED with fluctuating right sided chest pain x 1 week.  Patient's chart and medical history reviewed.    Ddx:  STEMI  NSTEMI  Unstable angina   Costochondritis   Pneumonia   Bronchitis  PE  Pleural effusion   Pneumothorax  CHF  Pericarditis   Myocarditis   Pleuritis   DKA    Patient's vitals reviewed.  Afebrile, no respiratory distress, and nontoxic-appearing in the ED. patient was physical exam was unremarkable.  Patient overall well-appearing.  Patient had no chest wall tenderness or pain with movement, unlikely costochondritis.  Patient had bilateral normal breath sounds, no tachycardia, no lower extremity edema, no calf swelling; speaking in full sentences; shortness of breath chronic; 98% O2 on room air; and overall well-appearing - unlikely PE.  Patient's started on fluids and given Tylenol pain.  Point of care glucose is 397. EKG showed normal sinus rhythm with 69 bpm. CT interval 136 ms. QRS 80 ms.  ms. No stemi noted. Signed by Dr. Lima.  No evidence of pericarditis on EKG.  Chest x-ray was unremarkable per my personal interpretation. Official chest x-ray interpretation  showed no acute process -unlikely pneumonia, pneumothorax, or pleural effusion.  Troponin in normal range, MI unlikely.  BNP in normal range, CHF unlikely.  UA showed 3+ glucose, no ketones.  CBC unremarkable.  CMP showed elevated BUN and creatinine 25 and 1.4 respectively, patient already on a L fluids.  Glucose 373.  PT INR slightly elevated to 14.8 and 1.2. VBG showed normal pH amd bicarb. Unlikely DKA - no ketones in urine, no acidosis, normal bicarb, and no anion gap. Repeat glucose after 1 L fluids 306.  Patient given 1 more L fluids. Repeat glucose showed 214. Discussed all results with patient, he verbalized understanding. Patient will be sent a referral to cardiology for further management of chest pain. Patient states he needs a new PCP; referral sent. Patient sent home with tylenol and robaxin for pain control. Patient agrees with this plan. Discussed with him strict return precautions, he verbalized understanding. Patient is stable for discharge.     Amount and/or Complexity of Data Reviewed  External Data Reviewed: labs, radiology, ECG and notes.  Labs: ordered.  Radiology: ordered.  ECG/medicine tests: ordered.    Risk  OTC drugs.  Prescription drug management.            Scribe Attestation:   Scribe #1: I performed the above scribed service and the documentation accurately describes the services I performed. I attest to the accuracy of the note.                             I, Alayna Holdsworth,PA-C, personally performed the services described in this documentation. All medical record entries made by the scribe were at my direction and in my presence. I have reviewed the chart and agree that the record reflects my personal performance and is accurate and complete.     Clinical Impression:  Final diagnoses:  [R07.9] Chest pain  [R73.09] Elevated glucose  [E10.65] Uncontrolled type 1 diabetes mellitus with hyperglycemia (Primary)          ED Disposition Condition    Discharge Stable          ED  NEPHROLOGY CONSULTATION    CHIEF COMPLAINT: CVA    HPI:  Pt is 88 yo F with a PMH HTN, HLD, chronic diastolic HF, paroxysmal atrial fibrillation (on anticoagulation, s/p ablation), CAD status post PCI to the LAD 2017 with repeat cardiac catheterization in 3/2022 showing nonobstructive coronary disease with patent stents, severe pulmonary hypertension, hypothyroidism, mitral valve regurgitation, history of PE with factor V Leiden, hypothyroidism, who presented to  ED on 11/11/24 with L facial droop. CT/CTA head and CTA of neck without evidence of an acute intracranial hemorrhage midline shift or hydrocephalus. There was no hemodynamic significant narrowing within the neck or proximal large vessel occlusion noted. MRI showed infarct involving R corona radiata region. Adm to AR at [facility] 11/14. S/p CT w/IV dye 11/11. Noted to have rising Cr. Poor historian. No CP, SOB, N/V/F/C.     ROS:  as above    Allergies:  Macrodantin (Other)  Motrin (Other)  statins (Muscle Pain)  Repatha (Unknown)  flu vaccines (Other)  nisoldipine (Unknown)  sulfa drugs (Other)  Ceftin (Other (Moderate))  tadalafil (Muscle Pain)    PAST MEDICAL & SURGICAL HISTORY:  Atrial fibrillation  CHF (congestive heart failure)  Pulmonary emboli  Factor 5 Leiden mutation, heterozygous  CAD (coronary artery disease)  Stented coronary artery  Hypothyroid  COPD (chronic obstructive pulmonary disease)  Mitral regurgitation  HTN (hypertension)  H/O: hysterectomy  H/O knee surgery  Cyst of breast, unspecified laterality  S/P excision  H/O cardiac radiofrequency ablation  S/P ablation of atrial fibrillation    SOCIAL HISTORY:  negative    FAMILY HISTORY:  diabetes mellitus (Father)  heart disease (Father)    MEDICATIONS  (STANDING):  albuterol    90 MICROgram(s) HFA Inhaler 2 Puff(s) Inhalation every 6 hours  apixaban 5 milliGRAM(s) Oral two times a day  aspirin  chewable 81 milliGRAM(s) Oral daily  famotidine    Tablet 20 milliGRAM(s) Oral daily  gabapentin 200 milliGRAM(s) Oral at bedtime  gabapentin 100 milliGRAM(s) Oral daily  levothyroxine 25 MICROGram(s) Oral daily  metoprolol tartrate 25 milliGRAM(s) Oral two times a day  polyethylene glycol 3350 17 Gram(s) Oral daily  potassium chloride    Tablet ER 20 milliEquivalent(s) Oral daily  senna 2 Tablet(s) Oral at bedtime    Vital Signs Last 24 Hrs  T(C): 36.3 (11-15-24 @ 07:37), Max: 36.7 (11-14-24 @ 19:54)  T(F): 97.4 (11-15-24 @ 07:37), Max: 98.1 (11-14-24 @ 19:54)  HR: 48 (11-15-24 @ 07:37) (48 - 74)  BP: 143/60 (11-15-24 @ 07:37) (134/73 - 147/83)  RR: 16 (11-15-24 @ 07:37) (15 - 16)  SpO2: 98% (11-15-24 @ 07:37) (94% - 98%)    I&O's Detail    14 Nov 2024 07:01  -  15 Nov 2024 07:00  --------------------------------------------------------  OUT:    Voided (mL): 900 mL  Total OUT: 900 mL    s1s2  b/l air entry  soft, ND  b/l LE lymphedema    LABS:                        13.5   10.30 )-----------( 280      ( 15 Nov 2024 09:55 )             40.7     11-15    140  |  102  |  31[H]  ----------------------------<  189[H]  3.7   |  26  |  1.50[H]    Ca    9.5      15 Nov 2024 09:55    TPro  7.4  /  Alb  3.4  /  TBili  0.5  /  DBili  x   /  AST  20  /  ALT  18  /  AlkPhos  53  11-15    LIVER FUNCTIONS - ( 15 Nov 2024 09:55 )  Alb: 3.4 g/dL / Pro: 7.4 g/dL / ALK PHOS: 53 U/L / ALT: 18 U/L / AST: 20 U/L / GGT: x           A/P:    CM, mod TR, mod-severe MR  S/p CVA involving R corona radiata region  S/p CT w/IV dye 11/11  Was on Lasix, Aldactone (last dose 11/14)  Mild contrast ANNALEE  Hold diuretics  Avoid nephrotoxins  F/u BMP, UA  If Cr is rising, would check renal SONO    [phone]         Prescriptions       Medication Sig Dispense Start Date End Date Auth. Provider    methocarbamoL (ROBAXIN) 500 MG Tab Take 2 tablets (1,000 mg total) by mouth 3 (three) times daily as needed (Pain). 30 tablet 6/19/2024 -- Holdsworth, Alayna, PA-C    acetaminophen (TYLENOL) 500 MG tablet Take 1 tablet (500 mg total) by mouth every 6 (six) hours as needed for Pain. 30 tablet 6/19/2024 -- Holdsworth, Alayna, PA-C          Follow-up Information       Follow up With Specialties Details Why Contact Info    [facility] -  Call   [address]  [place] [address]  [phone]               Holdsworth, Alayna, PA-C  06/20/24 2259

## 2024-11-15 NOTE — DIETITIAN INITIAL EVALUATION ADULT - EDUCATION DIETARY MODIFICATIONS
Pt with lethargy during visit - falling asleep during interview however able to understand and participate accurate responses./(1) partially meets; needs review/practice/verbalization

## 2024-11-15 NOTE — CONSULT NOTE ADULT - ASSESSMENT
89 year-old female with a PMH HTN, HLD, hypothyroidism, PAF, chronic diastolic HF, CAD s/p PCI, PE with factor V Leiden, severe pulmonary hypertension who presented to  11/11/24 with left facial droop, left sided weakness secondary to right corona radiata CVA      # CVA in R corona radiata  - MRI: There is abnormal T2 prolongation restricted diffusion seen involving the right corona radiata region. These findings are compatible with an acute infarct. No hemorrhagic transformation is seen. No significant shift or herniation is seen.  - ASA 81 po qd added by cardiology   - Lipitor added by neurology but she refused citing myalgia   - BP goal SBP < 140      # Paroxysmal A-fib  – Continue metoprolol tartrate 25 mg twice daily  – Continue Eliquis 5 mg twice daily    # ANNALEE  cr 1.5  holding lasix and spirolactone  nephrology to see  trend cr      # Chronic diastolic heart failure  # Pulm HTN  - ECHO from November 2023 with EF of 55 to 60% - severe pulmonary HTN , echo done at  unchanged from prior echo in 11/2023  –  holding  spironolactone 25 mg daily due to ANNALEE  – Continue metoprolol tartrate 25 mg twice daily  –  holding furosemide 40 mg daily due to ANNALEE  - 3L oxygen via NC overnight at home, no need for oxygen during daytime hours  -Daily weights     # Hyperlipidemia  - On Livalo 2mg at home, allergic to Repatha  – Lipitor 20mg recommended by neurology. LDL 85, LDL needs to be under 70 per neuro. Patient refusing 20mg lipitor tonight due to intermittent leg cramping/pain.    # Hypothyroidism  -Synthroid 25mcg AM      # DVT prophylaxis  – On Eliquis

## 2024-11-15 NOTE — CHART NOTE - NSCHARTNOTEFT_GEN_A_CORE
Family Contact Note    Contacted daughter over phone at 1:30pm. Explained that patient wanted to stop statin medication due to leg cramps. We explained the risks, including higher stroke risk, to her, and she agreed. Daughter was agreeable to plan, and I encouraged her to follow up with PCP or endocrinologist after her hospital stay to evaluate for alternative cholesterol reducing medication for long-term use.     Daughter also mentions that mother has restless leg syndrome and often will shake legs at night and sometimes even her head. Family Contact Note    Contacted daughter over phone at 1:30pm. Explained that patient wanted to stop statin medication due to leg cramps. We explained the risks, including higher stroke risk, to her, and she agreed. Daughter was agreeable to plan, and I encouraged her to follow up with PCP or endocrinologist after her hospital stay to evaluate for alternative cholesterol reducing medication for long-term use.     Daughter also mentions that mother has restless leg syndrome and often will shake legs at night and sometimes even her head.    addendum 2:20 PM  Resident note read and greatly appreciated. REstless leg syndrome more in keeping with presentation this morning, may benefit from medication management if worsens, however for now will monitor off med, especially in light of several active medical issues. Family is agreeable with patient's wishes to hold off on statin

## 2024-11-15 NOTE — DIETITIAN INITIAL EVALUATION ADULT - PERTINENT LABORATORY DATA
11-15    140  |  102  |  31[H]  ----------------------------<  189[H]  3.7   |  26  |  1.50[H]    Ca    9.5      15 Nov 2024 09:55    TPro  7.4  /  Alb  3.4  /  TBili  0.5  /  DBili  x   /  AST  20  /  ALT  18  /  AlkPhos  53  11-15  A1C with Estimated Average Glucose Result: 6.1 % (11-12-24 @ 07:39)

## 2024-11-15 NOTE — DIETITIAN INITIAL EVALUATION ADULT - PERTINENT MEDS FT
MEDICATIONS  (STANDING):  albuterol    90 MICROgram(s) HFA Inhaler 2 Puff(s) Inhalation every 6 hours  apixaban 5 milliGRAM(s) Oral two times a day  aspirin  chewable 81 milliGRAM(s) Oral daily  famotidine    Tablet 20 milliGRAM(s) Oral daily  furosemide    Tablet 40 milliGRAM(s) Oral daily  gabapentin 200 milliGRAM(s) Oral at bedtime  gabapentin 100 milliGRAM(s) Oral daily  levothyroxine 25 MICROGram(s) Oral daily  metoprolol tartrate 25 milliGRAM(s) Oral two times a day  polyethylene glycol 3350 17 Gram(s) Oral daily  potassium chloride    Tablet ER 20 milliEquivalent(s) Oral daily  senna 2 Tablet(s) Oral at bedtime  spironolactone 25 milliGRAM(s) Oral daily    MEDICATIONS  (PRN):  acetaminophen     Tablet .. 650 milliGRAM(s) Oral every 6 hours PRN Mild Pain (1 - 3), Moderate Pain (4 - 6), Severe Pain (7 - 10)  calcium carbonate    500 mG (Tums) Chewable 1 Tablet(s) Chew four times a day PRN Indigestion  LORazepam     Tablet 0.5 milliGRAM(s) Oral daily PRN Anxiety  melatonin 3 milliGRAM(s) Oral at bedtime PRN Insomnia

## 2024-11-16 LAB
ANION GAP SERPL CALC-SCNC: 7 MMOL/L — SIGNIFICANT CHANGE UP (ref 5–17)
BUN SERPL-MCNC: 33 MG/DL — HIGH (ref 7–23)
CALCIUM SERPL-MCNC: 9.4 MG/DL — SIGNIFICANT CHANGE UP (ref 8.4–10.5)
CHLORIDE SERPL-SCNC: 105 MMOL/L — SIGNIFICANT CHANGE UP (ref 96–108)
CHLORIDE UR-SCNC: 36 MMOL/L — SIGNIFICANT CHANGE UP
CO2 SERPL-SCNC: 29 MMOL/L — SIGNIFICANT CHANGE UP (ref 22–31)
CREAT SERPL-MCNC: 1.14 MG/DL — SIGNIFICANT CHANGE UP (ref 0.5–1.3)
EGFR: 46 ML/MIN/1.73M2 — LOW
GLUCOSE SERPL-MCNC: 97 MG/DL — SIGNIFICANT CHANGE UP (ref 70–99)
POTASSIUM SERPL-MCNC: 4.2 MMOL/L — SIGNIFICANT CHANGE UP (ref 3.5–5.3)
POTASSIUM SERPL-SCNC: 4.2 MMOL/L — SIGNIFICANT CHANGE UP (ref 3.5–5.3)
SODIUM SERPL-SCNC: 141 MMOL/L — SIGNIFICANT CHANGE UP (ref 135–145)
SODIUM UR-SCNC: 57 MMOL/L — SIGNIFICANT CHANGE UP

## 2024-11-16 PROCEDURE — 99232 SBSQ HOSP IP/OBS MODERATE 35: CPT

## 2024-11-16 RX ORDER — LACTULOSE 10 G/15ML
10 SOLUTION ORAL ONCE
Refills: 0 | Status: COMPLETED | OUTPATIENT
Start: 2024-11-16 | End: 2024-11-16

## 2024-11-16 RX ORDER — ROPINIROLE 8 MG/1
0.25 TABLET, FILM COATED, EXTENDED RELEASE ORAL AT BEDTIME
Refills: 0 | Status: DISCONTINUED | OUTPATIENT
Start: 2024-11-16 | End: 2024-11-17

## 2024-11-16 RX ADMIN — ACETAMINOPHEN 500MG 650 MILLIGRAM(S): 500 TABLET, COATED ORAL at 08:20

## 2024-11-16 RX ADMIN — APIXABAN 5 MILLIGRAM(S): 2.5 TABLET, FILM COATED ORAL at 10:04

## 2024-11-16 RX ADMIN — METOPROLOL TARTRATE 12.5 MILLIGRAM(S): 100 TABLET, FILM COATED ORAL at 19:07

## 2024-11-16 RX ADMIN — ACETAMINOPHEN 500MG 650 MILLIGRAM(S): 500 TABLET, COATED ORAL at 07:41

## 2024-11-16 RX ADMIN — Medication 25 MICROGRAM(S): at 05:36

## 2024-11-16 RX ADMIN — Medication 2 PUFF(S): at 08:14

## 2024-11-16 RX ADMIN — Medication 2 PUFF(S): at 15:15

## 2024-11-16 RX ADMIN — POLYETHYLENE GLYCOL 3350 17 GRAM(S): 17 POWDER, FOR SOLUTION ORAL at 12:25

## 2024-11-16 RX ADMIN — ACETAMINOPHEN 500MG 650 MILLIGRAM(S): 500 TABLET, COATED ORAL at 20:37

## 2024-11-16 RX ADMIN — Medication 2 TABLET(S): at 22:11

## 2024-11-16 RX ADMIN — Medication 81 MILLIGRAM(S): at 12:27

## 2024-11-16 RX ADMIN — GABAPENTIN 200 MILLIGRAM(S): 300 CAPSULE ORAL at 22:11

## 2024-11-16 RX ADMIN — FAMOTIDINE 20 MILLIGRAM(S): 20 TABLET, FILM COATED ORAL at 12:26

## 2024-11-16 RX ADMIN — GABAPENTIN 100 MILLIGRAM(S): 300 CAPSULE ORAL at 12:24

## 2024-11-16 RX ADMIN — ACETAMINOPHEN 500MG 650 MILLIGRAM(S): 500 TABLET, COATED ORAL at 21:10

## 2024-11-16 RX ADMIN — Medication 1 TABLET(S): at 15:31

## 2024-11-16 RX ADMIN — METOPROLOL TARTRATE 12.5 MILLIGRAM(S): 100 TABLET, FILM COATED ORAL at 06:18

## 2024-11-16 RX ADMIN — LACTULOSE 10 GRAM(S): 10 SOLUTION ORAL at 13:01

## 2024-11-16 RX ADMIN — APIXABAN 5 MILLIGRAM(S): 2.5 TABLET, FILM COATED ORAL at 22:11

## 2024-11-16 RX ADMIN — ROPINIROLE 0.25 MILLIGRAM(S): 8 TABLET, FILM COATED, EXTENDED RELEASE ORAL at 22:11

## 2024-11-16 NOTE — PROGRESS NOTE ADULT - SUBJECTIVE AND OBJECTIVE BOX
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 No acute overnight events. Pt c/o LLE pain    ALLERGIES:  Macrodantin (Other)  Motrin (Other)  statins (Muscle Pain)  Repatha (Unknown)  flu vaccines (Other)  nisoldipine (Unknown)  sulfa drugs (Other)  Ceftin (Other (Moderate))  tadalafil (Muscle Pain)    MEDICATIONS  (STANDING):  albuterol    90 MICROgram(s) HFA Inhaler 2 Puff(s) Inhalation every 6 hours  apixaban 5 milliGRAM(s) Oral two times a day  aspirin  chewable 81 milliGRAM(s) Oral daily  famotidine    Tablet 20 milliGRAM(s) Oral daily  gabapentin 200 milliGRAM(s) Oral at bedtime  gabapentin 100 milliGRAM(s) Oral daily  lactulose Syrup 10 Gram(s) Oral once  levothyroxine 25 MICROGram(s) Oral daily  metoprolol tartrate 12.5 milliGRAM(s) Oral two times a day  polyethylene glycol 3350 17 Gram(s) Oral daily  rOPINIRole 0.25 milliGRAM(s) Oral at bedtime  senna 2 Tablet(s) Oral at bedtime    MEDICATIONS  (PRN):  acetaminophen     Tablet .. 650 milliGRAM(s) Oral every 6 hours PRN Mild Pain (1 - 3), Moderate Pain (4 - 6), Severe Pain (7 - 10)  calcium carbonate    500 mG (Tums) Chewable 1 Tablet(s) Chew four times a day PRN Indigestion  LORazepam     Tablet 0.5 milliGRAM(s) Oral daily PRN Anxiety  melatonin 3 milliGRAM(s) Oral at bedtime PRN Insomnia    Vital Signs Last 24 Hrs  T(F): 97.9 (16 Nov 2024 07:54), Max: 97.9 (15 Nov 2024 20:19)  HR: 60 (16 Nov 2024 07:54) (60 - 85)  BP: 147/59 (16 Nov 2024 07:54) (121/70 - 148/83)  RR: 16 (16 Nov 2024 07:54) (16 - 16)  SpO2: 93% (16 Nov 2024 07:54) (93% - 96%)  I&O's Summary    BMI (kg/m2): 33.3 (11-14-24 @ 17:41)    PHYSICAL EXAM:  GENERAL: pt sitting in chair in NAD  CHEST/LUNG: Clear to percussion bilaterally; No rales, rhonchi, wheezing, or rubs; normal respiratory effort   HEART: Regular rate and rhythm; No murmurs noted  ABDOMEN: Soft, Nontender, Nondistended; Bowel sounds present   MUSCULOSKELETAL/EXTREMITIES:  no cyanosis, no edema noted to BLE  NERVOUS SYSTEM:   Sensation intact; follows commands  PSYCH: Appropriate affect, Alert & Oriented x 3     LABS:                        13.5   10.30 )-----------( 280      ( 15 Nov 2024 09:55 )             40.7       11-16    141  |  105  |  33  ----------------------------<  97  4.2   |  29  |  1.14    Ca    9.4      16 Nov 2024 05:55    TPro  7.4  /  Alb  3.4  /  TBili  0.5  /  DBili  x   /  AST  20  /  ALT  18  /  AlkPhos  53  11-15                11-12 Chol 154 mg/dL LDL -- HDL 53 mg/dL Trig 82 mg/dL                  Urinalysis Basic - ( 16 Nov 2024 05:55 )    Color: x / Appearance: x / SG: x / pH: x  Gluc: 97 mg/dL / Ketone: x  / Bili: x / Urobili: x   Blood: x / Protein: x / Nitrite: x   Leuk Esterase: x / RBC: x / WBC x   Sq Epi: x / Non Sq Epi: x / Bacteria: x        COVID-19 PCR: NotDetec (11-14-24 @ 20:35)      Care Discussed with Consultants/Other Providers: Yes

## 2024-11-16 NOTE — PROVIDER CONTACT NOTE (OTHER) - RECOMMENDATIONS
Concerned for aspiration risk. NPO diet until SLP reevaluation. Chest Xray. Family educated on mildly thick liquids restriction for pt.

## 2024-11-16 NOTE — PROGRESS NOTE ADULT - ASSESSMENT
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 These findings are compatible with an acute infarct. No hemorrhagic transformation is seen. No significant shift or herniation is seen.  - ASA 81 po qd added by cardiology   - Lipitor added by neurology but she refused citing myalgia   - BP goal SBP < 140    # Paroxysmal A-fib  – Continue metoprolol tartrate 25 mg twice daily  – Continue Eliquis 5 mg twice daily    # Mild contrast ANNALEE  holding lasix and spirolactone  nephrology consulted - Hold diuretics. Avoid nephrotoxins    # Chronic diastolic heart failure  # Pulm HTN  - ECHO from November 2023 with EF of 55 to 60% - severe pulmonary HTN , echo done at  unchanged from prior echo in 11/2023  –  holding  spironolactone 25 mg daily due to ANNALEE  – Continue metoprolol tartrate 25 mg twice daily  –  holding furosemide 40 mg daily due to ANNALEE  - 3L oxygen via NC overnight at home, no need for oxygen during daytime hours  -Daily weights     # Hyperlipidemia  - On Livalo 2mg at home, allergic to Repatha  – Lipitor 20mg recommended by neurology. LDL 85, LDL needs to be under 70 per neuro. Patient refusing 20mg lipitor due to intermittent leg cramping/pain. Spoke with Dr. Patel who will start Requip 11/16    # Hypothyroidism  -Synthroid 25mcg AM    # DVT prophylaxis  – On Eliquis  Discussed treatment plan with IDT.  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 These findings are compatible with an acute infarct. No hemorrhagic transformation is seen. No significant shift or herniation is seen.  - ASA 81 po qd added by cardiology   - Lipitor added by neurology but she refused citing myalgia   - BP goal SBP < 140    # Paroxysmal A-fib  – Continue metoprolol tartrate twice daily  – Continue Eliquis 5 mg twice daily    # Mild contrast ANNALEE  holding lasix and spirolactone  nephrology consulted - Hold diuretics. Avoid nephrotoxins    # Chronic diastolic heart failure  # Pulm HTN  - ECHO from November 2023 with EF of 55 to 60% - severe pulmonary HTN , echo done at  unchanged from prior echo in 11/2023  –  holding  spironolactone 25 mg daily due to ANNALEE  – Continue metoprolol tartrate 25 mg twice daily  –  holding furosemide 40 mg daily due to ANNALEE  - 3L oxygen via NC overnight at home, no need for oxygen during daytime hours  -Daily weights     # Hyperlipidemia  - On Livalo 2mg at home, allergic to Repatha  – Lipitor 20mg recommended by neurology. LDL 85, LDL needs to be under 70 per neuro. Patient refusing 20mg lipitor due to intermittent leg cramping/pain. Spoke with Dr. Patel who will start Requip 11/16    # Hypothyroidism  -Synthroid 25mcg AM    # DVT prophylaxis  – On Eliquis  Discussed treatment plan with IDT.

## 2024-11-16 NOTE — PROVIDER CONTACT NOTE (OTHER) - SITUATION
Pt having intermittent coughing more freq  during meals despite supervised feedings and redirection to take smaller bites and chew food more thoroughly. Pt in upright position >45degrees while eating.

## 2024-11-16 NOTE — PROGRESS NOTE ADULT - COMMENTS
Patient in bed. She still has restless movement of leg, which she claims is from statin, however she is distractable and movement is not persistent. We discussed restless leg syndrome, which her family reports occurred prior to admission; patient also agrees she has RLS but never was on medications. We discussed possible requip, which she is agreeable to. Fili trial. Encouraged to continue oral hydration. She still has not had BM since 11/11

## 2024-11-16 NOTE — PROVIDER CONTACT NOTE (OTHER) - BACKGROUND
CVA. Pt is currently on a regular easy to chew diet (DASH/TLC) w/ mildy thick liquids. Recent MBS+Pt evaluated by SLP today noting similar observations but no change to diet made. Pt has COPD w/cough

## 2024-11-16 NOTE — PROGRESS NOTE ADULT - ASSESSMENT
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 These findings are compatible with an acute infarct. No hemorrhagic transformation is seen. No significant shift or herniation is seen.  - ASA 81 po qd added by cardiology   -  Patient refuses statin, even low dose; states she has been on multiple variations of statin including injectable in past , and has always had poor reaction. We discussed the increased risk of secondary stroke on ASA only; she verbalizes understanding but refuses. Also reviewed with family. DC 11/15  - BP goal SBP < 140  - Start comprehensive PT, OT, SLP program 3 hours daily 5 x week  - Precautions: cardiac, fall, AC    # frustration, irritability, anxiety, exacerbated by use of statin and myalgia  - statin dc per patient's wishes  - neuropsychology support  - rec therapy    # Paroxysmal A-fib  – Continue metoprolol tartrate 25 mg twice daily  – Continue Eliquis 5 mg twice daily  - HR controlled    # Chronic diastolic heart failure  # Pulm HTN  - ECHO from November 2023 with EF of 55 to 60% - severe pulmonary HTN , echo done at  unchanged from prior echo in 11/2023  –  spironolactone 25 mg daily- held 11/15 due to ANNALEE  – Continue metoprolol tartrate 25 mg twice daily  – furosemide 40 mg daily held 11/15 due to ANNALEE  - 3L oxygen via NC overnight at home, no need for oxygen during daytime hours  - BP  (121/70 - 148/83) 11.16. O2 sat 93-96%    # Hyperlipidemia  - On Livalo 2mg at home, allergic to Repatha  – Lipitor dc per patient's wishes after education  - RD consult to provide education on Heart Healthy diet     # Hypothyroidism  - Synthroid 25mcg AM    # ANNALEE  - Hospitalist appreciated. Hold lasix and spironolactone 11/15  - renal consult appreciated 11/15. Agree with holding diuretics. Renal sono if Cr worsens   - BUn/Cr 31/1.5 11/15 (prior 27/1.0)--> 33/1.14 11/16 improved    # restless leg syndrome  - requip qhs  - patient agreeable    # Pain control  - Tylenol PRN    # GI/Bowel Mgmt   - Continue Senna at bedtime   - Miralax daily    # FEN   - Diet - seen by SLP 11/12: Easy To Chew Diet with Mildly Thick Liquids    # DVT prophylaxis  – On Eliquis    # LABS  CBC CMP 11/18    [facility] Holloway  Cardiovascular Disease  [address] [place] [address]  Phone: [phone]  Fax: [phone]  Follow Up Time:

## 2024-11-16 NOTE — PROVIDER CONTACT NOTE (OTHER) - ASSESSMENT
Encouraged pt to take small sips between solid foods but is still observed to coughing during meals. Pt observed able to swallow mildly thin liquids with no  coughing without food.

## 2024-11-16 NOTE — PROGRESS NOTE ADULT - SUBJECTIVE AND OBJECTIVE BOX
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     Patient went out to lunch at 12 pm the day of admission with her daughter. Around 6 pm the patient slipped out of bed and was unable to get back up. She called her daughter and when the she arrived noted her mother did not seem like herself and had a Left sided facial droop.  CT/CTA head and CTA of neck without evidence of an acute intracranial hemorrhage midline shift or hydrocephalus. There was no hemodynamic significant narrowing within the neck. or proximal large vessel occlusion noted.  MRI showed "abnormal T2 prolongation restricted diffusion seen involving the right corona radiata region. These findings are compatible with an acute infarct. No hemorrhagic transformation is seen. No significant shift or herniation is seen."    Patient was managed medically and was deemed stable for discharge to [facility] on 11/14/24 for acute IRF.          (14 Nov 2024 13:35)      PAST MEDICAL & SURGICAL HISTORY:  Atrial fibrillation      CHF (congestive heart failure)      Pulmonary emboli      Factor 5 Leiden mutation, heterozygous      CAD (coronary artery disease)      Stented coronary artery      Hypothyroid      COPD (chronic obstructive pulmonary disease)      Mitral regurgitation      HTN (hypertension)      H/O: hysterectomy      H/O knee surgery      Cyst of breast, unspecified laterality  S/P excision      H/O cardiac radiofrequency ablation      S/P ablation of atrial fibrillation          MEDICATIONS  (STANDING):  albuterol    90 MICROgram(s) HFA Inhaler 2 Puff(s) Inhalation every 6 hours  apixaban 5 milliGRAM(s) Oral two times a day  aspirin  chewable 81 milliGRAM(s) Oral daily  famotidine    Tablet 20 milliGRAM(s) Oral daily  gabapentin 200 milliGRAM(s) Oral at bedtime  gabapentin 100 milliGRAM(s) Oral daily  lactulose Syrup 10 Gram(s) Oral once  levothyroxine 25 MICROGram(s) Oral daily  metoprolol tartrate 12.5 milliGRAM(s) Oral two times a day  polyethylene glycol 3350 17 Gram(s) Oral daily  rOPINIRole 0.25 milliGRAM(s) Oral at bedtime  senna 2 Tablet(s) Oral at bedtime    MEDICATIONS  (PRN):  acetaminophen     Tablet .. 650 milliGRAM(s) Oral every 6 hours PRN Mild Pain (1 - 3), Moderate Pain (4 - 6), Severe Pain (7 - 10)  calcium carbonate    500 mG (Tums) Chewable 1 Tablet(s) Chew four times a day PRN Indigestion  LORazepam     Tablet 0.5 milliGRAM(s) Oral daily PRN Anxiety  melatonin 3 milliGRAM(s) Oral at bedtime PRN Insomnia      Allergies    Macrodantin (Other)  Motrin (Other)  statins (Muscle Pain)  Repatha (Unknown)  flu vaccines (Other)  nisoldipine (Unknown)  sulfa drugs (Other)  Ceftin (Other (Moderate))  tadalafil (Muscle Pain)    Intolerances          VITALS  89y  Vital Signs Last 24 Hrs  T(C): 36.6 (16 Nov 2024 07:54), Max: 36.6 (15 Nov 2024 20:19)  T(F): 97.9 (16 Nov 2024 07:54), Max: 97.9 (15 Nov 2024 20:19)  HR: 60 (16 Nov 2024 07:54) (60 - 85)  BP: 147/59 (16 Nov 2024 07:54) (121/70 - 148/83)  BP(mean): --  RR: 16 (16 Nov 2024 07:54) (16 - 16)  SpO2: 93% (16 Nov 2024 07:54) (93% - 96%)    Parameters below as of 16 Nov 2024 07:54  Patient On (Oxygen Delivery Method): room air      Daily     Daily         RECENT LABS:                          13.5   10.30 )-----------( 280      ( 15 Nov 2024 09:55 )             40.7     11-16    141  |  105  |  33[H]  ----------------------------<  97  4.2   |  29  |  1.14    Ca    9.4      16 Nov 2024 05:55    TPro  7.4  /  Alb  3.4  /  TBili  0.5  /  DBili  x   /  AST  20  /  ALT  18  /  AlkPhos  53  11-15    LIVER FUNCTIONS - ( 15 Nov 2024 09:55 )  Alb: 3.4 g/dL / Pro: 7.4 g/dL / ALK PHOS: 53 U/L / ALT: 18 U/L / AST: 20 U/L / GGT: x             Urinalysis Basic - ( 16 Nov 2024 05:55 )    Color: x / Appearance: x / SG: x / pH: x  Gluc: 97 mg/dL / Ketone: x  / Bili: x / Urobili: x   Blood: x / Protein: x / Nitrite: x   Leuk Esterase: x / RBC: x / WBC x   Sq Epi: x / Non Sq Epi: x / Bacteria: x        Urinalysis with Rflx Culture (collected 11-11-24 @ 20:12)        CAPILLARY BLOOD GLUCOSE

## 2024-11-17 PROCEDURE — 99232 SBSQ HOSP IP/OBS MODERATE 35: CPT

## 2024-11-17 RX ORDER — ACETAMINOPHEN 500MG 500 MG/1
975 TABLET, COATED ORAL EVERY 8 HOURS
Refills: 0 | Status: DISCONTINUED | OUTPATIENT
Start: 2024-11-17 | End: 2024-11-29

## 2024-11-17 RX ORDER — GABAPENTIN 300 MG/1
200 CAPSULE ORAL
Refills: 0 | Status: DISCONTINUED | OUTPATIENT
Start: 2024-11-17 | End: 2024-11-20

## 2024-11-17 RX ORDER — GABAPENTIN 300 MG/1
100 CAPSULE ORAL ONCE
Refills: 0 | Status: COMPLETED | OUTPATIENT
Start: 2024-11-17 | End: 2024-11-17

## 2024-11-17 RX ORDER — ROPINIROLE 8 MG/1
0.5 TABLET, FILM COATED, EXTENDED RELEASE ORAL AT BEDTIME
Refills: 0 | Status: DISCONTINUED | OUTPATIENT
Start: 2024-11-17 | End: 2024-11-21

## 2024-11-17 RX ADMIN — Medication 2 PUFF(S): at 19:23

## 2024-11-17 RX ADMIN — APIXABAN 5 MILLIGRAM(S): 2.5 TABLET, FILM COATED ORAL at 21:46

## 2024-11-17 RX ADMIN — METOPROLOL TARTRATE 12.5 MILLIGRAM(S): 100 TABLET, FILM COATED ORAL at 06:12

## 2024-11-17 RX ADMIN — ACETAMINOPHEN 500MG 650 MILLIGRAM(S): 500 TABLET, COATED ORAL at 09:50

## 2024-11-17 RX ADMIN — ACETAMINOPHEN 500MG 975 MILLIGRAM(S): 500 TABLET, COATED ORAL at 15:05

## 2024-11-17 RX ADMIN — APIXABAN 5 MILLIGRAM(S): 2.5 TABLET, FILM COATED ORAL at 09:53

## 2024-11-17 RX ADMIN — Medication 81 MILLIGRAM(S): at 11:20

## 2024-11-17 RX ADMIN — Medication 25 MICROGRAM(S): at 05:13

## 2024-11-17 RX ADMIN — ACETAMINOPHEN 500MG 650 MILLIGRAM(S): 500 TABLET, COATED ORAL at 08:53

## 2024-11-17 RX ADMIN — ROPINIROLE 0.5 MILLIGRAM(S): 8 TABLET, FILM COATED, EXTENDED RELEASE ORAL at 21:46

## 2024-11-17 RX ADMIN — GABAPENTIN 100 MILLIGRAM(S): 300 CAPSULE ORAL at 15:04

## 2024-11-17 RX ADMIN — ACETAMINOPHEN, DIPHENHYDRAMINE HCL, PHENYLEPHRINE HCL 3 MILLIGRAM(S): 325; 25; 5 TABLET ORAL at 21:46

## 2024-11-17 RX ADMIN — METOPROLOL TARTRATE 12.5 MILLIGRAM(S): 100 TABLET, FILM COATED ORAL at 17:43

## 2024-11-17 RX ADMIN — FAMOTIDINE 20 MILLIGRAM(S): 20 TABLET, FILM COATED ORAL at 11:21

## 2024-11-17 RX ADMIN — ACETAMINOPHEN 500MG 975 MILLIGRAM(S): 500 TABLET, COATED ORAL at 16:00

## 2024-11-17 RX ADMIN — GABAPENTIN 100 MILLIGRAM(S): 300 CAPSULE ORAL at 11:21

## 2024-11-17 RX ADMIN — GABAPENTIN 200 MILLIGRAM(S): 300 CAPSULE ORAL at 21:45

## 2024-11-17 RX ADMIN — Medication 2 PUFF(S): at 08:33

## 2024-11-17 NOTE — PROGRESS NOTE ADULT - SUBJECTIVE AND OBJECTIVE BOX
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     Patient went out to lunch at 12 pm the day of admission with her daughter. Around 6 pm the patient slipped out of bed and was unable to get back up. She called her daughter and when the she arrived noted her mother did not seem like herself and had a Left sided facial droop.  CT/CTA head and CTA of neck without evidence of an acute intracranial hemorrhage midline shift or hydrocephalus. There was no hemodynamic significant narrowing within the neck. or proximal large vessel occlusion noted.  MRI showed "abnormal T2 prolongation restricted diffusion seen involving the right corona radiata region. These findings are compatible with an acute infarct. No hemorrhagic transformation is seen. No significant shift or herniation is seen."    Patient was managed medically and was deemed stable for discharge to [facility] on 11/14/24 for acute IRF.          (14 Nov 2024 13:35)      PAST MEDICAL & SURGICAL HISTORY:  Atrial fibrillation      CHF (congestive heart failure)      Pulmonary emboli      Factor 5 Leiden mutation, heterozygous      CAD (coronary artery disease)      Stented coronary artery      Hypothyroid      COPD (chronic obstructive pulmonary disease)      Mitral regurgitation      HTN (hypertension)      H/O: hysterectomy      H/O knee surgery      Cyst of breast, unspecified laterality  S/P excision      H/O cardiac radiofrequency ablation      S/P ablation of atrial fibrillation          MEDICATIONS  (STANDING):  albuterol    90 MICROgram(s) HFA Inhaler 2 Puff(s) Inhalation every 6 hours  apixaban 5 milliGRAM(s) Oral two times a day  aspirin  chewable 81 milliGRAM(s) Oral daily  famotidine    Tablet 20 milliGRAM(s) Oral daily  gabapentin 200 milliGRAM(s) Oral at bedtime  gabapentin 100 milliGRAM(s) Oral daily  levothyroxine 25 MICROGram(s) Oral daily  metoprolol tartrate 12.5 milliGRAM(s) Oral two times a day  polyethylene glycol 3350 17 Gram(s) Oral daily  rOPINIRole 0.25 milliGRAM(s) Oral at bedtime  senna 2 Tablet(s) Oral at bedtime    MEDICATIONS  (PRN):  acetaminophen     Tablet .. 650 milliGRAM(s) Oral every 6 hours PRN Mild Pain (1 - 3), Moderate Pain (4 - 6), Severe Pain (7 - 10)  calcium carbonate    500 mG (Tums) Chewable 1 Tablet(s) Chew four times a day PRN Indigestion  LORazepam     Tablet 0.5 milliGRAM(s) Oral daily PRN Anxiety  melatonin 3 milliGRAM(s) Oral at bedtime PRN Insomnia  saline laxative (FLEET) Rectal Enema 1 Enema Rectal once PRN constipation      Allergies    Macrodantin (Other)  Motrin (Other)  statins (Muscle Pain)  Repatha (Unknown)  flu vaccines (Other)  nisoldipine (Unknown)  sulfa drugs (Other)  Ceftin (Other (Moderate))  tadalafil (Muscle Pain)    Intolerances          VITALS  89y  Vital Signs Last 24 Hrs  T(C): 36.6 (16 Nov 2024 20:35), Max: 36.6 (16 Nov 2024 20:35)  T(F): 97.9 (16 Nov 2024 20:35), Max: 97.9 (16 Nov 2024 20:35)  HR: 60 (17 Nov 2024 06:11) (60 - 74)  BP: 143/82 (17 Nov 2024 06:11) (140/81 - 145/74)  BP(mean): --  RR: 16 (16 Nov 2024 20:35) (16 - 16)  SpO2: 98% (16 Nov 2024 20:35) (94% - 98%)    Parameters below as of 16 Nov 2024 20:35  Patient On (Oxygen Delivery Method): nasal cannula  O2 Flow (L/min): 2    Daily     Daily         RECENT LABS:                          13.5   10.30 )-----------( 280      ( 15 Nov 2024 09:55 )             40.7     11-16    141  |  105  |  33[H]  ----------------------------<  97  4.2   |  29  |  1.14    Ca    9.4      16 Nov 2024 05:55    TPro  7.4  /  Alb  3.4  /  TBili  0.5  /  DBili  x   /  AST  20  /  ALT  18  /  AlkPhos  53  11-15    LIVER FUNCTIONS - ( 15 Nov 2024 09:55 )  Alb: 3.4 g/dL / Pro: 7.4 g/dL / ALK PHOS: 53 U/L / ALT: 18 U/L / AST: 20 U/L / GGT: x             Urinalysis Basic - ( 16 Nov 2024 05:55 )    Color: x / Appearance: x / SG: x / pH: x  Gluc: 97 mg/dL / Ketone: x  / Bili: x / Urobili: x   Blood: x / Protein: x / Nitrite: x   Leuk Esterase: x / RBC: x / WBC x   Sq Epi: x / Non Sq Epi: x / Bacteria: x          CAPILLARY BLOOD GLUCOSE

## 2024-11-17 NOTE — PROGRESS NOTE ADULT - SUBJECTIVE AND OBJECTIVE BOX
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 No acute overnight events. No complaints this morning.  Nurse reports pt coughing with eating yesterday, possibly eating too fast. Discussed  supervision with meals or assistance to ensure pt with smaller bites.    ALLERGIES:  Macrodantin (Other)  Motrin (Other)  statins (Muscle Pain)  Repatha (Unknown)  flu vaccines (Other)  nisoldipine (Unknown)  sulfa drugs (Other)  Ceftin (Other (Moderate))  tadalafil (Muscle Pain)    MEDICATIONS  (STANDING):  albuterol    90 MICROgram(s) HFA Inhaler 2 Puff(s) Inhalation every 6 hours  apixaban 5 milliGRAM(s) Oral two times a day  aspirin  chewable 81 milliGRAM(s) Oral daily  famotidine    Tablet 20 milliGRAM(s) Oral daily  gabapentin 200 milliGRAM(s) Oral at bedtime  gabapentin 100 milliGRAM(s) Oral daily  levothyroxine 25 MICROGram(s) Oral daily  metoprolol tartrate 12.5 milliGRAM(s) Oral two times a day  polyethylene glycol 3350 17 Gram(s) Oral daily  rOPINIRole 0.25 milliGRAM(s) Oral at bedtime  senna 2 Tablet(s) Oral at bedtime    MEDICATIONS  (PRN):  acetaminophen     Tablet .. 650 milliGRAM(s) Oral every 6 hours PRN Mild Pain (1 - 3), Moderate Pain (4 - 6), Severe Pain (7 - 10)  calcium carbonate    500 mG (Tums) Chewable 1 Tablet(s) Chew four times a day PRN Indigestion  LORazepam     Tablet 0.5 milliGRAM(s) Oral daily PRN Anxiety  melatonin 3 milliGRAM(s) Oral at bedtime PRN Insomnia  saline laxative (FLEET) Rectal Enema 1 Enema Rectal once PRN constipation    Vital Signs Last 24 Hrs  T(F): 97.9 (16 Nov 2024 20:35), Max: 97.9 (16 Nov 2024 20:35)  HR: 60 (17 Nov 2024 06:11) (60 - 74)  BP: 143/82 (17 Nov 2024 06:11) (140/81 - 145/74)  RR: 16 (16 Nov 2024 20:35) (16 - 16)  SpO2: 98% (16 Nov 2024 20:35) (94% - 98%)  I&O's Summary    BMI (kg/m2): 33.3 (11-14-24 @ 17:41)    PHYSICAL EXAM:  GENERAL: pt sitting in chair in NAD  CHEST/LUNG: Clear to percussion bilaterally; No rales, rhonchi, wheezing anteriorly, or rubs; normal respiratory effort   HEART: Regular rate and rhythm; No murmurs noted  ABDOMEN: Soft, Nontender, Nondistended; Bowel sounds present   MUSCULOSKELETAL/EXTREMITIES:  no cyanosis, no edema noted to BLE  NERVOUS SYSTEM:   Sensation intact; follows commands  PSYCH: Appropriate affect, Alert & Oriented      LABS:                        13.5   10.30 )-----------( 280      ( 15 Nov 2024 09:55 )             40.7       11-16    141  |  105  |  33  ----------------------------<  97  4.2   |  29  |  1.14    Ca    9.4      16 Nov 2024 05:55    TPro  7.4  /  Alb  3.4  /  TBili  0.5  /  DBili  x   /  AST  20  /  ALT  18  /  AlkPhos  53  11-15                11-12 Chol 154 mg/dL LDL -- HDL 53 mg/dL Trig 82 mg/dL                  Urinalysis Basic - ( 16 Nov 2024 05:55 )    Color: x / Appearance: x / SG: x / pH: x  Gluc: 97 mg/dL / Ketone: x  / Bili: x / Urobili: x   Blood: x / Protein: x / Nitrite: x   Leuk Esterase: x / RBC: x / WBC x   Sq Epi: x / Non Sq Epi: x / Bacteria: x        COVID-19 PCR: NotDetec (11-14-24 @ 20:35)      Care Discussed with Consultants/Other Providers: Yes   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 No acute overnight events. Pt c/o leg pain again today, nurse giving Tylenol.  Nurse reports pt coughing with eating yesterday, possibly eating too fast. Discussed  supervision with meals or assistance to ensure pt with smaller bites.    ALLERGIES:  Macrodantin (Other)  Motrin (Other)  statins (Muscle Pain)  Repatha (Unknown)  flu vaccines (Other)  nisoldipine (Unknown)  sulfa drugs (Other)  Ceftin (Other (Moderate))  tadalafil (Muscle Pain)    MEDICATIONS  (STANDING):  albuterol    90 MICROgram(s) HFA Inhaler 2 Puff(s) Inhalation every 6 hours  apixaban 5 milliGRAM(s) Oral two times a day  aspirin  chewable 81 milliGRAM(s) Oral daily  famotidine    Tablet 20 milliGRAM(s) Oral daily  gabapentin 200 milliGRAM(s) Oral at bedtime  gabapentin 100 milliGRAM(s) Oral daily  levothyroxine 25 MICROGram(s) Oral daily  metoprolol tartrate 12.5 milliGRAM(s) Oral two times a day  polyethylene glycol 3350 17 Gram(s) Oral daily  rOPINIRole 0.25 milliGRAM(s) Oral at bedtime  senna 2 Tablet(s) Oral at bedtime    MEDICATIONS  (PRN):  acetaminophen     Tablet .. 650 milliGRAM(s) Oral every 6 hours PRN Mild Pain (1 - 3), Moderate Pain (4 - 6), Severe Pain (7 - 10)  calcium carbonate    500 mG (Tums) Chewable 1 Tablet(s) Chew four times a day PRN Indigestion  LORazepam     Tablet 0.5 milliGRAM(s) Oral daily PRN Anxiety  melatonin 3 milliGRAM(s) Oral at bedtime PRN Insomnia  saline laxative (FLEET) Rectal Enema 1 Enema Rectal once PRN constipation    Vital Signs Last 24 Hrs  T(F): 97.9 (16 Nov 2024 20:35), Max: 97.9 (16 Nov 2024 20:35)  HR: 60 (17 Nov 2024 06:11) (60 - 74)  BP: 143/82 (17 Nov 2024 06:11) (140/81 - 145/74)  RR: 16 (16 Nov 2024 20:35) (16 - 16)  SpO2: 98% (16 Nov 2024 20:35) (94% - 98%)  I&O's Summary    BMI (kg/m2): 33.3 (11-14-24 @ 17:41)    PHYSICAL EXAM:  GENERAL: pt sitting in chair in NAD  CHEST/LUNG: Clear to percussion bilaterally; No rales, rhonchi, wheezing anteriorly, or rubs; normal respiratory effort   HEART: Regular rate and rhythm; No murmurs noted  ABDOMEN: Soft, Nontender, Nondistended; Bowel sounds present   MUSCULOSKELETAL/EXTREMITIES:  no cyanosis, no edema noted to BLE  NERVOUS SYSTEM:   Sensation intact; follows commands  PSYCH: Appropriate affect, Alert & Oriented      LABS:                        13.5   10.30 )-----------( 280      ( 15 Nov 2024 09:55 )             40.7       11-16    141  |  105  |  33  ----------------------------<  97  4.2   |  29  |  1.14    Ca    9.4      16 Nov 2024 05:55    TPro  7.4  /  Alb  3.4  /  TBili  0.5  /  DBili  x   /  AST  20  /  ALT  18  /  AlkPhos  53  11-15                11-12 Chol 154 mg/dL LDL -- HDL 53 mg/dL Trig 82 mg/dL                  Urinalysis Basic - ( 16 Nov 2024 05:55 )    Color: x / Appearance: x / SG: x / pH: x  Gluc: 97 mg/dL / Ketone: x  / Bili: x / Urobili: x   Blood: x / Protein: x / Nitrite: x   Leuk Esterase: x / RBC: x / WBC x   Sq Epi: x / Non Sq Epi: x / Bacteria: x        COVID-19 PCR: NotDetec (11-14-24 @ 20:35)      Care Discussed with Consultants/Other Providers: Yes

## 2024-11-17 NOTE — PROGRESS NOTE ADULT - ASSESSMENT
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 These findings are compatible with an acute infarct. No hemorrhagic transformation is seen. No significant shift or herniation is seen.  - ASA 81 po qd added by cardiology   -  STatin dc 11/15. Patient refuses even low dose; states she has been on multiple variations of statin including injectable in past , and has always had poor reaction. We discussed the increased risk of secondary stroke on ASA only; she verbalizes understanding but refuses. Also reviewed with family  - BP goal SBP < 140  - Continue comprehensive PT, OT, SLP program 3 hours daily 5 x week  - Precautions: cardiac, fall, AC    # frustration, irritability, anxiety, exacerbated by use of statin and myalgia  - statin dc per patient's wishes  - neuropsychology support  - rec therapy  - improved 11/17    # Paroxysmal A-fib  – metoprolol tartrate 25 mg twice daily  – Eliquis 5 mg twice daily  - HR controlled 60-74 11/117    # Chronic diastolic heart failure  # Pulm HTN  - ECHO from November 2023 with EF of 55 to 60% - severe pulmonary HTN , echo done at  unchanged from prior echo in 11/2023  – spironolactone 25 mg daily- held 11/15 due to ANNALEE  – metoprolol tartrate 25 mg twice daily  – furosemide held 11/15 due to ANNALEE  - 3L oxygen via NC overnight at home, no need for oxygen during daytime hours  - BP  (140/81 - 145/74) 11/17  - O2 sat 94-98% (nocturnal O2 via NC)    # Hyperlipidemia  - On Livalo 2mg at home, allergic to Repatha  – Lipitor dc per patient's wishes after education  - RD consult to provide education on Heart Healthy diet     # Hypothyroidism  - Synthroid 25mcg AM    # ANNALEE  - Hospitalist appreciated. Hold lasix and spironolactone 11/15  - renal consult appreciated 11/15. Agree with holding diuretics. Renal sono if Cr worsens   - BUn/Cr 31/1.5 11/15 (prior 27/1.0)--> 33/1.14 11/16 improved  - BMP 11/18    # restless leg syndrome  - requip qhs  - patient agreeable. Tolerating, monitor,     # Pain control  - Tylenol PRN    # GI/Bowel Mgmt   - Continue Senna at bedtime   - Miralax daily    # FEN   - Diet - seen by SLP 11/12: Easy To Chew Diet with Mildly Thick Liquids    # DVT prophylaxis  – On Eliquis    # Case to be discussed in IDT rounds 11/18 (initial):    # LABS  CBC CMP 11/18    Praful Holloway  Cardiovascular Disease  [address] [place] [address]  Phone: [phone]  Fax: [phone]  Follow Up Time:   89 year-old female with a PMH HTN, HLD, hypothyroidism, PAF, chronic diastolic HF, CAD s/p PCI, PE with factor V Leiden, severe pulmonary hypertension who presented to  11/11/24 with left facial droop, left sided weakness secondary to right corona radiata CVA    # 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 These findings are compatible with an acute infarct. No hemorrhagic transformation is seen. No significant shift or herniation is seen.  - ASA 81 po qd added by cardiology   -  STatin dc 11/15. Patient refuses even low dose; states she has been on multiple variations of statin including injectable in past , and has always had poor reaction. We discussed the increased risk of secondary stroke on ASA only; she verbalizes understanding but refuses. Also reviewed with family  - BP goal SBP < 140  - Continue comprehensive PT, OT, SLP program 3 hours daily 5 x week  - Precautions: cardiac, fall, AC    # frustration, irritability, anxiety, exacerbated by use of statin and myalgia  - statin dc per patient's wishes  - neuropsychology support  - rec therapy  - improved 11/17    # restless leg syndrome  - requip qhs. Increase from 0.25 to 0.5 qhs 11/17  - check Fe and Mg levels in AM 11/18    # Paroxysmal A-fib  – metoprolol tartrate 25 mg twice daily  – Eliquis 5 mg twice daily  - HR controlled 60-74 11/117    # Chronic diastolic heart failure  # Pulm HTN  - ECHO from November 2023 with EF of 55 to 60% - severe pulmonary HTN , echo done at  unchanged from prior echo in 11/2023  – spironolactone 25 mg daily- held 11/15 due to ANNALEE  – metoprolol tartrate 25 mg twice daily  – furosemide held 11/15 due to ANNALEE  - 3L oxygen via NC overnight at home, no need for oxygen during daytime hours  - BP  (140/81 - 145/74) 11/17  - O2 sat 94-98% (nocturnal O2 via NC)    # Hyperlipidemia  - On Livalo 2mg at home, allergic to Repatha  – Lipitor dc per patient's wishes after education  - RD consult to provide education on Heart Healthy diet     # Hypothyroidism  - Synthroid 25mcg AM    # ANNALEE  - Hospitalist appreciated. Hold lasix and spironolactone 11/15  - renal consult appreciated 11/15. Agree with holding diuretics. Renal sono if Cr worsens   - BUn/Cr 31/1.5 11/15 (prior 27/1.0)--> 33/1.14 11/16 improved  - BMP 11/18    # Pain control  - Tylenol PRN    # GI/Bowel Mgmt   - Continue Senna at bedtime   - Miralax daily    # FEN   - Diet - seen by SLP 11/12: Easy To Chew Diet with Mildly Thick Liquids    # DVT prophylaxis  – On Eliquis    # Case to be discussed in IDT rounds 11/18 (initial):    # LABS  CBC CMP 11/18  Fe and Mg levels in AM 11/18    Praful Holloway  Cardiovascular Disease  [address] [place] [address]  Phone: [phone]  Fax: [phone]  Follow Up Time:

## 2024-11-17 NOTE — PROGRESS NOTE ADULT - COMMENTS
Patient in bed. She is less distressed than before, although she has moments of increased LE movements (repetitive, back and forth, both legs, extinguishable and reduced when distracted). She doesn't report any pain in leg, and reports better sleep, although still sig restlessness. Denies any SE with requip

## 2024-11-17 NOTE — PROGRESS NOTE ADULT - ASSESSMENT
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 These findings are compatible with an acute infarct. No hemorrhagic transformation is seen. No significant shift or herniation is seen.  - ASA 81 po qd added by cardiology   - Lipitor added by neurology but she refused citing myalgia   - BP goal SBP < 140    # Paroxysmal A-fib  – Continue metoprolol tartrate twice daily  – Continue Eliquis 5 mg twice daily    # Mild contrast ANNALEE, improved  holding lasix and spirolactone  nephrology consulted - Hold diuretics. Avoid nephrotoxins  encourage PO hydration    # Chronic diastolic heart failure  # Pulm HTN  - ECHO from November 2023 with EF of 55 to 60% - severe pulmonary HTN , echo done at  unchanged from prior echo in 11/2023  –  holding  spironolactone 25 mg daily due to ANNALEE. consider restarting this week  – Continue metoprolol tartrate 25 mg twice daily  –  holding furosemide 40 mg daily due to ANNALEE  - 3L oxygen via NC overnight at home, no need for oxygen during daytime hours  -Daily weights     # Hyperlipidemia  - On Livalo 2mg at home, allergic to Repatha  – Lipitor 20mg recommended by neurology. LDL 85, LDL needs to be under 70 per neuro. Patient refusing 20mg lipitor due to intermittent leg cramping/pain. Spoke with Dr. Patel who will start Requip 11/16    # Hypothyroidism  -Synthroid 25mcg AM    # DVT prophylaxis  – On Eliquis  Discussed treatment plan with IDT.  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 These findings are compatible with an acute infarct. No hemorrhagic transformation is seen. No significant shift or herniation is seen.  - ASA 81 po qd added by cardiology   - Lipitor added by neurology but she refused citing myalgia   - BP goal SBP < 140    #BLE pain/RLS  - started Requip 11/16  - Tylenol PRN  - may try magnesium    # Paroxysmal A-fib  – Continue metoprolol tartrate twice daily  – Continue Eliquis 5 mg twice daily    # Mild contrast ANNALEE, improved  holding lasix and spirolactone  nephrology consulted - Hold diuretics. Avoid nephrotoxins  encourage PO hydration    # Chronic diastolic heart failure  # Pulm HTN  - ECHO from November 2023 with EF of 55 to 60% - severe pulmonary HTN , echo done at  unchanged from prior echo in 11/2023  –  holding  spironolactone 25 mg daily due to ANNALEE. consider restarting this week  – Continue metoprolol tartrate 25 mg twice daily  –  holding furosemide 40 mg daily due to ANNALEE  - 3L oxygen via NC overnight at home, no need for oxygen during daytime hours  -Daily weights     # Hyperlipidemia  - On Livalo 2mg at home, allergic to Repatha  – Lipitor 20mg recommended by neurology. LDL 85, LDL needs to be under 70 per neuro. Patient refusing 20mg lipitor due to intermittent leg cramping/pain.     # Hypothyroidism  -Synthroid 25mcg AM    # DVT prophylaxis  – On Eliquis  Discussed treatment plan with IDT.

## 2024-11-18 LAB
ALBUMIN SERPL ELPH-MCNC: 2.9 G/DL — LOW (ref 3.3–5)
ALP SERPL-CCNC: 49 U/L — SIGNIFICANT CHANGE UP (ref 40–120)
ALT FLD-CCNC: 16 U/L — SIGNIFICANT CHANGE UP (ref 10–45)
ANION GAP SERPL CALC-SCNC: 7 MMOL/L — SIGNIFICANT CHANGE UP (ref 5–17)
AST SERPL-CCNC: 23 U/L — SIGNIFICANT CHANGE UP (ref 10–40)
BILIRUB SERPL-MCNC: 0.6 MG/DL — SIGNIFICANT CHANGE UP (ref 0.2–1.2)
BUN SERPL-MCNC: 24 MG/DL — HIGH (ref 7–23)
CALCIUM SERPL-MCNC: 9.1 MG/DL — SIGNIFICANT CHANGE UP (ref 8.4–10.5)
CHLORIDE SERPL-SCNC: 105 MMOL/L — SIGNIFICANT CHANGE UP (ref 96–108)
CO2 SERPL-SCNC: 29 MMOL/L — SIGNIFICANT CHANGE UP (ref 22–31)
CREAT SERPL-MCNC: 1.06 MG/DL — SIGNIFICANT CHANGE UP (ref 0.5–1.3)
EGFR: 50 ML/MIN/1.73M2 — LOW
GLUCOSE SERPL-MCNC: 90 MG/DL — SIGNIFICANT CHANGE UP (ref 70–99)
IRON SATN MFR SERPL: 43 UG/DL — SIGNIFICANT CHANGE UP (ref 30–160)
MAGNESIUM SERPL-MCNC: 2.1 MG/DL — SIGNIFICANT CHANGE UP (ref 1.6–2.6)
POTASSIUM SERPL-MCNC: 4 MMOL/L — SIGNIFICANT CHANGE UP (ref 3.5–5.3)
POTASSIUM SERPL-SCNC: 4 MMOL/L — SIGNIFICANT CHANGE UP (ref 3.5–5.3)
PROT SERPL-MCNC: 6.5 G/DL — SIGNIFICANT CHANGE UP (ref 6–8.3)
SODIUM SERPL-SCNC: 141 MMOL/L — SIGNIFICANT CHANGE UP (ref 135–145)

## 2024-11-18 PROCEDURE — 99232 SBSQ HOSP IP/OBS MODERATE 35: CPT

## 2024-11-18 RX ORDER — LIDOCAINE 40 MG/G
1 CREAM TOPICAL DAILY
Refills: 0 | Status: DISCONTINUED | OUTPATIENT
Start: 2024-11-18 | End: 2024-11-29

## 2024-11-18 RX ORDER — GUAIFENESIN 400 MG
200 TABLET ORAL EVERY 6 HOURS
Refills: 0 | Status: DISCONTINUED | OUTPATIENT
Start: 2024-11-18 | End: 2024-11-29

## 2024-11-18 RX ORDER — FUROSEMIDE 40 MG/1
40 TABLET ORAL DAILY
Refills: 0 | Status: DISCONTINUED | OUTPATIENT
Start: 2024-11-19 | End: 2024-11-29

## 2024-11-18 RX ORDER — FERROUS SULFATE 325(65) MG
325 TABLET ORAL DAILY
Refills: 0 | Status: DISCONTINUED | OUTPATIENT
Start: 2024-11-18 | End: 2024-11-29

## 2024-11-18 RX ADMIN — Medication 200 MILLIGRAM(S): at 17:39

## 2024-11-18 RX ADMIN — APIXABAN 5 MILLIGRAM(S): 2.5 TABLET, FILM COATED ORAL at 10:27

## 2024-11-18 RX ADMIN — APIXABAN 5 MILLIGRAM(S): 2.5 TABLET, FILM COATED ORAL at 22:05

## 2024-11-18 RX ADMIN — LIDOCAINE 1 PATCH: 40 CREAM TOPICAL at 23:32

## 2024-11-18 RX ADMIN — ACETAMINOPHEN, DIPHENHYDRAMINE HCL, PHENYLEPHRINE HCL 3 MILLIGRAM(S): 325; 25; 5 TABLET ORAL at 22:05

## 2024-11-18 RX ADMIN — Medication 25 MICROGRAM(S): at 05:57

## 2024-11-18 RX ADMIN — LIDOCAINE 1 PATCH: 40 CREAM TOPICAL at 11:43

## 2024-11-18 RX ADMIN — METOPROLOL TARTRATE 12.5 MILLIGRAM(S): 100 TABLET, FILM COATED ORAL at 05:57

## 2024-11-18 RX ADMIN — Medication 2 TABLET(S): at 22:05

## 2024-11-18 RX ADMIN — METOPROLOL TARTRATE 12.5 MILLIGRAM(S): 100 TABLET, FILM COATED ORAL at 17:39

## 2024-11-18 RX ADMIN — GABAPENTIN 200 MILLIGRAM(S): 300 CAPSULE ORAL at 10:26

## 2024-11-18 RX ADMIN — Medication 2 PUFF(S): at 20:28

## 2024-11-18 RX ADMIN — LIDOCAINE 1 PATCH: 40 CREAM TOPICAL at 19:46

## 2024-11-18 RX ADMIN — ROPINIROLE 0.5 MILLIGRAM(S): 8 TABLET, FILM COATED, EXTENDED RELEASE ORAL at 22:05

## 2024-11-18 RX ADMIN — Medication 81 MILLIGRAM(S): at 11:42

## 2024-11-18 RX ADMIN — Medication 2 PUFF(S): at 08:32

## 2024-11-18 RX ADMIN — GABAPENTIN 200 MILLIGRAM(S): 300 CAPSULE ORAL at 22:05

## 2024-11-18 RX ADMIN — FAMOTIDINE 20 MILLIGRAM(S): 20 TABLET, FILM COATED ORAL at 11:42

## 2024-11-18 RX ADMIN — LIDOCAINE 1 PATCH: 40 CREAM TOPICAL at 11:42

## 2024-11-18 NOTE — PROGRESS NOTE ADULT - SUBJECTIVE AND OBJECTIVE BOX
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     Patient went out to lunch at 12 pm the day of admission with her daughter. Around 6 pm the patient slipped out of bed and was unable to get back up. She called her daughter and when the she arrived noted her mother did not seem like herself and had a Left sided facial droop.  CT/CTA head and CTA of neck without evidence of an acute intracranial hemorrhage midline shift or hydrocephalus. There was no hemodynamic significant narrowing within the neck. or proximal large vessel occlusion noted.  MRI showed "abnormal T2 prolongation restricted diffusion seen involving the right corona radiata region. These findings are compatible with an acute infarct. No hemorrhagic transformation is seen. No significant shift or herniation is seen."    Patient was managed medically and was deemed stable for discharge to [facility] on 11/14/24 for acute IRF.          (14 Nov 2024 13:35)      PAST MEDICAL & SURGICAL HISTORY:  Atrial fibrillation      CHF (congestive heart failure)      Pulmonary emboli      Factor 5 Leiden mutation, heterozygous      CAD (coronary artery disease)      Stented coronary artery      Hypothyroid      COPD (chronic obstructive pulmonary disease)      Mitral regurgitation      HTN (hypertension)      H/O: hysterectomy      H/O knee surgery      Cyst of breast, unspecified laterality  S/P excision      H/O cardiac radiofrequency ablation      S/P ablation of atrial fibrillation          MEDICATIONS  (STANDING):  albuterol    90 MICROgram(s) HFA Inhaler 2 Puff(s) Inhalation every 6 hours  apixaban 5 milliGRAM(s) Oral two times a day  aspirin  chewable 81 milliGRAM(s) Oral daily  famotidine    Tablet 20 milliGRAM(s) Oral daily  gabapentin 200 milliGRAM(s) Oral <User Schedule>  levothyroxine 25 MICROGram(s) Oral daily  metoprolol tartrate 12.5 milliGRAM(s) Oral two times a day  polyethylene glycol 3350 17 Gram(s) Oral daily  rOPINIRole 0.5 milliGRAM(s) Oral at bedtime  senna 2 Tablet(s) Oral at bedtime    MEDICATIONS  (PRN):  acetaminophen     Tablet .. 975 milliGRAM(s) Oral every 8 hours PRN Mild Pain (1 - 3), Moderate Pain (4 - 6), Severe Pain (7 - 10)  calcium carbonate    500 mG (Tums) Chewable 1 Tablet(s) Chew four times a day PRN Indigestion  LORazepam     Tablet 0.5 milliGRAM(s) Oral daily PRN Anxiety  melatonin 3 milliGRAM(s) Oral at bedtime PRN Insomnia  saline laxative (FLEET) Rectal Enema 1 Enema Rectal once PRN constipation      Allergies    Macrodantin (Other)  Motrin (Other)  statins (Muscle Pain)  Repatha (Unknown)  flu vaccines (Other)  nisoldipine (Unknown)  sulfa drugs (Other)  Ceftin (Other (Moderate))  tadalafil (Muscle Pain)    Intolerances          VITALS  89y  Vital Signs Last 24 Hrs  T(C): 36.6 (17 Nov 2024 20:05), Max: 36.6 (17 Nov 2024 08:50)  T(F): 97.9 (17 Nov 2024 20:05), Max: 97.9 (17 Nov 2024 20:05)  HR: 66 (18 Nov 2024 05:54) (61 - 66)  BP: 122/60 (18 Nov 2024 05:54) (112/64 - 141/57)  BP(mean): --  RR: 18 (18 Nov 2024 05:54) (16 - 18)  SpO2: 95% (18 Nov 2024 05:54) (94% - 98%)    Parameters below as of 18 Nov 2024 05:54  Patient On (Oxygen Delivery Method): room air      Daily     Daily         RECENT LABS:      11-18    141  |  105  |  24[H]  ----------------------------<  90  4.0   |  29  |  1.06    Ca    9.1      18 Nov 2024 05:49  Mg     2.1     11-18    TPro  6.5  /  Alb  2.9[L]  /  TBili  0.6  /  DBili  x   /  AST  23  /  ALT  16  /  AlkPhos  49  11-18    LIVER FUNCTIONS - ( 18 Nov 2024 05:49 )  Alb: 2.9 g/dL / Pro: 6.5 g/dL / ALK PHOS: 49 U/L / ALT: 16 U/L / AST: 23 U/L / GGT: x             Urinalysis Basic - ( 18 Nov 2024 05:49 )    Color: x / Appearance: x / SG: x / pH: x  Gluc: 90 mg/dL / Ketone: x  / Bili: x / Urobili: x   Blood: x / Protein: x / Nitrite: x   Leuk Esterase: x / RBC: x / WBC x   Sq Epi: x / Non Sq Epi: x / Bacteria: x          CAPILLARY BLOOD GLUCOSE

## 2024-11-18 NOTE — PROGRESS NOTE ADULT - SUBJECTIVE AND OBJECTIVE BOX
No distress    Vital Signs Last 24 Hrs  HR: 77 (11-18-24 @ 17:43) (66 - 77)  BP: 129/75 (11-18-24 @ 17:43) (122/60 - 129/75)  RR: 18 (11-18-24 @ 05:54) (18 - 18)  SpO2: 95% (11-18-24 @ 05:54) (95% - 95%)    s1s2  b/l air entry  soft, ND  b/l LE lymphedema    18 Nov 2024 05:49    141    |  105    |  24     ----------------------------<  90     4.0     |  29     |  1.06     Ca    9.1        18 Nov 2024 05:49  Mg     2.1       18 Nov 2024 05:49    TPro  6.5    /  Alb  2.9    /  TBili  0.6    /  DBili  x      /  AST  23     /  ALT  16     /  AlkPhos  49     18 Nov 2024 05:49    LIVER FUNCTIONS - ( 18 Nov 2024 05:49 )  Alb: 2.9 g/dL / Pro: 6.5 g/dL / ALK PHOS: 49 U/L / ALT: 16 U/L / AST: 23 U/L / GGT: x           acetaminophen     Tablet .. 975 milliGRAM(s) Oral every 8 hours PRN  albuterol    90 MICROgram(s) HFA Inhaler 2 Puff(s) Inhalation every 6 hours  apixaban 5 milliGRAM(s) Oral two times a day  aspirin  chewable 81 milliGRAM(s) Oral daily  calcium carbonate    500 mG (Tums) Chewable 1 Tablet(s) Chew four times a day PRN  famotidine    Tablet 20 milliGRAM(s) Oral daily  ferrous    sulfate 325 milliGRAM(s) Oral daily  gabapentin 200 milliGRAM(s) Oral <User Schedule>  guaiFENesin Oral Liquid (Sugar-Free) 200 milliGRAM(s) Oral every 6 hours PRN  levothyroxine 25 MICROGram(s) Oral daily  lidocaine   4% Patch 1 Patch Transdermal daily  lidocaine   4% Patch 1 Patch Transdermal daily  LORazepam     Tablet 0.5 milliGRAM(s) Oral daily PRN  melatonin 3 milliGRAM(s) Oral at bedtime PRN  metoprolol tartrate 12.5 milliGRAM(s) Oral two times a day  polyethylene glycol 3350 17 Gram(s) Oral daily  rOPINIRole 0.5 milliGRAM(s) Oral at bedtime  saline laxative (FLEET) Rectal Enema 1 Enema Rectal once PRN  senna 2 Tablet(s) Oral at bedtime    A/P:    CM, mod TR, mod-severe MR  S/p CVA involving R corona radiata region  S/p CT w/IV dye 11/11  Was on Lasix, Aldactone (last dose 11/14)  Mild contrast ANNALEE  Improved   Avoid nephrotoxins  F/u BMP    [phone]

## 2024-11-18 NOTE — PROGRESS NOTE ADULT - SUBJECTIVE AND OBJECTIVE BOX
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 No acute overnight events. Patient feels tired, no specific complaints.   staff endorsing cough    ALLERGIES:  Macrodantin (Other)  Motrin (Other)  statins (Muscle Pain)  Repatha (Unknown)  flu vaccines (Other)  nisoldipine (Unknown)  sulfa drugs (Other)  Ceftin (Other (Moderate))  tadalafil (Muscle Pain)    MEDICATIONS  (STANDING):  albuterol    90 MICROgram(s) HFA Inhaler 2 Puff(s) Inhalation every 6 hours  apixaban 5 milliGRAM(s) Oral two times a day  aspirin  chewable 81 milliGRAM(s) Oral daily  famotidine    Tablet 20 milliGRAM(s) Oral daily  ferrous    sulfate 325 milliGRAM(s) Oral daily  gabapentin 200 milliGRAM(s) Oral <User Schedule>  levothyroxine 25 MICROGram(s) Oral daily  lidocaine   4% Patch 1 Patch Transdermal daily  lidocaine   4% Patch 1 Patch Transdermal daily  metoprolol tartrate 12.5 milliGRAM(s) Oral two times a day  polyethylene glycol 3350 17 Gram(s) Oral daily  rOPINIRole 0.5 milliGRAM(s) Oral at bedtime  senna 2 Tablet(s) Oral at bedtime    MEDICATIONS  (PRN):  acetaminophen     Tablet .. 975 milliGRAM(s) Oral every 8 hours PRN Mild Pain (1 - 3), Moderate Pain (4 - 6), Severe Pain (7 - 10)  calcium carbonate    500 mG (Tums) Chewable 1 Tablet(s) Chew four times a day PRN Indigestion  guaiFENesin Oral Liquid (Sugar-Free) 200 milliGRAM(s) Oral every 6 hours PRN Cough  LORazepam     Tablet 0.5 milliGRAM(s) Oral daily PRN Anxiety  melatonin 3 milliGRAM(s) Oral at bedtime PRN Insomnia  saline laxative (FLEET) Rectal Enema 1 Enema Rectal once PRN constipation    Vital Signs Last 24 Hrs  T(F): 97.9 (17 Nov 2024 20:05), Max: 97.9 (17 Nov 2024 20:05)  HR: 66 (18 Nov 2024 05:54) (64 - 66)  BP: 122/60 (18 Nov 2024 05:54) (112/64 - 131/63)  RR: 18 (18 Nov 2024 05:54) (16 - 18)  SpO2: 95% (18 Nov 2024 05:54) (94% - 95%)  I&O's Summary    BMI (kg/m2): 33.3 (11-14-24 @ 17:41)    PHYSICAL EXAM:  General: NAD, awake, alert   ENT: MMM, no tonsilar exudate  Neck: Supple, No JVD  Lungs: Clear to auscultation bilaterally, no wheezes. Good air entry bilaterally   Cardio: RRR, S1/S2, No murmurs  Abdomen: Soft, Nontender, Nondistended; Bowel sounds present  Extremities: No calf tenderness, non-piting edema b/l LE    LABS:      11-18    141  |  105  |  24  ----------------------------<  90  4.0   |  29  |  1.06    Ca    9.1      18 Nov 2024 05:49  Mg     2.1     11-18    TPro  6.5  /  Alb  2.9  /  TBili  0.6  /  DBili  x   /  AST  23  /  ALT  16  /  AlkPhos  49  11-18     11-12 Chol 154 mg/dL LDL -- HDL 53 mg/dL Trig 82 mg/dL    Urinalysis Basic - ( 18 Nov 2024 05:49 )    Color: x / Appearance: x / SG: x / pH: x  Gluc: 90 mg/dL / Ketone: x  / Bili: x / Urobili: x   Blood: x / Protein: x / Nitrite: x   Leuk Esterase: x / RBC: x / WBC x   Sq Epi: x / Non Sq Epi: x / Bacteria: x    COVID-19 PCR: NotDetec (11-14-24 @ 20:35)    RADIOLOGY & ADDITIONAL TESTS:     Care Discussed with Consultants/Other Providers:

## 2024-11-18 NOTE — PROGRESS NOTE ADULT - ASSESSMENT
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 These findings are compatible with an acute infarct. No hemorrhagic transformation is seen. No significant shift or herniation is seen.  - ASA 81 po qd added by cardiology   -  STatin dc 11/15. Patient refuses even low dose; states she has been on multiple variations of statin including injectable in past , and has always had poor reaction. We discussed the increased risk of secondary stroke on ASA only; she verbalizes understanding but refuses. Also reviewed with family  - BP goal SBP < 140  - Continue comprehensive PT, OT, SLP program 3 hours daily 5 x week  - Precautions: cardiac, fall, AC    # frustration, irritability, anxiety, exacerbated by use of statin and myalgia  - statin dc per patient's wishes  - neuropsychology support  - rec therapy    # restless leg syndrome  - requip increased 0.25 to 0.5 qhs 11/17  - Mg 2.1 11/18  - Fe level pending     # Paroxysmal A-fib  – metoprolol tartrate 25 mg twice daily  – Eliquis 5 mg twice daily  - HR controlled     # Chronic diastolic heart failure  # Pulm HTN  - ECHO from November 2023 with EF of 55 to 60% - severe pulmonary HTN , echo done at  unchanged from prior echo in 11/2023  – spironolactone 25 mg daily- held 11/15 due to ANNALEE  – metoprolol tartrate 25 mg twice daily  – furosemide held 11/15 due to ANNALEE  - 3L oxygen via NC overnight at home, no need for oxygen during daytime hours  - /64 - 141/57) 11/18    # Hyperlipidemia  - On Livalo 2mg at home, allergic to Repatha  – Lipitor dc per patient's wishes after education re: risk recurrent stroke. Family agreeable  -  education on Heart Healthy diet     # Hypothyroidism  - Synthroid 25mcg AM    # ANNALEE  - Hospitalist appreciated. Hold lasix and spironolactone 11/15  - renal consult appreciated 11/15. Hold diuretics.   - BUn/Cr 31/1.5 11/15 -->-> 24/1.06 11/18 resolved. Will f/u with hospitalist re: possibly re-introducing diuretics    # Pain control  - Tylenol PRN  - Rx RLS as above    # GI/Bowel Mgmt   - senna, miralax    # FEN   - Easy To Chew Diet with Mildly Thick Liquids    # DVT prophylaxis  – On Eliquis    # Case discussed in IDT rounds 11/18 (initial):  -    - barriers:  - goals:  - target:    # LABS  CBC CMP 11/21  Fe 11/18 pending    Praful Holloway  Cardiovascular Disease  [address] [place] [address]  Phone: [phone]  Fax: [phone]  Follow Up Time:   89 year-old female with a PMH HTN, HLD, hypothyroidism, PAF, chronic diastolic HF, CAD s/p PCI, PE with factor V Leiden, severe pulmonary hypertension who presented to  11/11/24 with left facial droop, left sided weakness secondary to right corona radiata CVA    # 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 These findings are compatible with an acute infarct. No hemorrhagic transformation is seen. No significant shift or herniation is seen.  - ASA 81 po qd added by cardiology   -  STatin dc 11/15. Patient refuses even low dose; states she has been on multiple variations of statin including injectable in past , and has always had poor reaction. We discussed the increased risk of secondary stroke on ASA only; she verbalizes understanding but refuses. Also reviewed with family  - BP goal SBP < 140  - Continue comprehensive PT, OT, SLP program 3 hours daily 5 x week  - Precautions: cardiac, fall, AC    # frustration, irritability, anxiety, exacerbated by use of statin and myalgia  - statin dc per patient's wishes  - neuropsychology support  - rec therapy    # restless leg syndrome  - requip increased 0.25 to 0.5 qhs 11/17  - gabapentin inc 200 q12 11/17. 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  - ROM, shoulder stabilization exercises  - warm compress PRN  - lidoderm patch  - discussed with patient and family who are agreeable    # Paroxysmal A-fib  – metoprolol tartrate 25 mg twice daily  – Eliquis 5 mg twice daily  - HR controlled     # Chronic diastolic heart failure  # Pulm HTN  - ECHO from November 2023 with EF of 55 to 60% - severe pulmonary HTN , echo done at  unchanged from prior echo in 11/2023  – spironolactone 25 mg daily- held 11/15 due to ANNALEE  – metoprolol tartrate 25 mg twice daily  – furosemide held 11/15 due to ANNALEE  - 3L oxygen via NC overnight at home, no need for oxygen during daytime hours  - /64 - 141/57) 11/18    # Hyperlipidemia  - On Livalo 2mg at home, allergic to Repatha  – Lipitor dc per patient's wishes after education re: risk recurrent stroke. Family agreeable  -  education on Heart Healthy diet     # Hypothyroidism  - Synthroid 25mcg AM    # ANNALEE  - Hospitalist appreciated. Hold lasix and spironolactone 11/15  - renal consult appreciated 11/15. Hold diuretics.   - BUn/Cr 31/1.5 11/15 -->-> 24/1.06 11/18 resolved. Will f/u with hospitalist re: possibly re-introducing diuretics    # Pain control  - Tylenol PRN  - Rx RLS as above    # GI/Bowel Mgmt   - senna, miralax  - add stewed prunes    # FEN   - Easy To Chew Diet with Mildly Thick Liquids    # DVT prophylaxis  – On Eliquis    # Case discussed in IDT rounds 11/18 (initial):  -    - barriers:  - goals:  - target:    # LABS  CBC CMP 11/21  Fe 11/18 pending    Praful Holloway  Cardiovascular Disease  [address] [place] [address]  Phone: [phone]  Fax: [phone]  Follow Up Time:   89 year-old female with a PMH HTN, HLD, hypothyroidism, PAF, chronic diastolic HF, CAD s/p PCI, PE with factor V Leiden, severe pulmonary hypertension who presented to  11/11/24 with left facial droop, left sided weakness secondary to right corona radiata CVA    # 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 These findings are compatible with an acute infarct. No hemorrhagic transformation is seen. No significant shift or herniation is seen.  - ASA 81 po qd added by cardiology   - STatin dc 11/15. Patient refuses even low dose; states she has been on multiple variations of statin including injectable in past , and has always had poor reaction. We discussed the increased risk of secondary stroke on ASA only; she verbalizes understanding but refuses. Also reviewed with family  - BP goal SBP < 140  - Continue comprehensive PT, OT, SLP program 3 hours daily 5 x week  - Precautions: cardiac, fall, AC    # frustration, irritability, anxiety, exacerbated by use of statin and myalgia  - statin dc per patient's wishes  - neuropsychology support  - rec therapy    # restless leg syndrome  - requip increased 0.25 to 0.5 qhs 11/17  - gabapentin inc 200 q12 11/17. 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  - ROM, shoulder stabilization exercises  - warm compress PRN  - lidoderm patch  - discussed with patient and family who are agreeable    # Paroxysmal A-fib  – metoprolol tartrate 25 mg twice daily  – Eliquis 5 mg twice daily  - HR controlled     # Chronic diastolic heart failure  # Pulm HTN  - ECHO from November 2023 with EF of 55 to 60% - severe pulmonary HTN , echo done at  unchanged from prior echo in 11/2023  – spironolactone 25 mg daily- held 11/15 due to ANNALEE  – metoprolol tartrate 25 mg twice daily  – furosemide held 11/15 due to ANNALEE  - 3L oxygen via [place] overnight at home, no need for oxygen during daytime hours  - /64 - 141/57) 11/18    # Hyperlipidemia  - On Livalo 2mg at home, allergic to Repatha  – Lipitor dc per patient's wishes after education re: risk recurrent stroke. Family agreeable  -  education on Heart Healthy diet     # Hypothyroidism  - Synthroid 25mcg AM    # ANNALEE  - Hospitalist appreciated. Hold lasix and spironolactone 11/15  - renal consult appreciated 11/15. Hold diuretics.   - BUn/Cr 31/1.5 11/15 -->-> 24/1.06 11/18 resolved. Will f/u with hospitalist re: possibly re-introducing diuretics    # Pain control  - Tylenol PRN  - Rx RLS as above    # GI/Bowel Mgmt   - senna, miralax  - add stewed prunes    # FEN   - Easy To Chew Diet with Mildly Thick Liquids  =- MBS 11/19    # DVT prophylaxis  – On Eliquis    # Case discussed in IDT rounds 11/18 (initial):  - easy to chew with mildly thick liquids, mild dysarthria, mild cognitive deficits, left mendoza inattention, supervision oral hygiene, max-total toilet hygiene, max LB, mod UB dressing, mod toilet transfers, min-mod bed mobility, ambulates 75 feet RW/min assist and WC follow, negotiates 4 steps with bilateral HR and min assist, leisure activities to improve QOL  - barriers:  left HP, mendoza inattention, mild cognitive deficits, reduced balance, pain, bilateral RTC syndrome  - goals: "get home and in my own bed" "Yoselin" supervision waking hours on dc, CS transfers and ambulation household, ambulate to bathroom with RW and CS, attend to left with visual compensatory strategies to perform dressing tasks with supervision,   - target: 12/5 home with caregiver support and home PT OT SLP  - caregiver training    # LABS  CBC CMP 11/21  Fe 11/18 pending    [facility]raful Holloway  Cardiovascular Disease  [address] [place] [address]  Phone: [phone]  Fax: [phone]  Follow Up Time:   89 year-old female with a PMH HTN, HLD, hypothyroidism, PAF, chronic diastolic HF, CAD s/p PCI, PE with factor V Leiden, severe pulmonary hypertension who presented to  11/11/24 with left facial droop, left sided weakness secondary to right corona radiata CVA    # 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 These findings are compatible with an acute infarct. No hemorrhagic transformation is seen. No significant shift or herniation is seen.  - ASA 81 po qd added by cardiology   - STatin dc 11/15. Patient refuses even low dose; states she has been on multiple variations of statin including injectable in past , and has always had poor reaction. We discussed the increased risk of secondary stroke on ASA only; she verbalizes understanding but refuses. Also reviewed with family  - BP goal SBP < 140  - Continue comprehensive PT, OT, SLP program 3 hours daily 5 x week  - cleared to shower  - Precautions: cardiac, fall, AC    # frustration, irritability, anxiety, exacerbated by use of statin and myalgia  - statin dc per patient's wishes  - neuropsychology support  - rec therapy    # restless leg syndrome  - requip increased 0.25 to 0.5 qhs 11/17  - gabapentin inc 200 q12 11/17. Family and patient agreeable  - Mg 2.1 11/18  - Fe level 43 11/18. Will supplement daily 325 mg   - reviewed with patient and family who are agreeable with plan. 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  - ROM, shoulder stabilization exercises  - warm compress PRN  - lidoderm patch  - discussed with patient and family who are agreeable    # Paroxysmal A-fib  – metoprolol tartrate 25 mg twice daily  – Eliquis 5 mg twice daily  - HR controlled     # Chronic diastolic heart failure  # Pulm HTN  - ECHO from November 2023 with EF of 55 to 60% - severe pulmonary HTN , echo done at  unchanged from prior echo in 11/2023  – spironolactone 25 mg daily- held 11/15 due to ANNALEE  – metoprolol tartrate 25 mg twice daily  – furosemide held 11/15 due to ANNALEE  - 3L oxygen via [place] overnight at home, no need for oxygen during daytime hours  - /64 - 141/57) 11/18    # Hyperlipidemia  - On Livalo 2mg at home, allergic to Repatha  – Lipitor dc per patient's wishes after education re: risk recurrent stroke. Family agreeable  -  education on Heart Healthy diet     # Hypothyroidism  - Synthroid 25mcg AM    # ANNALEE  - Hospitalist appreciated. Hold lasix and spironolactone 11/15  - renal consult appreciated 11/15. Hold diuretics.   - BUn/Cr 31/1.5 11/15 -->-> 24/1.06 11/18 resolved. Will f/u with hospitalist re: possibly re-introducing diuretics    # Pain control  - Tylenol PRN  - Rx RLS as above    # GI/Bowel Mgmt   - senna, miralax  - add stewed prunes    # FEN   - Easy To Chew Diet with Mildly Thick Liquids  =- MBS 11/19    # DVT prophylaxis  – On Eliquis    # Case discussed in IDT rounds 11/18 (initial):  - easy to chew with mildly thick liquids, mild dysarthria, mild cognitive deficits, left mendoza inattention, supervision oral hygiene, max-total toilet hygiene, max LB, mod UB dressing, mod toilet transfers, min-mod bed mobility, ambulates 75 feet RW/min assist and WC follow, negotiates 4 steps with bilateral HR and min assist, leisure activities to improve QOL  - barriers:  left HP, mendoza inattention, mild cognitive deficits, reduced balance, pain, bilateral RTC syndrome  - goals: "get home and in my own bed" "Yoselin" supervision waking hours on dc, CS transfers and ambulation household, ambulate to bathroom with RW and CS, attend to left with visual compensatory strategies to perform dressing tasks with supervision,   - target: 12/5 home with caregiver support and home PT OT SLP  - caregiver training    # LABS  CBC CMP 11/21    Daughter: Tonia [phone]  Son ": Mode [phone]    [facility]ra[facility] Holloway  Cardiovascular Disease  [address] [place] [address]  Phone: [phone]  Fax: [phone]  Follow Up Time:

## 2024-11-18 NOTE — PROGRESS NOTE ADULT - ASSESSMENT
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 These findings are compatible with an acute infarct. No hemorrhagic transformation is seen. No significant shift or herniation is seen.  - ASA 81 po qd added by cardiology   - Lipitor added by neurology but she refused citing myalgia   - BP goal SBP < 140    #BLE pain/RLS  - started Requip 11/16  - Tylenol PRN  - may try magnesium    # Paroxysmal A-fib  – Continue metoprolol tartrate twice daily  – Continue Eliquis 5 mg twice daily    # ANNALEE - improved    # Chronic diastolic heart failure  # Pulm HTN  - ECHO from November 2023 with EF of 55 to 60% - severe pulmonary HTN , echo done at  unchanged from prior echo in 11/2023  – Continue metoprolol tartrate 25 mg twice daily  –  restarting lasix 40mg daily. holding aldactone 25 for now  - 3L oxygen via NC overnight at home, no need for oxygen during daytime hours  -Daily weights     # Hyperlipidemia  - On Livalo 2mg at home, allergic to Repatha  – Lipitor 20mg recommended by neurology. LDL 85, LDL needs to be under 70 per neuro. Patient refusing 20mg lipitor due to intermittent leg cramping/pain.     # Hypothyroidism  -Synthroid 25mcg AM    # DVT prophylaxis  – On Eliquis

## 2024-11-18 NOTE — PROGRESS NOTE ADULT - COMMENTS
Patient seen with son Mode and his fiance at bedside. Patient tolerating increased gabapentin as well as requip dose. She had multiple BM which looks to be a combination of days (5) since her prior BM, medications, and stewed prunes; she enjoys the latter and requests with her meal tray. No diarrhea, no fever    Patient would like shower. Also some discomfort leander transfers in shoulders

## 2024-11-19 PROCEDURE — 99232 SBSQ HOSP IP/OBS MODERATE 35: CPT

## 2024-11-19 PROCEDURE — 74230 X-RAY XM SWLNG FUNCJ C+: CPT | Mod: 26

## 2024-11-19 RX ORDER — FAMOTIDINE 20 MG/1
20 TABLET, FILM COATED ORAL AT BEDTIME
Refills: 0 | Status: DISCONTINUED | OUTPATIENT
Start: 2024-11-19 | End: 2024-11-29

## 2024-11-19 RX ADMIN — Medication 325 MILLIGRAM(S): at 11:00

## 2024-11-19 RX ADMIN — Medication 2 PUFF(S): at 08:34

## 2024-11-19 RX ADMIN — METOPROLOL TARTRATE 12.5 MILLIGRAM(S): 100 TABLET, FILM COATED ORAL at 17:17

## 2024-11-19 RX ADMIN — ACETAMINOPHEN, DIPHENHYDRAMINE HCL, PHENYLEPHRINE HCL 3 MILLIGRAM(S): 325; 25; 5 TABLET ORAL at 21:39

## 2024-11-19 RX ADMIN — LIDOCAINE 1 PATCH: 40 CREAM TOPICAL at 23:00

## 2024-11-19 RX ADMIN — METOPROLOL TARTRATE 12.5 MILLIGRAM(S): 100 TABLET, FILM COATED ORAL at 05:36

## 2024-11-19 RX ADMIN — LIDOCAINE 1 PATCH: 40 CREAM TOPICAL at 11:00

## 2024-11-19 RX ADMIN — Medication 81 MILLIGRAM(S): at 11:00

## 2024-11-19 RX ADMIN — Medication 2 TABLET(S): at 21:32

## 2024-11-19 RX ADMIN — APIXABAN 5 MILLIGRAM(S): 2.5 TABLET, FILM COATED ORAL at 10:56

## 2024-11-19 RX ADMIN — GABAPENTIN 200 MILLIGRAM(S): 300 CAPSULE ORAL at 21:31

## 2024-11-19 RX ADMIN — FUROSEMIDE 40 MILLIGRAM(S): 40 TABLET ORAL at 05:36

## 2024-11-19 RX ADMIN — Medication 25 MICROGRAM(S): at 05:36

## 2024-11-19 RX ADMIN — APIXABAN 5 MILLIGRAM(S): 2.5 TABLET, FILM COATED ORAL at 21:32

## 2024-11-19 RX ADMIN — FAMOTIDINE 20 MILLIGRAM(S): 20 TABLET, FILM COATED ORAL at 21:32

## 2024-11-19 RX ADMIN — ROPINIROLE 0.5 MILLIGRAM(S): 8 TABLET, FILM COATED, EXTENDED RELEASE ORAL at 21:32

## 2024-11-19 RX ADMIN — LIDOCAINE 1 PATCH: 40 CREAM TOPICAL at 19:45

## 2024-11-19 RX ADMIN — LIDOCAINE 1 PATCH: 40 CREAM TOPICAL at 19:48

## 2024-11-19 RX ADMIN — GABAPENTIN 200 MILLIGRAM(S): 300 CAPSULE ORAL at 10:55

## 2024-11-19 NOTE — PROGRESS NOTE ADULT - COMMENTS
Patient reports no significant improvement in LE discomfort, however her restlessness has significantly improved over past 2 days (no restlessness, akasthisia this morning during entirety of exam). She requested shower late night, however was deferred due to staffing/pt care issues for later today.     Noted to have cough, leander during meals, says "since I had the stroke" Non productive. MBS performed with SLP --> recommend soft and bite sized with mildly thick liquids via single sip. No SOB, CP, palpitations. We reviewed recent med changes, time to see efficacy, and addition iron for lower levels. She is agreeable Patient reports no significant improvement in LE discomfort, however her restlessness has significantly improved over past 2 days (no restlessness, akasthisia this morning during entirety of exam). She requested shower late night, however was deferred due to staffing/pt care issues for later today.     Noted to have cough, leander during meals, says "since I had the stroke" Non productive. MBS performed with SLP --> recommend soft and bite sized with mildly thick liquids via single sip. No SOB, CP, palpitations. We reviewed recent med changes, time to see efficacy, and addition iron for lower levels. She is agreeable    Last BM today 11/19

## 2024-11-19 NOTE — PROGRESS NOTE ADULT - ASSESSMENT
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 These findings are compatible with an acute infarct. No hemorrhagic transformation is seen. No significant shift or herniation is seen.  - ASA 81 po qd added by cardiology   - Statin dc 11/15, per patient refusal. She is aware of increased risk of recurrent stroke without statin  - Continue comprehensive PT, OT, SLP program 3 hours daily 5 x week  - cleared to shower  - Precautions: cardiac, fall, AC    # frustration, irritability, anxiety, exacerbated by use of statin and myalgia  - statin dc per patient's wishes  - neuropsychology support  - rec therapy    # restless leg syndrome  - requip increased 0.25 to 0.5 qhs 11/17  - gabapentin inc 200 q12 11/17. Family and patient agreeable  - Mg 2.1 11/18  - Fe level 43 11/18. Will supplement daily 325 mg   - reviewed with patient and family who are agreeable with plan. 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  - ROM, shoulder stabilization exercises  - warm compress PRN  - lidoderm patch  - discussed with patient and family who are agreeable    # Paroxysmal A-fib  – metoprolol tartrate 25 mg twice daily  – Eliquis 5 mg twice daily  - HR controlled     # Chronic diastolic heart failure  # Pulm HTN  - ECHO:  EF of 55 to 60% - severe pulmonary HTN   – spironolactone held due to ANNALEE 11/15  - furosemide 40 mg  restarted 11/18  – metoprolol tartrate 25 mg twice daily  - home O2: 3L oxygen via NC overnight   - /80 - 129/75) 11/19    # HLD  - On Livalo 2mg at home, allergic to Repatha  – Lipitor dc per patient's wishes after education re: risk recurrent stroke. Family agreeable  -  education on Heart Healthy diet     # Hypothyroidism  - Synthroid 25mcg AM    # ANNALEE  - Hospitalist appreciated. Hold lasix and spironolactone 11/15  - renal consult appreciated 11/15. Hold diuretics.   - BUn/Cr 31/1.5 11/15 -->-> 24/1.06 11/18 resolved.   - furosemide restarted 11/18, monitor    # Pain control  - Tylenol PRN  - Rx RLS as above    # GI/Bowel Mgmt   - senna, miralax  - add stewed prunes    # FEN   - Easy To Chew Diet with Mildly Thick Liquids  - MBS 11/19    # DVT prophylaxis  – On Eliquis    # Case discussed in IDT rounds 11/18 (initial):  - easy to chew with mildly thick liquids, mild dysarthria, mild cognitive deficits, left mendoza inattention, supervision oral hygiene, max-total toilet hygiene, max LB, mod UB dressing, mod toilet transfers, min-mod bed mobility, ambulates 75 feet RW/min assist and WC follow, negotiates 4 steps with bilateral HR and min assist, leisure activities to improve QOL  - barriers:  left HP, mendoza inattention, mild cognitive deficits, reduced balance, pain, bilateral RTC syndrome  - goals: "get home and in my own bed" "Yoselin" supervision waking hours on dc, CS transfers and ambulation household, ambulate to bathroom with RW and CS, attend to left with visual compensatory strategies to perform dressing tasks with supervision,   - target: 12/5 home with caregiver support and home PT OT SLP  - caregiver training    # LABS  MBS  CBC CMP 11/21    Daughter: Tonia [phone]  Son ": Mode [phone]    [facility]ra[facility] Holloway  Cardiovascular Disease  [address] [place] [address]  Phone: [phone]  Fax: [phone]  Follow Up Time:   89 year-old female with a PMH HTN, HLD, hypothyroidism, PAF, chronic diastolic HF, CAD s/p PCI, PE with factor V Leiden, severe pulmonary hypertension who presented to  11/11/24 with left facial droop, left sided weakness secondary to right corona radiata CVA    # 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 These findings are compatible with an acute infarct. No hemorrhagic transformation is seen. No significant shift or herniation is seen.  - ASA 81 po qd added by cardiology   - Statin dc 11/15, per patient refusal. She is aware of increased risk of recurrent stroke without statin  - Continue comprehensive PT, OT, SLP program 3 hours daily 5 x week  - cleared to shower  - Precautions: cardiac, fall, AC    # frustration, irritability, anxiety, exacerbated by use of statin and myalgia  - statin dc per patient's wishes  - neuropsychology support  - rec therapy    # restless leg syndrome  - requip increased 0.25 to 0.5 qhs 11/17  - gabapentin inc 200 q12 11/17. Family and patient agreeable  - Mg 2.1 11/18  - Fe level 43 11/18. Will supplement daily 325 mg   - reviewed with patient and family who are agreeable with plan. 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  - ROM, shoulder stabilization exercises  - warm compress PRN  - lidoderm patch  - discussed with patient and family who are agreeable    # Paroxysmal A-fib  – metoprolol tartrate 25 mg twice daily  – Eliquis 5 mg twice daily  - HR controlled     #Possible TARAH  - was noted to be snoring loudly  - should have sleep study outpatient    # Chronic diastolic heart failure  # Pulm HTN  - ECHO:  EF of 55 to 60% - severe pulmonary HTN   – spironolactone held due to ANNALEE 11/15  - furosemide 40 mg  restarted 11/18  – metoprolol tartrate 25 mg twice daily  - home O2: 3L oxygen via NC overnight   - /80 - 129/75) 11/19    # HLD  - On Livalo 2mg at home, allergic to Repatha  – Lipitor dc per patient's wishes after education re: risk recurrent stroke. Family agreeable  -  education on Heart Healthy diet     # Hypothyroidism  - Synthroid 25mcg AM    # ANNALEE  - Hospitalist appreciated. Hold lasix and spironolactone 11/15  - renal consult appreciated 11/15. Hold diuretics.   - BUn/Cr 31/1.5 11/15 -->-> 24/1.06 11/18 resolved.   - furosemide restarted 11/18, monitor    # Pain control  - Tylenol PRN  - Rx RLS as above    # GI/Bowel Mgmt   - senna, miralax  - add stewed prunes    # FEN   - Easy To Chew Diet with Mildly Thick Liquids  - MBS 11/19    # DVT prophylaxis  – On Eliquis    # Case discussed in IDT rounds 11/18 (initial):  - easy to chew with mildly thick liquids, mild dysarthria, mild cognitive deficits, left mendoza inattention, supervision oral hygiene, max-total toilet hygiene, max LB, mod UB dressing, mod toilet transfers, min-mod bed mobility, ambulates 75 feet RW/min assist and WC follow, negotiates 4 steps with bilateral HR and min assist, leisure activities to improve QOL  - barriers:  left HP, mendoza inattention, mild cognitive deficits, reduced balance, pain, bilateral RTC syndrome  - goals: "get home and in my own bed" "Yoselin" supervision waking hours on dc, CS transfers and ambulation household, ambulate to bathroom with RW and CS, attend to left with visual compensatory strategies to perform dressing tasks with supervision,   - target: 12/5 home with caregiver support and home PT OT SLP  - caregiver training    # LABS  MBS  CBC CMP 11/21    Daughter: Tonia [phone]  Son ": Mode [phone]    [facility] Holloway  Cardiovascular Disease  [address] [place] [address]  Phone: [phone]  Fax: [phone]  Follow Up Time:    Pulmonology   Needs outpatient sleep study   89 year-old female with a PMH HTN, HLD, hypothyroidism, PAF, chronic diastolic HF, CAD s/p PCI, PE with factor V Leiden, severe pulmonary hypertension who presented to  11/11/24 with left facial droop, left sided weakness secondary to right corona radiata CVA    # 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 These findings are compatible with an acute infarct. No hemorrhagic transformation is seen. No significant shift or herniation is seen.  - ASA 81 po qd added by cardiology   - Statin dc 11/15, per patient refusal. She is aware of increased risk of recurrent stroke without statin  - Continue comprehensive PT, OT, SLP program 3 hours daily 5 x week  - cleared to shower  - Precautions: cardiac, fall, AC    # frustration, irritability, anxiety, exacerbated by use of statin and myalgia  - statin dc per patient's wishes  - neuropsychology support  - rec therapy    # restless leg syndrome  - requip increased 0.25 to 0.5 qhs 11/17  - gabapentin inc 200 q12 11/17. Family and patient agreeable. Mildly fatigued today, monitor on increased dose  - Mg 2.1 11/18  - Fe level 43 11/18. Will supplement daily 325 mg , discussed with patient  - reviewed with patient and family who are agreeable with plan. 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  - ROM, shoulder stabilization exercises  - warm compress PRN  - lidoderm patch  - discussed with patient and family who are agreeable    # Paroxysmal A-fib  – metoprolol tartrate 25 mg twice daily  – Eliquis 5 mg twice daily  - HR controlled     # Possible TARAH  - was noted to be snoring loudly  - sleep study outpatient    # Chronic diastolic heart failure  # Pulm HTN  - ECHO:  EF of 55 to 60% - severe pulmonary HTN   – spironolactone held due to ANNALEE 11/15  - furosemide 40 mg  restarted 11/18  – metoprolol tartrate 25 mg twice daily  - home O2: 3L oxygen via NC overnight   - /80 - 129/75) 11/19    # HLD  - On Livalo 2mg at home, allergic to Repatha  – Lipitor dc per patient's wishes after education re: risk recurrent stroke. Family agreeable  -  education on Heart Healthy diet     # Hypothyroidism  - Synthroid 25mcg AM    # ANNALEE  - Hospitalist appreciated. Hold lasix and spironolactone 11/15  - renal consult appreciated 11/15. Hold diuretics.   - BUn/Cr 31/1.5 11/15 -->-> 24/1.06 11/18 resolved.   - furosemide restarted 11/18, monitor    # Pain control  - Tylenol PRN  - Rx RLS as above    # GI/Bowel Mgmt   - senna, miralax  - add stewed prunes    # FEN   - MBS 11/19--. soft and bite sized with mildly thick liquids via single sip    # DVT prophylaxis  – On Eliquis    # Case discussed in IDT rounds 11/18 (initial):  - easy to chew with mildly thick liquids, mild dysarthria, mild cognitive deficits, left mendoza inattention, supervision oral hygiene, max-total toilet hygiene, max LB, mod UB dressing, mod toilet transfers, min-mod bed mobility, ambulates 75 feet RW/min assist and WC follow, negotiates 4 steps with bilateral HR and min assist, leisure activities to improve QOL  - barriers:  left HP, mendoza inattention, mild cognitive deficits, reduced balance, pain, bilateral RTC syndrome  - goals: "get home and in my own bed" "Yoselin" supervision waking hours on dc, CS transfers and ambulation household, ambulate to bathroom with RW and CS, attend to left with visual compensatory strategies to perform dressing tasks with supervision,   - target: 12/5 home with caregiver support and home PT OT SLP  - caregiver training    # LABS  CBC CMP 11/21    Daughter: Tonia [phone]  Son ": Mode [phone]    [facility] Holloway  Cardiovascular Disease  [address] [place] [address]  Phone: [phone]  Fax: [phone]  Follow Up Time:    Pulmonology   Needs outpatient sleep study

## 2024-11-19 NOTE — PROGRESS NOTE ADULT - SUBJECTIVE AND OBJECTIVE BOX
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 (18 Nov 2024 21:28)      HPI:  This is an 89 y/o Female RH dominant with a PMHx HTN, HLD, chronic diastolic HF, paroxysmal atrial fibrillation (on anticoagulation, s/p ablation), CAD status post PCI to the LAD 2017 with repeat cardiac catheterization in 3/2022 showing nonobstructive coronary disease with patent stents, severe pulmonary hypertension, hypothyroidism, mitral valve regurgitation, history of PE with factor V Leiden, hypothyroidism, who presented to  ED on 11/11/24 with Left sided facial droop.     Patient went out to lunch at 12 pm the day of admission with her daughter. Around 6 pm the patient slipped out of bed and was unable to get back up. She called her daughter and when the she arrived noted her mother did not seem like herself and had a Left sided facial droop.  CT/CTA head and CTA of neck without evidence of an acute intracranial hemorrhage midline shift or hydrocephalus. There was no hemodynamic significant narrowing within the neck. or proximal large vessel occlusion noted.  MRI showed "abnormal T2 prolongation restricted diffusion seen involving the right corona radiata region. These findings are compatible with an acute infarct. No hemorrhagic transformation is seen. No significant shift or herniation is seen."    Patient was managed medically and was deemed stable for discharge to [facility] on 11/14/24 for acute IRF.          (14 Nov 2024 13:35)      PAST MEDICAL & SURGICAL HISTORY:  Atrial fibrillation      CHF (congestive heart failure)      Pulmonary emboli      Factor 5 Leiden mutation, heterozygous      CAD (coronary artery disease)      Stented coronary artery      Hypothyroid      COPD (chronic obstructive pulmonary disease)      Mitral regurgitation      HTN (hypertension)      H/O: hysterectomy      H/O knee surgery      Cyst of breast, unspecified laterality  S/P excision      H/O cardiac radiofrequency ablation      S/P ablation of atrial fibrillation          MEDICATIONS  (STANDING):  albuterol    90 MICROgram(s) HFA Inhaler 2 Puff(s) Inhalation every 6 hours  apixaban 5 milliGRAM(s) Oral two times a day  aspirin  chewable 81 milliGRAM(s) Oral daily  famotidine    Tablet 20 milliGRAM(s) Oral daily  ferrous    sulfate 325 milliGRAM(s) Oral daily  furosemide    Tablet 40 milliGRAM(s) Oral daily  gabapentin 200 milliGRAM(s) Oral <User Schedule>  levothyroxine 25 MICROGram(s) Oral daily  lidocaine   4% Patch 1 Patch Transdermal daily  lidocaine   4% Patch 1 Patch Transdermal daily  metoprolol tartrate 12.5 milliGRAM(s) Oral two times a day  polyethylene glycol 3350 17 Gram(s) Oral daily  rOPINIRole 0.5 milliGRAM(s) Oral at bedtime  senna 2 Tablet(s) Oral at bedtime    MEDICATIONS  (PRN):  acetaminophen     Tablet .. 975 milliGRAM(s) Oral every 8 hours PRN Mild Pain (1 - 3), Moderate Pain (4 - 6), Severe Pain (7 - 10)  calcium carbonate    500 mG (Tums) Chewable 1 Tablet(s) Chew four times a day PRN Indigestion  guaiFENesin Oral Liquid (Sugar-Free) 200 milliGRAM(s) Oral every 6 hours PRN Cough  LORazepam     Tablet 0.5 milliGRAM(s) Oral daily PRN Anxiety  melatonin 3 milliGRAM(s) Oral at bedtime PRN Insomnia  saline laxative (FLEET) Rectal Enema 1 Enema Rectal once PRN constipation      Allergies    Macrodantin (Other)  Motrin (Other)  statins (Muscle Pain)  Repatha (Unknown)  flu vaccines (Other)  nisoldipine (Unknown)  sulfa drugs (Other)  Ceftin (Other (Moderate))  tadalafil (Muscle Pain)    Intolerances          VITALS  89y  Vital Signs Last 24 Hrs  T(C): 37.2 (18 Nov 2024 22:00), Max: 37.2 (18 Nov 2024 22:00)  T(F): 98.9 (18 Nov 2024 22:00), Max: 98.9 (18 Nov 2024 22:00)  HR: 60 (19 Nov 2024 05:34) (60 - 77)  BP: 119/80 (19 Nov 2024 05:34) (119/80 - 129/75)  BP(mean): --  RR: 17 (18 Nov 2024 22:00) (17 - 17)  SpO2: 95% (18 Nov 2024 22:00) (95% - 95%)    Parameters below as of 18 Nov 2024 22:00  Patient On (Oxygen Delivery Method): room air      Daily     Daily         RECENT LABS:      11-18    141  |  105  |  24[H]  ----------------------------<  90  4.0   |  29  |  1.06    Ca    9.1      18 Nov 2024 05:49  Mg     2.1     11-18    TPro  6.5  /  Alb  2.9[L]  /  TBili  0.6  /  DBili  x   /  AST  23  /  ALT  16  /  AlkPhos  49  11-18    LIVER FUNCTIONS - ( 18 Nov 2024 05:49 )  Alb: 2.9 g/dL / Pro: 6.5 g/dL / ALK PHOS: 49 U/L / ALT: 16 U/L / AST: 23 U/L / GGT: x             Urinalysis Basic - ( 18 Nov 2024 05:49 )    Color: x / Appearance: x / SG: x / pH: x  Gluc: 90 mg/dL / Ketone: x  / Bili: x / Urobili: x   Blood: x / Protein: x / Nitrite: x   Leuk Esterase: x / RBC: x / WBC x   Sq Epi: x / Non Sq Epi: x / Bacteria: x          CAPILLARY BLOOD GLUCOSE

## 2024-11-19 NOTE — PROGRESS NOTE ADULT - SUBJECTIVE AND OBJECTIVE BOX
No distress, on NC O2    Vital Signs Last 24 Hrs  T(C): 36.4 (11-19-24 @ 19:44), Max: 36.4 (11-19-24 @ 19:44)  T(F): 97.6 (11-19-24 @ 19:44), Max: 97.6 (11-19-24 @ 19:44)  HR: 68 (11-19-24 @ 19:44) (60 - 68)  BP: 119/65 (11-19-24 @ 19:44) (113/69 - 119/80)  RR: 16 (11-19-24 @ 19:44) (16 - 16)  SpO2: 92% (11-19-24 @ 19:44) (92% - 92%)    s1s2  b/l air entry  soft, ND  b/l LE lymphedema    18 Nov 2024 05:49    141    |  105    |  24     ----------------------------<  90     4.0     |  29     |  1.06     Ca    9.1        18 Nov 2024 05:49  Mg     2.1       18 Nov 2024 05:49    TPro  6.5    /  Alb  2.9    /  TBili  0.6    /  DBili  x      /  AST  23     /  ALT  16     /  AlkPhos  49     18 Nov 2024 05:49    LIVER FUNCTIONS - ( 18 Nov 2024 05:49 )  Alb: 2.9 g/dL / Pro: 6.5 g/dL / ALK PHOS: 49 U/L / ALT: 16 U/L / AST: 23 U/L / GGT: x           acetaminophen     Tablet .. 975 milliGRAM(s) Oral every 8 hours PRN  albuterol    90 MICROgram(s) HFA Inhaler 2 Puff(s) Inhalation every 6 hours  apixaban 5 milliGRAM(s) Oral two times a day  aspirin  chewable 81 milliGRAM(s) Oral daily  calcium carbonate    500 mG (Tums) Chewable 1 Tablet(s) Chew four times a day PRN  famotidine    Tablet 20 milliGRAM(s) Oral at bedtime  ferrous    sulfate 325 milliGRAM(s) Oral daily  furosemide    Tablet 40 milliGRAM(s) Oral daily  gabapentin 200 milliGRAM(s) Oral <User Schedule>  guaiFENesin Oral Liquid (Sugar-Free) 200 milliGRAM(s) Oral every 6 hours PRN  levothyroxine 25 MICROGram(s) Oral daily  lidocaine   4% Patch 1 Patch Transdermal daily  lidocaine   4% Patch 1 Patch Transdermal daily  LORazepam     Tablet 0.5 milliGRAM(s) Oral daily PRN  melatonin 3 milliGRAM(s) Oral at bedtime PRN  metoprolol tartrate 12.5 milliGRAM(s) Oral two times a day  polyethylene glycol 3350 17 Gram(s) Oral daily  rOPINIRole 0.5 milliGRAM(s) Oral at bedtime  saline laxative (FLEET) Rectal Enema 1 Enema Rectal once PRN  senna 2 Tablet(s) Oral at bedtime    A/P:    CM, mod TR, mod-severe MR  S/p CVA involving R corona radiata region  S/p CT w/IV dye 11/11  Was on Lasix, Aldactone (last dose 11/14)  Mild contrast ANNALEE  Improved   Avoid nephrotoxins  Agree w/restarting Lasix   F/u BMP, UO    [phone]

## 2024-11-20 PROCEDURE — 99232 SBSQ HOSP IP/OBS MODERATE 35: CPT

## 2024-11-20 RX ORDER — GABAPENTIN 300 MG/1
100 CAPSULE ORAL DAILY
Refills: 0 | Status: DISCONTINUED | OUTPATIENT
Start: 2024-11-20 | End: 2024-11-29

## 2024-11-20 RX ORDER — GABAPENTIN 300 MG/1
300 CAPSULE ORAL AT BEDTIME
Refills: 0 | Status: DISCONTINUED | OUTPATIENT
Start: 2024-11-20 | End: 2024-11-29

## 2024-11-20 RX ORDER — SPIRONOLACTONE 25 MG
25 TABLET ORAL DAILY
Refills: 0 | Status: DISCONTINUED | OUTPATIENT
Start: 2024-11-20 | End: 2024-11-20

## 2024-11-20 RX ORDER — SPIRONOLACTONE 25 MG
25 TABLET ORAL DAILY
Refills: 0 | Status: DISCONTINUED | OUTPATIENT
Start: 2024-11-21 | End: 2024-11-29

## 2024-11-20 RX ADMIN — LIDOCAINE 1 PATCH: 40 CREAM TOPICAL at 19:46

## 2024-11-20 RX ADMIN — Medication 2 PUFF(S): at 21:56

## 2024-11-20 RX ADMIN — LIDOCAINE 1 PATCH: 40 CREAM TOPICAL at 23:34

## 2024-11-20 RX ADMIN — GABAPENTIN 200 MILLIGRAM(S): 300 CAPSULE ORAL at 09:56

## 2024-11-20 RX ADMIN — Medication 325 MILLIGRAM(S): at 11:33

## 2024-11-20 RX ADMIN — APIXABAN 5 MILLIGRAM(S): 2.5 TABLET, FILM COATED ORAL at 21:49

## 2024-11-20 RX ADMIN — Medication 81 MILLIGRAM(S): at 11:33

## 2024-11-20 RX ADMIN — ACETAMINOPHEN, DIPHENHYDRAMINE HCL, PHENYLEPHRINE HCL 3 MILLIGRAM(S): 325; 25; 5 TABLET ORAL at 21:49

## 2024-11-20 RX ADMIN — LIDOCAINE 1 PATCH: 40 CREAM TOPICAL at 11:33

## 2024-11-20 RX ADMIN — Medication 25 MICROGRAM(S): at 05:53

## 2024-11-20 RX ADMIN — METOPROLOL TARTRATE 12.5 MILLIGRAM(S): 100 TABLET, FILM COATED ORAL at 17:12

## 2024-11-20 RX ADMIN — GABAPENTIN 300 MILLIGRAM(S): 300 CAPSULE ORAL at 21:49

## 2024-11-20 RX ADMIN — ROPINIROLE 0.5 MILLIGRAM(S): 8 TABLET, FILM COATED, EXTENDED RELEASE ORAL at 21:49

## 2024-11-20 RX ADMIN — METOPROLOL TARTRATE 12.5 MILLIGRAM(S): 100 TABLET, FILM COATED ORAL at 05:53

## 2024-11-20 RX ADMIN — APIXABAN 5 MILLIGRAM(S): 2.5 TABLET, FILM COATED ORAL at 09:55

## 2024-11-20 RX ADMIN — ACETAMINOPHEN 500MG 975 MILLIGRAM(S): 500 TABLET, COATED ORAL at 16:30

## 2024-11-20 RX ADMIN — Medication 2 PUFF(S): at 15:09

## 2024-11-20 RX ADMIN — FAMOTIDINE 20 MILLIGRAM(S): 20 TABLET, FILM COATED ORAL at 21:49

## 2024-11-20 RX ADMIN — Medication 25 MILLIGRAM(S): at 11:33

## 2024-11-20 RX ADMIN — Medication 2 PUFF(S): at 08:08

## 2024-11-20 RX ADMIN — FUROSEMIDE 40 MILLIGRAM(S): 40 TABLET ORAL at 05:53

## 2024-11-20 RX ADMIN — Medication 2 TABLET(S): at 21:49

## 2024-11-20 RX ADMIN — ACETAMINOPHEN 500MG 975 MILLIGRAM(S): 500 TABLET, COATED ORAL at 15:33

## 2024-11-20 NOTE — PROGRESS NOTE ADULT - ASSESSMENT
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 These findings are compatible with an acute infarct. No hemorrhagic transformation is seen. No significant shift or herniation is seen.  - ASA 81 po qd added by cardiology   - Statin dc 11/15, per patient refusal. She is aware of increased risk of recurrent stroke without statin  - Continue comprehensive PT, OT, SLP program 3 hours daily 5 x week  - cleared to shower  - Precautions: cardiac, fall, AC    # frustration, irritability, anxiety, exacerbated by use of statin and myalgia  - statin dc per patient's wishes  - neuropsychology support  - rec therapy    # restless leg syndrome  - requip increased 0.25 to 0.5 qhs 11/17  - gabapentin inc 200 q12 11/17. Family and patient agreeable. Mildly fatigued today, monitor on increased dose  - Mg 2.1 11/18  - Fe level 43 11/18. Will supplement daily 325 mg , discussed with patient  - reviewed with patient and family who are agreeable with plan. 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  - ROM, shoulder stabilization exercises  - warm compress PRN  - lidoderm patch  - discussed with patient and family who are agreeable    # Paroxysmal A-fib  – metoprolol tartrate 25 mg twice daily  – Eliquis 5 mg twice daily  - HR controlled     # Possible TARAH  - was noted to be snoring loudly  - sleep study outpatient    # Chronic diastolic heart failure  # Pulm HTN  - ECHO:  EF of 55 to 60% - severe pulmonary HTN   – spironolactone held due to ANNALEE 11/15  - furosemide 40 mg  restarted 11/18  - Aldactone reordered today x1 dose by hospitalist   – metoprolol tartrate 25 mg twice daily  - home O2: 3L oxygen via NC overnight       # HLD  - On Livalo 2mg at home, allergic to Repatha  – Lipitor dc per patient's wishes after education re: risk recurrent stroke. Family agreeable  -  education on Heart Healthy diet     # Hypothyroidism  - Synthroid 25mcg AM    # ANNALEE  - Hospitalist appreciated. Hold lasix and spironolactone 11/15  - renal consult appreciated 11/15. Hold diuretics.   - BUn/Cr 31/1.5 11/15 -->-> 24/1.06 11/18 resolved.   - furosemide restarted 11/18, monitor    # Pain control  - Tylenol PRN  - Rx RLS as above    # GI/Bowel Mgmt   - senna, miralax  - add stewed prunes    # FEN   - MBS 11/19--. soft and bite sized with mildly thick liquids via single sip    # DVT prophylaxis  – On Eliquis    # Case discussed in IDT rounds 11/18 (initial):  - easy to chew with mildly thick liquids, mild dysarthria, mild cognitive deficits, left mendoza inattention, supervision oral hygiene, max-total toilet hygiene, max LB, mod UB dressing, mod toilet transfers, min-mod bed mobility, ambulates 75 feet RW/min assist and WC follow, negotiates 4 steps with bilateral HR and min assist, leisure activities to improve QOL  - barriers:  left HP, mendoza inattention, mild cognitive deficits, reduced balance, pain, bilateral RTC syndrome  - goals: "get home and in my own bed" "Yoselin" supervision waking hours on dc, CS transfers and ambulation household, ambulate to bathroom with RW and CS, attend to left with visual compensatory strategies to perform dressing tasks with supervision,   - target: 12/5 home with caregiver support and home PT OT SLP  - caregiver training    # LABS  CBC CMP 11/21    Daughter: Tonia [phone]  Son ": Mode [phone]    [facility]  Cardiovascular Disease  [address] [place] [address]  Phone: [phone]  Fax: [phone]  Follow Up Time:    Pulmonology   Needs outpatient sleep study   89 year-old female with a PMH HTN, HLD, hypothyroidism, PAF, chronic diastolic HF, CAD s/p PCI, PE with factor V Leiden, severe pulmonary hypertension who presented to  11/11/24 with left facial droop, left sided weakness secondary to right corona radiata CVA    # 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 These findings are compatible with an acute infarct. No hemorrhagic transformation is seen. No significant shift or herniation is seen.  - ASA 81 po qd added by cardiology   - Statin dc 11/15, per patient refusal. She is aware of increased risk of recurrent stroke without statin  - Continue comprehensive PT, OT, SLP program 3 hours daily 5 x week  - cleared to shower  - Precautions: cardiac, fall, AC    # frustration, irritability, anxiety, exacerbated by use of statin and myalgia  - statin dc per patient's wishes  - neuropsychology support  - rec therapy    # restless leg syndrome  - requip increased 0.25 to 0.5 qhs 11/17  - gabapentin inc 200 q12 11/17. Patient with some daytime somnolence, will change to 100/300 q 10 AM/10 PM 11/20. She is agreeable  - Mg 2.1 11/18  - Fe level 43 11/18. Will supplement daily 325 mg   - reviewed with patient and family who are agreeable with plan. 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  - ROM, shoulder stabilization exercises  - warm compress PRN  - lidoderm patch  - discussed with patient and family who are agreeable    # Paroxysmal A-fib  – metoprolol tartrate 25 mg twice daily  – Eliquis 5 mg twice daily  - HR controlled     # Possible TARAH  - was noted to be snoring loudly  - sleep study outpatient    # Chronic diastolic heart failure  # Pulm HTN  # cough  - ECHO:  EF of 55 to 60% - severe pulmonary HTN   – spironolactone held due to ANNALEE 11/15  - furosemide 40 mg  restarted 11/18  - Aldactone daily reordered by hospitalist 11/20  – metoprolol tartrate 25 mg twice daily  - home O2: 3L oxygen via NC overnight   - O2 sat 92-94% 11/20    # HLD  - On Livalo 2mg at home, allergic to Repatha  – Lipitor dc per patient's wishes after education re: risk recurrent stroke. Family agreeable  -  education on Heart Healthy diet     # Hypothyroidism  - Synthroid 25mcg AM    # ANNALEE  - Hospitalist appreciated. Hold lasix and spironolactone 11/15  - renal consult appreciated 11/15. Hold diuretics.   - furosemide restarted 11/18  - BUn/Cr 31/1.5 11/15 -->-> 24/1.06 11/18 resolved.   - BMP 11/21    # Pain control  - Tylenol PRN  - Rx RLS as above    # GI/Bowel Mgmt   - senna, miralax  - add stewed prunes    #   - toileting program, scheduled q 4 hours during waking  hours leander with restart diuretics    # FEN   - MBS 11/19--. soft and bite sized with mildly thick liquids via single sip    # DVT prophylaxis  – On Eliquis    # Case discussed in IDT rounds 11/18 (initial):  - easy to chew with mildly thick liquids, mild dysarthria, mild cognitive deficits, left mendoza inattention, supervision oral hygiene, max-total toilet hygiene, max LB, mod UB dressing, mod toilet transfers, min-mod bed mobility, ambulates 75 feet RW/min assist and WC follow, negotiates 4 steps with bilateral HR and min assist, leisure activities to improve QOL  - barriers:  left HP, mendoza inattention, mild cognitive deficits, reduced balance, pain, bilateral RTC syndrome  - goals: "get home and in my own bed" "Yoselin" supervision waking hours on dc, CS transfers and ambulation household, ambulate to bathroom with RW and CS, attend to left with visual compensatory strategies to perform dressing tasks with supervision,   - target: 12/5 home with caregiver support and home PT OT SLP  - caregiver training    # LABS  CBC CMP 11/21    Daughter: Tonia [phone]  Son ": Mode [phone]    [facility]  Cardiovascular Disease  [address] [place] [address]  Phone: [phone]  Fax: [phone]  Follow Up Time:    Pulmonology   Needs outpatient sleep study   89 year-old female with a PMH HTN, HLD, hypothyroidism, PAF, chronic diastolic HF, CAD s/p PCI, PE with factor V Leiden, severe pulmonary hypertension who presented to  11/11/24 with left facial droop, left sided weakness secondary to right corona radiata CVA    # 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 These findings are compatible with an acute infarct. No hemorrhagic transformation is seen. No significant shift or herniation is seen.  - ASA 81 po qd added by cardiology   - Statin dc 11/15, per patient refusal. She is aware of increased risk of recurrent stroke without statin  - Continue comprehensive PT, OT, SLP program 3 hours daily 5 x week  - cleared to shower  - Precautions: cardiac, fall, AC    # frustration, irritability, anxiety, exacerbated by use of statin and myalgia  - statin dc per patient's wishes  - neuropsychology support  - rec therapy    # restless leg syndrome  - requip increased 0.25 to 0.5 qhs 11/17  - gabapentin inc 200 q12 11/17. Patient with some daytime somnolence, will change to 100/300 q 10 AM/10 PM 11/20. She is agreeable  - Mg 2.1 11/18  - Fe level 43 11/18. Will supplement daily 325 mg   - coenzyme Q10 level 11/21  - reviewed with patient and family who are agreeable with plan. 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  - ROM, shoulder stabilization exercises  - warm compress PRN  - lidoderm patch  - discussed with patient and family who are agreeable    # Paroxysmal A-fib  – metoprolol tartrate 25 mg twice daily  – Eliquis 5 mg twice daily  - HR controlled     # Possible TARAH  - was noted to be snoring loudly  - sleep study outpatient    # Chronic diastolic heart failure  # Pulm HTN  # cough  - ECHO:  EF of 55 to 60% - severe pulmonary HTN   – spironolactone held due to ANNALEE 11/15  - furosemide 40 mg  restarted 11/18  - Aldactone daily reordered by hospitalist 11/20  – metoprolol tartrate 25 mg twice daily  - home O2: 3L oxygen via NC overnight   - O2 sat 92-94% 11/20    # HLD  - On Livalo 2mg at home, allergic to Repatha  – Lipitor dc per patient's wishes after education re: risk recurrent stroke. Family agreeable  -  education on Heart Healthy diet     # Hypothyroidism  - Synthroid 25mcg AM    # ANNALEE  - Hospitalist appreciated. Hold lasix and spironolactone 11/15  - renal consult appreciated 11/15. Hold diuretics.   - furosemide restarted 11/18  - BUn/Cr 31/1.5 11/15 -->-> 24/1.06 11/18 resolved.   - BMP 11/21    # Pain control  - Tylenol PRN  - Rx RLS as above    # GI/Bowel Mgmt   - senna, miralax  - add stewed prunes    #   - toileting program, scheduled q 4 hours during waking  hours leander with restart diuretics    # FEN   - MBS 11/19--. soft and bite sized with mildly thick liquids via single sip    # DVT prophylaxis  – On Eliquis    # Case discussed in IDT rounds 11/18 (initial):  - easy to chew with mildly thick liquids, mild dysarthria, mild cognitive deficits, left mendoza inattention, supervision oral hygiene, max-total toilet hygiene, max LB, mod UB dressing, mod toilet transfers, min-mod bed mobility, ambulates 75 feet RW/min assist and WC follow, negotiates 4 steps with bilateral HR and min assist, leisure activities to improve QOL  - barriers:  left HP, mendoza inattention, mild cognitive deficits, reduced balance, pain, bilateral RTC syndrome  - goals: "get home and in my own bed" "Yoselin" supervision waking hours on dc, CS transfers and ambulation household, ambulate to bathroom with RW and CS, attend to left with visual compensatory strategies to perform dressing tasks with supervision,   - target: 12/5 home with caregiver support and home PT OT SLP  - caregiver training    # LABS  CBC CMP 11/21  - coenzyme Q10 level 11/21    Daughter: Tonia [phone]  Son ": Mode [phone]    [facility]  Cardiovascular Disease  [address] [place] [address]  Phone: [phone]  Fax: [phone]  Follow Up Time:    Pulmonology   Needs outpatient sleep study

## 2024-11-20 NOTE — PROGRESS NOTE ADULT - NS ATTEND AMEND GEN_ALL_CORE FT
Progress note amended to include my discussions with the patient, NP, hospitalist, RN, SW, PT, and my findings.     Patient sitting in wheelchair. Appears someone tired, sleepy, but does open eyes readily and answer questions when approached. She reports pain is improving, although she still has residual (states usually lasts up to 2 weeks after statin is dc'd) but also reports that sleep is significantly improved. She woke twice during night but fell back right away. We reviewed her medications, possible exacerbation daytime somnolence by gabapentin--we discussed reducing AM dose and increasing PM, which she is agreeable with (took "3" pills of gabapentin at home in past)    bilateral calves soft no ecchymoses or erythema. mild TTP bilaterally, but not as hypersensitive as early in stay. She still has mild restlessness legs bilaterally but improved.. lungs: no wheezing or rales appreciated,. no respiratory distress, on RA    She still has cough but improved from yesterday. Aldactone daily restarted today, check BMP in AM/ She is agreeable, but concerned about urinary frequency and toileting. Will place on toileting schedule q4, which she is also agreeable with    Continue program      RECENT LABS    Vital Signs Last 24 Hrs  T(C): 36.5 (20 Nov 2024 07:56), Max: 36.5 (20 Nov 2024 07:56)  T(F): 97.7 (20 Nov 2024 07:56), Max: 97.7 (20 Nov 2024 07:56)  HR: 57 (20 Nov 2024 08:08) (57 - 68)  BP: 125/79 (20 Nov 2024 07:56) (113/69 - 149/66)  BP(mean): --  RR: 16 (20 Nov 2024 07:56) (16 - 16)  SpO2: 94% (20 Nov 2024 08:08) (92% - 94%)    Parameters below as of 20 Nov 2024 08:08  Patient On (Oxygen Delivery Method): room air                    CAPILLARY BLOOD GLUCOSE

## 2024-11-20 NOTE — PROGRESS NOTE ADULT - SUBJECTIVE AND OBJECTIVE BOX
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 (19 Nov 2024 23:05)    Patient seen and examined at bedside. No acute overnight events. Slept well. States she was advised to have sleep study done in the past but never followed    ALLERGIES:  Macrodantin (Other)  Motrin (Other)  statins (Muscle Pain)  Repatha (Unknown)  flu vaccines (Other)  nisoldipine (Unknown)  sulfa drugs (Other)  Ceftin (Other (Moderate))  tadalafil (Muscle Pain)    MEDICATIONS  (STANDING):  albuterol    90 MICROgram(s) HFA Inhaler 2 Puff(s) Inhalation every 6 hours  apixaban 5 milliGRAM(s) Oral two times a day  aspirin  chewable 81 milliGRAM(s) Oral daily  famotidine    Tablet 20 milliGRAM(s) Oral at bedtime  ferrous    sulfate 325 milliGRAM(s) Oral daily  furosemide    Tablet 40 milliGRAM(s) Oral daily  gabapentin 200 milliGRAM(s) Oral <User Schedule>  levothyroxine 25 MICROGram(s) Oral daily  lidocaine   4% Patch 1 Patch Transdermal daily  lidocaine   4% Patch 1 Patch Transdermal daily  metoprolol tartrate 12.5 milliGRAM(s) Oral two times a day  polyethylene glycol 3350 17 Gram(s) Oral daily  rOPINIRole 0.5 milliGRAM(s) Oral at bedtime  senna 2 Tablet(s) Oral at bedtime  spironolactone 25 milliGRAM(s) Oral daily    MEDICATIONS  (PRN):  acetaminophen     Tablet .. 975 milliGRAM(s) Oral every 8 hours PRN Mild Pain (1 - 3), Moderate Pain (4 - 6), Severe Pain (7 - 10)  calcium carbonate    500 mG (Tums) Chewable 1 Tablet(s) Chew four times a day PRN Indigestion  guaiFENesin Oral Liquid (Sugar-Free) 200 milliGRAM(s) Oral every 6 hours PRN Cough  LORazepam     Tablet 0.5 milliGRAM(s) Oral daily PRN Anxiety  melatonin 3 milliGRAM(s) Oral at bedtime PRN Insomnia  saline laxative (FLEET) Rectal Enema 1 Enema Rectal once PRN constipation    Vital Signs Last 24 Hrs  T(F): 97.7 (20 Nov 2024 07:56), Max: 97.7 (20 Nov 2024 07:56)  HR: 57 (20 Nov 2024 08:08) (57 - 68)  BP: 125/79 (20 Nov 2024 07:56) (113/69 - 149/66)  RR: 16 (20 Nov 2024 07:56) (16 - 16)  SpO2: 94% (20 Nov 2024 08:08) (92% - 94%)  I&O's Summary    PHYSICAL EXAM:  General: NAD, awake, alert. pleasant   ENT: MMM, no tonsilar exudate  Neck: Supple, No JVD  Lungs: Clear to auscultation bilaterally, no wheezes. Good air entry bilaterally   Cardio: RRR, S1/S2, No murmurs  Abdomen: Soft, Nontender, Nondistended; Bowel sounds present  Extremities: No calf tenderness, No pitting edema    LABS:      11-18    141  |  105  |  24  ----------------------------<  90  4.0   |  29  |  1.06    Ca    9.1      18 Nov 2024 05:49  Mg     2.1     11-18    TPro  6.5  /  Alb  2.9  /  TBili  0.6  /  DBili  x   /  AST  23  /  ALT  16  /  AlkPhos  49  11-18     11-12 Chol 154 mg/dL LDL -- HDL 53 mg/dL Trig 82 mg/dL    Urinalysis Basic - ( 18 Nov 2024 05:49 )    Color: x / Appearance: x / SG: x / pH: x  Gluc: 90 mg/dL / Ketone: x  / Bili: x / Urobili: x   Blood: x / Protein: x / Nitrite: x   Leuk Esterase: x / RBC: x / WBC x   Sq Epi: x / Non Sq Epi: x / Bacteria: x    COVID-19 PCR: NotDetec (11-14-24 @ 20:35)    RADIOLOGY & ADDITIONAL TESTS:     Care Discussed with Consultants/Other Providers:

## 2024-11-20 NOTE — PROGRESS NOTE ADULT - ASSESSMENT
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 These findings are compatible with an acute infarct. No hemorrhagic transformation is seen. No significant shift or herniation is seen.  - ASA 81 po qd added by cardiology   - Lipitor added by neurology but she refused citing myalgia   - BP goal SBP < 140    #BLE pain/RLS  - started Requip 11/16  - Tylenol PRN  - may try magnesium    # Paroxysmal A-fib  – Continue metoprolol tartrate twice daily  – Continue Eliquis 5 mg twice daily    # ANNALEE - improved    # Chronic diastolic heart failure  # Pulm HTN  - ECHO from November 2023 with EF of 55 to 60% - severe pulmonary HTN , echo done at  unchanged from prior echo in 11/2023  – Continue metoprolol tartrate 25 mg twice daily  –  lasix 40mg daily. restarting aldactone 25mg daily  - 3L oxygen via NC overnight at home, no need for oxygen during daytime hours. Outpatient sleep study  -Daily weights     # Hyperlipidemia  - On Livalo 2mg at home, allergic to Repatha  – Lipitor 20mg recommended by neurology. LDL 85, LDL needs to be under 70 per neuro. Patient refusing 20mg lipitor due to intermittent leg cramping/pain.     # Hypothyroidism  -Synthroid 25mcg AM    # DVT prophylaxis  – On Eliquis

## 2024-11-20 NOTE — PROGRESS NOTE ADULT - SUBJECTIVE AND OBJECTIVE BOX
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     Patient went out to lunch at 12 pm the day of admission with her daughter. Around 6 pm the patient slipped out of bed and was unable to get back up. She called her daughter and when the she arrived noted her mother did not seem like herself and had a Left sided facial droop.  CT/CTA head and CTA of neck without evidence of an acute intracranial hemorrhage midline shift or hydrocephalus. There was no hemodynamic significant narrowing within the neck. or proximal large vessel occlusion noted.  MRI showed "abnormal T2 prolongation restricted diffusion seen involving the right corona radiata region. These findings are compatible with an acute infarct. No hemorrhagic transformation is seen. No significant shift or herniation is seen."    Patient was managed medically and was deemed stable for discharge to [facility] on 11/14/24 for acute IRF.          (14 Nov 2024 13:35)      PAST MEDICAL & SURGICAL HISTORY:  Atrial fibrillation      CHF (congestive heart failure)      Pulmonary emboli      Factor 5 Leiden mutation, heterozygous      CAD (coronary artery disease)      Stented coronary artery      Hypothyroid      COPD (chronic obstructive pulmonary disease)      Mitral regurgitation      HTN (hypertension)      H/O: hysterectomy      H/O knee surgery      Cyst of breast, unspecified laterality  S/P excision      H/O cardiac radiofrequency ablation      S/P ablation of atrial fibrillation          MEDICATIONS  (STANDING):  albuterol    90 MICROgram(s) HFA Inhaler 2 Puff(s) Inhalation every 6 hours  apixaban 5 milliGRAM(s) Oral two times a day  aspirin  chewable 81 milliGRAM(s) Oral daily  famotidine    Tablet 20 milliGRAM(s) Oral daily  ferrous    sulfate 325 milliGRAM(s) Oral daily  furosemide    Tablet 40 milliGRAM(s) Oral daily  gabapentin 200 milliGRAM(s) Oral <User Schedule>  levothyroxine 25 MICROGram(s) Oral daily  lidocaine   4% Patch 1 Patch Transdermal daily  lidocaine   4% Patch 1 Patch Transdermal daily  metoprolol tartrate 12.5 milliGRAM(s) Oral two times a day  polyethylene glycol 3350 17 Gram(s) Oral daily  rOPINIRole 0.5 milliGRAM(s) Oral at bedtime  senna 2 Tablet(s) Oral at bedtime    MEDICATIONS  (PRN):  acetaminophen     Tablet .. 975 milliGRAM(s) Oral every 8 hours PRN Mild Pain (1 - 3), Moderate Pain (4 - 6), Severe Pain (7 - 10)  calcium carbonate    500 mG (Tums) Chewable 1 Tablet(s) Chew four times a day PRN Indigestion  guaiFENesin Oral Liquid (Sugar-Free) 200 milliGRAM(s) Oral every 6 hours PRN Cough  LORazepam     Tablet 0.5 milliGRAM(s) Oral daily PRN Anxiety  melatonin 3 milliGRAM(s) Oral at bedtime PRN Insomnia  saline laxative (FLEET) Rectal Enema 1 Enema Rectal once PRN constipation      Allergies    Macrodantin (Other)  Motrin (Other)  statins (Muscle Pain)  Repatha (Unknown)  flu vaccines (Other)  nisoldipine (Unknown)  sulfa drugs (Other)  Ceftin (Other (Moderate))  tadalafil (Muscle Pain)    Intolerances          VITALS  89y  Vital Signs Last 24 Hrs  T(C): 36.5 (20 Nov 2024 07:56), Max: 36.5 (20 Nov 2024 07:56)  T(F): 97.7 (20 Nov 2024 07:56), Max: 97.7 (20 Nov 2024 07:56)  HR: 57 (20 Nov 2024 08:08) (57 - 68)  BP: 125/79 (20 Nov 2024 07:56) (113/69 - 149/66)  BP(mean): --  RR: 16 (20 Nov 2024 07:56) (16 - 16)  SpO2: 94% (20 Nov 2024 08:08) (92% - 94%)    Parameters below as of 20 Nov 2024 08:08  Patient On (Oxygen Delivery Method): room air          RECENT LABS:      11-18    141  |  105  |  24[H]  ----------------------------<  90  4.0   |  29  |  1.06    Ca    9.1      18 Nov 2024 05:49  Mg     2.1     11-18    TPro  6.5  /  Alb  2.9[L]  /  TBili  0.6  /  DBili  x   /  AST  23  /  ALT  16  /  AlkPhos  49  11-18    LIVER FUNCTIONS - ( 18 Nov 2024 05:49 )  Alb: 2.9 g/dL / Pro: 6.5 g/dL / ALK PHOS: 49 U/L / ALT: 16 U/L / AST: 23 U/L / GGT: x             Urinalysis Basic - ( 18 Nov 2024 05:49 )    Color: x / Appearance: x / SG: x / pH: x  Gluc: 90 mg/dL / Ketone: x  / Bili: x / Urobili: x   Blood: x / Protein: x / Nitrite: x   Leuk Esterase: x / RBC: x / WBC x   Sq Epi: x / Non Sq Epi: x / Bacteria: x          CAPILLARY BLOOD GLUCOSE        Review of Systems:   · Additional ROS  	 Pt examined at bedside. NAD. Reports sleeping well overnight. Reports no pain at this time and RLS is improvement. Reports "it was d/t the statins.  Infrequent  cough noted during exam. No SOB. Left facial droop noted and + left sided weakness.  Pt reports she is looking forward to therapy.   No Headache, blurred vision CP, palpitations. We reviewed recent med changes, time to see efficacy, and addition iron for lower levels. She is agreeable    Last BM today 11/19  	    Physical Exam:   · Constitutional Comments	In good spirits. Listening to [employer] music. Pt is a . cooperative.   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     Patient went out to lunch at 12 pm the day of admission with her daughter. Around 6 pm the patient slipped out of bed and was unable to get back up. She called her daughter and when the she arrived noted her mother did not seem like herself and had a Left sided facial droop.  CT/CTA head and CTA of neck without evidence of an acute intracranial hemorrhage midline shift or hydrocephalus. There was no hemodynamic significant narrowing within the neck. or proximal large vessel occlusion noted.  MRI showed "abnormal T2 prolongation restricted diffusion seen involving the right corona radiata region. These findings are compatible with an acute infarct. No hemorrhagic transformation is seen. No significant shift or herniation is seen."    Patient was managed medically and was deemed stable for discharge to [facility] on 11/14/24 for acute IRF.          (14 Nov 2024 13:35)      PAST MEDICAL & SURGICAL HISTORY:  Atrial fibrillation      CHF (congestive heart failure)      Pulmonary emboli      Factor 5 Leiden mutation, heterozygous      CAD (coronary artery disease)      Stented coronary artery      Hypothyroid      COPD (chronic obstructive pulmonary disease)      Mitral regurgitation      HTN (hypertension)      H/O: hysterectomy      H/O knee surgery      Cyst of breast, unspecified laterality  S/P excision      H/O cardiac radiofrequency ablation      S/P ablation of atrial fibrillation          MEDICATIONS  (STANDING):  albuterol    90 MICROgram(s) HFA Inhaler 2 Puff(s) Inhalation every 6 hours  apixaban 5 milliGRAM(s) Oral two times a day  aspirin  chewable 81 milliGRAM(s) Oral daily  famotidine    Tablet 20 milliGRAM(s) Oral daily  ferrous    sulfate 325 milliGRAM(s) Oral daily  furosemide    Tablet 40 milliGRAM(s) Oral daily  gabapentin 200 milliGRAM(s) Oral <User Schedule>  levothyroxine 25 MICROGram(s) Oral daily  lidocaine   4% Patch 1 Patch Transdermal daily  lidocaine   4% Patch 1 Patch Transdermal daily  metoprolol tartrate 12.5 milliGRAM(s) Oral two times a day  polyethylene glycol 3350 17 Gram(s) Oral daily  rOPINIRole 0.5 milliGRAM(s) Oral at bedtime  senna 2 Tablet(s) Oral at bedtime    MEDICATIONS  (PRN):  acetaminophen     Tablet .. 975 milliGRAM(s) Oral every 8 hours PRN Mild Pain (1 - 3), Moderate Pain (4 - 6), Severe Pain (7 - 10)  calcium carbonate    500 mG (Tums) Chewable 1 Tablet(s) Chew four times a day PRN Indigestion  guaiFENesin Oral Liquid (Sugar-Free) 200 milliGRAM(s) Oral every 6 hours PRN Cough  LORazepam     Tablet 0.5 milliGRAM(s) Oral daily PRN Anxiety  melatonin 3 milliGRAM(s) Oral at bedtime PRN Insomnia  saline laxative (FLEET) Rectal Enema 1 Enema Rectal once PRN constipation      Allergies    Macrodantin (Other)  Motrin (Other)  statins (Muscle Pain)  Repatha (Unknown)  flu vaccines (Other)  nisoldipine (Unknown)  sulfa drugs (Other)  Ceftin (Other (Moderate))  tadalafil (Muscle Pain)    Intolerances          VITALS  89y  Vital Signs Last 24 Hrs  T(C): 36.5 (20 Nov 2024 07:56), Max: 36.5 (20 Nov 2024 07:56)  T(F): 97.7 (20 Nov 2024 07:56), Max: 97.7 (20 Nov 2024 07:56)  HR: 57 (20 Nov 2024 08:08) (57 - 68)  BP: 125/79 (20 Nov 2024 07:56) (113/69 - 149/66)  BP(mean): --  RR: 16 (20 Nov 2024 07:56) (16 - 16)  SpO2: 94% (20 Nov 2024 08:08) (92% - 94%)    Parameters below as of 20 Nov 2024 08:08  Patient On (Oxygen Delivery Method): room air          RECENT LABS:      11-18    141  |  105  |  24[H]  ----------------------------<  90  4.0   |  29  |  1.06    Ca    9.1      18 Nov 2024 05:49  Mg     2.1     11-18    TPro  6.5  /  Alb  2.9[L]  /  TBili  0.6  /  DBili  x   /  AST  23  /  ALT  16  /  AlkPhos  49  11-18    LIVER FUNCTIONS - ( 18 Nov 2024 05:49 )  Alb: 2.9 g/dL / Pro: 6.5 g/dL / ALK PHOS: 49 U/L / ALT: 16 U/L / AST: 23 U/L / GGT: x             Urinalysis Basic - ( 18 Nov 2024 05:49 )    Color: x / Appearance: x / SG: x / pH: x  Gluc: 90 mg/dL / Ketone: x  / Bili: x / Urobili: x   Blood: x / Protein: x / Nitrite: x   Leuk Esterase: x / RBC: x / WBC x   Sq Epi: x / Non Sq Epi: x / Bacteria: x          CAPILLARY BLOOD GLUCOSE        Review of Systems:   · Additional ROS  	 Pt examined at bedside. NAD. Reports sleeping well overnight. Reports no pain at this time and RLS is improvement. Reports "it was d/t the statins.  Infrequent  cough noted during exam. No SOB. Left facial droop noted and + left sided weakness.  Pt reports she is looking forward to therapy.   No Headache, blurred vision CP, palpitations. We reviewed recent med changes, time to see efficacy, and addition iron for lower levels. She is agreeable    Last BM today 11/19  	    Physical Exam:   · Constitutional Comments	In good spirits. Listening to [employer] music. Pt is a . cooperative.   · Respiratory	clear to auscultation bilaterally; no wheezes  · Cardiovascular	regular rate and rhythm  · Gastrointestinal	soft; nontender; normal active bowel sounds  · Motor	no erythema or warmth  no ecchymoses, no pedal edema  reduced pain with palpation and ROM legs, no restlessness today

## 2024-11-21 DIAGNOSIS — I50.32 CHRONIC DIASTOLIC (CONGESTIVE) HEART FAILURE: ICD-10-CM

## 2024-11-21 DIAGNOSIS — Z88.1 ALLERGY STATUS TO OTHER ANTIBIOTIC AGENTS: ICD-10-CM

## 2024-11-21 DIAGNOSIS — I63.9 CEREBRAL INFARCTION, UNSPECIFIED: ICD-10-CM

## 2024-11-21 DIAGNOSIS — D68.51 ACTIVATED PROTEIN C RESISTANCE: ICD-10-CM

## 2024-11-21 DIAGNOSIS — I25.10 ATHEROSCLEROTIC HEART DISEASE OF NATIVE CORONARY ARTERY WITHOUT ANGINA PECTORIS: ICD-10-CM

## 2024-11-21 DIAGNOSIS — Z79.890 HORMONE REPLACEMENT THERAPY: ICD-10-CM

## 2024-11-21 DIAGNOSIS — I48.0 PAROXYSMAL ATRIAL FIBRILLATION: ICD-10-CM

## 2024-11-21 DIAGNOSIS — Z88.2 ALLERGY STATUS TO SULFONAMIDES: ICD-10-CM

## 2024-11-21 DIAGNOSIS — I11.0 HYPERTENSIVE HEART DISEASE WITH HEART FAILURE: ICD-10-CM

## 2024-11-21 DIAGNOSIS — E78.5 HYPERLIPIDEMIA, UNSPECIFIED: ICD-10-CM

## 2024-11-21 DIAGNOSIS — Z95.5 PRESENCE OF CORONARY ANGIOPLASTY IMPLANT AND GRAFT: ICD-10-CM

## 2024-11-21 DIAGNOSIS — Z91.012 ALLERGY TO EGGS: ICD-10-CM

## 2024-11-21 DIAGNOSIS — R29.705 NIHSS SCORE 5: ICD-10-CM

## 2024-11-21 DIAGNOSIS — I27.20 PULMONARY HYPERTENSION, UNSPECIFIED: ICD-10-CM

## 2024-11-21 DIAGNOSIS — R29.810 FACIAL WEAKNESS: ICD-10-CM

## 2024-11-21 DIAGNOSIS — Z86.711 PERSONAL HISTORY OF PULMONARY EMBOLISM: ICD-10-CM

## 2024-11-21 DIAGNOSIS — Z79.01 LONG TERM (CURRENT) USE OF ANTICOAGULANTS: ICD-10-CM

## 2024-11-21 DIAGNOSIS — E03.9 HYPOTHYROIDISM, UNSPECIFIED: ICD-10-CM

## 2024-11-21 DIAGNOSIS — G81.94 HEMIPLEGIA, UNSPECIFIED AFFECTING LEFT NONDOMINANT SIDE: ICD-10-CM

## 2024-11-21 LAB
ALBUMIN SERPL ELPH-MCNC: 2.8 G/DL — LOW (ref 3.3–5)
ALP SERPL-CCNC: 56 U/L — SIGNIFICANT CHANGE UP (ref 40–120)
ALT FLD-CCNC: 16 U/L — SIGNIFICANT CHANGE UP (ref 10–45)
ANION GAP SERPL CALC-SCNC: 5 MMOL/L — SIGNIFICANT CHANGE UP (ref 5–17)
AST SERPL-CCNC: 23 U/L — SIGNIFICANT CHANGE UP (ref 10–40)
BILIRUB SERPL-MCNC: 0.5 MG/DL — SIGNIFICANT CHANGE UP (ref 0.2–1.2)
BUN SERPL-MCNC: 31 MG/DL — HIGH (ref 7–23)
CALCIUM SERPL-MCNC: 9.2 MG/DL — SIGNIFICANT CHANGE UP (ref 8.4–10.5)
CHLORIDE SERPL-SCNC: 103 MMOL/L — SIGNIFICANT CHANGE UP (ref 96–108)
CO2 SERPL-SCNC: 32 MMOL/L — HIGH (ref 22–31)
CREAT SERPL-MCNC: 1.21 MG/DL — SIGNIFICANT CHANGE UP (ref 0.5–1.3)
EGFR: 43 ML/MIN/1.73M2 — LOW
GLUCOSE SERPL-MCNC: 96 MG/DL — SIGNIFICANT CHANGE UP (ref 70–99)
POTASSIUM SERPL-MCNC: 3.4 MMOL/L — LOW (ref 3.5–5.3)
POTASSIUM SERPL-SCNC: 3.4 MMOL/L — LOW (ref 3.5–5.3)
PROT SERPL-MCNC: 6.5 G/DL — SIGNIFICANT CHANGE UP (ref 6–8.3)
SODIUM SERPL-SCNC: 140 MMOL/L — SIGNIFICANT CHANGE UP (ref 135–145)

## 2024-11-21 PROCEDURE — 99232 SBSQ HOSP IP/OBS MODERATE 35: CPT

## 2024-11-21 RX ORDER — ROPINIROLE 8 MG/1
1 TABLET, FILM COATED, EXTENDED RELEASE ORAL AT BEDTIME
Refills: 0 | Status: DISCONTINUED | OUTPATIENT
Start: 2024-11-21 | End: 2024-11-29

## 2024-11-21 RX ORDER — POTASSIUM CHLORIDE 600 MG/1
40 TABLET, EXTENDED RELEASE ORAL ONCE
Refills: 0 | Status: COMPLETED | OUTPATIENT
Start: 2024-11-21 | End: 2024-11-21

## 2024-11-21 RX ADMIN — Medication 25 MILLIGRAM(S): at 07:02

## 2024-11-21 RX ADMIN — LIDOCAINE 1 PATCH: 40 CREAM TOPICAL at 23:24

## 2024-11-21 RX ADMIN — LIDOCAINE 1 PATCH: 40 CREAM TOPICAL at 11:33

## 2024-11-21 RX ADMIN — Medication 2 PUFF(S): at 20:02

## 2024-11-21 RX ADMIN — ROPINIROLE 1 MILLIGRAM(S): 8 TABLET, FILM COATED, EXTENDED RELEASE ORAL at 21:19

## 2024-11-21 RX ADMIN — Medication 2 TABLET(S): at 21:19

## 2024-11-21 RX ADMIN — LIDOCAINE 1 PATCH: 40 CREAM TOPICAL at 19:24

## 2024-11-21 RX ADMIN — FUROSEMIDE 40 MILLIGRAM(S): 40 TABLET ORAL at 07:03

## 2024-11-21 RX ADMIN — LIDOCAINE 1 PATCH: 40 CREAM TOPICAL at 19:23

## 2024-11-21 RX ADMIN — GABAPENTIN 100 MILLIGRAM(S): 300 CAPSULE ORAL at 11:32

## 2024-11-21 RX ADMIN — GABAPENTIN 300 MILLIGRAM(S): 300 CAPSULE ORAL at 21:18

## 2024-11-21 RX ADMIN — POLYETHYLENE GLYCOL 3350 17 GRAM(S): 17 POWDER, FOR SOLUTION ORAL at 11:32

## 2024-11-21 RX ADMIN — APIXABAN 5 MILLIGRAM(S): 2.5 TABLET, FILM COATED ORAL at 10:46

## 2024-11-21 RX ADMIN — Medication 2 PUFF(S): at 15:27

## 2024-11-21 RX ADMIN — APIXABAN 5 MILLIGRAM(S): 2.5 TABLET, FILM COATED ORAL at 21:19

## 2024-11-21 RX ADMIN — Medication 325 MILLIGRAM(S): at 11:32

## 2024-11-21 RX ADMIN — Medication 25 MICROGRAM(S): at 07:03

## 2024-11-21 RX ADMIN — Medication 2 PUFF(S): at 07:56

## 2024-11-21 RX ADMIN — Medication 81 MILLIGRAM(S): at 11:32

## 2024-11-21 RX ADMIN — Medication 200 MILLIGRAM(S): at 17:48

## 2024-11-21 RX ADMIN — FAMOTIDINE 20 MILLIGRAM(S): 20 TABLET, FILM COATED ORAL at 21:19

## 2024-11-21 RX ADMIN — POTASSIUM CHLORIDE 40 MILLIEQUIVALENT(S): 600 TABLET, EXTENDED RELEASE ORAL at 10:37

## 2024-11-21 RX ADMIN — ACETAMINOPHEN, DIPHENHYDRAMINE HCL, PHENYLEPHRINE HCL 3 MILLIGRAM(S): 325; 25; 5 TABLET ORAL at 21:18

## 2024-11-21 RX ADMIN — METOPROLOL TARTRATE 12.5 MILLIGRAM(S): 100 TABLET, FILM COATED ORAL at 17:45

## 2024-11-21 RX ADMIN — Medication 1 TABLET(S): at 17:48

## 2024-11-21 RX ADMIN — METOPROLOL TARTRATE 12.5 MILLIGRAM(S): 100 TABLET, FILM COATED ORAL at 07:02

## 2024-11-21 NOTE — PROGRESS NOTE ADULT - SUBJECTIVE AND OBJECTIVE BOX
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 (21 Nov 2024 10:26)      Interval History:  Chart reviewed  Patient seen and examined  No major overnight events  No complaints this morning    Care Discussed with Consultants/Other Providers: Yes    ALLERGIES:  Macrodantin (Other)  Motrin (Other)  statins (Muscle Pain)  Repatha (Unknown)  flu vaccines (Other)  nisoldipine (Unknown)  sulfa drugs (Other)  Ceftin (Other (Moderate))  tadalafil (Muscle Pain)    MEDICATIONS  (STANDING):  albuterol    90 MICROgram(s) HFA Inhaler 2 Puff(s) Inhalation every 6 hours  apixaban 5 milliGRAM(s) Oral two times a day  aspirin  chewable 81 milliGRAM(s) Oral daily  famotidine    Tablet 20 milliGRAM(s) Oral at bedtime  ferrous    sulfate 325 milliGRAM(s) Oral daily  furosemide    Tablet 40 milliGRAM(s) Oral daily  gabapentin 100 milliGRAM(s) Oral daily  gabapentin 300 milliGRAM(s) Oral at bedtime  levothyroxine 25 MICROGram(s) Oral daily  lidocaine   4% Patch 1 Patch Transdermal daily  lidocaine   4% Patch 1 Patch Transdermal daily  metoprolol tartrate 12.5 milliGRAM(s) Oral two times a day  polyethylene glycol 3350 17 Gram(s) Oral daily  rOPINIRole 1 milliGRAM(s) Oral at bedtime  senna 2 Tablet(s) Oral at bedtime  spironolactone 25 milliGRAM(s) Oral daily    MEDICATIONS  (PRN):  acetaminophen     Tablet .. 975 milliGRAM(s) Oral every 8 hours PRN Mild Pain (1 - 3), Moderate Pain (4 - 6), Severe Pain (7 - 10)  calcium carbonate    500 mG (Tums) Chewable 1 Tablet(s) Chew four times a day PRN Indigestion  guaiFENesin Oral Liquid (Sugar-Free) 200 milliGRAM(s) Oral every 6 hours PRN Cough  melatonin 3 milliGRAM(s) Oral at bedtime PRN Insomnia  saline laxative (FLEET) Rectal Enema 1 Enema Rectal once PRN constipation    Vital Signs Last 24 Hrs  T(F): 98.1 (21 Nov 2024 08:13), Max: 98.1 (21 Nov 2024 08:13)  HR: 57 (21 Nov 2024 08:13) (55 - 84)  BP: 114/56 (21 Nov 2024 08:13) (112/69 - 123/60)  RR: 14 (21 Nov 2024 08:13) (14 - 16)  SpO2: 95% (21 Nov 2024 08:13) (94% - 95%)  I&O's Summary        PHYSICAL EXAM:  GENERAL: NAD  NERVOUS SYSTEM:  CN II - X grossly intact; Sensation intact; follows commands  CHEST/LUNG: Clear to percussion bilaterally; No rales, rhonchi, wheezing, or rubs; normal respiratory effort, no intercostal retractions  HEART: Regular rate and rhythm; No murmurs, rubs, or gallops; No pitting edema  ABDOMEN: Soft, Nontender, Nondistended; Bowel sounds present; No HSM or masses  MUSCULOSKELETAL/EXTREMITIES:  No clubbing or digital cyanosis  PSYCH: Appropriate affect, Alert    LABS:      11-21    140  |  103  |  31  ----------------------------<  96  3.4   |  32  |  1.21    Ca    9.2      21 Nov 2024 05:39    TPro  6.5  /  Alb  2.8  /  TBili  0.5  /  DBili  x   /  AST  23  /  ALT  16  /  AlkPhos  56  11-21 11-12 Chol 154 mg/dL LDL -- HDL 53 mg/dL Trig 82 mg/dL                    COVID-19 PCR: NotDetec (11-14-24 @ 20:35)

## 2024-11-21 NOTE — PROGRESS NOTE ADULT - SUBJECTIVE AND OBJECTIVE BOX
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     Patient went out to lunch at 12 pm the day of admission with her daughter. Around 6 pm the patient slipped out of bed and was unable to get back up. She called her daughter and when the she arrived noted her mother did not seem like herself and had a Left sided facial droop.  CT/CTA head and CTA of neck without evidence of an acute intracranial hemorrhage midline shift or hydrocephalus. There was no hemodynamic significant narrowing within the neck. or proximal large vessel occlusion noted.  MRI showed "abnormal T2 prolongation restricted diffusion seen involving the right corona radiata region. These findings are compatible with an acute infarct. No hemorrhagic transformation is seen. No significant shift or herniation is seen."    Patient was managed medically and was deemed stable for discharge to [facility] on 11/14/24 for acute IRF.          (14 Nov 2024 13:35)      PAST MEDICAL & SURGICAL HISTORY:  Atrial fibrillation      CHF (congestive heart failure)      Pulmonary emboli      Factor 5 Leiden mutation, heterozygous      CAD (coronary artery disease)      Stented coronary artery      Hypothyroid      COPD (chronic obstructive pulmonary disease)      Mitral regurgitation      HTN (hypertension)      H/O: hysterectomy      H/O knee surgery      Cyst of breast, unspecified laterality  S/P excision      H/O cardiac radiofrequency ablation      S/P ablation of atrial fibrillation          MEDICATIONS  (STANDING):  albuterol    90 MICROgram(s) HFA Inhaler 2 Puff(s) Inhalation every 6 hours  apixaban 5 milliGRAM(s) Oral two times a day  aspirin  chewable 81 milliGRAM(s) Oral daily  famotidine    Tablet 20 milliGRAM(s) Oral at bedtime  ferrous    sulfate 325 milliGRAM(s) Oral daily  furosemide    Tablet 40 milliGRAM(s) Oral daily  gabapentin 100 milliGRAM(s) Oral daily  gabapentin 300 milliGRAM(s) Oral at bedtime  levothyroxine 25 MICROGram(s) Oral daily  lidocaine   4% Patch 1 Patch Transdermal daily  lidocaine   4% Patch 1 Patch Transdermal daily  metoprolol tartrate 12.5 milliGRAM(s) Oral two times a day  polyethylene glycol 3350 17 Gram(s) Oral daily  potassium chloride   Powder 40 milliEquivalent(s) Oral once  rOPINIRole 0.5 milliGRAM(s) Oral at bedtime  senna 2 Tablet(s) Oral at bedtime  spironolactone 25 milliGRAM(s) Oral daily    MEDICATIONS  (PRN):  acetaminophen     Tablet .. 975 milliGRAM(s) Oral every 8 hours PRN Mild Pain (1 - 3), Moderate Pain (4 - 6), Severe Pain (7 - 10)  calcium carbonate    500 mG (Tums) Chewable 1 Tablet(s) Chew four times a day PRN Indigestion  guaiFENesin Oral Liquid (Sugar-Free) 200 milliGRAM(s) Oral every 6 hours PRN Cough  melatonin 3 milliGRAM(s) Oral at bedtime PRN Insomnia  saline laxative (FLEET) Rectal Enema 1 Enema Rectal once PRN constipation      Allergies    Macrodantin (Other)  Motrin (Other)  statins (Muscle Pain)  Repatha (Unknown)  flu vaccines (Other)  nisoldipine (Unknown)  sulfa drugs (Other)  Ceftin (Other (Moderate))  tadalafil (Muscle Pain)    Intolerances          VITALS  89y  Vital Signs Last 24 Hrs  T(C): 36.7 (21 Nov 2024 08:13), Max: 36.7 (20 Nov 2024 21:45)  T(F): 98.1 (21 Nov 2024 08:13), Max: 98.1 (21 Nov 2024 08:13)  HR: 57 (21 Nov 2024 08:13) (55 - 84)  BP: 114/56 (21 Nov 2024 08:13) (112/69 - 123/60)  BP(mean): --  RR: 14 (21 Nov 2024 08:13) (14 - 16)  SpO2: 95% (21 Nov 2024 08:13) (93% - 95%)    Parameters below as of 21 Nov 2024 08:13  Patient On (Oxygen Delivery Method): room air      Daily     Daily         RECENT LABS:      11-21    140  |  103  |  31[H]  ----------------------------<  96  3.4[L]   |  32[H]  |  1.21    Ca    9.2      21 Nov 2024 05:39    TPro  6.5  /  Alb  2.8[L]  /  TBili  0.5  /  DBili  x   /  AST  23  /  ALT  16  /  AlkPhos  56  11-21    LIVER FUNCTIONS - ( 21 Nov 2024 05:39 )  Alb: 2.8 g/dL / Pro: 6.5 g/dL / ALK PHOS: 56 U/L / ALT: 16 U/L / AST: 23 U/L / GGT: x             Urinalysis Basic - ( 21 Nov 2024 05:39 )    Color: x / Appearance: x / SG: x / pH: x  Gluc: 96 mg/dL / Ketone: x  / Bili: x / Urobili: x   Blood: x / Protein: x / Nitrite: x   Leuk Esterase: x / RBC: x / WBC x   Sq Epi: x / Non Sq Epi: x / Bacteria: x          CAPILLARY BLOOD GLUCOSE

## 2024-11-21 NOTE — PROGRESS NOTE ADULT - SUBJECTIVE AND OBJECTIVE BOX
Resting, on NC O2    Vital Signs Last 24 Hrs  T(C): 36.7 (11-21-24 @ 08:13), Max: 36.7 (11-20-24 @ 21:45)  T(F): 98.1 (11-21-24 @ 08:13), Max: 98.1 (11-21-24 @ 08:13)  HR: 58 (11-21-24 @ 15:27) (55 - 84)  BP: 114/56 (11-21-24 @ 08:13) (112/69 - 123/60)  RR: 14 (11-21-24 @ 08:13) (14 - 16)  SpO2: 97% (11-21-24 @ 15:27) (94% - 97%)    s1s2  b/l air entry  soft, ND  b/l LE lymphedema    21 Nov 2024 05:39    140    |  103    |  31     ----------------------------<  96     3.4     |  32     |  1.21     Ca    9.2        21 Nov 2024 05:39    TPro  6.5    /  Alb  2.8    /  TBili  0.5    /  DBili  x      /  AST  23     /  ALT  16     /  AlkPhos  56     21 Nov 2024 05:39    LIVER FUNCTIONS - ( 21 Nov 2024 05:39 )  Alb: 2.8 g/dL / Pro: 6.5 g/dL / ALK PHOS: 56 U/L / ALT: 16 U/L / AST: 23 U/L / GGT: x           acetaminophen     Tablet .. 975 milliGRAM(s) Oral every 8 hours PRN  albuterol    90 MICROgram(s) HFA Inhaler 2 Puff(s) Inhalation every 6 hours  apixaban 5 milliGRAM(s) Oral two times a day  aspirin  chewable 81 milliGRAM(s) Oral daily  calcium carbonate    500 mG (Tums) Chewable 1 Tablet(s) Chew four times a day PRN  famotidine    Tablet 20 milliGRAM(s) Oral at bedtime  ferrous    sulfate 325 milliGRAM(s) Oral daily  furosemide    Tablet 40 milliGRAM(s) Oral daily  gabapentin 100 milliGRAM(s) Oral daily  gabapentin 300 milliGRAM(s) Oral at bedtime  guaiFENesin Oral Liquid (Sugar-Free) 200 milliGRAM(s) Oral every 6 hours PRN  levothyroxine 25 MICROGram(s) Oral daily  lidocaine   4% Patch 1 Patch Transdermal daily  lidocaine   4% Patch 1 Patch Transdermal daily  melatonin 3 milliGRAM(s) Oral at bedtime PRN  metoprolol tartrate 12.5 milliGRAM(s) Oral two times a day  polyethylene glycol 3350 17 Gram(s) Oral daily  rOPINIRole 1 milliGRAM(s) Oral at bedtime  saline laxative (FLEET) Rectal Enema 1 Enema Rectal once PRN  senna 2 Tablet(s) Oral at bedtime  spironolactone 25 milliGRAM(s) Oral daily    A/P:    CM, mod TR, 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

## 2024-11-21 NOTE — PROGRESS NOTE ADULT - ASSESSMENT
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 These findings are compatible with an acute infarct. No hemorrhagic transformation is seen. No significant shift or herniation is seen.  - ASA 81 po qd added by cardiology   - Statin dc 11/15, per patient refusal. She is aware of increased risk of recurrent stroke without statin  - Continue comprehensive PT, OT, SLP program 3 hours daily 5 x week  - cleared to shower  - Precautions: cardiac, fall, AC    # frustration, irritability, anxiety, exacerbated by use of statin and myalgia  - statin dc per patient's wishes  - neuropsychology support  - rec therapy    # restless leg syndrome  - requip increased 0.25 to 0.5 qhs 11/17. Will increase to 1 mg qhs 11/21, patient agreeable  - gabapentin changed from 200 bid to 100/300 q 10 AM/10 PM 11/20 due to daytime somnolence. She continues to feel drowsy in AM; usually sleeps 9:30 PM-11 AM at home. Will try to accommodate therapy schedules for later sessions; does not want further adjustment in schedule  - Mg 2.1 11/18  - Fe level 43 11/18. Will supplement daily 325 mg   - coenzyme Q10 level 11/21 pending  - reviewed with patient and family who are agreeable with plan. 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  - ROM, shoulder stabilization exercises  - warm compress PRN  - lidoderm patch  - discussed with patient and family who are agreeable    # Paroxysmal A-fib  – metoprolol tartrate 25 mg twice daily  – Eliquis 5 mg twice daily  - HR controlled     # Possible TARAH  - was noted to be snoring loudly  - sleep study outpatient    # Chronic diastolic heart failure  # Pulm HTN  # cough  - ECHO:  EF of 55 to 60% - severe pulmonary HTN   – spironolactone held due to ANNALEE 11/15  - furosemide 40 mg  restarted 11/18  - Aldactone daily reordered by hospitalist 11/20  – metoprolol tartrate 25 mg twice daily  - home O2: 3L oxygen via NC overnight     # HLD  - On Livalo 2mg at home, allergic to Repatha  – Lipitor dc per patient's wishes after education re: risk recurrent stroke. Family agreeable  -  education on Heart Healthy diet     # Hypothyroidism  - Synthroid 25mcg AM    # hypokalemia  - K+ 3.4 11/21  - replete 40 meq x 1   - BMP in AM 11/22    # ANNALEE  - Hospitalist appreciated. Hold lasix and spironolactone 11/15  - renal consult appreciated 11/15. Hold diuretics.   - furosemide restarted 11/18  - BUn/Cr 31/1.5 11/15 -->-> 24/1.06 11/18 --> 31/1.21 11/21. Encourage po fluids. Diuretics had been restarted this week  - BMP 11/22    # Pain control  - Tylenol PRN  - Rx RLS as above    # GI/Bowel Mgmt   - senna, miralax  - add stewed prunes    #   - toileting program, scheduled q 4 hours during waking  hours leander with restart diuretics    # FEN   - MBS 11/19--. soft and bite sized with mildly thick liquids via single sip    # DVT prophylaxis  – On Eliquis    # Case discussed in IDT rounds 11/18 (initial):  - easy to chew with mildly thick liquids, mild dysarthria, mild cognitive deficits, left mendoza inattention, supervision oral hygiene, max-total toilet hygiene, max LB, mod UB dressing, mod toilet transfers, min-mod bed mobility, ambulates 75 feet RW/min assist and WC follow, negotiates 4 steps with bilateral HR and min assist, leisure activities to improve QOL  - barriers:  left HP, mendoza inattention, mild cognitive deficits, reduced balance, pain, bilateral RTC syndrome  - goals: "get home and in my own bed" "Yoselin" supervision waking hours on dc, CS transfers and ambulation household, ambulate to bathroom with RW and CS, attend to left with visual compensatory strategies to perform dressing tasks with supervision,   - target: 12/5 home with caregiver support and home PT OT SLP  - caregiver training    # LABS    CBC CMP 11/25  coenzyme Q10 level 11/21 pending    Daughter: Tonia [phone]  Son ": Mode [phone]    [facility] Holloway  Cardiovascular Disease  [address] [place] [address]  Phone: [phone]  Fax: [phone]  Follow Up Time:    Pulmonology   Needs outpatient sleep study   89 year-old female with a PMH HTN, HLD, hypothyroidism, PAF, chronic diastolic HF, CAD s/p PCI, PE with factor V Leiden, severe pulmonary hypertension who presented to  11/11/24 with left facial droop, left sided weakness secondary to right corona radiata CVA    # 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 These findings are compatible with an acute infarct. No hemorrhagic transformation is seen. No significant shift or herniation is seen.  - ASA 81 po qd added by cardiology   - Statin dc 11/15, per patient refusal. She is aware of increased risk of recurrent stroke without statin  - Continue comprehensive PT, OT, SLP program 3 hours daily 5 x week  - cleared to shower  - Precautions: cardiac, fall, AC    # frustration, irritability, anxiety, exacerbated by use of statin and myalgia  - statin dc per patient's wishes  - neuropsychology support  - rec therapy    # restless leg syndrome  - requip increased 0.25 to 0.5 qhs 11/17. Will increase to 1 mg qhs 11/21, patient agreeable  - gabapentin changed from 200 bid to 100/300 q 10 AM/10 PM 11/20 due to daytime somnolence. She continues to feel drowsy in AM; usually sleeps 9:30 PM-11 AM at home. Will try to accommodate therapy schedules for later sessions; does not want further adjustment in schedule. Discussed with patient  - Mg 2.1 11/18  - Fe level 43 11/18. Will supplement daily 325 mg   - coenzyme Q10 level 11/21 pending  - reviewed with patient and family who are agreeable with plan. 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  - ROM, shoulder stabilization exercises  - warm compress PRN  - lidoderm patch  - discussed with patient and family who are agreeable    # Paroxysmal A-fib  – metoprolol tartrate 25 mg twice daily  – Eliquis 5 mg twice daily  - HR controlled 55-84 11/21    # Possible TARAH  - was noted to be snoring loudly  - sleep study outpatient  - may also be contributing to daytime somnolence    # Chronic diastolic heart failure  # Pulm HTN  # cough  - ECHO:  EF of 55 to 60% - severe pulmonary HTN   – spironolactone held due to ANNALEE 11/15  - furosemide 40 mg  restarted 11/18  - Aldactone daily reordered by hospitalist 11/20  – metoprolol tartrate 25 mg twice daily  - home O2: 3L oxygen via NC overnight     # HLD  - On Livalo 2mg at home, allergic to Repatha  – Lipitor dc per patient's wishes after education re: risk recurrent stroke. Family agreeable  -  education on Heart Healthy diet     # Hypothyroidism  - Synthroid 25mcg AM    # hypokalemia  - K+ 3.4 11/21  - replete 40 meq x 1   - BMP in AM 11/22    # ANNALEE  - Hospitalist appreciated. Hold lasix and spironolactone 11/15  - renal consult appreciated 11/15. Hold diuretics.   - furosemide restarted 11/18  - BUn/Cr 31/1.5 11/15 -->-> 24/1.06 11/18 --> 31/1.21 11/21. Encourage po fluids. Diuretics had been restarted this week  - BMP 11/22    # Pain control  - Tylenol PRN  - Rx RLS as above    # GI/Bowel Mgmt   - senna, miralax  - add stewed prunes    #   - toileting program, scheduled q 4 hours during waking  hours leander with restart diuretics    # FEN   - MBS 11/19--. soft and bite sized with mildly thick liquids via single sip    # DVT prophylaxis  – On Eliquis    # Case discussed in IDT rounds 11/18 (initial):  - easy to chew with mildly thick liquids, mild dysarthria, mild cognitive deficits, left mendoza inattention, supervision oral hygiene, max-total toilet hygiene, max LB, mod UB dressing, mod toilet transfers, min-mod bed mobility, ambulates 75 feet RW/min assist and WC follow, negotiates 4 steps with bilateral HR and min assist, leisure activities to improve QOL  - barriers:  left HP, mendoza inattention, mild cognitive deficits, reduced balance, pain, bilateral RTC syndrome  - goals: "get home and in my own bed" "Yoselin" supervision waking hours on dc, CS transfers and ambulation household, ambulate to bathroom with RW and CS, attend to left with visual compensatory strategies to perform dressing tasks with supervision,   - target: 12/5 home with caregiver support and home PT OT SLP  - caregiver training    # LABS    CBC CMP 11/25  coenzyme Q10 level 11/21 pending    Daughter: Tonia [phone]  Son ": Mode [phone]    [facility]  Cardiovascular Disease  [address] [place] [address]  Phone: [phone]  Fax: [phone]  Follow Up Time:    Pulmonology   Needs outpatient sleep study

## 2024-11-21 NOTE — PROGRESS NOTE ADULT - COMMENTS
Patient sitting in wheelchair in room. She appears drowsy, same as yesterday, with no improvement following adjustment in gabapentin dose. She does not report difficulty sleeping, although she still has leg discomfort. Overall, her restlessness in LE appears much better, and she does not offer pain complaints unless prompted, although she says "about the same"    Her typical sleep pattern at home is 9:30 PM-11 AM. Cough significantly improved,

## 2024-11-22 ENCOUNTER — TRANSCRIPTION ENCOUNTER (OUTPATIENT)
Age: 89
End: 2024-11-22

## 2024-11-22 LAB
ANION GAP SERPL CALC-SCNC: 7 MMOL/L — SIGNIFICANT CHANGE UP (ref 5–17)
BUN SERPL-MCNC: 27 MG/DL — HIGH (ref 7–23)
CALCIUM SERPL-MCNC: 9.3 MG/DL — SIGNIFICANT CHANGE UP (ref 8.4–10.5)
CHLORIDE SERPL-SCNC: 105 MMOL/L — SIGNIFICANT CHANGE UP (ref 96–108)
CO2 SERPL-SCNC: 33 MMOL/L — HIGH (ref 22–31)
CREAT SERPL-MCNC: 0.98 MG/DL — SIGNIFICANT CHANGE UP (ref 0.5–1.3)
EGFR: 55 ML/MIN/1.73M2 — LOW
GLUCOSE SERPL-MCNC: 95 MG/DL — SIGNIFICANT CHANGE UP (ref 70–99)
POTASSIUM SERPL-MCNC: 3.6 MMOL/L — SIGNIFICANT CHANGE UP (ref 3.5–5.3)
POTASSIUM SERPL-SCNC: 3.6 MMOL/L — SIGNIFICANT CHANGE UP (ref 3.5–5.3)
SODIUM SERPL-SCNC: 145 MMOL/L — SIGNIFICANT CHANGE UP (ref 135–145)

## 2024-11-22 PROCEDURE — 99232 SBSQ HOSP IP/OBS MODERATE 35: CPT

## 2024-11-22 RX ORDER — LEVOTHYROXINE SODIUM 150 MCG
1 TABLET ORAL
Qty: 0 | Refills: 0 | DISCHARGE
Start: 2024-11-22

## 2024-11-22 RX ORDER — SPIRONOLACTONE 25 MG
1 TABLET ORAL
Qty: 0 | Refills: 0 | DISCHARGE
Start: 2024-11-22

## 2024-11-22 RX ORDER — APIXABAN 2.5 MG/1
1 TABLET, FILM COATED ORAL
Qty: 0 | Refills: 0 | DISCHARGE
Start: 2024-11-22

## 2024-11-22 RX ORDER — METOPROLOL TARTRATE 50 MG
1 TABLET ORAL
Refills: 0 | DISCHARGE

## 2024-11-22 RX ORDER — GABAPENTIN 300 MG/1
1 CAPSULE ORAL
Refills: 0 | DISCHARGE

## 2024-11-22 RX ORDER — CALCIUM CARBONATE 500(1250)
1 TABLET ORAL
Qty: 0 | Refills: 0 | DISCHARGE
Start: 2024-11-22

## 2024-11-22 RX ORDER — GUAIFENESIN 400 MG
10 TABLET ORAL
Qty: 0 | Refills: 0 | DISCHARGE
Start: 2024-11-22

## 2024-11-22 RX ORDER — SODIUM CHLORIDE 0.65 %
1 AEROSOL, SPRAY (ML) NASAL
Qty: 0 | Refills: 0 | DISCHARGE
Start: 2024-11-22

## 2024-11-22 RX ORDER — ALBUTEROL 90 MCG
2 AEROSOL (GRAM) INHALATION
Qty: 0 | Refills: 0 | DISCHARGE
Start: 2024-11-22

## 2024-11-22 RX ORDER — GABAPENTIN 300 MG/1
1 CAPSULE ORAL
Qty: 0 | Refills: 0 | DISCHARGE
Start: 2024-11-22

## 2024-11-22 RX ORDER — LIDOCAINE 40 MG/G
2 CREAM TOPICAL
Qty: 0 | Refills: 0 | DISCHARGE
Start: 2024-11-22

## 2024-11-22 RX ORDER — FAMOTIDINE 20 MG/1
1 TABLET, FILM COATED ORAL
Qty: 0 | Refills: 0 | DISCHARGE
Start: 2024-11-22

## 2024-11-22 RX ORDER — ACETAMINOPHEN, DIPHENHYDRAMINE HCL, PHENYLEPHRINE HCL 325; 25; 5 MG/1; MG/1; MG/1
1 TABLET ORAL
Qty: 0 | Refills: 0 | DISCHARGE
Start: 2024-11-22

## 2024-11-22 RX ORDER — POLYETHYLENE GLYCOL 3350 17 G/17G
17 POWDER, FOR SOLUTION ORAL
Qty: 0 | Refills: 0 | DISCHARGE
Start: 2024-11-22

## 2024-11-22 RX ORDER — SODIUM CHLORIDE 0.65 %
1 AEROSOL, SPRAY (ML) NASAL
Refills: 0 | Status: DISCONTINUED | OUTPATIENT
Start: 2024-11-22 | End: 2024-11-29

## 2024-11-22 RX ORDER — POTASSIUM CHLORIDE 10 MEQ
1 TABLET, EXTENDED RELEASE ORAL
Refills: 0 | DISCHARGE

## 2024-11-22 RX ORDER — ALBUTEROL 90 MCG
2 AEROSOL (GRAM) INHALATION
Refills: 0 | DISCHARGE

## 2024-11-22 RX ORDER — GABAPENTIN 300 MG/1
2 CAPSULE ORAL
Refills: 0 | DISCHARGE

## 2024-11-22 RX ORDER — ROPINIROLE 8 MG/1
1 TABLET, FILM COATED, EXTENDED RELEASE ORAL
Qty: 0 | Refills: 0 | DISCHARGE
Start: 2024-11-22

## 2024-11-22 RX ORDER — FUROSEMIDE 40 MG/1
1 TABLET ORAL
Qty: 0 | Refills: 0 | DISCHARGE
Start: 2024-11-22

## 2024-11-22 RX ORDER — ACETAMINOPHEN 500MG 500 MG/1
3 TABLET, COATED ORAL
Qty: 0 | Refills: 0 | DISCHARGE
Start: 2024-11-22

## 2024-11-22 RX ORDER — FERROUS SULFATE 325(65) MG
1 TABLET ORAL
Qty: 0 | Refills: 0 | DISCHARGE
Start: 2024-11-22

## 2024-11-22 RX ORDER — SENNOSIDES 8.6 MG
2 TABLET ORAL
Qty: 0 | Refills: 0 | DISCHARGE
Start: 2024-11-22

## 2024-11-22 RX ADMIN — METOPROLOL TARTRATE 12.5 MILLIGRAM(S): 100 TABLET, FILM COATED ORAL at 06:29

## 2024-11-22 RX ADMIN — Medication 81 MILLIGRAM(S): at 11:40

## 2024-11-22 RX ADMIN — LIDOCAINE 1 PATCH: 40 CREAM TOPICAL at 23:45

## 2024-11-22 RX ADMIN — Medication 1 TABLET(S): at 13:59

## 2024-11-22 RX ADMIN — LIDOCAINE 1 PATCH: 40 CREAM TOPICAL at 19:37

## 2024-11-22 RX ADMIN — GABAPENTIN 100 MILLIGRAM(S): 300 CAPSULE ORAL at 11:40

## 2024-11-22 RX ADMIN — Medication 200 MILLIGRAM(S): at 11:54

## 2024-11-22 RX ADMIN — ACETAMINOPHEN, DIPHENHYDRAMINE HCL, PHENYLEPHRINE HCL 3 MILLIGRAM(S): 325; 25; 5 TABLET ORAL at 21:20

## 2024-11-22 RX ADMIN — Medication 25 MICROGRAM(S): at 06:29

## 2024-11-22 RX ADMIN — Medication 2 PUFF(S): at 19:47

## 2024-11-22 RX ADMIN — METOPROLOL TARTRATE 12.5 MILLIGRAM(S): 100 TABLET, FILM COATED ORAL at 18:50

## 2024-11-22 RX ADMIN — LIDOCAINE 1 PATCH: 40 CREAM TOPICAL at 19:36

## 2024-11-22 RX ADMIN — APIXABAN 5 MILLIGRAM(S): 2.5 TABLET, FILM COATED ORAL at 10:43

## 2024-11-22 RX ADMIN — Medication 25 MILLIGRAM(S): at 06:29

## 2024-11-22 RX ADMIN — FAMOTIDINE 20 MILLIGRAM(S): 20 TABLET, FILM COATED ORAL at 21:20

## 2024-11-22 RX ADMIN — Medication 2 PUFF(S): at 08:10

## 2024-11-22 RX ADMIN — ROPINIROLE 1 MILLIGRAM(S): 8 TABLET, FILM COATED, EXTENDED RELEASE ORAL at 21:21

## 2024-11-22 RX ADMIN — LIDOCAINE 1 PATCH: 40 CREAM TOPICAL at 11:40

## 2024-11-22 RX ADMIN — Medication 1 TABLET(S): at 18:55

## 2024-11-22 RX ADMIN — APIXABAN 5 MILLIGRAM(S): 2.5 TABLET, FILM COATED ORAL at 21:21

## 2024-11-22 RX ADMIN — FUROSEMIDE 40 MILLIGRAM(S): 40 TABLET ORAL at 06:29

## 2024-11-22 RX ADMIN — Medication 325 MILLIGRAM(S): at 11:40

## 2024-11-22 RX ADMIN — GABAPENTIN 300 MILLIGRAM(S): 300 CAPSULE ORAL at 21:21

## 2024-11-22 NOTE — PROGRESS NOTE ADULT - ASSESSMENT
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 These findings are compatible with an acute infarct. No hemorrhagic transformation is seen. No significant shift or herniation is seen.  - ASA 81 po qd added by cardiology   - Lipitor added by neurology but she refused citing myalgia   - BP goal SBP < 140    #Nasal congestion  - ocean nasal saline spray started 11/22    #BLE pain/RLS  - started Requip 11/16  - Tylenol PRN  - may try magnesium    # Paroxysmal A-fib  – Continue metoprolol tartrate twice daily  – Continue Eliquis 5 mg twice daily    # ANNALEE - improved    # Chronic diastolic heart failure  # Pulm HTN  - ECHO from November 2023 with EF of 55 to 60% - severe pulmonary HTN , echo done at  unchanged from prior echo in 11/2023  – Continue metoprolol tartrate 25 mg twice daily  –  lasix 40mg daily. restarting aldactone 25mg daily  - 3L oxygen via NC overnight at home, no need for oxygen during daytime hours. Outpatient sleep study  -Daily weights     # Hyperlipidemia  - On Livalo 2mg at home, allergic to Repatha  – Lipitor 20mg recommended by neurology. LDL 85, LDL needs to be under 70 per neuro. Patient refusing 20mg lipitor due to intermittent leg cramping/pain.     # Hypothyroidism  -Synthroid 25mcg AM    # DVT prophylaxis  – On Eliquis

## 2024-11-22 NOTE — PROGRESS NOTE ADULT - SUBJECTIVE AND OBJECTIVE BOX
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 (20 Nov 2024 12:11)      HPI:  This is an 89 y/o Female RH dominant with a PMHx HTN, HLD, chronic diastolic HF, paroxysmal atrial fibrillation (on anticoagulation, s/p ablation), CAD status post PCI to the LAD 2017 with repeat cardiac catheterization in 3/2022 showing nonobstructive coronary disease with patent stents, severe pulmonary hypertension, hypothyroidism, mitral valve regurgitation, history of PE with factor V Leiden, hypothyroidism, who presented to  ED on 11/11/24 with Left sided facial droop.     Patient went out to lunch at 12 pm the day of admission with her daughter. Around 6 pm the patient slipped out of bed and was unable to get back up. She called her daughter and when the she arrived noted her mother did not seem like herself and had a Left sided facial droop.  CT/CTA head and CTA of neck without evidence of an acute intracranial hemorrhage midline shift or hydrocephalus. There was no hemodynamic significant narrowing within the neck. or proximal large vessel occlusion noted.  MRI showed "abnormal T2 prolongation restricted diffusion seen involving the right corona radiata region. These findings are compatible with an acute infarct. No hemorrhagic transformation is seen. No significant shift or herniation is seen."    Patient was managed medically and was deemed stable for discharge to [facility] on 11/14/24 for acute IRF.     SUBJECTIVE/ROS  Patient was seen during speech therapy. Progressing well in therapy. no overnight events. in no acute distress. Still having L leg cramps, but improved somewhat. No F/C, chest pain, SOB.    Spoke to Tonia yesterday over phone who stated it would be really difficult to accommodate for pt to come home and expresses preference for [facility]. Will follow up with Valorie today.     Other comprehensive ROS negative.          (14 Nov 2024 13:35)      PAST MEDICAL & SURGICAL HISTORY:  Atrial fibrillation      CHF (congestive heart failure)      Pulmonary emboli      Factor 5 Leiden mutation, heterozygous      CAD (coronary artery disease)      Stented coronary artery      Hypothyroid      COPD (chronic obstructive pulmonary disease)      Mitral regurgitation      HTN (hypertension)      H/O: hysterectomy      H/O knee surgery      Cyst of breast, unspecified laterality  S/P excision      H/O cardiac radiofrequency ablation      S/P ablation of atrial fibrillation          MEDICATIONS  (STANDING):  albuterol    90 MICROgram(s) HFA Inhaler 2 Puff(s) Inhalation every 6 hours  apixaban 5 milliGRAM(s) Oral two times a day  aspirin  chewable 81 milliGRAM(s) Oral daily  famotidine    Tablet 20 milliGRAM(s) Oral at bedtime  ferrous    sulfate 325 milliGRAM(s) Oral daily  furosemide    Tablet 40 milliGRAM(s) Oral daily  gabapentin 100 milliGRAM(s) Oral daily  gabapentin 300 milliGRAM(s) Oral at bedtime  levothyroxine 25 MICROGram(s) Oral daily  lidocaine   4% Patch 1 Patch Transdermal daily  lidocaine   4% Patch 1 Patch Transdermal daily  metoprolol tartrate 12.5 milliGRAM(s) Oral two times a day  polyethylene glycol 3350 17 Gram(s) Oral daily  rOPINIRole 1 milliGRAM(s) Oral at bedtime  senna 2 Tablet(s) Oral at bedtime  spironolactone 25 milliGRAM(s) Oral daily    MEDICATIONS  (PRN):  acetaminophen     Tablet .. 975 milliGRAM(s) Oral every 8 hours PRN Mild Pain (1 - 3), Moderate Pain (4 - 6), Severe Pain (7 - 10)  calcium carbonate    500 mG (Tums) Chewable 1 Tablet(s) Chew four times a day PRN Indigestion  guaiFENesin Oral Liquid (Sugar-Free) 200 milliGRAM(s) Oral every 6 hours PRN Cough  melatonin 3 milliGRAM(s) Oral at bedtime PRN Insomnia  saline laxative (FLEET) Rectal Enema 1 Enema Rectal once PRN constipation        Allergies    Macrodantin (Other)  Motrin (Other)  statins (Muscle Pain)  Repatha (Unknown)  flu vaccines (Other)  nisoldipine (Unknown)  sulfa drugs (Other)  Ceftin (Other (Moderate))  tadalafil (Muscle Pain)    Intolerances          VITALS  T(C): 36.7 (11-21-24 @ 20:15), Max: 36.7 (11-21-24 @ 20:15)  T(F): 98 (11-21-24 @ 20:15), Max: 98 (11-21-24 @ 20:15)  HR: 64 (11-22-24 @ 08:42) (58 - 71)  BP: 141/79 (11-22-24 @ 08:42) (130/60 - 141/79)  ABP: --  ABP(mean): --  RR: 16 (11-22-24 @ 08:42) (14 - 16)  SpO2: 91% (11-22-24 @ 08:42) (91% - 97%)      PHYSICAL EXAM:   Constitutional - NAD, Comfortable  HEENT - NCAT  Chest - good chest expansion, good respiratory effort  Cardio - warm and well perfused  Abdomen -  Nondistended  Extremities - No peripheral edema  Neurologic Exam:                           Orientation: Awake, Alert  Psychiatric - Mood stable, Affect WNL      LAB    11-22    145  |  105  |  27[H]  ----------------------------<  95  3.6   |  33[H]  |  0.98    Ca    9.3      22 Nov 2024 05:29    TPro  6.5  /  Alb  2.8[L]  /  TBili  0.5  /  DBili  x   /  AST  23  /  ALT  16  /  AlkPhos  56  11-21    LIVER FUNCTIONS - ( 21 Nov 2024 05:39 )  Alb: 2.8 g/dL / Pro: 6.5 g/dL / ALK PHOS: 56 U/L / ALT: 16 U/L / AST: 23 U/L / GGT: x                 Urinalysis Basic - ( 22 Nov 2024 05:29 )    Color: x / Appearance: x / SG: x / pH: x  Gluc: 95 mg/dL / Ketone: x  / Bili: x / Urobili: x   Blood: x / Protein: x / Nitrite: x   Leuk Esterase: x / RBC: x / WBC x   Sq Epi: x / Non Sq Epi: x / Bacteria: x

## 2024-11-22 NOTE — PROGRESS NOTE ADULT - SUBJECTIVE AND OBJECTIVE BOX
Resting, on NC O2    Vital Signs Last 24 Hrs  T(C): 36.7 (11-21-24 @ 20:15), Max: 36.7 (11-21-24 @ 20:15)  T(F): 98 (11-21-24 @ 20:15), Max: 98 (11-21-24 @ 20:15)  HR: 67 (11-22-24 @ 18:50) (61 - 67)  BP: 143/58 (11-22-24 @ 18:50) (130/60 - 143/58)  RR: 16 (11-22-24 @ 18:50) (16 - 16)  SpO2: 91% (11-22-24 @ 18:50) (91% - 96%)    s1s2  b/l air entry  soft, ND  b/l LE lymphedema    22 Nov 2024 05:29    145    |  105    |  27     ----------------------------<  95     3.6     |  33     |  0.98     Ca    9.3        22 Nov 2024 05:29    TPro  6.5    /  Alb  2.8    /  TBili  0.5    /  DBili  x      /  AST  23     /  ALT  16     /  AlkPhos  56     21 Nov 2024 05:39    LIVER FUNCTIONS - ( 21 Nov 2024 05:39 )  Alb: 2.8 g/dL / Pro: 6.5 g/dL / ALK PHOS: 56 U/L / ALT: 16 U/L / AST: 23 U/L / GGT: x           acetaminophen     Tablet .. 975 milliGRAM(s) Oral every 8 hours PRN  albuterol    90 MICROgram(s) HFA Inhaler 2 Puff(s) Inhalation every 6 hours  apixaban 5 milliGRAM(s) Oral two times a day  aspirin  chewable 81 milliGRAM(s) Oral daily  calcium carbonate    500 mG (Tums) Chewable 1 Tablet(s) Chew four times a day PRN  famotidine    Tablet 20 milliGRAM(s) Oral at bedtime  ferrous    sulfate 325 milliGRAM(s) Oral daily  furosemide    Tablet 40 milliGRAM(s) Oral daily  gabapentin 100 milliGRAM(s) Oral daily  gabapentin 300 milliGRAM(s) Oral at bedtime  guaiFENesin Oral Liquid (Sugar-Free) 200 milliGRAM(s) Oral every 6 hours PRN  levothyroxine 25 MICROGram(s) Oral daily  lidocaine   4% Patch 1 Patch Transdermal daily  lidocaine   4% Patch 1 Patch Transdermal daily  melatonin 3 milliGRAM(s) Oral at bedtime PRN  metoprolol tartrate 12.5 milliGRAM(s) Oral two times a day  polyethylene glycol 3350 17 Gram(s) Oral daily  rOPINIRole 1 milliGRAM(s) Oral at bedtime  saline laxative (FLEET) Rectal Enema 1 Enema Rectal once PRN  senna 2 Tablet(s) Oral at bedtime  sodium chloride 0.65% Nasal 1 Spray(s) Both Nostrils two times a day PRN  spironolactone 25 milliGRAM(s) Oral daily    A/P:    CM, mod TR, mod-severe MR  S/p CVA involving R corona radiata region  S/p CT w/IV dye 11/11  S/p mild contrast ANNALEE  Was on Lasix, Aldactone (held since 11/15)  ANNALEE has resolved     Avoid nephrotoxins as able  Lasix, Aldactone restarted   F/u BMP, UO    [phone]

## 2024-11-22 NOTE — DISCHARGE NOTE PROVIDER - NSDCMRMEDTOKEN_GEN_ALL_CORE_FT
acetaminophen 325 mg oral tablet: 3 tab(s) orally every 8 hours As needed Mild Pain (1 - 3), Moderate Pain (4 - 6), Severe Pain (7 - 10)  albuterol 90 mcg/inh inhalation aerosol: 2 puff(s) inhaled every 6 hours  Aldactone 25 mg oral tablet: 1 tab(s) orally once a day  apixaban 5 mg oral tablet: 1 tab(s) orally 2 times a day  aspirin 81 mg oral tablet, chewable: 1 tab(s) orally once a day  calcium carbonate 500 mg (200 mg elemental calcium) oral tablet, chewable: 1 tab(s) orally 4 times a day As needed Indigestion  famotidine 20 mg oral tablet: 1 tab(s) orally once a day (at bedtime)  ferrous sulfate 325 mg (65 mg elemental iron) oral tablet: 1 tab(s) orally once a day  guaiFENesin 100 mg/5 mL oral liquid: 10 milliliter(s) orally every 6 hours As needed Cough  Lasix 40 mg oral tablet: 1 tab(s) orally once a day  levothyroxine 25 mcg (0.025 mg) oral tablet: 1 tab(s) orally once a day  lidocaine 4% topical film: Apply topically to affected area once a day as needed for  mild pain Place on bilateral shoulders  melatonin 3 mg oral tablet: 1 tab(s) orally once a day (at bedtime) As needed Insomnia  Neurontin 100 mg oral capsule: 1 cap(s) orally once a day  Neurontin 300 mg oral capsule: 1 cap(s) orally once a day (at bedtime)  polyethylene glycol 3350 oral powder for reconstitution: 17 gram(s) orally once a day  rOPINIRole 1 mg oral tablet: 1 tab(s) orally once a day (at bedtime)  senna leaf extract oral tablet: 2 tab(s) orally once a day (at bedtime)  sodium chloride 0.65% nasal spray: 1 spray(s) nasal 2 times a day as needed for  dry nasal passages one spray each nostril as needed   acetaminophen 325 mg oral tablet: 3 tab(s) orally every 8 hours As needed Mild Pain (1 - 3), Moderate Pain (4 - 6), Severe Pain (7 - 10)  albuterol 90 mcg/inh inhalation aerosol: 2 puff(s) inhaled every 6 hours  Aldactone 25 mg oral tablet: 1 tab(s) orally once a day  allopurinol 100 mg oral tablet: 1 tab(s) orally once a day  apixaban 5 mg oral tablet: 1 tab(s) orally 2 times a day  aspirin 81 mg oral tablet, chewable: 1 tab(s) orally once a day  calcium carbonate 500 mg (200 mg elemental calcium) oral tablet, chewable: 1 tab(s) orally 4 times a day As needed Indigestion  famotidine 20 mg oral tablet: 1 tab(s) orally once a day (at bedtime)  ferrous sulfate 325 mg (65 mg elemental iron) oral tablet: 1 tab(s) orally once a day  guaiFENesin 100 mg/5 mL oral liquid: 10 milliliter(s) orally every 6 hours As needed Cough  Lasix 40 mg oral tablet: 1 tab(s) orally once a day  levothyroxine 25 mcg (0.025 mg) oral tablet: 1 tab(s) orally once a day  lidocaine 4% topical film: Apply topically to affected area once a day as needed for  mild pain Place on bilateral shoulders  melatonin 3 mg oral tablet: 1 tab(s) orally once a day (at bedtime) As needed Insomnia  Metoprolol Tartrate 25 mg oral tablet: 0.5 tab(s) orally 2 times a day  Neurontin 100 mg oral capsule: 1 cap(s) orally once a day  Neurontin 300 mg oral capsule: 1 cap(s) orally once a day (at bedtime)  polyethylene glycol 3350 oral powder for reconstitution: 17 gram(s) orally once a day  rOPINIRole 1 mg oral tablet: 1 tab(s) orally once a day (at bedtime)  senna leaf extract oral tablet: 2 tab(s) orally once a day (at bedtime)  sodium chloride 0.65% nasal spray: 1 spray(s) nasal 2 times a day as needed for  dry nasal passages one spray each nostril as needed   acetaminophen 325 mg oral tablet: 3 tab(s) orally every 8 hours As needed Mild Pain (1 - 3), Moderate Pain (4 - 6), Severe Pain (7 - 10)  albuterol 90 mcg/inh inhalation aerosol: 2 puff(s) inhaled every 6 hours  Aldactone 25 mg oral tablet: 1 tab(s) orally once a day  apixaban 5 mg oral tablet: 1 tab(s) orally 2 times a day  aspirin 81 mg oral tablet, chewable: 1 tab(s) orally once a day  calcium carbonate 500 mg (200 mg elemental calcium) oral tablet, chewable: 1 tab(s) orally 4 times a day As needed Indigestion  famotidine 20 mg oral tablet: 1 tab(s) orally once a day (at bedtime)  ferrous sulfate 325 mg (65 mg elemental iron) oral tablet: 1 tab(s) orally once a day  guaiFENesin 100 mg/5 mL oral liquid: 10 milliliter(s) orally every 6 hours As needed Cough  Lasix 40 mg oral tablet: 1 tab(s) orally once a day  levothyroxine 25 mcg (0.025 mg) oral tablet: 1 tab(s) orally once a day  lidocaine 4% topical film: Apply topically to affected area once a day as needed for  mild pain Place on bilateral shoulders  melatonin 3 mg oral tablet: 1 tab(s) orally once a day (at bedtime) As needed Insomnia  Metoprolol Tartrate 25 mg oral tablet: 0.5 tab(s) orally 2 times a day  Neurontin 100 mg oral capsule: 1 cap(s) orally once a day  Neurontin 300 mg oral capsule: 1 cap(s) orally once a day (at bedtime)  polyethylene glycol 3350 oral powder for reconstitution: 17 gram(s) orally once a day  predniSONE 10 mg oral tablet: 1 tab(s) orally once a day  rOPINIRole 1 mg oral tablet: 1 tab(s) orally once a day (at bedtime)  senna leaf extract oral tablet: 2 tab(s) orally once a day (at bedtime)  sodium chloride 0.65% nasal spray: 1 spray(s) nasal 2 times a day as needed for  dry nasal passages one spray each nostril as needed

## 2024-11-22 NOTE — DISCHARGE NOTE PROVIDER - PROVIDER TOKENS
PROVIDER:[TOKEN:[[id number]],FOLLOWUP:[2 weeks]],PROVIDER:[TOKEN:[[id number]],FOLLOWUP:[1 month]]

## 2024-11-22 NOTE — DISCHARGE NOTE PROVIDER - CARE PROVIDER_API CALL
Praful Holloway  Cardiovascular Disease  [address] [place] [address]  Phone: [phone]  Fax: [phone]  Follow Up Time: 2 weeks    Promise Soni  Physical/Rehab Medicine  [address] [place] [address]  Phone: [phone]  Fax: [phone]  Follow Up Time: 1 month

## 2024-11-22 NOTE — DISCHARGE NOTE PROVIDER - NSDCCPCAREPLAN_GEN_ALL_CORE_FT
PRINCIPAL DISCHARGE DIAGNOSIS  Diagnosis: Right-sided cerebrovascular accident (CVA)  Assessment and Plan of Treatment: You had a stroke and came to rehab to get stronger and recover your function. Please follow up with Dr. Soni outpatient.      SECONDARY DISCHARGE DIAGNOSES  Diagnosis: Restless leg syndrome  Assessment and Plan of Treatment: You had left leg cramping pain during your rehab stay. We discontinued the statin, which could be causing leg cramps. However, you still had leg cramps so we started you on requip and gabapentin, medications that can be helpful for leg cramping associated with restless leg syndrome. Please follow up with your PCP to get this condition further evaluated.    Diagnosis: ANNALEE (acute kidney injury)  Assessment and Plan of Treatment: You had a mild acute kidney injury as evidenced by your kidney labs. The kidney doctor saw you and recommended staying well-hydrated and paused your diuretic medications for about a week. The labs improved, so we restarted the lasix and aldactone. Please follow up with your PCP and cardiologist.    Diagnosis: TARAH (obstructive sleep apnea)  Assessment and Plan of Treatment: During your rehab, you had frequent tiredness during the day and reported snoring at night, which may be signs of obstructive sleep apnea. Please follow up with a pulmonologist to have this further evaluated.     PRINCIPAL DISCHARGE DIAGNOSIS  Diagnosis: Right-sided cerebrovascular accident (CVA)  Assessment and Plan of Treatment: You had a stroke and came to rehab to get stronger and recover your function. Please follow up with Dr. Soni outpatient.      SECONDARY DISCHARGE DIAGNOSES  Diagnosis: Restless leg syndrome  Assessment and Plan of Treatment: You had left leg cramping pain during your rehab stay. We discontinued the statin, which could be causing leg cramps. However, you still had leg cramps so we started you on requip and gabapentin, medications that can be helpful for leg cramping associated with restless leg syndrome. Please follow up with your PCP to get this condition further evaluated.    Diagnosis: ANNALEE (acute kidney injury)  Assessment and Plan of Treatment: You had a mild acute kidney injury as evidenced by your kidney labs. The kidney doctor saw you and recommended staying well-hydrated and paused your diuretic medications for about a week. The labs improved, so we restarted the lasix and aldactone. Please follow up with your PCP and cardiologist.    Diagnosis: TARAH (obstructive sleep apnea)  Assessment and Plan of Treatment: During your rehab, you had frequent tiredness during the day and reported snoring at night, which may be signs of obstructive sleep apnea. Please follow up with a pulmonologist to have this further evaluated.    Diagnosis: Paroxysmal atrial fibrillation  Assessment and Plan of Treatment: Please continue to take metoprolol 12.5mg twice per day and eliquis 5mg twice per day.     PRINCIPAL DISCHARGE DIAGNOSIS  Diagnosis: Right-sided cerebrovascular accident (CVA)  Assessment and Plan of Treatment: You had a stroke and came to rehab to get stronger and recover your function. Please follow up with Dr. Soni outpatient.      SECONDARY DISCHARGE DIAGNOSES  Diagnosis: Restless leg syndrome  Assessment and Plan of Treatment: You had left leg cramping pain during your rehab stay. We discontinued the statin, which could be causing leg cramps. However, you still had leg cramps so we started you on requip and gabapentin, medications that can be helpful for leg cramping associated with restless leg syndrome. Please follow up with your PCP to get this condition further evaluated.    Diagnosis: ANNALEE (acute kidney injury)  Assessment and Plan of Treatment: You had a mild acute kidney injury as evidenced by your kidney labs. The kidney doctor saw you and recommended staying well-hydrated and paused your diuretic medications for about a week. The labs improved, so we restarted the lasix and aldactone. Please follow up with your PCP and cardiologist.    Diagnosis: TARAH (obstructive sleep apnea)  Assessment and Plan of Treatment: During your rehab, you had frequent tiredness during the day and reported snoring at night, which may be signs of obstructive sleep apnea. Please follow up with a pulmonologist to have this further evaluated.    Diagnosis: Paroxysmal atrial fibrillation  Assessment and Plan of Treatment: Please continue to take metoprolol 12.5mg twice per day and eliquis 5mg twice per day.    Diagnosis: Gout  Assessment and Plan of Treatment: continue prednisone x 3 more days (from 11/29). Continue to follow dietary recommendations to reduce the incidence of future flares

## 2024-11-22 NOTE — DISCHARGE NOTE PROVIDER - CARE PROVIDERS DIRECT ADDRESSES
[email or web address]

## 2024-11-22 NOTE — DISCHARGE NOTE PROVIDER - HOSPITAL COURSE
HPI:  This is an 88 y/o Female RH dominant with a PMHx HTN, HLD, chronic diastolic HF, paroxysmal atrial fibrillation (on anticoagulation, s/p ablation), CAD status post PCI to the LAD 2017 with repeat cardiac catheterization in 3/2022 showing nonobstructive coronary disease with patent stents, severe pulmonary hypertension, hypothyroidism, mitral valve regurgitation, history of PE with factor V Leiden, hypothyroidism, who presented to  ED on 11/11/24 with Left sided facial droop.     Patient went out to lunch at 12 pm the day of admission with her daughter. Around 6 pm the patient slipped out of bed and was unable to get back up. She called her daughter and when the she arrived noted her mother did not seem like herself and had a Left sided facial droop.  CT/CTA head and CTA of neck without evidence of an acute intracranial hemorrhage midline shift or hydrocephalus. There was no hemodynamic significant narrowing within the neck. or proximal large vessel occlusion noted.  MRI showed "abnormal T2 prolongation restricted diffusion seen involving the right corona radiata region. These findings are compatible with an acute infarct. No hemorrhagic transformation is seen. No significant shift or herniation is seen."        Patient was evaluated by PM&R and therapy for gait/ADL impairments and recommended acute rehabilitation. Patient was medically optimized for discharge to [facility] on 11/14. Admitted with gait instability, ADL, and functional impairments.     Rehab course significant for L leg cramps. Statin was DC'ed as patient attributes these cramps to statin. Risks and benefits of DC'ing statin reviewed and patient understands. Patient's daughter mentions the possibility that pt may have restless leg sx so requip 0.5 qHs was started. Gabapentin was also started. Nephrology evaluated patient due to mild ANNALEE. They recommended pausing diuretics lasix + spironolactone and encouraging fluids on 11/15. These were held, but re-continued on 11/22 as ANNALEE improved.     All other medical co-morbidities were stable. Patient tolerated course of inpatient PT/OT/SLP rehab with significant improvements and met rehab goals prior to discharge. Patient was medically cleared on 11/27 for discharge to [facility]. HPI:  This is an 89 y/o Female RH dominant with a PMHx HTN, HLD, chronic diastolic HF, paroxysmal atrial fibrillation (on anticoagulation, s/p ablation), CAD status post PCI to the LAD 2017 with repeat cardiac catheterization in 3/2022 showing nonobstructive coronary disease with patent stents, severe pulmonary hypertension, hypothyroidism, mitral valve regurgitation, history of PE with factor V Leiden, hypothyroidism, who presented to  ED on 11/11/24 with Left sided facial droop.     Patient went out to lunch at 12 pm the day of admission with her daughter. Around 6 pm the patient slipped out of bed and was unable to get back up. She called her daughter and when the she arrived noted her mother did not seem like herself and had a Left sided facial droop.  CT/CTA head and CTA of neck without evidence of an acute intracranial hemorrhage midline shift or hydrocephalus. There was no hemodynamic significant narrowing within the neck. or proximal large vessel occlusion noted.  MRI showed "abnormal T2 prolongation restricted diffusion seen involving the right corona radiata region. These findings are compatible with an acute infarct. No hemorrhagic transformation is seen. No significant shift or herniation is seen."    Patient was evaluated by PM&R and therapy for gait/ADL impairments and recommended acute rehabilitation. Patient was medically optimized for discharge to [facility] on 11/14. Admitted with gait instability, ADL, and functional impairments.     Rehab course significant for L leg cramps as well as myalgia and hyperalgesia. Statin was DC'ed as patient attributes these cramps to statin; she has had a long history of multiple attempts with agents to reduce HLD and suffers similar effects. Risks and benefits of DC'ing statin reviewed and patient understands. Patient's daughter mentions the possibility that pt may have restless leg sx so requip was started. Gabapentin was also started; iron levels found to be borderline low, so iron supplement daily was added . Nephrology evaluated patient due to mild ANNALEE. They recommended pausing diuretics lasix + spironolactone and encouraging fluids on 11/15. These were held, but re-continued on 11/22 as ANNALEE improved.     Patient had gouty attack right great toe that resolved with steroids.    All other medical co-morbidities were stable. Patient tolerated course of inpatient PT/OT/SLP rehab with significant improvements and met rehab goals prior to discharge. Patient was medically cleared on 11/27 for discharge to [facility]. HPI:  This is an 89 y/o Female RH dominant with a PMHx HTN, HLD, chronic diastolic HF, paroxysmal atrial fibrillation (on anticoagulation, s/p ablation), CAD status post PCI to the LAD 2017 with repeat cardiac catheterization in 3/2022 showing nonobstructive coronary disease with patent stents, severe pulmonary hypertension, hypothyroidism, mitral valve regurgitation, history of PE with factor V Leiden, hypothyroidism, who presented to  ED on 11/11/24 with Left sided facial droop.     Patient went out to lunch at 12 pm the day of admission with her daughter. Around 6 pm the patient slipped out of bed and was unable to get back up. She called her daughter and when the she arrived noted her mother did not seem like herself and had a Left sided facial droop.  CT/CTA head and CTA of neck without evidence of an acute intracranial hemorrhage midline shift or hydrocephalus. There was no hemodynamic significant narrowing within the neck. or proximal large vessel occlusion noted.  MRI showed "abnormal T2 prolongation restricted diffusion seen involving the right corona radiata region. These findings are compatible with an acute infarct. No hemorrhagic transformation is seen. No significant shift or herniation is seen."    Patient was evaluated by PM&R and therapy for gait/ADL impairments and recommended acute rehabilitation. Patient was medically optimized for discharge to [facility] on 11/14. Admitted with gait instability, ADL, and functional impairments.     Rehab course significant for L leg cramps as well as myalgia and hyperalgesia. Statin was DC'ed as patient attributes these cramps to statin; she has had a long history of multiple attempts with agents to reduce HLD and suffers similar effects. Risks and benefits of DC'ing statin reviewed and patient understands. Patient's daughter mentions the possibility that pt may have restless leg sx so requip was started. Gabapentin was also started; iron levels found to be borderline low, so iron supplement daily was added . Nephrology evaluated patient due to mild ANNALEE. They recommended pausing diuretics lasix + spironolactone and encouraging fluids on 11/15. These were held, but re-continued on 11/22 as ANNALEE improved.     Patient had gouty attack right great toe that resolved with steroids.    All other medical co-morbidities were stable. Patient tolerated course of inpatient PT/OT/SLP rehab with significant improvements and met rehab goals prior to discharge. Patient was medically cleared on 11/29 for discharge to [facility]. HPI:  This is an 88 y/o Female RH dominant with a PMHx HTN, HLD, chronic diastolic HF, paroxysmal atrial fibrillation (on anticoagulation, s/p ablation), CAD status post PCI to the LAD 2017 with repeat cardiac catheterization in 3/2022 showing nonobstructive coronary disease with patent stents, severe pulmonary hypertension, hypothyroidism, mitral valve regurgitation, history of PE with factor V Leiden, hypothyroidism, who presented to  ED on 11/11/24 with Left sided facial droop.     Patient went out to lunch at 12 pm the day of admission with her daughter. Around 6 pm the patient slipped out of bed and was unable to get back up. She called her daughter and when the she arrived noted her mother did not seem like herself and had a Left sided facial droop.  CT/CTA head and CTA of neck without evidence of an acute intracranial hemorrhage midline shift or hydrocephalus. There was no hemodynamic significant narrowing within the neck. or proximal large vessel occlusion noted.  MRI showed "abnormal T2 prolongation restricted diffusion seen involving the right corona radiata region. These findings are compatible with an acute infarct. No hemorrhagic transformation is seen. No significant shift or herniation is seen."    Patient was evaluated by PM&R and therapy for gait/ADL impairments and recommended acute rehabilitation. Patient was medically optimized for discharge to [facility] on 11/14. Admitted with gait instability, ADL, and functional impairments.     Rehab course significant for L leg cramps as well as myalgia and hyperalgesia. Statin was DC'ed as patient attributes these cramps to statin; she has had a long history of multiple attempts with agents to reduce HLD and suffers similar effects. Risks and benefits of DC'ing statin reviewed and patient understands. Patient's daughter mentions the possibility that pt may have restless leg sx so requip was started. Gabapentin was also started; iron levels found to be borderline low, so iron supplement daily was added . Nephrology evaluated patient due to mild ANNALEE. They recommended pausing diuretics lasix + spironolactone and encouraging fluids on 11/15. These were held, but re-continued on 11/22 as ANNALEE improved.     Patient had gouty attack right great toe that resolved with steroids. Diet was upgraded to thin liquids w single sip prior to discharge.     All other medical co-morbidities were stable. Patient tolerated course of inpatient PT/OT/SLP rehab with significant improvements and met rehab goals prior to discharge. Patient was medically cleared on 11/29 for discharge to [facility]. HPI:  This is an 88 y/o Female RH dominant with a PMHx HTN, HLD, chronic diastolic HF, paroxysmal atrial fibrillation (on anticoagulation, s/p ablation), CAD status post PCI to the LAD 2017 with repeat cardiac catheterization in 3/2022 showing nonobstructive coronary disease with patent stents, severe pulmonary hypertension, hypothyroidism, mitral valve regurgitation, history of PE with factor V Leiden, hypothyroidism, who presented to  ED on 11/11/24 with Left sided facial droop.     Patient went out to lunch at 12 pm the day of admission with her daughter. Around 6 pm the patient slipped out of bed and was unable to get back up. She called her daughter and when the she arrived noted her mother did not seem like herself and had a Left sided facial droop.  CT/CTA head and CTA of neck without evidence of an acute intracranial hemorrhage midline shift or hydrocephalus. There was no hemodynamic significant narrowing within the neck. or proximal large vessel occlusion noted.  MRI showed "abnormal T2 prolongation restricted diffusion seen involving the right corona radiata region. These findings are compatible with an acute infarct. No hemorrhagic transformation is seen. No significant shift or herniation is seen."    Patient was evaluated by PM&R and therapy for gait/ADL impairments and recommended acute rehabilitation. Patient was medically optimized for discharge to [facility] on 11/14. Admitted with gait instability, ADL, and functional impairments.     Rehab course significant for L leg cramps as well as myalgia and hyperalgesia. Statin was DC'ed as patient attributes these cramps to statin; she has had a long history of multiple attempts with agents to reduce HLD and suffers similar effects. Risks and benefits of DC'ing statin reviewed and patient understands. Patient's daughter mentions the possibility that pt may have restless leg sx so requip was started. Gabapentin was also started; iron levels found to be borderline low, so iron supplement daily was added . Nephrology evaluated patient due to mild ANNALEE. They recommended pausing diuretics lasix + spironolactone and encouraging fluids on 11/15. These were held, but re-continued on 11/22 as ANNALEE improved.     Patient had gouty attack right great toe that resolved with steroids. Diet was upgraded to thin liquids w single sip prior to discharge. There was a recurrence on 11/29, so she was restarted on prednisone 10 mg daily for gouty flare x 3 more days.    All other medical co-morbidities were stable. Patient tolerated course of inpatient PT/OT/SLP rehab with significant improvements and met rehab goals prior to discharge. Patient was medically cleared on 11/29 for discharge to [facility].

## 2024-11-22 NOTE — PROGRESS NOTE ADULT - ASSESSMENT
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 These findings are compatible with an acute infarct. No hemorrhagic transformation is seen. No significant shift or herniation is seen.  - ASA 81 po qd added by cardiology   - Statin dc 11/15, per patient refusal. She is aware of increased risk of recurrent stroke without statin  - Continue comprehensive PT, OT, SLP program 3 hours daily 5 x week  - cleared to shower  - Precautions: cardiac, fall, AC    # frustration, irritability, anxiety, exacerbated by use of statin and myalgia  - statin dc per patient's wishes  - neuropsychology support  - rec therapy    # restless leg syndrome  - requip increased 0.25 to 0.5 qhs 11/17. Will increase to 1 mg qhs 11/21, patient agreeable  - gabapentin changed from 200 bid to 100/300 q 10 AM/10 PM 11/20 due to daytime somnolence. She continues to feel drowsy in AM; usually sleeps 9:30 PM-11 AM at home. Will try to accommodate therapy schedules for later sessions; does not want further adjustment in schedule. Discussed with patient  - Mg 2.1 11/18  - Fe level 43 11/18. Will supplement daily 325 mg   - coenzyme Q10 level 11/21 pending  - reviewed with patient and family who are agreeable with plan. 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  - ROM, shoulder stabilization exercises  - warm compress PRN  - lidoderm patch  - discussed with patient and family who are agreeable    # Paroxysmal A-fib  – metoprolol tartrate 25 mg twice daily  – Eliquis 5 mg twice daily  - HR controlled 58 - 71 11/22    # Possible TARAH  - was noted to be snoring loudly  - sleep study outpatient  - may also be contributing to daytime somnolence    # Chronic diastolic heart failure  # Pulm HTN  # cough  - ECHO:  EF of 55 to 60% - severe pulmonary HTN   – spironolactone held due to ANNALEE 11/15  - furosemide 40 mg  restarted 11/18  - Aldactone daily reordered by hospitalist 11/20  – metoprolol tartrate 25 mg twice daily  - home O2: 3L oxygen via NC overnight     # HLD  - On Livalo 2mg at home, allergic to Repatha  – Lipitor dc per patient's wishes after education re: risk recurrent stroke. Family agreeable  -  education on Heart Healthy diet     # Hypothyroidism  - Synthroid 25mcg AM    # hypokalemia  - K+ 3.4 11/21  - replete 40 meq x 1   - K+ 3.6 11/22  - ctm    # ANNALEE  - Hospitalist appreciated. Hold lasix and spironolactone 11/15  - renal consult appreciated 11/15. Hold diuretics.   - furosemide restarted 11/18  - BUn/Cr 31/1.5 11/15 -->-> 24/1.06 11/18 --> 31/1.21 11/21. Encourage po fluids. Diuretics had been restarted this week  - BMP 11/22    # Pain control  - Tylenol PRN  - Rx RLS as above    # GI/Bowel Mgmt   - senna, miralax  - add stewed prunes    #   - toileting program, scheduled q 4 hours during waking  hours leander with restart diuretics    # FEN   - MBS 11/19--. soft and bite sized with mildly thick liquids via single sip    # DVT prophylaxis  – On Eliquis    # Case discussed in IDT rounds 11/18 (initial):  - easy to chew with mildly thick liquids, mild dysarthria, mild cognitive deficits, left mendoza inattention, supervision oral hygiene, max-total toilet hygiene, max LB, mod UB dressing, mod toilet transfers, min-mod bed mobility, ambulates 75 feet RW/min assist and WC follow, negotiates 4 steps with bilateral HR and min assist, leisure activities to improve QOL  - barriers:  left HP, mendoza inattention, mild cognitive deficits, reduced balance, pain, bilateral RTC syndrome  - goals: "get home and in my own bed" "Yoselin" supervision waking hours on dc, CS transfers and ambulation household, ambulate to bathroom with RW and CS, attend to left with visual compensatory strategies to perform dressing tasks with supervision,   - target: 12/5 home with caregiver support and home PT OT SLP  - caregiver training    # LABS    CBC CMP 11/25  coenzyme Q10 level 11/21 pending    Daughter: Tonia [phone]  Son ": Mode [phone]    [facility]  Cardiovascular Disease  [address] [place] [address]  Phone: [phone]  Fax: [phone]  Follow Up Time:    Pulmonology   Needs outpatient sleep study

## 2024-11-22 NOTE — DISCHARGE NOTE PROVIDER - ATTENDING ATTESTATION STATEMENT
I have personally seen and examined the patient. I have collaborated with and supervised the
Coagulation/Other

## 2024-11-22 NOTE — ED PROVIDER NOTE - NSICDXPASTSURGICALHX_GEN_ALL_CORE_FT
PAST SURGICAL HISTORY:  Cyst of breast, unspecified laterality S/P excision    H/O cardiac radiofrequency ablation     H/O knee surgery     H/O: hysterectomy     S/P ablation of atrial fibrillation      <-- Click to add NO pertinent Family History

## 2024-11-22 NOTE — PROGRESS NOTE ADULT - SUBJECTIVE AND OBJECTIVE BOX
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 (22 Nov 2024 13:42)      Interval History:  Chart reviewed  Patient seen and examined  No major overnight events  No complaints this morning    Care Discussed with Consultants/Other Providers: Yes    ALLERGIES:  Macrodantin (Other)  Motrin (Other)  statins (Muscle Pain)  Repatha (Unknown)  flu vaccines (Other)  nisoldipine (Unknown)  sulfa drugs (Other)  Ceftin (Other (Moderate))  tadalafil (Muscle Pain)    MEDICATIONS  (STANDING):  albuterol    90 MICROgram(s) HFA Inhaler 2 Puff(s) Inhalation every 6 hours  apixaban 5 milliGRAM(s) Oral two times a day  aspirin  chewable 81 milliGRAM(s) Oral daily  famotidine    Tablet 20 milliGRAM(s) Oral at bedtime  ferrous    sulfate 325 milliGRAM(s) Oral daily  furosemide    Tablet 40 milliGRAM(s) Oral daily  gabapentin 100 milliGRAM(s) Oral daily  gabapentin 300 milliGRAM(s) Oral at bedtime  levothyroxine 25 MICROGram(s) Oral daily  lidocaine   4% Patch 1 Patch Transdermal daily  lidocaine   4% Patch 1 Patch Transdermal daily  metoprolol tartrate 12.5 milliGRAM(s) Oral two times a day  polyethylene glycol 3350 17 Gram(s) Oral daily  rOPINIRole 1 milliGRAM(s) Oral at bedtime  senna 2 Tablet(s) Oral at bedtime  spironolactone 25 milliGRAM(s) Oral daily    MEDICATIONS  (PRN):  acetaminophen     Tablet .. 975 milliGRAM(s) Oral every 8 hours PRN Mild Pain (1 - 3), Moderate Pain (4 - 6), Severe Pain (7 - 10)  calcium carbonate    500 mG (Tums) Chewable 1 Tablet(s) Chew four times a day PRN Indigestion  guaiFENesin Oral Liquid (Sugar-Free) 200 milliGRAM(s) Oral every 6 hours PRN Cough  melatonin 3 milliGRAM(s) Oral at bedtime PRN Insomnia  saline laxative (FLEET) Rectal Enema 1 Enema Rectal once PRN constipation  sodium chloride 0.65% Nasal 1 Spray(s) Both Nostrils two times a day PRN Nasal Congestion    Vital Signs Last 24 Hrs  T(F): 98 (21 Nov 2024 20:15), Max: 98 (21 Nov 2024 20:15)  HR: 64 (22 Nov 2024 08:42) (58 - 71)  BP: 141/79 (22 Nov 2024 08:42) (130/60 - 141/79)  RR: 16 (22 Nov 2024 08:42) (14 - 16)  SpO2: 91% (22 Nov 2024 08:42) (91% - 97%)  I&O's Summary      PHYSICAL EXAM:  GENERAL: NAD  NERVOUS SYSTEM:  CN II - X grossly intact; Sensation intact; follows commands  CHEST/LUNG: Clear to percussion bilaterally; No rales, rhonchi, wheezing, or rubs; normal respiratory effort, no intercostal retractions  HEART: Regular rate and rhythm; No murmurs, rubs, or gallops; No pitting edema  ABDOMEN: Soft, Nontender, Nondistended; Bowel sounds present; No HSM or masses  MUSCULOSKELETAL/EXTREMITIES:  No clubbing or digital cyanosis  PSYCH: Appropriate affect, Alert    LABS:      11-22    145  |  105  |  27  ----------------------------<  95  3.6   |  33  |  0.98    Ca    9.3      22 Nov 2024 05:29    TPro  6.5  /  Alb  2.8  /  TBili  0.5  /  DBili  x   /  AST  23  /  ALT  16  /  AlkPhos  56  11-21 11-12 Chol 154 mg/dL LDL -- HDL 53 mg/dL Trig 82 mg/dL

## 2024-11-22 NOTE — PROGRESS NOTE ADULT - ATTENDING COMMENTS
Pt. seen with resident.  Agree with documentation above as per resident with amendments made as appropriate. Patient medically stable. Making progress towards rehab goals.       right subcortical infarct  doing well   PRN robitussion for congestion

## 2024-11-22 NOTE — CHART NOTE - NSCHARTNOTEFT_GEN_A_CORE
Nutrition Follow Up Note  Hospital Course   (Per Electronic Medical Record)    Source:  Patient [X]  Family Member [X]   Nursing Staff [X]   Medical Record [X]      Diet: Diet, Soft and Bite Sized:   DASH/TLC {Sodium & Cholesterol Restricted}  Mildly Thick Liquids (MILDTHICKLIQS)  Single Sips Only (11-19-24 @ 11:59) [Active]      At this time patient tolerating diet w/ varied appetite/intake consuming % of meals. Observed breakfast meal, patient consumed 50% of Urdu toast, cream of wheat and yogurt. Reviewed preferences and menu alternatives, food preferences obtained and noted on patients file with nutrition office. No issues w/ dentition or chewing and swallowing current diet texture. Denies N/V/C/D, last BM 11/21 per nursing flowsheets.       Current Weight: 187.8lb on 11/14      Pertinent Medications: MEDICATIONS  (STANDING):  albuterol    90 MICROgram(s) HFA Inhaler 2 Puff(s) Inhalation every 6 hours  apixaban 5 milliGRAM(s) Oral two times a day  aspirin  chewable 81 milliGRAM(s) Oral daily  famotidine    Tablet 20 milliGRAM(s) Oral at bedtime  ferrous    sulfate 325 milliGRAM(s) Oral daily  furosemide    Tablet 40 milliGRAM(s) Oral daily  gabapentin 100 milliGRAM(s) Oral daily  gabapentin 300 milliGRAM(s) Oral at bedtime  levothyroxine 25 MICROGram(s) Oral daily  lidocaine   4% Patch 1 Patch Transdermal daily  lidocaine   4% Patch 1 Patch Transdermal daily  metoprolol tartrate 12.5 milliGRAM(s) Oral two times a day  polyethylene glycol 3350 17 Gram(s) Oral daily  rOPINIRole 1 milliGRAM(s) Oral at bedtime  senna 2 Tablet(s) Oral at bedtime  spironolactone 25 milliGRAM(s) Oral daily    MEDICATIONS  (PRN):  acetaminophen     Tablet .. 975 milliGRAM(s) Oral every 8 hours PRN Mild Pain (1 - 3), Moderate Pain (4 - 6), Severe Pain (7 - 10)  calcium carbonate    500 mG (Tums) Chewable 1 Tablet(s) Chew four times a day PRN Indigestion  guaiFENesin Oral Liquid (Sugar-Free) 200 milliGRAM(s) Oral every 6 hours PRN Cough  melatonin 3 milliGRAM(s) Oral at bedtime PRN Insomnia  saline laxative (FLEET) Rectal Enema 1 Enema Rectal once PRN constipation      Pertinent Labs:  11-22 Na145 mmol/L Glu 95 mg/dL K+ 3.6 mmol/L Cr  0.98 mg/dL BUN 27 mg/dL[H] 11-21 Alb 2.8 g/dL[L] 11-12 Chol 154 mg/dL LDL --    HDL 53 mg/dL Trig 82 mg/dL    Skin: No pressure injury per nursing flowsheets    Edema: No edema noted per nursing flowsheet    Last Bowel Movement: on 11/21    Estimated Needs:   [X] No Change Since Previous Assessment    Previous Nutrition Diagnosis:   Altered GI Function  2/2 acute illness  evidenced by persistent constipation    Nutrition Diagnosis is [X] Resolved -Patient having regular BM         New Nutrition Diagnosis: [X] Not Applicable  [X] Chewing Difficulties    [X] Swallowing Difficulties    as evidenced by need for Soft and Bite Sized diet texture w/ Mildly Thickened Liquids per SLP     Interventions:   1. Recommend continuing with current plan of care, diet consistency per SLP  2. Encourage PO intake  3. Obtain and honor food preferences as able  4. Ongoing diet education     Monitoring & Evaluation:   [X] Weights   [X] PO Intake   [X] Skin Integrity   [X] Follow Up (Per Protocol)  [X] Tolerance to Diet Prescription   [X] Other: Labs & POCT    Registered Dietitian/Nutritionist Remains Available.  Yareli eKlley RD    Phone# [phone] Nutrition Follow Up Note  Hospital Course   (Per Electronic Medical Record)    Source:  Patient [X]  Nursing Staff [X]   Medical Record [X]      Diet: Diet, Soft and Bite Sized:   DASH/TLC {Sodium & Cholesterol Restricted}  Mildly Thick Liquids (MILDTHICKLIQS)  Single Sips Only (11-19-24 @ 11:59) [Active]      Patient requires assist with feeds; Restorative Dining Room. At this time patient tolerating diet w/ varied appetite/intake consuming % of meals. Observed breakfast meal, patient consumed 50% of Tristanian toast, cream of wheat and yogurt. Reviewed preferences and menu alternatives, food preferences obtained and noted on patients file with nutrition office. Diet  Patient denies any chewing/swallowing difficulties and declined any food texture modifications at this time. downgraded to Soft and Bite sized(11/16), no issues chewing and swallowing current diet texture. Denies N/V/C/D, last BM 11/21 per nursing flowsheets.       Current Weight: 187.8lb on 11/14      Pertinent Medications: MEDICATIONS  (STANDING):  albuterol    90 MICROgram(s) HFA Inhaler 2 Puff(s) Inhalation every 6 hours  apixaban 5 milliGRAM(s) Oral two times a day  aspirin  chewable 81 milliGRAM(s) Oral daily  famotidine    Tablet 20 milliGRAM(s) Oral at bedtime  ferrous    sulfate 325 milliGRAM(s) Oral daily  furosemide    Tablet 40 milliGRAM(s) Oral daily  gabapentin 100 milliGRAM(s) Oral daily  gabapentin 300 milliGRAM(s) Oral at bedtime  levothyroxine 25 MICROGram(s) Oral daily  lidocaine   4% Patch 1 Patch Transdermal daily  lidocaine   4% Patch 1 Patch Transdermal daily  metoprolol tartrate 12.5 milliGRAM(s) Oral two times a day  polyethylene glycol 3350 17 Gram(s) Oral daily  rOPINIRole 1 milliGRAM(s) Oral at bedtime  senna 2 Tablet(s) Oral at bedtime  spironolactone 25 milliGRAM(s) Oral daily    MEDICATIONS  (PRN):  acetaminophen     Tablet .. 975 milliGRAM(s) Oral every 8 hours PRN Mild Pain (1 - 3), Moderate Pain (4 - 6), Severe Pain (7 - 10)  calcium carbonate    500 mG (Tums) Chewable 1 Tablet(s) Chew four times a day PRN Indigestion  guaiFENesin Oral Liquid (Sugar-Free) 200 milliGRAM(s) Oral every 6 hours PRN Cough  melatonin 3 milliGRAM(s) Oral at bedtime PRN Insomnia  saline laxative (FLEET) Rectal Enema 1 Enema Rectal once PRN constipation      Pertinent Labs:  11-22 Na145 mmol/L Glu 95 mg/dL K+ 3.6 mmol/L Cr  0.98 mg/dL BUN 27 mg/dL[H] 11-21 Alb 2.8 g/dL[L] 11-12 Chol 154 mg/dL LDL --    HDL 53 mg/dL Trig 82 mg/dL    Skin: No pressure injury per nursing flowsheets    Edema: No edema noted per nursing flowsheet    Last Bowel Movement: on 11/21    Estimated Needs:   [X] No Change Since Previous Assessment    Previous Nutrition Diagnosis:   Altered GI Function  2/2 acute illness  evidenced by persistent constipation    Nutrition Diagnosis is [X] Resolved -Patient having regular BM         New Nutrition Diagnosis: [X] Not Applicable  [X] Chewing Difficulties    [X] Swallowing Difficulties    as evidenced by need for Soft and Bite Sized diet texture w/ Mildly Thickened Liquids per SLP     Interventions:   1. Recommend continuing with current plan of care, diet consistency per SLP  2. Encourage PO intake  3. Obtain and honor food preferences as able  4. Ongoing diet education     Monitoring & Evaluation:   [X] Weights   [X] PO Intake   [X] Skin Integrity   [X] Follow Up (Per Protocol)  [X] Tolerance to Diet Prescription   [X] Other: Labs & POCT    Registered Dietitian/Nutritionist Remains Available.  Yareli Kelley RD    Phone# [phone]

## 2024-11-22 NOTE — DISCHARGE NOTE PROVIDER - NSFOLLOWUPCLINICS_GEN_ALL_ED_FT
[facility]  Pulmonology  [address] [place] [address], [place] [address]  Phone: [phone]  Fax:

## 2024-11-22 NOTE — DISCHARGE NOTE PROVIDER - NSDCQMSTROKERISK_NEU_ALL_CORE
History of a stroke or TIA Atrial fibrillation/History of a stroke or TIA Atrial fibrillation/High cholesterol/History of a stroke or TIA

## 2024-11-23 PROCEDURE — 99232 SBSQ HOSP IP/OBS MODERATE 35: CPT | Mod: GC

## 2024-11-23 PROCEDURE — 99233 SBSQ HOSP IP/OBS HIGH 50: CPT

## 2024-11-23 RX ORDER — PREDNISONE 20 MG/1
10 TABLET ORAL DAILY
Refills: 0 | Status: DISCONTINUED | OUTPATIENT
Start: 2024-11-23 | End: 2024-11-23

## 2024-11-23 RX ORDER — PREDNISONE 20 MG/1
10 TABLET ORAL DAILY
Refills: 0 | Status: COMPLETED | OUTPATIENT
Start: 2024-11-23 | End: 2024-11-26

## 2024-11-23 RX ADMIN — Medication 325 MILLIGRAM(S): at 11:24

## 2024-11-23 RX ADMIN — Medication 1 TABLET(S): at 18:25

## 2024-11-23 RX ADMIN — Medication 1 SPRAY(S): at 18:26

## 2024-11-23 RX ADMIN — Medication 1 SPRAY(S): at 11:41

## 2024-11-23 RX ADMIN — ROPINIROLE 1 MILLIGRAM(S): 8 TABLET, FILM COATED, EXTENDED RELEASE ORAL at 21:05

## 2024-11-23 RX ADMIN — LIDOCAINE 1 PATCH: 40 CREAM TOPICAL at 19:15

## 2024-11-23 RX ADMIN — FUROSEMIDE 40 MILLIGRAM(S): 40 TABLET ORAL at 06:21

## 2024-11-23 RX ADMIN — LIDOCAINE 1 PATCH: 40 CREAM TOPICAL at 11:25

## 2024-11-23 RX ADMIN — ACETAMINOPHEN 500MG 975 MILLIGRAM(S): 500 TABLET, COATED ORAL at 04:16

## 2024-11-23 RX ADMIN — GABAPENTIN 300 MILLIGRAM(S): 300 CAPSULE ORAL at 21:06

## 2024-11-23 RX ADMIN — LIDOCAINE 1 PATCH: 40 CREAM TOPICAL at 23:50

## 2024-11-23 RX ADMIN — Medication 25 MILLIGRAM(S): at 06:20

## 2024-11-23 RX ADMIN — APIXABAN 5 MILLIGRAM(S): 2.5 TABLET, FILM COATED ORAL at 10:48

## 2024-11-23 RX ADMIN — ACETAMINOPHEN 500MG 975 MILLIGRAM(S): 500 TABLET, COATED ORAL at 04:58

## 2024-11-23 RX ADMIN — Medication 2 PUFF(S): at 08:43

## 2024-11-23 RX ADMIN — METOPROLOL TARTRATE 12.5 MILLIGRAM(S): 100 TABLET, FILM COATED ORAL at 18:26

## 2024-11-23 RX ADMIN — ACETAMINOPHEN, DIPHENHYDRAMINE HCL, PHENYLEPHRINE HCL 3 MILLIGRAM(S): 325; 25; 5 TABLET ORAL at 21:06

## 2024-11-23 RX ADMIN — POLYETHYLENE GLYCOL 3350 17 GRAM(S): 17 POWDER, FOR SOLUTION ORAL at 11:25

## 2024-11-23 RX ADMIN — METOPROLOL TARTRATE 12.5 MILLIGRAM(S): 100 TABLET, FILM COATED ORAL at 06:21

## 2024-11-23 RX ADMIN — FAMOTIDINE 20 MILLIGRAM(S): 20 TABLET, FILM COATED ORAL at 21:05

## 2024-11-23 RX ADMIN — GABAPENTIN 100 MILLIGRAM(S): 300 CAPSULE ORAL at 11:24

## 2024-11-23 RX ADMIN — Medication 2 PUFF(S): at 21:48

## 2024-11-23 RX ADMIN — Medication 25 MICROGRAM(S): at 06:21

## 2024-11-23 RX ADMIN — Medication 81 MILLIGRAM(S): at 11:41

## 2024-11-23 RX ADMIN — PREDNISONE 10 MILLIGRAM(S): 20 TABLET ORAL at 14:52

## 2024-11-23 RX ADMIN — APIXABAN 5 MILLIGRAM(S): 2.5 TABLET, FILM COATED ORAL at 21:05

## 2024-11-23 NOTE — PROGRESS NOTE ADULT - SUBJECTIVE AND OBJECTIVE BOX
Cc: Gait dysfunction    HPI: Patient seen and examined at bedside. No acute events overnight. Does complain of R big toe pain, consistent with her gout in the past.   Pain otherwsie controlled, no chest pain, no N/V, no Fevers/Chills. No other new ROS  Has been tolerating rehabilitation program.    acetaminophen     Tablet .. 975 milliGRAM(s) Oral every 8 hours PRN  albuterol    90 MICROgram(s) HFA Inhaler 2 Puff(s) Inhalation every 6 hours  apixaban 5 milliGRAM(s) Oral two times a day  aspirin  chewable 81 milliGRAM(s) Oral daily  calcium carbonate    500 mG (Tums) Chewable 1 Tablet(s) Chew four times a day PRN  famotidine    Tablet 20 milliGRAM(s) Oral at bedtime  ferrous    sulfate 325 milliGRAM(s) Oral daily  furosemide    Tablet 40 milliGRAM(s) Oral daily  gabapentin 100 milliGRAM(s) Oral daily  gabapentin 300 milliGRAM(s) Oral at bedtime  guaiFENesin Oral Liquid (Sugar-Free) 200 milliGRAM(s) Oral every 6 hours PRN  levothyroxine 25 MICROGram(s) Oral daily  lidocaine   4% Patch 1 Patch Transdermal daily  lidocaine   4% Patch 1 Patch Transdermal daily  melatonin 3 milliGRAM(s) Oral at bedtime PRN  metoprolol tartrate 12.5 milliGRAM(s) Oral two times a day  polyethylene glycol 3350 17 Gram(s) Oral daily  rOPINIRole 1 milliGRAM(s) Oral at bedtime  saline laxative (FLEET) Rectal Enema 1 Enema Rectal once PRN  senna 2 Tablet(s) Oral at bedtime  sodium chloride 0.65% Nasal 1 Spray(s) Both Nostrils two times a day PRN  spironolactone 25 milliGRAM(s) Oral daily      T(C): 36.5 (11-23-24 @ 08:39), Max: 36.5 (11-23-24 @ 08:39)  HR: 52 (11-23-24 @ 08:43) (52 - 67)  BP: 116/69 (11-23-24 @ 08:39) (116/69 - 143/58)  RR: 14 (11-23-24 @ 08:39) (14 - 16)  SpO2: 96% (11-23-24 @ 08:43) (91% - 96%)    In NAD  HEENT- EOMI  Heart- S1S2  Lungs- CTA bl.  Abd- + BS, NT  Ext- No calf pain, R great toe mildly swollen, TTP over joint  Neuro- Exam unchanged    CMP 11/18/24 reviewed  CMP 11/21/24 reviewed  BMP 11/22/24 reviewed      Imp: Patient with diagnosis of CVA, A-fib, ANNALEE, CHF and now gout admitted for comprehensive acute rehabilitation.    Plan:  - Continue PT/OT/SLP as indicated  - DVT prophylaxis - Continue Eliquis  - Skin- Turn q2h, check skin daily  - Continue current medications  -Active issues-   Gout - will give short course of oral steroids and monitor  CVA - Continue ASA/Lipitor  - Patient is stable to continue current rehabilitation program.

## 2024-11-23 NOTE — PROGRESS NOTE ADULT - ASSESSMENT
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 These findings are compatible with an acute infarct. No hemorrhagic transformation is seen. No significant shift or herniation is seen.  - ASA 81 po qd added by cardiology   - Lipitor added by neurology but she refused citing myalgia   - BP goal SBP < 140    #Nasal congestion  - ocean nasal saline spray started 11/22    #BLE pain/RLS  - started Requip 11/16  - Tylenol PRN  - may try magnesium    # Paroxysmal A-fib  – Continue metoprolol tartrate twice daily  – Continue Eliquis 5 mg twice daily    # ANNALEE - improved    # Chronic diastolic heart failure  # Pulm HTN  - ECHO from November 2023 with EF of 55 to 60% - severe pulmonary HTN , echo done at  unchanged from prior echo in 11/2023  – Continue metoprolol tartrate 25 mg twice daily  – Lasix 40mg daily, aldactone 25mg daily  - 3L oxygen via NC overnight at home, no need for oxygen during daytime hours. Outpatient sleep study  -Daily weights     # Hyperlipidemia  - On Livalo 2mg at home, allergic to Repatha  – Lipitor 20mg recommended by neurology. LDL 85, LDL needs to be under 70 per neuro. Patient refusing 20mg Lipitor due to intermittent leg cramping/pain.     # Hypothyroidism  -Synthroid 25mcg AM    # DVT prophylaxis  – On Eliquis    #Right big toe pain  - Pt started on steroids by physiatry service, will monitor the response    # Cough while eating ? Dysphagia  F/up SLP

## 2024-11-23 NOTE — PROGRESS NOTE ADULT - SUBJECTIVE AND OBJECTIVE BOX
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     Interval History:  First encounter with pt:  Patient seen and examined at bedside.  No overnight events  c/o right big toe pain and cough while eating    ALLERGIES:  Macrodantin (Other)  Motrin (Other)  statins (Muscle Pain)  Repatha (Unknown)  flu vaccines (Other)  nisoldipine (Unknown)  sulfa drugs (Other)  Ceftin (Other (Moderate))  tadalafil (Muscle Pain)    MEDICATIONS  (STANDING):  albuterol    90 MICROgram(s) HFA Inhaler 2 Puff(s) Inhalation every 6 hours  apixaban 5 milliGRAM(s) Oral two times a day  aspirin  chewable 81 milliGRAM(s) Oral daily  famotidine    Tablet 20 milliGRAM(s) Oral at bedtime  ferrous    sulfate 325 milliGRAM(s) Oral daily  furosemide    Tablet 40 milliGRAM(s) Oral daily  gabapentin 100 milliGRAM(s) Oral daily  gabapentin 300 milliGRAM(s) Oral at bedtime  levothyroxine 25 MICROGram(s) Oral daily  lidocaine   4% Patch 1 Patch Transdermal daily  lidocaine   4% Patch 1 Patch Transdermal daily  metoprolol tartrate 12.5 milliGRAM(s) Oral two times a day  polyethylene glycol 3350 17 Gram(s) Oral daily  predniSONE   Tablet 10 milliGRAM(s) Oral daily  rOPINIRole 1 milliGRAM(s) Oral at bedtime  senna 2 Tablet(s) Oral at bedtime  spironolactone 25 milliGRAM(s) Oral daily    MEDICATIONS  (PRN):  acetaminophen     Tablet .. 975 milliGRAM(s) Oral every 8 hours PRN Mild Pain (1 - 3), Moderate Pain (4 - 6), Severe Pain (7 - 10)  calcium carbonate    500 mG (Tums) Chewable 1 Tablet(s) Chew four times a day PRN Indigestion  guaiFENesin Oral Liquid (Sugar-Free) 200 milliGRAM(s) Oral every 6 hours PRN Cough  melatonin 3 milliGRAM(s) Oral at bedtime PRN Insomnia  saline laxative (FLEET) Rectal Enema 1 Enema Rectal once PRN constipation  sodium chloride 0.65% Nasal 1 Spray(s) Both Nostrils two times a day PRN Nasal Congestion    Vital Signs Last 24 Hrs  T(F): 97.7 (23 Nov 2024 08:39), Max: 97.7 (23 Nov 2024 08:39)  HR: 52 (23 Nov 2024 08:43) (52 - 67)  BP: 116/69 (23 Nov 2024 08:39) (116/69 - 143/58)  RR: 14 (23 Nov 2024 08:39) (14 - 16)  SpO2: 96% (23 Nov 2024 08:43) (91% - 96%)  I&O's Summary        PHYSICAL EXAM:  GENERAL: NAD  CHEST/LUNG: Clear to percussion bilaterally; No rales, rhonchi, wheezing, or rubs; normal respiratory effort, no intercostal retractions  HEART: Regular rate and rhythm; No murmurs, rubs, or gallops; No pitting edema  ABDOMEN: Soft, Nontender, Nondistended; Bowel sounds present; No HSM or masses  MUSCULOSKELETAL/EXTREMITIES:  2+ Peripheral Pulses, No clubbing or digital cyanosis; FROM of extremeties (pain, crepitation or contracture)    LABS:      11-22    145  |  105  |  27  ----------------------------<  95  3.6   |  33  |  0.98    Ca    9.3      22 Nov 2024 05:29    TPro  6.5  /  Alb  2.8  /  TBili  0.5  /  DBili  x   /  AST  23  /  ALT  16  /  AlkPhos  56  11-21 11-12 Chol 154 mg/dL LDL -- HDL 53 mg/dL Trig 82 mg/dL          Urinalysis Basic - ( 22 Nov 2024 05:29 )    Color: x / Appearance: x / SG: x / pH: x  Gluc: 95 mg/dL / Ketone: x  / Bili: x / Urobili: x   Blood: x / Protein: x / Nitrite: x   Leuk Esterase: x / RBC: x / WBC x   Sq Epi: x / Non Sq Epi: x / Bacteria: x        COVID-19 PCR: NotDetec (11-14-24 @ 20:35)      Care Discussed with Consultants/Other Providers: Yes

## 2024-11-24 PROCEDURE — 99232 SBSQ HOSP IP/OBS MODERATE 35: CPT

## 2024-11-24 RX ADMIN — APIXABAN 5 MILLIGRAM(S): 2.5 TABLET, FILM COATED ORAL at 21:14

## 2024-11-24 RX ADMIN — Medication 1 SPRAY(S): at 18:06

## 2024-11-24 RX ADMIN — LIDOCAINE 1 PATCH: 40 CREAM TOPICAL at 12:31

## 2024-11-24 RX ADMIN — PREDNISONE 10 MILLIGRAM(S): 20 TABLET ORAL at 05:37

## 2024-11-24 RX ADMIN — Medication 2 PUFF(S): at 21:55

## 2024-11-24 RX ADMIN — Medication 325 MILLIGRAM(S): at 12:33

## 2024-11-24 RX ADMIN — APIXABAN 5 MILLIGRAM(S): 2.5 TABLET, FILM COATED ORAL at 09:58

## 2024-11-24 RX ADMIN — Medication 2 TABLET(S): at 21:14

## 2024-11-24 RX ADMIN — Medication 81 MILLIGRAM(S): at 12:33

## 2024-11-24 RX ADMIN — LIDOCAINE 1 PATCH: 40 CREAM TOPICAL at 12:32

## 2024-11-24 RX ADMIN — Medication 1 TABLET(S): at 16:30

## 2024-11-24 RX ADMIN — Medication 2 PUFF(S): at 09:00

## 2024-11-24 RX ADMIN — Medication 1 SPRAY(S): at 10:01

## 2024-11-24 RX ADMIN — Medication 25 MICROGRAM(S): at 05:36

## 2024-11-24 RX ADMIN — FAMOTIDINE 20 MILLIGRAM(S): 20 TABLET, FILM COATED ORAL at 21:14

## 2024-11-24 RX ADMIN — POLYETHYLENE GLYCOL 3350 17 GRAM(S): 17 POWDER, FOR SOLUTION ORAL at 12:31

## 2024-11-24 RX ADMIN — GABAPENTIN 100 MILLIGRAM(S): 300 CAPSULE ORAL at 12:33

## 2024-11-24 RX ADMIN — ROPINIROLE 1 MILLIGRAM(S): 8 TABLET, FILM COATED, EXTENDED RELEASE ORAL at 21:14

## 2024-11-24 RX ADMIN — METOPROLOL TARTRATE 12.5 MILLIGRAM(S): 100 TABLET, FILM COATED ORAL at 05:38

## 2024-11-24 RX ADMIN — Medication 1 TABLET(S): at 12:36

## 2024-11-24 RX ADMIN — GABAPENTIN 300 MILLIGRAM(S): 300 CAPSULE ORAL at 21:15

## 2024-11-24 RX ADMIN — ACETAMINOPHEN, DIPHENHYDRAMINE HCL, PHENYLEPHRINE HCL 3 MILLIGRAM(S): 325; 25; 5 TABLET ORAL at 21:15

## 2024-11-24 RX ADMIN — LIDOCAINE 1 PATCH: 40 CREAM TOPICAL at 19:20

## 2024-11-24 RX ADMIN — METOPROLOL TARTRATE 12.5 MILLIGRAM(S): 100 TABLET, FILM COATED ORAL at 18:07

## 2024-11-24 RX ADMIN — Medication 25 MILLIGRAM(S): at 05:39

## 2024-11-24 RX ADMIN — FUROSEMIDE 40 MILLIGRAM(S): 40 TABLET ORAL at 05:38

## 2024-11-24 NOTE — PROGRESS NOTE ADULT - SUBJECTIVE AND OBJECTIVE BOX
Cc: Gait dysfunction    HPI: Patient seen and examined at bedside. No acute events overnight. Foot pain better with the start of oral steroids.   Has been tolerating rehabilitation program.    acetaminophen     Tablet .. 975 milliGRAM(s) Oral every 8 hours PRN  albuterol    90 MICROgram(s) HFA Inhaler 2 Puff(s) Inhalation every 6 hours  apixaban 5 milliGRAM(s) Oral two times a day  aspirin  chewable 81 milliGRAM(s) Oral daily  calcium carbonate    500 mG (Tums) Chewable 1 Tablet(s) Chew four times a day PRN  famotidine    Tablet 20 milliGRAM(s) Oral at bedtime  ferrous    sulfate 325 milliGRAM(s) Oral daily  furosemide    Tablet 40 milliGRAM(s) Oral daily  gabapentin 100 milliGRAM(s) Oral daily  gabapentin 300 milliGRAM(s) Oral at bedtime  guaiFENesin Oral Liquid (Sugar-Free) 200 milliGRAM(s) Oral every 6 hours PRN  levothyroxine 25 MICROGram(s) Oral daily  lidocaine   4% Patch 1 Patch Transdermal daily  lidocaine   4% Patch 1 Patch Transdermal daily  melatonin 3 milliGRAM(s) Oral at bedtime PRN  metoprolol tartrate 12.5 milliGRAM(s) Oral two times a day  polyethylene glycol 3350 17 Gram(s) Oral daily  predniSONE   Tablet 10 milliGRAM(s) Oral daily  rOPINIRole 1 milliGRAM(s) Oral at bedtime  saline laxative (FLEET) Rectal Enema 1 Enema Rectal once PRN  senna 2 Tablet(s) Oral at bedtime  sodium chloride 0.65% Nasal 1 Spray(s) Both Nostrils two times a day PRN  spironolactone 25 milliGRAM(s) Oral daily      T(C): 36.7 (11-24-24 @ 09:00), Max: 36.7 (11-23-24 @ 21:09)  HR: 55 (11-24-24 @ 09:00) (55 - 73)  BP: 134/63 (11-24-24 @ 09:00) (120/73 - 151/80)  RR: 16 (11-24-24 @ 09:00) (14 - 16)  SpO2: 96% (11-24-24 @ 09:00) (91% - 96%)    In NAD  HEENT- EOMI  Heart- S1S2  Lungs- CTA bl.  Abd- + BS, NT  Ext- No calf pain, R great toe mildly swollen, minimal TTP over joint  Neuro- Exam unchanged    MR Brain 11/12/24 reviewed and interpreted by me: R corona radiata infarct seen    ACC: [id number] EXAM: MR BRAIN ORDERED BY: MARTHA PARIS    PROCEDURE DATE: 11/12/2024        INTERPRETATION: Clinical indication: TIA.    MRI of the brain was from sagittal T1 axial T1 and T2 T2 FLAIR diffusion and gradient echo sequence. Coronal T2-weighted sequence was performed as well.    Parenchymal volume loss and chronic microvessel ischemic changes are identified    There is no acute hemorrhage mass or mass effect seen.    There is abnormal T2 prolongation restricted diffusion seen involving the right corona radiata region. These findings are compatible with an acute infarct. No hemorrhagic transformation is seen. No significant shift or herniation is seen.    The large vessels demonstrate normal flow voids    Partial empty sella is seen.    IMPRESSION: Acute infarct as described above.    --- End of Report ---            DIMAS GARCIA MD; Attending Radiologist  This document has been electronically signed. Nov 12 2024 11:10AM    Imp: Patient with diagnosis of CVA, A-fib, ANNALEE, CHF and now gout admitted for comprehensive acute rehabilitation.    Plan:  - Continue PT/OT/SLP as indicated  - DVT prophylaxis - Continue Eliquis  - Skin- Turn q2h, check skin daily  - Continue current medications  -Active issues-   Gout - will give short course of oral steroids and monitor  CVA - Continue ASA/Lipitor  - Patient is stable to continue current rehabilitation program.

## 2024-11-24 NOTE — PROGRESS NOTE ADULT - SUBJECTIVE AND OBJECTIVE BOX
CC: Patient is a 89y old  Female who presents with a chief complaint of right corona radiata CVA with weakness of left side of body.    Interval History:  Patient seen and examined at bedside.  No overnight events  As per pt, her right big toe pain is better than yesterday and cough while eating improved.      ALLERGIES:  Macrodantin (Other)  Motrin (Other)  statins (Muscle Pain)  Repatha (Unknown)  flu vaccines (Other)  nisoldipine (Unknown)  sulfa drugs (Other)  Ceftin (Other (Moderate))  tadalafil (Muscle Pain)    MEDICATIONS  (STANDING):  albuterol    90 MICROgram(s) HFA Inhaler 2 Puff(s) Inhalation every 6 hours  apixaban 5 milliGRAM(s) Oral two times a day  aspirin  chewable 81 milliGRAM(s) Oral daily  famotidine    Tablet 20 milliGRAM(s) Oral at bedtime  ferrous    sulfate 325 milliGRAM(s) Oral daily  furosemide    Tablet 40 milliGRAM(s) Oral daily  gabapentin 100 milliGRAM(s) Oral daily  gabapentin 300 milliGRAM(s) Oral at bedtime  levothyroxine 25 MICROGram(s) Oral daily  lidocaine   4% Patch 1 Patch Transdermal daily  lidocaine   4% Patch 1 Patch Transdermal daily  metoprolol tartrate 12.5 milliGRAM(s) Oral two times a day  polyethylene glycol 3350 17 Gram(s) Oral daily  predniSONE   Tablet 10 milliGRAM(s) Oral daily  rOPINIRole 1 milliGRAM(s) Oral at bedtime  senna 2 Tablet(s) Oral at bedtime  spironolactone 25 milliGRAM(s) Oral daily    MEDICATIONS  (PRN):  acetaminophen     Tablet .. 975 milliGRAM(s) Oral every 8 hours PRN Mild Pain (1 - 3), Moderate Pain (4 - 6), Severe Pain (7 - 10)  calcium carbonate    500 mG (Tums) Chewable 1 Tablet(s) Chew four times a day PRN Indigestion  guaiFENesin Oral Liquid (Sugar-Free) 200 milliGRAM(s) Oral every 6 hours PRN Cough  melatonin 3 milliGRAM(s) Oral at bedtime PRN Insomnia  saline laxative (FLEET) Rectal Enema 1 Enema Rectal once PRN constipation  sodium chloride 0.65% Nasal 1 Spray(s) Both Nostrils two times a day PRN Nasal Congestion    Vital Signs Last 24 Hrs  T(F): 98 (24 Nov 2024 09:00), Max: 98 (23 Nov 2024 21:09)  HR: 55 (24 Nov 2024 09:00) (55 - 73)  BP: 134/63 (24 Nov 2024 09:00) (120/73 - 151/80)  RR: 16 (24 Nov 2024 09:00) (14 - 16)  SpO2: 96% (24 Nov 2024 09:00) (91% - 96%)  I&O's Summary        PHYSICAL EXAM:  GENERAL: NAD  CHEST/LUNG: Clear to percussion bilaterally; No rales, rhonchi, wheezing, or rubs; normal respiratory effort, no intercostal retractions  HEART: Regular rate and rhythm; No murmurs, rubs, or gallops; No pitting edema  ABDOMEN: Soft, Nontender, Nondistended; Bowel sounds present; No HSM or masses  MUSCULOSKELETAL/EXTREMITIES:  2+ Peripheral Pulses, No clubbing or digital cyanosis; FROM of extremeties (pain, crepitation or contracture)      LABS:      11-22    145  |  105  |  27  ----------------------------<  95  3.6   |  33  |  0.98    Ca    9.3      22 Nov 2024 05:29                  11-12 Chol 154 mg/dL LDL -- HDL 53 mg/dL Trig 82 mg/dL        Urinalysis Basic - ( 22 Nov 2024 05:29 )    Color: x / Appearance: x / SG: x / pH: x  Gluc: 95 mg/dL / Ketone: x  / Bili: x / Urobili: x   Blood: x / Protein: x / Nitrite: x   Leuk Esterase: x / RBC: x / WBC x   Sq Epi: x / Non Sq Epi: x / Bacteria: x        COVID-19 PCR: NotDetec (11-14-24 @ 20:35)      Care Discussed with Consultants/Other Providers: Yes

## 2024-11-24 NOTE — PROGRESS NOTE ADULT - ASSESSMENT
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 These findings are compatible with an acute infarct. No hemorrhagic transformation is seen. No significant shift or herniation is seen.  - ASA 81 po qd added by cardiology   - Lipitor added by neurology but she refused citing myalgia   - BP goal SBP < 140    #Nasal congestion  - ocean nasal saline spray started 11/22    #BLE pain/RLS  - started Requip 11/16  - Tylenol PRN  - may try magnesium    # Paroxysmal A-fib  – Continue metoprolol tartrate twice daily  – Continue Eliquis 5 mg twice daily    # ANNALEE - resolved    # Chronic diastolic heart failure  # Pulm HTN  - ECHO from November 2023 with EF of 55 to 60% - severe pulmonary HTN , echo done at  unchanged from prior echo in 11/2023  – Continue metoprolol tartrate 25 mg twice daily  – Lasix 40mg daily, aldactone 25mg daily  - 3L oxygen via NC overnight at home, no need for oxygen during daytime hours. Outpatient sleep study  -Daily weights     # Hyperlipidemia  - On Livalo 2mg at home, allergic to Repatha  – Lipitor 20mg recommended by neurology. LDL 85, LDL needs to be under 70 per neuro. Patient refusing 20mg Lipitor due to intermittent leg cramping/pain.     # Hypothyroidism  -Synthroid 25mcg AM    # DVT prophylaxis  – On Eliquis    # GI ppx:  Famotidine    #Right big toe pain /? Gout  - c/w Prednisone and monitor response     # Cough while eating ? Dysphagia -  Improving  F/up SLP

## 2024-11-25 LAB
ALBUMIN SERPL ELPH-MCNC: 3.4 G/DL — SIGNIFICANT CHANGE UP (ref 3.3–5)
ALP SERPL-CCNC: 60 U/L — SIGNIFICANT CHANGE UP (ref 40–120)
ALT FLD-CCNC: 16 U/L — SIGNIFICANT CHANGE UP (ref 10–45)
ANION GAP SERPL CALC-SCNC: 6 MMOL/L — SIGNIFICANT CHANGE UP (ref 5–17)
AST SERPL-CCNC: 21 U/L — SIGNIFICANT CHANGE UP (ref 10–40)
BILIRUB SERPL-MCNC: 0.4 MG/DL — SIGNIFICANT CHANGE UP (ref 0.2–1.2)
BUN SERPL-MCNC: 34 MG/DL — HIGH (ref 7–23)
CALCIUM SERPL-MCNC: 9.6 MG/DL — SIGNIFICANT CHANGE UP (ref 8.4–10.5)
CHLORIDE SERPL-SCNC: 99 MMOL/L — SIGNIFICANT CHANGE UP (ref 96–108)
CO2 SERPL-SCNC: 36 MMOL/L — HIGH (ref 22–31)
CREAT SERPL-MCNC: 1.18 MG/DL — SIGNIFICANT CHANGE UP (ref 0.5–1.3)
EGFR: 44 ML/MIN/1.73M2 — LOW
GLUCOSE SERPL-MCNC: 190 MG/DL — HIGH (ref 70–99)
POTASSIUM SERPL-MCNC: 3.8 MMOL/L — SIGNIFICANT CHANGE UP (ref 3.5–5.3)
POTASSIUM SERPL-SCNC: 3.8 MMOL/L — SIGNIFICANT CHANGE UP (ref 3.5–5.3)
PROT SERPL-MCNC: 7.9 G/DL — SIGNIFICANT CHANGE UP (ref 6–8.3)
SODIUM SERPL-SCNC: 141 MMOL/L — SIGNIFICANT CHANGE UP (ref 135–145)

## 2024-11-25 PROCEDURE — 99233 SBSQ HOSP IP/OBS HIGH 50: CPT

## 2024-11-25 PROCEDURE — 99232 SBSQ HOSP IP/OBS MODERATE 35: CPT

## 2024-11-25 RX ORDER — METOPROLOL TARTRATE 100 MG/1
0.5 TABLET, FILM COATED ORAL
Qty: 30 | Refills: 0
Start: 2024-11-25 | End: 2024-12-24

## 2024-11-25 RX ORDER — METOPROLOL SUCCINATE 50 MG/1
0.5 TABLET, EXTENDED RELEASE ORAL
Qty: 30 | Refills: 0 | DISCHARGE
Start: 2024-11-25 | End: 2024-12-24

## 2024-11-25 RX ORDER — ALLOPURINOL 300 MG/1
1 TABLET ORAL
Qty: 0 | Refills: 0 | DISCHARGE
Start: 2024-11-25

## 2024-11-25 RX ADMIN — LIDOCAINE 1 PATCH: 40 CREAM TOPICAL at 23:40

## 2024-11-25 RX ADMIN — Medication 81 MILLIGRAM(S): at 11:29

## 2024-11-25 RX ADMIN — LIDOCAINE 1 PATCH: 40 CREAM TOPICAL at 00:15

## 2024-11-25 RX ADMIN — LIDOCAINE 1 PATCH: 40 CREAM TOPICAL at 11:29

## 2024-11-25 RX ADMIN — PREDNISONE 10 MILLIGRAM(S): 20 TABLET ORAL at 05:58

## 2024-11-25 RX ADMIN — FUROSEMIDE 40 MILLIGRAM(S): 40 TABLET ORAL at 05:53

## 2024-11-25 RX ADMIN — POLYETHYLENE GLYCOL 3350 17 GRAM(S): 17 POWDER, FOR SOLUTION ORAL at 11:30

## 2024-11-25 RX ADMIN — Medication 2 PUFF(S): at 16:05

## 2024-11-25 RX ADMIN — METOPROLOL TARTRATE 12.5 MILLIGRAM(S): 100 TABLET, FILM COATED ORAL at 17:25

## 2024-11-25 RX ADMIN — LIDOCAINE 1 PATCH: 40 CREAM TOPICAL at 17:26

## 2024-11-25 RX ADMIN — METOPROLOL TARTRATE 12.5 MILLIGRAM(S): 100 TABLET, FILM COATED ORAL at 05:53

## 2024-11-25 RX ADMIN — ACETAMINOPHEN 500MG 975 MILLIGRAM(S): 500 TABLET, COATED ORAL at 09:35

## 2024-11-25 RX ADMIN — Medication 2 TABLET(S): at 21:05

## 2024-11-25 RX ADMIN — GABAPENTIN 300 MILLIGRAM(S): 300 CAPSULE ORAL at 21:06

## 2024-11-25 RX ADMIN — ROPINIROLE 1 MILLIGRAM(S): 8 TABLET, FILM COATED, EXTENDED RELEASE ORAL at 21:05

## 2024-11-25 RX ADMIN — FAMOTIDINE 20 MILLIGRAM(S): 20 TABLET, FILM COATED ORAL at 21:05

## 2024-11-25 RX ADMIN — Medication 325 MILLIGRAM(S): at 11:30

## 2024-11-25 RX ADMIN — ACETAMINOPHEN, DIPHENHYDRAMINE HCL, PHENYLEPHRINE HCL 3 MILLIGRAM(S): 325; 25; 5 TABLET ORAL at 21:07

## 2024-11-25 RX ADMIN — Medication 25 MICROGRAM(S): at 05:53

## 2024-11-25 RX ADMIN — Medication 1 TABLET(S): at 23:18

## 2024-11-25 RX ADMIN — Medication 2 PUFF(S): at 22:01

## 2024-11-25 RX ADMIN — APIXABAN 5 MILLIGRAM(S): 2.5 TABLET, FILM COATED ORAL at 21:05

## 2024-11-25 RX ADMIN — GABAPENTIN 100 MILLIGRAM(S): 300 CAPSULE ORAL at 11:29

## 2024-11-25 RX ADMIN — ACETAMINOPHEN 500MG 975 MILLIGRAM(S): 500 TABLET, COATED ORAL at 10:05

## 2024-11-25 RX ADMIN — APIXABAN 5 MILLIGRAM(S): 2.5 TABLET, FILM COATED ORAL at 09:37

## 2024-11-25 RX ADMIN — Medication 2 PUFF(S): at 08:40

## 2024-11-25 RX ADMIN — Medication 25 MILLIGRAM(S): at 05:53

## 2024-11-25 RX ADMIN — Medication 1 TABLET(S): at 11:36

## 2024-11-25 NOTE — PROGRESS NOTE ADULT - ASSESSMENT
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 These findings are compatible with an acute infarct. No hemorrhagic transformation is seen. No significant shift or herniation is seen.  - ASA 81 po qd added by cardiology   - Lipitor added by neurology but she refused citing myalgia   - BP goal SBP < 140    #Nasal congestion  - ocean nasal saline spray started 11/22    #BLE pain/RLS  - started Requip 11/16  - Tylenol PRN  - may try magnesium if needed    # Paroxysmal A-fib  – Continue metoprolol tartrate twice daily  – Continue Eliquis 5 mg twice daily    # ANNALEE - resolved    # Chronic diastolic heart failure  # Pulm HTN  - ECHO from November 2023 with EF of 55 to 60% - severe pulmonary HTN , echo done at  unchanged from prior echo in 11/2023  – Continue metoprolol tartrate 25 mg twice daily  – Lasix 40mg daily, aldactone 25mg daily  - 3L oxygen via NC overnight at home, no need for oxygen during daytime hours. Outpatient sleep study  -Daily weights     # Hyperlipidemia  - On Livalo 2mg at home, allergic to Repatha  – Lipitor 20mg recommended by neurology. LDL 85, LDL needs to be under 70 per neuro. Patient refusing 20mg Lipitor due to intermittent leg cramping/pain.     # Hypothyroidism  -Synthroid 25mcg AM    # DVT prophylaxis  – On Eliquis    # GI ppx:  Famotidine    #Right big toe pain /? Gout  - improving  - c/w short course of Prednisone     # Cough while eating ? Dysphagia -  Improving  F/up SLP

## 2024-11-25 NOTE — PROGRESS NOTE ADULT - COMMENTS
Patient seen, sitting in chair. She looks much more alert than last week, improved attention. Has sustained eye opening throughout and does not require cues or repetition to respond. She states her right toe pain is "gone" after treatment for gouty flare, and that she usually has gout attacks every 6 months. We asked whether she has tried allopurinol in past; she has not but is willing to try    Her left LE still persists but no more jumping/twitching leg and sleep is much improved

## 2024-11-25 NOTE — PROGRESS NOTE ADULT - SUBJECTIVE AND OBJECTIVE BOX
No CP, SOB    Vital Signs Last 24 Hrs  T(C): 36.8 (11-25-24 @ 21:09), Max: 36.8 (11-25-24 @ 21:09)  T(F): 98.2 (11-25-24 @ 21:09), Max: 98.2 (11-25-24 @ 21:09)  HR: 61 (11-25-24 @ 21:09) (56 - 62)  BP: 116/69 (11-25-24 @ 21:09) (116/69 - 139/66)  RR: 16 (11-25-24 @ 21:09) (16 - 16)  SpO2: 95% (11-25-24 @ 21:09) (95% - 98%)    s1s2  b/l air entry  soft, ND  b/l LE lymphedema    25 Nov 2024 09:31    141    |  99     |  34     ----------------------------<  190    3.8     |  36     |  1.18     Ca    9.6        25 Nov 2024 09:31    TPro  7.9    /  Alb  3.4    /  TBili  0.4    /  DBili  x      /  AST  21     /  ALT  16     /  AlkPhos  60     25 Nov 2024 09:31    LIVER FUNCTIONS - ( 25 Nov 2024 09:31 )  Alb: 3.4 g/dL / Pro: 7.9 g/dL / ALK PHOS: 60 U/L / ALT: 16 U/L / AST: 21 U/L / GGT: x           acetaminophen     Tablet .. 975 milliGRAM(s) Oral every 8 hours PRN  albuterol    90 MICROgram(s) HFA Inhaler 2 Puff(s) Inhalation every 6 hours  apixaban 5 milliGRAM(s) Oral two times a day  aspirin  chewable 81 milliGRAM(s) Oral daily  calcium carbonate    500 mG (Tums) Chewable 1 Tablet(s) Chew four times a day PRN  famotidine    Tablet 20 milliGRAM(s) Oral at bedtime  ferrous    sulfate 325 milliGRAM(s) Oral daily  furosemide    Tablet 40 milliGRAM(s) Oral daily  gabapentin 300 milliGRAM(s) Oral at bedtime  gabapentin 100 milliGRAM(s) Oral daily  guaiFENesin Oral Liquid (Sugar-Free) 200 milliGRAM(s) Oral every 6 hours PRN  levothyroxine 25 MICROGram(s) Oral daily  lidocaine   4% Patch 1 Patch Transdermal daily  lidocaine   4% Patch 1 Patch Transdermal daily  melatonin 3 milliGRAM(s) Oral at bedtime PRN  metoprolol tartrate 12.5 milliGRAM(s) Oral two times a day  polyethylene glycol 3350 17 Gram(s) Oral daily  predniSONE   Tablet 10 milliGRAM(s) Oral daily  rOPINIRole 1 milliGRAM(s) Oral at bedtime  saline laxative (FLEET) Rectal Enema 1 Enema Rectal once PRN  senna 2 Tablet(s) Oral at bedtime  sodium chloride 0.65% Nasal 1 Spray(s) Both Nostrils two times a day PRN  spironolactone 25 milliGRAM(s) Oral daily    A/P:    CM, mod TR, 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

## 2024-11-25 NOTE — PROGRESS NOTE ADULT - SUBJECTIVE AND OBJECTIVE BOX
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 (24 Nov 2024 11:01)      Interval History:  Patient seen and examined at bedside.  No overnight events  No complaints this morning  Pt denies CP, SOB, abd pain, N/V/D    ALLERGIES:  Macrodantin (Other)  Motrin (Other)  statins (Muscle Pain)  Repatha (Unknown)  flu vaccines (Other)  nisoldipine (Unknown)  sulfa drugs (Other)  Ceftin (Other (Moderate))  tadalafil (Muscle Pain)    MEDICATIONS  (STANDING):  albuterol    90 MICROgram(s) HFA Inhaler 2 Puff(s) Inhalation every 6 hours  apixaban 5 milliGRAM(s) Oral two times a day  aspirin  chewable 81 milliGRAM(s) Oral daily  famotidine    Tablet 20 milliGRAM(s) Oral at bedtime  ferrous    sulfate 325 milliGRAM(s) Oral daily  furosemide    Tablet 40 milliGRAM(s) Oral daily  gabapentin 100 milliGRAM(s) Oral daily  gabapentin 300 milliGRAM(s) Oral at bedtime  levothyroxine 25 MICROGram(s) Oral daily  lidocaine   4% Patch 1 Patch Transdermal daily  lidocaine   4% Patch 1 Patch Transdermal daily  metoprolol tartrate 12.5 milliGRAM(s) Oral two times a day  polyethylene glycol 3350 17 Gram(s) Oral daily  predniSONE   Tablet 10 milliGRAM(s) Oral daily  rOPINIRole 1 milliGRAM(s) Oral at bedtime  senna 2 Tablet(s) Oral at bedtime  spironolactone 25 milliGRAM(s) Oral daily    MEDICATIONS  (PRN):  acetaminophen     Tablet .. 975 milliGRAM(s) Oral every 8 hours PRN Mild Pain (1 - 3), Moderate Pain (4 - 6), Severe Pain (7 - 10)  calcium carbonate    500 mG (Tums) Chewable 1 Tablet(s) Chew four times a day PRN Indigestion  guaiFENesin Oral Liquid (Sugar-Free) 200 milliGRAM(s) Oral every 6 hours PRN Cough  melatonin 3 milliGRAM(s) Oral at bedtime PRN Insomnia  saline laxative (FLEET) Rectal Enema 1 Enema Rectal once PRN constipation  sodium chloride 0.65% Nasal 1 Spray(s) Both Nostrils two times a day PRN Nasal Congestion    Vital Signs Last 24 Hrs  T(F): 97.6 (25 Nov 2024 07:38), Max: 97.7 (24 Nov 2024 21:18)  HR: 56 (25 Nov 2024 07:38) (56 - 68)  BP: 128/80 (25 Nov 2024 07:38) (124/62 - 146/74)  RR: 16 (25 Nov 2024 07:38) (16 - 16)  SpO2: 97% (25 Nov 2024 07:38) (94% - 97%)  I&O's Summary    BMI (kg/m2): 32.7 (11-24-24 @ 09:00)    PHYSICAL EXAM:    GENERAL: NAD  CHEST/LUNG: Clear to auscultation bilaterally; No rales, rhonchi, wheezing,   HEART: Regular rate and rhythm; No murmurs, rubs, or gallops; b/l LE edema  ABDOMEN: Soft, Nontender, Nondistended; Bowel sounds present; No HSM or masses  Extremities: +2 pulses, no clubbing or cyanosis  Psych: awake and alert    LABS:                        11-12 Chol 154 mg/dL LDL -- HDL 53 mg/dL Trig 82 mg/dL                      COVID-19 PCR: NotDetec (11-14-24 @ 20:35)      Care Discussed with Consultants/Other Providers: Yes. Rehab provider   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 (24 Nov 2024 11:01)      Interval History:  Patient seen and examined at bedside.  No overnight events  No complaints this morning  Pt denies CP, SOB, abd pain, N/V/D  no reported R toe pain today    ALLERGIES:  Macrodantin (Other)  Motrin (Other)  statins (Muscle Pain)  Repatha (Unknown)  flu vaccines (Other)  nisoldipine (Unknown)  sulfa drugs (Other)  Ceftin (Other (Moderate))  tadalafil (Muscle Pain)    MEDICATIONS  (STANDING):  albuterol    90 MICROgram(s) HFA Inhaler 2 Puff(s) Inhalation every 6 hours  apixaban 5 milliGRAM(s) Oral two times a day  aspirin  chewable 81 milliGRAM(s) Oral daily  famotidine    Tablet 20 milliGRAM(s) Oral at bedtime  ferrous    sulfate 325 milliGRAM(s) Oral daily  furosemide    Tablet 40 milliGRAM(s) Oral daily  gabapentin 100 milliGRAM(s) Oral daily  gabapentin 300 milliGRAM(s) Oral at bedtime  levothyroxine 25 MICROGram(s) Oral daily  lidocaine   4% Patch 1 Patch Transdermal daily  lidocaine   4% Patch 1 Patch Transdermal daily  metoprolol tartrate 12.5 milliGRAM(s) Oral two times a day  polyethylene glycol 3350 17 Gram(s) Oral daily  predniSONE   Tablet 10 milliGRAM(s) Oral daily  rOPINIRole 1 milliGRAM(s) Oral at bedtime  senna 2 Tablet(s) Oral at bedtime  spironolactone 25 milliGRAM(s) Oral daily    MEDICATIONS  (PRN):  acetaminophen     Tablet .. 975 milliGRAM(s) Oral every 8 hours PRN Mild Pain (1 - 3), Moderate Pain (4 - 6), Severe Pain (7 - 10)  calcium carbonate    500 mG (Tums) Chewable 1 Tablet(s) Chew four times a day PRN Indigestion  guaiFENesin Oral Liquid (Sugar-Free) 200 milliGRAM(s) Oral every 6 hours PRN Cough  melatonin 3 milliGRAM(s) Oral at bedtime PRN Insomnia  saline laxative (FLEET) Rectal Enema 1 Enema Rectal once PRN constipation  sodium chloride 0.65% Nasal 1 Spray(s) Both Nostrils two times a day PRN Nasal Congestion    Vital Signs Last 24 Hrs  T(F): 97.6 (25 Nov 2024 07:38), Max: 97.7 (24 Nov 2024 21:18)  HR: 56 (25 Nov 2024 07:38) (56 - 68)  BP: 128/80 (25 Nov 2024 07:38) (124/62 - 146/74)  RR: 16 (25 Nov 2024 07:38) (16 - 16)  SpO2: 97% (25 Nov 2024 07:38) (94% - 97%)  I&O's Summary    BMI (kg/m2): 32.7 (11-24-24 @ 09:00)    PHYSICAL EXAM:    GENERAL: NAD  CHEST/LUNG: Clear to auscultation bilaterally; No rales, rhonchi, wheezing,   HEART: Regular rate and rhythm; No murmurs, rubs, or gallops; b/l LE edema  ABDOMEN: Soft, Nontender, Nondistended; Bowel sounds present; No HSM or masses  Extremities: +2 pulses, no clubbing or cyanosis  Psych: awake and alert    LABS:                        11-12 Chol 154 mg/dL LDL -- HDL 53 mg/dL Trig 82 mg/dL                      COVID-19 PCR: NotDetec (11-14-24 @ 20:35)      Care Discussed with Consultants/Other Providers: Yes. Rehab provider

## 2024-11-25 NOTE — PROGRESS NOTE ADULT - SUBJECTIVE AND OBJECTIVE BOX
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     Patient went out to lunch at 12 pm the day of admission with her daughter. Around 6 pm the patient slipped out of bed and was unable to get back up. She called her daughter and when the she arrived noted her mother did not seem like herself and had a Left sided facial droop.  CT/CTA head and CTA of neck without evidence of an acute intracranial hemorrhage midline shift or hydrocephalus. There was no hemodynamic significant narrowing within the neck. or proximal large vessel occlusion noted.  MRI showed "abnormal T2 prolongation restricted diffusion seen involving the right corona radiata region. These findings are compatible with an acute infarct. No hemorrhagic transformation is seen. No significant shift or herniation is seen."    Patient was managed medically and was deemed stable for discharge to [facility] on 24 for acute IRF.          (2024 13:35)      PAST MEDICAL & SURGICAL HISTORY:  Atrial fibrillation      CHF (congestive heart failure)      Pulmonary emboli      Factor 5 Leiden mutation, heterozygous      CAD (coronary artery disease)      Stented coronary artery      Hypothyroid      COPD (chronic obstructive pulmonary disease)      Mitral regurgitation      HTN (hypertension)      H/O: hysterectomy      H/O knee surgery      Cyst of breast, unspecified laterality  S/P excision      H/O cardiac radiofrequency ablation      S/P ablation of atrial fibrillation          MEDICATIONS  (STANDING):  albuterol    90 MICROgram(s) HFA Inhaler 2 Puff(s) Inhalation every 6 hours  apixaban 5 milliGRAM(s) Oral two times a day  aspirin  chewable 81 milliGRAM(s) Oral daily  famotidine    Tablet 20 milliGRAM(s) Oral at bedtime  ferrous    sulfate 325 milliGRAM(s) Oral daily  furosemide    Tablet 40 milliGRAM(s) Oral daily  gabapentin 100 milliGRAM(s) Oral daily  gabapentin 300 milliGRAM(s) Oral at bedtime  levothyroxine 25 MICROGram(s) Oral daily  lidocaine   4% Patch 1 Patch Transdermal daily  lidocaine   4% Patch 1 Patch Transdermal daily  metoprolol tartrate 12.5 milliGRAM(s) Oral two times a day  polyethylene glycol 3350 17 Gram(s) Oral daily  predniSONE   Tablet 10 milliGRAM(s) Oral daily  rOPINIRole 1 milliGRAM(s) Oral at bedtime  senna 2 Tablet(s) Oral at bedtime  spironolactone 25 milliGRAM(s) Oral daily    MEDICATIONS  (PRN):  acetaminophen     Tablet .. 975 milliGRAM(s) Oral every 8 hours PRN Mild Pain (1 - 3), Moderate Pain (4 - 6), Severe Pain (7 - 10)  calcium carbonate    500 mG (Tums) Chewable 1 Tablet(s) Chew four times a day PRN Indigestion  guaiFENesin Oral Liquid (Sugar-Free) 200 milliGRAM(s) Oral every 6 hours PRN Cough  melatonin 3 milliGRAM(s) Oral at bedtime PRN Insomnia  saline laxative (FLEET) Rectal Enema 1 Enema Rectal once PRN constipation  sodium chloride 0.65% Nasal 1 Spray(s) Both Nostrils two times a day PRN Nasal Congestion      Allergies    Macrodantin (Other)  Motrin (Other)  statins (Muscle Pain)  Repatha (Unknown)  flu vaccines (Other)  nisoldipine (Unknown)  sulfa drugs (Other)  Ceftin (Other (Moderate))  tadalafil (Muscle Pain)    Intolerances          VITALS  89y  Vital Signs Last 24 Hrs  T(C): 36.4 (2024 07:38), Max: 36.5 (2024 21:18)  T(F): 97.6 (2024 07:38), Max: 97.7 (2024 21:18)  HR: 56 (2024 07:38) (56 - 68)  BP: 128/80 (2024 07:38) (124/62 - 146/74)  BP(mean): --  RR: 16 (2024 07:38) (16 - 16)  SpO2: 97% (2024 07:38) (94% - 97%)    Parameters below as of 2024 08:27  Patient On (Oxygen Delivery Method): nasal cannula      Daily     Daily Weight in k.5 (2024 05:53)        RECENT LABS:                      CAPILLARY BLOOD GLUCOSE                   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     Patient went out to lunch at 12 pm the day of admission with her daughter. Around 6 pm the patient slipped out of bed and was unable to get back up. She called her daughter and when the she arrived noted her mother did not seem like herself and had a Left sided facial droop.  CT/CTA head and CTA of neck without evidence of an acute intracranial hemorrhage midline shift or hydrocephalus. There was no hemodynamic significant narrowing within the neck. or proximal large vessel occlusion noted.  MRI showed "abnormal T2 prolongation restricted diffusion seen involving the right corona radiata region. These findings are compatible with an acute infarct. No hemorrhagic transformation is seen. No significant shift or herniation is seen."    Patient was managed medically and was deemed stable for discharge to [facility] on 24 for acute IRF.          (2024 13:35)      PAST MEDICAL & SURGICAL HISTORY:  Atrial fibrillation      CHF (congestive heart failure)      Pulmonary emboli      Factor 5 Leiden mutation, heterozygous      CAD (coronary artery disease)      Stented coronary artery      Hypothyroid      COPD (chronic obstructive pulmonary disease)      Mitral regurgitation      HTN (hypertension)      H/O: hysterectomy      H/O knee surgery      Cyst of breast, unspecified laterality  S/P excision      H/O cardiac radiofrequency ablation      S/P ablation of atrial fibrillation          MEDICATIONS  (STANDING):  albuterol    90 MICROgram(s) HFA Inhaler 2 Puff(s) Inhalation every 6 hours  apixaban 5 milliGRAM(s) Oral two times a day  aspirin  chewable 81 milliGRAM(s) Oral daily  famotidine    Tablet 20 milliGRAM(s) Oral at bedtime  ferrous    sulfate 325 milliGRAM(s) Oral daily  furosemide    Tablet 40 milliGRAM(s) Oral daily  gabapentin 100 milliGRAM(s) Oral daily  gabapentin 300 milliGRAM(s) Oral at bedtime  levothyroxine 25 MICROGram(s) Oral daily  lidocaine   4% Patch 1 Patch Transdermal daily  lidocaine   4% Patch 1 Patch Transdermal daily  metoprolol tartrate 12.5 milliGRAM(s) Oral two times a day  polyethylene glycol 3350 17 Gram(s) Oral daily  predniSONE   Tablet 10 milliGRAM(s) Oral daily  rOPINIRole 1 milliGRAM(s) Oral at bedtime  senna 2 Tablet(s) Oral at bedtime  spironolactone 25 milliGRAM(s) Oral daily    MEDICATIONS  (PRN):  acetaminophen     Tablet .. 975 milliGRAM(s) Oral every 8 hours PRN Mild Pain (1 - 3), Moderate Pain (4 - 6), Severe Pain (7 - 10)  calcium carbonate    500 mG (Tums) Chewable 1 Tablet(s) Chew four times a day PRN Indigestion  guaiFENesin Oral Liquid (Sugar-Free) 200 milliGRAM(s) Oral every 6 hours PRN Cough  melatonin 3 milliGRAM(s) Oral at bedtime PRN Insomnia  saline laxative (FLEET) Rectal Enema 1 Enema Rectal once PRN constipation  sodium chloride 0.65% Nasal 1 Spray(s) Both Nostrils two times a day PRN Nasal Congestion      Allergies    Macrodantin (Other)  Motrin (Other)  statins (Muscle Pain)  Repatha (Unknown)  flu vaccines (Other)  nisoldipine (Unknown)  sulfa drugs (Other)  Ceftin (Other (Moderate))  tadalafil (Muscle Pain)    Intolerances          VITALS  89y  Vital Signs Last 24 Hrs  T(C): 36.4 (2024 07:38), Max: 36.5 (2024 21:18)  T(F): 97.6 (2024 07:38), Max: 97.7 (2024 21:18)  HR: 56 (2024 07:38) (56 - 68)  BP: 128/80 (2024 07:38) (124/62 - 146/74)  BP(mean): --  RR: 16 (2024 07:38) (16 - 16)  SpO2: 97% (2024 07:38) (94% - 97%)    Parameters below as of 2024 08:27  Patient On (Oxygen Delivery Method): nasal cannula      Daily     Daily Weight in k.5 (2024 05:53)        RECENT LABS:            141  |  99  |  34[H]  ----------------------------<  190[H]  3.8   |  36[H]  |  1.18    Ca    9.6      2024 09:31    TPro  7.9  /  Alb  3.4  /  TBili  0.4  /  DBili  x   /  AST  21  /  ALT  16  /  AlkPhos  60                  CAPILLARY BLOOD GLUCOSE

## 2024-11-25 NOTE — PROGRESS NOTE ADULT - ASSESSMENT
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 These findings are compatible with an acute infarct. No hemorrhagic transformation is seen. No significant shift or herniation is seen.  - ASA 81 po qd added by cardiology   - Statin dc 11/15, per patient refusal. She is aware of increased risk of recurrent stroke without statin  - Continue comprehensive PT, OT, SLP program 3 hours daily 5 x week  - cleared to shower  - Precautions: cardiac, fall, AC    # frustration, irritability, anxiety, exacerbated by use of statin and myalgia  - statin dc per patient's wishes  - neuropsychology support  - rec therapy    # restless leg syndrome  - requip increased 0.25 to 0.5 qhs 11/17. Will increase to 1 mg qhs 11/21, patient agreeable  - gabapentin changed from 200 bid to 100/300 q 10 AM/10 PM 11/20   - Mg 2.1 11/18  - Fe level 43 11/18. Will supplement daily 325 mg   - coenzyme Q10 level 11/21 still pending 11/25  - reviewed with patient and family who are agreeable with plan. 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  - ROM, shoulder stabilization exercises  - warm compress PRN  - lidoderm patch  - discussed with patient and family who are agreeable    # Paroxysmal A-fib  – metoprolol tartrate 25 mg twice daily  – Eliquis 5 mg twice daily  - HR controlled    # Possible TARAH  - was noted to be snoring loudly  - sleep study outpatient  - may also be contributing to daytime somnolence    # Chronic diastolic heart failure  # Pulm HTN  # cough  - ECHO:  EF of 55 to 60% - severe pulmonary HTN   – spironolactone held due to ANNALEE 11/15  - furosemide 40 mg  restarted 11/18  - Aldactone daily reordered by hospitalist 11/20  – metoprolol tartrate 25 mg twice daily  - home O2: 3L oxygen via [place] overnight     # HLD  - On Livalo 2mg at home, allergic to Repatha  – Lipitor dc per patient's wishes after education re: risk recurrent stroke. Family agreeable  -  education on Heart Healthy diet     # Gouty flare over weekend  -  short course of oral steroids and monitor    # Hypothyroidism  - Synthroid 25mcg AM    # hypokalemia  - repleted  - K+ 3.6 11/22. BMP 11/25 pending    # ANNALEE  - Hospitalist appreciated. Hold lasix and spironolactone 11/15  - renal consult appreciated 11/15. Hold diuretics.   - furosemide restarted 11/18  - BUn/Cr 31/1.5 11/15 -->-> 24/1.06 11/18 --> 31/1.21 11/21. Encourage po fluids. Diuretics had been restarted this week  - BMP 11/25 pending    # Pain control  - Tylenol PRN  - gabapentin  - steroids  - Rx RLS as above    # GI/Bowel Mgmt   - senna, miralax  - add stewed prunes    #   - toileting program, scheduled q 4 hours during waking  hours leander with restart diuretics    # FEN   - MBS 11/19--. soft and bite sized with mildly thick liquids via single sip    # DVT prophylaxis  – On Eliquis    # Case discussed in IDT rounds 11/25  (follow up)  -       - barriers:  left HP, mendoza inattention, mild cognitive deficits, reduced balance, pain, bilateral RTC syndrome  - goals: "get home and in my own bed" "Yoselin" supervision waking hours on dc, CS transfers and ambulation household, ambulate to bathroom with RW and CS, attend to left with visual compensatory strategies to perform dressing tasks with supervision,   - target: 12/5 home with caregiver support and home PT OT SLP  - caregiver training    # LABS  CBC CMP 11/25  coenzyme Q10 level 11/21 pending    Daughter: Tonia [phone]  Son ": Mode [phone]    [facility]  Cardiovascular Disease  [address] [place] [address]  Phone: [phone]  Fax: [phone]  Follow Up Time:    Pulmonology   Needs outpatient sleep study   89 year-old female with a PMH HTN, HLD, hypothyroidism, PAF, chronic diastolic HF, CAD s/p PCI, PE with factor V Leiden, severe pulmonary hypertension who presented to  11/11/24 with left facial droop, left sided weakness secondary to right corona radiata CVA    # 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 These findings are compatible with an acute infarct. No hemorrhagic transformation is seen. No significant shift or herniation is seen.  - ASA 81 po qd added by cardiology   - Statin dc 11/15, per patient refusal. She is aware of increased risk of recurrent stroke without statin  - Continue comprehensive PT, OT, SLP program 3 hours daily 5 x week  - cleared to shower  - Precautions: cardiac, fall, AC    # frustration, irritability, anxiety, exacerbated by use of statin and myalgia  - statin dc per patient's wishes  - neuropsychology support  - rec therapy    # restless leg syndrome  - requip increased 0.25 to 0.5 qhs 11/17. Will increase to 1 mg qhs 11/21, patient agreeable  - gabapentin changed from 200 bid to 100/300 q 10 AM/10 PM 11/20   - Mg 2.1 11/18  - Fe level 43 11/18. Will supplement daily 325 mg   - coenzyme Q10 level 11/21 still pending 11/25  - reviewed with patient and family who are agreeable with plan. 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  - ROM, shoulder stabilization exercises  - warm compress PRN  - lidoderm patch  - discussed with patient and family who are agreeable    # Paroxysmal A-fib  – metoprolol tartrate 25 mg twice daily  – Eliquis 5 mg twice daily  - HR controlled    # Possible TARAH  - was noted to be snoring loudly  - sleep study outpatient  - may also be contributing to daytime somnolence    # Chronic diastolic heart failure  # Pulm HTN  # cough  - ECHO:  EF of 55 to 60% - severe pulmonary HTN   – spironolactone held due to ANNALEE 11/15  - furosemide 40 mg  restarted 11/18  - Aldactone daily reordered by hospitalist 11/20  – metoprolol tartrate 25 mg twice daily  - home O2: 3L oxygen via [place] overnight     # HLD  - On Livalo 2mg at home, allergic to Repatha  – Lipitor dc per patient's wishes after education re: risk recurrent stroke. Family agreeable  -  education on Heart Healthy diet     # Gouty flare over weekend  -  short course of oral steroids and monitor  - significantly improved 11/25  - Patient interested in possible suppressive therapy with allopurinol    # Hypothyroidism  - Synthroid 25mcg AM    # hypokalemia  - repleted  - K+ 3.6 11/22. BMP 11/25 pending    # ANNALEE  - Hospitalist appreciated. Hold lasix and spironolactone 11/15  - renal consult appreciated 11/15. Hold diuretics.   - furosemide restarted 11/18  - BUn/Cr 31/1.5 11/15 -->-> 24/1.06 11/18 --> 31/1.21 11/21--> 34/1.18 11/25  - Encourage po fluids.     # Pain control  - Tylenol PRN  - gabapentin  - steroids  - Rx RLS as above    # GI/Bowel Mgmt   - senna, miralax  - add stewed prunes    #   - toileting program, scheduled q 4 hours during waking  hours leander with restart diuretics    # FEN   - MBS 11/19--. soft and bite sized with mildly thick liquids via single sip    # DVT prophylaxis  – On Eliquis    # Case discussed in IDT rounds 11/25  (follow up)  - Family provided ANABELLA choices, soft-bite sized, language WFL, mild dysarthria, reduced carryover cognitive strategies, supervision eating and oral hygiene, mod assist toilet hygiene, min assist UB/mod LB, min footwear with adaptive equipment, mod assist shower transfers, +left mendoza inattention , left lateral lean, difficulty dual tasking,  reduced organization, min assist bed mobility and trnasfers, ambulates 90 feet with RW and Cg/min assist, 50 feet with rollator and min assist, negotiates 4 steps bilateral HR and min assist, reduced safety awareness and balance and frequent incontinence,   - barriers:  left HP, mendoza inattention, mild cognitive deficits, reduced balance, pain, bilateral RTC syndrome  - adjusted goals: "get home and in my own bed" "Yoselin"  ambulate to bathroom with RW and CS (not met), new goal: ambulate to bathroom with RW and CG,  attend to left with visual compensatory strategies to perform dressing tasks with supervision (progressing)   - adjusted target: ANABELLA 11/29   - caregiver training    # LABS  CBC CMP 11/25  coenzyme Q10 level 11/21 pending    Daughter: Tonia [phone]  Son ": Mode [phone]    [facility] Holloway  Cardiovascular Disease  [address] [place] [address]  Phone: [phone]  Fax: [phone]  Follow Up Time:    Pulmonology   Needs outpatient sleep study   89 year-old female with a PMH HTN, HLD, hypothyroidism, PAF, chronic diastolic HF, CAD s/p PCI, PE with factor V Leiden, severe pulmonary hypertension who presented to  11/11/24 with left facial droop, left sided weakness secondary to right corona radiata CVA    # 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 These findings are compatible with an acute infarct. No hemorrhagic transformation is seen. No significant shift or herniation is seen.  - ASA 81 po qd added by cardiology   - Statin dc 11/15, per patient refusal. She is aware of increased risk of recurrent stroke without statin  - Continue comprehensive PT, OT, SLP program 3 hours daily 5 x week  - cleared to shower  - Precautions: cardiac, fall, AC    # frustration, irritability, anxiety, exacerbated by use of statin and myalgia  - statin dc per patient's wishes  - neuropsychology support  - rec therapy    # restless leg syndrome  - requip increased 0.25 to 0.5 qhs 11/17. Will increase to 1 mg qhs 11/21, patient agreeable  - gabapentin changed from 200 bid to 100/300 q 10 AM/10 PM 11/20   - Mg 2.1 11/18  - Fe level 43 11/18. Will supplement daily 325 mg   - coenzyme Q10 level 11/21 still pending 11/25  - reviewed with patient and family who are agreeable with plan. 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  - ROM, shoulder stabilization exercises  - warm compress PRN  - lidoderm patch  - discussed with patient and family who are agreeable    # Paroxysmal A-fib  – metoprolol tartrate 25 mg twice daily  – Eliquis 5 mg twice daily  - HR controlled    # Possible TARAH  - was noted to be snoring loudly  - sleep study outpatient  - may also be contributing to daytime somnolence    # Chronic diastolic heart failure  # Pulm HTN  # cough  - ECHO:  EF of 55 to 60% - severe pulmonary HTN   – spironolactone held due to ANNALEE 11/15  - furosemide 40 mg  restarted 11/18  - Aldactone daily reordered by hospitalist 11/20  – metoprolol tartrate 25 mg twice daily  - home O2: 3L oxygen via [place] overnight     # HLD  - On Livalo 2mg at home, allergic to Repatha  – Lipitor dc per patient's wishes after education re: risk recurrent stroke. Family agreeable  -  education on Heart Healthy diet     # Gouty flare over weekend  -  short course of oral steroids and monitor  - significantly improved 11/25  - Patient interested in possible suppressive therapy with allopurinol    # Hypothyroidism  - Synthroid 25mcg AM    # hypokalemia  - repleted  - K+ 3.6 11/22. BMP 11/25 pending    # ANNALEE  - Hospitalist appreciated. Hold lasix and spironolactone 11/15  - renal consult appreciated 11/15. Hold diuretics.   - furosemide restarted 11/18  - BUn/Cr 31/1.5 11/15 -->-> 24/1.06 11/18 --> 31/1.21 11/21--> 34/1.18 11/25  - Encourage po fluids.     # Pain control  - Tylenol PRN  - gabapentin  - steroids  - Rx RLS as above    # GI/Bowel Mgmt   - senna, miralax  - add stewed prunes    #   - toileting program, scheduled q 4 hours during waking  hours leander with restart diuretics    # FEN   - MBS 11/19--. soft and bite sized with mildly thick liquids via single sip    # DVT prophylaxis  – On Eliquis    # Case discussed in IDT rounds 11/25  (follow up)  - Family provided ANABELLA choices, soft-bite sized, language WFL, mild dysarthria, reduced carryover cognitive strategies, supervision eating and oral hygiene, mod assist toilet hygiene, min assist UB/mod LB, min footwear with adaptive equipment, mod assist shower transfers, +left mendoza inattention , left lateral lean, difficulty dual tasking,  reduced organization, min assist bed mobility and transfers, ambulates 90 feet with RW and Cg/min assist, 50 feet with rollator and min assist, negotiates 4 steps bilateral HR and min assist, reduced safety awareness and balance and frequent incontinence,   - barriers:  left HP, mendoza inattention, mild cognitive deficits, reduced balance, pain, bilateral RTC syndrome  - adjusted goals: "get home and in my own bed" "Yoselin"  ambulate to bathroom with RW and CS (not met), new goal: ambulate to bathroom with RW and CG,  attend to left with visual compensatory strategies to perform dressing tasks with supervision (progressing)   - adjusted target: ANABELLA 11/29   - caregiver training  - Left message to review current medical issues, functional status, and dc planning 11/25 2:08 PM    # LABS  CBC CMP 11/25  coenzyme Q10 level 11/21 pending    Daughter: Tonia [phone]  Son ": Mode [phone]    [facility]  Cardiovascular Disease  [address] [place] [address]  Phone: [phone]  Fax: [phone]  Follow Up Time:    Pulmonology   Needs outpatient sleep study   89 year-old female with a PMH HTN, HLD, hypothyroidism, PAF, chronic diastolic HF, CAD s/p PCI, PE with factor V Leiden, severe pulmonary hypertension who presented to  11/11/24 with left facial droop, left sided weakness secondary to right corona radiata CVA    # 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 These findings are compatible with an acute infarct. No hemorrhagic transformation is seen. No significant shift or herniation is seen.  - ASA 81 po qd added by cardiology   - Statin dc 11/15, per patient refusal. She is aware of increased risk of recurrent stroke without statin  - Continue comprehensive PT, OT, SLP program 3 hours daily 5 x week  - cleared to shower  - Precautions: cardiac, fall, AC    # frustration, irritability, anxiety, exacerbated by use of statin and myalgia  - statin dc per patient's wishes  - neuropsychology support  - rec therapy    # restless leg syndrome  - requip increased 0.25 to 0.5 qhs 11/17. Will increase to 1 mg qhs 11/21, patient agreeable  - gabapentin changed from 200 bid to 100/300 q 10 AM/10 PM 11/20   - Mg 2.1 11/18  - Fe level 43 11/18. Will supplement daily 325 mg   - coenzyme Q10 level 11/21 still pending 11/25  - reviewed with patient and family who are agreeable with plan. 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  - ROM, shoulder stabilization exercises  - warm compress PRN  - lidoderm patch  - discussed with patient and family who are agreeable    # Paroxysmal A-fib  – metoprolol tartrate 25 mg twice daily  – Eliquis 5 mg twice daily  - HR controlled    # Possible TARAH  - was noted to be snoring loudly  - sleep study outpatient  - may also be contributing to daytime somnolence    # Chronic diastolic heart failure  # Pulm HTN  # cough  - ECHO:  EF of 55 to 60% - severe pulmonary HTN   – spironolactone held due to ANNALEE 11/15  - furosemide 40 mg  restarted 11/18  - Aldactone daily reordered by hospitalist 11/20  – metoprolol tartrate 25 mg twice daily  - home O2: 3L oxygen via [place] overnight     # HLD  - On Livalo 2mg at home, allergic to Repatha  – Lipitor dc per patient's wishes after education re: risk recurrent stroke. Family agreeable  -  education on Heart Healthy diet     # Gouty flare over weekend  -  short course of oral steroids and monitor  - significantly improved 11/25  - Patient interested in possible suppressive therapy with allopurinol. REviewed with hospitalist, including renal function, cleared for 100 mg daily. Will start 11/26, monitor    # Hypothyroidism  - Synthroid 25mcg AM    # hypokalemia  - repleted  - K+ 3.6 11/22. BMP 11/25 pending    # ANNALEE  - Hospitalist appreciated. Hold lasix and spironolactone 11/15  - renal consult appreciated 11/15. Hold diuretics.   - furosemide restarted 11/18  - BUn/Cr 31/1.5 11/15 -->-> 24/1.06 11/18 --> 31/1.21 11/21--> 34/1.18 11/25  - Encourage po fluids.     # Pain control  - Tylenol PRN  - gabapentin  - steroids  - Rx RLS as above    # GI/Bowel Mgmt   - senna, miralax  - add stewed prunes    #   - toileting program, scheduled q 4 hours during waking  hours leander with restart diuretics    # FEN   - MBS 11/19--. soft and bite sized with mildly thick liquids via single sip    # DVT prophylaxis  – On Eliquis    # Case discussed in IDT rounds 11/25  (follow up)  - Family provided ANABELLA choices, soft-bite sized, language WFL, mild dysarthria, reduced carryover cognitive strategies, supervision eating and oral hygiene, mod assist toilet hygiene, min assist UB/mod LB, min footwear with adaptive equipment, mod assist shower transfers, +left mendoza inattention , left lateral lean, difficulty dual tasking,  reduced organization, min assist bed mobility and transfers, ambulates 90 feet with RW and Cg/min assist, 50 feet with rollator and min assist, negotiates 4 steps bilateral HR and min assist, reduced safety awareness and balance and frequent incontinence,   - barriers:  left HP, mendoza inattention, mild cognitive deficits, reduced balance, pain, bilateral RTC syndrome  - adjusted goals: "get home and in my own bed" "Yoselin"  ambulate to bathroom with RW and CS (not met), new goal: ambulate to bathroom with RW and CG,  attend to left with visual compensatory strategies to perform dressing tasks with supervision (progressing)   - adjusted target: ANABELLA 11/29   - caregiver training  - Left message to review current medical issues, functional status, and dc planning 11/25 2:08 PM    # LABS  CBC CMP 11/25  coenzyme Q10 level 11/21 pending    Daughter: Tonia [phone]  Son ": Mode [phone]    [facility]  Cardiovascular Disease  [address] [place] [address]  Phone: [phone]  Fax: [phone]  Follow Up Time:    Pulmonology   Needs outpatient sleep study   89 year-old female with a PMH HTN, HLD, hypothyroidism, PAF, chronic diastolic HF, CAD s/p PCI, PE with factor V Leiden, severe pulmonary hypertension who presented to  11/11/24 with left facial droop, left sided weakness secondary to right corona radiata CVA    # 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 These findings are compatible with an acute infarct. No hemorrhagic transformation is seen. No significant shift or herniation is seen.  - ASA 81 po qd added by cardiology   - Statin dc 11/15, per patient refusal. She is aware of increased risk of recurrent stroke without statin  - Continue comprehensive PT, OT, SLP program 3 hours daily 5 x week  - cleared to shower  - Precautions: cardiac, fall, AC    # frustration, irritability, anxiety, exacerbated by use of statin and myalgia  - statin dc per patient's wishes  - neuropsychology support  - rec therapy    # restless leg syndrome  - requip increased 0.25 to 0.5 qhs 11/17. Will increase to 1 mg qhs 11/21, patient agreeable  - gabapentin changed from 200 bid to 100/300 q 10 AM/10 PM 11/20   - Mg 2.1 11/18  - Fe level 43 11/18. Will supplement daily 325 mg   - coenzyme Q10 level 11/21 still pending 11/25  - reviewed with patient and family who are agreeable with plan. 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  - ROM, shoulder stabilization exercises  - warm compress PRN  - lidoderm patch  - discussed with patient and family who are agreeable    # Paroxysmal A-fib  – metoprolol tartrate 25 mg twice daily  – Eliquis 5 mg twice daily  - HR controlled    # Possible TARAH  - was noted to be snoring loudly  - sleep study outpatient  - may also be contributing to daytime somnolence    # Chronic diastolic heart failure  # Pulm HTN  # cough  - ECHO:  EF of 55 to 60% - severe pulmonary HTN   – spironolactone held due to ANNALEE 11/15  - furosemide 40 mg  restarted 11/18  - Aldactone daily reordered by hospitalist 11/20  – metoprolol tartrate 25 mg twice daily  - home O2: 3L oxygen via [place] overnight     # HLD  - On Livalo 2mg at home, allergic to Repatha  – Lipitor dc per patient's wishes after education re: risk recurrent stroke. Family agreeable  -  education on Heart Healthy diet     # Gouty flare over weekend  -  short course of oral steroids and monitor  - significantly improved 11/25  - Patient interested in possible suppressive therapy with allopurinol. However, family has concern about sensitivities to medications, and prefer to modify diet (frequency not that often and usually mild attacks). Will off for now    # Hypothyroidism  - Synthroid 25mcg AM    # hypokalemia  - repleted  - K+ 3.6 11/22. BMP 11/25 pending    # ANNALEE  - Hospitalist appreciated. Hold lasix and spironolactone 11/15  - renal consult appreciated 11/15. Hold diuretics.   - furosemide restarted 11/18  - BUn/Cr 31/1.5 11/15 -->-> 24/1.06 11/18 --> 31/1.21 11/21--> 34/1.18 11/25  - Encourage po fluids.     # Pain control  - Tylenol PRN  - gabapentin  - steroids  - Rx RLS as above    # GI/Bowel Mgmt   - senna, miralax  - add stewed prunes    #   - toileting program, scheduled q 4 hours during waking  hours leander with restart diuretics    # FEN   - MBS 11/19--. soft and bite sized with mildly thick liquids via single sip    # DVT prophylaxis  – On Eliquis    # Case discussed in IDT rounds 11/25  (follow up)  - Family provided ANABELLA choices, soft-bite sized, language WFL, mild dysarthria, reduced carryover cognitive strategies, supervision eating and oral hygiene, mod assist toilet hygiene, min assist UB/mod LB, min footwear with adaptive equipment, mod assist shower transfers, +left mendoza inattention , left lateral lean, difficulty dual tasking,  reduced organization, min assist bed mobility and transfers, ambulates 90 feet with RW and Cg/min assist, 50 feet with rollator and min assist, negotiates 4 steps bilateral HR and min assist, reduced safety awareness and balance and frequent incontinence,   - barriers:  left HP, mendoza inattention, mild cognitive deficits, reduced balance, pain, bilateral RTC syndrome  - adjusted goals: "get home and in my own bed" "Yoselin"  ambulate to bathroom with RW and CS (not met), new goal: ambulate to bathroom with RW and CG,  attend to left with visual compensatory strategies to perform dressing tasks with supervision (progressing)   - adjusted target: ANABELLA 11/29   - caregiver training  - Left message to review current medical issues, functional status, and dc planning 11/25 2:08 PM    # LABS  CBC CMP 11/25  coenzyme Q10 level 11/21 pending    Daughter: Tonia [phone]  Son ": Mode [phone]    addendum 5:03 PM  Discussed in detail current medical condition, functional status, goals for discharge as set by team, Differences between IRF and ANABELLA and authorization process. WE also discussed the allopurinol, family has a lot of reservations but will defer to patient. We will review again with her tomorrow. Family is agreeable with plan, also agreeable with ANABELLA. We discussed her progress in therapy, certain barriers for transition to community. She has had a difficult time coming in for PT portion, will see if PT can upload videos to [employer]. Also discussed access to records via patient portal  - Time spent with family reviewing case 43 minutes. Total time spent for caregiver education patient education and evaluation, discussions with specialists and team and management 70 min.     [facility] Holloway  Cardiovascular Disease  [address] [place] [address]  Phone: [phone]  Fax: [phone]  Follow Up Time:    Pulmonology   Needs outpatient sleep study

## 2024-11-26 LAB — UBIQUINONE10 SERPL-MCNC: 0.69 UG/ML — SIGNIFICANT CHANGE UP (ref 0.37–2.2)

## 2024-11-26 PROCEDURE — 99232 SBSQ HOSP IP/OBS MODERATE 35: CPT

## 2024-11-26 RX ORDER — UBIDECARENONE 100 %
200 POWDER (GRAM) MISCELLANEOUS DAILY
Refills: 0 | Status: DISCONTINUED | OUTPATIENT
Start: 2024-11-26 | End: 2024-11-26

## 2024-11-26 RX ADMIN — Medication 25 MILLIGRAM(S): at 05:38

## 2024-11-26 RX ADMIN — FAMOTIDINE 20 MILLIGRAM(S): 20 TABLET, FILM COATED ORAL at 22:21

## 2024-11-26 RX ADMIN — APIXABAN 5 MILLIGRAM(S): 2.5 TABLET, FILM COATED ORAL at 09:39

## 2024-11-26 RX ADMIN — Medication 325 MILLIGRAM(S): at 11:38

## 2024-11-26 RX ADMIN — Medication 2 TABLET(S): at 22:20

## 2024-11-26 RX ADMIN — Medication 2 PUFF(S): at 21:34

## 2024-11-26 RX ADMIN — PREDNISONE 10 MILLIGRAM(S): 20 TABLET ORAL at 05:40

## 2024-11-26 RX ADMIN — METOPROLOL TARTRATE 12.5 MILLIGRAM(S): 100 TABLET, FILM COATED ORAL at 17:13

## 2024-11-26 RX ADMIN — METOPROLOL TARTRATE 12.5 MILLIGRAM(S): 100 TABLET, FILM COATED ORAL at 05:38

## 2024-11-26 RX ADMIN — Medication 2 PUFF(S): at 17:34

## 2024-11-26 RX ADMIN — LIDOCAINE 1 PATCH: 40 CREAM TOPICAL at 18:52

## 2024-11-26 RX ADMIN — ACETAMINOPHEN, DIPHENHYDRAMINE HCL, PHENYLEPHRINE HCL 3 MILLIGRAM(S): 325; 25; 5 TABLET ORAL at 22:19

## 2024-11-26 RX ADMIN — APIXABAN 5 MILLIGRAM(S): 2.5 TABLET, FILM COATED ORAL at 22:20

## 2024-11-26 RX ADMIN — GABAPENTIN 300 MILLIGRAM(S): 300 CAPSULE ORAL at 22:20

## 2024-11-26 RX ADMIN — FUROSEMIDE 40 MILLIGRAM(S): 40 TABLET ORAL at 05:38

## 2024-11-26 RX ADMIN — POLYETHYLENE GLYCOL 3350 17 GRAM(S): 17 POWDER, FOR SOLUTION ORAL at 11:38

## 2024-11-26 RX ADMIN — Medication 25 MICROGRAM(S): at 05:37

## 2024-11-26 RX ADMIN — Medication 1 TABLET(S): at 10:36

## 2024-11-26 RX ADMIN — LIDOCAINE 1 PATCH: 40 CREAM TOPICAL at 11:37

## 2024-11-26 RX ADMIN — Medication 81 MILLIGRAM(S): at 11:36

## 2024-11-26 RX ADMIN — LIDOCAINE 1 PATCH: 40 CREAM TOPICAL at 23:30

## 2024-11-26 RX ADMIN — ROPINIROLE 1 MILLIGRAM(S): 8 TABLET, FILM COATED, EXTENDED RELEASE ORAL at 22:20

## 2024-11-26 RX ADMIN — GABAPENTIN 100 MILLIGRAM(S): 300 CAPSULE ORAL at 11:36

## 2024-11-26 NOTE — PROGRESS NOTE ADULT - SUBJECTIVE AND OBJECTIVE BOX
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     Patient went out to lunch at 12 pm the day of admission with her daughter. Around 6 pm the patient slipped out of bed and was unable to get back up. She called her daughter and when the she arrived noted her mother did not seem like herself and had a Left sided facial droop.  CT/CTA head and CTA of neck without evidence of an acute intracranial hemorrhage midline shift or hydrocephalus. There was no hemodynamic significant narrowing within the neck. or proximal large vessel occlusion noted.  MRI showed "abnormal T2 prolongation restricted diffusion seen involving the right corona radiata region. These findings are compatible with an acute infarct. No hemorrhagic transformation is seen. No significant shift or herniation is seen."    Patient was managed medically and was deemed stable for discharge to [facility] on 11/14/24 for acute IRF.          (14 Nov 2024 13:35)      PAST MEDICAL & SURGICAL HISTORY:  Atrial fibrillation      CHF (congestive heart failure)      Pulmonary emboli      Factor 5 Leiden mutation, heterozygous      CAD (coronary artery disease)      Stented coronary artery      Hypothyroid      COPD (chronic obstructive pulmonary disease)      Mitral regurgitation      HTN (hypertension)      H/O: hysterectomy      H/O knee surgery      Cyst of breast, unspecified laterality  S/P excision      H/O cardiac radiofrequency ablation      S/P ablation of atrial fibrillation          MEDICATIONS  (STANDING):  albuterol    90 MICROgram(s) HFA Inhaler 2 Puff(s) Inhalation every 6 hours  apixaban 5 milliGRAM(s) Oral two times a day  aspirin  chewable 81 milliGRAM(s) Oral daily  famotidine    Tablet 20 milliGRAM(s) Oral at bedtime  ferrous    sulfate 325 milliGRAM(s) Oral daily  furosemide    Tablet 40 milliGRAM(s) Oral daily  gabapentin 100 milliGRAM(s) Oral daily  gabapentin 300 milliGRAM(s) Oral at bedtime  levothyroxine 25 MICROGram(s) Oral daily  lidocaine   4% Patch 1 Patch Transdermal daily  lidocaine   4% Patch 1 Patch Transdermal daily  metoprolol tartrate 12.5 milliGRAM(s) Oral two times a day  polyethylene glycol 3350 17 Gram(s) Oral daily  rOPINIRole 1 milliGRAM(s) Oral at bedtime  senna 2 Tablet(s) Oral at bedtime  spironolactone 25 milliGRAM(s) Oral daily    MEDICATIONS  (PRN):  acetaminophen     Tablet .. 975 milliGRAM(s) Oral every 8 hours PRN Mild Pain (1 - 3), Moderate Pain (4 - 6), Severe Pain (7 - 10)  calcium carbonate    500 mG (Tums) Chewable 1 Tablet(s) Chew four times a day PRN Indigestion  guaiFENesin Oral Liquid (Sugar-Free) 200 milliGRAM(s) Oral every 6 hours PRN Cough  melatonin 3 milliGRAM(s) Oral at bedtime PRN Insomnia  saline laxative (FLEET) Rectal Enema 1 Enema Rectal once PRN constipation  sodium chloride 0.65% Nasal 1 Spray(s) Both Nostrils two times a day PRN Nasal Congestion      Allergies    Macrodantin (Other)  Motrin (Other)  statins (Muscle Pain)  Repatha (Unknown)  flu vaccines (Other)  nisoldipine (Unknown)  sulfa drugs (Other)  Ceftin (Other (Moderate))  tadalafil (Muscle Pain)    Intolerances          VITALS  89y  Vital Signs Last 24 Hrs  T(C): 36.6 (26 Nov 2024 07:13), Max: 36.8 (25 Nov 2024 21:09)  T(F): 97.9 (26 Nov 2024 07:13), Max: 98.2 (25 Nov 2024 21:09)  HR: 56 (26 Nov 2024 07:13) (56 - 62)  BP: 134/69 (26 Nov 2024 07:13) (116/69 - 139/66)  BP(mean): --  RR: 16 (26 Nov 2024 07:13) (16 - 16)  SpO2: 96% (26 Nov 2024 07:13) (95% - 98%)    Parameters below as of 26 Nov 2024 07:13  Patient On (Oxygen Delivery Method): nasal cannula  O2 Flow (L/min): 2    Daily     Daily         RECENT LABS:      11-25    141  |  99  |  34[H]  ----------------------------<  190[H]  3.8   |  36[H]  |  1.18    Ca    9.6      25 Nov 2024 09:31    TPro  7.9  /  Alb  3.4  /  TBili  0.4  /  DBili  x   /  AST  21  /  ALT  16  /  AlkPhos  60  11-25    LIVER FUNCTIONS - ( 25 Nov 2024 09:31 )  Alb: 3.4 g/dL / Pro: 7.9 g/dL / ALK PHOS: 60 U/L / ALT: 16 U/L / AST: 21 U/L / GGT: x             Urinalysis Basic - ( 25 Nov 2024 09:31 )    Color: x / Appearance: x / SG: x / pH: x  Gluc: 190 mg/dL / Ketone: x  / Bili: x / Urobili: x   Blood: x / Protein: x / Nitrite: x   Leuk Esterase: x / RBC: x / WBC x   Sq Epi: x / Non Sq Epi: x / Bacteria: x          CAPILLARY BLOOD GLUCOSE

## 2024-11-26 NOTE — PROGRESS NOTE ADULT - SUBJECTIVE AND OBJECTIVE BOX
No distress    Vital Signs Last 24 Hrs  T(C): 36.4 (11-26-24 @ 20:15), Max: 36.6 (11-26-24 @ 07:13)  T(F): 97.6 (11-26-24 @ 20:15), Max: 97.9 (11-26-24 @ 07:13)  HR: 62 (11-26-24 @ 21:34) (56 - 62)  BP: 111/65 (11-26-24 @ 20:15) (111/65 - 134/69)  RR: 16 (11-26-24 @ 20:15) (16 - 16)  SpO2: 93% (11-26-24 @ 21:34) (93% - 96%)    s1s2  b/l air entry  soft, ND  b/l LE lymphedema    25 Nov 2024 09:31    141    |  99     |  34     ----------------------------<  190    3.8     |  36     |  1.18     Ca    9.6        25 Nov 2024 09:31    TPro  7.9    /  Alb  3.4    /  TBili  0.4    /  DBili  x      /  AST  21     /  ALT  16     /  AlkPhos  60     25 Nov 2024 09:31    LIVER FUNCTIONS - ( 25 Nov 2024 09:31 )  Alb: 3.4 g/dL / Pro: 7.9 g/dL / ALK PHOS: 60 U/L / ALT: 16 U/L / AST: 21 U/L / GGT: x           acetaminophen     Tablet .. 975 milliGRAM(s) Oral every 8 hours PRN  albuterol    90 MICROgram(s) HFA Inhaler 2 Puff(s) Inhalation every 6 hours  apixaban 5 milliGRAM(s) Oral two times a day  aspirin  chewable 81 milliGRAM(s) Oral daily  calcium carbonate    500 mG (Tums) Chewable 1 Tablet(s) Chew four times a day PRN  famotidine    Tablet 20 milliGRAM(s) Oral at bedtime  ferrous    sulfate 325 milliGRAM(s) Oral daily  furosemide    Tablet 40 milliGRAM(s) Oral daily  gabapentin 100 milliGRAM(s) Oral daily  gabapentin 300 milliGRAM(s) Oral at bedtime  guaiFENesin Oral Liquid (Sugar-Free) 200 milliGRAM(s) Oral every 6 hours PRN  levothyroxine 25 MICROGram(s) Oral daily  lidocaine   4% Patch 1 Patch Transdermal daily  lidocaine   4% Patch 1 Patch Transdermal daily  melatonin 3 milliGRAM(s) Oral at bedtime PRN  metoprolol tartrate 12.5 milliGRAM(s) Oral two times a day  polyethylene glycol 3350 17 Gram(s) Oral daily  rOPINIRole 1 milliGRAM(s) Oral at bedtime  saline laxative (FLEET) Rectal Enema 1 Enema Rectal once PRN  senna 2 Tablet(s) Oral at bedtime  sodium chloride 0.65% Nasal 1 Spray(s) Both Nostrils two times a day PRN  spironolactone 25 milliGRAM(s) Oral daily  Tart Cherry 1000mg 1 Capsule(s) 1 Capsule(s) Oral two times a day PRN    A/P:    CM, mod TR, mod-severe MR  S/p CVA involving R corona radiata region  S/p CT w/IV dye 11/11  S/p mild contrast ANNALEE  ANNALEE has resolved     Avoid nephrotoxins as able  Lasix, Aldactone restarted   F/u BMP, UO    [phone]

## 2024-11-26 NOTE — PROGRESS NOTE ADULT - ASSESSMENT
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 These findings are compatible with an acute infarct. No hemorrhagic transformation is seen. No significant shift or herniation is seen.  - ASA 81 po qd added by cardiology   - Statin dc 11/15, per patient refusal. She is aware of increased risk of recurrent stroke without statin  - Continue comprehensive PT, OT, SLP program 3 hours daily 5 x week  - Precautions: cardiac, fall, AC    # frustration, irritability, anxiety, exacerbated by use of statin and myalgia  - statin dc per patient's wishes  - neuropsychology support  - rec therapy    # restless leg syndrome  - requip 1 mg qhs   - gabapentin 100/300 q 10 AM/10 PM   - Mg 2.1 11/18  - Fe level 43 11/18. 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  - ROM, shoulder stabilization exercises  - warm compress PRN  - lidoderm patch    # Paroxysmal A-fib  – metoprolol tartrate 25 mg bid  – Eliquis 5 mg twice daily  - HR 56-62 BP (116/69 - 139/66) 11/26    # Possible TARAH  - was noted to be snoring loudly  - sleep study outpatient  - may also be contributing to daytime somnolence    # Chronic diastolic heart failure  # Pulm HTN  # cough  - ECHO:  EF of 55 to 60% - severe pulmonary HTN   – spironolactone held due to ANNALEE 11/15  - furosemide 40 mg  restarted 11/18  - Aldactone daily reordered by hospitalist 11/20  – metoprolol tartrate 25 mg twice daily  - home O2: 3L oxygen via NC overnight     # HLD  - On Livalo 2mg at home, allergic to Repatha  – Lipitor dc per patient's wishes after education re: risk recurrent stroke. Family agreeable  -  education on Heart Healthy diet     # Gouty flare  - resolved  -  completed short course prednisone  - patient declines allopurinol at this time due to SE and infrequent and mild flares  - dietary education to reduce consumption foods that may increase uric acid formation    # Hypothyroidism  - Synthroid 25mcg AM    # hypokalemia  - repleted  - K+ 3.8 11/25    # ANNALEE  - Hospitalist appreciated. Hold lasix and spironolactone 11/15  - renal consult appreciated 11/15. Hold diuretics.   - furosemide restarted 11/18  - BUn/Cr 31/1.5 11/15 -->34/1.18 11/25  - Encourage po fluids.     # Pain control  - Tylenol PRN  - gabapentin  - lidoderm patch  - requip    # GI/Bowel Mgmt   - senna, miralax  - add stewed prunes    #   - toileting program, scheduled q 4 hours during waking  hours leander with restart diuretics    # FEN   - MBS 11/19--. soft and bite sized with mildly thick liquids via single sip    # DVT prophylaxis  – On Eliquis    # Case discussed in IDT rounds 11/25  (follow up)  - Family provided ANABELLA choices, soft-bite sized, language WFL, mild dysarthria, reduced carryover cognitive strategies, supervision eating and oral hygiene, mod assist toilet hygiene, min assist UB/mod LB, min footwear with adaptive equipment, mod assist shower transfers, +left mendoza inattention , left lateral lean, difficulty dual tasking,  reduced organization, min assist bed mobility and transfers, ambulates 90 feet with RW and Cg/min assist, 50 feet with rollator and min assist, negotiates 4 steps bilateral HR and min assist, reduced safety awareness and balance and frequent incontinence,   - barriers:  left HP, mendoza inattention, mild cognitive deficits, reduced balance, pain, bilateral RTC syndrome  - adjusted goals: "get home and in my own bed" "Yoselin"  ambulate to bathroom with RW and CS (not met), new goal: ambulate to bathroom with RW and CG,  attend to left with visual compensatory strategies to perform dressing tasks with supervision (progressing)   - adjusted target: ANABELLA 11/29, in progress. REviewed with family and patient who are agreeable    # LABS  CBC CMP 11/28  coenzyme Q10 level 11/21 pending    Daughter: Tonia [phone]  Son ": Mode [phone]      Praful Holloway  Cardiovascular Disease  [address] [address]  Phone: [phone]  Fax: [phone]  Follow Up Time:    Pulmonology   Needs outpatient sleep study   89 year-old female with a PMH HTN, HLD, hypothyroidism, PAF, chronic diastolic HF, CAD s/p PCI, PE with factor V Leiden, severe pulmonary hypertension who presented to  11/11/24 with left facial droop, left sided weakness secondary to right corona radiata CVA    # 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 These findings are compatible with an acute infarct. No hemorrhagic transformation is seen. No significant shift or herniation is seen.  - ASA 81 po qd added by cardiology   - Statin dc 11/15, per patient refusal. She is aware of increased risk of recurrent stroke without statin  - Continue comprehensive PT, OT, SLP program 3 hours daily 5 x week  - Precautions: cardiac, fall, AC    # frustration, irritability, anxiety, exacerbated by use of statin and myalgia  - statin dc per patient's wishes  - neuropsychology support  - rec therapy    # restless leg syndrome  - requip 1 mg qhs   - gabapentin 100/300 q 10 AM/10 PM   - Mg 2.1 11/18  - Fe level 43 11/18. 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  - ROM, shoulder stabilization exercises  - warm compress PRN  - lidoderm patch    # Paroxysmal A-fib  – metoprolol tartrate 25 mg bid  – Eliquis 5 mg twice daily  - HR 56-62 BP (116/69 - 139/66) 11/26    # Possible TARAH  - was noted to be snoring loudly  - sleep study outpatient  - may also be contributing to daytime somnolence    # Chronic diastolic heart failure  # Pulm HTN  # cough  - ECHO:  EF of 55 to 60% - severe pulmonary HTN   – spironolactone held due to ANNALEE 11/15  - furosemide 40 mg  restarted 11/18  - Aldactone daily reordered by hospitalist 11/20  – metoprolol tartrate 25 mg twice daily  - home O2: 3L oxygen via NC overnight     # HLD  - On Livalo 2mg at home, allergic to Repatha  – Lipitor dc per patient's wishes after education re: risk recurrent stroke. Family agreeable  -  education on Heart Healthy diet     # Gouty flare  - resolved  -  completed short course prednisone  - patient declines allopurinol at this time due to SE and infrequent and mild flares  - dietary education to reduce consumption foods that may increase uric acid formation    # Hypothyroidism  - Synthroid 25mcg AM    # hypokalemia  - repleted  - K+ 3.8 11/25    # ANNALEE  - Hospitalist appreciated. Hold lasix and spironolactone 11/15  - renal consult appreciated 11/15. Hold diuretics.   - furosemide restarted 11/18  - BUn/Cr 31/1.5 11/15 -->34/1.18 11/25  - Encourage po fluids.     # Pain control  - Tylenol PRN  - gabapentin  - lidoderm patch  - requip    # GI/Bowel Mgmt   - senna, miralax  - add stewed prunes    #   - toileting program, scheduled q 4 hours during waking  hours leander with restart diuretics    # FEN   - [facility] 11/19--. soft and bite sized with mildly thick liquids via single sip  - Repeat MBS 11/29    # DVT prophylaxis  – On Eliquis    # Case discussed in IDT rounds 11/25  (follow up)  - Family provided ANABELLA choices, soft-bite sized, language WFL, mild dysarthria, reduced carryover cognitive strategies, supervision eating and oral hygiene, mod assist toilet hygiene, min assist UB/mod LB, min footwear with adaptive equipment, mod assist shower transfers, +left mendoza inattention , left lateral lean, difficulty dual tasking,  reduced organization, min assist bed mobility and transfers, ambulates 90 feet with RW and Cg/min assist, 50 feet with rollator and min assist, negotiates 4 steps bilateral HR and min assist, reduced safety awareness and balance and frequent incontinence,   - barriers:  left HP, mendoza inattention, mild cognitive deficits, reduced balance, pain, bilateral RTC syndrome  - adjusted goals: "get home and in my own bed" "Yoselin"  ambulate to bathroom with RW and CS (not met), new goal: ambulate to bathroom with RW and CG,  attend to left with visual compensatory strategies to perform dressing tasks with supervision (progressing)   - adjusted target: ANABELLA 11/29, in progress. REviewed with family and patient who are agreeable    # LABS  CBC CMP 11/28  MBS 11/29  coenzyme Q10 level 11/21 pending    Daughter: Tonia [phone]  Son ": Mode [phone]      Praful Holloway  Cardiovascular Disease  [address] [place] [address]  Phone: [phone]  Fax: [phone]  Follow Up Time:    Pulmonology   Needs outpatient sleep study

## 2024-11-26 NOTE — PROGRESS NOTE ADULT - COMMENTS
Patient sitting in wheelchair. She is alert, good sustained arousal and does not appear fatigued. Much less anxious, irritable and restless than on admission. She reports NO pain in either leg or right toe. We also discussed allopurinol given family's information and concerns; she states she does NOT want to start medication at this time, and will drink her tart cherry juice. We will also ask nutrition to provide her and family with information on foods to avoid to reduce incidence of gouty attacks which she's agreeable to    We also discussed ANABELLA which she is agreeable to, and when talking about mendoza inattention left, states "I'm aware"

## 2024-11-27 LAB — GLUCOSE BLDC GLUCOMTR-MCNC: 102 MG/DL — HIGH (ref 70–99)

## 2024-11-27 PROCEDURE — 99232 SBSQ HOSP IP/OBS MODERATE 35: CPT

## 2024-11-27 RX ADMIN — METOPROLOL TARTRATE 12.5 MILLIGRAM(S): 100 TABLET, FILM COATED ORAL at 06:01

## 2024-11-27 RX ADMIN — Medication 1 TABLET(S): at 19:26

## 2024-11-27 RX ADMIN — Medication 2 PUFF(S): at 19:58

## 2024-11-27 RX ADMIN — APIXABAN 5 MILLIGRAM(S): 2.5 TABLET, FILM COATED ORAL at 21:38

## 2024-11-27 RX ADMIN — POLYETHYLENE GLYCOL 3350 17 GRAM(S): 17 POWDER, FOR SOLUTION ORAL at 12:03

## 2024-11-27 RX ADMIN — LIDOCAINE 1 PATCH: 40 CREAM TOPICAL at 18:30

## 2024-11-27 RX ADMIN — FAMOTIDINE 20 MILLIGRAM(S): 20 TABLET, FILM COATED ORAL at 21:39

## 2024-11-27 RX ADMIN — Medication 25 MICROGRAM(S): at 06:00

## 2024-11-27 RX ADMIN — METOPROLOL TARTRATE 12.5 MILLIGRAM(S): 100 TABLET, FILM COATED ORAL at 18:02

## 2024-11-27 RX ADMIN — Medication 25 MILLIGRAM(S): at 06:00

## 2024-11-27 RX ADMIN — Medication 2 PUFF(S): at 08:12

## 2024-11-27 RX ADMIN — LIDOCAINE 1 PATCH: 40 CREAM TOPICAL at 12:02

## 2024-11-27 RX ADMIN — LIDOCAINE 1 PATCH: 40 CREAM TOPICAL at 18:31

## 2024-11-27 RX ADMIN — GABAPENTIN 300 MILLIGRAM(S): 300 CAPSULE ORAL at 21:38

## 2024-11-27 RX ADMIN — Medication 81 MILLIGRAM(S): at 12:02

## 2024-11-27 RX ADMIN — ACETAMINOPHEN, DIPHENHYDRAMINE HCL, PHENYLEPHRINE HCL 3 MILLIGRAM(S): 325; 25; 5 TABLET ORAL at 21:39

## 2024-11-27 RX ADMIN — FUROSEMIDE 40 MILLIGRAM(S): 40 TABLET ORAL at 06:00

## 2024-11-27 RX ADMIN — GABAPENTIN 100 MILLIGRAM(S): 300 CAPSULE ORAL at 12:03

## 2024-11-27 RX ADMIN — Medication 1 TABLET(S): at 14:00

## 2024-11-27 RX ADMIN — ROPINIROLE 1 MILLIGRAM(S): 8 TABLET, FILM COATED, EXTENDED RELEASE ORAL at 21:38

## 2024-11-27 RX ADMIN — Medication 325 MILLIGRAM(S): at 12:03

## 2024-11-27 RX ADMIN — Medication 2 TABLET(S): at 21:39

## 2024-11-27 RX ADMIN — APIXABAN 5 MILLIGRAM(S): 2.5 TABLET, FILM COATED ORAL at 12:03

## 2024-11-27 NOTE — PROGRESS NOTE ADULT - SUBJECTIVE AND OBJECTIVE BOX
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     Patient went out to lunch at 12 pm the day of admission with her daughter. Around 6 pm the patient slipped out of bed and was unable to get back up. She called her daughter and when the she arrived noted her mother did not seem like herself and had a Left sided facial droop.  CT/CTA head and CTA of neck without evidence of an acute intracranial hemorrhage midline shift or hydrocephalus. There was no hemodynamic significant narrowing within the neck. or proximal large vessel occlusion noted.  MRI showed "abnormal T2 prolongation restricted diffusion seen involving the right corona radiata region. These findings are compatible with an acute infarct. No hemorrhagic transformation is seen. No significant shift or herniation is seen."    Patient was managed medically and was deemed stable for discharge to [facility] on 11/14/24 for acute IRF.          (14 Nov 2024 13:35)      PAST MEDICAL & SURGICAL HISTORY:  Atrial fibrillation      CHF (congestive heart failure)      Pulmonary emboli      Factor 5 Leiden mutation, heterozygous      CAD (coronary artery disease)      Stented coronary artery      Hypothyroid      COPD (chronic obstructive pulmonary disease)      Mitral regurgitation      HTN (hypertension)      H/O: hysterectomy      H/O knee surgery      Cyst of breast, unspecified laterality  S/P excision      H/O cardiac radiofrequency ablation      S/P ablation of atrial fibrillation          MEDICATIONS  (STANDING):  albuterol    90 MICROgram(s) HFA Inhaler 2 Puff(s) Inhalation every 6 hours  apixaban 5 milliGRAM(s) Oral two times a day  aspirin  chewable 81 milliGRAM(s) Oral daily  famotidine    Tablet 20 milliGRAM(s) Oral at bedtime  ferrous    sulfate 325 milliGRAM(s) Oral daily  furosemide    Tablet 40 milliGRAM(s) Oral daily  gabapentin 100 milliGRAM(s) Oral daily  gabapentin 300 milliGRAM(s) Oral at bedtime  levothyroxine 25 MICROGram(s) Oral daily  lidocaine   4% Patch 1 Patch Transdermal daily  lidocaine   4% Patch 1 Patch Transdermal daily  metoprolol tartrate 12.5 milliGRAM(s) Oral two times a day  polyethylene glycol 3350 17 Gram(s) Oral daily  rOPINIRole 1 milliGRAM(s) Oral at bedtime  senna 2 Tablet(s) Oral at bedtime  spironolactone 25 milliGRAM(s) Oral daily  Ultra Coenzyme Q10 200mg 1 Capsule(s) 1 Capsule(s) Oral daily    MEDICATIONS  (PRN):  acetaminophen     Tablet .. 975 milliGRAM(s) Oral every 8 hours PRN Mild Pain (1 - 3), Moderate Pain (4 - 6), Severe Pain (7 - 10)  calcium carbonate    500 mG (Tums) Chewable 1 Tablet(s) Chew four times a day PRN Indigestion  guaiFENesin Oral Liquid (Sugar-Free) 200 milliGRAM(s) Oral every 6 hours PRN Cough  melatonin 3 milliGRAM(s) Oral at bedtime PRN Insomnia  saline laxative (FLEET) Rectal Enema 1 Enema Rectal once PRN constipation  sodium chloride 0.65% Nasal 1 Spray(s) Both Nostrils two times a day PRN Nasal Congestion  Tart Cherry 1000mg 1 Capsule(s) 1 Capsule(s) Oral two times a day PRN gouty pain      Allergies    Macrodantin (Other)  Motrin (Other)  statins (Muscle Pain)  Repatha (Unknown)  flu vaccines (Other)  nisoldipine (Unknown)  sulfa drugs (Other)  Ceftin (Other (Moderate))  tadalafil (Muscle Pain)    Intolerances          VITALS  89y  Vital Signs Last 24 Hrs  T(C): 36.3 (27 Nov 2024 07:37), Max: 36.4 (26 Nov 2024 20:15)  T(F): 97.3 (27 Nov 2024 07:37), Max: 97.6 (26 Nov 2024 20:15)  HR: 54 (27 Nov 2024 08:12) (54 - 62)  BP: 134/64 (27 Nov 2024 07:37) (111/65 - 135/60)  BP(mean): --  RR: 16 (27 Nov 2024 07:37) (16 - 16)  SpO2: 98% (27 Nov 2024 08:12) (93% - 98%)    Parameters below as of 27 Nov 2024 08:12  Patient On (Oxygen Delivery Method): room air      Daily     Daily         RECENT LABS:                      CAPILLARY BLOOD GLUCOSE

## 2024-11-27 NOTE — PROGRESS NOTE ADULT - SUBJECTIVE AND OBJECTIVE BOX
No distress    Vital Signs Last 24 Hrs  T(C): 36.8 (11-27-24 @ 19:40), Max: 36.8 (11-27-24 @ 19:40)  T(F): 98.2 (11-27-24 @ 19:40), Max: 98.2 (11-27-24 @ 19:40)  HR: 62 (11-27-24 @ 19:40) (54 - 64)  BP: 102/65 (11-27-24 @ 19:40) (102/65 - 136/56)  RR: 16 (11-27-24 @ 19:40) (16 - 16)  SpO2: 93% (11-27-24 @ 19:40) (93% - 98%)    s1s2  b/l air entry  soft, ND  b/l LE lymphedema    acetaminophen     Tablet .. 975 milliGRAM(s) Oral every 8 hours PRN  albuterol    90 MICROgram(s) HFA Inhaler 2 Puff(s) Inhalation every 6 hours  apixaban 5 milliGRAM(s) Oral two times a day  aspirin  chewable 81 milliGRAM(s) Oral daily  calcium carbonate    500 mG (Tums) Chewable 1 Tablet(s) Chew four times a day PRN  famotidine    Tablet 20 milliGRAM(s) Oral at bedtime  ferrous    sulfate 325 milliGRAM(s) Oral daily  furosemide    Tablet 40 milliGRAM(s) Oral daily  gabapentin 100 milliGRAM(s) Oral daily  gabapentin 300 milliGRAM(s) Oral at bedtime  guaiFENesin Oral Liquid (Sugar-Free) 200 milliGRAM(s) Oral every 6 hours PRN  levothyroxine 25 MICROGram(s) Oral daily  lidocaine   4% Patch 1 Patch Transdermal daily  lidocaine   4% Patch 1 Patch Transdermal daily  melatonin 3 milliGRAM(s) Oral at bedtime PRN  metoprolol tartrate 12.5 milliGRAM(s) Oral two times a day  polyethylene glycol 3350 17 Gram(s) Oral daily  rOPINIRole 1 milliGRAM(s) Oral at bedtime  saline laxative (FLEET) Rectal Enema 1 Enema Rectal once PRN  senna 2 Tablet(s) Oral at bedtime  sodium chloride 0.65% Nasal 1 Spray(s) Both Nostrils two times a day PRN  spironolactone 25 milliGRAM(s) Oral daily  Tart Cherry 1000mg 1 Capsule(s) 1 Capsule(s) Oral two times a day PRN  Ultra Coenzyme Q10 200mg 1 Capsule(s) 1 Capsule(s) Oral daily    A/P:    CM, mod TR, mod-severe MR  S/p CVA involving R corona radiata region  S/p CT w/IV dye 11/11  S/p mild contrast ANNALEE  ANNALEE has resolved     Avoid nephrotoxins as able  Continue Lasix, Aldactone  F/u BMP, UO    [phone]

## 2024-11-27 NOTE — PROGRESS NOTE ADULT - ASSESSMENT
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 These findings are compatible with an acute infarct. No hemorrhagic transformation is seen. No significant shift or herniation is seen.  - ASA 81 po qd added by cardiology   - Lipitor added by neurology but she refused citing myalgia   - BP goal SBP < 140    #Nasal congestion  - ocean nasal saline spray started 11/22    #BLE pain/RLS  - started Requip 11/16  - Tylenol PRN  - may try magnesium if needed    # Paroxysmal A-fib  – Continue metoprolol tartrate twice daily  – Continue Eliquis 5 mg twice daily    # ANNALEE - resolved    # Chronic diastolic heart failure  # Pulm HTN  - ECHO from November 2023 with EF of 55 to 60% - severe pulmonary HTN , echo done at  unchanged from prior echo in 11/2023  – Continue metoprolol tartrate 25 mg twice daily  – Lasix 40mg daily, aldactone 25mg daily  - 3L oxygen via NC overnight at home, no need for oxygen during daytime hours. Outpatient sleep study  -Daily weights     # Hyperlipidemia  - On Livalo 2mg at home, allergic to Repatha  – Lipitor 20mg recommended by neurology. LDL 85, LDL needs to be under 70 per neuro. Patient refusing 20mg Lipitor due to intermittent leg cramping/pain.     # Hypothyroidism  -Synthroid 25mcg AM    # DVT prophylaxis  – On Eliquis    # GI ppx:  Famotidine    #Right big toe pain /? Gout  - improving  - c/w short course of Prednisone     # Cough while eating ?   -Dysphagia -  Improving  -  SLP following

## 2024-11-27 NOTE — PROGRESS NOTE ADULT - SUBJECTIVE AND OBJECTIVE BOX
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 (26 Nov 2024 23:45)      Interval History:  Patient seen and examined at bedside.  No overnight events  No complaints this morning  Denies CP, SOB, abd pain, N/V    ALLERGIES:  Macrodantin (Other)  Motrin (Other)  statins (Muscle Pain)  Repatha (Unknown)  flu vaccines (Other)  nisoldipine (Unknown)  sulfa drugs (Other)  Ceftin (Other (Moderate))  tadalafil (Muscle Pain)    MEDICATIONS  (STANDING):  albuterol    90 MICROgram(s) HFA Inhaler 2 Puff(s) Inhalation every 6 hours  apixaban 5 milliGRAM(s) Oral two times a day  aspirin  chewable 81 milliGRAM(s) Oral daily  famotidine    Tablet 20 milliGRAM(s) Oral at bedtime  ferrous    sulfate 325 milliGRAM(s) Oral daily  furosemide    Tablet 40 milliGRAM(s) Oral daily  gabapentin 100 milliGRAM(s) Oral daily  gabapentin 300 milliGRAM(s) Oral at bedtime  levothyroxine 25 MICROGram(s) Oral daily  lidocaine   4% Patch 1 Patch Transdermal daily  lidocaine   4% Patch 1 Patch Transdermal daily  metoprolol tartrate 12.5 milliGRAM(s) Oral two times a day  polyethylene glycol 3350 17 Gram(s) Oral daily  rOPINIRole 1 milliGRAM(s) Oral at bedtime  senna 2 Tablet(s) Oral at bedtime  spironolactone 25 milliGRAM(s) Oral daily  Ultra Coenzyme Q10 200mg 1 Capsule(s) 1 Capsule(s) Oral daily    MEDICATIONS  (PRN):  acetaminophen     Tablet .. 975 milliGRAM(s) Oral every 8 hours PRN Mild Pain (1 - 3), Moderate Pain (4 - 6), Severe Pain (7 - 10)  calcium carbonate    500 mG (Tums) Chewable 1 Tablet(s) Chew four times a day PRN Indigestion  guaiFENesin Oral Liquid (Sugar-Free) 200 milliGRAM(s) Oral every 6 hours PRN Cough  melatonin 3 milliGRAM(s) Oral at bedtime PRN Insomnia  saline laxative (FLEET) Rectal Enema 1 Enema Rectal once PRN constipation  sodium chloride 0.65% Nasal 1 Spray(s) Both Nostrils two times a day PRN Nasal Congestion  Tart Cherry 1000mg 1 Capsule(s) 1 Capsule(s) Oral two times a day PRN gouty pain    Vital Signs Last 24 Hrs  T(F): 97.3 (27 Nov 2024 07:37), Max: 97.6 (26 Nov 2024 20:15)  HR: 54 (27 Nov 2024 07:37) (54 - 62)  BP: 134/64 (27 Nov 2024 07:37) (111/65 - 135/60)  RR: 16 (27 Nov 2024 07:37) (16 - 16)  SpO2: 98% (27 Nov 2024 07:37) (93% - 98%)  I&O's Summary    BMI (kg/m2): 32.7 (11-24-24 @ 09:00)    PHYSICAL EXAM:  GENERAL: NAD  CHEST/LUNG: Clear to auscultation bilaterally; No rales, rhonchi, wheezing,   HEART: Regular rate and rhythm; No murmurs, rubs, or gallops; b/l LE edema  ABDOMEN: Soft, Nontender, Nondistended; Bowel sounds present; No HSM or masses  Extremities: +2 pulses, no clubbing or cyanosis  Psych: awake and alert    LABS:      11-25    141  |  99  |  34  ----------------------------<  190  3.8   |  36  |  1.18    Ca    9.6      25 Nov 2024 09:31    TPro  7.9  /  Alb  3.4  /  TBili  0.4  /  DBili  x   /  AST  21  /  ALT  16  /  AlkPhos  60  11-25 11-12 Chol 154 mg/dL LDL -- HDL 53 mg/dL Trig 82 mg/dL                  Urinalysis Basic - ( 25 Nov 2024 09:31 )    Color: x / Appearance: x / SG: x / pH: x  Gluc: 190 mg/dL / Ketone: x  / Bili: x / Urobili: x   Blood: x / Protein: x / Nitrite: x   Leuk Esterase: x / RBC: x / WBC x   Sq Epi: x / Non Sq Epi: x / Bacteria: x        COVID-19 PCR: NotDetec (11-14-24 @ 20:35)      Care Discussed with Consultants/Other Providers: Yes. Rehab provider

## 2024-11-27 NOTE — PROGRESS NOTE ADULT - COMMENTS
Patient seen in WC. Continues to have improved/good sleep. No further gouty pain, and pain in left leg persists but at much lesser degree (she reports it improves with activity, and knows it's from the statin, same type of discomfort she usually gets with statins). She ate 100% of meal tray, denies any B/B concerns. No new complaints, and aware that ANABELLA is in progress

## 2024-11-28 DIAGNOSIS — I48.0 PAROXYSMAL ATRIAL FIBRILLATION: ICD-10-CM

## 2024-11-28 DIAGNOSIS — I63.9 CEREBRAL INFARCTION, UNSPECIFIED: ICD-10-CM

## 2024-11-28 DIAGNOSIS — I50.32 CHRONIC DIASTOLIC (CONGESTIVE) HEART FAILURE: ICD-10-CM

## 2024-11-28 DIAGNOSIS — E03.9 HYPOTHYROIDISM, UNSPECIFIED: ICD-10-CM

## 2024-11-28 DIAGNOSIS — E78.5 HYPERLIPIDEMIA, UNSPECIFIED: ICD-10-CM

## 2024-11-28 DIAGNOSIS — I27.20 PULMONARY HYPERTENSION, UNSPECIFIED: ICD-10-CM

## 2024-11-28 PROCEDURE — 99232 SBSQ HOSP IP/OBS MODERATE 35: CPT

## 2024-11-28 RX ADMIN — LIDOCAINE 1 PATCH: 40 CREAM TOPICAL at 23:34

## 2024-11-28 RX ADMIN — LIDOCAINE 1 PATCH: 40 CREAM TOPICAL at 00:59

## 2024-11-28 RX ADMIN — Medication 325 MILLIGRAM(S): at 11:52

## 2024-11-28 RX ADMIN — ACETAMINOPHEN 500MG 975 MILLIGRAM(S): 500 TABLET, COATED ORAL at 15:51

## 2024-11-28 RX ADMIN — APIXABAN 5 MILLIGRAM(S): 2.5 TABLET, FILM COATED ORAL at 10:59

## 2024-11-28 RX ADMIN — ROPINIROLE 1 MILLIGRAM(S): 8 TABLET, FILM COATED, EXTENDED RELEASE ORAL at 21:36

## 2024-11-28 RX ADMIN — ACETAMINOPHEN 500MG 975 MILLIGRAM(S): 500 TABLET, COATED ORAL at 14:51

## 2024-11-28 RX ADMIN — APIXABAN 5 MILLIGRAM(S): 2.5 TABLET, FILM COATED ORAL at 21:36

## 2024-11-28 RX ADMIN — Medication 2 PUFF(S): at 09:41

## 2024-11-28 RX ADMIN — LIDOCAINE 1 PATCH: 40 CREAM TOPICAL at 19:12

## 2024-11-28 RX ADMIN — Medication 2 PUFF(S): at 16:54

## 2024-11-28 RX ADMIN — Medication 81 MILLIGRAM(S): at 11:52

## 2024-11-28 RX ADMIN — Medication 25 MILLIGRAM(S): at 05:55

## 2024-11-28 RX ADMIN — Medication 25 MICROGRAM(S): at 05:55

## 2024-11-28 RX ADMIN — METOPROLOL TARTRATE 12.5 MILLIGRAM(S): 100 TABLET, FILM COATED ORAL at 17:51

## 2024-11-28 RX ADMIN — LIDOCAINE 1 PATCH: 40 CREAM TOPICAL at 11:51

## 2024-11-28 RX ADMIN — GABAPENTIN 100 MILLIGRAM(S): 300 CAPSULE ORAL at 11:52

## 2024-11-28 RX ADMIN — Medication 1 TABLET(S): at 18:23

## 2024-11-28 RX ADMIN — Medication 2 TABLET(S): at 21:36

## 2024-11-28 RX ADMIN — FAMOTIDINE 20 MILLIGRAM(S): 20 TABLET, FILM COATED ORAL at 21:36

## 2024-11-28 RX ADMIN — GABAPENTIN 300 MILLIGRAM(S): 300 CAPSULE ORAL at 21:36

## 2024-11-28 RX ADMIN — METOPROLOL TARTRATE 12.5 MILLIGRAM(S): 100 TABLET, FILM COATED ORAL at 05:56

## 2024-11-28 RX ADMIN — ACETAMINOPHEN, DIPHENHYDRAMINE HCL, PHENYLEPHRINE HCL 3 MILLIGRAM(S): 325; 25; 5 TABLET ORAL at 21:36

## 2024-11-28 RX ADMIN — Medication 1 SPRAY(S): at 11:51

## 2024-11-28 RX ADMIN — Medication 1 TABLET(S): at 14:51

## 2024-11-28 RX ADMIN — FUROSEMIDE 40 MILLIGRAM(S): 40 TABLET ORAL at 05:55

## 2024-11-28 NOTE — PROGRESS NOTE ADULT - SUBJECTIVE AND OBJECTIVE BOX
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 (27 Nov 2024 22:20)      History, interim events and clinically pertinent issues reviewed; patient interviewed and examined.  She was sitting up in her wc in her room and has no specific complaints this morning    REVIEW OF SYMPTOMS: patient denies HA's, CP, palpitations, shortness of breath or upper respiratory symptoms, nausea, vomiting, diarrhea, constipation, dysuria, bruising/bleeding and all other systems were reviewed as negative      ALLERGIES:  Macrodantin (Other)  Motrin (Other)  statins (Muscle Pain)  Repatha (Unknown)  flu vaccines (Other)  nisoldipine (Unknown)  sulfa drugs (Other)  Ceftin (Other (Moderate))  tadalafil (Muscle Pain)    MEDICATIONS  (STANDING):  albuterol    90 MICROgram(s) HFA Inhaler 2 Puff(s) Inhalation every 6 hours  apixaban 5 milliGRAM(s) Oral two times a day  aspirin  chewable 81 milliGRAM(s) Oral daily  famotidine    Tablet 20 milliGRAM(s) Oral at bedtime  ferrous    sulfate 325 milliGRAM(s) Oral daily  furosemide    Tablet 40 milliGRAM(s) Oral daily  gabapentin 100 milliGRAM(s) Oral daily  gabapentin 300 milliGRAM(s) Oral at bedtime  levothyroxine 25 MICROGram(s) Oral daily  lidocaine   4% Patch 1 Patch Transdermal daily  lidocaine   4% Patch 1 Patch Transdermal daily  metoprolol tartrate 12.5 milliGRAM(s) Oral two times a day  polyethylene glycol 3350 17 Gram(s) Oral daily  rOPINIRole 1 milliGRAM(s) Oral at bedtime  senna 2 Tablet(s) Oral at bedtime  spironolactone 25 milliGRAM(s) Oral daily  Ultra Coenzyme Q10 200mg 1 Capsule(s) 1 Capsule(s) Oral daily    MEDICATIONS  (PRN):  acetaminophen     Tablet .. 975 milliGRAM(s) Oral every 8 hours PRN Mild Pain (1 - 3), Moderate Pain (4 - 6), Severe Pain (7 - 10)  calcium carbonate    500 mG (Tums) Chewable 1 Tablet(s) Chew four times a day PRN Indigestion  guaiFENesin Oral Liquid (Sugar-Free) 200 milliGRAM(s) Oral every 6 hours PRN Cough  melatonin 3 milliGRAM(s) Oral at bedtime PRN Insomnia  saline laxative (FLEET) Rectal Enema 1 Enema Rectal once PRN constipation  sodium chloride 0.65% Nasal 1 Spray(s) Both Nostrils two times a day PRN Nasal Congestion  Tart Cherry 1000mg 1 Capsule(s) 1 Capsule(s) Oral two times a day PRN gouty pain    Vital Signs Last 24 Hrs  T(F): 98.2 (27 Nov 2024 19:40), Max: 98.2 (27 Nov 2024 19:40)  HR: 57 (28 Nov 2024 05:53) (57 - 64)  BP: 137/82 (28 Nov 2024 05:53) (102/65 - 137/82)  RR: 16 (27 Nov 2024 19:40) (16 - 16)  SpO2: 93% (27 Nov 2024 19:40) (93% - 98%)  I&O's Summary    BMI (kg/m2): 32.7 (11-24-24 @ 09:00)        POCT Blood Glucose.: 102 mg/dL (27 Nov 2024 16:19)    PHYSICAL EXAM:  General: NAD, A/O x 3  ENT: MMM  Neck: Supple, No JVD  Lungs: Clear to auscultation bilaterally  Cardio: RRR, S1/S2, No murmurs  Abdomen: Soft, Nontender, Nondistended; Bowel sounds present  Extremities: No calf tenderness, No pitting edema  Neuro:  left weakness /  no new deficits    LABS:      11-25    141  |  99  |  34  ----------------------------<  190  3.8   |  36  |  1.18    Ca    9.6      25 Nov 2024 09:31    TPro  7.9  /  Alb  3.4  /  TBili  0.4  /  DBili  x   /  AST  21  /  ALT  16  /  AlkPhos  60  11-25 11-12 Chol 154 mg/dL LDL -- HDL 53 mg/dL Trig 82 mg/dL            Urinalysis Basic - ( 25 Nov 2024 09:31 )    Color: x / Appearance: x / SG: x / pH: x  Gluc: 190 mg/dL / Ketone: x  / Bili: x / Urobili: x   Blood: x / Protein: x / Nitrite: x   Leuk Esterase: x / RBC: x / WBC x   Sq Epi: x / Non Sq Epi: x / Bacteria: x        COVID-19 PCR: Yareli (11-14-24 @ 20:35)           Statement Selected

## 2024-11-28 NOTE — PROGRESS NOTE ADULT - TIME BILLING
Time spent includes direct patient care  (interview and examination of patient), discussion with other providers, support staff and/or patient's family members, review of medical records, ordering diagnostic tests and analyzing results, and documentation, excluding time spent in ACP discussions.
Time spent includes direct patient care  (interview and examination of patient), discussion with other providers, support staff and/or patient's family members, review of medical records, ordering diagnostic tests and analyzing results, and documentation.
Time spent includes direct patient care  (interview and examination of patient), discussion with other providers, support staff and/or patient's family members, review of medical records, ordering diagnostic tests and analyzing results, and documentation, excluding time spent in ACP discussions.
Time spent includes direct patient care  (interview and examination of patient), discussion with other providers, support staff and/or patient's family members, review of medical records, ordering diagnostic tests and analyzing results, and documentation.
Time spent includes direct patient care  (interview and examination of patient), discussion with other providers, support staff and/or patient's family members, review of medical records, ordering diagnostic tests and analyzing results, and documentation.

## 2024-11-28 NOTE — PROGRESS NOTE ADULT - SUBJECTIVE AND OBJECTIVE BOX
Cc: Impaired mobility     HPI: Patient resting comfortably.    Has been tolerating rehabilitation program.    acetaminophen     Tablet .. 975 milliGRAM(s) Oral every 8 hours PRN  albuterol    90 MICROgram(s) HFA Inhaler 2 Puff(s) Inhalation every 6 hours  apixaban 5 milliGRAM(s) Oral two times a day  aspirin  chewable 81 milliGRAM(s) Oral daily  calcium carbonate    500 mG (Tums) Chewable 1 Tablet(s) Chew four times a day PRN  famotidine    Tablet 20 milliGRAM(s) Oral at bedtime  ferrous    sulfate 325 milliGRAM(s) Oral daily  furosemide    Tablet 40 milliGRAM(s) Oral daily  gabapentin 100 milliGRAM(s) Oral daily  gabapentin 300 milliGRAM(s) Oral at bedtime  guaiFENesin Oral Liquid (Sugar-Free) 200 milliGRAM(s) Oral every 6 hours PRN  levothyroxine 25 MICROGram(s) Oral daily  lidocaine   4% Patch 1 Patch Transdermal daily  lidocaine   4% Patch 1 Patch Transdermal daily  melatonin 3 milliGRAM(s) Oral at bedtime PRN  metoprolol tartrate 12.5 milliGRAM(s) Oral two times a day  polyethylene glycol 3350 17 Gram(s) Oral daily  rOPINIRole 1 milliGRAM(s) Oral at bedtime  saline laxative (FLEET) Rectal Enema 1 Enema Rectal once PRN  senna 2 Tablet(s) Oral at bedtime  sodium chloride 0.65% Nasal 1 Spray(s) Both Nostrils two times a day PRN  spironolactone 25 milliGRAM(s) Oral daily  Tart Cherry 1000mg 1 Capsule(s) 1 Capsule(s) Oral two times a day PRN  Ultra Coenzyme Q10 200mg 1 Capsule(s) 1 Capsule(s) Oral daily      T(C): 36.7 (11-28-24 @ 08:31), Max: 36.8 (11-27-24 @ 19:40)  HR: 54 (11-28-24 @ 08:31) (54 - 64)  BP: 153/65 (11-28-24 @ 08:31) (102/65 - 153/65)  RR: 16 (11-28-24 @ 08:31) (16 - 16)  SpO2: 95% (11-28-24 @ 08:31) (93% - 98%)    In NAD  HEENT- EOMI  Heart- No cyanosis  Lungs- No respiratory distress   Abd- + BS, NT  Ext- No calf pain  Neuro- Exam unchanged      Imp: Patient with diagnosis of CVA in R corona radiata admitted for comprehensive acute rehabilitation.    Plan:  -Impaired mobility - Continue PT/OT  - DVT prophylaxis - Apixaban  - Skin- Turn q2h, check skin daily  - Continue current medications; patient medically stable.   -Active issues-   -CVA - Aspirin  -A-fib- apixaban, metoprolol   -Appreciate internal medicine consultation   - Patient to continue current rehabilitation program.

## 2024-11-29 ENCOUNTER — TRANSCRIPTION ENCOUNTER (OUTPATIENT)
Age: 89
End: 2024-11-29

## 2024-11-29 VITALS
RESPIRATION RATE: 14 BRPM | SYSTOLIC BLOOD PRESSURE: 125 MMHG | OXYGEN SATURATION: 94 % | HEART RATE: 74 BPM | DIASTOLIC BLOOD PRESSURE: 62 MMHG

## 2024-11-29 LAB
ALBUMIN SERPL ELPH-MCNC: 3 G/DL — LOW (ref 3.3–5)
ALP SERPL-CCNC: 54 U/L — SIGNIFICANT CHANGE UP (ref 40–120)
ALT FLD-CCNC: 16 U/L — SIGNIFICANT CHANGE UP (ref 10–45)
ANION GAP SERPL CALC-SCNC: 8 MMOL/L — SIGNIFICANT CHANGE UP (ref 5–17)
AST SERPL-CCNC: 21 U/L — SIGNIFICANT CHANGE UP (ref 10–40)
BILIRUB SERPL-MCNC: 0.5 MG/DL — SIGNIFICANT CHANGE UP (ref 0.2–1.2)
BUN SERPL-MCNC: 29 MG/DL — HIGH (ref 7–23)
CALCIUM SERPL-MCNC: 9.2 MG/DL — SIGNIFICANT CHANGE UP (ref 8.4–10.5)
CHLORIDE SERPL-SCNC: 102 MMOL/L — SIGNIFICANT CHANGE UP (ref 96–108)
CO2 SERPL-SCNC: 31 MMOL/L — SIGNIFICANT CHANGE UP (ref 22–31)
CREAT SERPL-MCNC: 1.13 MG/DL — SIGNIFICANT CHANGE UP (ref 0.5–1.3)
EGFR: 47 ML/MIN/1.73M2 — LOW
GLUCOSE SERPL-MCNC: 97 MG/DL — SIGNIFICANT CHANGE UP (ref 70–99)
HCT VFR BLD CALC: 39.3 % — SIGNIFICANT CHANGE UP (ref 34.5–45)
HGB BLD-MCNC: 13 G/DL — SIGNIFICANT CHANGE UP (ref 11.5–15.5)
MCHC RBC-ENTMCNC: 30.8 PG — SIGNIFICANT CHANGE UP (ref 27–34)
MCHC RBC-ENTMCNC: 33.1 G/DL — SIGNIFICANT CHANGE UP (ref 32–36)
MCV RBC AUTO: 93.1 FL — SIGNIFICANT CHANGE UP (ref 80–100)
NRBC # BLD: 0 /100 WBCS — SIGNIFICANT CHANGE UP (ref 0–0)
PLATELET # BLD AUTO: 326 K/UL — SIGNIFICANT CHANGE UP (ref 150–400)
POTASSIUM SERPL-MCNC: 3.6 MMOL/L — SIGNIFICANT CHANGE UP (ref 3.5–5.3)
POTASSIUM SERPL-SCNC: 3.6 MMOL/L — SIGNIFICANT CHANGE UP (ref 3.5–5.3)
PROT SERPL-MCNC: 6.9 G/DL — SIGNIFICANT CHANGE UP (ref 6–8.3)
RBC # BLD: 4.22 M/UL — SIGNIFICANT CHANGE UP (ref 3.8–5.2)
RBC # FLD: 13.8 % — SIGNIFICANT CHANGE UP (ref 10.3–14.5)
SODIUM SERPL-SCNC: 141 MMOL/L — SIGNIFICANT CHANGE UP (ref 135–145)
WBC # BLD: 10.72 K/UL — HIGH (ref 3.8–10.5)
WBC # FLD AUTO: 10.72 K/UL — HIGH (ref 3.8–10.5)

## 2024-11-29 PROCEDURE — 82962 GLUCOSE BLOOD TEST: CPT

## 2024-11-29 PROCEDURE — 97116 GAIT TRAINING THERAPY: CPT | Mod: GP

## 2024-11-29 PROCEDURE — 36415 COLL VENOUS BLD VENIPUNCTURE: CPT

## 2024-11-29 PROCEDURE — 82542 COL CHROMOTOGRAPHY QUAL/QUAN: CPT

## 2024-11-29 PROCEDURE — 85027 COMPLETE CBC AUTOMATED: CPT

## 2024-11-29 PROCEDURE — 99239 HOSP IP/OBS DSCHRG MGMT >30: CPT

## 2024-11-29 PROCEDURE — 92523 SPEECH SOUND LANG COMPREHEN: CPT | Mod: GN

## 2024-11-29 PROCEDURE — 74230 X-RAY XM SWLNG FUNCJ C+: CPT | Mod: 26

## 2024-11-29 PROCEDURE — 92526 ORAL FUNCTION THERAPY: CPT | Mod: GN

## 2024-11-29 PROCEDURE — 99232 SBSQ HOSP IP/OBS MODERATE 35: CPT

## 2024-11-29 PROCEDURE — 97112 NEUROMUSCULAR REEDUCATION: CPT | Mod: GP

## 2024-11-29 PROCEDURE — 82436 ASSAY OF URINE CHLORIDE: CPT

## 2024-11-29 PROCEDURE — 97110 THERAPEUTIC EXERCISES: CPT | Mod: GP

## 2024-11-29 PROCEDURE — 87635 SARS-COV-2 COVID-19 AMP PRB: CPT

## 2024-11-29 PROCEDURE — 92507 TX SP LANG VOICE COMM INDIV: CPT | Mod: GN

## 2024-11-29 PROCEDURE — 80053 COMPREHEN METABOLIC PANEL: CPT

## 2024-11-29 PROCEDURE — 92610 EVALUATE SWALLOWING FUNCTION: CPT | Mod: GN

## 2024-11-29 PROCEDURE — 97165 OT EVAL LOW COMPLEX 30 MIN: CPT | Mod: GO

## 2024-11-29 PROCEDURE — 74230 X-RAY XM SWLNG FUNCJ C+: CPT

## 2024-11-29 PROCEDURE — 97530 THERAPEUTIC ACTIVITIES: CPT | Mod: GO

## 2024-11-29 PROCEDURE — 83735 ASSAY OF MAGNESIUM: CPT

## 2024-11-29 PROCEDURE — 80048 BASIC METABOLIC PNL TOTAL CA: CPT

## 2024-11-29 PROCEDURE — 94640 AIRWAY INHALATION TREATMENT: CPT

## 2024-11-29 PROCEDURE — 92611 MOTION FLUOROSCOPY/SWALLOW: CPT | Mod: GN

## 2024-11-29 PROCEDURE — 97161 PT EVAL LOW COMPLEX 20 MIN: CPT | Mod: GP

## 2024-11-29 PROCEDURE — 83540 ASSAY OF IRON: CPT

## 2024-11-29 PROCEDURE — 84300 ASSAY OF URINE SODIUM: CPT

## 2024-11-29 PROCEDURE — 97535 SELF CARE MNGMENT TRAINING: CPT | Mod: GO

## 2024-11-29 RX ORDER — ROPINIROLE 8 MG/1
1 TABLET, FILM COATED, EXTENDED RELEASE ORAL
Qty: 0 | Refills: 0 | DISCHARGE
Start: 2024-11-29

## 2024-11-29 RX ORDER — PREDNISONE 20 MG/1
1 TABLET ORAL
Qty: 0 | Refills: 0 | DISCHARGE
Start: 2024-11-29

## 2024-11-29 RX ORDER — PREDNISONE 20 MG/1
10 TABLET ORAL DAILY
Refills: 0 | Status: DISCONTINUED | OUTPATIENT
Start: 2024-11-29 | End: 2024-11-29

## 2024-11-29 RX ADMIN — Medication 1 SPRAY(S): at 17:28

## 2024-11-29 RX ADMIN — METOPROLOL TARTRATE 12.5 MILLIGRAM(S): 100 TABLET, FILM COATED ORAL at 17:29

## 2024-11-29 RX ADMIN — Medication 81 MILLIGRAM(S): at 12:02

## 2024-11-29 RX ADMIN — GABAPENTIN 100 MILLIGRAM(S): 300 CAPSULE ORAL at 12:02

## 2024-11-29 RX ADMIN — Medication 325 MILLIGRAM(S): at 12:02

## 2024-11-29 RX ADMIN — Medication 2 PUFF(S): at 15:42

## 2024-11-29 RX ADMIN — Medication 25 MILLIGRAM(S): at 05:38

## 2024-11-29 RX ADMIN — LIDOCAINE 1 PATCH: 40 CREAM TOPICAL at 12:03

## 2024-11-29 RX ADMIN — FUROSEMIDE 40 MILLIGRAM(S): 40 TABLET ORAL at 05:39

## 2024-11-29 RX ADMIN — METOPROLOL TARTRATE 12.5 MILLIGRAM(S): 100 TABLET, FILM COATED ORAL at 05:38

## 2024-11-29 RX ADMIN — APIXABAN 5 MILLIGRAM(S): 2.5 TABLET, FILM COATED ORAL at 09:58

## 2024-11-29 RX ADMIN — Medication 1 SPRAY(S): at 12:02

## 2024-11-29 RX ADMIN — PREDNISONE 10 MILLIGRAM(S): 20 TABLET ORAL at 13:26

## 2024-11-29 RX ADMIN — Medication 25 MICROGRAM(S): at 05:38

## 2024-11-29 RX ADMIN — Medication 1 TABLET(S): at 14:02

## 2024-11-29 NOTE — PROGRESS NOTE ADULT - RESPIRATORY
clear to auscultation bilaterally/no wheezes/no rales/no respiratory distress
clear to auscultation bilaterally/no wheezes/no respiratory distress
clear to auscultation bilaterally/no respiratory distress
clear to auscultation bilaterally/no respiratory distress
normal/clear to auscultation bilaterally/no wheezes/no rales/no rhonchi
clear to auscultation bilaterally/no wheezes/no rales/no rhonchi/no respiratory distress
clear to auscultation bilaterally/no respiratory distress
clear to auscultation bilaterally/no wheezes
normal/clear to auscultation bilaterally/no wheezes/no rales/no rhonchi

## 2024-11-29 NOTE — PROGRESS NOTE ADULT - CARDIOVASCULAR
normal/regular rate and rhythm/S1 S2 present/no gallops/no rub/no murmur
regular rate and rhythm/S1 S2 present
regular rate and rhythm
normal/regular rate and rhythm/S1 S2 present/no gallops/no rub/no murmur
regular rate and rhythm/S1 S2 present
regular rate and rhythm
regular rate and rhythm
regular rate and rhythm/S1 S2 present
normal/regular rate and rhythm/S1 S2 present/no gallops/no rub/no murmur

## 2024-11-29 NOTE — CHART NOTE - NSCHARTNOTEFT_GEN_A_CORE
NUTRITION Follow Up Note    Pt with adequate appetite/intake at this time. Pt reports not enjoying food due to lack of flavor however consuming ~% of meals as per flow sheets. LBM . No Pressure Ulcers. Weight stable. Electrolytes stable. Recommended Ensure Plus High Protein 8oz PO Daily (Provides 350kcal & 20grams of Protein) to optimize protein intake. No chewing/swallowing issues reported.     SOURCE: Patient [X]  Medical Record [X]  Nursing Staff [X]  Family Member []    DIET: Diet, Soft and Bite Sized:   DASH/TLC {Sodium & Cholesterol Restricted}  Single Sips Only (24 @ 12:29) [Active]          EDEMA: generalized       WEIGHT TRENDS:  Weight in k (2024 07:34)  Weight in k.5 (2024 05:53)  Weight in k.7 (2024 09:00)  Weight in k.4 (2024 05:52)  Weight in k.7 (2024 06:19)      PERTINENT MEDICATIONS: MEDICATIONS  (STANDING):  albuterol    90 MICROgram(s) HFA Inhaler 2 Puff(s) Inhalation every 6 hours  apixaban 5 milliGRAM(s) Oral two times a day  aspirin  chewable 81 milliGRAM(s) Oral daily  famotidine    Tablet 20 milliGRAM(s) Oral at bedtime  ferrous    sulfate 325 milliGRAM(s) Oral daily  furosemide    Tablet 40 milliGRAM(s) Oral daily  gabapentin 100 milliGRAM(s) Oral daily  gabapentin 300 milliGRAM(s) Oral at bedtime  levothyroxine 25 MICROGram(s) Oral daily  lidocaine   4% Patch 1 Patch Transdermal daily  lidocaine   4% Patch 1 Patch Transdermal daily  metoprolol tartrate 12.5 milliGRAM(s) Oral two times a day  polyethylene glycol 3350 17 Gram(s) Oral daily  predniSONE   Tablet 10 milliGRAM(s) Oral daily  rOPINIRole 1 milliGRAM(s) Oral at bedtime  senna 2 Tablet(s) Oral at bedtime  spironolactone 25 milliGRAM(s) Oral daily  Ultra Coenzyme Q10 200mg 1 Capsule(s) 1 Capsule(s) Oral daily    MEDICATIONS  (PRN):  acetaminophen     Tablet .. 975 milliGRAM(s) Oral every 8 hours PRN Mild Pain (1 - 3), Moderate Pain (4 - 6), Severe Pain (7 - 10)  calcium carbonate    500 mG (Tums) Chewable 1 Tablet(s) Chew four times a day PRN Indigestion  guaiFENesin Oral Liquid (Sugar-Free) 200 milliGRAM(s) Oral every 6 hours PRN Cough  melatonin 3 milliGRAM(s) Oral at bedtime PRN Insomnia  saline laxative (FLEET) Rectal Enema 1 Enema Rectal once PRN constipation  sodium chloride 0.65% Nasal 1 Spray(s) Both Nostrils two times a day PRN Nasal Congestion  Tart Cherry 1000mg 1 Capsule(s) 1 Capsule(s) Oral two times a day PRN gouty pain      PERTINENT LABS:   Na141 mmol/L Glu 97 mg/dL K+ 3.6 mmol/L Cr  1.13 mg/dL BUN 29 mg/dL[H]  Alb 3.0 g/dL[L] 11- Chol 154 mg/dL LDL --    HDL 53 mg/dL Trig 82 mg/dL        ESTIMATED NEEDS:   [X] No Change Since Previous Assessment    PREVIOUS NUTRITION DIAGNOSIS:  Altered GI Function  2/2 acute illness  evidenced by persistent constipation  NUTRITION DIAGNOSIS IS [X] Ongoing -     [X] Resolved -    [X] Not Applicable     NEW NUTRITION DIAGNOSIS: [X] Not Applicable    INTERVENTIONS:   1. Continue Current Nutrition Care Regimen at This Time.     MONITORING & EVALUATION:   1. Weights   2. PO intakes   3. Skin integrity   4. Tolerance to diet prescription   5. Labs & POCT  6. Follow up (per protocol)    Registered Dietitian/Nutritionist Remains Available.  Jake Real RD    Contact: [phone] or via [facility]AL[facility]

## 2024-11-29 NOTE — PROGRESS NOTE ADULT - SUBJECTIVE AND OBJECTIVE BOX
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     Patient went out to lunch at 12 pm the day of admission with her daughter. Around 6 pm the patient slipped out of bed and was unable to get back up. She called her daughter and when the she arrived noted her mother did not seem like herself and had a Left sided facial droop.  CT/CTA head and CTA of neck without evidence of an acute intracranial hemorrhage midline shift or hydrocephalus. There was no hemodynamic significant narrowing within the neck. or proximal large vessel occlusion noted.  MRI showed "abnormal T2 prolongation restricted diffusion seen involving the right corona radiata region. These findings are compatible with an acute infarct. No hemorrhagic transformation is seen. No significant shift or herniation is seen."    Patient was managed medically and was deemed stable for discharge to [facility] on 11/14/24 for acute IRF.          (14 Nov 2024 13:35)      PAST MEDICAL & SURGICAL HISTORY:  Atrial fibrillation      CHF (congestive heart failure)      Pulmonary emboli      Factor 5 Leiden mutation, heterozygous      CAD (coronary artery disease)      Stented coronary artery      Hypothyroid      COPD (chronic obstructive pulmonary disease)      Mitral regurgitation      HTN (hypertension)      H/O: hysterectomy      H/O knee surgery      Cyst of breast, unspecified laterality  S/P excision      H/O cardiac radiofrequency ablation      S/P ablation of atrial fibrillation          MEDICATIONS  (STANDING):  albuterol    90 MICROgram(s) HFA Inhaler 2 Puff(s) Inhalation every 6 hours  apixaban 5 milliGRAM(s) Oral two times a day  aspirin  chewable 81 milliGRAM(s) Oral daily  famotidine    Tablet 20 milliGRAM(s) Oral at bedtime  ferrous    sulfate 325 milliGRAM(s) Oral daily  furosemide    Tablet 40 milliGRAM(s) Oral daily  gabapentin 100 milliGRAM(s) Oral daily  gabapentin 300 milliGRAM(s) Oral at bedtime  levothyroxine 25 MICROGram(s) Oral daily  lidocaine   4% Patch 1 Patch Transdermal daily  lidocaine   4% Patch 1 Patch Transdermal daily  metoprolol tartrate 12.5 milliGRAM(s) Oral two times a day  polyethylene glycol 3350 17 Gram(s) Oral daily  rOPINIRole 1 milliGRAM(s) Oral at bedtime  senna 2 Tablet(s) Oral at bedtime  spironolactone 25 milliGRAM(s) Oral daily  Ultra Coenzyme Q10 200mg 1 Capsule(s) 1 Capsule(s) Oral daily    MEDICATIONS  (PRN):  acetaminophen     Tablet .. 975 milliGRAM(s) Oral every 8 hours PRN Mild Pain (1 - 3), Moderate Pain (4 - 6), Severe Pain (7 - 10)  calcium carbonate    500 mG (Tums) Chewable 1 Tablet(s) Chew four times a day PRN Indigestion  guaiFENesin Oral Liquid (Sugar-Free) 200 milliGRAM(s) Oral every 6 hours PRN Cough  melatonin 3 milliGRAM(s) Oral at bedtime PRN Insomnia  saline laxative (FLEET) Rectal Enema 1 Enema Rectal once PRN constipation  sodium chloride 0.65% Nasal 1 Spray(s) Both Nostrils two times a day PRN Nasal Congestion  Tart Cherry 1000mg 1 Capsule(s) 1 Capsule(s) Oral two times a day PRN gouty pain      Allergies    Macrodantin (Other)  Motrin (Other)  statins (Muscle Pain)  Repatha (Unknown)  flu vaccines (Other)  nisoldipine (Unknown)  sulfa drugs (Other)  Ceftin (Other (Moderate))  tadalafil (Muscle Pain)    Intolerances          VITALS  89y  Vital Signs Last 24 Hrs  T(C): 36.3 (29 Nov 2024 07:34), Max: 36.8 (28 Nov 2024 20:15)  T(F): 97.4 (29 Nov 2024 07:34), Max: 98.2 (28 Nov 2024 20:15)  HR: 56 (29 Nov 2024 07:34) (55 - 71)  BP: 129/78 (29 Nov 2024 07:34) (129/78 - 138/82)  BP(mean): --  RR: 16 (29 Nov 2024 07:34) (16 - 16)  SpO2: 100% (29 Nov 2024 07:34) (93% - 100%)    Parameters below as of 29 Nov 2024 07:34  Patient On (Oxygen Delivery Method): nasal cannula  O2 Flow (L/min): 2    Daily     Daily         RECENT LABS:                          13.0   10.72 )-----------( 326      ( 29 Nov 2024 08:19 )             39.3                     CAPILLARY BLOOD GLUCOSE                   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     Patient went out to lunch at 12 pm the day of admission with her daughter. Around 6 pm the patient slipped out of bed and was unable to get back up. She called her daughter and when the she arrived noted her mother did not seem like herself and had a Left sided facial droop.  CT/CTA head and CTA of neck without evidence of an acute intracranial hemorrhage midline shift or hydrocephalus. There was no hemodynamic significant narrowing within the neck. or proximal large vessel occlusion noted.  MRI showed "abnormal T2 prolongation restricted diffusion seen involving the right corona radiata region. These findings are compatible with an acute infarct. No hemorrhagic transformation is seen. No significant shift or herniation is seen."    Patient was managed medically and was deemed stable for discharge to [facility] on 11/14/24 for acute IRF.          (14 Nov 2024 13:35)      PAST MEDICAL & SURGICAL HISTORY:  Atrial fibrillation      CHF (congestive heart failure)      Pulmonary emboli      Factor 5 Leiden mutation, heterozygous      CAD (coronary artery disease)      Stented coronary artery      Hypothyroid      COPD (chronic obstructive pulmonary disease)      Mitral regurgitation      HTN (hypertension)      H/O: hysterectomy      H/O knee surgery      Cyst of breast, unspecified laterality  S/P excision      H/O cardiac radiofrequency ablation      S/P ablation of atrial fibrillation          MEDICATIONS  (STANDING):  albuterol    90 MICROgram(s) HFA Inhaler 2 Puff(s) Inhalation every 6 hours  apixaban 5 milliGRAM(s) Oral two times a day  aspirin  chewable 81 milliGRAM(s) Oral daily  famotidine    Tablet 20 milliGRAM(s) Oral at bedtime  ferrous    sulfate 325 milliGRAM(s) Oral daily  furosemide    Tablet 40 milliGRAM(s) Oral daily  gabapentin 100 milliGRAM(s) Oral daily  gabapentin 300 milliGRAM(s) Oral at bedtime  levothyroxine 25 MICROGram(s) Oral daily  lidocaine   4% Patch 1 Patch Transdermal daily  lidocaine   4% Patch 1 Patch Transdermal daily  metoprolol tartrate 12.5 milliGRAM(s) Oral two times a day  polyethylene glycol 3350 17 Gram(s) Oral daily  rOPINIRole 1 milliGRAM(s) Oral at bedtime  senna 2 Tablet(s) Oral at bedtime  spironolactone 25 milliGRAM(s) Oral daily  Ultra Coenzyme Q10 200mg 1 Capsule(s) 1 Capsule(s) Oral daily    MEDICATIONS  (PRN):  acetaminophen     Tablet .. 975 milliGRAM(s) Oral every 8 hours PRN Mild Pain (1 - 3), Moderate Pain (4 - 6), Severe Pain (7 - 10)  calcium carbonate    500 mG (Tums) Chewable 1 Tablet(s) Chew four times a day PRN Indigestion  guaiFENesin Oral Liquid (Sugar-Free) 200 milliGRAM(s) Oral every 6 hours PRN Cough  melatonin 3 milliGRAM(s) Oral at bedtime PRN Insomnia  saline laxative (FLEET) Rectal Enema 1 Enema Rectal once PRN constipation  sodium chloride 0.65% Nasal 1 Spray(s) Both Nostrils two times a day PRN Nasal Congestion  Tart Cherry 1000mg 1 Capsule(s) 1 Capsule(s) Oral two times a day PRN gouty pain      Allergies    Macrodantin (Other)  Motrin (Other)  statins (Muscle Pain)  Repatha (Unknown)  flu vaccines (Other)  nisoldipine (Unknown)  sulfa drugs (Other)  Ceftin (Other (Moderate))  tadalafil (Muscle Pain)    Intolerances          VITALS  89y  Vital Signs Last 24 Hrs  T(C): 36.3 (29 Nov 2024 07:34), Max: 36.8 (28 Nov 2024 20:15)  T(F): 97.4 (29 Nov 2024 07:34), Max: 98.2 (28 Nov 2024 20:15)  HR: 56 (29 Nov 2024 07:34) (55 - 71)  BP: 129/78 (29 Nov 2024 07:34) (129/78 - 138/82)  BP(mean): --  RR: 16 (29 Nov 2024 07:34) (16 - 16)  SpO2: 100% (29 Nov 2024 07:34) (93% - 100%)    Parameters below as of 29 Nov 2024 07:34  Patient On (Oxygen Delivery Method): nasal cannula  O2 Flow (L/min): 2    Daily     Daily         RECENT LABS:                          13.0   10.72 )-----------( 326      ( 29 Nov 2024 08:19 )             39.3       11-29    141  |  102  |  29[H]  ----------------------------<  97  3.6   |  31  |  1.13    Ca    9.2      29 Nov 2024 08:19    TPro  6.9  /  Alb  3.0[L]  /  TBili  0.5  /  DBili  x   /  AST  21  /  ALT  16  /  AlkPhos  54  11-29                CAPILLARY BLOOD GLUCOSE

## 2024-11-29 NOTE — PROGRESS NOTE ADULT - GASTROINTESTINAL
soft/nontender/nondistended
soft/nontender
soft/nontender/normal active bowel sounds
soft/nontender
soft/nontender/nondistended/normal active bowel sounds
soft/nontender
soft/nontender/nondistended/normal active bowel sounds
soft/nontender/normal active bowel sounds

## 2024-11-29 NOTE — PROGRESS NOTE ADULT - REASON FOR ADMISSION
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

## 2024-11-29 NOTE — DISCHARGE NOTE NURSING/CASE MANAGEMENT/SOCIAL WORK - PATIENT PORTAL LINK FT
You can access the FollowMyHealth Patient Portal offered by [facility] by registering at the following website: [email or web address]. By joining [employer]’s FollowMyHealth portal, you will also be able to view your health information using other applications (apps) compatible with our system.

## 2024-11-29 NOTE — PROGRESS NOTE ADULT - SUBJECTIVE AND OBJECTIVE BOX
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 (29 Nov 2024 08:45)      There were no acute medical issues reported and patient without new complaints this AM    REVIEW OF SYMPTOMS: patient denies HA's, CP, palpitations, shortness of breath or upper respiratory symptoms, nausea, vomiting, diarrhea, constipation, dysuria, bruising/bleeding and all other systems were reviewed as negative      ALLERGIES:  Macrodantin (Other)  Motrin (Other)  statins (Muscle Pain)  Repatha (Unknown)  flu vaccines (Other)  nisoldipine (Unknown)  sulfa drugs (Other)  Ceftin (Other (Moderate))  tadalafil (Muscle Pain)    MEDICATIONS  (STANDING):  albuterol    90 MICROgram(s) HFA Inhaler 2 Puff(s) Inhalation every 6 hours  apixaban 5 milliGRAM(s) Oral two times a day  aspirin  chewable 81 milliGRAM(s) Oral daily  famotidine    Tablet 20 milliGRAM(s) Oral at bedtime  ferrous    sulfate 325 milliGRAM(s) Oral daily  furosemide    Tablet 40 milliGRAM(s) Oral daily  gabapentin 100 milliGRAM(s) Oral daily  gabapentin 300 milliGRAM(s) Oral at bedtime  levothyroxine 25 MICROGram(s) Oral daily  lidocaine   4% Patch 1 Patch Transdermal daily  lidocaine   4% Patch 1 Patch Transdermal daily  metoprolol tartrate 12.5 milliGRAM(s) Oral two times a day  polyethylene glycol 3350 17 Gram(s) Oral daily  rOPINIRole 1 milliGRAM(s) Oral at bedtime  senna 2 Tablet(s) Oral at bedtime  spironolactone 25 milliGRAM(s) Oral daily  Ultra Coenzyme Q10 200mg 1 Capsule(s) 1 Capsule(s) Oral daily    MEDICATIONS  (PRN):  acetaminophen     Tablet .. 975 milliGRAM(s) Oral every 8 hours PRN Mild Pain (1 - 3), Moderate Pain (4 - 6), Severe Pain (7 - 10)  calcium carbonate    500 mG (Tums) Chewable 1 Tablet(s) Chew four times a day PRN Indigestion  guaiFENesin Oral Liquid (Sugar-Free) 200 milliGRAM(s) Oral every 6 hours PRN Cough  melatonin 3 milliGRAM(s) Oral at bedtime PRN Insomnia  saline laxative (FLEET) Rectal Enema 1 Enema Rectal once PRN constipation  sodium chloride 0.65% Nasal 1 Spray(s) Both Nostrils two times a day PRN Nasal Congestion  Tart Cherry 1000mg 1 Capsule(s) 1 Capsule(s) Oral two times a day PRN gouty pain    Vital Signs Last 24 Hrs  T(F): 97.4 (29 Nov 2024 07:34), Max: 98.2 (28 Nov 2024 20:15)  HR: 56 (29 Nov 2024 07:34) (55 - 71)  BP: 129/78 (29 Nov 2024 07:34) (129/78 - 138/82)  RR: 16 (29 Nov 2024 07:34) (16 - 16)  SpO2: 100% (29 Nov 2024 07:34) (93% - 100%)  I&O's Summary    28 Nov 2024 07:01  -  29 Nov 2024 07:00  --------------------------------------------------------  IN: 480 mL / OUT: 0 mL / NET: 480 mL                PHYSICAL EXAM:  General: NAD  ENT: MMM  Neck: Supple, No JVD  Lungs: Clear to auscultation bilaterally  Cardio: RRR, S1/S2, No murmurs  Abdomen: Soft, Nontender, Nondistended; Bowel sounds present  Extremities: No calf tenderness, No pitting edema  Neuro left sided weakness no new deficits    LABS:                        13.0   10.72 )-----------( 326      ( 29 Nov 2024 08:19 )             39.3       11-29    141  |  102  |  29  ----------------------------<  97  3.6   |  31  |  1.13    Ca    9.2      29 Nov 2024 08:19    TPro  6.9  /  Alb  3.0  /  TBili  0.5  /  DBili  x   /  AST  21  /  ALT  16  /  AlkPhos  54  11-29                11-12 Chol 154 mg/dL LDL -- HDL 53 mg/dL Trig 82 mg/dL            Urinalysis Basic - ( 29 Nov 2024 08:19 )    Color: x / Appearance: x / SG: x / pH: x  Gluc: 97 mg/dL / Ketone: x  / Bili: x / Urobili: x   Blood: x / Protein: x / Nitrite: x   Leuk Esterase: x / RBC: x / WBC x   Sq Epi: x / Non Sq Epi: x / Bacteria: x        COVID-19 PCR: NotDetec (11-14-24 @ 20:35)

## 2024-11-29 NOTE — PROGRESS NOTE ADULT - ASSESSMENT
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 These findings are compatible with an acute infarct. No hemorrhagic transformation is seen. No significant shift or herniation is seen.  - ASA 81 po qd added by cardiology   - Lipitor added by neurology but she refused citing myalgia   - BP goal SBP < 140  -medically stable for ANABELLA    #Nasal congestion  - ocean nasal saline spray started 11/22    #BLE pain/RLS  - started Requip 11/16  - Tylenol PRN  - may try magnesium if needed    # Paroxysmal A-fib  – Continue metoprolol tartrate twice daily  – Continue Eliquis 5 mg twice daily    # ANNALEE - resolved    # Chronic diastolic heart failure  # Pulm HTN  - ECHO from November 2023 with EF of 55 to 60% - severe pulmonary HTN , echo done at  unchanged from prior echo in 11/2023  – Continue metoprolol tartrate 25 mg twice daily  – Lasix 40mg daily, aldactone 25mg daily  - 3L oxygen via NC overnight at home, no need for oxygen during daytime hours. Outpatient sleep study  -Daily weights     # Hyperlipidemia  - On Livalo 2mg at home, allergic to Repatha  – Lipitor 20mg recommended by neurology. LDL 85, LDL needs to be under 70 per neuro. Patient refusing 20mg Lipitor due to intermittent leg cramping/pain.     # Hypothyroidism  -Synthroid 25mcg AM    # DVT prophylaxis  – On Eliquis    # GI ppx:  Famotidine    #Right big toe pain /? Gout  - improving  - c/w short course of Prednisone     # Cough while eating ?   -Dysphagia -  Improving  -  SLP following

## 2024-11-29 NOTE — DISCHARGE NOTE NURSING/CASE MANAGEMENT/SOCIAL WORK - NSDCPEFALRISK_GEN_ALL_CORE
For information on Fall & Injury Prevention, visit: [email or web address] OR  [email or web address] OR  https://www.cdc.gov/steadi/patient.html

## 2024-11-29 NOTE — PROGRESS NOTE ADULT - NSPROGADDITIONALINFOA_GEN_ALL_CORE
I have personally interviewed and examined this patient, reviewed pertinent clinical information, and performed the evaluation and management services provided at today's visit for inpatient medical follow up    I am available to discuss any issues related to the medical care of this patient on the unit this morning, through [employer] Teams Chat or by phone at [phone]
I have personally interviewed and examined this patient, reviewed pertinent clinical information, and performed the evaluation and management services provided at today's visit for inpatient medical follow up    I am available to discuss any issues related to the medical care of this patient on the unit this morning, through [employer] Teams Chat or by phone at [phone]

## 2024-11-29 NOTE — PROGRESS NOTE ADULT - PROVIDER SPECIALTY LIST ADULT
Hospitalist
Nephrology
Physiatry
Rehab Medicine
Rehab Medicine
Hospitalist
Nephrology
Nephrology
Physiatry
Hospitalist
Nephrology
Neuropsychology
Physiatry
Rehab Medicine
Hospitalist
Hospitalist
Physiatry
Hospitalist
Hospitalist
Physiatry
Hospitalist
Physiatry

## 2024-11-29 NOTE — DISCHARGE NOTE NURSING/CASE MANAGEMENT/SOCIAL WORK - FINANCIAL ASSISTANCE
[facility] provides services at a reduced cost to those who are determined to be eligible through [facility]’s financial assistance program. Information regarding [facility]’s financial assistance program can be found by going to [email or web address] or by calling [phone].

## 2024-11-29 NOTE — PROGRESS NOTE ADULT - CONSTITUTIONAL COMMENTS
alert, NAD O x 3-4
alert, restless but redirectable. Reduced complex reasoning
Patient sitting in WC, legs were calm bilaterally less anxious, especially with family present
alert, NAD, less labile and frustrated
drowsy but arousable, reduced simple attention. Does answer questions approriately. Mood fair, not anxious but appears sl withdrawn. No repsiratory distress, on RA
mood fair, appears sl tired today, cooperative. Less distressed by pain
alert, mood fair, stable, anxiety and overall pain significantly improved from admission O x 3-4 although some deficits with more complex reasoning
alert, mood overall improved, O x 3-4
alert, sustained arousal, mood improved NAD afebrile Occ cough

## 2024-11-29 NOTE — PROGRESS NOTE ADULT - ASSESSMENT
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 These findings are compatible with an acute infarct. No hemorrhagic transformation is seen. No significant shift or herniation is seen.  - ASA 81 po qd added by cardiology   - Statin dc 11/15, per patient refusal. She is aware of increased risk of recurrent stroke without statin  - Continue comprehensive PT, OT, SLP program 3 hours daily 5 x week  - Precautions: cardiac, fall, AC    # frustration, irritability, anxiety, exacerbated by pain  - neuropsychology support  - rec therapy  - improved significantly from admission    # restless leg syndrome  - requip 1 mg qhs   - gabapentin 100/300 q 10 AM/10 PM   - Mg 2.1 11/18  - Fe level 43 11/18. 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  - ROM, shoulder stabilization exercises  - warm compress PRN  - lidoderm patch    # Paroxysmal A-fib  – metoprolol tartrate 25 mg bid  – Eliquis 5 mg twice daily  - HR 55-71 11/29    # Possible TARAH  - was noted to be snoring loudly  - sleep study outpatient  - may also be contributing to daytime somnolence  - O2 sat % 11/29    # Chronic diastolic heart failure  # Pulm HTN  # cough  - ECHO:  EF of 55 to 60% - severe pulmonary HTN   – spironolactone held due to ANNALEE 11/15  - furosemide 40 mg  restarted 11/18  - Aldactone daily reordered by hospitalist 11/20  – metoprolol tartrate 25 mg twice daily  - home O2: 3L oxygen via NC overnight   - BP  (129/78 - 138/82) 11/29    # HLD  - On Livalo 2mg at home, allergic to Repatha  – Lipitor dc per patient's wishes after education re: risk recurrent stroke. Family agreeable  -  education on Heart Healthy diet     # Gouty flare: resolved  -  completed short course prednisone  - patient declines allopurinol at this time due to SE and infrequent and mild flares  - dietary education to reduce consumption foods that may increase uric acid formation    # Hypothyroidism  - Synthroid 25mcg AM    # hypokalemia  - repleted  - K+ 3.8 11/25. K+ 11/29 pending    # ANNALEE  - Hospitalist appreciated. Hold lasix and spironolactone 11/15  - renal consult appreciated 11/15. Hold diuretics.   - furosemide restarted 11/18  - BUn/Cr 31/1.5 11/15 -->34/1.18 11/25  - Encourage po fluids. BMP 11/29 pending    # Pain control  - Tylenol PRN  - gabapentin  - lidoderm patch  - requip    # GI/Bowel Mgmt   - senna, miralax  - add stewed prunes    #   - toileting program, scheduled q 4 hours during waking  hours leander with restart diuretics    # FEN   - [facility] 11/19--. soft and bite sized with mildly thick liquids via single sip  - Repeat [facility] 11/29    # DVT prophylaxis  – On Eliquis    # Case discussed in IDT rounds 11/25  (follow up)  - Family provided ANABELLA choices, soft-bite sized, language WFL, mild dysarthria, reduced carryover cognitive strategies, supervision eating and oral hygiene, mod assist toilet hygiene, min assist UB/mod LB, min footwear with adaptive equipment, mod assist shower transfers, +left mendoza inattention , left lateral lean, difficulty dual tasking,  reduced organization, min assist bed mobility and transfers, ambulates 90 feet with RW and Cg/min assist, 50 feet with rollator and min assist, negotiates 4 steps bilateral HR and min assist, reduced safety awareness and balance and frequent incontinence,   - barriers:  left HP, mendoza inattention, mild cognitive deficits, reduced balance, pain, bilateral RTC syndrome  - adjusted goals: "get home and in my own bed" "Yoselin"  ambulate to bathroom with RW and CS (not met), new goal: ambulate to bathroom with RW and CG,  attend to left with visual compensatory strategies to perform dressing tasks with supervision (progressing)   - adjusted target: ANABELLA 11/29, in progress. REviewed with family and patient who are agreeable    # LABS  [facility] 11/29  MBS 11/29      Daughter: Tonia [phone]  Son ": Mode [phone]      Praful Holloway  Cardiovascular Disease  [address] [place] [address]  Phone: [phone]  Fax: [phone]  Follow Up Time:    Pulmonology   Needs outpatient sleep study   89 year-old female with a PMH HTN, HLD, hypothyroidism, PAF, chronic diastolic HF, CAD s/p PCI, PE with factor V Leiden, severe pulmonary hypertension who presented to  11/11/24 with left facial droop, left sided weakness secondary to right corona radiata CVA    # 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 These findings are compatible with an acute infarct. No hemorrhagic transformation is seen. No significant shift or herniation is seen.  - ASA 81 po qd added by cardiology   - Statin dc 11/15, per patient refusal. She is aware of increased risk of recurrent stroke without statin  - Continue comprehensive PT, OT, SLP program 3 hours daily 5 x week  - Precautions: cardiac, fall, AC    # frustration, irritability, anxiety, exacerbated by pain  - neuropsychology support  - rec therapy  - improved significantly from admission    # restless leg syndrome  - requip 1 mg qhs   - gabapentin 100/300 q 10 AM/10 PM   - Mg 2.1 11/18  - Fe level 43 11/18. 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  - ROM, shoulder stabilization exercises  - warm compress PRN  - lidoderm patch    # Paroxysmal A-fib  – metoprolol tartrate 25 mg bid  – Eliquis 5 mg twice daily  - HR 55-71 11/29    # Possible TARAH  - was noted to be snoring loudly  - sleep study outpatient  - may also be contributing to daytime somnolence  - O2 sat % 11/29    # Chronic diastolic heart failure  # Pulm HTN  # cough  - ECHO:  EF of 55 to 60% - severe pulmonary HTN   – spironolactone held due to ANNALEE 11/15  - furosemide 40 mg  restarted 11/18  - Aldactone daily reordered by hospitalist 11/20  – metoprolol tartrate 25 mg twice daily  - home O2: 3L oxygen via NC overnight   - BP  (129/78 - 138/82) 11/29    # HLD  - On Livalo 2mg at home, allergic to Repatha  – Lipitor dc per patient's wishes after education re: risk recurrent stroke. Family agreeable  -  education on Heart Healthy diet     # Gouty flare:   -  completed short course prednisone. Has mild recurrence, will resume 10 mg daily x 3 days as discussed with hospitalist, f/u response/duration in ANABELLA  - patient declines allopurinol at this time due to SE and infrequent and mild flares  - dietary education to reduce consumption foods that may increase uric acid formation    # Hypothyroidism  - Synthroid 25mcg AM    # hypokalemia  - repleted  - K+ 3.8 11/25.--> 3. 6 11/29  - f/u PCP    # ANNALEE  - Hospitalist appreciated. Hold lasix and spironolactone 11/15  - renal consult appreciated 11/15. Hold diuretics.   - furosemide restarted 11/18  - BUn/Cr 31/1.5 11/15 -->34/1.18 11/25--> 29/1.13 11/29, improved  - Encourage po fluids.    # Pain control  - Tylenol PRN  - gabapentin  - lidoderm patch  - requip    # GI/Bowel Mgmt   - senna, miralax  - add stewed prunes    #   - toileting program, scheduled q 4 hours during waking  hours leander with restart diuretics    # FEN   - MBS 11/19--. soft and bite sized with mildly thick liquids via single sip. Patient is now cleared for THIN liquids with single sip  - Repeat MBS 11/29, upgraded liquids as above    # DVT prophylaxis  – On Eliquis    # Case discussed in IDT rounds 11/25  (follow up)  - Family provided ANABELLA choices, soft-bite sized, language WFL, mild dysarthria, reduced carryover cognitive strategies, supervision eating and oral hygiene, mod assist toilet hygiene, min assist UB/mod LB, min footwear with adaptive equipment, mod assist shower transfers, +left mendoza inattention , left lateral lean, difficulty dual tasking,  reduced organization, min assist bed mobility and transfers, ambulates 90 feet with RW and Cg/min assist, 50 feet with rollator and min assist, negotiates 4 steps bilateral HR and min assist, reduced safety awareness and balance and frequent incontinence,   - barriers:  left HP, mendoza inattention, mild cognitive deficits, reduced balance, pain, bilateral RTC syndrome  - adjusted goals: "get home and in my own bed" "Yoselin"  ambulate to bathroom with RW and CS (not met), new goal: ambulate to bathroom with RW and CG,  attend to left with visual compensatory strategies to perform dressing tasks with supervision (progressing)   - Patient is medically cleared for transfer to [facility] today for continued PT OT SLP services. Recommend monitoring right toe symptoms for duration prednisone use as discussed with hospitalist. Time spent for evaluation, management, discussions with consultants, coordination dc 45 min      Daughter: Tonia [phone]  Son ": Mode [phone]      Praful Holloway  Cardiovascular Disease  [address] [place] [address]  Phone: [phone]  Fax: [phone]  Follow Up Time:    Pulmonology   Needs outpatient sleep study

## 2024-11-29 NOTE — PROGRESS NOTE ADULT - COMMENTS
Patient reports some recurrence of right toe pain since yesterday. Mild, but persistent, and wishes to go back on "the gout medicine" [steroid] She does not complain of left calf pain/myalgia from statin today, and does continue to take her tart cherry juice. Discussed with hosptialist, will restart prednison3 x 3 more days (received 3 prior) and monitor at [facility].    Spoke with SLP, diet post MBS upgraded to allow thin liquids with single sips

## 2024-11-29 NOTE — PROGRESS NOTE ADULT - MOTOR
right hallux: no erythema or warmth  no pain with resisted extension or flexion of toe, or with AROM  no pedal edema  no calf TTP bilaterally
bilateral calves soft no TTP  right shoulder 3-/5 (RTC tear) right elbow flexor/ext and wrist flex/ext grasp 5/5.   left elbow flexor 4/5 extensor 4/5 gross grasp 4-/5  left HF 4/5 quad 4+/5
left HF 4/5 quad 4+/5 ham 4-/5 ankle DF 4+/5 PF 4/5  no calf swelling, denies any pain with palpation, although after exam she complained of exacerbation pain and requested tylenol  normal tone and ROM
+TTP palpation right 1st MTP joing  mild discomfort with resisted right foot DF and PF at great toe MTP  no calf TTP bilaterally  left HF 4/5 quad 4+/5 ham 4-/5 ankle DF 4+/5 PF 4/5
mild TTP left calf but significantly improved. no ecchymoses, no erythema, no swelling  left elbow flexion 4/5 extension 4+/5 gross grasp 4/5  left HF 4+/5 quad 5-/5 ankle DF 4/5 PF 4+/5
no calf swelling or pedal edema  calves soft no tTP  right shoulder 3-/5 right elbow flexor/ext and wrist flex/ext grasp 5/5.   left elbow flexor 4/5 extensor 4/5 gross grasp 4-/5  left HF 4+/5 quad 5/5 ankle PF and DF 5/  . RLE 5/5
calve: restlessness in LE increased when examining legs, not sustained and patient is distractible  no swelling +persistent mild TTP calves left > right  no cord palpated, no erythema
no erythema or warmth  no ecchymoses, no pedal edema  reduced pain with palpation and ROM legs, no restlessness today

## 2025-01-31 RX ORDER — OSELTAMIVIR PHOSPHATE 75 MG/1
1 CAPSULE ORAL
Refills: 0 | DISCHARGE
Start: 2025-01-31

## 2025-02-01 ENCOUNTER — NON-APPOINTMENT (OUTPATIENT)
Age: 89
End: 2025-02-01

## 2025-02-01 ENCOUNTER — INPATIENT (INPATIENT)
Facility: HOSPITAL | Age: 89
LOS: 8 days | Discharge: NURSING, REHAB OR LONG-TERM CARE | DRG: 189 | End: 2025-02-10
Attending: STUDENT IN AN ORGANIZED HEALTH CARE EDUCATION/TRAINING PROGRAM | Admitting: STUDENT IN AN ORGANIZED HEALTH CARE EDUCATION/TRAINING PROGRAM
Payer: MEDICARE

## 2025-02-01 VITALS
RESPIRATION RATE: 18 BRPM | OXYGEN SATURATION: 90 % | HEART RATE: 79 BPM | DIASTOLIC BLOOD PRESSURE: 54 MMHG | TEMPERATURE: 100 F | SYSTOLIC BLOOD PRESSURE: 128 MMHG

## 2025-02-01 DIAGNOSIS — Z90.710 ACQUIRED ABSENCE OF BOTH CERVIX AND UTERUS: Chronic | ICD-10-CM

## 2025-02-01 DIAGNOSIS — J96.01 ACUTE RESPIRATORY FAILURE WITH HYPOXIA: ICD-10-CM

## 2025-02-01 DIAGNOSIS — Z98.890 OTHER SPECIFIED POSTPROCEDURAL STATES: Chronic | ICD-10-CM

## 2025-02-01 DIAGNOSIS — N60.09 SOLITARY CYST OF UNSPECIFIED BREAST: Chronic | ICD-10-CM

## 2025-02-01 LAB
ALBUMIN SERPL ELPH-MCNC: 2.6 G/DL — LOW (ref 3.3–5)
ALP SERPL-CCNC: 58 U/L — SIGNIFICANT CHANGE UP (ref 40–120)
ALT FLD-CCNC: 13 U/L — SIGNIFICANT CHANGE UP (ref 12–78)
ANION GAP SERPL CALC-SCNC: 4 MMOL/L — LOW (ref 5–17)
APPEARANCE UR: CLEAR — SIGNIFICANT CHANGE UP
APTT BLD: 37 SEC — HIGH (ref 24.5–35.6)
AST SERPL-CCNC: 31 U/L — SIGNIFICANT CHANGE UP (ref 15–37)
BACTERIA # UR AUTO: NEGATIVE /HPF — SIGNIFICANT CHANGE UP
BASOPHILS # BLD AUTO: 0.06 K/UL — SIGNIFICANT CHANGE UP (ref 0–0.2)
BASOPHILS NFR BLD AUTO: 0.5 % — SIGNIFICANT CHANGE UP (ref 0–2)
BILIRUB SERPL-MCNC: 0.5 MG/DL — SIGNIFICANT CHANGE UP (ref 0.2–1.2)
BILIRUB UR-MCNC: NEGATIVE — SIGNIFICANT CHANGE UP
BUN SERPL-MCNC: 22 MG/DL — SIGNIFICANT CHANGE UP (ref 7–23)
CALCIUM SERPL-MCNC: 9.4 MG/DL — SIGNIFICANT CHANGE UP (ref 8.5–10.1)
CAST: 0 /LPF — SIGNIFICANT CHANGE UP (ref 0–4)
CHLORIDE SERPL-SCNC: 98 MMOL/L — SIGNIFICANT CHANGE UP (ref 96–108)
CO2 SERPL-SCNC: 27 MMOL/L — SIGNIFICANT CHANGE UP (ref 22–31)
COLOR SPEC: YELLOW — SIGNIFICANT CHANGE UP
CREAT SERPL-MCNC: 1.43 MG/DL — HIGH (ref 0.5–1.3)
DIFF PNL FLD: ABNORMAL
EGFR: 35 ML/MIN/1.73M2 — LOW
EOSINOPHIL # BLD AUTO: 0.32 K/UL — SIGNIFICANT CHANGE UP (ref 0–0.5)
EOSINOPHIL NFR BLD AUTO: 2.7 % — SIGNIFICANT CHANGE UP (ref 0–6)
FLUAV AG NPH QL: SIGNIFICANT CHANGE UP
FLUBV AG NPH QL: SIGNIFICANT CHANGE UP
GLUCOSE SERPL-MCNC: 121 MG/DL — HIGH (ref 70–99)
GLUCOSE UR QL: NEGATIVE MG/DL — SIGNIFICANT CHANGE UP
HCT VFR BLD CALC: 36.3 % — SIGNIFICANT CHANGE UP (ref 34.5–45)
HGB BLD-MCNC: 11.9 G/DL — SIGNIFICANT CHANGE UP (ref 11.5–15.5)
IMM GRANULOCYTES NFR BLD AUTO: 0.6 % — SIGNIFICANT CHANGE UP (ref 0–0.9)
INR BLD: 2.36 RATIO — HIGH (ref 0.85–1.16)
KETONES UR-MCNC: NEGATIVE MG/DL — SIGNIFICANT CHANGE UP
LACTATE SERPL-SCNC: 1.5 MMOL/L — SIGNIFICANT CHANGE UP (ref 0.7–2)
LEUKOCYTE ESTERASE UR-ACNC: ABNORMAL
LYMPHOCYTES # BLD AUTO: 0.88 K/UL — LOW (ref 1–3.3)
LYMPHOCYTES # BLD AUTO: 7.3 % — LOW (ref 13–44)
MCHC RBC-ENTMCNC: 29.8 PG — SIGNIFICANT CHANGE UP (ref 27–34)
MCHC RBC-ENTMCNC: 32.8 G/DL — SIGNIFICANT CHANGE UP (ref 32–36)
MCV RBC AUTO: 91 FL — SIGNIFICANT CHANGE UP (ref 80–100)
MONOCYTES # BLD AUTO: 1.03 K/UL — HIGH (ref 0–0.9)
MONOCYTES NFR BLD AUTO: 8.5 % — SIGNIFICANT CHANGE UP (ref 2–14)
NEUTROPHILS # BLD AUTO: 9.7 K/UL — HIGH (ref 1.8–7.4)
NEUTROPHILS NFR BLD AUTO: 80.4 % — HIGH (ref 43–77)
NITRITE UR-MCNC: NEGATIVE — SIGNIFICANT CHANGE UP
NT-PROBNP SERPL-SCNC: 2642 PG/ML — HIGH (ref 0–450)
PH UR: 6 — SIGNIFICANT CHANGE UP (ref 5–8)
PLATELET # BLD AUTO: 462 K/UL — HIGH (ref 150–400)
POTASSIUM SERPL-MCNC: 4.5 MMOL/L — SIGNIFICANT CHANGE UP (ref 3.5–5.3)
POTASSIUM SERPL-SCNC: 4.5 MMOL/L — SIGNIFICANT CHANGE UP (ref 3.5–5.3)
PROT SERPL-MCNC: 7.9 GM/DL — SIGNIFICANT CHANGE UP (ref 6–8.3)
PROT UR-MCNC: NEGATIVE MG/DL — SIGNIFICANT CHANGE UP
PROTHROM AB SERPL-ACNC: 26.9 SEC — HIGH (ref 9.9–13.4)
RBC # BLD: 3.99 M/UL — SIGNIFICANT CHANGE UP (ref 3.8–5.2)
RBC # FLD: 13.5 % — SIGNIFICANT CHANGE UP (ref 10.3–14.5)
RBC CASTS # UR COMP ASSIST: 4 /HPF — SIGNIFICANT CHANGE UP (ref 0–4)
RSV RNA NPH QL NAA+NON-PROBE: SIGNIFICANT CHANGE UP
SARS-COV-2 RNA SPEC QL NAA+PROBE: SIGNIFICANT CHANGE UP
SODIUM SERPL-SCNC: 129 MMOL/L — LOW (ref 135–145)
SP GR SPEC: 1.01 — SIGNIFICANT CHANGE UP (ref 1–1.03)
SQUAMOUS # UR AUTO: 2 /HPF — SIGNIFICANT CHANGE UP (ref 0–5)
TROPONIN I, HIGH SENSITIVITY RESULT: 26.45 NG/L — SIGNIFICANT CHANGE UP
UROBILINOGEN FLD QL: 0.2 MG/DL — SIGNIFICANT CHANGE UP (ref 0.2–1)
WBC # BLD: 12.06 K/UL — HIGH (ref 3.8–10.5)
WBC # FLD AUTO: 12.06 K/UL — HIGH (ref 3.8–10.5)
WBC UR QL: 2 /HPF — SIGNIFICANT CHANGE UP (ref 0–5)

## 2025-02-01 PROCEDURE — 71045 X-RAY EXAM CHEST 1 VIEW: CPT | Mod: 26

## 2025-02-01 PROCEDURE — 87040 BLOOD CULTURE FOR BACTERIA: CPT

## 2025-02-01 PROCEDURE — 80048 BASIC METABOLIC PNL TOTAL CA: CPT

## 2025-02-01 PROCEDURE — 93005 ELECTROCARDIOGRAM TRACING: CPT

## 2025-02-01 PROCEDURE — 80061 LIPID PANEL: CPT

## 2025-02-01 PROCEDURE — 80053 COMPREHEN METABOLIC PANEL: CPT

## 2025-02-01 PROCEDURE — 84484 ASSAY OF TROPONIN QUANT: CPT

## 2025-02-01 PROCEDURE — 85025 COMPLETE CBC W/AUTO DIFF WBC: CPT

## 2025-02-01 PROCEDURE — 83036 HEMOGLOBIN GLYCOSYLATED A1C: CPT

## 2025-02-01 PROCEDURE — 97530 THERAPEUTIC ACTIVITIES: CPT | Mod: GP

## 2025-02-01 PROCEDURE — 85652 RBC SED RATE AUTOMATED: CPT

## 2025-02-01 PROCEDURE — 84100 ASSAY OF PHOSPHORUS: CPT

## 2025-02-01 PROCEDURE — 86140 C-REACTIVE PROTEIN: CPT

## 2025-02-01 PROCEDURE — 97116 GAIT TRAINING THERAPY: CPT | Mod: GP

## 2025-02-01 PROCEDURE — 36415 COLL VENOUS BLD VENIPUNCTURE: CPT

## 2025-02-01 PROCEDURE — 97163 PT EVAL HIGH COMPLEX 45 MIN: CPT | Mod: GP

## 2025-02-01 PROCEDURE — 94640 AIRWAY INHALATION TREATMENT: CPT

## 2025-02-01 PROCEDURE — 80202 ASSAY OF VANCOMYCIN: CPT

## 2025-02-01 PROCEDURE — 85027 COMPLETE CBC AUTOMATED: CPT

## 2025-02-01 PROCEDURE — 99285 EMERGENCY DEPT VISIT HI MDM: CPT

## 2025-02-01 PROCEDURE — 71045 X-RAY EXAM CHEST 1 VIEW: CPT

## 2025-02-01 PROCEDURE — 85610 PROTHROMBIN TIME: CPT

## 2025-02-01 PROCEDURE — 71275 CT ANGIOGRAPHY CHEST: CPT | Mod: 26

## 2025-02-01 PROCEDURE — 84145 PROCALCITONIN (PCT): CPT

## 2025-02-01 PROCEDURE — 93010 ELECTROCARDIOGRAM REPORT: CPT

## 2025-02-01 PROCEDURE — 85730 THROMBOPLASTIN TIME PARTIAL: CPT

## 2025-02-01 PROCEDURE — 83735 ASSAY OF MAGNESIUM: CPT

## 2025-02-01 PROCEDURE — 82962 GLUCOSE BLOOD TEST: CPT

## 2025-02-01 RX ORDER — GABAPENTIN 800 MG/1
300 TABLET ORAL ONCE
Refills: 0 | Status: COMPLETED | OUTPATIENT
Start: 2025-02-01 | End: 2025-02-01

## 2025-02-01 RX ORDER — ROPINIROLE 0.5 MG/1
1 TABLET, FILM COATED ORAL ONCE
Refills: 0 | Status: COMPLETED | OUTPATIENT
Start: 2025-02-01 | End: 2025-02-01

## 2025-02-01 RX ORDER — IPRATROPIUM BROMIDE AND ALBUTEROL SULFATE .5; 2.5 MG/3ML; MG/3ML
3 SOLUTION RESPIRATORY (INHALATION)
Refills: 0 | Status: COMPLETED | OUTPATIENT
Start: 2025-02-01 | End: 2025-02-01

## 2025-02-01 RX ORDER — FAMOTIDINE 10 MG/ML
40 INJECTION INTRAVENOUS ONCE
Refills: 0 | Status: COMPLETED | OUTPATIENT
Start: 2025-02-01 | End: 2025-02-01

## 2025-02-01 RX ORDER — MEROPENEM 500 MG/20ML
1000 INJECTION INTRAVENOUS ONCE
Refills: 0 | Status: COMPLETED | OUTPATIENT
Start: 2025-02-01 | End: 2025-02-01

## 2025-02-01 RX ORDER — APIXABAN 5 MG/1
5 TABLET, FILM COATED ORAL ONCE
Refills: 0 | Status: COMPLETED | OUTPATIENT
Start: 2025-02-01 | End: 2025-02-01

## 2025-02-01 RX ORDER — METOPROLOL SUCCINATE 25 MG
25 TABLET, EXTENDED RELEASE 24 HR ORAL ONCE
Refills: 0 | Status: COMPLETED | OUTPATIENT
Start: 2025-02-01 | End: 2025-02-01

## 2025-02-01 RX ORDER — METHYLPREDNISOLONE ACETATE 40 MG/ML
125 VIAL (ML) INJECTION ONCE
Refills: 0 | Status: COMPLETED | OUTPATIENT
Start: 2025-02-01 | End: 2025-02-01

## 2025-02-01 RX ORDER — MEROPENEM 500 MG/20ML
1000 INJECTION INTRAVENOUS ONCE
Refills: 0 | Status: DISCONTINUED | OUTPATIENT
Start: 2025-02-01 | End: 2025-02-01

## 2025-02-01 RX ADMIN — ROPINIROLE 1 MILLIGRAM(S): 0.5 TABLET, FILM COATED ORAL at 23:05

## 2025-02-01 RX ADMIN — Medication 125 MILLIGRAM(S): at 15:44

## 2025-02-01 RX ADMIN — Medication 25 MILLIGRAM(S): at 21:51

## 2025-02-01 RX ADMIN — GABAPENTIN 300 MILLIGRAM(S): 800 TABLET ORAL at 21:51

## 2025-02-01 RX ADMIN — IPRATROPIUM BROMIDE AND ALBUTEROL SULFATE 3 MILLILITER(S): .5; 2.5 SOLUTION RESPIRATORY (INHALATION) at 16:30

## 2025-02-01 RX ADMIN — IPRATROPIUM BROMIDE AND ALBUTEROL SULFATE 3 MILLILITER(S): .5; 2.5 SOLUTION RESPIRATORY (INHALATION) at 15:44

## 2025-02-01 RX ADMIN — IPRATROPIUM BROMIDE AND ALBUTEROL SULFATE 3 MILLILITER(S): .5; 2.5 SOLUTION RESPIRATORY (INHALATION) at 16:11

## 2025-02-01 RX ADMIN — MEROPENEM 1000 MILLIGRAM(S): 500 INJECTION INTRAVENOUS at 21:50

## 2025-02-01 RX ADMIN — FAMOTIDINE 40 MILLIGRAM(S): 10 INJECTION INTRAVENOUS at 21:51

## 2025-02-01 RX ADMIN — APIXABAN 5 MILLIGRAM(S): 5 TABLET, FILM COATED ORAL at 23:05

## 2025-02-01 NOTE — ED PROVIDER NOTE - OBJECTIVE STATEMENT
88 y/o female with PMHx of  HTN, HLD, stroke chronic diastolic HF, paroxysmal atrial fibrillation (on anticoagulation, s/p ablation), CAD status post PCI to the LAD 2017 with repeat cardiac catheterization in 3/2022 showing nonobstructive coronary disease with patent stents, severe pulmonary hypertension, mitral valve regurgitation, history of PE with factor V Leiden, hypothyroidism; presents to ED with daughter c/o difficulty breathing and weakness. Daughter reports labored breathing and states patient has been having difficulty ambulating with her walker. Possible fever overnight, pt woke up diaphoretic. Pt seen by Pulm yesterday for sx due to no improvement with interventions at home. Seen at  today for sx, blood O2 level was 80-83, advised to be seen in ED for further eval. Daughter also concerned about  possibly infected toenail on her L great toe. She denies lower extremity swelling, chest pain, abdominal pain, chills, nausea, vomiting, diarrhea. 88 y/o female with PMHx of  HTN, HLD, stroke (11/2024), chronic diastolic HF, paroxysmal atrial fibrillation (on anticoagulation, s/p ablation), CAD status post PCI to the LAD 2017 with repeat cardiac catheterization in 3/2022 showing nonobstructive coronary disease with patent stents, severe pulmonary hypertension, mitral valve regurgitation, history of PE with factor V Leiden, hypothyroidism; presents to ED with daughter c/o difficulty breathing and weakness. Daughter reports labored breathing and states patient has been having difficulty ambulating with her walker. Possible fever overnight, pt woke up diaphoretic. Pt seen by Pulm yesterday for sx due to no improvement with interventions at home. Seen at  today for sx, blood O2 level was 80-83, advised to be seen in ED for further eval. Daughter also concerned about  possibly infected toenail on her L great toe. She denies lower extremity swelling, chest pain, abdominal pain, chills, nausea, vomiting, diarrhea. 89 y/o female with PMHx of  HTN, HLD, stroke (11/2024), chronic diastolic HF, paroxysmal atrial fibrillation (on anticoagulation, s/p ablation), CAD status post PCI to the LAD 2017 with repeat cardiac catheterization in 3/2022 showing nonobstructive coronary disease with patent stents, severe pulmonary hypertension, mitral valve regurgitation, history of PE with factor V Leiden, hypothyroidism; presents to ED with daughter c/o difficulty breathing and weakness. Daughter reports labored breathing and states patient has been having difficulty ambulating with her walker. Possible fever overnight, pt woke up diaphoretic. Pt seen by Pulm yesterday for sx due to no improvement with interventions at home. Seen at  today for sx, blood O2 level was 80-83, advised to be seen in ED for further eval. She denies lower extremity swelling, chest pain, abdominal pain, chills, nausea, vomiting, diarrhea.

## 2025-02-01 NOTE — ED ADULT NURSE NOTE - NSFALLHARMRISKINTERV_ED_ALL_ED

## 2025-02-01 NOTE — ED PROVIDER NOTE - PHYSICAL EXAMINATION
Const: Well appearing, NAD  Eyes: PERRL, EOM intact  CV: RRR, no murmurs, no chest wall tenderness, distal pulses intact  Resp: B/L wheezing (R>L), normal resp effort  GI: soft, nondistended, nontender  MSK: Full ROM, no muscle or bony deformity or tenderness  Neuro: AOx3, GCS 15, No focal deficits  Skin: No rash, laceration or abrasion  Psych: calm, cooperative.

## 2025-02-01 NOTE — ED ADULT TRIAGE NOTE - CHIEF COMPLAINT QUOTE
Patient presents to the ER from urgent care with complaints of shortness of breath, hypoxia, and cough. 02 88% in triage, called for main bed.

## 2025-02-01 NOTE — ED ADULT NURSE NOTE - OBJECTIVE STATEMENT
89y female presented to the ED with complaints of SOB and cough for the past 6 days. Pt has increased SOB with activity. Pt on 3L nasal canula at baseline. Pt lives at home and  has an aid. Pt hx of stroke  2 months ago, on Eliquis. 89y female presented to the ED with complaints of SOB and cough for the past 6 days. Pt has increased SOB with activity. Pt on 3L nasal canula at baseline. Pt lives at home and  has an aid. Pt hx of stroke  2 months ago, on Eliquis. Pt endorses left great toe pain.

## 2025-02-02 LAB
-  COAGULASE NEGATIVE STAPHYLOCOCCUS: SIGNIFICANT CHANGE UP
A1C WITH ESTIMATED AVERAGE GLUCOSE RESULT: 6.2 % — HIGH (ref 4–5.6)
ALBUMIN SERPL ELPH-MCNC: 2.5 G/DL — LOW (ref 3.3–5)
ALP SERPL-CCNC: 54 U/L — SIGNIFICANT CHANGE UP (ref 40–120)
ALT FLD-CCNC: 9 U/L — LOW (ref 12–78)
ANION GAP SERPL CALC-SCNC: 6 MMOL/L — SIGNIFICANT CHANGE UP (ref 5–17)
APTT BLD: 35.8 SEC — HIGH (ref 24.5–35.6)
AST SERPL-CCNC: 14 U/L — LOW (ref 15–37)
BASOPHILS # BLD AUTO: 0.01 K/UL — SIGNIFICANT CHANGE UP (ref 0–0.2)
BASOPHILS NFR BLD AUTO: 0.1 % — SIGNIFICANT CHANGE UP (ref 0–2)
BILIRUB SERPL-MCNC: 0.4 MG/DL — SIGNIFICANT CHANGE UP (ref 0.2–1.2)
BUN SERPL-MCNC: 24 MG/DL — HIGH (ref 7–23)
CALCIUM SERPL-MCNC: 9.7 MG/DL — SIGNIFICANT CHANGE UP (ref 8.5–10.1)
CHLORIDE SERPL-SCNC: 99 MMOL/L — SIGNIFICANT CHANGE UP (ref 96–108)
CHOLEST SERPL-MCNC: 183 MG/DL — SIGNIFICANT CHANGE UP
CO2 SERPL-SCNC: 26 MMOL/L — SIGNIFICANT CHANGE UP (ref 22–31)
CREAT SERPL-MCNC: 1.2 MG/DL — SIGNIFICANT CHANGE UP (ref 0.5–1.3)
EGFR: 43 ML/MIN/1.73M2 — LOW
EOSINOPHIL # BLD AUTO: 0.01 K/UL — SIGNIFICANT CHANGE UP (ref 0–0.5)
EOSINOPHIL NFR BLD AUTO: 0.1 % — SIGNIFICANT CHANGE UP (ref 0–6)
ESTIMATED AVERAGE GLUCOSE: 131 MG/DL — HIGH (ref 68–114)
GLUCOSE BLDC GLUCOMTR-MCNC: 189 MG/DL — HIGH (ref 70–99)
GLUCOSE BLDC GLUCOMTR-MCNC: 219 MG/DL — HIGH (ref 70–99)
GLUCOSE BLDC GLUCOMTR-MCNC: 229 MG/DL — HIGH (ref 70–99)
GLUCOSE BLDC GLUCOMTR-MCNC: 270 MG/DL — HIGH (ref 70–99)
GLUCOSE SERPL-MCNC: 288 MG/DL — HIGH (ref 70–99)
GRAM STN FLD: ABNORMAL
HCT VFR BLD CALC: 33.8 % — LOW (ref 34.5–45)
HDLC SERPL-MCNC: 43 MG/DL — LOW
HGB BLD-MCNC: 11 G/DL — LOW (ref 11.5–15.5)
IMM GRANULOCYTES NFR BLD AUTO: 0.8 % — SIGNIFICANT CHANGE UP (ref 0–0.9)
INR BLD: 2.06 RATIO — HIGH (ref 0.85–1.16)
LIPID PNL WITH DIRECT LDL SERPL: 125 MG/DL — HIGH
LYMPHOCYTES # BLD AUTO: 0.59 K/UL — LOW (ref 1–3.3)
LYMPHOCYTES # BLD AUTO: 8 % — LOW (ref 13–44)
MCHC RBC-ENTMCNC: 29.3 PG — SIGNIFICANT CHANGE UP (ref 27–34)
MCHC RBC-ENTMCNC: 32.5 G/DL — SIGNIFICANT CHANGE UP (ref 32–36)
MCV RBC AUTO: 89.9 FL — SIGNIFICANT CHANGE UP (ref 80–100)
METHOD TYPE: SIGNIFICANT CHANGE UP
MONOCYTES # BLD AUTO: 0.13 K/UL — SIGNIFICANT CHANGE UP (ref 0–0.9)
MONOCYTES NFR BLD AUTO: 1.8 % — LOW (ref 2–14)
NEUTROPHILS # BLD AUTO: 6.53 K/UL — SIGNIFICANT CHANGE UP (ref 1.8–7.4)
NEUTROPHILS NFR BLD AUTO: 89.2 % — HIGH (ref 43–77)
NON HDL CHOLESTEROL: 140 MG/DL — HIGH
PLATELET # BLD AUTO: 472 K/UL — HIGH (ref 150–400)
POTASSIUM SERPL-MCNC: 4.1 MMOL/L — SIGNIFICANT CHANGE UP (ref 3.5–5.3)
POTASSIUM SERPL-SCNC: 4.1 MMOL/L — SIGNIFICANT CHANGE UP (ref 3.5–5.3)
PROT SERPL-MCNC: 7.3 GM/DL — SIGNIFICANT CHANGE UP (ref 6–8.3)
PROTHROM AB SERPL-ACNC: 24.1 SEC — HIGH (ref 9.9–13.4)
RBC # BLD: 3.76 M/UL — LOW (ref 3.8–5.2)
RBC # FLD: 13.6 % — SIGNIFICANT CHANGE UP (ref 10.3–14.5)
SODIUM SERPL-SCNC: 131 MMOL/L — LOW (ref 135–145)
SPECIMEN SOURCE: SIGNIFICANT CHANGE UP
TRIGL SERPL-MCNC: 80 MG/DL — SIGNIFICANT CHANGE UP
WBC # BLD: 7.33 K/UL — SIGNIFICANT CHANGE UP (ref 3.8–10.5)
WBC # FLD AUTO: 7.33 K/UL — SIGNIFICANT CHANGE UP (ref 3.8–10.5)

## 2025-02-02 PROCEDURE — 99223 1ST HOSP IP/OBS HIGH 75: CPT

## 2025-02-02 RX ORDER — PANTOPRAZOLE 20 MG/1
40 TABLET, DELAYED RELEASE ORAL ONCE
Refills: 0 | Status: COMPLETED | OUTPATIENT
Start: 2025-02-02 | End: 2025-02-02

## 2025-02-02 RX ORDER — INSULIN LISPRO 100/ML
VIAL (ML) SUBCUTANEOUS AT BEDTIME
Refills: 0 | Status: DISCONTINUED | OUTPATIENT
Start: 2025-02-02 | End: 2025-02-10

## 2025-02-02 RX ORDER — ACETAMINOPHEN 160 MG/5ML
650 SUSPENSION ORAL EVERY 6 HOURS
Refills: 0 | Status: DISCONTINUED | OUTPATIENT
Start: 2025-02-02 | End: 2025-02-10

## 2025-02-02 RX ORDER — SENNOSIDES 8.6 MG
2 TABLET ORAL AT BEDTIME
Refills: 0 | Status: DISCONTINUED | OUTPATIENT
Start: 2025-02-02 | End: 2025-02-10

## 2025-02-02 RX ORDER — DM/PSEUDOEPHED/ACETAMINOPHEN 10-30-250
25 CAPSULE ORAL ONCE
Refills: 0 | Status: DISCONTINUED | OUTPATIENT
Start: 2025-02-02 | End: 2025-02-10

## 2025-02-02 RX ORDER — INSULIN LISPRO 100/ML
VIAL (ML) SUBCUTANEOUS
Refills: 0 | Status: DISCONTINUED | OUTPATIENT
Start: 2025-02-02 | End: 2025-02-10

## 2025-02-02 RX ORDER — ASPIRIN 81 MG/1
81 TABLET, COATED ORAL DAILY
Refills: 0 | Status: DISCONTINUED | OUTPATIENT
Start: 2025-02-02 | End: 2025-02-10

## 2025-02-02 RX ORDER — AZTREONAM 500 MG
1000 VIAL (EA) INJECTION EVERY 8 HOURS
Refills: 0 | Status: DISCONTINUED | OUTPATIENT
Start: 2025-02-02 | End: 2025-02-10

## 2025-02-02 RX ORDER — IPRATROPIUM BROMIDE AND ALBUTEROL SULFATE .5; 2.5 MG/3ML; MG/3ML
3 SOLUTION RESPIRATORY (INHALATION) EVERY 6 HOURS
Refills: 0 | Status: DISCONTINUED | OUTPATIENT
Start: 2025-02-02 | End: 2025-02-10

## 2025-02-02 RX ORDER — PITAVASTATIN 1 MG/1
1 TABLET, FILM COATED ORAL
Refills: 0 | DISCHARGE

## 2025-02-02 RX ORDER — GLUCAGON 3 MG/1
1 POWDER NASAL ONCE
Refills: 0 | Status: DISCONTINUED | OUTPATIENT
Start: 2025-02-02 | End: 2025-02-10

## 2025-02-02 RX ORDER — DM/PSEUDOEPHED/ACETAMINOPHEN 10-30-250
12.5 CAPSULE ORAL ONCE
Refills: 0 | Status: DISCONTINUED | OUTPATIENT
Start: 2025-02-02 | End: 2025-02-10

## 2025-02-02 RX ORDER — METHYLPREDNISOLONE ACETATE 40 MG/ML
40 VIAL (ML) INJECTION EVERY 8 HOURS
Refills: 0 | Status: DISCONTINUED | OUTPATIENT
Start: 2025-02-02 | End: 2025-02-05

## 2025-02-02 RX ORDER — SODIUM CHLORIDE 9 G/ML
1000 INJECTION, SOLUTION INTRAVENOUS
Refills: 0 | Status: DISCONTINUED | OUTPATIENT
Start: 2025-02-02 | End: 2025-02-10

## 2025-02-02 RX ORDER — VANCOMYCIN HYDROCHLORIDE 50 MG/ML
1000 KIT ORAL EVERY 24 HOURS
Refills: 0 | Status: DISCONTINUED | OUTPATIENT
Start: 2025-02-02 | End: 2025-02-05

## 2025-02-02 RX ORDER — ALBUTEROL 90 MCG
2 AEROSOL REFILL (GRAM) INHALATION
Refills: 0 | DISCHARGE

## 2025-02-02 RX ORDER — APIXABAN 5 MG/1
5 TABLET, FILM COATED ORAL
Refills: 0 | Status: DISCONTINUED | OUTPATIENT
Start: 2025-02-02 | End: 2025-02-10

## 2025-02-02 RX ORDER — ROPINIROLE 0.5 MG/1
1 TABLET, FILM COATED ORAL AT BEDTIME
Refills: 0 | Status: DISCONTINUED | OUTPATIENT
Start: 2025-02-02 | End: 2025-02-10

## 2025-02-02 RX ORDER — LEVOTHYROXINE SODIUM 25 UG/1
25 TABLET ORAL DAILY
Refills: 0 | Status: DISCONTINUED | OUTPATIENT
Start: 2025-02-02 | End: 2025-02-10

## 2025-02-02 RX ORDER — GABAPENTIN 800 MG/1
300 TABLET ORAL AT BEDTIME
Refills: 0 | Status: DISCONTINUED | OUTPATIENT
Start: 2025-02-02 | End: 2025-02-10

## 2025-02-02 RX ORDER — DEXTROMETHORPHAN HBR AND GUAIFENESIN ORAL SOLUTION 10; 100 MG/5ML; MG/5ML
10 LIQUID ORAL EVERY 6 HOURS
Refills: 0 | Status: DISCONTINUED | OUTPATIENT
Start: 2025-02-02 | End: 2025-02-10

## 2025-02-02 RX ORDER — GABAPENTIN 800 MG/1
100 TABLET ORAL DAILY
Refills: 0 | Status: DISCONTINUED | OUTPATIENT
Start: 2025-02-02 | End: 2025-02-10

## 2025-02-02 RX ORDER — METOPROLOL SUCCINATE 25 MG
12.5 TABLET, EXTENDED RELEASE 24 HR ORAL
Refills: 0 | Status: DISCONTINUED | OUTPATIENT
Start: 2025-02-02 | End: 2025-02-10

## 2025-02-02 RX ORDER — DM/PSEUDOEPHED/ACETAMINOPHEN 10-30-250
15 CAPSULE ORAL ONCE
Refills: 0 | Status: DISCONTINUED | OUTPATIENT
Start: 2025-02-02 | End: 2025-02-10

## 2025-02-02 RX ORDER — POLYETHYLENE GLYCOL 3350 17 G/17G
17 POWDER, FOR SOLUTION ORAL DAILY
Refills: 0 | Status: DISCONTINUED | OUTPATIENT
Start: 2025-02-02 | End: 2025-02-10

## 2025-02-02 RX ADMIN — VANCOMYCIN HYDROCHLORIDE 250 MILLIGRAM(S): KIT at 05:14

## 2025-02-02 RX ADMIN — ROPINIROLE 1 MILLIGRAM(S): 0.5 TABLET, FILM COATED ORAL at 21:26

## 2025-02-02 RX ADMIN — IPRATROPIUM BROMIDE AND ALBUTEROL SULFATE 3 MILLILITER(S): .5; 2.5 SOLUTION RESPIRATORY (INHALATION) at 13:28

## 2025-02-02 RX ADMIN — IPRATROPIUM BROMIDE AND ALBUTEROL SULFATE 3 MILLILITER(S): .5; 2.5 SOLUTION RESPIRATORY (INHALATION) at 22:16

## 2025-02-02 RX ADMIN — Medication 40 MILLIGRAM(S): at 14:41

## 2025-02-02 RX ADMIN — Medication 50 MILLIGRAM(S): at 16:38

## 2025-02-02 RX ADMIN — Medication 25 MILLIGRAM(S): at 09:23

## 2025-02-02 RX ADMIN — Medication 40 MILLIGRAM(S): at 21:28

## 2025-02-02 RX ADMIN — Medication 12.5 MILLIGRAM(S): at 21:27

## 2025-02-02 RX ADMIN — Medication 2 TABLET(S): at 21:26

## 2025-02-02 RX ADMIN — Medication 2: at 20:07

## 2025-02-02 RX ADMIN — GABAPENTIN 300 MILLIGRAM(S): 800 TABLET ORAL at 21:25

## 2025-02-02 RX ADMIN — Medication 40 MILLIGRAM(S): at 09:23

## 2025-02-02 RX ADMIN — IPRATROPIUM BROMIDE AND ALBUTEROL SULFATE 3 MILLILITER(S): .5; 2.5 SOLUTION RESPIRATORY (INHALATION) at 08:32

## 2025-02-02 RX ADMIN — APIXABAN 5 MILLIGRAM(S): 5 TABLET, FILM COATED ORAL at 09:23

## 2025-02-02 RX ADMIN — DEXTROMETHORPHAN HBR AND GUAIFENESIN ORAL SOLUTION 10 MILLILITER(S): 10; 100 LIQUID ORAL at 09:23

## 2025-02-02 RX ADMIN — ACETAMINOPHEN 650 MILLIGRAM(S): 160 SUSPENSION ORAL at 09:25

## 2025-02-02 RX ADMIN — Medication 12.5 MILLIGRAM(S): at 09:23

## 2025-02-02 RX ADMIN — PANTOPRAZOLE 40 MILLIGRAM(S): 20 TABLET, DELAYED RELEASE ORAL at 23:44

## 2025-02-02 RX ADMIN — Medication 40 MILLIGRAM(S): at 05:14

## 2025-02-02 RX ADMIN — LEVOTHYROXINE SODIUM 25 MICROGRAM(S): 25 TABLET ORAL at 05:14

## 2025-02-02 RX ADMIN — Medication 6: at 14:41

## 2025-02-02 RX ADMIN — Medication 50 MILLIGRAM(S): at 21:30

## 2025-02-02 RX ADMIN — Medication 1 DROP(S): at 22:40

## 2025-02-02 RX ADMIN — ACETAMINOPHEN 650 MILLIGRAM(S): 160 SUSPENSION ORAL at 10:25

## 2025-02-02 RX ADMIN — APIXABAN 5 MILLIGRAM(S): 5 TABLET, FILM COATED ORAL at 21:26

## 2025-02-02 RX ADMIN — Medication 50 MILLIGRAM(S): at 09:22

## 2025-02-02 RX ADMIN — GABAPENTIN 100 MILLIGRAM(S): 800 TABLET ORAL at 09:22

## 2025-02-02 NOTE — CONSULT NOTE ADULT - ASSESSMENT
Assessment:  89F with HTN, dyslipidemia, CVA, chronic diastolic CHF, paroxysmal a-fib s/p ablation (on AC), CAD s/p PCI, COPD (on 3L home O2 at night), severe pulm HTN, Mitral valve regurg, PE, Factor V leiden, hypothyroidism presents 2/1 with dyspnea, reports that it had gotten worse for the past few days, including worsening cough with clear sputum  Denies any fever or chills, no abdominal pain, n/v, diarrhea or dysuria  No known recent sick contact  Afebrile on admission, 90% on 3L, now on 4L NC  WBC 12 > 7  Cr 1.4 > 1.2  UA negative  RVP negative  CXR with Cardiomegaly with bilateral airspace disease that is possibly infectious in nature.   CTA Chest with No evidence of pulmonary embolism. Mosaic attenuation throughout the lungs, likely due to small airways   or less likely vascular disease. There are also scattered areas of interlobular septal thickening which could be due to edema or and   infectious or inflammatory process in the appropriate clinical setting.    Antimicrobials:  aztreonam  IVPB 1000 every 8 hours (2/2 -- )  vancomycin  IVPB 1000 every 24 hours (2/2 -- )    Impression:   #Acute on Chronic Hypoxic Respiratory Failure, COPD Exacerbation, Possible Multifocal Pneumonia  #Leukocytosis improved  #ANNALEE improved  #Multiple Drug Allergy, Ceftin, Sulfa Allergy    Recommendations:  - continue empiric Vancomycin 1G q24 (Day #1)  - please check Vancomycin trough before 4th sequential dose  - continue empiric Aztreonam 1G q8 (Day #1)  - monitor temperature curve  - trend WBC  - reason for abx use reviewed with patient  - side effects of antibiotic discussed, tolerating abx well so far  - Prior cultures reviewed. An epidemiologic assessment was performed. There is a significant risk for resistant microorganisms to spread to family members, and/or healthcare staff. The patient will be placed on isolation according to infection control policy. Will reconsider isolation measures based on new culture results and other clinical data as appropriate Appropriate cultures collected and an appropriate broad spectrum antibiotic therapy will be considered  - follow up BCx x2  - rest per primary team    Clinical team may change from intravenous to oral antibiotics when the following criteria are met:   1. Patient is clinically improving/stable       a)	Improved signs and symptoms of infection from initial presentation       b)	Afebrile for 24 hours       c)	Leukocytosis trending towards normal range   2. Patient is tolerating oral intake   3. Initial/repeat blood cultures are negative OR do not need to wait for preliminary blood cultures to result    Cannot advise changing to oral antibiotic therapy until culture sensitivity is available.   Assessment:  89F with HTN, dyslipidemia, CVA, chronic diastolic CHF, paroxysmal a-fib s/p ablation (on AC), CAD s/p PCI, COPD (on 3L home O2 at night), severe pulm HTN, Mitral valve regurg, PE, Factor V leiden, hypothyroidism presents 2/1 with dyspnea, reports that it had gotten worse for the past few days, including worsening cough with clear sputum  Denies any fever or chills, no abdominal pain, n/v, diarrhea or dysuria  No known recent sick contact  Documented history of antibiotic allergies, but does not recall what reactions were  Afebrile on admission, 90% on 3L, now on 4L NC  WBC 12 > 7  Cr 1.4 > 1.2  UA negative  RVP negative  CXR with Cardiomegaly with bilateral airspace disease that is possibly infectious in nature.   CTA Chest with No evidence of pulmonary embolism. Mosaic attenuation throughout the lungs, likely due to small airways   or less likely vascular disease. There are also scattered areas of interlobular septal thickening which could be due to edema or and   infectious or inflammatory process in the appropriate clinical setting.    Antimicrobials:  aztreonam  IVPB 1000 every 8 hours (2/2 -- )  vancomycin  IVPB 1000 every 24 hours (2/2 -- )    Impression:   #Acute on Chronic Hypoxic Respiratory Failure, COPD Exacerbation, Possible Multifocal Pneumonia  #Leukocytosis improved  #ANNALEE improved  #Multiple Drug Allergy, Ceftin, Sulfa Allergy, does not recall what reaction were    Recommendations:  - continue empiric Vancomycin 1G q24 (Day #1)  - please check Vancomycin trough before 4th sequential dose  - continue empiric Aztreonam 1G q8 (Day #1)  - on IV steroids  - monitor temperature curve  - trend WBC  - reason for abx use reviewed with patient  - side effects of antibiotic discussed, tolerating abx well so far  - Prior cultures reviewed. An epidemiologic assessment was performed. There is a significant risk for resistant microorganisms to spread to family members, and/or healthcare staff. The patient will be placed on isolation according to infection control policy. Will reconsider isolation measures based on new culture results and other clinical data as appropriate Appropriate cultures collected and an appropriate broad spectrum antibiotic therapy will be considered  - follow up BCx x2  - rest per primary team    Clinical team may change from intravenous to oral antibiotics when the following criteria are met:   1. Patient is clinically improving/stable       a)	Improved signs and symptoms of infection from initial presentation       b)	Afebrile for 24 hours       c)	Leukocytosis trending towards normal range   2. Patient is tolerating oral intake   3. Initial/repeat blood cultures are negative OR do not need to wait for preliminary blood cultures to result    Cannot advise changing to oral antibiotic therapy until culture sensitivity is available.

## 2025-02-02 NOTE — PATIENT PROFILE ADULT - FUNCTIONAL ASSESSMENT - BASIC MOBILITY 3.
Physical Exam  
Constitutional: He appears well-developed and well-nourished. Skin: No rash noted. He is diaphoretic. There is pallor. 3 = A little assistance

## 2025-02-02 NOTE — PATIENT PROFILE ADULT - TYPE OF COMMUNICATION USED TO ADDRESS HEALTHCARE
Other Keystone Flap Text: The defect edges were debeveled with a #15 scalpel blade.  Given the location of the defect, shape of the defect a keystone flap was deemed most appropriate.  Using a sterile surgical marker, an appropriate keystone flap was drawn incorporating the defect, outlining the appropriate donor tissue and placing the expected incisions within the relaxed skin tension lines where possible. The area thus outlined was incised deep to adipose tissue with a #15 scalpel blade.  The skin margins were undermined to an appropriate distance in all directions around the primary defect and laterally outward around the flap utilizing iris scissors.

## 2025-02-02 NOTE — PATIENT PROFILE ADULT - FALL HARM RISK - RISK INTERVENTIONS

## 2025-02-02 NOTE — H&P ADULT - CONVERSATION DETAILS
Patient states daughter Tonia is HCP, and denies having any limitations on resuscitation status for this admission

## 2025-02-02 NOTE — PROGRESS NOTE ADULT - ASSESSMENT
A/P:    *Acute on chronic hypoxic respiratory failure + Sepsis 2/2 HCAP/gram negative PNA  *COPD Exacerbation   *Acute on chronic diastolic CHF  *valvular abnormalities   *H/o severe pulm HTN   -CT: The pulmonary arteries B/L dilated, suggestive of pulmonary HTN. Mosaic attenuation throughout the lungs, likely due to small airways or less likely vascular disease. There are also scattered areas of interlobular septal thickening which could be due to edema or and   infectious or inflammatory process in the appropriate clinical setting. borderline enlarged pretracheal node measuring 1.0 cm   -Vanco / Aztreonem  -ID consult   -F/u blood cx  -Lactate = 1.5  -Duonebs  -Solumedrol  -Pulse ox monitoring  -O2 supplementation   -C/w IV lasix + aldactone (will need to watch creatinine closely)  -I/Os + Daily weights  -On BB   -follow consult of Dr. Sinclair and Dr. Holloway  -Patient has tamiflu ordered outpatient. However, she denies being flu positive. She is flu negative in ED.      *Suspect ANNALEE  - Cr improved this am   -Patient on diuretics, will need to monitor closely  -If no improvement in renal function, will need nephro evaluation   -I/Os   -UA     *Hyponatremia  -improved this am     *Thrombocytosis  -F/u outpatient heme for further evaluation     *H/o HTN / dyslipidemia / CVA / paroxysmal a-fib / CAD / PE / factor V leiden  -C/w home meds and f/u outpatient for further management if conditions remain stable during hospitalization     *DVT ppx  -On Eliquis

## 2025-02-02 NOTE — ED ADULT NURSE REASSESSMENT NOTE - NS ED NURSE REASSESS COMMENT FT1
Received call from Lab notifying of POSITIVE BLOOD CULTURES {2/1/25 AEROBIC GRAM POSITIVE COCCI IN CLUSTERS).  Notified MD Ogden of positive blood culture results.

## 2025-02-02 NOTE — H&P ADULT - HISTORY OF PRESENT ILLNESS
88 y/o F w/ PMH of HTN, dyslipidemia, CVA, chronic diastolic CHF, paroxysmal a-fib s/p ablation (on AC), CAD s/p PCI, COPD (on 3L home O2 at night), severe pulm HTN, Mitral valve regurg, PE, Factor V leiden, hypothyroidism, p/w dyspnea. Patient states that she has chronic dyspnea 2/2 COPD. However, SOB has been getting worse and is associated with wheezing. Patient states she also has a cough at baseline, but cough has also gotten worse and is productive of clear sputum. States she had elevated temperatures of ~99. Patient was at urgent care w/ low O2 saturations.         PSH: PCI     Social Hx: Denies tobacco / etoh / drugs     Family Hx: Denies pertinent hx

## 2025-02-02 NOTE — H&P ADULT - PSYCHIATRIC
[de-identified] : 3/18/2022: normal LVEF  normal/normal affect/alert and oriented x3/normal behavior

## 2025-02-02 NOTE — PROGRESS NOTE ADULT - SUBJECTIVE AND OBJECTIVE BOX
Patient is seen and examined. Chart is reviewed. Admitted with AHRF and cough. still has significant cough.    Gen: No fever, chills, weakness  ENT: No visual changes or throat pain  Neck: No pain or stiffness  Respiratory: ++cough, ++ wheezing  Cardiovascular: No chest pain or palpitations  Gastrointestinal: No abdominal pain, nausea, vomiting, constipation, or diarrhea  Hematologic: No easy bleeding or bruising  Neurologic: No numbness or focal weakness  Psych: No depression or insomnia  Skin: No rash or itching      MEDICATIONS  (STANDING):  albuterol/ipratropium for Nebulization 3 milliLiter(s) Nebulizer every 6 hours  apixaban 5 milliGRAM(s) Oral two times a day  aspirin  chewable 81 milliGRAM(s) Oral daily  aztreonam  IVPB 1000 milliGRAM(s) IV Intermittent every 8 hours  furosemide   Injectable 40 milliGRAM(s) IV Push daily  gabapentin 100 milliGRAM(s) Oral daily  gabapentin 300 milliGRAM(s) Oral at bedtime  levothyroxine 25 MICROGram(s) Oral daily  methylPREDNISolone sodium succinate Injectable 40 milliGRAM(s) IV Push every 8 hours  metoprolol tartrate 12.5 milliGRAM(s) Oral two times a day  rOPINIRole 1 milliGRAM(s) Oral at bedtime  spironolactone 25 milliGRAM(s) Oral daily  vancomycin  IVPB 1000 milliGRAM(s) IV Intermittent every 24 hours    MEDICATIONS  (PRN):  acetaminophen     Tablet .. 650 milliGRAM(s) Oral every 6 hours PRN Temp greater or equal to 38C (100.4F), Mild Pain (1 - 3)  benzonatate 200 milliGRAM(s) Oral every 8 hours PRN Cough  guaifenesin/dextromethorphan Oral Liquid 10 milliLiter(s) Oral every 6 hours PRN Cough      Vital Signs Last 24 Hrs  T(C): 36.7 (02 Feb 2025 02:16), Max: 37.6 (01 Feb 2025 15:41)  T(F): 98 (02 Feb 2025 02:16), Max: 99.6 (01 Feb 2025 15:41)  HR: 67 (02 Feb 2025 08:34) (67 - 79)  BP: 143/47 (02 Feb 2025 02:16) (113/46 - 143/47)  BP(mean): 79 (02 Feb 2025 00:20) (52 - 79)  RR: 17 (02 Feb 2025 02:16) (17 - 22)  SpO2: 96% (02 Feb 2025 08:34) (90% - 99%)    Parameters below as of 02 Feb 2025 08:34  Patient On (Oxygen Delivery Method): nasal cannula        PHYSICAL EXAM:  GENERAL: NAD, lying in bed comfortably  HEAD:  Atraumatic, Normocephalic  EYES: conjunctiva and sclera clear  ENT: Moist mucous membranes  NECK: Supple, No JVD  CHEST/LUNG: ++ rhonchi, wheezing. Unlabored respirations, on NC oxygen   HEART: Regular rate and rhythm; No murmurs  ABDOMEN: Bowel sounds present; Soft, Nontender, Nondistended.   EXTREMITIES:  2+ Peripheral Pulses, brisk capillary refill. No clubbing, cyanosis, or edema  NERVOUS SYSTEM:  Alert & Oriented X3, speech clear. No deficits   MSK: FROM all 4 extremities, full and equal strength          LABS:                          11.0   7.33  )-----------( 472      ( 02 Feb 2025 06:54 )             33.8     02 Feb 2025 06:54    131    |  99     |  24     ----------------------------<  288    4.1     |  26     |  1.20     Ca    9.7        02 Feb 2025 06:54    TPro  7.3    /  Alb  2.5    /  TBili  0.4    /  DBili  x      /  AST  14     /  ALT  9      /  AlkPhos  54     02 Feb 2025 06:54    LIVER FUNCTIONS - ( 02 Feb 2025 06:54 )  Alb: 2.5 g/dL / Pro: 7.3 gm/dL / ALK PHOS: 54 U/L / ALT: 9 U/L / AST: 14 U/L / GGT: x           PT/INR - ( 02 Feb 2025 06:54 )   PT: 24.1 sec;   INR: 2.06 ratio         PTT - ( 02 Feb 2025 06:54 )  PTT:35.8 sec  CAPILLARY BLOOD GLUCOSE            Urinalysis Basic - ( 02 Feb 2025 06:54 )    Color: x / Appearance: x / SG: x / pH: x  Gluc: 288 mg/dL / Ketone: x  / Bili: x / Urobili: x   Blood: x / Protein: x / Nitrite: x   Leuk Esterase: x / RBC: x / WBC x   Sq Epi: x / Non Sq Epi: x / Bacteria: x        RADIOLOGY:

## 2025-02-02 NOTE — H&P ADULT - ASSESSMENT
89 y/o F w/ PMH of HTN, dyslipidemia, CVA, chronic diastolic CHF, paroxysmal a-fib s/p ablation (on AC), CAD s/p PCI, COPD (on 3L home O2 at night), severe pulm HTN, Mitral valve regurg, PE, Factor V leiden, hypothyroidism, p/w dyspnea    *Sepsis 2/2 HCAP/gram negative PNA  *COPD Exacerbation   *Acute on chronic diastolic CHF  *valvular abnormalities   *H/o severe pulm HTN   -CT: The pulmonary arteries B/L dilated, suggestive of pulmonary HTN. Mosaic attenuation throughout the lungs, likely due to small airways or less likely vascular disease. There are also scattered areas of interlobular septal thickening which could be due to edema or and   infectious or inflammatory process in the appropriate clinical setting. borderline enlarged pretracheal node measuring 1.0 cm   -Vanco / Aztreonem  -ID consult   -F/u blood cx  -Lactate = 1.5  -Duonebs  -Solumedrol  -Pulse ox monitoring  -O2 supplementation   -C/w IV lasix + aldactone (will need to watch creatinine closely)  -I/Os + Daily weights  -On BB   -Will avoid ACEi at this time 2/2 ANNALEE   -Will consult Dr. Sinclair and Dr. Holloway  -Patient has tamiflu ordered outpatient. However, she denies being flu positive. She is flu negative in ED.      *L great toe infected toe nail  -Podiatry    *Suspect ANNALEE  -Patient on diuretics, will need to monitor closely  -Nephro consult   -I/Os   -UA     *Hyponatremia  -Recheck Na in AM s/p IVF     *Thrombocytosis  -F/u outpatient heme for further evaluation     *H/o HTN / dyslipidemia / CVA / paroxysmal a-fib / CAD / PE / factor V leiden  -C/w home meds and f/u outpatient for further management if conditions remain stable during hospitalization     *DVT ppx  -On Eliquis     >75 mins required for admission    89 y/o F w/ PMH of HTN, dyslipidemia, CVA, chronic diastolic CHF, paroxysmal a-fib s/p ablation (on AC), CAD s/p PCI, COPD (on 3L home O2 at night), severe pulm HTN, Mitral valve regurg, PE, Factor V leiden, hypothyroidism, p/w dyspnea    *Sepsis 2/2 HCAP/gram negative PNA  *COPD Exacerbation   *Acute on chronic diastolic CHF  *valvular abnormalities   *H/o severe pulm HTN   -CT: The pulmonary arteries B/L dilated, suggestive of pulmonary HTN. Mosaic attenuation throughout the lungs, likely due to small airways or less likely vascular disease. There are also scattered areas of interlobular septal thickening which could be due to edema or and   infectious or inflammatory process in the appropriate clinical setting. borderline enlarged pretracheal node measuring 1.0 cm   -Vanco / Aztreonem  -ID consult   -F/u blood cx  -Lactate = 1.5  -Duonebs  -Solumedrol  -Pulse ox monitoring  -O2 supplementation   -C/w IV lasix + aldactone (will need to watch creatinine closely)  -I/Os + Daily weights  -On BB   -Will avoid ACEi at this time 2/2 ANNALEE   -Will consult Dr. Sinclair and Dr. Holloway  -Patient has tamiflu ordered outpatient. However, she denies being flu positive. She is flu negative in ED.      *L great toe infected toe nail  -Podiatry    *Suspect ANNALEE  -Patient on diuretics, will need to monitor closely  -If no improvement in renal function, will need nephro evaluation   -I/Os   -UA     *Hyponatremia  -Recheck Na in AM     *Thrombocytosis  -F/u outpatient heme for further evaluation     *H/o HTN / dyslipidemia / CVA / paroxysmal a-fib / CAD / PE / factor V leiden  -C/w home meds and f/u outpatient for further management if conditions remain stable during hospitalization     *DVT ppx  -On Eliquis     >75 mins required for admission  88 y/o F w/ PMH of HTN, dyslipidemia, CVA, chronic diastolic CHF, paroxysmal a-fib s/p ablation (on AC), CAD s/p PCI, COPD (on 3L home O2 at night), severe pulm HTN, Mitral valve regurg, PE, Factor V leiden, hypothyroidism, p/w dyspnea    *Acute on chronic hypoxic respiratory failure + Sepsis 2/2 HCAP/gram negative PNA  *COPD Exacerbation   *Acute on chronic diastolic CHF  *valvular abnormalities   *H/o severe pulm HTN   -CT: The pulmonary arteries B/L dilated, suggestive of pulmonary HTN. Mosaic attenuation throughout the lungs, likely due to small airways or less likely vascular disease. There are also scattered areas of interlobular septal thickening which could be due to edema or and   infectious or inflammatory process in the appropriate clinical setting. borderline enlarged pretracheal node measuring 1.0 cm   -Vanco / Aztreonem  -ID consult   -F/u blood cx  -Lactate = 1.5  -Duonebs  -Solumedrol  -Pulse ox monitoring  -O2 supplementation   -C/w IV lasix + aldactone (will need to watch creatinine closely)  -I/Os + Daily weights  -On BB   -Will avoid ACEi at this time 2/2 ANNALEE   -Will consult Dr. Sinclair and Dr. Holloway  -Patient has tamiflu ordered outpatient. However, she denies being flu positive. She is flu negative in ED.      *Suspect ANNALEE  -Patient on diuretics, will need to monitor closely  -If no improvement in renal function, will need nephro evaluation   -I/Os   -UA     *Hyponatremia  -Recheck Na in AM     *Thrombocytosis  -F/u outpatient heme for further evaluation     *H/o HTN / dyslipidemia / CVA / paroxysmal a-fib / CAD / PE / factor V leiden  -C/w home meds and f/u outpatient for further management if conditions remain stable during hospitalization     *DVT ppx  -On Eliquis     >75 mins required for admission

## 2025-02-02 NOTE — CONSULT NOTE ADULT - SUBJECTIVE AND OBJECTIVE BOX
Patient is a 89y old  Female who presents with a chief complaint of dyspnea (02 Feb 2025 08:52)    HPI:    89 y/o F w/ PMH of HTN, dyslipidemia, CVA, chronic diastolic CHF, paroxysmal a-fib s/p ablation (on AC), CAD s/p PCI, COPD (on 3L home O2 at night), severe pulm HTN, Mitral valve regurg, PE, Factor V leiden, hypothyroidism, p/w dyspnea. Patient states that she has chronic dyspnea 2/2 COPD. However, SOB has been getting worse and is associated with wheezing. Patient states she also has a cough at baseline, but cough has also gotten worse and is productive of clear sputum. States she had elevated temperatures of ~99. Patient was at urgent care w/ low O2 saturations.  (02 Feb 2025 02:34)  Above HPI reviewed and reconciled with patient    89F with HTN, dyslipidemia, CVA, chronic diastolic CHF, paroxysmal a-fib s/p ablation (on AC), CAD s/p PCI, COPD (on 3L home O2 at night), severe pulm HTN, Mitral valve regurg, PE, Factor V leiden, hypothyroidism presents 2/1 with dyspnea, reports that it had gotten worse for the past few days, including worsening cough with clear sputum  Denies any fever or chills, no abdominal pain, n/v, diarrhea or dysuria  No known recent sick contact  Afebrile on admission, 90% on 3L, now on 4L NC  WBC 12 > 7  Cr 1.4 > 1.2  UA negative  RVP negative  CXR with Cardiomegaly with bilateral airspace disease that is possibly infectious in nature.   CTA Chest with No evidence of pulmonary embolism. Mosaic attenuation throughout the lungs, likely due to small airways   or less likely vascular disease. There are also scattered areas of interlobular septal thickening which could be due to edema or and   infectious or inflammatory process in the appropriate clinical setting.    PAST MEDICAL & SURGICAL HISTORY:  Atrial fibrillation      CHF (congestive heart failure)      Pulmonary emboli      Factor 5 Leiden mutation, heterozygous      CAD (coronary artery disease)      Stented coronary artery      Hypothyroid      COPD (chronic obstructive pulmonary disease)      Mitral regurgitation      HTN (hypertension)      H/O: hysterectomy      H/O knee surgery      Cyst of breast, unspecified laterality  S/P excision      H/O cardiac radiofrequency ablation      S/P ablation of atrial fibrillation        FAMILY HISTORY:  FHx: diabetes mellitus (Father)    FHx: heart disease (Father)      Social Hx: Denies alcohol, tobacco, recreational drug use      Allergies  flu vaccines (Other)  statins (Muscle Pain)  Macrodantin (Other)  nisoldipine (Unknown)  Repatha (Unknown)  tadalafil (Muscle Pain)  sulfa drugs (Other)  Motrin (Other)  Ceftin (Other (Moderate))    ANTIMICROBIALS (past 90 days)  MEDICATIONS  (STANDING):  aztreonam  IVPB   50 mL/Hr IV Intermittent (02-02-25 @ 09:22)    meropenem Injectable   1000 milliGRAM(s) IV Push (02-01-25 @ 21:50)    vancomycin  IVPB   250 mL/Hr IV Intermittent (02-02-25 @ 05:14)        ACTIVE ANTIMICROBIALS  aztreonam  IVPB 1000 every 8 hours (2/2 -- )  vancomycin  IVPB 1000 every 24 hours (2/2 -- )    MEDICATIONS  (STANDING):  acetaminophen     Tablet .. 650 every 6 hours PRN  albuterol/ipratropium for Nebulization 3 every 6 hours  apixaban 5 two times a day  aspirin  chewable 81 daily  benzonatate 200 every 8 hours PRN  dextrose 50% Injectable 25 once  dextrose 50% Injectable 12.5 once  dextrose 50% Injectable 25 once  dextrose Oral Gel 15 once PRN  furosemide   Injectable 40 daily  gabapentin 100 daily  gabapentin 300 at bedtime  glucagon  Injectable 1 once  guaifenesin/dextromethorphan Oral Liquid 10 every 6 hours PRN  insulin lispro (ADMELOG) corrective regimen sliding scale  three times a day before meals  insulin lispro (ADMELOG) corrective regimen sliding scale  at bedtime  levothyroxine 25 daily  methylPREDNISolone sodium succinate Injectable 40 every 8 hours  metoprolol tartrate 12.5 two times a day  rOPINIRole 1 at bedtime  spironolactone 25 daily      REVIEW OF SYSTEMS  [  ] ROS unobtainable because:    [ x ] All other systems negative except as noted below:	    Constitutional:  [ ] fever [ ] chills  [ ] weight loss  [ ] weakness  Skin:  [ ] rash [ ] phlebitis	  Eyes: [ ] icterus [ ] pain  [ ] discharge	  ENMT: [ ] sore throat  [ ] thrush [ ] ulcers [ ] exudates  Respiratory: [ ] dyspnea [ ] hemoptysis [ ] cough [ ] sputum	  Cardiovascular:  [ ] chest pain [ ] palpitations [ ] edema	  Gastrointestinal:  [ ] nausea [ ] vomiting [ ] diarrhea [ ] constipation [ ] pain	  Genitourinary:  [ ] dysuria [ ] frequency [ ] hematuria [ ] discharge [ ] flank pain  [ ] incontinence  Musculoskeletal:  [ ] myalgias [ ] arthralgias [ ] arthritis  [ ] back pain  Neurological:  [ ] headache [ ] seizures  [ ] confusion/altered mental status  Psychiatric:  [ ] anxiety [ ] depression	  Hematology/Lymphatics:  [ ] lymphadenopathy  Endocrine:  [ ] adrenal [ ] thyroid  Allergic/Immunologic:	 [ ] transplant [ ] seasonal    Vital Signs Last 24 Hrs  T(C): 36.4 (02 Feb 2025 08:17), Max: 37.6 (01 Feb 2025 15:41)  T(F): 97.6 (02 Feb 2025 08:17), Max: 99.6 (01 Feb 2025 15:41)  HR: 67 (02 Feb 2025 08:34) (67 - 79)  BP: 138/60 (02 Feb 2025 08:17) (113/46 - 143/47)  BP(mean): 79 (02 Feb 2025 00:20) (52 - 79)  RR: 18 (02 Feb 2025 08:17) (17 - 22)  SpO2: 96% (02 Feb 2025 08:34) (90% - 99%)    Parameters below as of 02 Feb 2025 08:34  Patient On (Oxygen Delivery Method): nasal cannula        Physical Exam:  Constitutional:  frail, NAD  Head/Eyes: no icterus  LUNGS:  Course  CVS:  regular rhythm  Abd:  soft, non-tender; non-distended  Ext:  no edema  Vascular:  IV site no erythema tenderness or discharge  Neuro: AAO X 3, non- focal    Labs: all available labs reviewed                        11.0   7.33  )-----------( 472      ( 02 Feb 2025 06:54 )             33.8     02-02    131[L]  |  99  |  24[H]  ----------------------------<  288[H]  4.1   |  26  |  1.20    Ca    9.7      02 Feb 2025 06:54    TPro  7.3  /  Alb  2.5[L]  /  TBili  0.4  /  DBili  x   /  AST  14[L]  /  ALT  9[L]  /  AlkPhos  54  02-02     LIVER FUNCTIONS - ( 02 Feb 2025 06:54 )  Alb: 2.5 g/dL / Pro: 7.3 gm/dL / ALK PHOS: 54 U/L / ALT: 9 U/L / AST: 14 U/L / GGT: x           Urinalysis Basic - ( 02 Feb 2025 06:54 )    Color: x / Appearance: x / SG: x / pH: x  Gluc: 288 mg/dL / Ketone: x  / Bili: x / Urobili: x   Blood: x / Protein: x / Nitrite: x   Leuk Esterase: x / RBC: x / WBC x   Sq Epi: x / Non Sq Epi: x / Bacteria: x          Radiology: all available radiological tests reviewed    Advanced directives addressed: full resuscitation Patient is a 89y old  Female who presents with a chief complaint of dyspnea (02 Feb 2025 08:52)    HPI:    89 y/o F w/ PMH of HTN, dyslipidemia, CVA, chronic diastolic CHF, paroxysmal a-fib s/p ablation (on AC), CAD s/p PCI, COPD (on 3L home O2 at night), severe pulm HTN, Mitral valve regurg, PE, Factor V leiden, hypothyroidism, p/w dyspnea. Patient states that she has chronic dyspnea 2/2 COPD. However, SOB has been getting worse and is associated with wheezing. Patient states she also has a cough at baseline, but cough has also gotten worse and is productive of clear sputum. States she had elevated temperatures of ~99. Patient was at urgent care w/ low O2 saturations.  (02 Feb 2025 02:34)  Above HPI reviewed and reconciled with patient    89F with HTN, dyslipidemia, CVA, chronic diastolic CHF, paroxysmal a-fib s/p ablation (on AC), CAD s/p PCI, COPD (on 3L home O2 at night), severe pulm HTN, Mitral valve regurg, PE, Factor V leiden, hypothyroidism presents 2/1 with dyspnea, reports that it had gotten worse for the past few days, including worsening cough with clear sputum  Denies any fever or chills, no abdominal pain, n/v, diarrhea or dysuria  No known recent sick contact  Documented history of antibiotic allergies, but does not recall what reactions were  Afebrile on admission, 90% on 3L, now on 4L NC  WBC 12 > 7  Cr 1.4 > 1.2  UA negative  RVP negative  CXR with Cardiomegaly with bilateral airspace disease that is possibly infectious in nature.   CTA Chest with No evidence of pulmonary embolism. Mosaic attenuation throughout the lungs, likely due to small airways   or less likely vascular disease. There are also scattered areas of interlobular septal thickening which could be due to edema or and   infectious or inflammatory process in the appropriate clinical setting.    PAST MEDICAL & SURGICAL HISTORY:  Atrial fibrillation      CHF (congestive heart failure)      Pulmonary emboli      Factor 5 Leiden mutation, heterozygous      CAD (coronary artery disease)      Stented coronary artery      Hypothyroid      COPD (chronic obstructive pulmonary disease)      Mitral regurgitation      HTN (hypertension)      H/O: hysterectomy      H/O knee surgery      Cyst of breast, unspecified laterality  S/P excision      H/O cardiac radiofrequency ablation      S/P ablation of atrial fibrillation        FAMILY HISTORY:  FHx: diabetes mellitus (Father)    FHx: heart disease (Father)      Social Hx: Denies alcohol, tobacco, recreational drug use      Allergies  flu vaccines (Other)  statins (Muscle Pain)  Macrodantin (Other)  nisoldipine (Unknown)  Repatha (Unknown)  tadalafil (Muscle Pain)  sulfa drugs (Other)  Motrin (Other)  Ceftin (Other (Moderate))    ANTIMICROBIALS (past 90 days)  MEDICATIONS  (STANDING):  aztreonam  IVPB   50 mL/Hr IV Intermittent (02-02-25 @ 09:22)    meropenem Injectable   1000 milliGRAM(s) IV Push (02-01-25 @ 21:50)    vancomycin  IVPB   250 mL/Hr IV Intermittent (02-02-25 @ 05:14)        ACTIVE ANTIMICROBIALS  aztreonam  IVPB 1000 every 8 hours (2/2 -- )  vancomycin  IVPB 1000 every 24 hours (2/2 -- )    MEDICATIONS  (STANDING):  acetaminophen     Tablet .. 650 every 6 hours PRN  albuterol/ipratropium for Nebulization 3 every 6 hours  apixaban 5 two times a day  aspirin  chewable 81 daily  benzonatate 200 every 8 hours PRN  dextrose 50% Injectable 25 once  dextrose 50% Injectable 12.5 once  dextrose 50% Injectable 25 once  dextrose Oral Gel 15 once PRN  furosemide   Injectable 40 daily  gabapentin 100 daily  gabapentin 300 at bedtime  glucagon  Injectable 1 once  guaifenesin/dextromethorphan Oral Liquid 10 every 6 hours PRN  insulin lispro (ADMELOG) corrective regimen sliding scale  three times a day before meals  insulin lispro (ADMELOG) corrective regimen sliding scale  at bedtime  levothyroxine 25 daily  methylPREDNISolone sodium succinate Injectable 40 every 8 hours  metoprolol tartrate 12.5 two times a day  rOPINIRole 1 at bedtime  spironolactone 25 daily      REVIEW OF SYSTEMS  [  ] ROS unobtainable because:    [ x ] All other systems negative except as noted below:	    Constitutional:  [ ] fever [ ] chills  [ ] weight loss  [ ] weakness  Skin:  [ ] rash [ ] phlebitis	  Eyes: [ ] icterus [ ] pain  [ ] discharge	  ENMT: [ ] sore throat  [ ] thrush [ ] ulcers [ ] exudates  Respiratory: [ ] dyspnea [ ] hemoptysis [ ] cough [ ] sputum	  Cardiovascular:  [ ] chest pain [ ] palpitations [ ] edema	  Gastrointestinal:  [ ] nausea [ ] vomiting [ ] diarrhea [ ] constipation [ ] pain	  Genitourinary:  [ ] dysuria [ ] frequency [ ] hematuria [ ] discharge [ ] flank pain  [ ] incontinence  Musculoskeletal:  [ ] myalgias [ ] arthralgias [ ] arthritis  [ ] back pain  Neurological:  [ ] headache [ ] seizures  [ ] confusion/altered mental status  Psychiatric:  [ ] anxiety [ ] depression	  Hematology/Lymphatics:  [ ] lymphadenopathy  Endocrine:  [ ] adrenal [ ] thyroid  Allergic/Immunologic:	 [ ] transplant [ ] seasonal    Vital Signs Last 24 Hrs  T(C): 36.4 (02 Feb 2025 08:17), Max: 37.6 (01 Feb 2025 15:41)  T(F): 97.6 (02 Feb 2025 08:17), Max: 99.6 (01 Feb 2025 15:41)  HR: 67 (02 Feb 2025 08:34) (67 - 79)  BP: 138/60 (02 Feb 2025 08:17) (113/46 - 143/47)  BP(mean): 79 (02 Feb 2025 00:20) (52 - 79)  RR: 18 (02 Feb 2025 08:17) (17 - 22)  SpO2: 96% (02 Feb 2025 08:34) (90% - 99%)    Parameters below as of 02 Feb 2025 08:34  Patient On (Oxygen Delivery Method): nasal cannula        Physical Exam:  Constitutional:  frail, NAD  Head/Eyes: no icterus  LUNGS:  Course  CVS:  regular rhythm  Abd:  soft, non-tender; non-distended  Ext:  no edema  Vascular:  IV site no erythema tenderness or discharge  Neuro: AAO X 3, non- focal    Labs: all available labs reviewed                        11.0   7.33  )-----------( 472      ( 02 Feb 2025 06:54 )             33.8     02-02    131[L]  |  99  |  24[H]  ----------------------------<  288[H]  4.1   |  26  |  1.20    Ca    9.7      02 Feb 2025 06:54    TPro  7.3  /  Alb  2.5[L]  /  TBili  0.4  /  DBili  x   /  AST  14[L]  /  ALT  9[L]  /  AlkPhos  54  02-02     LIVER FUNCTIONS - ( 02 Feb 2025 06:54 )  Alb: 2.5 g/dL / Pro: 7.3 gm/dL / ALK PHOS: 54 U/L / ALT: 9 U/L / AST: 14 U/L / GGT: x           Urinalysis Basic - ( 02 Feb 2025 06:54 )    Color: x / Appearance: x / SG: x / pH: x  Gluc: 288 mg/dL / Ketone: x  / Bili: x / Urobili: x   Blood: x / Protein: x / Nitrite: x   Leuk Esterase: x / RBC: x / WBC x   Sq Epi: x / Non Sq Epi: x / Bacteria: x          Radiology: all available radiological tests reviewed    Advanced directives addressed: full resuscitation

## 2025-02-03 LAB
A1C WITH ESTIMATED AVERAGE GLUCOSE RESULT: 6.6 % — HIGH (ref 4–5.6)
ANION GAP SERPL CALC-SCNC: 6 MMOL/L — SIGNIFICANT CHANGE UP (ref 5–17)
BASOPHILS # BLD AUTO: 0.01 K/UL — SIGNIFICANT CHANGE UP (ref 0–0.2)
BASOPHILS NFR BLD AUTO: 0.1 % — SIGNIFICANT CHANGE UP (ref 0–2)
BUN SERPL-MCNC: 34 MG/DL — HIGH (ref 7–23)
CALCIUM SERPL-MCNC: 9.7 MG/DL — SIGNIFICANT CHANGE UP (ref 8.5–10.1)
CHLORIDE SERPL-SCNC: 101 MMOL/L — SIGNIFICANT CHANGE UP (ref 96–108)
CO2 SERPL-SCNC: 27 MMOL/L — SIGNIFICANT CHANGE UP (ref 22–31)
CREAT SERPL-MCNC: 1.41 MG/DL — HIGH (ref 0.5–1.3)
CRP SERPL-MCNC: 95.4 MG/ML — HIGH (ref 0–5)
CULTURE RESULTS: ABNORMAL
EGFR: 36 ML/MIN/1.73M2 — LOW
EOSINOPHIL # BLD AUTO: 0.11 K/UL — SIGNIFICANT CHANGE UP (ref 0–0.5)
EOSINOPHIL NFR BLD AUTO: 0.7 % — SIGNIFICANT CHANGE UP (ref 0–6)
ERYTHROCYTE [SEDIMENTATION RATE] IN BLOOD: 90 MM/HR — HIGH (ref 0–20)
ESTIMATED AVERAGE GLUCOSE: 143 MG/DL — HIGH (ref 68–114)
GLUCOSE BLDC GLUCOMTR-MCNC: 189 MG/DL — HIGH (ref 70–99)
GLUCOSE BLDC GLUCOMTR-MCNC: 193 MG/DL — HIGH (ref 70–99)
GLUCOSE BLDC GLUCOMTR-MCNC: 277 MG/DL — HIGH (ref 70–99)
GLUCOSE SERPL-MCNC: 248 MG/DL — HIGH (ref 70–99)
GRAM STN FLD: ABNORMAL
HCT VFR BLD CALC: 31.9 % — LOW (ref 34.5–45)
HGB BLD-MCNC: 10.5 G/DL — LOW (ref 11.5–15.5)
IMM GRANULOCYTES NFR BLD AUTO: 0.8 % — SIGNIFICANT CHANGE UP (ref 0–0.9)
LYMPHOCYTES # BLD AUTO: 0.47 K/UL — LOW (ref 1–3.3)
LYMPHOCYTES # BLD AUTO: 3.2 % — LOW (ref 13–44)
MANUAL SMEAR VERIFICATION: SIGNIFICANT CHANGE UP
MCHC RBC-ENTMCNC: 29.7 PG — SIGNIFICANT CHANGE UP (ref 27–34)
MCHC RBC-ENTMCNC: 32.9 G/DL — SIGNIFICANT CHANGE UP (ref 32–36)
MCV RBC AUTO: 90.1 FL — SIGNIFICANT CHANGE UP (ref 80–100)
MONOCYTES # BLD AUTO: 0.62 K/UL — SIGNIFICANT CHANGE UP (ref 0–0.9)
MONOCYTES NFR BLD AUTO: 4.2 % — SIGNIFICANT CHANGE UP (ref 2–14)
NEUTROPHILS # BLD AUTO: 13.46 K/UL — HIGH (ref 1.8–7.4)
NEUTROPHILS NFR BLD AUTO: 91 % — HIGH (ref 43–77)
ORGANISM # SPEC MICROSCOPIC CNT: ABNORMAL
ORGANISM # SPEC MICROSCOPIC CNT: SIGNIFICANT CHANGE UP
PLAT MORPH BLD: NORMAL — SIGNIFICANT CHANGE UP
PLATELET # BLD AUTO: 525 K/UL — HIGH (ref 150–400)
POTASSIUM SERPL-MCNC: 3.9 MMOL/L — SIGNIFICANT CHANGE UP (ref 3.5–5.3)
POTASSIUM SERPL-SCNC: 3.9 MMOL/L — SIGNIFICANT CHANGE UP (ref 3.5–5.3)
PROCALCITONIN SERPL-MCNC: 0.07 NG/ML — SIGNIFICANT CHANGE UP (ref 0.02–0.1)
RBC # BLD: 3.54 M/UL — LOW (ref 3.8–5.2)
RBC # FLD: 13.7 % — SIGNIFICANT CHANGE UP (ref 10.3–14.5)
RBC BLD AUTO: NORMAL — SIGNIFICANT CHANGE UP
SODIUM SERPL-SCNC: 134 MMOL/L — LOW (ref 135–145)
SPECIMEN SOURCE: SIGNIFICANT CHANGE UP
WBC # BLD: 14.79 K/UL — HIGH (ref 3.8–10.5)
WBC # FLD AUTO: 14.79 K/UL — HIGH (ref 3.8–10.5)

## 2025-02-03 PROCEDURE — 99233 SBSQ HOSP IP/OBS HIGH 50: CPT

## 2025-02-03 PROCEDURE — 93010 ELECTROCARDIOGRAM REPORT: CPT

## 2025-02-03 RX ORDER — PANTOPRAZOLE 20 MG/1
40 TABLET, DELAYED RELEASE ORAL ONCE
Refills: 0 | Status: COMPLETED | OUTPATIENT
Start: 2025-02-03 | End: 2025-02-03

## 2025-02-03 RX ADMIN — Medication 2: at 21:12

## 2025-02-03 RX ADMIN — Medication 2: at 18:16

## 2025-02-03 RX ADMIN — APIXABAN 5 MILLIGRAM(S): 5 TABLET, FILM COATED ORAL at 11:15

## 2025-02-03 RX ADMIN — ROPINIROLE 1 MILLIGRAM(S): 0.5 TABLET, FILM COATED ORAL at 21:11

## 2025-02-03 RX ADMIN — Medication 50 MILLIGRAM(S): at 22:49

## 2025-02-03 RX ADMIN — Medication 40 MILLIGRAM(S): at 06:43

## 2025-02-03 RX ADMIN — PANTOPRAZOLE 40 MILLIGRAM(S): 20 TABLET, DELAYED RELEASE ORAL at 21:39

## 2025-02-03 RX ADMIN — IPRATROPIUM BROMIDE AND ALBUTEROL SULFATE 3 MILLILITER(S): .5; 2.5 SOLUTION RESPIRATORY (INHALATION) at 07:42

## 2025-02-03 RX ADMIN — Medication 40 MILLIGRAM(S): at 21:14

## 2025-02-03 RX ADMIN — GABAPENTIN 100 MILLIGRAM(S): 800 TABLET ORAL at 11:15

## 2025-02-03 RX ADMIN — ASPIRIN 81 MILLIGRAM(S): 81 TABLET, COATED ORAL at 06:43

## 2025-02-03 RX ADMIN — Medication 25 MILLIGRAM(S): at 11:15

## 2025-02-03 RX ADMIN — Medication 12.5 MILLIGRAM(S): at 11:15

## 2025-02-03 RX ADMIN — IPRATROPIUM BROMIDE AND ALBUTEROL SULFATE 3 MILLILITER(S): .5; 2.5 SOLUTION RESPIRATORY (INHALATION) at 13:21

## 2025-02-03 RX ADMIN — Medication 2: at 13:09

## 2025-02-03 RX ADMIN — Medication 40 MILLIGRAM(S): at 13:13

## 2025-02-03 RX ADMIN — Medication 40 MILLIGRAM(S): at 11:15

## 2025-02-03 RX ADMIN — Medication 50 MILLIGRAM(S): at 05:38

## 2025-02-03 RX ADMIN — Medication 12.5 MILLIGRAM(S): at 21:10

## 2025-02-03 RX ADMIN — GABAPENTIN 300 MILLIGRAM(S): 800 TABLET ORAL at 21:11

## 2025-02-03 RX ADMIN — APIXABAN 5 MILLIGRAM(S): 5 TABLET, FILM COATED ORAL at 21:10

## 2025-02-03 RX ADMIN — POLYETHYLENE GLYCOL 3350 17 GRAM(S): 17 POWDER, FOR SOLUTION ORAL at 11:16

## 2025-02-03 RX ADMIN — LEVOTHYROXINE SODIUM 25 MICROGRAM(S): 25 TABLET ORAL at 06:42

## 2025-02-03 RX ADMIN — IPRATROPIUM BROMIDE AND ALBUTEROL SULFATE 3 MILLILITER(S): .5; 2.5 SOLUTION RESPIRATORY (INHALATION) at 21:53

## 2025-02-03 RX ADMIN — Medication 1 DROP(S): at 21:40

## 2025-02-03 RX ADMIN — IPRATROPIUM BROMIDE AND ALBUTEROL SULFATE 3 MILLILITER(S): .5; 2.5 SOLUTION RESPIRATORY (INHALATION) at 02:06

## 2025-02-03 RX ADMIN — Medication 50 MILLIGRAM(S): at 13:09

## 2025-02-03 RX ADMIN — Medication 2 TABLET(S): at 21:11

## 2025-02-03 RX ADMIN — VANCOMYCIN HYDROCHLORIDE 250 MILLIGRAM(S): KIT at 06:45

## 2025-02-03 NOTE — CONSULT NOTE ADULT - SUBJECTIVE AND OBJECTIVE BOX
Patient is a 89y old  Female who presents with a chief complaint of dyspnea (03 Feb 2025 14:41)      HPI:    88 y/o F w/ PMH of HTN, dyslipidemia, CVA, chronic diastolic CHF, paroxysmal a-fib s/p ablation (on AC), CAD s/p PCI, COPD (on 3L home O2 at night), severe pulm HTN, Mitral valve regurg, PE, Factor V leiden, hypothyroidism, p/w dyspnea. Patient states that she has chronic dyspnea 2/2 COPD. However, SOB has been getting worse and is associated with wheezing. Patient states she also has a cough at baseline, but cough has also gotten worse and is productive of clear sputum. States she had elevated temperatures of ~99. Patient was at urgent care w/ low O2 saturations.     CT Patent central airways. Multifocal ground glass opacities with areas of interlobular septal thickening and superimposed   atelectasis with a somewhat mosaic attenuation.  History of Pulmonary Hypertension/COPD  On O2 , 3L       PSH: PCI     Social Hx: Denies tobacco / etoh / drugs     Family Hx: Denies pertinent hx    (02 Feb 2025 02:34)      PAST MEDICAL & SURGICAL HISTORY:  Atrial fibrillation      CHF (congestive heart failure)      Pulmonary emboli      Factor 5 Leiden mutation, heterozygous      CAD (coronary artery disease)      Stented coronary artery      Hypothyroid      COPD (chronic obstructive pulmonary disease)      Mitral regurgitation      HTN (hypertension)      H/O: hysterectomy      H/O knee surgery      Cyst of breast, unspecified laterality  S/P excision      H/O cardiac radiofrequency ablation      S/P ablation of atrial fibrillation          PREVIOUS DIAGNOSTIC TESTING:      MEDICATIONS  (STANDING):  albuterol/ipratropium for Nebulization 3 milliLiter(s) Nebulizer every 6 hours  apixaban 5 milliGRAM(s) Oral two times a day  aspirin  chewable 81 milliGRAM(s) Oral daily  aztreonam  IVPB 1000 milliGRAM(s) IV Intermittent every 8 hours  dextrose 5%. 1000 milliLiter(s) (50 mL/Hr) IV Continuous <Continuous>  dextrose 5%. 1000 milliLiter(s) (100 mL/Hr) IV Continuous <Continuous>  dextrose 50% Injectable 25 Gram(s) IV Push once  dextrose 50% Injectable 12.5 Gram(s) IV Push once  dextrose 50% Injectable 25 Gram(s) IV Push once  furosemide   Injectable 40 milliGRAM(s) IV Push daily  gabapentin 100 milliGRAM(s) Oral daily  gabapentin 300 milliGRAM(s) Oral at bedtime  glucagon  Injectable 1 milliGRAM(s) IntraMuscular once  insulin lispro (ADMELOG) corrective regimen sliding scale   SubCutaneous three times a day before meals  insulin lispro (ADMELOG) corrective regimen sliding scale   SubCutaneous at bedtime  levothyroxine 25 MICROGram(s) Oral daily  methylPREDNISolone sodium succinate Injectable 40 milliGRAM(s) IV Push every 8 hours  metoprolol tartrate 12.5 milliGRAM(s) Oral two times a day  polyethylene glycol 3350 17 Gram(s) Oral daily  rOPINIRole 1 milliGRAM(s) Oral at bedtime  senna 2 Tablet(s) Oral at bedtime  spironolactone 25 milliGRAM(s) Oral daily  vancomycin  IVPB 1000 milliGRAM(s) IV Intermittent every 24 hours    MEDICATIONS  (PRN):  acetaminophen     Tablet .. 650 milliGRAM(s) Oral every 6 hours PRN Temp greater or equal to 38C (100.4F), Mild Pain (1 - 3)  artificial tears (preservative free) Ophthalmic Solution 1 Drop(s) Both EYES three times a day PRN Dry Eyes  benzonatate 200 milliGRAM(s) Oral every 8 hours PRN Cough  dextrose Oral Gel 15 Gram(s) Oral once PRN Blood Glucose LESS THAN 70 milliGRAM(s)/deciliter  guaifenesin/dextromethorphan Oral Liquid 10 milliLiter(s) Oral every 6 hours PRN Cough      FAMILY HISTORY:  FHx: diabetes mellitus (Father)    FHx: heart disease (Father)        SOCIAL HISTORY:  ***    REVIEW OF SYSTEM:  Pertinent items are noted in HPI.      Vital Signs Last 24 Hrs  T(C): 36.7 (03 Feb 2025 08:43), Max: 36.7 (02 Feb 2025 20:31)  T(F): 98.1 (03 Feb 2025 08:43), Max: 98.1 (03 Feb 2025 08:43)  HR: 67 (03 Feb 2025 13:22) (64 - 88)  BP: 124/54 (03 Feb 2025 08:43) (124/54 - 141/51)  BP(mean): --  RR: 19 (03 Feb 2025 08:43) (19 - 19)  SpO2: 97% (03 Feb 2025 13:22) (93% - 97%)    Parameters below as of 03 Feb 2025 13:22  Patient On (Oxygen Delivery Method): nasal cannula, 3lpm        I&O's Summary    02 Feb 2025 07:01  -  03 Feb 2025 07:00  --------------------------------------------------------  IN: 0 mL / OUT: 3 mL / NET: -3 mL      PHYSICAL EXAM  General Appearance: cooperative, no acute distress,   HEENT: PERRL, conjunctiva clear, EOM's intact, non injected pharynx, no exudate, TM   normal  Neck: Supple, , no adenopathy, thyroid: not enlarged, no carotid bruit or JVD  Back: Symmetric, no  tenderness,no soft tissue tenderness  Lungs: Coarse lexi  Heart: Regular rate and rhythm, S1, S2 normal, no murmur, rub or gallop  Abdomen: Soft, non-tender, bowel sounds active , no hepatosplenomegaly  Extremities: no cyanosis or edema, no joint swelling  Skin: Skin color, texture normal, no rashes   Neurologic: Alert and oriented X3 , cranial nerves intact, sensory and motor normal,    ECG:    LABS:                          10.5   14.79 )-----------( 525      ( 03 Feb 2025 06:08 )             31.9     02-03    134[L]  |  101  |  34[H]  ----------------------------<  248[H]  3.9   |  27  |  1.41[H]    Ca    9.7      03 Feb 2025 06:08    TPro  7.3  /  Alb  2.5[L]  /  TBili  0.4  /  DBili  x   /  AST  14[L]  /  ALT  9[L]  /  AlkPhos  54  02-02        Lipid Panel  183  43  --  80      PT/INR - ( 02 Feb 2025 06:54 )   PT: 24.1 sec;   INR: 2.06 ratio         PTT - ( 02 Feb 2025 06:54 )  PTT:35.8 sec  Urinalysis Basic - ( 03 Feb 2025 06:08 )    Color: x / Appearance: x / SG: x / pH: x  Gluc: 248 mg/dL / Ketone: x  / Bili: x / Urobili: x   Blood: x / Protein: x / Nitrite: x   Leuk Esterase: x / RBC: x / WBC x   Sq Epi: x / Non Sq Epi: x / Bacteria: x            RADIOLOGY & ADDITIONAL STUDIES:

## 2025-02-03 NOTE — CONSULT NOTE ADULT - ASSESSMENT
1) Pneumonia  2) Dyspnea   3) PH  4) Hypoxemia  5) Abnormal CT Chest     88 y/o F w/ PMH of HTN, dyslipidemia, CVA, chronic diastolic CHF, paroxysmal a-fib s/p ablation (on AC), CAD s/p PCI, COPD (on 3L home O2 at night), severe pulm HTN, Mitral valve regurg, PE, Factor V leiden, hypothyroidism, p/w dyspnea. Patient states that she has chronic dyspnea 2/2 COPD. However, SOB has been getting worse and is associated with wheezing. Patient states she also has a cough at baseline, but cough has also gotten worse and is productive of clear sputum. States she had elevated temperatures of ~99. Patient was at urgent care w/ low O2 saturations.     CT Patent central airways. Multifocal ground glass opacities with areas of interlobular septal thickening and superimposed   atelectasis with a somewhat mosaic attenuation.  History of Pulmonary Hypertension/COPD  On O2 , 3L   Currently on Vancomycin/Aztreonam  Diuresis for PH  FOllow up Cardiology recommendations  reviewed CT Chest   O2  Will continue to monitor

## 2025-02-03 NOTE — PROGRESS NOTE ADULT - SUBJECTIVE AND OBJECTIVE BOX
Patient is seen and examined. Chart is reviewed. She still has some SOB and cough. No fever.    Gen: No fever, chills, weakness  ENT: No visual changes or throat pain  Neck: No pain or stiffness  Respiratory: ++ cough, ++ wheezing  Cardiovascular: No chest pain or palpitations  Gastrointestinal: No abdominal pain, nausea, vomiting, constipation, or diarrhea  Hematologic: No easy bleeding or bruising  Neurologic: No numbness or focal weakness  Psych: No depression or insomnia  Skin: No rash or itching    MEDICATIONS  (STANDING):  albuterol/ipratropium for Nebulization 3 milliLiter(s) Nebulizer every 6 hours  apixaban 5 milliGRAM(s) Oral two times a day  aspirin  chewable 81 milliGRAM(s) Oral daily  aztreonam  IVPB 1000 milliGRAM(s) IV Intermittent every 8 hours  dextrose 5%. 1000 milliLiter(s) (50 mL/Hr) IV Continuous <Continuous>  dextrose 5%. 1000 milliLiter(s) (100 mL/Hr) IV Continuous <Continuous>  dextrose 50% Injectable 25 Gram(s) IV Push once  dextrose 50% Injectable 12.5 Gram(s) IV Push once  dextrose 50% Injectable 25 Gram(s) IV Push once  furosemide   Injectable 40 milliGRAM(s) IV Push daily  gabapentin 100 milliGRAM(s) Oral daily  gabapentin 300 milliGRAM(s) Oral at bedtime  glucagon  Injectable 1 milliGRAM(s) IntraMuscular once  insulin lispro (ADMELOG) corrective regimen sliding scale   SubCutaneous three times a day before meals  insulin lispro (ADMELOG) corrective regimen sliding scale   SubCutaneous at bedtime  levothyroxine 25 MICROGram(s) Oral daily  methylPREDNISolone sodium succinate Injectable 40 milliGRAM(s) IV Push every 8 hours  metoprolol tartrate 12.5 milliGRAM(s) Oral two times a day  polyethylene glycol 3350 17 Gram(s) Oral daily  rOPINIRole 1 milliGRAM(s) Oral at bedtime  senna 2 Tablet(s) Oral at bedtime  spironolactone 25 milliGRAM(s) Oral daily  vancomycin  IVPB 1000 milliGRAM(s) IV Intermittent every 24 hours    MEDICATIONS  (PRN):  acetaminophen     Tablet .. 650 milliGRAM(s) Oral every 6 hours PRN Temp greater or equal to 38C (100.4F), Mild Pain (1 - 3)  artificial tears (preservative free) Ophthalmic Solution 1 Drop(s) Both EYES three times a day PRN Dry Eyes  benzonatate 200 milliGRAM(s) Oral every 8 hours PRN Cough  dextrose Oral Gel 15 Gram(s) Oral once PRN Blood Glucose LESS THAN 70 milliGRAM(s)/deciliter  guaifenesin/dextromethorphan Oral Liquid 10 milliLiter(s) Oral every 6 hours PRN Cough      Vital Signs Last 24 Hrs  T(C): 36.7 (03 Feb 2025 08:43), Max: 36.7 (02 Feb 2025 20:31)  T(F): 98.1 (03 Feb 2025 08:43), Max: 98.1 (03 Feb 2025 08:43)  HR: 64 (03 Feb 2025 08:43) (64 - 88)  BP: 124/54 (03 Feb 2025 08:43) (124/54 - 141/51)  BP(mean): --  RR: 19 (03 Feb 2025 08:43) (19 - 19)  SpO2: 95% (03 Feb 2025 08:43) (93% - 97%)    Parameters below as of 03 Feb 2025 08:43  Patient On (Oxygen Delivery Method): nasal cannula      PHYSICAL EXAM:  GENERAL: NAD, lying in bed comfortably  HEAD:  Atraumatic, Normocephalic  EYES: conjunctiva and sclera clear  ENT: Moist mucous membranes  NECK: Supple, No JVD  CHEST/LUNG: ++ rhonchi, wheezing. Unlabored respirations, on NC oxygen   HEART: Regular rate and rhythm; No murmurs  ABDOMEN: Bowel sounds present; Soft, Nontender, Nondistended.   EXTREMITIES:  2+ Peripheral Pulses, brisk capillary refill. No clubbing, cyanosis, or edema  NERVOUS SYSTEM:  Alert & Oriented X3, speech clear. No deficits   MSK: FROM all 4 extremities, full and equal strength      LABS:                          10.5   14.79 )-----------( 525      ( 03 Feb 2025 06:08 )             31.9     03 Feb 2025 06:08    134    |  101    |  34     ----------------------------<  248    3.9     |  27     |  1.41     Ca    9.7        03 Feb 2025 06:08    TPro  7.3    /  Alb  2.5    /  TBili  0.4    /  DBili  x      /  AST  14     /  ALT  9      /  AlkPhos  54     02 Feb 2025 06:54    LIVER FUNCTIONS - ( 02 Feb 2025 06:54 )  Alb: 2.5 g/dL / Pro: 7.3 gm/dL / ALK PHOS: 54 U/L / ALT: 9 U/L / AST: 14 U/L / GGT: x           PT/INR - ( 02 Feb 2025 06:54 )   PT: 24.1 sec;   INR: 2.06 ratio         PTT - ( 02 Feb 2025 06:54 )  PTT:35.8 sec  CAPILLARY BLOOD GLUCOSE      POCT Blood Glucose.: 219 mg/dL (02 Feb 2025 21:50)  POCT Blood Glucose.: 189 mg/dL (02 Feb 2025 19:38)  POCT Blood Glucose.: 270 mg/dL (02 Feb 2025 14:32)        Urinalysis Basic - ( 03 Feb 2025 06:08 )    Color: x / Appearance: x / SG: x / pH: x  Gluc: 248 mg/dL / Ketone: x  / Bili: x / Urobili: x   Blood: x / Protein: x / Nitrite: x   Leuk Esterase: x / RBC: x / WBC x   Sq Epi: x / Non Sq Epi: x / Bacteria: x        RADIOLOGY:

## 2025-02-03 NOTE — PROGRESS NOTE ADULT - ASSESSMENT
Assessment:  89F with HTN, dyslipidemia, CVA, chronic diastolic CHF, paroxysmal a-fib s/p ablation (on AC), CAD s/p PCI, COPD (on 3L home O2 at night), severe pulm HTN, Mitral valve regurg, PE, Factor V leiden, hypothyroidism presents 2/1 with dyspnea, reports that it had gotten worse for the past few days, including worsening cough with clear sputum  Denies any fever or chills, no abdominal pain, n/v, diarrhea or dysuria  No known recent sick contact  Documented history of antibiotic allergies, but does not recall what reactions were  Afebrile on admission, 90% on 3L, now on 4L NC  WBC 12 > 7  Cr 1.4 > 1.2  UA negative  RVP negative  BCx 2/1 with CoNS (2 out of 4)  CXR with Cardiomegaly with bilateral airspace disease that is possibly infectious in nature.   CTA Chest with No evidence of pulmonary embolism. Mosaic attenuation throughout the lungs, likely due to small airways   or less likely vascular disease. There are also scattered areas of interlobular septal thickening which could be due to edema or and   infectious or inflammatory process in the appropriate clinical setting.    Antimicrobials:  aztreonam  IVPB 1000 every 8 hours (2/2 -- )  vancomycin  IVPB 1000 every 24 hours (2/2 -- )    Impression:   #Acute on Chronic Hypoxic Respiratory Failure, COPD Exacerbation, Possible Multifocal Pneumonia  #Leukocytosis   #ANNALEE improved  #Multiple Drug Allergy, Ceftin, Sulfa Allergy, does not recall what reaction were  - remains afebrile, WBC multifactorial ?steroids  - BCx with CONS contaminant     Recommendations:  - continue empiric Vancomycin 1G q24 (Day #2)  - please check Vancomycin trough before 4th sequential dose  - continue empiric Aztreonam 1G q8 (Day #2)  - on IV steroids  - monitor temperature curve  - trend WBC  - reason for abx use reviewed with patient  - side effects of antibiotic discussed, tolerating abx well so far  - Prior cultures reviewed. An epidemiologic assessment was performed. There is a significant risk for resistant microorganisms to spread to family members, and/or healthcare staff. The patient will be placed on isolation according to infection control policy. Will reconsider isolation measures based on new culture results and other clinical data as appropriate Appropriate cultures collected and an appropriate broad spectrum antibiotic therapy will be considered  - follow up repeat BCx (2/3)  - pending Pulm eval  - rest per primary team    Clinical team may change from intravenous to oral antibiotics when the following criteria are met:   1. Patient is clinically improving/stable       a)	Improved signs and symptoms of infection from initial presentation       b)	Afebrile for 24 hours       c)	Leukocytosis trending towards normal range   2. Patient is tolerating oral intake   3. Initial/repeat blood cultures are negative OR do not need to wait for preliminary blood cultures to result    Cannot advise changing to oral antibiotic therapy until culture sensitivity is available.

## 2025-02-03 NOTE — PROGRESS NOTE ADULT - ASSESSMENT
A/P:    *Acute on chronic hypoxic respiratory failure + Sepsis 2/2 HCAP/gram negative PNA  *COPD Exacerbation   *Acute on chronic diastolic CHF  *valvular abnormalities   *H/o severe pulm HTN   -CT: The pulmonary arteries B/L dilated, suggestive of pulmonary HTN. Mosaic attenuation throughout the lungs, likely due to small airways or less likely vascular disease. There are also scattered areas of interlobular septal thickening which could be due to edema or and   infectious or inflammatory process in the appropriate clinical setting. borderline enlarged pretracheal node measuring 1.0 cm   -Vanco / Aztreonem  -ID consult   -F/u blood cx  -Lactate = 1.5  -Duonebs  -Solumedrol  -Pulse ox monitoring  -O2 supplementation   -C/w IV lasix + aldactone (will need to watch creatinine closely)  -I/Os + Daily weights  -On BB   -follow consult of Dr. Sinclair and Dr. Holloway  -Patient has tamiflu ordered outpatient. However, she denies being flu positive. She is flu negative in ED.      *Suspect ANNALEE  - Cr slightly worsen again this am   -Patient on diuretics, will need to monitor closely  -If no improvement in renal function, will need nephro evaluation   -I/Os   -UA     *Hyponatremia  -stable with the setting of Hyperglycemia     *Thrombocytosis  -F/u outpatient heme for further evaluation     *H/o HTN / dyslipidemia / CVA / paroxysmal a-fib / CAD / PE / factor V leiden  -C/w home meds and f/u outpatient for further management if conditions remain stable during hospitalization     *DVT ppx  -On Eliquis

## 2025-02-03 NOTE — CONSULT NOTE ADULT - SUBJECTIVE AND OBJECTIVE BOX
Patient is a 89y old  Female who presents with a chief complaint of dyspnea (03 Feb 2025 14:41)    ________________________________  S. MAGDALENE is a 89y year old Female with a past medical history of paroxysmal atrial fibrillation on anticoagulation status post ablation, CAD status post PCI to the LAD in 2017, with repeat coronary treatment 2022 showing patent stents, chronic diastolic heart failure, history of CVA, mitral valve regurgitation, history of PE with factor V Leiden, hypertension, hyperlipidemia, obesity, severe pulm hypertension without response to nitric oxide on right heart cath and COPD.    Patient now presents for evaluation of shortness of breath, chest pressure, which is pleuritic.  Admits to wheezing.  Chest pain resolved.  EKG shows chronic changes.  Troponin negative.    Diagnosed with multilobar pneumonia.  Blood cultures positive.    PREVIOUS CARDIAC WORKUP:    Echocardiogram 11/12/24      1. Left ventricular cavity is normal in size. Left ventricular systolic function is normal with an ejection fraction of 60 % by Manley's method of disks.   2. Normal right ventricular systolic function.   3. Moderate to severe mitral regurgitation.   4. Mildto moderate pulmonic regurgitation.   5. Moderate tricuspid regurgitation.   6. Severe pulmonary hypertension.   7. There is mild calcification of the mitral valve annulus.   8. Findings were discussed with  and  on 11/12/2024 at 9:20 am    ________________________________  Review of systems: A 10 point review of system has been performed, and is negative except for what has been mentioned in the above history of present illness.     PAST MEDICAL & SURGICAL HISTORY:  Atrial fibrillation      CHF (congestive heart failure)      Pulmonary emboli      Factor 5 Leiden mutation, heterozygous      CAD (coronary artery disease)      Stented coronary artery      Hypothyroid      COPD (chronic obstructive pulmonary disease)      Mitral regurgitation      HTN (hypertension)      H/O: hysterectomy      H/O knee surgery      Cyst of breast, unspecified laterality  S/P excision      H/O cardiac radiofrequency ablation      S/P ablation of atrial fibrillation        FAMILY HISTORY:  FHx: diabetes mellitus (Father)    FHx: heart disease (Father)         SOCIAL HISTORY: The patient denies any tobacco abuse, alcohol abuse or illicit drug use.    ALLERGIES:  flu vaccines (Other)  statins (Muscle Pain)  Macrodantin (Other)  nisoldipine (Unknown)  Repatha (Unknown)  tadalafil (Muscle Pain)  sulfa drugs (Other)  Motrin (Other)  Ceftin (Other (Moderate))    Home Medications:  Albuterol (Eqv-ProAir HFA) 90 mcg/inh inhalation aerosol: 2 puff(s) inhaled every 6 hours as needed for (02 Feb 2025 00:11)  Aldactone 25 mg oral tablet: 1 tab(s) orally once a day (02 Feb 2025 00:09)  apixaban 5 mg oral tablet: 1 tab(s) orally 2 times a day (02 Feb 2025 00:09)  aspirin 81 mg oral tablet, chewable: 1 tab(s) orally once a day (02 Feb 2025 00:09)  Breztri Aerosphere 160 mcg-9 mcg-4.8 mcg/inh inhalation aerosol: 2 puff(s) inhaled 2 times a day (02 Feb 2025 00:13)  famotidine 20 mg oral tablet: 1 tab(s) orally once a day (at bedtime) (02 Feb 2025 00:09)  Lasix 40 mg oral tablet: 1 tab(s) orally once a day (02 Feb 2025 00:09)  levothyroxine 25 mcg (0.025 mg) oral tablet: 1 tab(s) orally once a day (02 Feb 2025 00:09)  Livalo 2 mg oral tablet: 1 tab(s) orally once a day (02 Feb 2025 00:12)  Neurontin 100 mg oral capsule: 1 cap(s) orally once a day (02 Feb 2025 00:09)  Neurontin 300 mg oral capsule: 1 cap(s) orally once a day (at bedtime) (02 Feb 2025 00:09)  oseltamivir 75 mg oral capsule: 1 cap(s) orally 2 times a day for 5 days ***course not complete*** (02 Feb 2025 00:12)  rOPINIRole 1 mg oral tablet: 1 tab(s) orally once a day (at bedtime) (02 Feb 2025 00:09)    MEDICATIONS  (STANDING):  albuterol/ipratropium for Nebulization 3 milliLiter(s) Nebulizer every 6 hours  apixaban 5 milliGRAM(s) Oral two times a day  aspirin  chewable 81 milliGRAM(s) Oral daily  aztreonam  IVPB 1000 milliGRAM(s) IV Intermittent every 8 hours  dextrose 5%. 1000 milliLiter(s) (50 mL/Hr) IV Continuous <Continuous>  dextrose 5%. 1000 milliLiter(s) (100 mL/Hr) IV Continuous <Continuous>  dextrose 50% Injectable 25 Gram(s) IV Push once  dextrose 50% Injectable 12.5 Gram(s) IV Push once  dextrose 50% Injectable 25 Gram(s) IV Push once  furosemide   Injectable 40 milliGRAM(s) IV Push daily  gabapentin 100 milliGRAM(s) Oral daily  gabapentin 300 milliGRAM(s) Oral at bedtime  glucagon  Injectable 1 milliGRAM(s) IntraMuscular once  insulin lispro (ADMELOG) corrective regimen sliding scale   SubCutaneous three times a day before meals  insulin lispro (ADMELOG) corrective regimen sliding scale   SubCutaneous at bedtime  levothyroxine 25 MICROGram(s) Oral daily  methylPREDNISolone sodium succinate Injectable 40 milliGRAM(s) IV Push every 8 hours  metoprolol tartrate 12.5 milliGRAM(s) Oral two times a day  polyethylene glycol 3350 17 Gram(s) Oral daily  rOPINIRole 1 milliGRAM(s) Oral at bedtime  senna 2 Tablet(s) Oral at bedtime  spironolactone 25 milliGRAM(s) Oral daily  vancomycin  IVPB 1000 milliGRAM(s) IV Intermittent every 24 hours    MEDICATIONS  (PRN):  acetaminophen     Tablet .. 650 milliGRAM(s) Oral every 6 hours PRN Temp greater or equal to 38C (100.4F), Mild Pain (1 - 3)  artificial tears (preservative free) Ophthalmic Solution 1 Drop(s) Both EYES three times a day PRN Dry Eyes  benzonatate 200 milliGRAM(s) Oral every 8 hours PRN Cough  dextrose Oral Gel 15 Gram(s) Oral once PRN Blood Glucose LESS THAN 70 milliGRAM(s)/deciliter  guaifenesin/dextromethorphan Oral Liquid 10 milliLiter(s) Oral every 6 hours PRN Cough    Vital Signs Last 24 Hrs  T(C): 36.7 (03 Feb 2025 08:43), Max: 36.7 (02 Feb 2025 20:31)  T(F): 98.1 (03 Feb 2025 08:43), Max: 98.1 (03 Feb 2025 08:43)  HR: 67 (03 Feb 2025 13:22) (64 - 88)  BP: 124/54 (03 Feb 2025 08:43) (124/54 - 141/51)  BP(mean): --  RR: 19 (03 Feb 2025 08:43) (19 - 19)  SpO2: 97% (03 Feb 2025 13:22) (93% - 97%)    Parameters below as of 03 Feb 2025 13:22  Patient On (Oxygen Delivery Method): nasal cannula, 3lpm      I&O's Summary    02 Feb 2025 07:01  -  03 Feb 2025 07:00  --------------------------------------------------------  IN: 0 mL / OUT: 3 mL / NET: -3 mL      ________________________________  GENERAL APPEARANCE:  No acute distress  HEAD: normocephalic, atraumatic  NECK: supple, no jugular venous distention, no carotid bruit    HEART: Regular rate and rhythm, S1, S2 normal, 1/6 murmur    LUNGS: Rales   ABDOMEN: soft, nontender, nondistended, with positive bowel sounds appreciated  EXTREMITIES: no edema.   NEURO: Alert and oriented x3  PSYC:  Normal affect  SKIN:  Dry  ________________________________   TELEMETRY:    ECG: Sinus rhythm, right bundle branch block with chronic precordial T wave inversions    LABS:                        10.5   14.79 )-----------( 525      ( 03 Feb 2025 06:08 )             31.9             02-03    134[L]  |  101  |  34[H]  ----------------------------<  248[H]  3.9   |  27  |  1.41[H]    Ca    9.7      03 Feb 2025 06:08    TPro  7.3  /  Alb  2.5[L]  /  TBili  0.4  /  DBili  x   /  AST  14[L]  /  ALT  9[L]  /  AlkPhos  54  02-02      Lipid Panel  Chl 183  HDL 43  LDL --  Trg 80  LIVER FUNCTIONS - ( 02 Feb 2025 06:54 )  Alb: 2.5 g/dL / Pro: 7.3 gm/dL / ALK PHOS: 54 U/L / ALT: 9 U/L / AST: 14 U/L / GGT: x         PT/INR - ( 02 Feb 2025 06:54 )   PT: 24.1 sec;   INR: 2.06 ratio         PTT - ( 02 Feb 2025 06:54 )  PTT:35.8 sec  Urinalysis Basic - ( 03 Feb 2025 06:08 )    Color: x / Appearance: x / SG: x / pH: x  Gluc: 248 mg/dL / Ketone: x  / Bili: x / Urobili: x   Blood: x / Protein: x / Nitrite: x   Leuk Esterase: x / RBC: x / WBC x   Sq Epi: x / Non Sq Epi: x / Bacteria: x        PT/INR - ( 02 Feb 2025 06:54 )   PT: 24.1 sec;   INR: 2.06 ratio         PTT - ( 02 Feb 2025 06:54 )  PTT:35.8 sec  Urinalysis Basic - ( 03 Feb 2025 06:08 )    Color: x / Appearance: x / SG: x / pH: x  Gluc: 248 mg/dL / Ketone: x  / Bili: x / Urobili: x   Blood: x / Protein: x / Nitrite: x   Leuk Esterase: x / RBC: x / WBC x   Sq Epi: x / Non Sq Epi: x / Bacteria: x        RADIOLOGY & ADDITIONAL STUDIES:   ________________________________    ASSESSMENT:  Chest pain, pleuritic  Chronic diastolic heart failure, mild exacerbation in the setting of pneumonia  Paroxysmal atrial fibrillation status post ablation  History of CVA  History of CAD status post prior PCI to LAD in 2017 with cardiac cath in 2022 showing patent stent  Severe pulm hypertension with no response to nitric oxide  History of PE with factor V Leiden    PLAN:  In summary, this is a 89y Female with a past medical history of severe pulm hypertension, chronic diastolic heart failure, paroxysmal atrial fibrillation, CAD, systemic hypertension and COPD.  Admitted for respiratory failure due to multifocal pneumonia.  Continue IV diuretic, and will reassess in the morning given slight bump in creatinine regarding continuing.  Continue beta-blocker.  Maintain sinus rhythm.  Continue anticoagulation and aspirin.  Continue Aldactone.  No need for repeat echo.      ____________________________________________  (Dragon Dictation software used). Thank you for allowing me to participate in the care of your patient. Please contact me should any questions arise.    NEFTALI Forte DO, [facility]  Office: [phone]

## 2025-02-03 NOTE — PROGRESS NOTE ADULT - SUBJECTIVE AND OBJECTIVE BOX
Date of Service:02-03-25 @ 14:41  Interval History/ROS: Afebrile overnight, on 3L NC, reports still having cough, dyspnea improving slowly, denies fever or chills     REVIEW OF SYSTEMS  [  ] ROS unobtainable because:    [ x ] All other systems negative except as noted below    Constitutional:  [ ] fever [ ] chills  [ ] weight loss  [ ]night sweat  [ ]poor appetite/PO intake [ ]fatigue   Skin:  [ ] rash [ ] phlebitis	  Eyes: [ ] icterus [ ] pain  [ ] discharge	  ENMT: [ ] sore throat  [ ] thrush [ ] ulcers [ ] exudates [ ]anosmia  Respiratory: [ ] dyspnea [ ] hemoptysis [ ] cough [ ] sputum	  Cardiovascular:  [ ] chest pain [ ] palpitations [ ] edema	  Gastrointestinal:  [ ] nausea [ ] vomiting [ ] diarrhea [ ] constipation [ ] pain	  Genitourinary:  [ ] dysuria [ ] frequency [ ] hematuria [ ] discharge [ ] flank pain  [ ] incontinence  Musculoskeletal:  [ ] myalgias [ ] arthralgias [ ] arthritis  [ ] back pain  Neurological:  [ ] headache [ ] weakness [ ] seizures  [ ] confusion/altered mental status    Allergies  flu vaccines (Other)  statins (Muscle Pain)  Macrodantin (Other)  nisoldipine (Unknown)  Repatha (Unknown)  tadalafil (Muscle Pain)  sulfa drugs (Other)  Motrin (Other)  Ceftin (Other (Moderate))        ANTIMICROBIALS:    aztreonam  IVPB 1000 every 8 hours  vancomycin  IVPB 1000 every 24 hours        OTHER MEDS: MEDICATIONS  (STANDING):  acetaminophen     Tablet .. 650 every 6 hours PRN  albuterol/ipratropium for Nebulization 3 every 6 hours  apixaban 5 two times a day  aspirin  chewable 81 daily  benzonatate 200 every 8 hours PRN  dextrose 50% Injectable 25 once  dextrose 50% Injectable 12.5 once  dextrose 50% Injectable 25 once  dextrose Oral Gel 15 once PRN  furosemide   Injectable 40 daily  gabapentin 100 daily  gabapentin 300 at bedtime  glucagon  Injectable 1 once  guaifenesin/dextromethorphan Oral Liquid 10 every 6 hours PRN  insulin lispro (ADMELOG) corrective regimen sliding scale  three times a day before meals  insulin lispro (ADMELOG) corrective regimen sliding scale  at bedtime  levothyroxine 25 daily  methylPREDNISolone sodium succinate Injectable 40 every 8 hours  metoprolol tartrate 12.5 two times a day  polyethylene glycol 3350 17 daily  rOPINIRole 1 at bedtime  senna 2 at bedtime  spironolactone 25 daily      Vital Signs Last 24 Hrs  T(F): 98.1 (02-03-25 @ 08:43), Max: 99.6 (02-01-25 @ 15:41)    Vital Signs Last 24 Hrs  HR: 67 (02-03-25 @ 13:22) (64 - 88)  BP: 124/54 (02-03-25 @ 08:43) (124/54 - 141/51)  RR: 19 (02-03-25 @ 08:43)  SpO2: 97% (02-03-25 @ 13:22) (93% - 97%)  Wt(kg): --    EXAM:    Constitutional:  frail, NAD  Head/Eyes: no icterus  LUNGS:  Course  CVS:  regular rhythm  Abd:  soft, non-tender; non-distended  Ext:  no edema  Vascular:  IV site no erythema tenderness or discharge  Neuro: AAO X 3, non- focal    Labs:                        10.5   14.79 )-----------( 525      ( 03 Feb 2025 06:08 )             31.9     02-03    134[L]  |  101  |  34[H]  ----------------------------<  248[H]  3.9   |  27  |  1.41[H]    Ca    9.7      03 Feb 2025 06:08    TPro  7.3  /  Alb  2.5[L]  /  TBili  0.4  /  DBili  x   /  AST  14[L]  /  ALT  9[L]  /  AlkPhos  54  02-02      WBC Trend:  WBC Count: 14.79 (02-03-25 @ 06:08)  WBC Count: 7.33 (02-02-25 @ 06:54)  WBC Count: 12.06 (02-01-25 @ 15:01)      Creatine Trend:  Creatinine: 1.41 (02-03)  Creatinine: 1.20 (02-02)  Creatinine: 1.43 (02-01)      Liver Biochemical Testing Trend:  Alanine Aminotransferase (ALT/SGPT): 9 *L* (02-02)  Alanine Aminotransferase (ALT/SGPT): 13 (02-01)  Alanine Aminotransferase (ALT/SGPT): 16 (11-29)  Alanine Aminotransferase (ALT/SGPT): 16 (11-25)  Alanine Aminotransferase (ALT/SGPT): 16 (11-21)  Aspartate Aminotransferase (AST/SGOT): 14 (02-02-25 @ 06:54)  Aspartate Aminotransferase (AST/SGOT): 31 (02-01-25 @ 15:01)  Aspartate Aminotransferase (AST/SGOT): 21 (11-29-24 @ 08:19)  Aspartate Aminotransferase (AST/SGOT): 21 (11-25-24 @ 09:31)  Aspartate Aminotransferase (AST/SGOT): 23 (11-21-24 @ 05:39)  Bilirubin Total: 0.4 (02-02)  Bilirubin Total: 0.5 (02-01)  Bilirubin Total: 0.5 (11-29)  Bilirubin Total: 0.4 (11-25)  Bilirubin Total: 0.5 (11-21)      Trend LDH  06-04-23 @ 21:29  166      Urinalysis Basic - ( 03 Feb 2025 06:08 )    Color: x / Appearance: x / SG: x / pH: x  Gluc: 248 mg/dL / Ketone: x  / Bili: x / Urobili: x   Blood: x / Protein: x / Nitrite: x   Leuk Esterase: x / RBC: x / WBC x   Sq Epi: x / Non Sq Epi: x / Bacteria: x        MICROBIOLOGY:        Urinalysis with Rflx Culture (collected 01 Feb 2025 19:20)    Culture - Blood (collected 01 Feb 2025 15:50)  Source: .Blood BLOOD  Preliminary Report:    No growth at 24 hours    Culture - Blood (collected 01 Feb 2025 15:01)  Source: .Blood BLOOD  Preliminary Report:    Growth in anaerobic bottle: Gram Positive Cocci in Clusters    Growth in aerobic bottle: Staphylococcus hominis    Isolation of Coagulase negative Staphylococcus from single blood culture    sets may represent    contamination. Contact the Microbiology Department at [phone] if    susceptibility testing is needed.    clinically indicated.    Direct identification is available within approximately 3-5    hours either by Blood Panel Multiplexed PCR or Direct    MALDI-TOF. Details: [email or web address]  Organism: Blood Culture PCR  Organism: Blood Culture PCR    Sensitivities:      Method Type: PCR      -  Coagulase negative Staphylococcus: Detec    Urinalysis with Rflx Culture (collected 11 Nov 2024 20:12)    Culture - Urine (collected 06 Jun 2023 07:43)  Source: Clean Catch Clean Catch (Midstream)  Final Report:    >=3 organisms. Probable collection contamination.    Culture - Blood (collected 04 Jun 2023 21:29)  Source: .Blood None  Final Report:    No Growth Final    Culture - Blood (collected 04 Jun 2023 21:29)  Source: .Blood None  Final Report:    No Growth Final    C-Reactive Protein: 95.4 (02-03)      Lactate, Blood: 1.5 (02-01 @ 15:01)    Sedimentation Rate, Erythrocyte: 90 mm/hr (02-03-25 @ 06:08)      RADIOLOGY:  imaging below personally reviewed    Xray Chest 1 View- PORTABLE-Urgent:  (01 Feb 2025 15:55)

## 2025-02-04 LAB
ANION GAP SERPL CALC-SCNC: 4 MMOL/L — LOW (ref 5–17)
BASOPHILS # BLD AUTO: 0.01 K/UL — SIGNIFICANT CHANGE UP (ref 0–0.2)
BASOPHILS NFR BLD AUTO: 0.1 % — SIGNIFICANT CHANGE UP (ref 0–2)
BUN SERPL-MCNC: 42 MG/DL — HIGH (ref 7–23)
CALCIUM SERPL-MCNC: 10.7 MG/DL — HIGH (ref 8.5–10.1)
CHLORIDE SERPL-SCNC: 101 MMOL/L — SIGNIFICANT CHANGE UP (ref 96–108)
CO2 SERPL-SCNC: 30 MMOL/L — SIGNIFICANT CHANGE UP (ref 22–31)
CREAT SERPL-MCNC: 1.51 MG/DL — HIGH (ref 0.5–1.3)
EGFR: 33 ML/MIN/1.73M2 — LOW
EOSINOPHIL # BLD AUTO: 0 K/UL — SIGNIFICANT CHANGE UP (ref 0–0.5)
EOSINOPHIL NFR BLD AUTO: 0 % — SIGNIFICANT CHANGE UP (ref 0–6)
GLUCOSE BLDC GLUCOMTR-MCNC: 170 MG/DL — HIGH (ref 70–99)
GLUCOSE BLDC GLUCOMTR-MCNC: 188 MG/DL — HIGH (ref 70–99)
GLUCOSE BLDC GLUCOMTR-MCNC: 194 MG/DL — HIGH (ref 70–99)
GLUCOSE BLDC GLUCOMTR-MCNC: 241 MG/DL — HIGH (ref 70–99)
GLUCOSE SERPL-MCNC: 218 MG/DL — HIGH (ref 70–99)
HCT VFR BLD CALC: 34.7 % — SIGNIFICANT CHANGE UP (ref 34.5–45)
HGB BLD-MCNC: 11.1 G/DL — LOW (ref 11.5–15.5)
IMM GRANULOCYTES NFR BLD AUTO: 1 % — HIGH (ref 0–0.9)
LYMPHOCYTES # BLD AUTO: 0.44 K/UL — LOW (ref 1–3.3)
LYMPHOCYTES # BLD AUTO: 3 % — LOW (ref 13–44)
MCHC RBC-ENTMCNC: 29.3 PG — SIGNIFICANT CHANGE UP (ref 27–34)
MCHC RBC-ENTMCNC: 32 G/DL — SIGNIFICANT CHANGE UP (ref 32–36)
MCV RBC AUTO: 91.6 FL — SIGNIFICANT CHANGE UP (ref 80–100)
MONOCYTES # BLD AUTO: 0.48 K/UL — SIGNIFICANT CHANGE UP (ref 0–0.9)
MONOCYTES NFR BLD AUTO: 3.3 % — SIGNIFICANT CHANGE UP (ref 2–14)
NEUTROPHILS # BLD AUTO: 13.49 K/UL — HIGH (ref 1.8–7.4)
NEUTROPHILS NFR BLD AUTO: 92.6 % — HIGH (ref 43–77)
PLATELET # BLD AUTO: 529 K/UL — HIGH (ref 150–400)
POTASSIUM SERPL-MCNC: 4.5 MMOL/L — SIGNIFICANT CHANGE UP (ref 3.5–5.3)
POTASSIUM SERPL-SCNC: 4.5 MMOL/L — SIGNIFICANT CHANGE UP (ref 3.5–5.3)
RBC # BLD: 3.79 M/UL — LOW (ref 3.8–5.2)
RBC # FLD: 14 % — SIGNIFICANT CHANGE UP (ref 10.3–14.5)
SODIUM SERPL-SCNC: 135 MMOL/L — SIGNIFICANT CHANGE UP (ref 135–145)
VANCOMYCIN TROUGH SERPL-MCNC: 14.1 UG/ML — SIGNIFICANT CHANGE UP (ref 10–20)
WBC # BLD: 14.57 K/UL — HIGH (ref 3.8–10.5)
WBC # FLD AUTO: 14.57 K/UL — HIGH (ref 3.8–10.5)

## 2025-02-04 PROCEDURE — 99233 SBSQ HOSP IP/OBS HIGH 50: CPT

## 2025-02-04 PROCEDURE — 93010 ELECTROCARDIOGRAM REPORT: CPT

## 2025-02-04 RX ORDER — PANTOPRAZOLE 20 MG/1
40 TABLET, DELAYED RELEASE ORAL
Refills: 0 | Status: DISCONTINUED | OUTPATIENT
Start: 2025-02-04 | End: 2025-02-10

## 2025-02-04 RX ADMIN — Medication 2: at 17:37

## 2025-02-04 RX ADMIN — APIXABAN 5 MILLIGRAM(S): 5 TABLET, FILM COATED ORAL at 09:47

## 2025-02-04 RX ADMIN — IPRATROPIUM BROMIDE AND ALBUTEROL SULFATE 3 MILLILITER(S): .5; 2.5 SOLUTION RESPIRATORY (INHALATION) at 02:03

## 2025-02-04 RX ADMIN — IPRATROPIUM BROMIDE AND ALBUTEROL SULFATE 3 MILLILITER(S): .5; 2.5 SOLUTION RESPIRATORY (INHALATION) at 08:37

## 2025-02-04 RX ADMIN — Medication 50 MILLIGRAM(S): at 14:52

## 2025-02-04 RX ADMIN — VANCOMYCIN HYDROCHLORIDE 250 MILLIGRAM(S): KIT at 06:25

## 2025-02-04 RX ADMIN — LEVOTHYROXINE SODIUM 25 MICROGRAM(S): 25 TABLET ORAL at 06:22

## 2025-02-04 RX ADMIN — Medication 12.5 MILLIGRAM(S): at 21:24

## 2025-02-04 RX ADMIN — POLYETHYLENE GLYCOL 3350 17 GRAM(S): 17 POWDER, FOR SOLUTION ORAL at 09:48

## 2025-02-04 RX ADMIN — Medication 25 MILLIGRAM(S): at 09:47

## 2025-02-04 RX ADMIN — PANTOPRAZOLE 40 MILLIGRAM(S): 20 TABLET, DELAYED RELEASE ORAL at 06:22

## 2025-02-04 RX ADMIN — Medication 50 MILLIGRAM(S): at 21:25

## 2025-02-04 RX ADMIN — Medication 2: at 12:42

## 2025-02-04 RX ADMIN — IPRATROPIUM BROMIDE AND ALBUTEROL SULFATE 3 MILLILITER(S): .5; 2.5 SOLUTION RESPIRATORY (INHALATION) at 14:00

## 2025-02-04 RX ADMIN — DEXTROMETHORPHAN HBR AND GUAIFENESIN ORAL SOLUTION 10 MILLILITER(S): 10; 100 LIQUID ORAL at 21:24

## 2025-02-04 RX ADMIN — Medication 40 MILLIGRAM(S): at 21:22

## 2025-02-04 RX ADMIN — ASPIRIN 81 MILLIGRAM(S): 81 TABLET, COATED ORAL at 06:21

## 2025-02-04 RX ADMIN — Medication 40 MILLIGRAM(S): at 06:23

## 2025-02-04 RX ADMIN — APIXABAN 5 MILLIGRAM(S): 5 TABLET, FILM COATED ORAL at 21:24

## 2025-02-04 RX ADMIN — ROPINIROLE 1 MILLIGRAM(S): 0.5 TABLET, FILM COATED ORAL at 21:24

## 2025-02-04 RX ADMIN — GABAPENTIN 300 MILLIGRAM(S): 800 TABLET ORAL at 21:24

## 2025-02-04 RX ADMIN — Medication 12.5 MILLIGRAM(S): at 09:46

## 2025-02-04 RX ADMIN — Medication 200 MILLIGRAM(S): at 21:24

## 2025-02-04 RX ADMIN — IPRATROPIUM BROMIDE AND ALBUTEROL SULFATE 3 MILLILITER(S): .5; 2.5 SOLUTION RESPIRATORY (INHALATION) at 19:34

## 2025-02-04 RX ADMIN — GABAPENTIN 100 MILLIGRAM(S): 800 TABLET ORAL at 09:46

## 2025-02-04 RX ADMIN — Medication 40 MILLIGRAM(S): at 14:54

## 2025-02-04 RX ADMIN — Medication 50 MILLIGRAM(S): at 05:14

## 2025-02-04 RX ADMIN — Medication 4: at 08:29

## 2025-02-04 RX ADMIN — Medication 2 TABLET(S): at 21:25

## 2025-02-04 NOTE — PHYSICAL THERAPY INITIAL EVALUATION ADULT - GENERAL OBSERVATIONS, REHAB EVAL
Pre session: supine in bed with bed alarm on. monitor in place. O2 via nc 3 l/min via nc. Call bell and phone in reach. Primafit intact. No c/o pain. Tolerated well and would benefit form further skilled PT for functional mobility training.

## 2025-02-04 NOTE — PHYSICAL THERAPY INITIAL EVALUATION ADULT - DIAGNOSIS, PT EVAL
Acute on chronic hypoxic respiratory failure + Sepsis 2/2 HCAP/gram negative PNA; COPD Exacerbation; Acute on chronic diastolic CHF

## 2025-02-04 NOTE — PHYSICAL THERAPY INITIAL EVALUATION ADULT - ADDITIONAL COMMENTS
Lives in apt. first floor. [address]. Uses RW. Has aide who helps with ADl and IADL's. 8am-1pm aide. 3pm-5pm- daughter. 5pm-9pm aide. Dtr or friends provide transport to [place]. Has life alert. Uses home O2 at night 3 l/min. Only uses it during daytime if SOB. Has tub shower with grab bars. Has transport wc for community entry.

## 2025-02-04 NOTE — PHYSICAL THERAPY INITIAL EVALUATION ADULT - PERTINENT HX OF CURRENT PROBLEM, REHAB EVAL
88 y/o F w/ PMH of HTN, dyslipidemia, CVA, chronic diastolic CHF, paroxysmal a-fib s/p ablation (on AC), CAD s/p PCI, COPD (on 3L home O2 at night), severe pulm HTN, Mitral valve regurg, PE, Factor V leiden, hypothyroidism, p/w dyspnea. Patient states that she has chronic dyspnea 2/2 COPD. However, SOB has been getting worse and is associated with wheezing. Patient states she also has a cough at baseline, but cough has also gotten worse and is productive of clear sputum. States she had elevated temperatures of ~99. Patient was at urgent care w/ low O2 saturations.

## 2025-02-04 NOTE — PROGRESS NOTE ADULT - ASSESSMENT
A/P:    *Acute on chronic hypoxic respiratory failure + Sepsis 2/2 HCAP/gram negative PNA  *COPD Exacerbation   *Acute on chronic diastolic CHF  *valvular abnormalities   *H/o severe pulm HTN   -CT: The pulmonary arteries B/L dilated, suggestive of pulmonary HTN. Mosaic attenuation throughout the lungs, likely due to small airways or less likely vascular disease. There are also scattered areas of interlobular septal thickening which could be due to edema or and   infectious or inflammatory process in the appropriate clinical setting. borderline enlarged pretracheal node measuring 1.0 cm   -Vanco / Aztreonem  -ID consult   -F/u blood cx  -Lactate = 1.5  -Duonebs  -Solumedrol  -Pulse ox monitoring  -O2 supplementation   -C/w IV lasix + aldactone (will need to watch creatinine closely)  -I/Os + Daily weights  -On BB   -follow consult pulmonary and cardiology consults   -Patient has tamiflu ordered outpatient. However, she denies being flu positive. She is flu negative in ED.      *Suspect ANNALEE  - Cr slightly worsen again this am   -Patient on diuretics, will need to monitor closely  -follow Nephro consult     *Hyponatremia  -stable     *Thrombocytosis  -F/u outpatient heme for further evaluation     *H/o HTN / dyslipidemia / CVA / paroxysmal a-fib / CAD / PE / factor V leiden  -C/w home meds and f/u outpatient for further management if conditions remain stable during hospitalization     *DVT ppx  -On Eliquis

## 2025-02-04 NOTE — PROGRESS NOTE ADULT - SUBJECTIVE AND OBJECTIVE BOX
Patient is seen and examined. Chart is reviewed. SOB and cough is improving.     Gen: No fever, chills, weakness  ENT: No visual changes or throat pain  Neck: No pain or stiffness  Respiratory: No cough or wheezing  Cardiovascular: No chest pain or palpitations  Gastrointestinal: No abdominal pain, nausea, vomiting, constipation, or diarrhea  Hematologic: No easy bleeding or bruising  Neurologic: No numbness or focal weakness  Psych: No depression or insomnia  Skin: No rash or itching      MEDICATIONS  (STANDING):  albuterol/ipratropium for Nebulization 3 milliLiter(s) Nebulizer every 6 hours  apixaban 5 milliGRAM(s) Oral two times a day  aspirin  chewable 81 milliGRAM(s) Oral daily  aztreonam  IVPB 1000 milliGRAM(s) IV Intermittent every 8 hours  dextrose 5%. 1000 milliLiter(s) (100 mL/Hr) IV Continuous <Continuous>  dextrose 5%. 1000 milliLiter(s) (50 mL/Hr) IV Continuous <Continuous>  dextrose 50% Injectable 25 Gram(s) IV Push once  dextrose 50% Injectable 12.5 Gram(s) IV Push once  dextrose 50% Injectable 25 Gram(s) IV Push once  gabapentin 100 milliGRAM(s) Oral daily  gabapentin 300 milliGRAM(s) Oral at bedtime  glucagon  Injectable 1 milliGRAM(s) IntraMuscular once  insulin lispro (ADMELOG) corrective regimen sliding scale   SubCutaneous at bedtime  insulin lispro (ADMELOG) corrective regimen sliding scale   SubCutaneous three times a day before meals  levothyroxine 25 MICROGram(s) Oral daily  methylPREDNISolone sodium succinate Injectable 40 milliGRAM(s) IV Push every 8 hours  metoprolol tartrate 12.5 milliGRAM(s) Oral two times a day  pantoprazole    Tablet 40 milliGRAM(s) Oral before breakfast  polyethylene glycol 3350 17 Gram(s) Oral daily  rOPINIRole 1 milliGRAM(s) Oral at bedtime  senna 2 Tablet(s) Oral at bedtime  spironolactone 25 milliGRAM(s) Oral daily  vancomycin  IVPB 1000 milliGRAM(s) IV Intermittent every 24 hours    MEDICATIONS  (PRN):  acetaminophen     Tablet .. 650 milliGRAM(s) Oral every 6 hours PRN Temp greater or equal to 38C (100.4F), Mild Pain (1 - 3)  artificial tears (preservative free) Ophthalmic Solution 1 Drop(s) Both EYES three times a day PRN Dry Eyes  benzonatate 200 milliGRAM(s) Oral every 8 hours PRN Cough  dextrose Oral Gel 15 Gram(s) Oral once PRN Blood Glucose LESS THAN 70 milliGRAM(s)/deciliter  guaifenesin/dextromethorphan Oral Liquid 10 milliLiter(s) Oral every 6 hours PRN Cough      Vital Signs Last 24 Hrs  T(C): 36.3 (04 Feb 2025 08:25), Max: 36.4 (03 Feb 2025 20:44)  T(F): 97.4 (04 Feb 2025 08:25), Max: 97.6 (04 Feb 2025 00:05)  HR: 64 (04 Feb 2025 14:03) (64 - 76)  BP: 147/64 (04 Feb 2025 08:25) (136/61 - 147/64)  BP(mean): --  RR: 18 (04 Feb 2025 08:25) (18 - 18)  SpO2: 98% (04 Feb 2025 14:03) (93% - 98%)    Parameters below as of 04 Feb 2025 14:03  Patient On (Oxygen Delivery Method): nasal cannula, 3l      PHYSICAL EXAM:  GENERAL: NAD, lying in bed comfortably  HEAD:  Atraumatic, Normocephalic  EYES: conjunctiva and sclera clear  ENT: Moist mucous membranes  NECK: Supple, No JVD  CHEST/LUNG: ++ rhonchi, wheezing- improving, Unlabored respirations, on NC oxygen   HEART: Regular rate and rhythm; No murmurs  ABDOMEN: Bowel sounds present; Soft, Nontender, Nondistended.   EXTREMITIES:  2+ Peripheral Pulses, brisk capillary refill. No clubbing, cyanosis, or edema  NERVOUS SYSTEM:  Alert & Oriented X3, speech clear. No deficits   MSK: FROM all 4 extremities, full and equal strength      LABS:                          11.1   14.57 )-----------( 529      ( 04 Feb 2025 05:16 )             34.7     04 Feb 2025 05:16    135    |  101    |  42     ----------------------------<  218    4.5     |  30     |  1.51     Ca    10.7       04 Feb 2025 05:16          CAPILLARY BLOOD GLUCOSE      POCT Blood Glucose.: 188 mg/dL (04 Feb 2025 12:12)  POCT Blood Glucose.: 241 mg/dL (04 Feb 2025 08:21)  POCT Blood Glucose.: 277 mg/dL (03 Feb 2025 20:53)  POCT Blood Glucose.: 189 mg/dL (03 Feb 2025 17:45)        Urinalysis Basic - ( 04 Feb 2025 05:16 )    Color: x / Appearance: x / SG: x / pH: x  Gluc: 218 mg/dL / Ketone: x  / Bili: x / Urobili: x   Blood: x / Protein: x / Nitrite: x   Leuk Esterase: x / RBC: x / WBC x   Sq Epi: x / Non Sq Epi: x / Bacteria: x        RADIOLOGY:

## 2025-02-04 NOTE — PROGRESS NOTE ADULT - SUBJECTIVE AND OBJECTIVE BOX
The patient was seen and examined.  Shortness of breath improved.  Sitting up in chair.  Creatinine slightly increased.  No chest discomfort     Initial Consult HPI  _______________  SKeegan DEL CASTILLO is a 89y year old Female with a past medical history of paroxysmal atrial fibrillation on anticoagulation status post ablation, CAD status post PCI to the LAD in 2017, with repeat coronary treatment 2022 showing patent stents, chronic diastolic heart failure, history of CVA, mitral valve regurgitation, history of PE with factor V Leiden, hypertension, hyperlipidemia, obesity, severe pulm hypertension without response to nitric oxide on right heart cath and COPD.    Patient now presents for evaluation of shortness of breath, chest pressure, which is pleuritic.  Admits to wheezing.  Chest pain resolved.  EKG shows chronic changes.  Troponin negative.    Diagnosed with multilobar pneumonia.  Blood cultures positive.    PREVIOUS CARDIAC WORKUP:    Echocardiogram 11/12/24      1. Left ventricular cavity is normal in size. Left ventricular systolic function is normal with an ejection fraction of 60 % by Manley's method of disks.   2. Normal right ventricular systolic function.   3. Moderate to severe mitral regurgitation.   4. Mildto moderate pulmonic regurgitation.   5. Moderate tricuspid regurgitation.   6. Severe pulmonary hypertension.   7. There is mild calcification of the mitral valve annulus.   8. Findings were discussed with  and  on 11/12/2024 at 9:20 am    ________________________________  Review of systems: A 10 point review of system has been performed, and is negative except for what has been mentioned in the above history of present illness.     PAST MEDICAL & SURGICAL HISTORY:  Atrial fibrillation      CHF (congestive heart failure)      Pulmonary emboli      Factor 5 Leiden mutation, heterozygous      CAD (coronary artery disease)      Stented coronary artery      Hypothyroid      COPD (chronic obstructive pulmonary disease)      Mitral regurgitation      HTN (hypertension)      H/O: hysterectomy      H/O knee surgery      Cyst of breast, unspecified laterality  S/P excision      H/O cardiac radiofrequency ablation      S/P ablation of atrial fibrillation           ALLERGIES:  flu vaccines (Other)  statins (Muscle Pain)  Macrodantin (Other)  nisoldipine (Unknown)  Repatha (Unknown)  tadalafil (Muscle Pain)  sulfa drugs (Other)  Motrin (Other)  Ceftin (Other (Moderate))    Home Medications:  Albuterol (Eqv-ProAir HFA) 90 mcg/inh inhalation aerosol: 2 puff(s) inhaled every 6 hours as needed for (02 Feb 2025 00:11)  Aldactone 25 mg oral tablet: 1 tab(s) orally once a day (02 Feb 2025 00:09)  apixaban 5 mg oral tablet: 1 tab(s) orally 2 times a day (02 Feb 2025 00:09)  aspirin 81 mg oral tablet, chewable: 1 tab(s) orally once a day (02 Feb 2025 00:09)  Breztri Aerosphere 160 mcg-9 mcg-4.8 mcg/inh inhalation aerosol: 2 puff(s) inhaled 2 times a day (02 Feb 2025 00:13)  famotidine 20 mg oral tablet: 1 tab(s) orally once a day (at bedtime) (02 Feb 2025 00:09)  Lasix 40 mg oral tablet: 1 tab(s) orally once a day (02 Feb 2025 00:09)  levothyroxine 25 mcg (0.025 mg) oral tablet: 1 tab(s) orally once a day (02 Feb 2025 00:09)  Livalo 2 mg oral tablet: 1 tab(s) orally once a day (02 Feb 2025 00:12)  Neurontin 100 mg oral capsule: 1 cap(s) orally once a day (02 Feb 2025 00:09)  Neurontin 300 mg oral capsule: 1 cap(s) orally once a day (at bedtime) (02 Feb 2025 00:09)  oseltamivir 75 mg oral capsule: 1 cap(s) orally 2 times a day for 5 days ***course not complete*** (02 Feb 2025 00:12)  rOPINIRole 1 mg oral tablet: 1 tab(s) orally once a day (at bedtime) (02 Feb 2025 00:09)    MEDICATIONS  (STANDING):  albuterol/ipratropium for Nebulization 3 milliLiter(s) Nebulizer every 6 hours  apixaban 5 milliGRAM(s) Oral two times a day  aspirin  chewable 81 milliGRAM(s) Oral daily  aztreonam  IVPB 1000 milliGRAM(s) IV Intermittent every 8 hours  dextrose 5%. 1000 milliLiter(s) (50 mL/Hr) IV Continuous <Continuous>  dextrose 5%. 1000 milliLiter(s) (100 mL/Hr) IV Continuous <Continuous>  dextrose 50% Injectable 25 Gram(s) IV Push once  dextrose 50% Injectable 12.5 Gram(s) IV Push once  dextrose 50% Injectable 25 Gram(s) IV Push once  gabapentin 100 milliGRAM(s) Oral daily  gabapentin 300 milliGRAM(s) Oral at bedtime  glucagon  Injectable 1 milliGRAM(s) IntraMuscular once  insulin lispro (ADMELOG) corrective regimen sliding scale   SubCutaneous three times a day before meals  insulin lispro (ADMELOG) corrective regimen sliding scale   SubCutaneous at bedtime  levothyroxine 25 MICROGram(s) Oral daily  methylPREDNISolone sodium succinate Injectable 40 milliGRAM(s) IV Push every 8 hours  metoprolol tartrate 12.5 milliGRAM(s) Oral two times a day  pantoprazole    Tablet 40 milliGRAM(s) Oral before breakfast  polyethylene glycol 3350 17 Gram(s) Oral daily  rOPINIRole 1 milliGRAM(s) Oral at bedtime  senna 2 Tablet(s) Oral at bedtime  spironolactone 25 milliGRAM(s) Oral daily  vancomycin  IVPB 1000 milliGRAM(s) IV Intermittent every 24 hours    MEDICATIONS  (PRN):  acetaminophen     Tablet .. 650 milliGRAM(s) Oral every 6 hours PRN Temp greater or equal to 38C (100.4F), Mild Pain (1 - 3)  artificial tears (preservative free) Ophthalmic Solution 1 Drop(s) Both EYES three times a day PRN Dry Eyes  benzonatate 200 milliGRAM(s) Oral every 8 hours PRN Cough  dextrose Oral Gel 15 Gram(s) Oral once PRN Blood Glucose LESS THAN 70 milliGRAM(s)/deciliter  guaifenesin/dextromethorphan Oral Liquid 10 milliLiter(s) Oral every 6 hours PRN Cough      Vital Signs Last 24 Hrs  T(C): 36.7 (04 Feb 2025 15:22), Max: 36.7 (04 Feb 2025 15:22)  T(F): 98.1 (04 Feb 2025 15:22), Max: 98.1 (04 Feb 2025 15:22)  HR: 88 (04 Feb 2025 15:22) (64 - 88)  BP: 155/62 (04 Feb 2025 15:22) (136/61 - 155/62)  BP(mean): --  RR: 18 (04 Feb 2025 15:22) (18 - 18)  SpO2: 92% (04 Feb 2025 15:22) (92% - 98%)    Parameters below as of 04 Feb 2025 15:22  Patient On (Oxygen Delivery Method): nasal cannula      I&O's Summary    03 Feb 2025 07:01  -  04 Feb 2025 07:00  --------------------------------------------------------  IN: 0 mL / OUT: 1 mL / NET: -1 mL        ________________________________  GENERAL APPEARANCE:  No acute distress  HEAD: normocephalic, atraumatic  NECK: supple, no jugular venous distention, no carotid bruit    HEART: Regular rate and rhythm, S1, S2 normal, 1/6 murmur    LUNGS: Rales   ABDOMEN: soft, nontender, nondistended, with positive bowel sounds appreciated  EXTREMITIES: no edema.   NEURO: Alert and oriented x3  PSYC:  Normal affect  SKIN:  Dry  ________________________________         ECG: Sinus rhythm, right bundle branch block with chronic precordial T wave inversions    LABS:                                11.1   14.57 )-----------( 529      ( 04 Feb 2025 05:16 )             34.7          02-04    135  |  101  |  42[H]  ----------------------------<  218[H]  4.5   |  30  |  1.51[H]    Ca    10.7[H]      04 Feb 2025 05:16            Urinalysis Basic - ( 04 Feb 2025 05:16 )    Color: x / Appearance: x / SG: x / pH: x  Gluc: 218 mg/dL / Ketone: x  / Bili: x / Urobili: x   Blood: x / Protein: x / Nitrite: x   Leuk Esterase: x / RBC: x / WBC x   Sq Epi: x / Non Sq Epi: x / Bacteria: x        RADIOLOGY & ADDITIONAL STUDIES:   ________________________________    ASSESSMENT:  Chest pain, pleuritic  Chronic diastolic heart failure, mild exacerbation in the setting of pneumonia  Paroxysmal atrial fibrillation status post ablation  History of CVA  History of CAD status post prior PCI to LAD in 2017 with cardiac cath in 2022 showing patent stent  Severe pulm hypertension with no response to nitric oxide  History of PE with factor V Leiden    PLAN:  In summary, this is a 89y Female with a past medical history of severe pulm hypertension, chronic diastolic heart failure, paroxysmal atrial fibrillation, CAD, systemic hypertension and COPD.  Admitted for respiratory failure due to multifocal pneumonia.    Given increasing creatinine, hold Lasix today.  Continue anticoagulation.  Continue aspirin.  Blood pressure stable.  Continue Aldactone and metoprolol.      ____________________________________________  (Dragon Dictation software used). Thank you for allowing me to participate in the care of your patient. Please contact me should any questions arise.    NEFTALI Forte DO, [facility]  Office: [phone]

## 2025-02-05 LAB
ANION GAP SERPL CALC-SCNC: 5 MMOL/L — SIGNIFICANT CHANGE UP (ref 5–17)
BASOPHILS # BLD AUTO: 0.01 K/UL — SIGNIFICANT CHANGE UP (ref 0–0.2)
BASOPHILS NFR BLD AUTO: 0.1 % — SIGNIFICANT CHANGE UP (ref 0–2)
BUN SERPL-MCNC: 45 MG/DL — HIGH (ref 7–23)
CALCIUM SERPL-MCNC: 9.8 MG/DL — SIGNIFICANT CHANGE UP (ref 8.5–10.1)
CHLORIDE SERPL-SCNC: 101 MMOL/L — SIGNIFICANT CHANGE UP (ref 96–108)
CO2 SERPL-SCNC: 27 MMOL/L — SIGNIFICANT CHANGE UP (ref 22–31)
CREAT SERPL-MCNC: 1.26 MG/DL — SIGNIFICANT CHANGE UP (ref 0.5–1.3)
EGFR: 41 ML/MIN/1.73M2 — LOW
EOSINOPHIL # BLD AUTO: 0.04 K/UL — SIGNIFICANT CHANGE UP (ref 0–0.5)
EOSINOPHIL NFR BLD AUTO: 0.3 % — SIGNIFICANT CHANGE UP (ref 0–6)
GLUCOSE BLDC GLUCOMTR-MCNC: 169 MG/DL — HIGH (ref 70–99)
GLUCOSE BLDC GLUCOMTR-MCNC: 173 MG/DL — HIGH (ref 70–99)
GLUCOSE BLDC GLUCOMTR-MCNC: 209 MG/DL — HIGH (ref 70–99)
GLUCOSE BLDC GLUCOMTR-MCNC: 248 MG/DL — HIGH (ref 70–99)
GLUCOSE SERPL-MCNC: 228 MG/DL — HIGH (ref 70–99)
HCT VFR BLD CALC: 32.7 % — LOW (ref 34.5–45)
HGB BLD-MCNC: 10.5 G/DL — LOW (ref 11.5–15.5)
IMM GRANULOCYTES NFR BLD AUTO: 1.3 % — HIGH (ref 0–0.9)
LYMPHOCYTES # BLD AUTO: 0.39 K/UL — LOW (ref 1–3.3)
LYMPHOCYTES # BLD AUTO: 2.9 % — LOW (ref 13–44)
MCHC RBC-ENTMCNC: 29.4 PG — SIGNIFICANT CHANGE UP (ref 27–34)
MCHC RBC-ENTMCNC: 32.1 G/DL — SIGNIFICANT CHANGE UP (ref 32–36)
MCV RBC AUTO: 91.6 FL — SIGNIFICANT CHANGE UP (ref 80–100)
MONOCYTES # BLD AUTO: 0.66 K/UL — SIGNIFICANT CHANGE UP (ref 0–0.9)
MONOCYTES NFR BLD AUTO: 4.8 % — SIGNIFICANT CHANGE UP (ref 2–14)
NEUTROPHILS # BLD AUTO: 12.39 K/UL — HIGH (ref 1.8–7.4)
NEUTROPHILS NFR BLD AUTO: 90.6 % — HIGH (ref 43–77)
PLATELET # BLD AUTO: 539 K/UL — HIGH (ref 150–400)
POTASSIUM SERPL-MCNC: 4.5 MMOL/L — SIGNIFICANT CHANGE UP (ref 3.5–5.3)
POTASSIUM SERPL-SCNC: 4.5 MMOL/L — SIGNIFICANT CHANGE UP (ref 3.5–5.3)
RBC # BLD: 3.57 M/UL — LOW (ref 3.8–5.2)
RBC # FLD: 13.8 % — SIGNIFICANT CHANGE UP (ref 10.3–14.5)
SODIUM SERPL-SCNC: 133 MMOL/L — LOW (ref 135–145)
WBC # BLD: 13.67 K/UL — HIGH (ref 3.8–10.5)
WBC # FLD AUTO: 13.67 K/UL — HIGH (ref 3.8–10.5)

## 2025-02-05 PROCEDURE — 99233 SBSQ HOSP IP/OBS HIGH 50: CPT

## 2025-02-05 RX ORDER — PREDNISONE 5 MG/1
40 TABLET ORAL DAILY
Refills: 0 | Status: DISCONTINUED | OUTPATIENT
Start: 2025-02-06 | End: 2025-02-10

## 2025-02-05 RX ADMIN — LEVOTHYROXINE SODIUM 25 MICROGRAM(S): 25 TABLET ORAL at 06:23

## 2025-02-05 RX ADMIN — PANTOPRAZOLE 40 MILLIGRAM(S): 20 TABLET, DELAYED RELEASE ORAL at 06:23

## 2025-02-05 RX ADMIN — VANCOMYCIN HYDROCHLORIDE 250 MILLIGRAM(S): KIT at 05:12

## 2025-02-05 RX ADMIN — Medication 2: at 17:26

## 2025-02-05 RX ADMIN — Medication 25 MILLIGRAM(S): at 11:21

## 2025-02-05 RX ADMIN — Medication 2: at 13:41

## 2025-02-05 RX ADMIN — IPRATROPIUM BROMIDE AND ALBUTEROL SULFATE 3 MILLILITER(S): .5; 2.5 SOLUTION RESPIRATORY (INHALATION) at 21:01

## 2025-02-05 RX ADMIN — ROPINIROLE 1 MILLIGRAM(S): 0.5 TABLET, FILM COATED ORAL at 21:40

## 2025-02-05 RX ADMIN — Medication 40 MILLIGRAM(S): at 13:39

## 2025-02-05 RX ADMIN — Medication 2 TABLET(S): at 21:40

## 2025-02-05 RX ADMIN — Medication 50 MILLIGRAM(S): at 21:41

## 2025-02-05 RX ADMIN — DEXTROMETHORPHAN HBR AND GUAIFENESIN ORAL SOLUTION 10 MILLILITER(S): 10; 100 LIQUID ORAL at 13:37

## 2025-02-05 RX ADMIN — DEXTROMETHORPHAN HBR AND GUAIFENESIN ORAL SOLUTION 10 MILLILITER(S): 10; 100 LIQUID ORAL at 06:23

## 2025-02-05 RX ADMIN — APIXABAN 5 MILLIGRAM(S): 5 TABLET, FILM COATED ORAL at 11:21

## 2025-02-05 RX ADMIN — Medication 200 MILLIGRAM(S): at 06:23

## 2025-02-05 RX ADMIN — GABAPENTIN 300 MILLIGRAM(S): 800 TABLET ORAL at 21:40

## 2025-02-05 RX ADMIN — ASPIRIN 81 MILLIGRAM(S): 81 TABLET, COATED ORAL at 06:23

## 2025-02-05 RX ADMIN — IPRATROPIUM BROMIDE AND ALBUTEROL SULFATE 3 MILLILITER(S): .5; 2.5 SOLUTION RESPIRATORY (INHALATION) at 14:35

## 2025-02-05 RX ADMIN — Medication 50 MILLIGRAM(S): at 13:39

## 2025-02-05 RX ADMIN — GABAPENTIN 100 MILLIGRAM(S): 800 TABLET ORAL at 11:21

## 2025-02-05 RX ADMIN — Medication 50 MILLIGRAM(S): at 06:22

## 2025-02-05 RX ADMIN — POLYETHYLENE GLYCOL 3350 17 GRAM(S): 17 POWDER, FOR SOLUTION ORAL at 11:21

## 2025-02-05 RX ADMIN — Medication 4: at 08:41

## 2025-02-05 RX ADMIN — DEXTROMETHORPHAN HBR AND GUAIFENESIN ORAL SOLUTION 10 MILLILITER(S): 10; 100 LIQUID ORAL at 23:59

## 2025-02-05 RX ADMIN — Medication 40 MILLIGRAM(S): at 06:23

## 2025-02-05 RX ADMIN — Medication 12.5 MILLIGRAM(S): at 11:21

## 2025-02-05 RX ADMIN — IPRATROPIUM BROMIDE AND ALBUTEROL SULFATE 3 MILLILITER(S): .5; 2.5 SOLUTION RESPIRATORY (INHALATION) at 09:31

## 2025-02-05 RX ADMIN — Medication 200 MILLIGRAM(S): at 21:40

## 2025-02-05 RX ADMIN — Medication 12.5 MILLIGRAM(S): at 21:40

## 2025-02-05 RX ADMIN — APIXABAN 5 MILLIGRAM(S): 5 TABLET, FILM COATED ORAL at 21:40

## 2025-02-05 NOTE — PROGRESS NOTE ADULT - SUBJECTIVE AND OBJECTIVE BOX
Patient is seen and examined. Chart is reviewed. SOB is little better. Less cough today.    Gen: No fever, chills, weakness  ENT: No visual changes or throat pain  Neck: No pain or stiffness  Respiratory: ++ cough or wheezing  Cardiovascular: No chest pain or palpitations  Gastrointestinal: No abdominal pain, nausea, vomiting, constipation, or diarrhea  Hematologic: No easy bleeding or bruising  Neurologic: No numbness or focal weakness  Psych: No depression or insomnia  Skin: No rash or itching        MEDICATIONS  (STANDING):  albuterol/ipratropium for Nebulization 3 milliLiter(s) Nebulizer every 6 hours  apixaban 5 milliGRAM(s) Oral two times a day  aspirin  chewable 81 milliGRAM(s) Oral daily  aztreonam  IVPB 1000 milliGRAM(s) IV Intermittent every 8 hours  dextrose 5%. 1000 milliLiter(s) (50 mL/Hr) IV Continuous <Continuous>  dextrose 5%. 1000 milliLiter(s) (100 mL/Hr) IV Continuous <Continuous>  dextrose 50% Injectable 25 Gram(s) IV Push once  dextrose 50% Injectable 12.5 Gram(s) IV Push once  dextrose 50% Injectable 25 Gram(s) IV Push once  furosemide    Tablet 40 milliGRAM(s) Oral daily  gabapentin 100 milliGRAM(s) Oral daily  gabapentin 300 milliGRAM(s) Oral at bedtime  glucagon  Injectable 1 milliGRAM(s) IntraMuscular once  insulin lispro (ADMELOG) corrective regimen sliding scale   SubCutaneous three times a day before meals  insulin lispro (ADMELOG) corrective regimen sliding scale   SubCutaneous at bedtime  levothyroxine 25 MICROGram(s) Oral daily  methylPREDNISolone sodium succinate Injectable 40 milliGRAM(s) IV Push every 8 hours  metoprolol tartrate 12.5 milliGRAM(s) Oral two times a day  pantoprazole    Tablet 40 milliGRAM(s) Oral before breakfast  polyethylene glycol 3350 17 Gram(s) Oral daily  rOPINIRole 1 milliGRAM(s) Oral at bedtime  senna 2 Tablet(s) Oral at bedtime  spironolactone 25 milliGRAM(s) Oral daily    MEDICATIONS  (PRN):  acetaminophen     Tablet .. 650 milliGRAM(s) Oral every 6 hours PRN Temp greater or equal to 38C (100.4F), Mild Pain (1 - 3)  artificial tears (preservative free) Ophthalmic Solution 1 Drop(s) Both EYES three times a day PRN Dry Eyes  benzonatate 200 milliGRAM(s) Oral every 8 hours PRN Cough  dextrose Oral Gel 15 Gram(s) Oral once PRN Blood Glucose LESS THAN 70 milliGRAM(s)/deciliter  guaifenesin/dextromethorphan Oral Liquid 10 milliLiter(s) Oral every 6 hours PRN Cough      Vital Signs Last 24 Hrs  T(C): 36.4 (05 Feb 2025 11:21), Max: 36.8 (05 Feb 2025 05:18)  T(F): 97.6 (05 Feb 2025 11:21), Max: 98.2 (05 Feb 2025 05:18)  HR: 63 (05 Feb 2025 15:23) (59 - 79)  BP: 143/61 (05 Feb 2025 11:21) (133/57 - 143/61)  BP(mean): 81 (05 Feb 2025 11:21) (81 - 81)  RR: 18 (05 Feb 2025 11:21) (18 - 18)  SpO2: 91% (05 Feb 2025 15:23) (91% - 96%)    Parameters below as of 05 Feb 2025 15:23  Patient On (Oxygen Delivery Method): nasal cannula, 2l        PHYSICAL EXAM:  GENERAL: NAD, lying in bed comfortably  HEAD:  Atraumatic, Normocephalic  EYES: conjunctiva and sclera clear  ENT: Moist mucous membranes  NECK: Supple, No JVD  CHEST/LUNG: Clear to auscultation bilaterally; No rales, rhonchi, wheezing. Unlabored respirations  HEART: Regular rate and rhythm; No murmurs  ABDOMEN: Bowel sounds present; Soft, Nontender, Nondistended.   EXTREMITIES:  2+ Peripheral Pulses, brisk capillary refill. No clubbing, cyanosis, or edema  NERVOUS SYSTEM:  Alert & Oriented X3, speech clear. No deficits   MSK: FROM all 4 extremities, full and equal strength          LABS:                          10.5   13.67 )-----------( 539      ( 05 Feb 2025 06:21 )             32.7     05 Feb 2025 06:21    133    |  101    |  45     ----------------------------<  228    4.5     |  27     |  1.26     Ca    9.8        05 Feb 2025 06:21          CAPILLARY BLOOD GLUCOSE      POCT Blood Glucose.: 169 mg/dL (05 Feb 2025 13:16)  POCT Blood Glucose.: 209 mg/dL (05 Feb 2025 08:32)  POCT Blood Glucose.: 194 mg/dL (04 Feb 2025 21:19)  POCT Blood Glucose.: 170 mg/dL (04 Feb 2025 17:31)        Urinalysis Basic - ( 05 Feb 2025 06:21 )    Color: x / Appearance: x / SG: x / pH: x  Gluc: 228 mg/dL / Ketone: x  / Bili: x / Urobili: x   Blood: x / Protein: x / Nitrite: x   Leuk Esterase: x / RBC: x / WBC x   Sq Epi: x / Non Sq Epi: x / Bacteria: x        RADIOLOGY:

## 2025-02-05 NOTE — PROGRESS NOTE ADULT - SUBJECTIVE AND OBJECTIVE BOX
Date of Service:02-05-25 @ 12:47  Interval History/ROS: Afebrile overnight, on 3L NC, reports still dyspnea and cough but improve after neb treatment, denies fever or chills     REVIEW OF SYSTEMS  [  ] ROS unobtainable because:    [ x ] All other systems negative except as noted below    Constitutional:  [ ] fever [ ] chills  [ ] weight loss  [ ]night sweat  [ ]poor appetite/PO intake [ ]fatigue   Skin:  [ ] rash [ ] phlebitis	  Eyes: [ ] icterus [ ] pain  [ ] discharge	  ENMT: [ ] sore throat  [ ] thrush [ ] ulcers [ ] exudates [ ]anosmia  Respiratory: [ ] dyspnea [ ] hemoptysis [ ] cough [ ] sputum	  Cardiovascular:  [ ] chest pain [ ] palpitations [ ] edema	  Gastrointestinal:  [ ] nausea [ ] vomiting [ ] diarrhea [ ] constipation [ ] pain	  Genitourinary:  [ ] dysuria [ ] frequency [ ] hematuria [ ] discharge [ ] flank pain  [ ] incontinence  Musculoskeletal:  [ ] myalgias [ ] arthralgias [ ] arthritis  [ ] back pain  Neurological:  [ ] headache [ ] weakness [ ] seizures  [ ] confusion/altered mental status    Allergies  flu vaccines (Other)  statins (Muscle Pain)  Macrodantin (Other)  nisoldipine (Unknown)  Repatha (Unknown)  tadalafil (Muscle Pain)  sulfa drugs (Other)  Motrin (Other)  Ceftin (Other (Moderate))        ANTIMICROBIALS:    aztreonam  IVPB 1000 every 8 hours  vancomycin  IVPB 1000 every 24 hours        OTHER MEDS: MEDICATIONS  (STANDING):  acetaminophen     Tablet .. 650 every 6 hours PRN  albuterol/ipratropium for Nebulization 3 every 6 hours  apixaban 5 two times a day  aspirin  chewable 81 daily  benzonatate 200 every 8 hours PRN  dextrose 50% Injectable 25 once  dextrose 50% Injectable 12.5 once  dextrose 50% Injectable 25 once  dextrose Oral Gel 15 once PRN  gabapentin 100 daily  gabapentin 300 at bedtime  glucagon  Injectable 1 once  guaifenesin/dextromethorphan Oral Liquid 10 every 6 hours PRN  insulin lispro (ADMELOG) corrective regimen sliding scale  three times a day before meals  insulin lispro (ADMELOG) corrective regimen sliding scale  at bedtime  levothyroxine 25 daily  methylPREDNISolone sodium succinate Injectable 40 every 8 hours  metoprolol tartrate 12.5 two times a day  pantoprazole    Tablet 40 before breakfast  polyethylene glycol 3350 17 daily  rOPINIRole 1 at bedtime  senna 2 at bedtime  spironolactone 25 daily      Vital Signs Last 24 Hrs  T(F): 97.6 (02-05-25 @ 11:21), Max: 99.6 (02-01-25 @ 15:41)    Vital Signs Last 24 Hrs  HR: 59 (02-05-25 @ 11:21) (59 - 88)  BP: 143/61 (02-05-25 @ 11:21) (133/57 - 155/62)  RR: 18 (02-05-25 @ 11:21)  SpO2: 96% (02-05-25 @ 11:21) (92% - 98%)  Wt(kg): --    EXAM:    Constitutional:  frail, NAD  Head/Eyes: no icterus  LUNGS:  Course  CVS:  regular rhythm  Abd:  soft, non-tender; non-distended  Ext:  no edema  Vascular:  IV site no erythema tenderness or discharge  Neuro: AAO X 3, non- focal    Labs:                        10.5   13.67 )-----------( 539      ( 05 Feb 2025 06:21 )             32.7     02-05    133[L]  |  101  |  45[H]  ----------------------------<  228[H]  4.5   |  27  |  1.26    Ca    9.8      05 Feb 2025 06:21        WBC Trend:  WBC Count: 13.67 (02-05-25 @ 06:21)  WBC Count: 14.57 (02-04-25 @ 05:16)  WBC Count: 14.79 (02-03-25 @ 06:08)  WBC Count: 7.33 (02-02-25 @ 06:54)      Creatine Trend:  Creatinine: 1.26 (02-05)  Creatinine: 1.51 (02-04)  Creatinine: 1.41 (02-03)  Creatinine: 1.20 (02-02)      Liver Biochemical Testing Trend:  Alanine Aminotransferase (ALT/SGPT): 9 *L* (02-02)  Alanine Aminotransferase (ALT/SGPT): 13 (02-01)  Alanine Aminotransferase (ALT/SGPT): 16 (11-29)  Alanine Aminotransferase (ALT/SGPT): 16 (11-25)  Alanine Aminotransferase (ALT/SGPT): 16 (11-21)  Aspartate Aminotransferase (AST/SGOT): 14 (02-02-25 @ 06:54)  Aspartate Aminotransferase (AST/SGOT): 31 (02-01-25 @ 15:01)  Aspartate Aminotransferase (AST/SGOT): 21 (11-29-24 @ 08:19)  Aspartate Aminotransferase (AST/SGOT): 21 (11-25-24 @ 09:31)  Aspartate Aminotransferase (AST/SGOT): 23 (11-21-24 @ 05:39)  Bilirubin Total: 0.4 (02-02)  Bilirubin Total: 0.5 (02-01)  Bilirubin Total: 0.5 (11-29)  Bilirubin Total: 0.4 (11-25)  Bilirubin Total: 0.5 (11-21)      Trend LDH  06-04-23 @ 21:29  166      Urinalysis Basic - ( 05 Feb 2025 06:21 )    Color: x / Appearance: x / SG: x / pH: x  Gluc: 228 mg/dL / Ketone: x  / Bili: x / Urobili: x   Blood: x / Protein: x / Nitrite: x   Leuk Esterase: x / RBC: x / WBC x   Sq Epi: x / Non Sq Epi: x / Bacteria: x        MICROBIOLOGY:  Vancomycin Level, Trough: 14.1 (02-04 @ 05:16)        Culture - Blood (collected 03 Feb 2025 08:28)  Source: .Blood BLOOD  Preliminary Report:    No growth at 24 hours    Urinalysis with Rflx Culture (collected 01 Feb 2025 19:20)    Culture - Blood (collected 01 Feb 2025 15:50)  Source: .Blood BLOOD  Preliminary Report:    No growth at 72 Hours    Culture - Blood (collected 01 Feb 2025 15:01)  Source: .Blood BLOOD  Final Report:    Growth in aerobic and anaerobic bottles: Staphylococcus hominis    Isolation of Coagulase negative Staphylococcus from single blood culture    sets may represent    contamination. Contact the Microbiology Department at [phone] if    susceptibility testing is needed.    clinically indicated.    Direct identification is available within approximately 3-5    hours either by Blood Panel Multiplexed PCR or Direct    MALDI-TOF. Details: [email or web address]  Organism: Blood Culture PCR  Organism: Blood Culture PCR    Sensitivities:      -  Coagulase negative Staphylococcus: Detec      Method Type: PCR    Urinalysis with Rflx Culture (collected 11 Nov 2024 20:12)    Culture - Urine (collected 06 Jun 2023 07:43)  Source: Clean Catch Clean Catch (Midstream)  Final Report:    >=3 organisms. Probable collection contamination.    Culture - Blood (collected 04 Jun 2023 21:29)  Source: .Blood None  Final Report:    No Growth Final    Culture - Blood (collected 04 Jun 2023 21:29)  Source: .Blood None  Final Report:    No Growth Final      Procalcitonin: 0.07 (02-03)    C-Reactive Protein: 95.4 (02-03)    Sedimentation Rate, Erythrocyte: 90 mm/hr (02-03-25 @ 06:08)      RADIOLOGY:  imaging below personally reviewed

## 2025-02-05 NOTE — PROGRESS NOTE ADULT - ASSESSMENT
A/P:    *Acute on chronic hypoxic respiratory failure + Sepsis 2/2 HCAP/gram negative PNA  *COPD Exacerbation   *Acute on chronic diastolic CHF  *valvular abnormalities   *H/o severe pulm HTN   -CT: The pulmonary arteries B/L dilated, suggestive of pulmonary HTN. Mosaic attenuation throughout the lungs, likely due to small airways or less likely vascular disease. There are also scattered areas of interlobular septal thickening which could be due to edema or and   infectious or inflammatory process in the appropriate clinical setting. borderline enlarged pretracheal node measuring 1.0 cm   -Vanco / Aztreonem  -ID consult   -F/u blood cx  -Lactate = 1.5  -Duonebs  -Solumedrol  -Pulse ox monitoring  -O2 supplementation   -C/w IV lasix + aldactone (will need to watch creatinine closely)  -I/Os + Daily weights  -On BB   -follow consult pulmonary and cardiology consults   -Patient has tamiflu ordered outpatient. However, she denies being flu positive. She is flu negative in ED.      *Suspect ANNALEE  - Cr slightly worsen again this am   -Patient on diuretics, will need to monitor closely  -follow Nephro consult     *Hyponatremia  -stable     *Thrombocytosis  -F/u outpatient heme for further evaluation     *H/o HTN / dyslipidemia / CVA / paroxysmal a-fib / CAD / PE / factor V leiden  -C/w home meds and f/u outpatient for further management if conditions remain stable during hospitalization     *DVT ppx  -On Eliquis    A/P:    *Acute on chronic hypoxic respiratory failure + Sepsis 2/2 HCAP/gram negative PNA  *COPD Exacerbation   *Acute on chronic diastolic CHF  *valvular abnormalities   *H/o severe pulm HTN   -CT: The pulmonary arteries B/L dilated, suggestive of pulmonary HTN. Mosaic attenuation throughout the lungs, likely due to small airways or less likely vascular disease. There are also scattered areas of interlobular septal thickening which could be due to edema or and   infectious or inflammatory process in the appropriate clinical setting. borderline enlarged pretracheal node measuring 1.0 cm   -Vanco / Aztreonem  -ID consult   -F/u blood cx  -Lactate = 1.5  -Duonebs  -stop solumedrol and start prednisone   -Pulse ox monitoring  -O2 supplementation   -C/w IV lasix + aldactone (will need to watch creatinine closely)  -I/Os + Daily weights  -On BB   -follow consult pulmonary and cardiology consults   -Patient has tamiflu ordered outpatient. However, she denies being flu positive. She is flu negative in ED.      *Suspect ANNALEE  - Cr slightly worsen again this am   -Patient on diuretics, will need to monitor closely  -follow Nephro consult     *Hyponatremia  -stable     *Thrombocytosis  -F/u outpatient heme for further evaluation     *H/o HTN / dyslipidemia / CVA / paroxysmal a-fib / CAD / PE / factor V leiden  -C/w home meds and f/u outpatient for further management if conditions remain stable during hospitalization     *DVT ppx  -On Eliquis

## 2025-02-05 NOTE — PROGRESS NOTE ADULT - ASSESSMENT
Assessment:  89F with HTN, dyslipidemia, CVA, chronic diastolic CHF, paroxysmal a-fib s/p ablation (on AC), CAD s/p PCI, COPD (on 3L home O2 at night), severe pulm HTN, Mitral valve regurg, PE, Factor V leiden, hypothyroidism presents 2/1 with dyspnea, reports that it had gotten worse for the past few days, including worsening cough with clear sputum  Denies any fever or chills, no abdominal pain, n/v, diarrhea or dysuria  No known recent sick contact  Documented history of antibiotic allergies, but does not recall what reactions were  Afebrile on admission, 90% on 3L, now on 4L NC  WBC 12 > 7  Cr 1.4 > 1.2  UA negative  RVP negative  BCx 2/1 with CoNS (2 out of 4)  CXR with Cardiomegaly with bilateral airspace disease that is possibly infectious in nature.   CTA Chest with No evidence of pulmonary embolism. Mosaic attenuation throughout the lungs, likely due to small airways   or less likely vascular disease. There are also scattered areas of interlobular septal thickening which could be due to edema or and   infectious or inflammatory process in the appropriate clinical setting.    Antimicrobials:  vancomycin  IVPB 1000 every 24 hours (2/2 --- 2/5)  aztreonam  IVPB 1000 every 8 hours (2/2 -- )    Impression:   #Acute on Chronic Hypoxic Respiratory Failure, COPD Exacerbation, Possible Multifocal Pneumonia  #Leukocytosis   #ANNALEE improved  #Multiple Drug Allergy, Ceftin, Sulfa Allergy, does not recall what reaction were  - remains afebrile, WBC multifactorial ?steroids  - BCx with CONS contaminant, repeat BCx negative    Recommendations:  - stop empiric Vanco  - continue empiric Aztreonam 1G q8 (Day #4)  - on IV steroids  - monitor temperature curve  - trend WBC  - reason for abx use reviewed with patient  - side effects of antibiotic discussed, tolerating abx well so far  - Prior cultures reviewed. An epidemiologic assessment was performed. There is a significant risk for resistant microorganisms to spread to family members, and/or healthcare staff. The patient will be placed on isolation according to infection control policy. Will reconsider isolation measures based on new culture results and other clinical data as appropriate Appropriate cultures collected and an appropriate broad spectrum antibiotic therapy will be considered  - if continues to improve, can transition to PO antibiotic once ready for discharge; Moxifloxacin 400mg q24 to complete 7-day course  - follow up Pulm   - rest per primary team    Clinical team may change from intravenous to oral antibiotics when the following criteria are met:   1. Patient is clinically improving/stable       a)	Improved signs and symptoms of infection from initial presentation       b)	Afebrile for 24 hours       c)	Leukocytosis trending towards normal range   2. Patient is tolerating oral intake   3. Initial/repeat blood cultures are negative OR do not need to wait for preliminary blood cultures to result    When above criteria met may change iv antibiotics to an oral agent as above

## 2025-02-05 NOTE — PROGRESS NOTE ADULT - SUBJECTIVE AND OBJECTIVE BOX
The patient was seen and examined.  No chest discomfort.  Creatinine improved.  Improved shortness of breath and cough.      Initial Consult HPI  _______________  SKeegan DEL CASTILLO is a 89y year old Female with a past medical history of paroxysmal atrial fibrillation on anticoagulation status post ablation, CAD status post PCI to the LAD in 2017, with repeat coronary treatment 2022 showing patent stents, chronic diastolic heart failure, history of CVA, mitral valve regurgitation, history of PE with factor V Leiden, hypertension, hyperlipidemia, obesity, severe pulm hypertension without response to nitric oxide on right heart cath and COPD.    Patient now presents for evaluation of shortness of breath, chest pressure, which is pleuritic.  Admits to wheezing.  Chest pain resolved.  EKG shows chronic changes.  Troponin negative.    Diagnosed with multilobar pneumonia.  Blood cultures positive.    PREVIOUS CARDIAC WORKUP:    Echocardiogram 11/12/24      1. Left ventricular cavity is normal in size. Left ventricular systolic function is normal with an ejection fraction of 60 % by Manley's method of disks.   2. Normal right ventricular systolic function.   3. Moderate to severe mitral regurgitation.   4. Mildto moderate pulmonic regurgitation.   5. Moderate tricuspid regurgitation.   6. Severe pulmonary hypertension.   7. There is mild calcification of the mitral valve annulus.   8. Findings were discussed with  and  on 11/12/2024 at 9:20 am    ________________________________  Review of systems: A 10 point review of system has been performed, and is negative except for what has been mentioned in the above history of present illness.     PAST MEDICAL & SURGICAL HISTORY:  Atrial fibrillation      CHF (congestive heart failure)      Pulmonary emboli      Factor 5 Leiden mutation, heterozygous      CAD (coronary artery disease)      Stented coronary artery      Hypothyroid      COPD (chronic obstructive pulmonary disease)      Mitral regurgitation      HTN (hypertension)      H/O: hysterectomy      H/O knee surgery      Cyst of breast, unspecified laterality  S/P excision      H/O cardiac radiofrequency ablation      S/P ablation of atrial fibrillation           ALLERGIES:  flu vaccines (Other)  statins (Muscle Pain)  Macrodantin (Other)  nisoldipine (Unknown)  Repatha (Unknown)  tadalafil (Muscle Pain)  sulfa drugs (Other)  Motrin (Other)  Ceftin (Other (Moderate))    Home Medications:  Albuterol (Eqv-ProAir HFA) 90 mcg/inh inhalation aerosol: 2 puff(s) inhaled every 6 hours as needed for (02 Feb 2025 00:11)  Aldactone 25 mg oral tablet: 1 tab(s) orally once a day (02 Feb 2025 00:09)  apixaban 5 mg oral tablet: 1 tab(s) orally 2 times a day (02 Feb 2025 00:09)  aspirin 81 mg oral tablet, chewable: 1 tab(s) orally once a day (02 Feb 2025 00:09)  Breztri Aerosphere 160 mcg-9 mcg-4.8 mcg/inh inhalation aerosol: 2 puff(s) inhaled 2 times a day (02 Feb 2025 00:13)  famotidine 20 mg oral tablet: 1 tab(s) orally once a day (at bedtime) (02 Feb 2025 00:09)  Lasix 40 mg oral tablet: 1 tab(s) orally once a day (02 Feb 2025 00:09)  levothyroxine 25 mcg (0.025 mg) oral tablet: 1 tab(s) orally once a day (02 Feb 2025 00:09)  Livalo 2 mg oral tablet: 1 tab(s) orally once a day (02 Feb 2025 00:12)  Neurontin 100 mg oral capsule: 1 cap(s) orally once a day (02 Feb 2025 00:09)  Neurontin 300 mg oral capsule: 1 cap(s) orally once a day (at bedtime) (02 Feb 2025 00:09)  oseltamivir 75 mg oral capsule: 1 cap(s) orally 2 times a day for 5 days ***course not complete*** (02 Feb 2025 00:12)  rOPINIRole 1 mg oral tablet: 1 tab(s) orally once a day (at bedtime) (02 Feb 2025 00:09)    MEDICATIONS  (STANDING):  albuterol/ipratropium for Nebulization 3 milliLiter(s) Nebulizer every 6 hours  apixaban 5 milliGRAM(s) Oral two times a day  aspirin  chewable 81 milliGRAM(s) Oral daily  aztreonam  IVPB 1000 milliGRAM(s) IV Intermittent every 8 hours  dextrose 5%. 1000 milliLiter(s) (50 mL/Hr) IV Continuous <Continuous>  dextrose 5%. 1000 milliLiter(s) (100 mL/Hr) IV Continuous <Continuous>  dextrose 50% Injectable 25 Gram(s) IV Push once  dextrose 50% Injectable 12.5 Gram(s) IV Push once  dextrose 50% Injectable 25 Gram(s) IV Push once  furosemide    Tablet 40 milliGRAM(s) Oral daily  gabapentin 100 milliGRAM(s) Oral daily  gabapentin 300 milliGRAM(s) Oral at bedtime  glucagon  Injectable 1 milliGRAM(s) IntraMuscular once  insulin lispro (ADMELOG) corrective regimen sliding scale   SubCutaneous three times a day before meals  insulin lispro (ADMELOG) corrective regimen sliding scale   SubCutaneous at bedtime  levothyroxine 25 MICROGram(s) Oral daily  metoprolol tartrate 12.5 milliGRAM(s) Oral two times a day  pantoprazole    Tablet 40 milliGRAM(s) Oral before breakfast  polyethylene glycol 3350 17 Gram(s) Oral daily  rOPINIRole 1 milliGRAM(s) Oral at bedtime  senna 2 Tablet(s) Oral at bedtime  spironolactone 25 milliGRAM(s) Oral daily    MEDICATIONS  (PRN):  acetaminophen     Tablet .. 650 milliGRAM(s) Oral every 6 hours PRN Temp greater or equal to 38C (100.4F), Mild Pain (1 - 3)  artificial tears (preservative free) Ophthalmic Solution 1 Drop(s) Both EYES three times a day PRN Dry Eyes  benzonatate 200 milliGRAM(s) Oral every 8 hours PRN Cough  dextrose Oral Gel 15 Gram(s) Oral once PRN Blood Glucose LESS THAN 70 milliGRAM(s)/deciliter  guaifenesin/dextromethorphan Oral Liquid 10 milliLiter(s) Oral every 6 hours PRN Cough      Vital Signs Last 24 Hrs  T(C): 36.4 (05 Feb 2025 15:40), Max: 36.8 (05 Feb 2025 05:18)  T(F): 97.6 (05 Feb 2025 15:40), Max: 98.2 (05 Feb 2025 05:18)  HR: 63 (05 Feb 2025 15:40) (59 - 70)  BP: 149/62 (05 Feb 2025 15:40) (133/57 - 149/62)  BP(mean): 81 (05 Feb 2025 11:21) (81 - 81)  RR: 18 (05 Feb 2025 15:40) (18 - 18)  SpO2: 93% (05 Feb 2025 15:40) (91% - 96%)    Parameters below as of 05 Feb 2025 15:40  Patient On (Oxygen Delivery Method): nasal cannula  O2 Flow (L/min): 3    I&O's Summary    04 Feb 2025 07:01  -  05 Feb 2025 07:00  --------------------------------------------------------  IN: 0 mL / OUT: 550 mL / NET: -550 mL    05 Feb 2025 07:01  -  05 Feb 2025 20:53  --------------------------------------------------------  IN: 0 mL / OUT: 500 mL / NET: -500 mL        ________________________________  GENERAL APPEARANCE:  No acute distress  HEAD: normocephalic, atraumatic  NECK: supple, no jugular venous distention, no carotid bruit    HEART: Regular rate and rhythm, S1, S2 normal, 1/6 murmur    LUNGS: Rales   ABDOMEN: soft, nontender, nondistended, with positive bowel sounds appreciated  EXTREMITIES: no edema.   NEURO: Alert and oriented x3  PSYC:  Normal affect  SKIN:  Dry  ________________________________         ECG: Sinus rhythm, right bundle branch block with chronic precordial T wave inversions    LABS:                                          10.5   13.67 )-----------( 539      ( 05 Feb 2025 06:21 )             32.7          02-05    133[L]  |  101  |  45[H]  ----------------------------<  228[H]  4.5   |  27  |  1.26    Ca    9.8      05 Feb 2025 06:21            Urinalysis Basic - ( 05 Feb 2025 06:21 )    Color: x / Appearance: x / SG: x / pH: x  Gluc: 228 mg/dL / Ketone: x  / Bili: x / Urobili: x   Blood: x / Protein: x / Nitrite: x   Leuk Esterase: x / RBC: x / WBC x   Sq Epi: x / Non Sq Epi: x / Bacteria: x          RADIOLOGY & ADDITIONAL STUDIES:   ________________________________    ASSESSMENT:  Chest pain, pleuritic  Chronic diastolic heart failure, mild exacerbation in the setting of pneumonia  Paroxysmal atrial fibrillation status post ablation  History of CVA  History of CAD status post prior PCI to LAD in 2017 with cardiac cath in 2022 showing patent stent  Severe pulm hypertension with no response to nitric oxide  History of PE with factor V Leiden    PLAN:  In summary, this is a 89y Female with a past medical history of severe pulm hypertension, chronic diastolic heart failure, paroxysmal atrial fibrillation, CAD, systemic hypertension and COPD.  Admitted for respiratory failure due to multifocal pneumonia.    Creatinine improved.  Resume oral Lasix.  Continue Aldactone.  Continue anticoagulation.  Continue aspirin.  Antibiotics per primary.   Will follow-up as needed.      ____________________________________________  (Dragon Dictation software used). Thank you for allowing me to participate in the care of your patient. Please contact me should any questions arise.    NEFTALI Forte DO, [facility]  Office: [phone]

## 2025-02-06 LAB
GLUCOSE BLDC GLUCOMTR-MCNC: 145 MG/DL — HIGH (ref 70–99)
GLUCOSE BLDC GLUCOMTR-MCNC: 146 MG/DL — HIGH (ref 70–99)
GLUCOSE BLDC GLUCOMTR-MCNC: 173 MG/DL — HIGH (ref 70–99)
GLUCOSE BLDC GLUCOMTR-MCNC: 189 MG/DL — HIGH (ref 70–99)

## 2025-02-06 PROCEDURE — 99233 SBSQ HOSP IP/OBS HIGH 50: CPT

## 2025-02-06 RX ADMIN — LEVOTHYROXINE SODIUM 25 MICROGRAM(S): 25 TABLET ORAL at 06:45

## 2025-02-06 RX ADMIN — Medication 40 MILLIGRAM(S): at 10:39

## 2025-02-06 RX ADMIN — APIXABAN 5 MILLIGRAM(S): 5 TABLET, FILM COATED ORAL at 10:39

## 2025-02-06 RX ADMIN — GABAPENTIN 100 MILLIGRAM(S): 800 TABLET ORAL at 10:35

## 2025-02-06 RX ADMIN — ROPINIROLE 1 MILLIGRAM(S): 0.5 TABLET, FILM COATED ORAL at 23:13

## 2025-02-06 RX ADMIN — Medication 50 MILLIGRAM(S): at 13:01

## 2025-02-06 RX ADMIN — GABAPENTIN 300 MILLIGRAM(S): 800 TABLET ORAL at 23:13

## 2025-02-06 RX ADMIN — PANTOPRAZOLE 40 MILLIGRAM(S): 20 TABLET, DELAYED RELEASE ORAL at 06:45

## 2025-02-06 RX ADMIN — Medication 2: at 13:01

## 2025-02-06 RX ADMIN — Medication 200 MILLIGRAM(S): at 23:12

## 2025-02-06 RX ADMIN — DEXTROMETHORPHAN HBR AND GUAIFENESIN ORAL SOLUTION 10 MILLILITER(S): 10; 100 LIQUID ORAL at 06:52

## 2025-02-06 RX ADMIN — Medication 25 MILLIGRAM(S): at 10:38

## 2025-02-06 RX ADMIN — Medication 12.5 MILLIGRAM(S): at 10:39

## 2025-02-06 RX ADMIN — Medication 2 TABLET(S): at 23:13

## 2025-02-06 RX ADMIN — Medication 12.5 MILLIGRAM(S): at 23:12

## 2025-02-06 RX ADMIN — Medication 50 MILLIGRAM(S): at 06:46

## 2025-02-06 RX ADMIN — IPRATROPIUM BROMIDE AND ALBUTEROL SULFATE 3 MILLILITER(S): .5; 2.5 SOLUTION RESPIRATORY (INHALATION) at 13:09

## 2025-02-06 RX ADMIN — PREDNISONE 40 MILLIGRAM(S): 5 TABLET ORAL at 10:39

## 2025-02-06 RX ADMIN — POLYETHYLENE GLYCOL 3350 17 GRAM(S): 17 POWDER, FOR SOLUTION ORAL at 10:39

## 2025-02-06 RX ADMIN — APIXABAN 5 MILLIGRAM(S): 5 TABLET, FILM COATED ORAL at 23:13

## 2025-02-06 RX ADMIN — IPRATROPIUM BROMIDE AND ALBUTEROL SULFATE 3 MILLILITER(S): .5; 2.5 SOLUTION RESPIRATORY (INHALATION) at 08:44

## 2025-02-06 RX ADMIN — ASPIRIN 81 MILLIGRAM(S): 81 TABLET, COATED ORAL at 06:45

## 2025-02-06 RX ADMIN — Medication 50 MILLIGRAM(S): at 23:12

## 2025-02-06 RX ADMIN — IPRATROPIUM BROMIDE AND ALBUTEROL SULFATE 3 MILLILITER(S): .5; 2.5 SOLUTION RESPIRATORY (INHALATION) at 21:03

## 2025-02-06 NOTE — DIETITIAN NUTRITION RISK NOTIFICATION - ADDITIONAL COMMENTS/DIETITIAN RECOMMENDATIONS
Pt on regular texture diet, low fat.  Suggest maintain this diet  Add ONS if pt's po intake falls below 75% ENN   MVI w/ minerals daily to ensure 100% RDA met   Record PO intake in EMR after each meal (flowsheet, nursing.)   Consider adding thiamine 100 mg daily 2/2 poor PO intake/ malnutrition   Suggest add Vit C 500 mg BID, add Zinc Sulfate 220 mg x 10 days to promote wound healing   Zeeshan BID  Monitor bowel movements, if no BM for >3 days, consider implementing bowel regimen.   suggest Confirm Goals of Care regarding nutrition support. Will provide nutrition/ hydration within goals of care.   Monitor PO intake, tolerance, labs and weight.

## 2025-02-06 NOTE — DIETITIAN INITIAL EVALUATION ADULT - PERTINENT MEDS FT
MEDICATIONS  (STANDING):  albuterol/ipratropium for Nebulization 3 milliLiter(s) Nebulizer every 6 hours  apixaban 5 milliGRAM(s) Oral two times a day  aspirin  chewable 81 milliGRAM(s) Oral daily  aztreonam  IVPB 1000 milliGRAM(s) IV Intermittent every 8 hours  dextrose 5%. 1000 milliLiter(s) (50 mL/Hr) IV Continuous <Continuous>  dextrose 5%. 1000 milliLiter(s) (100 mL/Hr) IV Continuous <Continuous>  dextrose 50% Injectable 25 Gram(s) IV Push once  dextrose 50% Injectable 12.5 Gram(s) IV Push once  dextrose 50% Injectable 25 Gram(s) IV Push once  furosemide    Tablet 40 milliGRAM(s) Oral daily  gabapentin 100 milliGRAM(s) Oral daily  gabapentin 300 milliGRAM(s) Oral at bedtime  glucagon  Injectable 1 milliGRAM(s) IntraMuscular once  insulin lispro (ADMELOG) corrective regimen sliding scale   SubCutaneous three times a day before meals  insulin lispro (ADMELOG) corrective regimen sliding scale   SubCutaneous at bedtime  levothyroxine 25 MICROGram(s) Oral daily  metoprolol tartrate 12.5 milliGRAM(s) Oral two times a day  pantoprazole    Tablet 40 milliGRAM(s) Oral before breakfast  polyethylene glycol 3350 17 Gram(s) Oral daily  predniSONE   Tablet 40 milliGRAM(s) Oral daily  rOPINIRole 1 milliGRAM(s) Oral at bedtime  senna 2 Tablet(s) Oral at bedtime  spironolactone 25 milliGRAM(s) Oral daily    MEDICATIONS  (PRN):  acetaminophen     Tablet .. 650 milliGRAM(s) Oral every 6 hours PRN Temp greater or equal to 38C (100.4F), Mild Pain (1 - 3)  artificial tears (preservative free) Ophthalmic Solution 1 Drop(s) Both EYES three times a day PRN Dry Eyes  benzonatate 200 milliGRAM(s) Oral every 8 hours PRN Cough  dextrose Oral Gel 15 Gram(s) Oral once PRN Blood Glucose LESS THAN 70 milliGRAM(s)/deciliter  guaifenesin/dextromethorphan Oral Liquid 10 milliLiter(s) Oral every 6 hours PRN Cough

## 2025-02-06 NOTE — DIETITIAN INITIAL EVALUATION ADULT - ORAL INTAKE PTA/DIET HISTORY
Pt was very lethargic on visit, was on breathing apparatus. On visit 11/22/24 not at [facility], pt was on soft and bite sized diet, required feed assist, resisted texture change.  Will return to get more information from patient.  Pt was very lethargic on visit, was on breathing apparatus. On visit 11/22/24 not at [facility], pt was on soft and bite sized diet, required feed assist, resisted texture change.  Pt lives with her daughter, they both shop and cook.  PO intake estimated < 75% ENN > one month.  Pt was very lethargic on visit, was on breathing apparatus. On visit 11/22/24 not at [facility], pt was on soft and bite sized diet, required feed assist, resisted texture change. Currently eats regular textured diet.  Pt has trouble with breathing Pt lives with her daughter, they both shop and cook. She reports usually eating one meal a day for the past year. PO intake estimated < 75% ENN > one month.

## 2025-02-06 NOTE — PROGRESS NOTE ADULT - SUBJECTIVE AND OBJECTIVE BOX
Patient is a 89y old  Female who presents with a chief complaint of Acute respiratory failure with hypoxia     (06 Feb 2025 07:41)      INTERVAL HPI/OVERNIGHT EVENTS: afebrile sob better     MEDICATIONS  (STANDING):  albuterol/ipratropium for Nebulization 3 milliLiter(s) Nebulizer every 6 hours  apixaban 5 milliGRAM(s) Oral two times a day  aspirin  chewable 81 milliGRAM(s) Oral daily  aztreonam  IVPB 1000 milliGRAM(s) IV Intermittent every 8 hours  dextrose 5%. 1000 milliLiter(s) (50 mL/Hr) IV Continuous <Continuous>  dextrose 5%. 1000 milliLiter(s) (100 mL/Hr) IV Continuous <Continuous>  dextrose 50% Injectable 25 Gram(s) IV Push once  dextrose 50% Injectable 12.5 Gram(s) IV Push once  dextrose 50% Injectable 25 Gram(s) IV Push once  furosemide    Tablet 40 milliGRAM(s) Oral daily  gabapentin 100 milliGRAM(s) Oral daily  gabapentin 300 milliGRAM(s) Oral at bedtime  glucagon  Injectable 1 milliGRAM(s) IntraMuscular once  insulin lispro (ADMELOG) corrective regimen sliding scale   SubCutaneous three times a day before meals  insulin lispro (ADMELOG) corrective regimen sliding scale   SubCutaneous at bedtime  levothyroxine 25 MICROGram(s) Oral daily  metoprolol tartrate 12.5 milliGRAM(s) Oral two times a day  pantoprazole    Tablet 40 milliGRAM(s) Oral before breakfast  polyethylene glycol 3350 17 Gram(s) Oral daily  predniSONE   Tablet 40 milliGRAM(s) Oral daily  rOPINIRole 1 milliGRAM(s) Oral at bedtime  senna 2 Tablet(s) Oral at bedtime  spironolactone 25 milliGRAM(s) Oral daily    MEDICATIONS  (PRN):  acetaminophen     Tablet .. 650 milliGRAM(s) Oral every 6 hours PRN Temp greater or equal to 38C (100.4F), Mild Pain (1 - 3)  artificial tears (preservative free) Ophthalmic Solution 1 Drop(s) Both EYES three times a day PRN Dry Eyes  benzonatate 200 milliGRAM(s) Oral every 8 hours PRN Cough  dextrose Oral Gel 15 Gram(s) Oral once PRN Blood Glucose LESS THAN 70 milliGRAM(s)/deciliter  guaifenesin/dextromethorphan Oral Liquid 10 milliLiter(s) Oral every 6 hours PRN Cough      Allergies    flu vaccines (Other)  statins (Muscle Pain)  Macrodantin (Other)  nisoldipine (Unknown)  Repatha (Unknown)  tadalafil (Muscle Pain)  sulfa drugs (Other)  Motrin (Other)  Ceftin (Other (Moderate))    Intolerances        REVIEW OF SYSTEMS:  CONSTITUTIONAL: No fever or chills  HEENT:  No headache, no sore throat  RESPIRATORY: No cough, wheezing, + shortness of breath  CARDIOVASCULAR: No chest pain, palpitations  GASTROINTESTINAL: No abd pain, nausea, vomiting, or diarrhea  GENITOURINARY: No dysuria, frequency, or hematuria  NEUROLOGICAL: no focal weakness or dizziness  MUSCULOSKELETAL: no myalgias     Vital Signs Last 24 Hrs  T(C): 36.4 (06 Feb 2025 16:22), Max: 36.6 (05 Feb 2025 21:37)  T(F): 97.5 (06 Feb 2025 16:22), Max: 97.9 (05 Feb 2025 21:37)  HR: 59 (06 Feb 2025 17:53) (50 - 69)  BP: 136/71 (06 Feb 2025 17:53) (135/53 - 150/49)  BP(mean): --  RR: 18 (06 Feb 2025 17:53) (17 - 18)  SpO2: 93% (06 Feb 2025 17:53) (91% - 99%)    Parameters below as of 06 Feb 2025 17:53  Patient On (Oxygen Delivery Method): nasal cannula  O2 Flow (L/min): 2      PHYSICAL EXAM:  GENERAL: NAD  HEENT:  anicteric, moist mucous membranes  CHEST/LUNG:  decrease breath sound at the base  HEART:  RRR, S1, S2  ABDOMEN:  BS+, soft, nontender, nondistended  EXTREMITIES: no edema, cyanosis, or calf tenderness  NERVOUS SYSTEM: answers questions and follows commands appropriately    LABS:    CBC Full  -  ( 05 Feb 2025 06:21 )  WBC Count : 13.67 K/uL  Hemoglobin : 10.5 g/dL  Hematocrit : 32.7 %  Platelet Count - Automated : 539 K/uL  Mean Cell Volume : 91.6 fl  Mean Cell Hemoglobin : 29.4 pg  Mean Cell Hemoglobin Concentration : 32.1 g/dL  Auto Neutrophil # : 12.39 K/uL  Auto Lymphocyte # : 0.39 K/uL  Auto Monocyte # : 0.66 K/uL  Auto Eosinophil # : 0.04 K/uL  Auto Basophil # : 0.01 K/uL  Auto Neutrophil % : 90.6 %  Auto Lymphocyte % : 2.9 %  Auto Monocyte % : 4.8 %  Auto Eosinophil % : 0.3 %  Auto Basophil % : 0.1 %      Ca    9.8        05 Feb 2025 06:21        Urinalysis Basic - ( 05 Feb 2025 06:21 )    Color: x / Appearance: x / SG: x / pH: x  Gluc: 228 mg/dL / Ketone: x  / Bili: x / Urobili: x   Blood: x / Protein: x / Nitrite: x   Leuk Esterase: x / RBC: x / WBC x   Sq Epi: x / Non Sq Epi: x / Bacteria: x      CAPILLARY BLOOD GLUCOSE      POCT Blood Glucose.: 146 mg/dL (06 Feb 2025 17:04)  POCT Blood Glucose.: 173 mg/dL (06 Feb 2025 12:59)  POCT Blood Glucose.: 145 mg/dL (06 Feb 2025 09:09)  POCT Blood Glucose.: 248 mg/dL (05 Feb 2025 21:33)        Culture - Blood (collected 02-03-25 @ 08:28)  Source: .Blood BLOOD  Preliminary Report (02-06-25 @ 13:01):    No growth at 72 Hours    Urinalysis with Rflx Culture (collected 02-01-25 @ 19:20)    Culture - Blood (collected 02-01-25 @ 15:50)  Source: .Blood BLOOD  Preliminary Report (02-06-25 @ 01:01):    No growth at 4 days    Culture - Blood (collected 02-01-25 @ 15:01)  Source: .Blood BLOOD  Gram Stain (02-03-25 @ 01:15):    Growth in aerobic bottle: Gram Positive Cocci in Clusters    Growth in anaerobic bottle: Gram Positive Cocci in Clusters  Final Report (02-03-25 @ 19:20):    Growth in aerobic and anaerobic bottles: Staphylococcus hominis    Isolation of Coagulase negative Staphylococcus from single blood culture    sets may represent    contamination. Contact the Microbiology Department at [phone] if    susceptibility testing is needed.    clinically indicated.    Direct identification is available within approximately 3-5    hours either by Blood Panel Multiplexed PCR or Direct    MALDI-TOF. Details: [email or web address]  Organism: Blood Culture PCR (02-03-25 @ 19:20)  Organism: Blood Culture PCR (02-03-25 @ 19:20)      Method Type: PCR      -  Coagulase negative Staphylococcus: Detec        RADIOLOGY & ADDITIONAL TESTS:    Personally reviewed.     Consultant(s) Notes Reviewed:  [x] YES  [ ] NO

## 2025-02-06 NOTE — DIETITIAN INITIAL EVALUATION ADULT - NSFNSPHYEXAMSKINFT_GEN_A_CORE
Pressure Injury 1: Left:, buttocks, Suspected deep tissue injury  Pressure Injury 2: Left:, heel, Stage I, barely visible at this point  Pressure Injury 3: none, none  Pressure Injury 4: none, none  Pressure Injury 5: none, none  Pressure Injury 6: none, none  Pressure Injury 7: none, none  Pressure Injury 8: none, none  Pressure Injury 9: none, none  Pressure Injury 10: none, none  Pressure Injury 11: none, none, Pressure Injury 1: Left:, buttocks, Suspected deep tissue injury  Pressure Injury 2: none, none  Pressure Injury 3: none, none  Pressure Injury 4: none, none  Pressure Injury 5: none, none  Pressure Injury 6: none, none  Pressure Injury 7: none, none  Pressure Injury 8: none, none  Pressure Injury 9: none, none  Pressure Injury 10: none, none  Pressure Injury 11: none, none Pressure Injury 1: Left:, buttocks, Suspected deep tissue injury  Vasile 17

## 2025-02-06 NOTE — DIETITIAN INITIAL EVALUATION ADULT - ADD RECOMMEND
Suggest consult SLP to determine proper texture of diet for patient  on 11/22, pt was on soft, bite sized diet (not at [facility]) unable to converse with pt at this time, she is very lethargic  Add ONS if pt's po intake falls below 75% ENN   MVI w/ minerals daily to ensure 100% RDA met   Record PO intake in EMR after each meal (flowsheet, nursing.)   Consider adding thiamine 100 mg daily 2/2 poor PO intake/ malnutrition   Suggest add Vit C 500 mg BID, add Zinc Sulfate 220 mg x 10 days to promote wound healing   Zeeshan BID  Monitor bowel movements, if no BM for >3 days, consider implementing bowel regimen.   suggest Confirm Goals of Care regarding nutrition support. Will provide nutrition/ hydration within goals of care.   Monitor PO intake, tolerance, labs and weight.  Suggest consult SLP to determine proper texture of diet for patient  on 11/22, pt was on soft, bite sized diet (not at [facility])  Add ONS if pt's po intake falls below 75% ENN   MVI w/ minerals daily to ensure 100% RDA met   Record PO intake in EMR after each meal (flowsheet, nursing.)   Consider adding thiamine 100 mg daily 2/2 poor PO intake/ malnutrition   Suggest add Vit C 500 mg BID, add Zinc Sulfate 220 mg x 10 days to promote wound healing   Zeeshan BID  Monitor bowel movements, if no BM for >3 days, consider implementing bowel regimen.   suggest Confirm Goals of Care regarding nutrition support. Will provide nutrition/ hydration within goals of care.   Monitor PO intake, tolerance, labs and weight.  Pt on regular texture diet, low fat.  Suggest maintain this diet  Add ONS if pt's po intake falls below 75% ENN   MVI w/ minerals daily to ensure 100% RDA met   Record PO intake in EMR after each meal (flowsheet, nursing.)   Consider adding thiamine 100 mg daily 2/2 poor PO intake/ malnutrition   Suggest add Vit C 500 mg BID, add Zinc Sulfate 220 mg x 10 days to promote wound healing   Zeeshan BID  Monitor bowel movements, if no BM for >3 days, consider implementing bowel regimen.   suggest Confirm Goals of Care regarding nutrition support. Will provide nutrition/ hydration within goals of care.   Monitor PO intake, tolerance, labs and weight.

## 2025-02-06 NOTE — DIETITIAN INITIAL EVALUATION ADULT - NAME AND PHONE
Tamara Crawford RDN, CDN, [facility]      [phone]   [email or web address]

## 2025-02-06 NOTE — DIETITIAN INITIAL EVALUATION ADULT - SIGNS/SYMPTOMS
Rest, drink plenty of fluids  Advance activity as tolerated  Continue all previously prescribed medications as directed  Follow up with your PMD - bring copies of your results  Return to the ER for persistent chest pain, shortness of breath, or other new or concerning symptoms   For fever or body aches, take Tylenol 650mg every six hours and supplement with ibuprofen 600mg, with food, every six hours which can be taken three hours apart from the Tylenol to have a layered effect AEB muscle wasting, fat wasting, PO intake estimated < 75% ENN > one month.

## 2025-02-06 NOTE — DIETITIAN INITIAL EVALUATION ADULT - PERTINENT LABORATORY DATA
02-05    133[L]  |  101  |  45[H]  ----------------------------<  228[H]  4.5   |  27  |  1.26    Ca    9.8      05 Feb 2025 06:21    POCT Blood Glucose.: 248 mg/dL (02-05-25 @ 21:33)  A1C with Estimated Average Glucose Result: 6.6 % (02-03-25 @ 06:08)  A1C with Estimated Average Glucose Result: 6.2 % (02-02-25 @ 06:54)  A1C with Estimated Average Glucose Result: 6.1 % (11-12-24 @ 07:39)

## 2025-02-06 NOTE — DIETITIAN INITIAL EVALUATION ADULT - OTHER INFO
89 y/o F w/ PMH of HTN, dyslipidemia, CVA, chronic diastolic CHF, paroxysmal a-fib s/p ablation (on AC), CAD s/p PCI, COPD (on 3L home O2 at night), severe pulm HTN, Mitral valve regurg, PE, Factor V leiden, hypothyroidism, p/w dyspnea. Patient states that she has chronic dyspnea 2/2 COPD. However, SOB has been getting worse and is associated with wheezing. Patient states she also has a cough at baseline, but cough has also gotten worse and is productive of clear sputum. States she had elevated temperatures of ~99. Patient was at urgent care w/ low O2 saturations.    88 y/o F w/ PMH of HTN, dyslipidemia, CVA, chronic diastolic CHF, paroxysmal a-fib s/p ablation (on AC), CAD s/p PCI, COPD (on 3L home O2 at night), severe pulm HTN, Mitral valve regurg, PE, Factor V leiden, hypothyroidism, p/w dyspnea. Patient states that she has chronic dyspnea 2/2 COPD. However, SOB has been getting worse and is associated with wheezing. Patient states she also has a cough at baseline, but cough has also gotten worse and is productive of clear sputum. States she had elevated temperatures of ~99. Patient was at urgent care w/ low O2 saturations.      Admit dx is ARF with hypoxia  RD bedscale wt is 84 kg   185#  Unable to perform NFPE at this time, pt with nursing  Suggest Confirm Goals of Care regarding nutrition support. Will provide nutrition/ hydration within goals of care.   Pt has elevated BGs  Pt not on home meds for T2DM.  HgbA1c is 6.6   2/3/2024  Suggest new weight as dosing weight in EMR is vastly different from RD bedscale wt  Recommendations to follow in Plan/Intervention    88 y/o F w/ PMH of HTN, dyslipidemia, CVA, chronic diastolic CHF, paroxysmal a-fib s/p ablation (on AC), CAD s/p PCI, COPD (on 3L home O2 at night), severe pulm HTN, Mitral valve regurg, PE, Factor V leiden, hypothyroidism, p/w dyspnea. Patient states that she has chronic dyspnea 2/2 COPD. However, SOB has been getting worse and is associated with wheezing. Patient states she also has a cough at baseline, but cough has also gotten worse and is productive of clear sputum. States she had elevated temperatures of ~99. Patient was at urgent care w/ low O2 saturations.      Admit dx is ARF with hypoxia  RD bedscale wt is 84 kg   185#  NFPE reveals  muscle wasting, fat wasting moderate.  Suggest Confirm Goals of Care regarding nutrition support. Will provide nutrition/ hydration within goals of care.   Pt has elevated BGs  Pt not on home meds for T2DM.  HgbA1c is 6.6   2/3/2024  Suggest new weight as dosing weight in EMR is vastly different from RD bedscale wt  Recommendations to follow in Plan/Intervention

## 2025-02-06 NOTE — PROGRESS NOTE ADULT - ASSESSMENT
*Acute on chronic hypoxic respiratory failure + Sepsis 2/2 HCAP/gram negative PNA  *COPD Exacerbation   *Acute on chronic diastolic CHF  *valvular abnormalities   *H/o severe pulm HTN   -CT: The pulmonary arteries B/L dilated, suggestive of pulmonary HTN. Mosaic attenuation throughout the lungs, likely due to small airways or less likely vascular disease. There are also scattered areas of interlobular septal thickening which could be due to edema or and   infectious or inflammatory process in the appropriate clinical setting. borderline enlarged pretracheal node measuring 1.0 cm   -Vanco / Aztreonem  -REPEAT  blood cx NGTD 2/3  - Duoneb PRN  -  prednisone BID IV-->  PO daily   -C/w IV lasix + aldactone (will need to watch creatinine closely)  -I/Os + Daily weights  -On BB   -follow consult pulmonary and cardiology consults   -Patient has tamiflu ordered outpatient. However, she denies being flu positive. She is flu negative in ED.      #  ANNALEE-- RESOLVED   -Patient on diuretics, will need to monitor closely  -follow Nephro consult     *Hyponatremia  -stable     *Thrombocytosis  -F/u outpatient heme for further evaluation     *H/o HTN / dyslipidemia / CVA / paroxysmal a-fib / CAD / PE / factor V leiden  -C/w home meds and f/u outpatient for further management if conditions remain stable during hospitalization     *DVT ppx  -On Eliquis       DISPO: ANABELLA am -REPEAT  blood cx NGTD 2/3 Azactam switched to Moxifloxacin 400mg q24 to complete 7-day course AM   - follow up Pulm Creatinine stable

## 2025-02-07 LAB
ANION GAP SERPL CALC-SCNC: 4 MMOL/L — LOW (ref 5–17)
BUN SERPL-MCNC: 42 MG/DL — HIGH (ref 7–23)
CALCIUM SERPL-MCNC: 9.6 MG/DL — SIGNIFICANT CHANGE UP (ref 8.5–10.1)
CHLORIDE SERPL-SCNC: 103 MMOL/L — SIGNIFICANT CHANGE UP (ref 96–108)
CO2 SERPL-SCNC: 28 MMOL/L — SIGNIFICANT CHANGE UP (ref 22–31)
CREAT SERPL-MCNC: 1.15 MG/DL — SIGNIFICANT CHANGE UP (ref 0.5–1.3)
CULTURE RESULTS: SIGNIFICANT CHANGE UP
EGFR: 46 ML/MIN/1.73M2 — LOW
GLUCOSE BLDC GLUCOMTR-MCNC: 125 MG/DL — HIGH (ref 70–99)
GLUCOSE BLDC GLUCOMTR-MCNC: 135 MG/DL — HIGH (ref 70–99)
GLUCOSE BLDC GLUCOMTR-MCNC: 237 MG/DL — HIGH (ref 70–99)
GLUCOSE BLDC GLUCOMTR-MCNC: 242 MG/DL — HIGH (ref 70–99)
GLUCOSE SERPL-MCNC: 111 MG/DL — HIGH (ref 70–99)
HCT VFR BLD CALC: 35.3 % — SIGNIFICANT CHANGE UP (ref 34.5–45)
HGB BLD-MCNC: 11.2 G/DL — LOW (ref 11.5–15.5)
MAGNESIUM SERPL-MCNC: 2.7 MG/DL — HIGH (ref 1.6–2.6)
MCHC RBC-ENTMCNC: 29.3 PG — SIGNIFICANT CHANGE UP (ref 27–34)
MCHC RBC-ENTMCNC: 31.7 G/DL — LOW (ref 32–36)
MCV RBC AUTO: 92.4 FL — SIGNIFICANT CHANGE UP (ref 80–100)
PHOSPHATE SERPL-MCNC: 2.5 MG/DL — SIGNIFICANT CHANGE UP (ref 2.5–4.5)
PLATELET # BLD AUTO: 571 K/UL — HIGH (ref 150–400)
POTASSIUM SERPL-MCNC: 4.4 MMOL/L — SIGNIFICANT CHANGE UP (ref 3.5–5.3)
POTASSIUM SERPL-SCNC: 4.4 MMOL/L — SIGNIFICANT CHANGE UP (ref 3.5–5.3)
RBC # BLD: 3.82 M/UL — SIGNIFICANT CHANGE UP (ref 3.8–5.2)
RBC # FLD: 14.2 % — SIGNIFICANT CHANGE UP (ref 10.3–14.5)
SODIUM SERPL-SCNC: 135 MMOL/L — SIGNIFICANT CHANGE UP (ref 135–145)
SPECIMEN SOURCE: SIGNIFICANT CHANGE UP
WBC # BLD: 15.63 K/UL — HIGH (ref 3.8–10.5)
WBC # FLD AUTO: 15.63 K/UL — HIGH (ref 3.8–10.5)

## 2025-02-07 PROCEDURE — 99233 SBSQ HOSP IP/OBS HIGH 50: CPT

## 2025-02-07 PROCEDURE — 93010 ELECTROCARDIOGRAM REPORT: CPT

## 2025-02-07 RX ORDER — BISACODYL 5 MG
10 TABLET, DELAYED RELEASE (ENTERIC COATED) ORAL ONCE
Refills: 0 | Status: COMPLETED | OUTPATIENT
Start: 2025-02-07 | End: 2025-02-07

## 2025-02-07 RX ADMIN — DEXTROMETHORPHAN HBR AND GUAIFENESIN ORAL SOLUTION 10 MILLILITER(S): 10; 100 LIQUID ORAL at 21:52

## 2025-02-07 RX ADMIN — PANTOPRAZOLE 40 MILLIGRAM(S): 20 TABLET, DELAYED RELEASE ORAL at 06:07

## 2025-02-07 RX ADMIN — Medication 40 MILLIGRAM(S): at 10:01

## 2025-02-07 RX ADMIN — Medication 25 MILLIGRAM(S): at 10:02

## 2025-02-07 RX ADMIN — GABAPENTIN 300 MILLIGRAM(S): 800 TABLET ORAL at 21:52

## 2025-02-07 RX ADMIN — GABAPENTIN 100 MILLIGRAM(S): 800 TABLET ORAL at 10:04

## 2025-02-07 RX ADMIN — Medication 12.5 MILLIGRAM(S): at 10:00

## 2025-02-07 RX ADMIN — ASPIRIN 81 MILLIGRAM(S): 81 TABLET, COATED ORAL at 06:06

## 2025-02-07 RX ADMIN — LEVOTHYROXINE SODIUM 25 MICROGRAM(S): 25 TABLET ORAL at 06:07

## 2025-02-07 RX ADMIN — IPRATROPIUM BROMIDE AND ALBUTEROL SULFATE 3 MILLILITER(S): .5; 2.5 SOLUTION RESPIRATORY (INHALATION) at 22:41

## 2025-02-07 RX ADMIN — Medication 50 MILLIGRAM(S): at 06:06

## 2025-02-07 RX ADMIN — POLYETHYLENE GLYCOL 3350 17 GRAM(S): 17 POWDER, FOR SOLUTION ORAL at 10:04

## 2025-02-07 RX ADMIN — PREDNISONE 40 MILLIGRAM(S): 5 TABLET ORAL at 10:02

## 2025-02-07 RX ADMIN — ROPINIROLE 1 MILLIGRAM(S): 0.5 TABLET, FILM COATED ORAL at 21:52

## 2025-02-07 RX ADMIN — IPRATROPIUM BROMIDE AND ALBUTEROL SULFATE 3 MILLILITER(S): .5; 2.5 SOLUTION RESPIRATORY (INHALATION) at 09:06

## 2025-02-07 RX ADMIN — APIXABAN 5 MILLIGRAM(S): 5 TABLET, FILM COATED ORAL at 21:52

## 2025-02-07 RX ADMIN — Medication 200 MILLIGRAM(S): at 21:52

## 2025-02-07 RX ADMIN — Medication 2 TABLET(S): at 21:51

## 2025-02-07 RX ADMIN — Medication 50 MILLIGRAM(S): at 21:52

## 2025-02-07 RX ADMIN — Medication 200 MILLIGRAM(S): at 10:03

## 2025-02-07 RX ADMIN — DEXTROMETHORPHAN HBR AND GUAIFENESIN ORAL SOLUTION 10 MILLILITER(S): 10; 100 LIQUID ORAL at 00:10

## 2025-02-07 RX ADMIN — APIXABAN 5 MILLIGRAM(S): 5 TABLET, FILM COATED ORAL at 10:01

## 2025-02-07 RX ADMIN — IPRATROPIUM BROMIDE AND ALBUTEROL SULFATE 3 MILLILITER(S): .5; 2.5 SOLUTION RESPIRATORY (INHALATION) at 15:11

## 2025-02-07 RX ADMIN — Medication 12.5 MILLIGRAM(S): at 21:59

## 2025-02-07 RX ADMIN — Medication 4: at 18:17

## 2025-02-07 RX ADMIN — Medication 50 MILLIGRAM(S): at 13:10

## 2025-02-07 NOTE — PROGRESS NOTE ADULT - SUBJECTIVE AND OBJECTIVE BOX
The patient was seen and examined. No acute events overnight.  Cont to have chest pain.  Improved SOB  PAT on tele    Initial Consult HPI  ________________________________  S. MAGDALENE is a 89y year old Female with a past medical history of paroxysmal atrial fibrillation on anticoagulation status post ablation, CAD status post PCI to the LAD in 2017, with repeat coronary treatment 2022 showing patent stents, chronic diastolic heart failure, history of CVA, mitral valve regurgitation, history of PE with factor V Leiden, hypertension, hyperlipidemia, obesity, severe pulm hypertension without response to nitric oxide on right heart cath and COPD.    Patient now presents for evaluation of shortness of breath, chest pressure, which is pleuritic.  Admits to wheezing.  Chest pain resolved.  EKG shows chronic changes.  Troponin negative.    Diagnosed with multilobar pneumonia.  Blood cultures positive.    PREVIOUS CARDIAC WORKUP:    Echocardiogram 11/12/24      1. Left ventricular cavity is normal in size. Left ventricular systolic function is normal with an ejection fraction of 60 % by Manley's method of disks.   2. Normal right ventricular systolic function.   3. Moderate to severe mitral regurgitation.   4. Mildto moderate pulmonic regurgitation.   5. Moderate tricuspid regurgitation.   6. Severe pulmonary hypertension.   7. There is mild calcification of the mitral valve annulus.   8. Findings were discussed with  and  on 11/12/2024 at 9:20 am    ________________________________  Review of systems: A 10 point review of system has been performed, and is negative except for what has been mentioned in the above history of present illness.     PAST MEDICAL & SURGICAL HISTORY:  Atrial fibrillation      CHF (congestive heart failure)      Pulmonary emboli      Factor 5 Leiden mutation, heterozygous      CAD (coronary artery disease)      Stented coronary artery      Hypothyroid      COPD (chronic obstructive pulmonary disease)      Mitral regurgitation      HTN (hypertension)      H/O: hysterectomy      H/O knee surgery      Cyst of breast, unspecified laterality  S/P excision      H/O cardiac radiofrequency ablation      S/P ablation of atrial fibrillation       ALLERGIES:  flu vaccines (Other)  statins (Muscle Pain)  Macrodantin (Other)  nisoldipine (Unknown)  Repatha (Unknown)  tadalafil (Muscle Pain)  sulfa drugs (Other)  Motrin (Other)  Ceftin (Other (Moderate))    Home Medications:  Albuterol (Eqv-ProAir HFA) 90 mcg/inh inhalation aerosol: 2 puff(s) inhaled every 6 hours as needed for (02 Feb 2025 00:11)  Aldactone 25 mg oral tablet: 1 tab(s) orally once a day (02 Feb 2025 00:09)  apixaban 5 mg oral tablet: 1 tab(s) orally 2 times a day (02 Feb 2025 00:09)  aspirin 81 mg oral tablet, chewable: 1 tab(s) orally once a day (02 Feb 2025 00:09)  Breztri Aerosphere 160 mcg-9 mcg-4.8 mcg/inh inhalation aerosol: 2 puff(s) inhaled 2 times a day (02 Feb 2025 00:13)  famotidine 20 mg oral tablet: 1 tab(s) orally once a day (at bedtime) (02 Feb 2025 00:09)  Lasix 40 mg oral tablet: 1 tab(s) orally once a day (02 Feb 2025 00:09)  levothyroxine 25 mcg (0.025 mg) oral tablet: 1 tab(s) orally once a day (02 Feb 2025 00:09)  Livalo 2 mg oral tablet: 1 tab(s) orally once a day (02 Feb 2025 00:12)  Neurontin 100 mg oral capsule: 1 cap(s) orally once a day (02 Feb 2025 00:09)  Neurontin 300 mg oral capsule: 1 cap(s) orally once a day (at bedtime) (02 Feb 2025 00:09)  oseltamivir 75 mg oral capsule: 1 cap(s) orally 2 times a day for 5 days ***course not complete*** (02 Feb 2025 00:12)  rOPINIRole 1 mg oral tablet: 1 tab(s) orally once a day (at bedtime) (02 Feb 2025 00:09)     MEDICATIONS  (STANDING):  albuterol/ipratropium for Nebulization 3 milliLiter(s) Nebulizer every 6 hours  apixaban 5 milliGRAM(s) Oral two times a day  aspirin  chewable 81 milliGRAM(s) Oral daily  aztreonam  IVPB 1000 milliGRAM(s) IV Intermittent every 8 hours  bisacodyl Suppository 10 milliGRAM(s) Rectal once  dextrose 5%. 1000 milliLiter(s) (100 mL/Hr) IV Continuous <Continuous>  dextrose 5%. 1000 milliLiter(s) (50 mL/Hr) IV Continuous <Continuous>  dextrose 50% Injectable 25 Gram(s) IV Push once  dextrose 50% Injectable 12.5 Gram(s) IV Push once  dextrose 50% Injectable 25 Gram(s) IV Push once  furosemide    Tablet 40 milliGRAM(s) Oral daily  gabapentin 100 milliGRAM(s) Oral daily  gabapentin 300 milliGRAM(s) Oral at bedtime  glucagon  Injectable 1 milliGRAM(s) IntraMuscular once  insulin lispro (ADMELOG) corrective regimen sliding scale   SubCutaneous three times a day before meals  insulin lispro (ADMELOG) corrective regimen sliding scale   SubCutaneous at bedtime  levothyroxine 25 MICROGram(s) Oral daily  metoprolol tartrate 12.5 milliGRAM(s) Oral two times a day  pantoprazole    Tablet 40 milliGRAM(s) Oral before breakfast  polyethylene glycol 3350 17 Gram(s) Oral daily  predniSONE   Tablet 40 milliGRAM(s) Oral daily  rOPINIRole 1 milliGRAM(s) Oral at bedtime  senna 2 Tablet(s) Oral at bedtime  spironolactone 25 milliGRAM(s) Oral daily    MEDICATIONS  (PRN):  acetaminophen     Tablet .. 650 milliGRAM(s) Oral every 6 hours PRN Temp greater or equal to 38C (100.4F), Mild Pain (1 - 3)  artificial tears (preservative free) Ophthalmic Solution 1 Drop(s) Both EYES three times a day PRN Dry Eyes  benzonatate 200 milliGRAM(s) Oral every 8 hours PRN Cough  dextrose Oral Gel 15 Gram(s) Oral once PRN Blood Glucose LESS THAN 70 milliGRAM(s)/deciliter  guaifenesin/dextromethorphan Oral Liquid 10 milliLiter(s) Oral every 6 hours PRN Cough      Vital Signs Last 24 Hrs  T(C): 36.3 (07 Feb 2025 23:03), Max: 36.4 (07 Feb 2025 00:48)  T(F): 97.3 (07 Feb 2025 23:03), Max: 97.6 (07 Feb 2025 00:48)  HR: 55 (07 Feb 2025 23:03) (51 - 82)  BP: 136/51 (07 Feb 2025 23:03) (136/51 - 150/58)  BP(mean): --  RR: 18 (07 Feb 2025 23:03) (18 - 18)  SpO2: 98% (07 Feb 2025 23:03) (95% - 98%)    Parameters below as of 07 Feb 2025 23:03  Patient On (Oxygen Delivery Method): nasal cannula      I&O's Summary    06 Feb 2025 07:01  -  07 Feb 2025 07:00  --------------------------------------------------------  IN: 0 mL / OUT: 1200 mL / NET: -1200 mL    07 Feb 2025 07:01  -  08 Feb 2025 00:40  --------------------------------------------------------  IN: 0 mL / OUT: 501 mL / NET: -501 mL          ________________________________  GENERAL APPEARANCE:  No acute distress  HEAD: normocephalic, atraumatic  NECK: supple, no jugular venous distention, no carotid bruit    HEART: Regular rate and rhythm, S1, S2 normal, 1/6 murmur    LUNGS: Rales   ABDOMEN: soft, nontender, nondistended, with positive bowel sounds appreciated  EXTREMITIES: no edema.   NEURO: Alert and oriented x3  PSYC:  Normal affect  SKIN:  Dry  ________________________________   TELEMETRY: Sinus Rhythm PAT     ECG: Sinus rhythm, right bundle branch block with chronic precordial T wave inversions    LABS:                                              11.2   15.63 )-----------( 571      ( 07 Feb 2025 06:12 )             35.3          02-07    135  |  103  |  42[H]  ----------------------------<  111[H]  4.4   |  28  |  1.15    Ca    9.6      07 Feb 2025 06:12  Phos  2.5     02-07  Mg     2.7     02-07            Urinalysis Basic - ( 07 Feb 2025 06:12 )    Color: x / Appearance: x / SG: x / pH: x  Gluc: 111 mg/dL / Ketone: x  / Bili: x / Urobili: x   Blood: x / Protein: x / Nitrite: x   Leuk Esterase: x / RBC: x / WBC x   Sq Epi: x / Non Sq Epi: x / Bacteria: x              PT/INR - ( 02 Feb 2025 06:54 )   PT: 24.1 sec;   INR: 2.06 ratio         PTT - ( 02 Feb 2025 06:54 )  PTT:35.8 sec  Urinalysis Basic - ( 03 Feb 2025 06:08 )    Color: x / Appearance: x / SG: x / pH: x  Gluc: 248 mg/dL / Ketone: x  / Bili: x / Urobili: x   Blood: x / Protein: x / Nitrite: x   Leuk Esterase: x / RBC: x / WBC x   Sq Epi: x / Non Sq Epi: x / Bacteria: x        RADIOLOGY & ADDITIONAL STUDIES:   ________________________________    ASSESSMENT:  Chest pain, pleuritic  Chronic diastolic heart failure, mild exacerbation in the setting of pneumonia  Paroxysmal atrial fibrillation status post ablation  History of CVA  History of CAD status post prior PCI to LAD in 2017 with cardiac cath in 2022 showing patent stent  Severe pulm hypertension with no response to nitric oxide  History of PE with factor V Leiden    PLAN:  In summary, this is a 89y Female with a past medical history of severe pulm hypertension, chronic diastolic heart failure, paroxysmal atrial fibrillation, CAD, systemic hypertension and COPD.  Admitted for respiratory failure due to multifocal pneumonia - likely aspiration    GI eval noted  Given PAT and CP start CCB.   Cont anticoagulation    ____________________________________________  (Dragon Dictation software used). Thank you for allowing me to participate in the care of your patient. Please contact me should any questions arise.    NEFTALI Forte DO, [facility]  Office: [phone]      error

## 2025-02-07 NOTE — PROGRESS NOTE ADULT - SUBJECTIVE AND OBJECTIVE BOX
Patient is a 89y old  Female who presents with a chief complaint of dyspnea (06 Feb 2025 10:55)      INTERVAL HPI/OVERNIGHT EVENTS: on 3 L NC ( at baseline o2 ) episode of wide complex tachycardia 29 beats on tele  STAT EKG Sinus bradycardia 58bpm RBBB ( No changes from previous EKG ). Denies chest pain or palpitation   Mg and phosp ordered     MEDICATIONS  (STANDING):  albuterol/ipratropium for Nebulization 3 milliLiter(s) Nebulizer every 6 hours  apixaban 5 milliGRAM(s) Oral two times a day  aspirin  chewable 81 milliGRAM(s) Oral daily  aztreonam  IVPB 1000 milliGRAM(s) IV Intermittent every 8 hours  bisacodyl Suppository 10 milliGRAM(s) Rectal once  dextrose 5%. 1000 milliLiter(s) (50 mL/Hr) IV Continuous <Continuous>  dextrose 5%. 1000 milliLiter(s) (100 mL/Hr) IV Continuous <Continuous>  dextrose 50% Injectable 25 Gram(s) IV Push once  dextrose 50% Injectable 12.5 Gram(s) IV Push once  dextrose 50% Injectable 25 Gram(s) IV Push once  furosemide    Tablet 40 milliGRAM(s) Oral daily  gabapentin 300 milliGRAM(s) Oral at bedtime  gabapentin 100 milliGRAM(s) Oral daily  glucagon  Injectable 1 milliGRAM(s) IntraMuscular once  insulin lispro (ADMELOG) corrective regimen sliding scale   SubCutaneous three times a day before meals  insulin lispro (ADMELOG) corrective regimen sliding scale   SubCutaneous at bedtime  levothyroxine 25 MICROGram(s) Oral daily  metoprolol tartrate 12.5 milliGRAM(s) Oral two times a day  pantoprazole    Tablet 40 milliGRAM(s) Oral before breakfast  polyethylene glycol 3350 17 Gram(s) Oral daily  predniSONE   Tablet 40 milliGRAM(s) Oral daily  rOPINIRole 1 milliGRAM(s) Oral at bedtime  senna 2 Tablet(s) Oral at bedtime  spironolactone 25 milliGRAM(s) Oral daily    MEDICATIONS  (PRN):  acetaminophen     Tablet .. 650 milliGRAM(s) Oral every 6 hours PRN Temp greater or equal to 38C (100.4F), Mild Pain (1 - 3)  artificial tears (preservative free) Ophthalmic Solution 1 Drop(s) Both EYES three times a day PRN Dry Eyes  benzonatate 200 milliGRAM(s) Oral every 8 hours PRN Cough  dextrose Oral Gel 15 Gram(s) Oral once PRN Blood Glucose LESS THAN 70 milliGRAM(s)/deciliter  guaifenesin/dextromethorphan Oral Liquid 10 milliLiter(s) Oral every 6 hours PRN Cough      Allergies    flu vaccines (Other)  statins (Muscle Pain)  Macrodantin (Other)  nisoldipine (Unknown)  Repatha (Unknown)  tadalafil (Muscle Pain)  sulfa drugs (Other)  Motrin (Other)  Ceftin (Other (Moderate))    Intolerances        REVIEW OF SYSTEMS:  CONSTITUTIONAL: No fever or chills  HEENT:  No headache, no sore throat  RESPIRATORY: + cough, no  wheezing, + shortness of breath  CARDIOVASCULAR: No chest pain, palpitations  GASTROINTESTINAL: No abd pain, nausea, vomiting, or diarrhea  GENITOURINARY: No dysuria, frequency, or hematuria  NEUROLOGICAL: no focal weakness or dizziness  MUSCULOSKELETAL: no myalgias     Vital Signs Last 24 Hrs  T(C): 36.4 (07 Feb 2025 07:39), Max: 36.4 (07 Feb 2025 00:48)  T(F): 97.6 (07 Feb 2025 07:39), Max: 97.6 (07 Feb 2025 00:48)  HR: 82 (07 Feb 2025 15:16) (51 - 82)  BP: 150/58 (07 Feb 2025 07:39) (136/71 - 150/58)  BP(mean): --  RR: 18 (07 Feb 2025 07:39) (18 - 18)  SpO2: 95% (07 Feb 2025 15:16) (93% - 97%)    Parameters below as of 07 Feb 2025 15:16  Patient On (Oxygen Delivery Method): nasal cannula        PHYSICAL EXAM:  GENERAL: NAD  HEENT:  anicteric, moist mucous membranes  CHEST/LUNG: decrease breath sounds at the base  HEART:  RRR, S1, S2  ABDOMEN:  BS+, soft, nontender, nondistended  EXTREMITIES: no edema, cyanosis, or calf tenderness  NERVOUS SYSTEM: answers questions and follows commands appropriately    LABS:                        11.2   15.63 )-----------( 571      ( 07 Feb 2025 06:12 )             35.3     CBC Full  -  ( 07 Feb 2025 06:12 )  WBC Count : 15.63 K/uL  Hemoglobin : 11.2 g/dL  Hematocrit : 35.3 %  Platelet Count - Automated : 571 K/uL  Mean Cell Volume : 92.4 fl  Mean Cell Hemoglobin : 29.3 pg  Mean Cell Hemoglobin Concentration : 31.7 g/dL  Auto Neutrophil # : x  Auto Lymphocyte # : x  Auto Monocyte # : x  Auto Eosinophil # : x  Auto Basophil # : x  Auto Neutrophil % : x  Auto Lymphocyte % : x  Auto Monocyte % : x  Auto Eosinophil % : x  Auto Basophil % : x    07 Feb 2025 06:12    135    |  103    |  42     ----------------------------<  111    4.4     |  28     |  1.15     Ca    9.6        07 Feb 2025 06:12  Phos  2.5       07 Feb 2025 10:29  Mg     2.7       07 Feb 2025 10:29        Urinalysis Basic - ( 07 Feb 2025 06:12 )    Color: x / Appearance: x / SG: x / pH: x  Gluc: 111 mg/dL / Ketone: x  / Bili: x / Urobili: x   Blood: x / Protein: x / Nitrite: x   Leuk Esterase: x / RBC: x / WBC x   Sq Epi: x / Non Sq Epi: x / Bacteria: x      CAPILLARY BLOOD GLUCOSE      POCT Blood Glucose.: 135 mg/dL (07 Feb 2025 13:01)  POCT Blood Glucose.: 125 mg/dL (07 Feb 2025 07:58)  POCT Blood Glucose.: 189 mg/dL (06 Feb 2025 23:07)        Culture - Blood (collected 02-03-25 @ 08:28)  Source: .Blood BLOOD  Preliminary Report (02-07-25 @ 13:01):    No growth at 4 days    Urinalysis with Rflx Culture (collected 02-01-25 @ 19:20)    Culture - Blood (collected 02-01-25 @ 15:50)  Source: .Blood BLOOD  Final Report (02-07-25 @ 01:00):    No growth at 5 days    Culture - Blood (collected 02-01-25 @ 15:01)  Source: .Blood BLOOD  Gram Stain (02-03-25 @ 01:15):    Growth in aerobic bottle: Gram Positive Cocci in Clusters    Growth in anaerobic bottle: Gram Positive Cocci in Clusters  Final Report (02-03-25 @ 19:20):    Growth in aerobic and anaerobic bottles: Staphylococcus hominis    Isolation of Coagulase negative Staphylococcus from single blood culture    sets may represent    contamination. Contact the Microbiology Department at [phone] if    susceptibility testing is needed.    clinically indicated.    Direct identification is available within approximately 3-5    hours either by Blood Panel Multiplexed PCR or Direct    MALDI-TOF. Details: [email or web address]  Organism: Blood Culture PCR (02-03-25 @ 19:20)  Organism: Blood Culture PCR (02-03-25 @ 19:20)      Method Type: PCR      -  Coagulase negative Staphylococcus: Detec        RADIOLOGY & ADDITIONAL TESTS:  < from: 12 Lead ECG (02.07.25 @ 10:24) >  Ventricular Rate 58 BPM    Atrial Rate 58 BPM    P-R Interval 154 ms    QRS Duration 146 ms    Q-T Interval 432 ms    QTC Calculation(Bazett) 424 ms    P Axis 51 degrees    R Axis 44 degrees    T Axis -27 degrees    Diagnosis Line Sinus bradycardia  Right bundle branch block  T wave abnormality, consider inferolateral ischemia  Abnormal ECG  When compared with ECG of 04-FEB-2025 23:14,  T wave inversion more evident in Inferior leads  T wave inversion now evident in Lateral leads  Confirmed by JOSEFINA YEBOAH ([id number]) on 2/7/2025 1:20:55 PM    < end of copied text >  < from: TTE W or WO Ultrasound Enhancing Agent (11.12.24 @ 09:47) >   1. Left ventricular cavity is normal in size. Left ventricular systolic function is normal with an ejection fraction of 60 % by Manley's method of disks.   2. Normal right ventricular systolic function.   3. Moderate to severe mitral regurgitation.   4. Mildto moderate pulmonic regurgitation.   5. Moderate tricuspid regurgitation.   6. Severe pulmonary hypertension.   7. There is mild calcification of the mitral valve annulus.   8. Findings were discussed with  and  on 11/12/2024 at 9:20 am.    < end of copied text >    Personally reviewed.     Consultant(s) Notes Reviewed:  [x] YES  [ ] NO

## 2025-02-07 NOTE — PROGRESS NOTE ADULT - ASSESSMENT
*Acute on chronic hypoxic respiratory failure + Sepsis 2/2 HCAP/gram negative PNA  *COPD Exacerbation   *Acute on chronic diastolic CHF  *valvular abnormalities ( episode of  Wide complex tachycardia)   *H/o severe pulm HTN   # RBBB  - on 3 L NC at baseline   -CT: The pulmonary arteries B/L dilated, suggestive of pulmonary HTN. Mosaic attenuation throughout the lungs, likely due to small airways or less likely vascular disease. There are also scattered areas of interlobular septal thickening which could be due to edema or and infectious or inflammatory process in the appropriate clinical setting. borderline enlarged pretracheal node measuring 1.0 cm   - STAT EKG Sinus bradycardia 58bpm RBBB ( No changes from previous EKG )  - Leukocytosis 2/2 steroids   - REPEAT  blood cx NGTD 2/3  - on Aztreonam  - Duoneb PRN  - prednisone BID IV--> switched to PO daily   -C/w IV lasix + aldactone (will need to watch creatinine closely)  -I/Os + Daily weights  - On BB   - pulmonary and cardiology following       #  ANNALEE-- RESOLVED   -Patient on diuretics, will need to monitor closely  -follow Nephro consult     *Hyponatremia-- > RESOLVED     *Thrombocytosis  -F/u outpatient heme for further evaluation     *H/o HTN / dyslipidemia / CVA / paroxysmal a-fib / CAD / PE / factor V leiden  -C/w home meds and f/u outpatient for further management if conditions remain stable during hospitalization     *DVT ppx  -On Eliquis       DISPO: ANABELLA    -REPEAT  blood cx NGTD 2/3 will switch Azactam switched to Moxifloxacin 400mg q24 to complete 7-day course

## 2025-02-08 LAB
BUN SERPL-MCNC: 32 MG/DL — HIGH (ref 7–23)
CALCIUM SERPL-MCNC: 9.5 MG/DL — SIGNIFICANT CHANGE UP (ref 8.5–10.1)
CHLORIDE SERPL-SCNC: 103 MMOL/L — SIGNIFICANT CHANGE UP (ref 96–108)
CO2 SERPL-SCNC: 34 MMOL/L — HIGH (ref 22–31)
CREAT SERPL-MCNC: 1.04 MG/DL — SIGNIFICANT CHANGE UP (ref 0.5–1.3)
CULTURE RESULTS: SIGNIFICANT CHANGE UP
EGFR: 51 ML/MIN/1.73M2 — LOW
GLUCOSE BLDC GLUCOMTR-MCNC: 121 MG/DL — HIGH (ref 70–99)
GLUCOSE BLDC GLUCOMTR-MCNC: 143 MG/DL — HIGH (ref 70–99)
GLUCOSE BLDC GLUCOMTR-MCNC: 194 MG/DL — HIGH (ref 70–99)
GLUCOSE BLDC GLUCOMTR-MCNC: 98 MG/DL — SIGNIFICANT CHANGE UP (ref 70–99)
GLUCOSE SERPL-MCNC: 120 MG/DL — HIGH (ref 70–99)
HCT VFR BLD CALC: 34.4 % — LOW (ref 34.5–45)
HGB BLD-MCNC: 10.9 G/DL — LOW (ref 11.5–15.5)
MCHC RBC-ENTMCNC: 29.3 PG — SIGNIFICANT CHANGE UP (ref 27–34)
MCHC RBC-ENTMCNC: 31.7 G/DL — LOW (ref 32–36)
MCV RBC AUTO: 92.5 FL — SIGNIFICANT CHANGE UP (ref 80–100)
PLATELET # BLD AUTO: 544 K/UL — HIGH (ref 150–400)
POTASSIUM SERPL-MCNC: 4.5 MMOL/L — SIGNIFICANT CHANGE UP (ref 3.5–5.3)
POTASSIUM SERPL-SCNC: 4.5 MMOL/L — SIGNIFICANT CHANGE UP (ref 3.5–5.3)
RBC # BLD: 3.72 M/UL — LOW (ref 3.8–5.2)
RBC # FLD: 14.2 % — SIGNIFICANT CHANGE UP (ref 10.3–14.5)
SODIUM SERPL-SCNC: 137 MMOL/L — SIGNIFICANT CHANGE UP (ref 135–145)
SPECIMEN SOURCE: SIGNIFICANT CHANGE UP
WBC # BLD: 14.71 K/UL — HIGH (ref 3.8–10.5)
WBC # FLD AUTO: 14.71 K/UL — HIGH (ref 3.8–10.5)

## 2025-02-08 PROCEDURE — 71045 X-RAY EXAM CHEST 1 VIEW: CPT | Mod: 26

## 2025-02-08 PROCEDURE — 93010 ELECTROCARDIOGRAM REPORT: CPT

## 2025-02-08 PROCEDURE — 99233 SBSQ HOSP IP/OBS HIGH 50: CPT

## 2025-02-08 RX ADMIN — APIXABAN 5 MILLIGRAM(S): 5 TABLET, FILM COATED ORAL at 10:14

## 2025-02-08 RX ADMIN — PREDNISONE 40 MILLIGRAM(S): 5 TABLET ORAL at 14:02

## 2025-02-08 RX ADMIN — Medication 50 MILLIGRAM(S): at 22:37

## 2025-02-08 RX ADMIN — PANTOPRAZOLE 40 MILLIGRAM(S): 20 TABLET, DELAYED RELEASE ORAL at 05:27

## 2025-02-08 RX ADMIN — ASPIRIN 81 MILLIGRAM(S): 81 TABLET, COATED ORAL at 05:27

## 2025-02-08 RX ADMIN — IPRATROPIUM BROMIDE AND ALBUTEROL SULFATE 3 MILLILITER(S): .5; 2.5 SOLUTION RESPIRATORY (INHALATION) at 13:45

## 2025-02-08 RX ADMIN — IPRATROPIUM BROMIDE AND ALBUTEROL SULFATE 3 MILLILITER(S): .5; 2.5 SOLUTION RESPIRATORY (INHALATION) at 20:17

## 2025-02-08 RX ADMIN — GABAPENTIN 300 MILLIGRAM(S): 800 TABLET ORAL at 22:38

## 2025-02-08 RX ADMIN — Medication 12.5 MILLIGRAM(S): at 22:38

## 2025-02-08 RX ADMIN — Medication 50 MILLIGRAM(S): at 16:24

## 2025-02-08 RX ADMIN — Medication 2 TABLET(S): at 22:38

## 2025-02-08 RX ADMIN — Medication 40 MILLIGRAM(S): at 10:14

## 2025-02-08 RX ADMIN — Medication 25 MILLIGRAM(S): at 14:03

## 2025-02-08 RX ADMIN — LEVOTHYROXINE SODIUM 25 MICROGRAM(S): 25 TABLET ORAL at 05:27

## 2025-02-08 RX ADMIN — GABAPENTIN 100 MILLIGRAM(S): 800 TABLET ORAL at 10:14

## 2025-02-08 RX ADMIN — Medication 50 MILLIGRAM(S): at 05:27

## 2025-02-08 RX ADMIN — APIXABAN 5 MILLIGRAM(S): 5 TABLET, FILM COATED ORAL at 22:37

## 2025-02-08 RX ADMIN — IPRATROPIUM BROMIDE AND ALBUTEROL SULFATE 3 MILLILITER(S): .5; 2.5 SOLUTION RESPIRATORY (INHALATION) at 01:37

## 2025-02-08 RX ADMIN — DEXTROMETHORPHAN HBR AND GUAIFENESIN ORAL SOLUTION 10 MILLILITER(S): 10; 100 LIQUID ORAL at 22:45

## 2025-02-08 RX ADMIN — IPRATROPIUM BROMIDE AND ALBUTEROL SULFATE 3 MILLILITER(S): .5; 2.5 SOLUTION RESPIRATORY (INHALATION) at 08:08

## 2025-02-08 RX ADMIN — ROPINIROLE 1 MILLIGRAM(S): 0.5 TABLET, FILM COATED ORAL at 22:38

## 2025-02-08 NOTE — PROGRESS NOTE ADULT - ASSESSMENT
1) Pneumonia  2) Dyspnea   3) PH  4) Hypoxemia  5) Abnormal CT Chest     89 y/o F w/ PMH of HTN, dyslipidemia, CVA, chronic diastolic CHF, paroxysmal a-fib s/p ablation (on AC), CAD s/p PCI, COPD (on 3L home O2 at night), severe pulm HTN, Mitral valve regurg, PE, Factor V leiden, hypothyroidism, p/w dyspnea. Patient states that she has chronic dyspnea 2/2 COPD. However, SOB has been getting worse and is associated with wheezing. Patient states she also has a cough at baseline, but cough has also gotten worse and is productive of clear sputum. States she had elevated temperatures of ~99. Patient was at urgent care w/ low O2 saturations.     CT Patent central airways. Multifocal ground glass opacities with areas of interlobular septal thickening and superimposed   atelectasis with a somewhat mosaic attenuation.  History of Pulmonary Hypertension/COPD  On O2 , 3L   Currently on Vancomycin/Aztreonam  Diuresis for PH  FOllow up Cardiology recommendations  reviewed CT Chest   O2  Will continue to monitor   2/8  seen and examined  covering for Dr Sinclair  bronchospasm with suspected mucus plugging  re eval with cxr after bronchodilator rx  cxr requested

## 2025-02-08 NOTE — PROGRESS NOTE ADULT - ASSESSMENT
*Acute on chronic hypoxic respiratory failure + Sepsis 2/2 HCAP/gram negative PNA  *COPD Exacerbation   *Acute on chronic diastolic CHF  *valvular abnormalities ( episode of  Wide complex tachycardia)   *H/o severe pulm HTN   # RBBB  - on 3 L NC at baseline   -CT: The pulmonary arteries B/L dilated, suggestive of pulmonary HTN. Mosaic attenuation throughout the lungs, likely due to small airways or less likely vascular disease. There are also scattered areas of interlobular septal thickening which could be due to edema or and infectious or inflammatory process in the appropriate clinical setting. borderline enlarged pretracheal node measuring 1.0 cm   - CXR 2/8 shows cardiomegaly , airspace congestion ( personal read )   - EKG Sinus bradycardia 58bpm RBBB ( No changes from previous EKG )  - Leukocytosis 2/2 steroids   - REPEAT  blood cx NGTD 2/3  - on Aztreonam  - Duoneb PRN  - prednisone BID IV--> switched to PO daily   -C/w IV lasix + aldactone (will need to watch creatinine closely)  -I/Os + Daily weights  - On BB   - pulmonary and cardiology following       #  ANNALEE-- RESOLVED   -Patient on diuretics, will need to monitor closely  -follow Nephro consult     *Hyponatremia-- > RESOLVED     *Thrombocytosis  -F/u outpatient heme for further evaluation     *H/o HTN / dyslipidemia / CVA / paroxysmal a-fib / CAD / PE / factor V leiden  -C/w home meds and f/u outpatient for further management if conditions remain stable during hospitalization     *DVT ppx  -On Eliquis       DISPO: ANABELLA Cash sent to [facility] on Monday ?   -REPEAT  blood cx NGTD 2/3 will switch Azactam switched to Moxifloxacin 400mg q24 to complete 7-day course

## 2025-02-08 NOTE — PROGRESS NOTE ADULT - SUBJECTIVE AND OBJECTIVE BOX
The patient was seen earlier today.  She had an episode of wide-complex tachycardia yesterday, likely due to atrial tachycardia with underlying right bundle branch block.  Shortness of breath improving.  No fevers.  EKG shows chronic changes with repolarization abnormalities.      Initial Consult HPI  _______________  S. MAGDALENE is a 89y year old Female with a past medical history of paroxysmal atrial fibrillation on anticoagulation status post ablation, CAD status post PCI to the LAD in 2017, with repeat coronary treatment 2022 showing patent stents, chronic diastolic heart failure, history of CVA, mitral valve regurgitation, history of PE with factor V Leiden, hypertension, hyperlipidemia, obesity, severe pulm hypertension without response to nitric oxide on right heart cath and COPD.    Patient now presents for evaluation of shortness of breath, chest pressure, which is pleuritic.  Admits to wheezing.  Chest pain resolved.  EKG shows chronic changes.  Troponin negative.    Diagnosed with multilobar pneumonia.  Blood cultures positive.    PREVIOUS CARDIAC WORKUP:    Echocardiogram 11/12/24      1. Left ventricular cavity is normal in size. Left ventricular systolic function is normal with an ejection fraction of 60 % by Manley's method of disks.   2. Normal right ventricular systolic function.   3. Moderate to severe mitral regurgitation.   4. Mildto moderate pulmonic regurgitation.   5. Moderate tricuspid regurgitation.   6. Severe pulmonary hypertension.   7. There is mild calcification of the mitral valve annulus.   8. Findings were discussed with  and  on 11/12/2024 at 9:20 am    ________________________________  Review of systems: A 10 point review of system has been performed, and is negative except for what has been mentioned in the above history of present illness.     PAST MEDICAL & SURGICAL HISTORY:  Atrial fibrillation      CHF (congestive heart failure)      Pulmonary emboli      Factor 5 Leiden mutation, heterozygous      CAD (coronary artery disease)      Stented coronary artery      Hypothyroid      COPD (chronic obstructive pulmonary disease)      Mitral regurgitation      HTN (hypertension)      H/O: hysterectomy      H/O knee surgery      Cyst of breast, unspecified laterality  S/P excision      H/O cardiac radiofrequency ablation      S/P ablation of atrial fibrillation           ALLERGIES:  flu vaccines (Other)  statins (Muscle Pain)  Macrodantin (Other)  nisoldipine (Unknown)  Repatha (Unknown)  tadalafil (Muscle Pain)  sulfa drugs (Other)  Motrin (Other)  Ceftin (Other (Moderate))    Home Medications:  Albuterol (Eqv-ProAir HFA) 90 mcg/inh inhalation aerosol: 2 puff(s) inhaled every 6 hours as needed for (02 Feb 2025 00:11)  Aldactone 25 mg oral tablet: 1 tab(s) orally once a day (02 Feb 2025 00:09)  apixaban 5 mg oral tablet: 1 tab(s) orally 2 times a day (02 Feb 2025 00:09)  aspirin 81 mg oral tablet, chewable: 1 tab(s) orally once a day (02 Feb 2025 00:09)  Breztri Aerosphere 160 mcg-9 mcg-4.8 mcg/inh inhalation aerosol: 2 puff(s) inhaled 2 times a day (02 Feb 2025 00:13)  famotidine 20 mg oral tablet: 1 tab(s) orally once a day (at bedtime) (02 Feb 2025 00:09)  Lasix 40 mg oral tablet: 1 tab(s) orally once a day (02 Feb 2025 00:09)  levothyroxine 25 mcg (0.025 mg) oral tablet: 1 tab(s) orally once a day (02 Feb 2025 00:09)  Livalo 2 mg oral tablet: 1 tab(s) orally once a day (02 Feb 2025 00:12)  Neurontin 100 mg oral capsule: 1 cap(s) orally once a day (02 Feb 2025 00:09)  Neurontin 300 mg oral capsule: 1 cap(s) orally once a day (at bedtime) (02 Feb 2025 00:09)  oseltamivir 75 mg oral capsule: 1 cap(s) orally 2 times a day for 5 days ***course not complete*** (02 Feb 2025 00:12)  rOPINIRole 1 mg oral tablet: 1 tab(s) orally once a day (at bedtime) (02 Feb 2025 00:09)    MEDICATIONS  (STANDING):  albuterol/ipratropium for Nebulization 3 milliLiter(s) Nebulizer every 6 hours  apixaban 5 milliGRAM(s) Oral two times a day  aspirin  chewable 81 milliGRAM(s) Oral daily  aztreonam  IVPB 1000 milliGRAM(s) IV Intermittent every 8 hours  bisacodyl Suppository 10 milliGRAM(s) Rectal once  dextrose 5%. 1000 milliLiter(s) (50 mL/Hr) IV Continuous <Continuous>  dextrose 5%. 1000 milliLiter(s) (100 mL/Hr) IV Continuous <Continuous>  dextrose 50% Injectable 25 Gram(s) IV Push once  dextrose 50% Injectable 12.5 Gram(s) IV Push once  dextrose 50% Injectable 25 Gram(s) IV Push once  furosemide    Tablet 40 milliGRAM(s) Oral daily  gabapentin 100 milliGRAM(s) Oral daily  gabapentin 300 milliGRAM(s) Oral at bedtime  glucagon  Injectable 1 milliGRAM(s) IntraMuscular once  insulin lispro (ADMELOG) corrective regimen sliding scale   SubCutaneous three times a day before meals  insulin lispro (ADMELOG) corrective regimen sliding scale   SubCutaneous at bedtime  levothyroxine 25 MICROGram(s) Oral daily  metoprolol tartrate 12.5 milliGRAM(s) Oral two times a day  pantoprazole    Tablet 40 milliGRAM(s) Oral before breakfast  polyethylene glycol 3350 17 Gram(s) Oral daily  predniSONE   Tablet 40 milliGRAM(s) Oral daily  rOPINIRole 1 milliGRAM(s) Oral at bedtime  senna 2 Tablet(s) Oral at bedtime  spironolactone 25 milliGRAM(s) Oral daily    MEDICATIONS  (PRN):  acetaminophen     Tablet .. 650 milliGRAM(s) Oral every 6 hours PRN Temp greater or equal to 38C (100.4F), Mild Pain (1 - 3)  artificial tears (preservative free) Ophthalmic Solution 1 Drop(s) Both EYES three times a day PRN Dry Eyes  benzonatate 200 milliGRAM(s) Oral every 8 hours PRN Cough  dextrose Oral Gel 15 Gram(s) Oral once PRN Blood Glucose LESS THAN 70 milliGRAM(s)/deciliter  guaifenesin/dextromethorphan Oral Liquid 10 milliLiter(s) Oral every 6 hours PRN Cough      Vital Signs Last 24 Hrs  T(C): 36.9 (08 Feb 2025 15:51), Max: 36.9 (08 Feb 2025 15:51)  T(F): 98.4 (08 Feb 2025 15:51), Max: 98.4 (08 Feb 2025 15:51)  HR: 58 (08 Feb 2025 15:51) (50 - 58)  BP: 124/66 (08 Feb 2025 15:51) (124/66 - 144/67)  BP(mean): --  RR: 18 (08 Feb 2025 15:51) (18 - 18)  SpO2: 97% (08 Feb 2025 15:51) (97% - 100%)    Parameters below as of 08 Feb 2025 15:51  Patient On (Oxygen Delivery Method): nasal cannula  O2 Flow (L/min): 2    I&O's Summary    07 Feb 2025 07:01  -  08 Feb 2025 07:00  --------------------------------------------------------  IN: 0 mL / OUT: 1001 mL / NET: -1001 mL    08 Feb 2025 07:01  -  08 Feb 2025 21:49  --------------------------------------------------------  IN: 0 mL / OUT: 800 mL / NET: -800 mL      ________________________________  GENERAL APPEARANCE:  No acute distress  HEAD: normocephalic, atraumatic  NECK: supple, no jugular venous distention, no carotid bruit    HEART: Regular rate and rhythm, S1, S2 normal, 1/6 murmur    LUNGS: Rales   ABDOMEN: soft, nontender, nondistended, with positive bowel sounds appreciated  EXTREMITIES: no edema.   NEURO: Alert and oriented x3  PSYC:  Normal affect  SKIN:  Dry  ________________________________      ECG: Sinus rhythm, right bundle branch block with chronic precordial T wave inversions    LABS:                           10.9   14.71 )-----------( 544      ( 08 Feb 2025 06:27 )             34.4          02-08    137  |  103  |  32[H]  ----------------------------<  120[H]  4.5   |  34[H]  |  1.04    Ca    9.5      08 Feb 2025 06:27  Phos  2.5     02-07  Mg     2.7     02-07       Urinalysis Basic - ( 08 Feb 2025 06:27 )    Color: x / Appearance: x / SG: x / pH: x  Gluc: 120 mg/dL / Ketone: x  / Bili: x / Urobili: x   Blood: x / Protein: x / Nitrite: x   Leuk Esterase: x / RBC: x / WBC x   Sq Epi: x / Non Sq Epi: x / Bacteria: x          RADIOLOGY & ADDITIONAL STUDIES:   ________________________________    ASSESSMENT:  Chest pain, pleuritic  Chronic diastolic heart failure, mild exacerbation in the setting of pneumonia  Paroxysmal atrial fibrillation status post ablation  History of CVA  History of CAD status post prior PCI to LAD in 2017 with cardiac cath in 2022 showing patent stent  Severe pulm hypertension with no response to nitric oxide  History of PE with factor V Leiden    PLAN:  In summary, this is a 89y Female with a past medical history of severe pulm hypertension, chronic diastolic heart failure, paroxysmal atrial fibrillation, CAD, systemic hypertension and COPD.  Admitted for respiratory failure due to multifocal pneumonia.    She had 1 episode of wide-complex tachycardia yesterday, likely due to paroxysmal atrial tachycardia with underlying bundle branch block.  EKG shows no abnormalities.  Will start low-dose diltiazem.  Continue anticoagulation.  Renal function stable.  Continue Aldactone and Lasix.      ____________________________________________  (Dragon Dictation software used). Thank you for allowing me to participate in the care of your patient. Please contact me should any questions arise.    NEFTALI Forte DO, [facility]  Office: [phone]       The patient was seen earlier today.  She had an episode of wide-complex tachycardia yesterday, likely due to atrial tachycardia with underlying right bundle branch block.  Shortness of breath improving.  No fevers.  EKG shows chronic changes with repolarization abnormalities.      Initial Consult HPI  _______________  S. MAGDALENE is a 89y year old Female with a past medical history of paroxysmal atrial fibrillation on anticoagulation status post ablation, CAD status post PCI to the LAD in 2017, with repeat coronary treatment 2022 showing patent stents, chronic diastolic heart failure, history of CVA, mitral valve regurgitation, history of PE with factor V Leiden, hypertension, hyperlipidemia, obesity, severe pulm hypertension without response to nitric oxide on right heart cath and COPD.    Patient now presents for evaluation of shortness of breath, chest pressure, which is pleuritic.  Admits to wheezing.  Chest pain resolved.  EKG shows chronic changes.  Troponin negative.    Diagnosed with multilobar pneumonia.  Blood cultures positive.    PREVIOUS CARDIAC WORKUP:    Echocardiogram 11/12/24      1. Left ventricular cavity is normal in size. Left ventricular systolic function is normal with an ejection fraction of 60 % by Manley's method of disks.   2. Normal right ventricular systolic function.   3. Moderate to severe mitral regurgitation.   4. Mildto moderate pulmonic regurgitation.   5. Moderate tricuspid regurgitation.   6. Severe pulmonary hypertension.   7. There is mild calcification of the mitral valve annulus.   8. Findings were discussed with  and  on 11/12/2024 at 9:20 am    ________________________________  Review of systems: A 10 point review of system has been performed, and is negative except for what has been mentioned in the above history of present illness.     PAST MEDICAL & SURGICAL HISTORY:  Atrial fibrillation      CHF (congestive heart failure)      Pulmonary emboli      Factor 5 Leiden mutation, heterozygous      CAD (coronary artery disease)      Stented coronary artery      Hypothyroid      COPD (chronic obstructive pulmonary disease)      Mitral regurgitation      HTN (hypertension)      H/O: hysterectomy      H/O knee surgery      Cyst of breast, unspecified laterality  S/P excision      H/O cardiac radiofrequency ablation      S/P ablation of atrial fibrillation           ALLERGIES:  flu vaccines (Other)  statins (Muscle Pain)  Macrodantin (Other)  nisoldipine (Unknown)  Repatha (Unknown)  tadalafil (Muscle Pain)  sulfa drugs (Other)  Motrin (Other)  Ceftin (Other (Moderate))    Home Medications:  Albuterol (Eqv-ProAir HFA) 90 mcg/inh inhalation aerosol: 2 puff(s) inhaled every 6 hours as needed for (02 Feb 2025 00:11)  Aldactone 25 mg oral tablet: 1 tab(s) orally once a day (02 Feb 2025 00:09)  apixaban 5 mg oral tablet: 1 tab(s) orally 2 times a day (02 Feb 2025 00:09)  aspirin 81 mg oral tablet, chewable: 1 tab(s) orally once a day (02 Feb 2025 00:09)  Breztri Aerosphere 160 mcg-9 mcg-4.8 mcg/inh inhalation aerosol: 2 puff(s) inhaled 2 times a day (02 Feb 2025 00:13)  famotidine 20 mg oral tablet: 1 tab(s) orally once a day (at bedtime) (02 Feb 2025 00:09)  Lasix 40 mg oral tablet: 1 tab(s) orally once a day (02 Feb 2025 00:09)  levothyroxine 25 mcg (0.025 mg) oral tablet: 1 tab(s) orally once a day (02 Feb 2025 00:09)  Livalo 2 mg oral tablet: 1 tab(s) orally once a day (02 Feb 2025 00:12)  Neurontin 100 mg oral capsule: 1 cap(s) orally once a day (02 Feb 2025 00:09)  Neurontin 300 mg oral capsule: 1 cap(s) orally once a day (at bedtime) (02 Feb 2025 00:09)  oseltamivir 75 mg oral capsule: 1 cap(s) orally 2 times a day for 5 days ***course not complete*** (02 Feb 2025 00:12)  rOPINIRole 1 mg oral tablet: 1 tab(s) orally once a day (at bedtime) (02 Feb 2025 00:09)    MEDICATIONS  (STANDING):  albuterol/ipratropium for Nebulization 3 milliLiter(s) Nebulizer every 6 hours  apixaban 5 milliGRAM(s) Oral two times a day  aspirin  chewable 81 milliGRAM(s) Oral daily  aztreonam  IVPB 1000 milliGRAM(s) IV Intermittent every 8 hours  bisacodyl Suppository 10 milliGRAM(s) Rectal once  dextrose 5%. 1000 milliLiter(s) (50 mL/Hr) IV Continuous <Continuous>  dextrose 5%. 1000 milliLiter(s) (100 mL/Hr) IV Continuous <Continuous>  dextrose 50% Injectable 25 Gram(s) IV Push once  dextrose 50% Injectable 12.5 Gram(s) IV Push once  dextrose 50% Injectable 25 Gram(s) IV Push once  furosemide    Tablet 40 milliGRAM(s) Oral daily  gabapentin 100 milliGRAM(s) Oral daily  gabapentin 300 milliGRAM(s) Oral at bedtime  glucagon  Injectable 1 milliGRAM(s) IntraMuscular once  insulin lispro (ADMELOG) corrective regimen sliding scale   SubCutaneous three times a day before meals  insulin lispro (ADMELOG) corrective regimen sliding scale   SubCutaneous at bedtime  levothyroxine 25 MICROGram(s) Oral daily  metoprolol tartrate 12.5 milliGRAM(s) Oral two times a day  pantoprazole    Tablet 40 milliGRAM(s) Oral before breakfast  polyethylene glycol 3350 17 Gram(s) Oral daily  predniSONE   Tablet 40 milliGRAM(s) Oral daily  rOPINIRole 1 milliGRAM(s) Oral at bedtime  senna 2 Tablet(s) Oral at bedtime  spironolactone 25 milliGRAM(s) Oral daily    MEDICATIONS  (PRN):  acetaminophen     Tablet .. 650 milliGRAM(s) Oral every 6 hours PRN Temp greater or equal to 38C (100.4F), Mild Pain (1 - 3)  artificial tears (preservative free) Ophthalmic Solution 1 Drop(s) Both EYES three times a day PRN Dry Eyes  benzonatate 200 milliGRAM(s) Oral every 8 hours PRN Cough  dextrose Oral Gel 15 Gram(s) Oral once PRN Blood Glucose LESS THAN 70 milliGRAM(s)/deciliter  guaifenesin/dextromethorphan Oral Liquid 10 milliLiter(s) Oral every 6 hours PRN Cough      Vital Signs Last 24 Hrs  T(C): 36.9 (08 Feb 2025 15:51), Max: 36.9 (08 Feb 2025 15:51)  T(F): 98.4 (08 Feb 2025 15:51), Max: 98.4 (08 Feb 2025 15:51)  HR: 58 (08 Feb 2025 15:51) (50 - 58)  BP: 124/66 (08 Feb 2025 15:51) (124/66 - 144/67)  BP(mean): --  RR: 18 (08 Feb 2025 15:51) (18 - 18)  SpO2: 97% (08 Feb 2025 15:51) (97% - 100%)    Parameters below as of 08 Feb 2025 15:51  Patient On (Oxygen Delivery Method): nasal cannula  O2 Flow (L/min): 2    I&O's Summary    07 Feb 2025 07:01  -  08 Feb 2025 07:00  --------------------------------------------------------  IN: 0 mL / OUT: 1001 mL / NET: -1001 mL    08 Feb 2025 07:01  -  08 Feb 2025 21:49  --------------------------------------------------------  IN: 0 mL / OUT: 800 mL / NET: -800 mL      ________________________________  GENERAL APPEARANCE:  No acute distress  HEAD: normocephalic, atraumatic  NECK: supple, no jugular venous distention, no carotid bruit    HEART: Regular rate and rhythm, S1, S2 normal, 1/6 murmur    LUNGS: Rales   ABDOMEN: soft, nontender, nondistended, with positive bowel sounds appreciated  EXTREMITIES: no edema.   NEURO: Alert and oriented x3  PSYC:  Normal affect  SKIN:  Dry  ________________________________      ECG: Sinus rhythm, right bundle branch block with chronic precordial T wave inversions    LABS:                           10.9   14.71 )-----------( 544      ( 08 Feb 2025 06:27 )             34.4          02-08    137  |  103  |  32[H]  ----------------------------<  120[H]  4.5   |  34[H]  |  1.04    Ca    9.5      08 Feb 2025 06:27  Phos  2.5     02-07  Mg     2.7     02-07       Urinalysis Basic - ( 08 Feb 2025 06:27 )    Color: x / Appearance: x / SG: x / pH: x  Gluc: 120 mg/dL / Ketone: x  / Bili: x / Urobili: x   Blood: x / Protein: x / Nitrite: x   Leuk Esterase: x / RBC: x / WBC x   Sq Epi: x / Non Sq Epi: x / Bacteria: x          RADIOLOGY & ADDITIONAL STUDIES:   ________________________________    ASSESSMENT:  Chest pain, pleuritic  Chronic diastolic heart failure, mild exacerbation in the setting of pneumonia  Paroxysmal atrial fibrillation status post ablation  History of CVA  History of CAD status post prior PCI to LAD in 2017 with cardiac cath in 2022 showing patent stent  Severe pulm hypertension with no response to nitric oxide  History of PE with factor V Leiden    PLAN:  In summary, this is a 89y Female with a past medical history of severe pulm hypertension, chronic diastolic heart failure, paroxysmal atrial fibrillation, CAD, systemic hypertension and COPD.  Admitted for respiratory failure due to multifocal pneumonia.    She had 1 episode of wide-complex tachycardia yesterday, likely due to paroxysmal atrial tachycardia with underlying bundle branch block.  EKG shows no abnormalities.  Will cont current dose of BB - if recurrence can increase dose.  Continue anticoagulation.  Renal function stable.  Continue Aldactone and Lasix.      ____________________________________________  (Dragon Dictation software used). Thank you for allowing me to participate in the care of your patient. Please contact me should any questions arise.    NEFTALI Forte DO, [facility]  Office: [phone]

## 2025-02-08 NOTE — PROGRESS NOTE ADULT - SUBJECTIVE AND OBJECTIVE BOX
Patient is a 89y old  Female who presents with a chief complaint of dyspnea (03 Feb 2025 14:41)      HPI:    89 y/o F w/ PMH of HTN, dyslipidemia, CVA, chronic diastolic CHF, paroxysmal a-fib s/p ablation (on AC), CAD s/p PCI, COPD (on 3L home O2 at night), severe pulm HTN, Mitral valve regurg, PE, Factor V leiden, hypothyroidism, p/w dyspnea. Patient states that she has chronic dyspnea 2/2 COPD. However, SOB has been getting worse and is associated with wheezing. Patient states she also has a cough at baseline, but cough has also gotten worse and is productive of clear sputum. States she had elevated temperatures of ~99. Patient was at urgent care w/ low O2 saturations.     CT Patent central airways. Multifocal ground glass opacities with areas of interlobular septal thickening and superimposed   atelectasis with a somewhat mosaic attenuation.  History of Pulmonary Hypertension/COPD  On O2 , 3L       PSH: PCI     Social Hx: Denies tobacco / etoh / drugs     Family Hx: Denies pertinent hx    (02 Feb 2025 02:34)      PAST MEDICAL & SURGICAL HISTORY:  Atrial fibrillation      CHF (congestive heart failure)      Pulmonary emboli      Factor 5 Leiden mutation, heterozygous      CAD (coronary artery disease)      Stented coronary artery      Hypothyroid      COPD (chronic obstructive pulmonary disease)      Mitral regurgitation      HTN (hypertension)      H/O: hysterectomy      H/O knee surgery      Cyst of breast, unspecified laterality  S/P excision      H/O cardiac radiofrequency ablation      S/P ablation of atrial fibrillation          PREVIOUS DIAGNOSTIC TESTING:      MEDICATIONS  (STANDING):  albuterol/ipratropium for Nebulization 3 milliLiter(s) Nebulizer every 6 hours  apixaban 5 milliGRAM(s) Oral two times a day  aspirin  chewable 81 milliGRAM(s) Oral daily  aztreonam  IVPB 1000 milliGRAM(s) IV Intermittent every 8 hours  bisacodyl Suppository 10 milliGRAM(s) Rectal once  dextrose 5%. 1000 milliLiter(s) (100 mL/Hr) IV Continuous <Continuous>  dextrose 5%. 1000 milliLiter(s) (50 mL/Hr) IV Continuous <Continuous>  dextrose 50% Injectable 25 Gram(s) IV Push once  dextrose 50% Injectable 12.5 Gram(s) IV Push once  dextrose 50% Injectable 25 Gram(s) IV Push once  furosemide    Tablet 40 milliGRAM(s) Oral daily  gabapentin 100 milliGRAM(s) Oral daily  gabapentin 300 milliGRAM(s) Oral at bedtime  glucagon  Injectable 1 milliGRAM(s) IntraMuscular once  insulin lispro (ADMELOG) corrective regimen sliding scale   SubCutaneous three times a day before meals  insulin lispro (ADMELOG) corrective regimen sliding scale   SubCutaneous at bedtime  levothyroxine 25 MICROGram(s) Oral daily  metoprolol tartrate 12.5 milliGRAM(s) Oral two times a day  pantoprazole    Tablet 40 milliGRAM(s) Oral before breakfast  polyethylene glycol 3350 17 Gram(s) Oral daily  predniSONE   Tablet 40 milliGRAM(s) Oral daily  rOPINIRole 1 milliGRAM(s) Oral at bedtime  senna 2 Tablet(s) Oral at bedtime  spironolactone 25 milliGRAM(s) Oral daily      MEDICATIONS  (PRN):  acetaminophen     Tablet .. 650 milliGRAM(s) Oral every 6 hours PRN Temp greater or equal to 38C (100.4F), Mild Pain (1 - 3)  artificial tears (preservative free) Ophthalmic Solution 1 Drop(s) Both EYES three times a day PRN Dry Eyes  benzonatate 200 milliGRAM(s) Oral every 8 hours PRN Cough  dextrose Oral Gel 15 Gram(s) Oral once PRN Blood Glucose LESS THAN 70 milliGRAM(s)/deciliter  guaifenesin/dextromethorphan Oral Liquid 10 milliLiter(s) Oral every 6 hours PRN Cough      FAMILY HISTORY:  FHx: diabetes mellitus (Father)    FHx: heart disease (Father)        SOCIAL HISTORY:  ***    REVIEW OF SYSTEM:  Pertinent items are noted in HPI.    Vital Signs Last 24 Hrs  T(C): 36.6 (08 Feb 2025 07:23), Max: 36.6 (08 Feb 2025 07:23)  T(F): 97.9 (08 Feb 2025 07:23), Max: 97.9 (08 Feb 2025 07:23)  HR: 50 (08 Feb 2025 07:23) (50 - 82)  BP: 144/67 (08 Feb 2025 07:23) (136/51 - 144/67)  BP(mean): --  RR: 18 (08 Feb 2025 07:23) (18 - 18)  SpO2: 98% (08 Feb 2025 08:53) (95% - 100%)    Parameters below as of 08 Feb 2025 08:53  Patient On (Oxygen Delivery Method): nasal cannula    I&O's Detail    07 Feb 2025 07:01  -  08 Feb 2025 07:00  --------------------------------------------------------  IN:  Total IN: 0 mL    OUT:    Stool (mL): 1 mL    Voided (mL): 1000 mL  Total OUT: 1001 mL    Total NET: -1001 mL            PHYSICAL EXAM  General Appearance: cooperative, mild resp distress,   HEENT: PERRL, conjunctiva clear, EOM's intact,  Neck: Supple,  Lungs: Coarse lexi, mild exp wheeze  Heart: Regular rate and rhythm, S1, S2 normal,  Abdomen: Soft, non-tender, bowel sounds active   Extremities: no cyanosis or edema, no joint swelling  Neurologic: Alert and oriented X3     ECG:    LABS:                        10.9   14.71 )-----------( 544      ( 08 Feb 2025 06:27 )             34.4   02-08    137  |  103  |  32[H]  ----------------------------<  120[H]  4.5   |  34[H]  |  1.04    Ca    9.5      08 Feb 2025 06:27  Phos  2.5     02-07  Mg     2.7     02-07                            10.5   14.79 )-----------( 525      ( 03 Feb 2025 06:08 )             31.9     02-03    134[L]  |  101  |  34[H]  ----------------------------<  248[H]  3.9   |  27  |  1.41[H]    Ca    9.7      03 Feb 2025 06:08    TPro  7.3  /  Alb  2.5[L]  /  TBili  0.4  /  DBili  x   /  AST  14[L]  /  ALT  9[L]  /  AlkPhos  54  02-02        Lipid Panel  183  43  --  80      PT/INR - ( 02 Feb 2025 06:54 )   PT: 24.1 sec;   INR: 2.06 ratio         PTT - ( 02 Feb 2025 06:54 )  PTT:35.8 sec  Urinalysis Basic - ( 03 Feb 2025 06:08 )    Color: x / Appearance: x / SG: x / pH: x  Gluc: 248 mg/dL / Ketone: x  / Bili: x / Urobili: x   Blood: x / Protein: x / Nitrite: x   Leuk Esterase: x / RBC: x / WBC x   Sq Epi: x / Non Sq Epi: x / Bacteria: x      < from: CT Angio Chest PE Protocol w/ IV Cont (02.01.25 @ 19:34) >  IMPRESSION:  1. No evidence of pulmonary embolism.  2. The pulmonary arteries bilaterally dilated, suggestive of pulmonary   hypertension.  3. Mosaic attenuation throughout the lungs, likely due to small airways   or less likely vascular disease. There are also scattered areas of   interlobular septal thickening which could be due to edema or and   infectious or inflammatory process in the appropriate clinical setting.      < end of copied text >        RADIOLOGY & ADDITIONAL STUDIES:

## 2025-02-08 NOTE — PROGRESS NOTE ADULT - SUBJECTIVE AND OBJECTIVE BOX
Patient is a 89y old  Female who presents with a chief complaint of dyspnea (08 Feb 2025 09:17)      INTERVAL HPI/OVERNIGHT EVENTS: Afebrile NAD sleeping, arousable Reports she is feeling better , tolerating diet well     ROS : As above     MEDICATIONS  (STANDING):  albuterol/ipratropium for Nebulization 3 milliLiter(s) Nebulizer every 6 hours  apixaban 5 milliGRAM(s) Oral two times a day  aspirin  chewable 81 milliGRAM(s) Oral daily  aztreonam  IVPB 1000 milliGRAM(s) IV Intermittent every 8 hours  bisacodyl Suppository 10 milliGRAM(s) Rectal once  dextrose 5%. 1000 milliLiter(s) (50 mL/Hr) IV Continuous <Continuous>  dextrose 5%. 1000 milliLiter(s) (100 mL/Hr) IV Continuous <Continuous>  dextrose 50% Injectable 25 Gram(s) IV Push once  dextrose 50% Injectable 12.5 Gram(s) IV Push once  dextrose 50% Injectable 25 Gram(s) IV Push once  furosemide    Tablet 40 milliGRAM(s) Oral daily  gabapentin 100 milliGRAM(s) Oral daily  gabapentin 300 milliGRAM(s) Oral at bedtime  glucagon  Injectable 1 milliGRAM(s) IntraMuscular once  insulin lispro (ADMELOG) corrective regimen sliding scale   SubCutaneous three times a day before meals  insulin lispro (ADMELOG) corrective regimen sliding scale   SubCutaneous at bedtime  levothyroxine 25 MICROGram(s) Oral daily  metoprolol tartrate 12.5 milliGRAM(s) Oral two times a day  pantoprazole    Tablet 40 milliGRAM(s) Oral before breakfast  polyethylene glycol 3350 17 Gram(s) Oral daily  predniSONE   Tablet 40 milliGRAM(s) Oral daily  rOPINIRole 1 milliGRAM(s) Oral at bedtime  senna 2 Tablet(s) Oral at bedtime  spironolactone 25 milliGRAM(s) Oral daily    MEDICATIONS  (PRN):  acetaminophen     Tablet .. 650 milliGRAM(s) Oral every 6 hours PRN Temp greater or equal to 38C (100.4F), Mild Pain (1 - 3)  artificial tears (preservative free) Ophthalmic Solution 1 Drop(s) Both EYES three times a day PRN Dry Eyes  benzonatate 200 milliGRAM(s) Oral every 8 hours PRN Cough  dextrose Oral Gel 15 Gram(s) Oral once PRN Blood Glucose LESS THAN 70 milliGRAM(s)/deciliter  guaifenesin/dextromethorphan Oral Liquid 10 milliLiter(s) Oral every 6 hours PRN Cough      Allergies    flu vaccines (Other)  statins (Muscle Pain)  Macrodantin (Other)  nisoldipine (Unknown)  Repatha (Unknown)  tadalafil (Muscle Pain)  sulfa drugs (Other)  Motrin (Other)  Ceftin (Other (Moderate))    Intolerances          Vital Signs Last 24 Hrs  T(C): 36.6 (08 Feb 2025 07:23), Max: 36.6 (08 Feb 2025 07:23)  T(F): 97.9 (08 Feb 2025 07:23), Max: 97.9 (08 Feb 2025 07:23)  HR: 57 (08 Feb 2025 13:56) (50 - 62)  BP: 144/67 (08 Feb 2025 07:23) (136/51 - 144/67)  BP(mean): --  RR: 18 (08 Feb 2025 07:23) (18 - 18)  SpO2: 98% (08 Feb 2025 13:56) (96% - 100%)    Parameters below as of 08 Feb 2025 13:56  Patient On (Oxygen Delivery Method): nasal cannula        PHYSICAL EXAM:  GENERAL: NAD  HEENT:  anicteric, moist mucous membranes  CHEST/LUNG:  decrease breath sounds at the base  HEART:  RRR, S1, S2  ABDOMEN:  BS+, soft, nontender, nondistended  EXTREMITIES: no edema, cyanosis, or calf tenderness  NERVOUS SYSTEM: answers questions and follows commands appropriately    LABS:                        10.9   14.71 )-----------( 544      ( 08 Feb 2025 06:27 )             34.4     CBC Full  -  ( 08 Feb 2025 06:27 )  WBC Count : 14.71 K/uL  Hemoglobin : 10.9 g/dL  Hematocrit : 34.4 %  Platelet Count - Automated : 544 K/uL  Mean Cell Volume : 92.5 fl  Mean Cell Hemoglobin : 29.3 pg  Mean Cell Hemoglobin Concentration : 31.7 g/dL  Auto Neutrophil # : x  Auto Lymphocyte # : x  Auto Monocyte # : x  Auto Eosinophil # : x  Auto Basophil # : x  Auto Neutrophil % : x  Auto Lymphocyte % : x  Auto Monocyte % : x  Auto Eosinophil % : x  Auto Basophil % : x    08 Feb 2025 06:27    137    |  103    |  32     ----------------------------<  120    4.5     |  34     |  1.04     Ca    9.5        08 Feb 2025 06:27        Urinalysis Basic - ( 08 Feb 2025 06:27 )    Color: x / Appearance: x / SG: x / pH: x  Gluc: 120 mg/dL / Ketone: x  / Bili: x / Urobili: x   Blood: x / Protein: x / Nitrite: x   Leuk Esterase: x / RBC: x / WBC x   Sq Epi: x / Non Sq Epi: x / Bacteria: x      CAPILLARY BLOOD GLUCOSE      POCT Blood Glucose.: 143 mg/dL (08 Feb 2025 13:04)  POCT Blood Glucose.: 98 mg/dL (08 Feb 2025 08:19)  POCT Blood Glucose.: 242 mg/dL (07 Feb 2025 21:56)  POCT Blood Glucose.: 237 mg/dL (07 Feb 2025 18:15)        Culture - Blood (collected 02-03-25 @ 08:28)  Source: .Blood BLOOD  Final Report (02-08-25 @ 13:00):    No growth at 5 days    Urinalysis with Rflx Culture (collected 02-01-25 @ 19:20)        RADIOLOGY & ADDITIONAL TESTS:    Personally reviewed.     Consultant(s) Notes Reviewed:  [x] YES  [ ] NO

## 2025-02-09 LAB
GLUCOSE BLDC GLUCOMTR-MCNC: 126 MG/DL — HIGH (ref 70–99)
GLUCOSE BLDC GLUCOMTR-MCNC: 164 MG/DL — HIGH (ref 70–99)
GLUCOSE BLDC GLUCOMTR-MCNC: 210 MG/DL — HIGH (ref 70–99)
GLUCOSE BLDC GLUCOMTR-MCNC: 216 MG/DL — HIGH (ref 70–99)
HCT VFR BLD CALC: 35.5 % — SIGNIFICANT CHANGE UP (ref 34.5–45)
HGB BLD-MCNC: 11.3 G/DL — LOW (ref 11.5–15.5)
MCHC RBC-ENTMCNC: 29.3 PG — SIGNIFICANT CHANGE UP (ref 27–34)
MCHC RBC-ENTMCNC: 31.8 G/DL — LOW (ref 32–36)
MCV RBC AUTO: 92 FL — SIGNIFICANT CHANGE UP (ref 80–100)
PLATELET # BLD AUTO: 534 K/UL — HIGH (ref 150–400)
RBC # BLD: 3.86 M/UL — SIGNIFICANT CHANGE UP (ref 3.8–5.2)
RBC # FLD: 14.4 % — SIGNIFICANT CHANGE UP (ref 10.3–14.5)
WBC # BLD: 13.26 K/UL — HIGH (ref 3.8–10.5)
WBC # FLD AUTO: 13.26 K/UL — HIGH (ref 3.8–10.5)

## 2025-02-09 PROCEDURE — 99233 SBSQ HOSP IP/OBS HIGH 50: CPT

## 2025-02-09 RX ORDER — PREDNISONE 5 MG/1
1 TABLET ORAL
Qty: 5 | Refills: 0
Start: 2025-02-09 | End: 2025-02-13

## 2025-02-09 RX ORDER — POLYETHYLENE GLYCOL 3350 17 G/17G
17 POWDER, FOR SOLUTION ORAL
Qty: 0 | Refills: 0 | DISCHARGE
Start: 2025-02-09

## 2025-02-09 RX ORDER — SENNOSIDES 8.6 MG
2 TABLET ORAL
Qty: 0 | Refills: 0 | DISCHARGE
Start: 2025-02-09

## 2025-02-09 RX ORDER — CARVEDILOL 6.25 MG
6.25 TABLET ORAL ONCE
Refills: 0 | Status: COMPLETED | OUTPATIENT
Start: 2025-02-09 | End: 2025-02-09

## 2025-02-09 RX ORDER — PANTOPRAZOLE 20 MG/1
1 TABLET, DELAYED RELEASE ORAL
Qty: 0 | Refills: 0 | DISCHARGE
Start: 2025-02-09

## 2025-02-09 RX ADMIN — IPRATROPIUM BROMIDE AND ALBUTEROL SULFATE 3 MILLILITER(S): .5; 2.5 SOLUTION RESPIRATORY (INHALATION) at 19:42

## 2025-02-09 RX ADMIN — PREDNISONE 40 MILLIGRAM(S): 5 TABLET ORAL at 09:12

## 2025-02-09 RX ADMIN — Medication 40 MILLIGRAM(S): at 09:12

## 2025-02-09 RX ADMIN — Medication 4: at 17:02

## 2025-02-09 RX ADMIN — IPRATROPIUM BROMIDE AND ALBUTEROL SULFATE 3 MILLILITER(S): .5; 2.5 SOLUTION RESPIRATORY (INHALATION) at 01:42

## 2025-02-09 RX ADMIN — ROPINIROLE 1 MILLIGRAM(S): 0.5 TABLET, FILM COATED ORAL at 22:35

## 2025-02-09 RX ADMIN — GABAPENTIN 100 MILLIGRAM(S): 800 TABLET ORAL at 09:12

## 2025-02-09 RX ADMIN — Medication 50 MILLIGRAM(S): at 22:35

## 2025-02-09 RX ADMIN — APIXABAN 5 MILLIGRAM(S): 5 TABLET, FILM COATED ORAL at 09:12

## 2025-02-09 RX ADMIN — Medication 6.25 MILLIGRAM(S): at 10:43

## 2025-02-09 RX ADMIN — Medication 50 MILLIGRAM(S): at 06:14

## 2025-02-09 RX ADMIN — LEVOTHYROXINE SODIUM 25 MICROGRAM(S): 25 TABLET ORAL at 06:14

## 2025-02-09 RX ADMIN — Medication 25 MILLIGRAM(S): at 09:12

## 2025-02-09 RX ADMIN — Medication 50 MILLIGRAM(S): at 13:34

## 2025-02-09 RX ADMIN — ASPIRIN 81 MILLIGRAM(S): 81 TABLET, COATED ORAL at 06:14

## 2025-02-09 RX ADMIN — POLYETHYLENE GLYCOL 3350 17 GRAM(S): 17 POWDER, FOR SOLUTION ORAL at 09:12

## 2025-02-09 RX ADMIN — GABAPENTIN 300 MILLIGRAM(S): 800 TABLET ORAL at 22:28

## 2025-02-09 RX ADMIN — APIXABAN 5 MILLIGRAM(S): 5 TABLET, FILM COATED ORAL at 22:28

## 2025-02-09 RX ADMIN — Medication 2 TABLET(S): at 22:28

## 2025-02-09 RX ADMIN — PANTOPRAZOLE 40 MILLIGRAM(S): 20 TABLET, DELAYED RELEASE ORAL at 06:15

## 2025-02-09 RX ADMIN — Medication 2: at 11:55

## 2025-02-09 NOTE — PROGRESS NOTE ADULT - ASSESSMENT
*Acute on chronic hypoxic respiratory failure + Sepsis 2/2 HCAP/gram negative PNA  *COPD Exacerbation   *Acute on chronic diastolic CHF  *valvular abnormalities ( episode of  Wide complex tachycardia)   *H/o severe pulm HTN   # RBBB  - on 3 L NC at baseline   -CT: The pulmonary arteries B/L dilated, suggestive of pulmonary HTN. Mosaic attenuation throughout the lungs, likely due to small airways or less likely vascular disease. There are also scattered areas of interlobular septal thickening which could be due to edema or and infectious or inflammatory process in the appropriate clinical setting. borderline enlarged pretracheal node measuring 1.0 cm   - CXR 2/8 Bilateral streaky lung opacities may represent pulmonary interstitial edema and/or scattered areas of linear atelectasis, similar to prior exam.  - EKG Sinus bradycardia 58bpm RBBB ( No changes from previous EKG )  - Leukocytosis 2/2 steroids   - REPEAT  blood cx NGTD 2/3  - on Aztreonam  - Duoneb PRN  - prednisone BID IV--> switched to PO daily   -C/w IV lasix + aldactone (will need to watch creatinine closely)  -I/Os + Daily weights  - On BB   - pulmonary and cardiology following       #  ANNALEE-- RESOLVED   -Patient on diuretics, will need to monitor closely  -follow Nephro consult     *Hyponatremia-- > RESOLVED     *Thrombocytosis  -F/u outpatient heme for further evaluation     *H/o HTN / dyslipidemia / CVA / paroxysmal a-fib / CAD / PE / factor V leiden  -C/w home meds and f/u outpatient for further management if conditions remain stable during hospitalization     *DVT ppx  -On Eliquis       DISPO: AANBELLA Cash sent to [facility] am    -REPEAT  blood cx NGTD 2/3 will switch Azactam switched to Moxifloxacin 400mg q24 to complete 7-day course  updated daughter Tonia

## 2025-02-09 NOTE — PROGRESS NOTE ADULT - SUBJECTIVE AND OBJECTIVE BOX
Patient is a 89y old  Female who presents with a chief complaint of dyspnea (09 Feb 2025 11:25)      INTERVAL HPI/OVERNIGHT EVENTS: afebrile, reports she is better. Talked with daughter margarita dillard, pending placement at [facility]     MEDICATIONS  (STANDING):  albuterol/ipratropium for Nebulization 3 milliLiter(s) Nebulizer every 6 hours  apixaban 5 milliGRAM(s) Oral two times a day  aspirin  chewable 81 milliGRAM(s) Oral daily  aztreonam  IVPB 1000 milliGRAM(s) IV Intermittent every 8 hours  dextrose 5%. 1000 milliLiter(s) (50 mL/Hr) IV Continuous <Continuous>  dextrose 5%. 1000 milliLiter(s) (100 mL/Hr) IV Continuous <Continuous>  dextrose 50% Injectable 25 Gram(s) IV Push once  dextrose 50% Injectable 12.5 Gram(s) IV Push once  dextrose 50% Injectable 25 Gram(s) IV Push once  furosemide    Tablet 40 milliGRAM(s) Oral daily  gabapentin 100 milliGRAM(s) Oral daily  gabapentin 300 milliGRAM(s) Oral at bedtime  glucagon  Injectable 1 milliGRAM(s) IntraMuscular once  insulin lispro (ADMELOG) corrective regimen sliding scale   SubCutaneous three times a day before meals  insulin lispro (ADMELOG) corrective regimen sliding scale   SubCutaneous at bedtime  levothyroxine 25 MICROGram(s) Oral daily  metoprolol tartrate 12.5 milliGRAM(s) Oral two times a day  pantoprazole    Tablet 40 milliGRAM(s) Oral before breakfast  polyethylene glycol 3350 17 Gram(s) Oral daily  predniSONE   Tablet 40 milliGRAM(s) Oral daily  rOPINIRole 1 milliGRAM(s) Oral at bedtime  senna 2 Tablet(s) Oral at bedtime  spironolactone 25 milliGRAM(s) Oral daily    MEDICATIONS  (PRN):  acetaminophen     Tablet .. 650 milliGRAM(s) Oral every 6 hours PRN Temp greater or equal to 38C (100.4F), Mild Pain (1 - 3)  artificial tears (preservative free) Ophthalmic Solution 1 Drop(s) Both EYES three times a day PRN Dry Eyes  benzonatate 200 milliGRAM(s) Oral every 8 hours PRN Cough  dextrose Oral Gel 15 Gram(s) Oral once PRN Blood Glucose LESS THAN 70 milliGRAM(s)/deciliter  guaifenesin/dextromethorphan Oral Liquid 10 milliLiter(s) Oral every 6 hours PRN Cough      Allergies    flu vaccines (Other)  statins (Muscle Pain)  Macrodantin (Other)  nisoldipine (Unknown)  Repatha (Unknown)  tadalafil (Muscle Pain)  sulfa drugs (Other)  Motrin (Other)  Ceftin (Other (Moderate))    Intolerances        REVIEW OF SYSTEMS:  CONSTITUTIONAL: No fever or chills  HEENT:  No headache, no sore throat  RESPIRATORY: No cough, wheezing, or shortness of breath  CARDIOVASCULAR: No chest pain, palpitations  GASTROINTESTINAL: No abd pain, nausea, vomiting, or diarrhea  GENITOURINARY: No dysuria, frequency, or hematuria  NEUROLOGICAL: no focal weakness or dizziness  MUSCULOSKELETAL: no myalgias     Vital Signs Last 24 Hrs  T(C): 36.2 (09 Feb 2025 07:43), Max: 36.9 (08 Feb 2025 15:51)  T(F): 97.2 (09 Feb 2025 07:43), Max: 98.4 (08 Feb 2025 15:51)  HR: 55 (09 Feb 2025 07:43) (55 - 68)  BP: 133/52 (09 Feb 2025 07:43) (124/66 - 152/58)  BP(mean): --  RR: 18 (09 Feb 2025 07:43) (18 - 18)  SpO2: 100% (09 Feb 2025 07:43) (94% - 100%)    Parameters below as of 09 Feb 2025 07:43  Patient On (Oxygen Delivery Method): nasal cannula        PHYSICAL EXAM:  GENERAL: NAD  HEENT:  anicteric, moist mucous membranes  CHEST/LUNG: decrease breath sounds at the bases   HEART:  RRR, S1, S2  ABDOMEN:  BS+, soft, nontender, nondistended  EXTREMITIES: no edema, cyanosis, or calf tenderness  NERVOUS SYSTEM: answers questions and follows commands appropriately    LABS:                        11.3   13.26 )-----------( 534      ( 09 Feb 2025 06:26 )             35.5     CBC Full  -  ( 09 Feb 2025 06:26 )  WBC Count : 13.26 K/uL  Hemoglobin : 11.3 g/dL  Hematocrit : 35.5 %  Platelet Count - Automated : 534 K/uL  Mean Cell Volume : 92.0 fl  Mean Cell Hemoglobin : 29.3 pg  Mean Cell Hemoglobin Concentration : 31.8 g/dL  Auto Neutrophil # : x  Auto Lymphocyte # : x  Auto Monocyte # : x  Auto Eosinophil # : x  Auto Basophil # : x  Auto Neutrophil % : x  Auto Lymphocyte % : x  Auto Monocyte % : x  Auto Eosinophil % : x  Auto Basophil % : x      Ca    9.5        08 Feb 2025 06:27        Urinalysis Basic - ( 08 Feb 2025 06:27 )    Color: x / Appearance: x / SG: x / pH: x  Gluc: 120 mg/dL / Ketone: x  / Bili: x / Urobili: x   Blood: x / Protein: x / Nitrite: x   Leuk Esterase: x / RBC: x / WBC x   Sq Epi: x / Non Sq Epi: x / Bacteria: x      CAPILLARY BLOOD GLUCOSE      POCT Blood Glucose.: 164 mg/dL (09 Feb 2025 11:36)  POCT Blood Glucose.: 126 mg/dL (09 Feb 2025 07:33)  POCT Blood Glucose.: 194 mg/dL (08 Feb 2025 22:15)  POCT Blood Glucose.: 121 mg/dL (08 Feb 2025 17:01)        Culture - Blood (collected 02-03-25 @ 08:28)  Source: .Blood BLOOD  Final Report (02-08-25 @ 13:00):    No growth at 5 days        RADIOLOGY & ADDITIONAL TESTS:  < from: Xray Chest 1 View- PORTABLE-Routine (Xray Chest 1 View- PORTABLE-Routine .) (02.08.25 @ 10:01) >    IMPRESSION:    Bilateral streaky lung opacities may represent pulmonary interstitial   edema and/or scattered areas of linear atelectasis, similar to prior exam.    --- End of Report ---            JACINTO MAHARAJ MD; Attending Radiologist  This document has been electronically signed. Feb 9 2025 12:47PM    < end of copied text >    Personally reviewed.     Consultant(s) Notes Reviewed:  [x] YES  [ ] NO

## 2025-02-09 NOTE — DISCHARGE NOTE PROVIDER - NSDCCPCAREPLAN_GEN_ALL_CORE_FT
PRINCIPAL DISCHARGE DIAGNOSIS  Diagnosis: Acute respiratory failure with hypoxia  Assessment and Plan of Treatment: You were admitted to the hospital because you were found to have pneumonia, or an infection of the lung.    While here, you were treated with antibiotics and your blood oxygen levels were constantly monitored. On discharge, your pneumonia was found to be adequately treated. It is important that after discharge, you follow up with your primary care physician within a week to inform them about this hospitalization.        SECONDARY DISCHARGE DIAGNOSES  Diagnosis: ANNALEE (acute kidney injury)  Assessment and Plan of Treatment: Your kidneys act as your body's blood filter. Sometimes, in states of dehydration, some of the kidney cells do not receive appropriate blood flow - in these situations, the kidney will become injured and have a decrease in function. Your acute kidney injury (ANNALEE) was treated with appropriate rehydration, avoidance of medications that would injure the kidney, and with constant monitoring of blood tests that tell us about your kidneys. At discharge, your kidney injury has resolved. It is important that when you leave the hospital you inform your primary care doctor about the issue and follow up with the doctor within one week of discharge.      Diagnosis: Hyponatremia  Assessment and Plan of Treatment: resolved    Diagnosis: H/O thrombocytosis  Assessment and Plan of Treatment: -F/u outpatient heme for further evaluation    Diagnosis: HTN (hypertension)  Assessment and Plan of Treatment: Hypertension, or high blood pressure, is a condition where your blood pressures are too high. Over time, this can lead to problems with your heart, kidney, blood vessels, and other organs. Uncontrolled hypertension can also lead to major adverse events like strokes and bleeds. During your hospitalization, your blood pressures were constantly monitored and treated with medication. It is very important that you continue your medications as prescribed and make a follow up appointment with your primary care physician to address changes made to your regimen as soon as you leave the hospital.      Diagnosis: Chronic atrial fibrillation  Assessment and Plan of Treatment: Atrial fibrillation is a condition where the different parts of the heart do not beat in time with each other. These changes can lead to serious issues, including clots that form in the heart and changes in the heart rate and rhythm where your heart might beat too fast. It is important that you take your prescribed medications as instructed to avoid clots forming and to make sure your heart does not beat too fast. It is also very important that you follow up with your outpatient cardiologist after discharge within a week to make sure your medications are working effectively.      Diagnosis: HLD (hyperlipidemia)  Assessment and Plan of Treatment: Hyperlipidemia, or high cholesterol, is a condition where the levels of fat and/or cholesterol in your blood are high. These elevated blood levels are dangerous because they can lead to issues like heart attacks. It is important that when you leave the hospital you take all your medications as prescribed to control your blood cholesterol levels.      Diagnosis: Acute on chronic diastolic congestive heart failure  Assessment and Plan of Treatment: Congestive heart failure is a chronic condition in which the heart has trouble pumping blood. In some cases of heart failure, fluid may back up into your lungs or you may have swelling (edema) in your lower legs. There are many causes of heart failure including high blood pressure, coronary artery disease, abnormal heart valves, heart muscle disease, lung disease, diabetes, etc. Symptoms include shortness of breath with activity or when lying flat, cough, swelling of the legs, fatigue, or increased urination during the night.   Treatment is aimed at managing the symptoms of heart failure and may include lifestyle changes, medications, or surgical procedures. Continue to take lasix and aldactone . Eat heart-healthy foods with low or no trans/saturated fats, cholesterol and salt. Weigh yourself every day for early recognition of fluid accumulation.  SEEK IMMEDIATE MEDICAL CARE IF YOU HAVE ANY OF THE FOLLOWING SYMPTOMS: shortness of breath, change in mental status, chest pain, lightheadedness/dizziness/fainting, or worsening of symptoms including not being able to conduct normal physical activity.      Diagnosis: H/O pulmonary hypertension  Assessment and Plan of Treatment:      PRINCIPAL DISCHARGE DIAGNOSIS  Diagnosis: Acute respiratory failure with hypoxia  Assessment and Plan of Treatment: You were admitted to the hospital because you were hypoxic requiring oxygen, multifactorial likely due to   *COPD Exacerbation   *Acute on chronic diastolic CHF  *valvular abnormalities  *H/o severe pulm HTN  - on 3 L NC at baseline   -CT: The pulmonary arteries B/L dilated, suggestive of pulmonary HTN. Mosaic attenuation throughout the lungs, likely due to small airways or less likely vascular disease. There are also scattered areas of interlobular septal thickening which could be due to edema or and infectious or inflammatory process in the appropriate clinical setting. borderline enlarged pretracheal node measuring 1.0 cm   - CXR 2/8 Bilateral streaky lung opacities may represent pulmonary interstitial edema and/or scattered areas of linear atelectasis, similar to prior exam.  - EKG Sinus bradycardia 58bpm RBBB ( No changes from previous EKG )  - Leukocytosis 2/2 steroids   - REPEAT  blood cx NGTD 2/3  - on Aztreonam switched to moxifloxacin for 7 days  - Duoneb PRN  - please take prednisone once daily as prescribed   -Continue home lasix + aldactone and BB   While here, you were treated with antibiotics and your blood oxygen levels were constantly monitored. On discharge. It is important that after discharge, you follow up with your primary care physician within a week to inform them about this hospitalization.        SECONDARY DISCHARGE DIAGNOSES  Diagnosis: ANNALEE (acute kidney injury)  Assessment and Plan of Treatment: Your kidneys act as your body's blood filter. Sometimes, in states of dehydration, some of the kidney cells do not receive appropriate blood flow - in these situations, the kidney will become injured and have a decrease in function. Your acute kidney injury (ANNALEE) was treated with appropriate rehydration, avoidance of medications that would injure the kidney, and with constant monitoring of blood tests that tell us about your kidneys. At discharge, your kidney injury has resolved. It is important that when you leave the hospital you inform your primary care doctor about the issue and follow up with the doctor within one week of discharge.      Diagnosis: Chronic atrial fibrillation  Assessment and Plan of Treatment: Atrial fibrillation is a condition where the different parts of the heart do not beat in time with each other. These changes can lead to serious issues, including clots that form in the heart and changes in the heart rate and rhythm where your heart might beat too fast. It is important that you take your prescribed medications as instructed to avoid clots forming and to make sure your heart does not beat too fast. It is also very important that you follow up with your outpatient cardiologist after discharge within a week to make sure your medications are working effectively.  you have bradycardic episodes with paroxysmal Afib   you were seen by cardiologist   EKG compared with previous same   please follow with cardiologist with in 1 week of discharge      Diagnosis: Hyponatremia  Assessment and Plan of Treatment: resolved    Diagnosis: H/O thrombocytosis  Assessment and Plan of Treatment: -F/u outpatient heme for further evaluation    Diagnosis: HTN (hypertension)  Assessment and Plan of Treatment: Hypertension, or high blood pressure, is a condition where your blood pressures are too high. Over time, this can lead to problems with your heart, kidney, blood vessels, and other organs. Uncontrolled hypertension can also lead to major adverse events like strokes and bleeds. During your hospitalization, your blood pressures were constantly monitored and treated with medication. It is very important that you continue your medications as prescribed and make a follow up appointment with your primary care physician to address changes made to your regimen as soon as you leave the hospital.      Diagnosis: HLD (hyperlipidemia)  Assessment and Plan of Treatment: Hyperlipidemia, or high cholesterol, is a condition where the levels of fat and/or cholesterol in your blood are high. These elevated blood levels are dangerous because they can lead to issues like heart attacks. It is important that when you leave the hospital you take all your medications as prescribed to control your blood cholesterol levels.      Diagnosis: Acute on chronic diastolic congestive heart failure  Assessment and Plan of Treatment: Congestive heart failure is a chronic condition in which the heart has trouble pumping blood. In some cases of heart failure, fluid may back up into your lungs or you may have swelling (edema) in your lower legs. There are many causes of heart failure including high blood pressure, coronary artery disease, abnormal heart valves, heart muscle disease, lung disease, diabetes, etc. Symptoms include shortness of breath with activity or when lying flat, cough, swelling of the legs, fatigue, or increased urination during the night.   Treatment is aimed at managing the symptoms of heart failure and may include lifestyle changes, medications, or surgical procedures. Continue to take lasix and aldactone . Eat heart-healthy foods with low or no trans/saturated fats, cholesterol and salt. Weigh yourself every day for early recognition of fluid accumulation.  SEEK IMMEDIATE MEDICAL CARE IF YOU HAVE ANY OF THE FOLLOWING SYMPTOMS: shortness of breath, change in mental status, chest pain, lightheadedness/dizziness/fainting, or worsening of symptoms including not being able to conduct normal physical activity.      Diagnosis: H/O pulmonary hypertension  Assessment and Plan of Treatment: please follow with primary care physician and cardiologist with in 1 week of discharge

## 2025-02-09 NOTE — DISCHARGE NOTE PROVIDER - CARE PROVIDER_API CALL
[facility]  Internal Medicine  [address] [place] [address], [address]  [place] [address]  Phone: [phone]  Fax: [phone]  Established Patient  Follow Up Time: 1-3 days

## 2025-02-09 NOTE — DISCHARGE NOTE PROVIDER - NSDCFUADDAPPT_GEN_ALL_CORE_FT
APPTS ARE READY TO BE MADE: [X] YES    Best Family or Patient Contact (if needed):    Additional Information about above appointments (if needed):    1:irving douglas  2:  3:  APPTS ARE READY TO BE MADE: [X] YES    Best Family or Patient Contact (if needed):    Additional Information about above appointments (if needed):    1:irving cole  2: Ramsey Feng (DO)  pulmonologist   3. Zeny Forte (DO) cardiologist    APPTS ARE READY TO BE MADE: [X] YES    Best Family or Patient Contact (if needed):    Additional Information about above appointments (if needed):    1:irving cole  2: Ramsey Feng ()  pulmonologist   3. Zeny Forte () cardiologist     Patient is being discharged to [facility]. Caregiver will arrange follow up.

## 2025-02-09 NOTE — PROGRESS NOTE ADULT - ASSESSMENT
1) Pneumonia  2) Dyspnea   3) PH  4) Hypoxemia  5) Abnormal CT Chest     88 y/o F w/ PMH of HTN, dyslipidemia, CVA, chronic diastolic CHF, paroxysmal a-fib s/p ablation (on AC), CAD s/p PCI, COPD (on 3L home O2 at night), severe pulm HTN, Mitral valve regurg, PE, Factor V leiden, hypothyroidism, p/w dyspnea. Patient states that she has chronic dyspnea 2/2 COPD. However, SOB has been getting worse and is associated with wheezing. Patient states she also has a cough at baseline, but cough has also gotten worse and is productive of clear sputum. States she had elevated temperatures of ~99. Patient was at urgent care w/ low O2 saturations.     CT Patent central airways. Multifocal ground glass opacities with areas of interlobular septal thickening and superimposed   atelectasis with a somewhat mosaic attenuation.  History of Pulmonary Hypertension/COPD  On O2 , 3L   Currently on Vancomycin/Aztreonam  Diuresis for PH  FOllow up Cardiology recommendations  reviewed CT Chest   O2  Will continue to monitor     seen and examined  covering for Dr Sinclair  bronchospasm with suspected mucus plugging  re eval with cxr after bronchodilator rx  cxr requested

## 2025-02-09 NOTE — DISCHARGE NOTE PROVIDER - HOSPITAL COURSE
HPI:    89 y/o F w/ PMH of HTN, dyslipidemia, CVA, chronic diastolic CHF, paroxysmal a-fib s/p ablation (on AC), CAD s/p PCI, COPD (on 3L home O2 at night), severe pulm HTN, Mitral valve regurg, PE, Factor V leiden, hypothyroidism, p/w dyspnea. Patient states that she has chronic dyspnea 2/2 COPD. However, SOB has been getting worse and is associated with wheezing. Patient states she also has a cough at baseline, but cough has also gotten worse and is productive of clear sputum. States she had elevated temperatures of ~99. Patient was at urgent care w/ low O2 saturations.         PSH: PCI     Social Hx: Denies tobacco / etoh / drugs     Family Hx: Denies pertinent hx    (02 Feb 2025 02:34)      ---  HOSPITAL COURSE:   *Acute on chronic hypoxic respiratory failure + Sepsis 2/2 HCAP/gram negative PNA  *COPD Exacerbation   *Acute on chronic diastolic CHF  *valvular abnormalities ( episode of  Wide complex tachycardia)   *H/o severe pulm HTN   # RBBB  - on 3 L NC at baseline   -CT: The pulmonary arteries B/L dilated, suggestive of pulmonary HTN. Mosaic attenuation throughout the lungs, likely due to small airways or less likely vascular disease. There are also scattered areas of interlobular septal thickening which could be due to edema or and infectious or inflammatory process in the appropriate clinical setting. borderline enlarged pretracheal node measuring 1.0 cm   - CXR 2/8 Bilateral streaky lung opacities may represent pulmonary interstitial edema and/or scattered areas of linear atelectasis, similar to prior exam.  - EKG Sinus bradycardia 58bpm RBBB ( No changes from previous EKG )  - Leukocytosis 2/2 steroids   - REPEAT  blood cx NGTD 2/3  - on Aztreonam  - Duoneb PRN  - prednisone BID IV--> switched to PO daily   -C/w IV lasix + aldactone (will need to watch creatinine closely)  -I/Os + Daily weights  - On BB   - pulmonary and cardiology following       #  ANNALEE-- RESOLVED   -Patient on diuretics, will need to monitor closely  -follow Nephro consult     *Hyponatremia-- > RESOLVED     *Thrombocytosis  -F/u outpatient heme for further evaluation     *H/o HTN / dyslipidemia / CVA / paroxysmal a-fib / CAD / PE / factor V leiden  -C/w home meds and f/u outpatient for further management if conditions remain stable during hospitalization     *DVT ppx  -On Eliquis       DISPO: ANABELLA Cash sent to [facility] [place] am    -REPEAT  blood cx NGTD 2/3 will switch Azactam switched to Moxifloxacin 400mg q24 to complete 7-day course  updated daughter Tonia

## 2025-02-09 NOTE — PROGRESS NOTE ADULT - SUBJECTIVE AND OBJECTIVE BOX
Patient is a 89y old  Female who presents with a chief complaint of dyspnea (03 Feb 2025 14:41)      HPI:    89 y/o F w/ PMH of HTN, dyslipidemia, CVA, chronic diastolic CHF, paroxysmal a-fib s/p ablation (on AC), CAD s/p PCI, COPD (on 3L home O2 at night), severe pulm HTN, Mitral valve regurg, PE, Factor V leiden, hypothyroidism, p/w dyspnea. Patient states that she has chronic dyspnea 2/2 COPD. However, SOB has been getting worse and is associated with wheezing. Patient states she also has a cough at baseline, but cough has also gotten worse and is productive of clear sputum. States she had elevated temperatures of ~99. Patient was at urgent care w/ low O2 saturations.     CT Patent central airways. Multifocal ground glass opacities with areas of interlobular septal thickening and superimposed   atelectasis with a somewhat mosaic attenuation.  History of Pulmonary Hypertension/COPD  On O2 , 3L       PSH: PCI     Social Hx: Denies tobacco / etoh / drugs     Family Hx: Denies pertinent hx    (02 Feb 2025 02:34)      PAST MEDICAL & SURGICAL HISTORY:  Atrial fibrillation      CHF (congestive heart failure)      Pulmonary emboli      Factor 5 Leiden mutation, heterozygous      CAD (coronary artery disease)      Stented coronary artery      Hypothyroid      COPD (chronic obstructive pulmonary disease)      Mitral regurgitation      HTN (hypertension)      H/O: hysterectomy      H/O knee surgery      Cyst of breast, unspecified laterality  S/P excision      H/O cardiac radiofrequency ablation      S/P ablation of atrial fibrillation          PREVIOUS DIAGNOSTIC TESTING:      MEDICATIONS  (STANDING):  albuterol/ipratropium for Nebulization 3 milliLiter(s) Nebulizer every 6 hours  apixaban 5 milliGRAM(s) Oral two times a day  aspirin  chewable 81 milliGRAM(s) Oral daily  aztreonam  IVPB 1000 milliGRAM(s) IV Intermittent every 8 hours  bisacodyl Suppository 10 milliGRAM(s) Rectal once  dextrose 5%. 1000 milliLiter(s) (100 mL/Hr) IV Continuous <Continuous>  dextrose 5%. 1000 milliLiter(s) (50 mL/Hr) IV Continuous <Continuous>  dextrose 50% Injectable 25 Gram(s) IV Push once  dextrose 50% Injectable 12.5 Gram(s) IV Push once  dextrose 50% Injectable 25 Gram(s) IV Push once  furosemide    Tablet 40 milliGRAM(s) Oral daily  gabapentin 100 milliGRAM(s) Oral daily  gabapentin 300 milliGRAM(s) Oral at bedtime  glucagon  Injectable 1 milliGRAM(s) IntraMuscular once  insulin lispro (ADMELOG) corrective regimen sliding scale   SubCutaneous three times a day before meals  insulin lispro (ADMELOG) corrective regimen sliding scale   SubCutaneous at bedtime  levothyroxine 25 MICROGram(s) Oral daily  metoprolol tartrate 12.5 milliGRAM(s) Oral two times a day  pantoprazole    Tablet 40 milliGRAM(s) Oral before breakfast  polyethylene glycol 3350 17 Gram(s) Oral daily  predniSONE   Tablet 40 milliGRAM(s) Oral daily  rOPINIRole 1 milliGRAM(s) Oral at bedtime  senna 2 Tablet(s) Oral at bedtime  spironolactone 25 milliGRAM(s) Oral daily      MEDICATIONS  (PRN):  acetaminophen     Tablet .. 650 milliGRAM(s) Oral every 6 hours PRN Temp greater or equal to 38C (100.4F), Mild Pain (1 - 3)  artificial tears (preservative free) Ophthalmic Solution 1 Drop(s) Both EYES three times a day PRN Dry Eyes  benzonatate 200 milliGRAM(s) Oral every 8 hours PRN Cough  dextrose Oral Gel 15 Gram(s) Oral once PRN Blood Glucose LESS THAN 70 milliGRAM(s)/deciliter  guaifenesin/dextromethorphan Oral Liquid 10 milliLiter(s) Oral every 6 hours PRN Cough      FAMILY HISTORY:  FHx: diabetes mellitus (Father)    FHx: heart disease (Father)        SOCIAL HISTORY:  ***    REVIEW OF SYSTEM:  Pertinent items are noted in HPI.    Vital Signs Last 24 Hrs  T(C): 36.2 (09 Feb 2025 07:43), Max: 36.9 (08 Feb 2025 15:51)  T(F): 97.2 (09 Feb 2025 07:43), Max: 98.4 (08 Feb 2025 15:51)  HR: 55 (09 Feb 2025 07:43) (55 - 68)  BP: 133/52 (09 Feb 2025 07:43) (124/66 - 152/58)  BP(mean): --  RR: 18 (09 Feb 2025 07:43) (18 - 18)  SpO2: 100% (09 Feb 2025 07:43) (94% - 100%)    Parameters below as of 09 Feb 2025 07:43  Patient On (Oxygen Delivery Method): nasal cannula              PHYSICAL EXAM  General Appearance: cooperative, mild resp distress,   HEENT: PERRL, conjunctiva clear, EOM's intact,  Neck: Supple,  Lungs: Coarse lexi, mild exp wheeze  Heart: Regular rate and rhythm, S1, S2 normal,  Abdomen: Soft, non-tender, bowel sounds active   Extremities: no cyanosis or edema, no joint swelling  Neurologic: Alert and oriented X3     ECG:    LABS:                        10.9   14.71 )-----------( 544      ( 08 Feb 2025 06:27 )             34.4   02-08    137  |  103  |  32[H]  ----------------------------<  120[H]  4.5   |  34[H]  |  1.04    Ca    9.5      08 Feb 2025 06:27  Phos  2.5     02-07  Mg     2.7     02-07                            10.5   14.79 )-----------( 525      ( 03 Feb 2025 06:08 )             31.9     02-03    134[L]  |  101  |  34[H]  ----------------------------<  248[H]  3.9   |  27  |  1.41[H]    Ca    9.7      03 Feb 2025 06:08    TPro  7.3  /  Alb  2.5[L]  /  TBili  0.4  /  DBili  x   /  AST  14[L]  /  ALT  9[L]  /  AlkPhos  54  02-02        Lipid Panel  183  43  --  80      PT/INR - ( 02 Feb 2025 06:54 )   PT: 24.1 sec;   INR: 2.06 ratio         PTT - ( 02 Feb 2025 06:54 )  PTT:35.8 sec  Urinalysis Basic - ( 03 Feb 2025 06:08 )    Color: x / Appearance: x / SG: x / pH: x  Gluc: 248 mg/dL / Ketone: x  / Bili: x / Urobili: x   Blood: x / Protein: x / Nitrite: x   Leuk Esterase: x / RBC: x / WBC x   Sq Epi: x / Non Sq Epi: x / Bacteria: x      < from: CT Angio Chest PE Protocol w/ IV Cont (02.01.25 @ 19:34) >  IMPRESSION:  1. No evidence of pulmonary embolism.  2. The pulmonary arteries bilaterally dilated, suggestive of pulmonary   hypertension.  3. Mosaic attenuation throughout the lungs, likely due to small airways   or less likely vascular disease. There are also scattered areas of   interlobular septal thickening which could be due to edema or and   infectious or inflammatory process in the appropriate clinical setting.      < end of copied text >        RADIOLOGY & ADDITIONAL STUDIES:

## 2025-02-09 NOTE — DISCHARGE NOTE PROVIDER - NSDCMRMEDTOKEN_GEN_ALL_CORE_FT
Albuterol (Eqv-ProAir HFA) 90 mcg/inh inhalation aerosol: 2 puff(s) inhaled every 6 hours as needed for  Aldactone 25 mg oral tablet: 1 tab(s) orally once a day  apixaban 5 mg oral tablet: 1 tab(s) orally 2 times a day  aspirin 81 mg oral tablet, chewable: 1 tab(s) orally once a day  Breztri Aerosphere 160 mcg-9 mcg-4.8 mcg/inh inhalation aerosol: 2 puff(s) inhaled 2 times a day  famotidine 20 mg oral tablet: 1 tab(s) orally once a day (at bedtime)  Lasix 40 mg oral tablet: 1 tab(s) orally once a day  levothyroxine 25 mcg (0.025 mg) oral tablet: 1 tab(s) orally once a day  Livalo 2 mg oral tablet: 1 tab(s) orally once a day  Metoprolol Tartrate 25 mg oral tablet: 0.5 tab(s) orally 2 times a day  Neurontin 100 mg oral capsule: 1 cap(s) orally once a day  Neurontin 300 mg oral capsule: 1 cap(s) orally once a day (at bedtime)  ocular lubricant preservative-free ophthalmic solution: 1 drop(s) to each affected eye 3 times a day As needed Dry Eyes  oseltamivir 75 mg oral capsule: 1 cap(s) orally 2 times a day for 5 days ***course not complete***  pantoprazole 40 mg oral delayed release tablet: 1 tab(s) orally once a day (before a meal)  polyethylene glycol 3350 oral powder for reconstitution: 17 gram(s) orally once a day  predniSONE 20 mg oral tablet: 1 tab(s) orally once a day  rOPINIRole 1 mg oral tablet: 1 tab(s) orally once a day (at bedtime)  senna leaf extract oral tablet: 2 tab(s) orally once a day (at bedtime)   Albuterol (Eqv-ProAir HFA) 90 mcg/inh inhalation aerosol: 2 puff(s) inhaled every 6 hours as needed for  Aldactone 25 mg oral tablet: 1 tab(s) orally once a day  apixaban 5 mg oral tablet: 1 tab(s) orally 2 times a day  aspirin 81 mg oral tablet, chewable: 1 tab(s) orally once a day  Breztri Aerosphere 160 mcg-9 mcg-4.8 mcg/inh inhalation aerosol: 2 puff(s) inhaled 2 times a day  famotidine 20 mg oral tablet: 1 tab(s) orally once a day (at bedtime)  Lasix 40 mg oral tablet: 0.5 tab(s) orally once a day  levothyroxine 25 mcg (0.025 mg) oral tablet: 1 tab(s) orally once a day  Livalo 2 mg oral tablet: 1 tab(s) orally once a day  Metoprolol Tartrate 25 mg oral tablet: 0.5 tab(s) orally 2 times a day  moxifloxacin 400 mg oral tablet: 1 tab(s) orally once a day  Neurontin 100 mg oral capsule: 1 cap(s) orally once a day  Neurontin 300 mg oral capsule: 1 cap(s) orally once a day (at bedtime)  ocular lubricant preservative-free ophthalmic solution: 1 drop(s) to each affected eye 3 times a day As needed Dry Eyes  pantoprazole 40 mg oral delayed release tablet: 1 tab(s) orally once a day (before a meal)  polyethylene glycol 3350 oral powder for reconstitution: 17 gram(s) orally once a day  predniSONE 20 mg oral tablet: 1 tab(s) orally once a day  rOPINIRole 1 mg oral tablet: 1 tab(s) orally once a day (at bedtime)  senna leaf extract oral tablet: 2 tab(s) orally once a day (at bedtime)

## 2025-02-10 ENCOUNTER — TRANSCRIPTION ENCOUNTER (OUTPATIENT)
Age: 89
End: 2025-02-10

## 2025-02-10 VITALS
OXYGEN SATURATION: 96 % | DIASTOLIC BLOOD PRESSURE: 49 MMHG | RESPIRATION RATE: 18 BRPM | HEART RATE: 63 BPM | SYSTOLIC BLOOD PRESSURE: 117 MMHG | TEMPERATURE: 98 F

## 2025-02-10 LAB
GLUCOSE BLDC GLUCOMTR-MCNC: 123 MG/DL — HIGH (ref 70–99)
GLUCOSE BLDC GLUCOMTR-MCNC: 258 MG/DL — HIGH (ref 70–99)
GLUCOSE BLDC GLUCOMTR-MCNC: 95 MG/DL — SIGNIFICANT CHANGE UP (ref 70–99)
MAGNESIUM SERPL-MCNC: 2.4 MG/DL — SIGNIFICANT CHANGE UP (ref 1.6–2.6)
PHOSPHATE SERPL-MCNC: 2.2 MG/DL — LOW (ref 2.5–4.5)
TROPONIN I, HIGH SENSITIVITY RESULT: 25.04 NG/L — SIGNIFICANT CHANGE UP

## 2025-02-10 PROCEDURE — 93010 ELECTROCARDIOGRAM REPORT: CPT

## 2025-02-10 PROCEDURE — 99239 HOSP IP/OBS DSCHRG MGMT >30: CPT

## 2025-02-10 RX ORDER — MOXIFLOXACIN HYDROCHLORIDE 400 MG/1
1 TABLET, FILM COATED ORAL
Qty: 7 | Refills: 0
Start: 2025-02-10 | End: 2025-02-16

## 2025-02-10 RX ADMIN — Medication 50 MILLIGRAM(S): at 05:41

## 2025-02-10 RX ADMIN — Medication 40 MILLIGRAM(S): at 10:26

## 2025-02-10 RX ADMIN — PREDNISONE 40 MILLIGRAM(S): 5 TABLET ORAL at 10:26

## 2025-02-10 RX ADMIN — PANTOPRAZOLE 40 MILLIGRAM(S): 20 TABLET, DELAYED RELEASE ORAL at 05:41

## 2025-02-10 RX ADMIN — POLYETHYLENE GLYCOL 3350 17 GRAM(S): 17 POWDER, FOR SOLUTION ORAL at 10:27

## 2025-02-10 RX ADMIN — GABAPENTIN 100 MILLIGRAM(S): 800 TABLET ORAL at 10:26

## 2025-02-10 RX ADMIN — Medication 25 MILLIGRAM(S): at 10:26

## 2025-02-10 RX ADMIN — IPRATROPIUM BROMIDE AND ALBUTEROL SULFATE 3 MILLILITER(S): .5; 2.5 SOLUTION RESPIRATORY (INHALATION) at 08:48

## 2025-02-10 RX ADMIN — APIXABAN 5 MILLIGRAM(S): 5 TABLET, FILM COATED ORAL at 10:26

## 2025-02-10 RX ADMIN — LEVOTHYROXINE SODIUM 25 MICROGRAM(S): 25 TABLET ORAL at 05:41

## 2025-02-10 RX ADMIN — Medication 6: at 17:45

## 2025-02-10 RX ADMIN — IPRATROPIUM BROMIDE AND ALBUTEROL SULFATE 3 MILLILITER(S): .5; 2.5 SOLUTION RESPIRATORY (INHALATION) at 01:48

## 2025-02-10 RX ADMIN — ASPIRIN 81 MILLIGRAM(S): 81 TABLET, COATED ORAL at 05:40

## 2025-02-10 RX ADMIN — Medication 50 MILLIGRAM(S): at 13:50

## 2025-02-10 NOTE — DISCHARGE NOTE NURSING/CASE MANAGEMENT/SOCIAL WORK - NSDCFUADDAPPT_GEN_ALL_CORE_FT
APPTS ARE READY TO BE MADE: [X] YES    Best Family or Patient Contact (if needed):    Additional Information about above appointments (if needed):    1:irving cole  2: Ramsey Feng (DO)  pulmonologist   3. Zeny Forte (DO) cardiologist

## 2025-02-10 NOTE — DISCHARGE NOTE NURSING/CASE MANAGEMENT/SOCIAL WORK - PATIENT PORTAL LINK FT
You can access the FollowMyHealth Patient Portal offered by [facility] by registering at the following website: [email or web address]. By joining [employer]’s FollowMyHealth portal, you will also be able to view your health information using other applications (apps) compatible with our system.

## 2025-02-10 NOTE — PROGRESS NOTE ADULT - PROVIDER SPECIALTY LIST ADULT
Cardiology
Cardiology
Hospitalist
Hospitalist
Pulmonology
Cardiology
Hospitalist
Infectious Disease
Infectious Disease
Pulmonology
Cardiology
Hospitalist
Pulmonology
Hospitalist

## 2025-02-10 NOTE — DISCHARGE NOTE NURSING/CASE MANAGEMENT/SOCIAL WORK - FINANCIAL ASSISTANCE
[facility] provides services at a reduced cost to those who are determined to be eligible through [facility]’s financial assistance program. Information regarding [facility]’s financial assistance program can be found by going to [email or web address] or by calling [phone].

## 2025-02-10 NOTE — PROVIDER CONTACT NOTE (CHANGE IN STATUS NOTIFICATION) - BACKGROUND
Pt here with c/o of cough and sob. admitted for acute resp fx w/hypoxia. On remote tele for COPD and CHF. Pt also has hx of afib.

## 2025-02-10 NOTE — PROVIDER CONTACT NOTE (CHANGE IN STATUS NOTIFICATION) - SITUATION
Pt bradycardia throughout the night. pulse between 55-60. Tele called to notify us of 2 tachycardic episodes both lasting less than 30 sec.

## 2025-02-10 NOTE — PROGRESS NOTE ADULT - ASSESSMENT
*Acute on chronic hypoxic respiratory failure + Sepsis 2/2 HCAP/gram negative PNA  *COPD Exacerbation   *Acute on chronic diastolic CHF  *valvular abnormalities ( episode of  Wide complex tachycardia)   *H/o severe pulm HTN   # RBBB  # Moderate protein-calorie malnutrition  - on 3 L NC at baseline   -CT: The pulmonary arteries B/L dilated, suggestive of pulmonary HTN. Mosaic attenuation throughout the lungs, likely due to small airways or less likely vascular disease. There are also scattered areas of interlobular septal thickening which could be due to edema or and infectious or inflammatory process in the appropriate clinical setting. borderline enlarged pretracheal node measuring 1.0 cm   - CXR 2/8 Bilateral streaky lung opacities may represent pulmonary interstitial edema and/or scattered areas of linear atelectasis, similar to prior exam.  - EKG Sinus bradycardia 58bpm RBBB ( No changes from previous EKG )  - Leukocytosis 2/2 steroids   - REPEAT  blood cx NGTD 2/3  - on Aztreonam Switched to moxifloxacin for 7 days   - Duoneb PRN  - prednisone BID IV--> switched to PO daily   -C/w IV lasix + aldactone (will need to watch creatinine closely)  -I/Os + Daily weights  - On BB   - pulmonary and cardiology following       #  ANNALEE-- RESOLVED   -Patient on diuretics, will need to monitor closely  -follow Nephro consult     *Hyponatremia-- > RESOLVED     *Thrombocytosis  -F/u outpatient heme for further evaluation     *H/o HTN / dyslipidemia / CVA / paroxysmal a-fib / CAD / PE / factor V leiden  -C/w home meds and f/u outpatient for further management if conditions remain stable during hospitalization     *DVT ppx  -On Eliquis       Dispo ANABELLA today

## 2025-02-10 NOTE — PROGRESS NOTE ADULT - REASON FOR ADMISSION
dyspnea

## 2025-02-10 NOTE — PROGRESS NOTE ADULT - ASSESSMENT
1) Pneumonia  2) Dyspnea   3) PH  4) Hypoxemia  5) Abnormal CT Chest     88 y/o F w/ PMH of HTN, dyslipidemia, CVA, chronic diastolic CHF, paroxysmal a-fib s/p ablation (on AC), CAD s/p PCI, COPD (on 3L home O2 at night), severe pulm HTN, Mitral valve regurg, PE, Factor V leiden, hypothyroidism, p/w dyspnea. Patient states that she has chronic dyspnea 2/2 COPD. However, SOB has been getting worse and is associated with wheezing. Patient states she also has a cough at baseline, but cough has also gotten worse and is productive of clear sputum. States she had elevated temperatures of ~99. Patient was at urgent care w/ low O2 saturations.     CT Patent central airways. Multifocal ground glass opacities with areas of interlobular septal thickening and superimposed   atelectasis with a somewhat mosaic attenuation.  History of Pulmonary Hypertension/COPD  On O2 , 3L   Currently on Vancomycin/Aztreonam  Diuresis for PH  FOllow up Cardiology recommendations  reviewed CT Chest   O2  Will continue to monitor     seen and examined  covering for Dr Sinclair  bronchospasm with suspected mucus plugging  re eval with cxr after bronchodilator rx  cxr requested    < from: Xray Chest 1 View- PORTABLE-Routine (Xray Chest 1 View- PORTABLE-Routine .) (02.08.25 @ 10:01) >  IMPRESSION:    Bilateral streaky lung opacities may represent pulmonary interstitial   edema and/or scattered areas of linear atelectasis, similar to prior exam.    < end of copied text >

## 2025-02-10 NOTE — PROGRESS NOTE ADULT - SUBJECTIVE AND OBJECTIVE BOX
Patient is a 89y old  Female who presents with a chief complaint of dyspnea (10 Feb 2025 09:35)      INTERVAL HPI/OVERNIGHT EVENTS: episode of paf  stat Trop negative , episodes of re  talked with cardio EKG same as previous EKG     MEDICATIONS  (STANDING):  albuterol/ipratropium for Nebulization 3 milliLiter(s) Nebulizer every 6 hours  apixaban 5 milliGRAM(s) Oral two times a day  aspirin  chewable 81 milliGRAM(s) Oral daily  aztreonam  IVPB 1000 milliGRAM(s) IV Intermittent every 8 hours  dextrose 5%. 1000 milliLiter(s) (100 mL/Hr) IV Continuous <Continuous>  dextrose 5%. 1000 milliLiter(s) (50 mL/Hr) IV Continuous <Continuous>  dextrose 50% Injectable 25 Gram(s) IV Push once  dextrose 50% Injectable 12.5 Gram(s) IV Push once  dextrose 50% Injectable 25 Gram(s) IV Push once  furosemide    Tablet 40 milliGRAM(s) Oral daily  gabapentin 100 milliGRAM(s) Oral daily  gabapentin 300 milliGRAM(s) Oral at bedtime  glucagon  Injectable 1 milliGRAM(s) IntraMuscular once  insulin lispro (ADMELOG) corrective regimen sliding scale   SubCutaneous three times a day before meals  insulin lispro (ADMELOG) corrective regimen sliding scale   SubCutaneous at bedtime  levothyroxine 25 MICROGram(s) Oral daily  metoprolol tartrate 12.5 milliGRAM(s) Oral two times a day  pantoprazole    Tablet 40 milliGRAM(s) Oral before breakfast  polyethylene glycol 3350 17 Gram(s) Oral daily  predniSONE   Tablet 40 milliGRAM(s) Oral daily  rOPINIRole 1 milliGRAM(s) Oral at bedtime  senna 2 Tablet(s) Oral at bedtime  spironolactone 25 milliGRAM(s) Oral daily    MEDICATIONS  (PRN):  acetaminophen     Tablet .. 650 milliGRAM(s) Oral every 6 hours PRN Temp greater or equal to 38C (100.4F), Mild Pain (1 - 3)  artificial tears (preservative free) Ophthalmic Solution 1 Drop(s) Both EYES three times a day PRN Dry Eyes  benzonatate 200 milliGRAM(s) Oral every 8 hours PRN Cough  dextrose Oral Gel 15 Gram(s) Oral once PRN Blood Glucose LESS THAN 70 milliGRAM(s)/deciliter  guaifenesin/dextromethorphan Oral Liquid 10 milliLiter(s) Oral every 6 hours PRN Cough      Allergies    flu vaccines (Other)  statins (Muscle Pain)  Macrodantin (Other)  nisoldipine (Unknown)  Repatha (Unknown)  tadalafil (Muscle Pain)  sulfa drugs (Other)  Motrin (Other)  Ceftin (Other (Moderate))    Intolerances        REVIEW OF SYSTEMS:  CONSTITUTIONAL: No fever or chills  HEENT:  No headache, no sore throat  RESPIRATORY: No cough, wheezing, or shortness of breath  CARDIOVASCULAR: No chest pain, palpitations  GASTROINTESTINAL: No abd pain, nausea, vomiting, or diarrhea  GENITOURINARY: No dysuria, frequency, or hematuria  NEUROLOGICAL: no focal weakness or dizziness  MUSCULOSKELETAL: no myalgias     Vital Signs Last 24 Hrs  T(C): 36.6 (10 Feb 2025 11:15), Max: 36.6 (10 Feb 2025 11:15)  T(F): 97.8 (10 Feb 2025 11:15), Max: 97.8 (10 Feb 2025 11:15)  HR: 55 (10 Feb 2025 11:15) (51 - 76)  BP: 142/45 (10 Feb 2025 11:15) (141/59 - 158/71)  BP(mean): --  RR: 18 (10 Feb 2025 11:15) (18 - 18)  SpO2: 95% (10 Feb 2025 11:15) (92% - 100%)    Parameters below as of 10 Feb 2025 11:15  Patient On (Oxygen Delivery Method): nasal cannula        PHYSICAL EXAM:  GENERAL: NAD  HEENT:  anicteric, moist mucous membranes  CHEST/LUNG:  CTA b/l, no rales, wheezes, or rhonchi  HEART:  RRR, S1, S2  ABDOMEN:  BS+, soft, nontender, nondistended  EXTREMITIES: no edema, cyanosis, or calf tenderness  NERVOUS SYSTEM: answers questions and follows commands appropriately    LABS:    CBC Full  -  ( 09 Feb 2025 06:26 )  WBC Count : 13.26 K/uL  Hemoglobin : 11.3 g/dL  Hematocrit : 35.5 %  Platelet Count - Automated : 534 K/uL  Mean Cell Volume : 92.0 fl  Mean Cell Hemoglobin : 29.3 pg  Mean Cell Hemoglobin Concentration : 31.8 g/dL  Auto Neutrophil # : x  Auto Lymphocyte # : x  Auto Monocyte # : x  Auto Eosinophil # : x  Auto Basophil # : x  Auto Neutrophil % : x  Auto Lymphocyte % : x  Auto Monocyte % : x  Auto Eosinophil % : x  Auto Basophil % : x      Phos  2.2       10 Feb 2025 11:54  Mg     2.4       10 Feb 2025 11:54          CAPILLARY BLOOD GLUCOSE      POCT Blood Glucose.: 123 mg/dL (10 Feb 2025 13:37)  POCT Blood Glucose.: 95 mg/dL (10 Feb 2025 08:16)  POCT Blood Glucose.: 216 mg/dL (09 Feb 2025 22:14)  POCT Blood Glucose.: 210 mg/dL (09 Feb 2025 17:00)          RADIOLOGY & ADDITIONAL TESTS:    Personally reviewed.     Consultant(s) Notes Reviewed:  [x] YES  [ ] NO

## 2025-02-10 NOTE — PROGRESS NOTE ADULT - NUTRITIONAL ASSESSMENT
This patient has been assessed with a concern for Malnutrition and has been determined to have a diagnosis/diagnoses of Moderate protein-calorie malnutrition.    This patient is being managed with:   Diet Regular-  Low Fat (LOWFAT)  Entered: Feb 2 2025  2:49AM  

## 2025-02-10 NOTE — PROGRESS NOTE ADULT - TIME BILLING
- Reviewing, and interpreting labs and testing.  - Independently obtaining a review of systems and performing a physical exam  - Reviewing consultant documentation/recommendations in addition to discussing plan of care with consultants.  - Counselling and educating patient and family regarding interpretation of aforementioned items and plan of care.
- Reviewing, and interpreting labs and testing.  - Independently obtaining a review of systems and performing a physical exam  - Reviewing consultant documentation/recommendations in addition to discussing plan of care with consultants.  - Counselling and educating patient and family regarding interpretation of aforementioned items and plan of care.
direct patient care including but not limited to reviewing chart, medications ,laboratory data, imaging reports, discussion of plan of care with consultants on the case, coordination of care with multidisciplinary team involved in the case and discussion of plan with, family ,  family agreeable to plan of care and verbalized understanding the anticipated hospital course and treatment plan.
- Reviewing, and interpreting labs and testing.  - Independently obtaining a review of systems and performing a physical exam  - Reviewing consultant documentation/recommendations in addition to discussing plan of care with consultants.  - Counselling and educating patient and family regarding interpretation of aforementioned items and plan of care.
direct patient care including but not limited to reviewing chart, medications ,laboratory data, imaging reports, discussion of plan of care with consultants on the case, coordination of care with multidisciplinary team involved in the case and discussion of plan with, family ,  family agreeable to plan of care and verbalized understanding the anticipated hospital course and treatment plan.
- Reviewing, and interpreting labs and testing.  - Independently obtaining a review of systems and performing a physical exam  - Reviewing consultant documentation/recommendations in addition to discussing plan of care with consultants.  - Counselling and educating patient and family regarding interpretation of aforementioned items and plan of care.
direct patient care including but not limited to reviewing chart, medications ,laboratory data, imaging reports, discussion of plan of care with consultants on the case, coordination of care with multidisciplinary team involved in the case and discussion of plan with, family ,  family agreeable to plan of care and verbalized understanding the anticipated hospital course and treatment plan.
direct patient care including but not limited to reviewing chart, medications ,laboratory data, imaging reports, discussion of plan of care with consultants on the case, coordination of care with multidisciplinary team involved in the case and discussion of plan with, family ,  family agreeable to plan of care and verbalized understanding the anticipated hospital course and treatment plan.

## 2025-02-15 DIAGNOSIS — J15.69 PNEUMONIA DUE TO OTHER GRAM-NEGATIVE BACTERIA: ICD-10-CM

## 2025-02-15 DIAGNOSIS — I45.10 UNSPECIFIED RIGHT BUNDLE-BRANCH BLOCK: ICD-10-CM

## 2025-02-15 DIAGNOSIS — Z79.890 HORMONE REPLACEMENT THERAPY: ICD-10-CM

## 2025-02-15 DIAGNOSIS — I25.10 ATHEROSCLEROTIC HEART DISEASE OF NATIVE CORONARY ARTERY WITHOUT ANGINA PECTORIS: ICD-10-CM

## 2025-02-15 DIAGNOSIS — Z95.5 PRESENCE OF CORONARY ANGIOPLASTY IMPLANT AND GRAFT: ICD-10-CM

## 2025-02-15 DIAGNOSIS — A41.9 SEPSIS, UNSPECIFIED ORGANISM: ICD-10-CM

## 2025-02-15 DIAGNOSIS — I11.0 HYPERTENSIVE HEART DISEASE WITH HEART FAILURE: ICD-10-CM

## 2025-02-15 DIAGNOSIS — E78.5 HYPERLIPIDEMIA, UNSPECIFIED: ICD-10-CM

## 2025-02-15 DIAGNOSIS — I50.33 ACUTE ON CHRONIC DIASTOLIC (CONGESTIVE) HEART FAILURE: ICD-10-CM

## 2025-02-15 DIAGNOSIS — Z88.1 ALLERGY STATUS TO OTHER ANTIBIOTIC AGENTS: ICD-10-CM

## 2025-02-15 DIAGNOSIS — E87.1 HYPO-OSMOLALITY AND HYPONATREMIA: ICD-10-CM

## 2025-02-15 DIAGNOSIS — J44.1 CHRONIC OBSTRUCTIVE PULMONARY DISEASE WITH (ACUTE) EXACERBATION: ICD-10-CM

## 2025-02-15 DIAGNOSIS — I34.0 NONRHEUMATIC MITRAL (VALVE) INSUFFICIENCY: ICD-10-CM

## 2025-02-15 DIAGNOSIS — J44.0 CHRONIC OBSTRUCTIVE PULMONARY DISEASE WITH (ACUTE) LOWER RESPIRATORY INFECTION: ICD-10-CM

## 2025-02-15 DIAGNOSIS — I48.0 PAROXYSMAL ATRIAL FIBRILLATION: ICD-10-CM

## 2025-02-15 DIAGNOSIS — Z99.81 DEPENDENCE ON SUPPLEMENTAL OXYGEN: ICD-10-CM

## 2025-02-15 DIAGNOSIS — Z86.711 PERSONAL HISTORY OF PULMONARY EMBOLISM: ICD-10-CM

## 2025-02-15 DIAGNOSIS — N17.9 ACUTE KIDNEY FAILURE, UNSPECIFIED: ICD-10-CM

## 2025-02-15 DIAGNOSIS — D75.839 THROMBOCYTOSIS, UNSPECIFIED: ICD-10-CM

## 2025-02-15 DIAGNOSIS — E03.9 HYPOTHYROIDISM, UNSPECIFIED: ICD-10-CM

## 2025-02-15 DIAGNOSIS — D68.51 ACTIVATED PROTEIN C RESISTANCE: ICD-10-CM

## 2025-02-15 DIAGNOSIS — Z82.49 FAMILY HISTORY OF ISCHEMIC HEART DISEASE AND OTHER DISEASES OF THE CIRCULATORY SYSTEM: ICD-10-CM

## 2025-02-15 DIAGNOSIS — E44.0 MODERATE PROTEIN-CALORIE MALNUTRITION: ICD-10-CM

## 2025-02-15 DIAGNOSIS — I27.20 PULMONARY HYPERTENSION, UNSPECIFIED: ICD-10-CM

## 2025-02-15 DIAGNOSIS — Z83.3 FAMILY HISTORY OF DIABETES MELLITUS: ICD-10-CM

## 2025-02-15 DIAGNOSIS — Z88.2 ALLERGY STATUS TO SULFONAMIDES: ICD-10-CM

## 2025-03-15 RX ORDER — CEFUROXIME SODIUM 1.5 G
1 VIAL (EA) INJECTION
Refills: 0 | DISCHARGE
Start: 2025-03-15 | End: 2025-03-22

## 2025-03-26 ENCOUNTER — INPATIENT (INPATIENT)
Facility: HOSPITAL | Age: 89
LOS: 1 days | Discharge: HOME | DRG: 392 | End: 2025-03-28
Attending: HOSPITALIST | Admitting: HOSPITALIST
Payer: MEDICARE

## 2025-03-26 VITALS — OXYGEN SATURATION: 84 %

## 2025-03-26 DIAGNOSIS — K59.00 CONSTIPATION, UNSPECIFIED: ICD-10-CM

## 2025-03-26 DIAGNOSIS — Z98.890 OTHER SPECIFIED POSTPROCEDURAL STATES: Chronic | ICD-10-CM

## 2025-03-26 DIAGNOSIS — Z90.710 ACQUIRED ABSENCE OF BOTH CERVIX AND UTERUS: Chronic | ICD-10-CM

## 2025-03-26 DIAGNOSIS — N60.09 SOLITARY CYST OF UNSPECIFIED BREAST: Chronic | ICD-10-CM

## 2025-03-26 LAB
ALBUMIN SERPL ELPH-MCNC: 3.3 G/DL — SIGNIFICANT CHANGE UP (ref 3.3–5)
ALP SERPL-CCNC: 45 U/L — SIGNIFICANT CHANGE UP (ref 40–120)
ALT FLD-CCNC: 13 U/L — SIGNIFICANT CHANGE UP (ref 12–78)
ANION GAP SERPL CALC-SCNC: 1 MMOL/L — LOW (ref 5–17)
AST SERPL-CCNC: 24 U/L — SIGNIFICANT CHANGE UP (ref 15–37)
BASOPHILS # BLD AUTO: 0.08 K/UL — SIGNIFICANT CHANGE UP (ref 0–0.2)
BASOPHILS NFR BLD AUTO: 0.8 % — SIGNIFICANT CHANGE UP (ref 0–2)
BILIRUB SERPL-MCNC: 0.5 MG/DL — SIGNIFICANT CHANGE UP (ref 0.2–1.2)
BUN SERPL-MCNC: 23 MG/DL — SIGNIFICANT CHANGE UP (ref 7–23)
CALCIUM SERPL-MCNC: 9.4 MG/DL — SIGNIFICANT CHANGE UP (ref 8.5–10.1)
CHLORIDE SERPL-SCNC: 103 MMOL/L — SIGNIFICANT CHANGE UP (ref 96–108)
CO2 SERPL-SCNC: 32 MMOL/L — HIGH (ref 22–31)
CREAT SERPL-MCNC: 1.07 MG/DL — SIGNIFICANT CHANGE UP (ref 0.5–1.3)
EGFR: 50 ML/MIN/1.73M2 — LOW
EGFR: 50 ML/MIN/1.73M2 — LOW
EOSINOPHIL # BLD AUTO: 0.65 K/UL — HIGH (ref 0–0.5)
EOSINOPHIL NFR BLD AUTO: 6.3 % — HIGH (ref 0–6)
GLUCOSE SERPL-MCNC: 108 MG/DL — HIGH (ref 70–99)
HCT VFR BLD CALC: 37.7 % — SIGNIFICANT CHANGE UP (ref 34.5–45)
HGB BLD-MCNC: 11.7 G/DL — SIGNIFICANT CHANGE UP (ref 11.5–15.5)
IMM GRANULOCYTES # BLD AUTO: 0.02 K/UL — SIGNIFICANT CHANGE UP (ref 0–0.07)
IMM GRANULOCYTES NFR BLD AUTO: 0.2 % — SIGNIFICANT CHANGE UP (ref 0–0.9)
LACTATE SERPL-SCNC: 1 MMOL/L — SIGNIFICANT CHANGE UP (ref 0.7–2)
LIDOCAIN IGE QN: 26 U/L — SIGNIFICANT CHANGE UP (ref 13–75)
LYMPHOCYTES # BLD AUTO: 1.44 K/UL — SIGNIFICANT CHANGE UP (ref 1–3.3)
LYMPHOCYTES NFR BLD AUTO: 13.9 % — SIGNIFICANT CHANGE UP (ref 13–44)
MCHC RBC-ENTMCNC: 28.6 PG — SIGNIFICANT CHANGE UP (ref 27–34)
MCHC RBC-ENTMCNC: 31 G/DL — LOW (ref 32–36)
MCV RBC AUTO: 92.2 FL — SIGNIFICANT CHANGE UP (ref 80–100)
MONOCYTES # BLD AUTO: 1.01 K/UL — HIGH (ref 0–0.9)
MONOCYTES NFR BLD AUTO: 9.7 % — SIGNIFICANT CHANGE UP (ref 2–14)
NEUTROPHILS # BLD AUTO: 7.17 K/UL — SIGNIFICANT CHANGE UP (ref 1.8–7.4)
NEUTROPHILS NFR BLD AUTO: 69.1 % — SIGNIFICANT CHANGE UP (ref 43–77)
NRBC # BLD AUTO: 0 K/UL — SIGNIFICANT CHANGE UP (ref 0–0)
NRBC # FLD: 0 K/UL — SIGNIFICANT CHANGE UP (ref 0–0)
NRBC BLD AUTO-RTO: 0 /100 WBCS — SIGNIFICANT CHANGE UP (ref 0–0)
PLATELET # BLD AUTO: 347 K/UL — SIGNIFICANT CHANGE UP (ref 150–400)
PMV BLD: 10.6 FL — SIGNIFICANT CHANGE UP (ref 7–13)
POTASSIUM SERPL-MCNC: 4.5 MMOL/L — SIGNIFICANT CHANGE UP (ref 3.5–5.3)
POTASSIUM SERPL-SCNC: 4.5 MMOL/L — SIGNIFICANT CHANGE UP (ref 3.5–5.3)
PROT SERPL-MCNC: 7.7 GM/DL — SIGNIFICANT CHANGE UP (ref 6–8.3)
RBC # BLD: 4.09 M/UL — SIGNIFICANT CHANGE UP (ref 3.8–5.2)
RBC # FLD: 16.7 % — HIGH (ref 10.3–14.5)
SODIUM SERPL-SCNC: 136 MMOL/L — SIGNIFICANT CHANGE UP (ref 135–145)
WBC # BLD: 10.37 K/UL — SIGNIFICANT CHANGE UP (ref 3.8–10.5)
WBC # FLD AUTO: 10.37 K/UL — SIGNIFICANT CHANGE UP (ref 3.8–10.5)

## 2025-03-26 PROCEDURE — 99497 ADVNCD CARE PLAN 30 MIN: CPT | Mod: 25

## 2025-03-26 PROCEDURE — 99285 EMERGENCY DEPT VISIT HI MDM: CPT

## 2025-03-26 PROCEDURE — 36415 COLL VENOUS BLD VENIPUNCTURE: CPT

## 2025-03-26 PROCEDURE — 99223 1ST HOSP IP/OBS HIGH 75: CPT

## 2025-03-26 PROCEDURE — 85027 COMPLETE CBC AUTOMATED: CPT

## 2025-03-26 PROCEDURE — 74176 CT ABD & PELVIS W/O CONTRAST: CPT | Mod: 26

## 2025-03-26 PROCEDURE — 80048 BASIC METABOLIC PNL TOTAL CA: CPT

## 2025-03-26 PROCEDURE — 94640 AIRWAY INHALATION TREATMENT: CPT

## 2025-03-26 RX ORDER — POLYETHYLENE GLYCOL-3350 AND ELECTROLYTES 236; 6.74; 5.86; 2.97; 22.74 G/274.31G; G/274.31G; G/274.31G; G/274.31G; G/274.31G
4000 POWDER, FOR SOLUTION ORAL ONCE
Refills: 0 | Status: COMPLETED | OUTPATIENT
Start: 2025-03-26 | End: 2025-03-26

## 2025-03-26 RX ORDER — POLYETHYLENE GLYCOL 3350 17 G/17G
17 POWDER, FOR SOLUTION ORAL DAILY
Refills: 0 | Status: DISCONTINUED | OUTPATIENT
Start: 2025-03-26 | End: 2025-03-28

## 2025-03-26 RX ORDER — MELATONIN 5 MG
3 TABLET ORAL AT BEDTIME
Refills: 0 | Status: DISCONTINUED | OUTPATIENT
Start: 2025-03-26 | End: 2025-03-28

## 2025-03-26 RX ORDER — HYPROMELLOSE 0.4 %
1 DROPS OPHTHALMIC (EYE) THREE TIMES A DAY
Refills: 0 | Status: DISCONTINUED | OUTPATIENT
Start: 2025-03-26 | End: 2025-03-28

## 2025-03-26 RX ORDER — ROPINIROLE 5 MG/1
1 TABLET, FILM COATED ORAL AT BEDTIME
Refills: 0 | Status: DISCONTINUED | OUTPATIENT
Start: 2025-03-26 | End: 2025-03-28

## 2025-03-26 RX ORDER — FUROSEMIDE 10 MG/ML
40 INJECTION INTRAMUSCULAR; INTRAVENOUS DAILY
Refills: 0 | Status: DISCONTINUED | OUTPATIENT
Start: 2025-03-26 | End: 2025-03-28

## 2025-03-26 RX ORDER — ASPIRIN 325 MG
81 TABLET ORAL DAILY
Refills: 0 | Status: DISCONTINUED | OUTPATIENT
Start: 2025-03-26 | End: 2025-03-28

## 2025-03-26 RX ORDER — B1/B2/B3/B5/B6/B12/VIT C/FOLIC 500-0.5 MG
1 TABLET ORAL
Refills: 0 | DISCHARGE

## 2025-03-26 RX ORDER — APIXABAN 2.5 MG/1
5 TABLET, FILM COATED ORAL EVERY 12 HOURS
Refills: 0 | Status: DISCONTINUED | OUTPATIENT
Start: 2025-03-26 | End: 2025-03-28

## 2025-03-26 RX ORDER — B1/B2/B3/B5/B6/B12/VIT C/FOLIC 500-0.5 MG
1 TABLET ORAL DAILY
Refills: 0 | Status: DISCONTINUED | OUTPATIENT
Start: 2025-03-26 | End: 2025-03-28

## 2025-03-26 RX ORDER — ONDANSETRON HCL/PF 4 MG/2 ML
4 VIAL (ML) INJECTION EVERY 8 HOURS
Refills: 0 | Status: DISCONTINUED | OUTPATIENT
Start: 2025-03-26 | End: 2025-03-28

## 2025-03-26 RX ORDER — FLUTICASONE PROPIONATE 50 UG/1
1 SPRAY, METERED NASAL
Refills: 0 | Status: DISCONTINUED | OUTPATIENT
Start: 2025-03-26 | End: 2025-03-28

## 2025-03-26 RX ORDER — LORATADINE 5 MG/5ML
1 SOLUTION ORAL
Refills: 0 | DISCHARGE

## 2025-03-26 RX ORDER — METOPROLOL SUCCINATE 50 MG/1
12.5 TABLET, EXTENDED RELEASE ORAL
Refills: 0 | Status: DISCONTINUED | OUTPATIENT
Start: 2025-03-26 | End: 2025-03-28

## 2025-03-26 RX ORDER — ALBUTEROL SULFATE 2.5 MG/3ML
2 VIAL, NEBULIZER (ML) INHALATION EVERY 6 HOURS
Refills: 0 | Status: DISCONTINUED | OUTPATIENT
Start: 2025-03-26 | End: 2025-03-28

## 2025-03-26 RX ORDER — UBIDECARENONE 100 MG
1 CAPSULE ORAL
Refills: 0 | DISCHARGE

## 2025-03-26 RX ORDER — LEVOTHYROXINE SODIUM 300 MCG
25 TABLET ORAL DAILY
Refills: 0 | Status: DISCONTINUED | OUTPATIENT
Start: 2025-03-26 | End: 2025-03-28

## 2025-03-26 RX ORDER — GABAPENTIN 400 MG/1
100 CAPSULE ORAL DAILY
Refills: 0 | Status: DISCONTINUED | OUTPATIENT
Start: 2025-03-26 | End: 2025-03-28

## 2025-03-26 RX ORDER — SPIRONOLACTONE 25 MG
25 TABLET ORAL DAILY
Refills: 0 | Status: DISCONTINUED | OUTPATIENT
Start: 2025-03-26 | End: 2025-03-28

## 2025-03-26 RX ORDER — FLUTICASONE PROPIONATE 50 UG/1
1 SPRAY, METERED NASAL
Refills: 0 | DISCHARGE

## 2025-03-26 RX ORDER — MAGNESIUM, ALUMINUM HYDROXIDE 200-200 MG
30 TABLET,CHEWABLE ORAL EVERY 4 HOURS
Refills: 0 | Status: DISCONTINUED | OUTPATIENT
Start: 2025-03-26 | End: 2025-03-28

## 2025-03-26 RX ORDER — ACETAMINOPHEN 500 MG/5ML
650 LIQUID (ML) ORAL EVERY 6 HOURS
Refills: 0 | Status: DISCONTINUED | OUTPATIENT
Start: 2025-03-26 | End: 2025-03-28

## 2025-03-26 RX ORDER — GABAPENTIN 400 MG/1
300 CAPSULE ORAL AT BEDTIME
Refills: 0 | Status: DISCONTINUED | OUTPATIENT
Start: 2025-03-26 | End: 2025-03-28

## 2025-03-26 RX ORDER — SENNA 187 MG
2 TABLET ORAL AT BEDTIME
Refills: 0 | Status: DISCONTINUED | OUTPATIENT
Start: 2025-03-26 | End: 2025-03-28

## 2025-03-26 RX ADMIN — Medication 2 TABLET(S): at 23:37

## 2025-03-26 RX ADMIN — ROPINIROLE 1 MILLIGRAM(S): 5 TABLET, FILM COATED ORAL at 23:37

## 2025-03-26 RX ADMIN — POLYETHYLENE GLYCOL-3350 AND ELECTROLYTES 4000 MILLILITER(S): 236; 6.74; 5.86; 2.97; 22.74 POWDER, FOR SOLUTION ORAL at 21:25

## 2025-03-26 RX ADMIN — GABAPENTIN 300 MILLIGRAM(S): 400 CAPSULE ORAL at 23:38

## 2025-03-26 NOTE — ED ADULT TRIAGE NOTE - GLASGOW COMA SCALE: SCORE, MLM
Alert-The patient is alert, awake and responds to voice. The patient is oriented to time, place, and person. The triage nurse is able to obtain subjective information. 15

## 2025-03-26 NOTE — PATIENT PROFILE ADULT - HAVE YOU EXPERIENCED VIOLENCE OR A TRAUMATIC EVENT?
Pulmonary Function Testing Instructions    Name Letty Bass  Date:                       Time:     What you need to do before your appointment:  · Wear loose fitting clothing so that you may breathe comfortably.    · Do not eat a big meal within 2 hours of testing, but you do not need to fast.    · If you smoke, do not smoke for 1 hour before the test.    · No alcohol for 4 hours prior to testing.    · Avoid vigorous exercise within 30 minutes of testing.    · Continue to take all medications except those listed below.    · Do not use your inhalers unless your condition will worsen and you will be                  unable to complete the testing.      Please hold for 6 hours:                               Please hold for 24 hours:           Albuterol Ventolin  Advair Pulmicort   ProAir   Anoro Qvar   Atrovent Xopenex  Breo Severent   Combivent   Brovana Singulair   Duoneb   Dulera Spiriva   Maxair   Flovent Symbicort   Proventil   Theophylline                                                                                    Contact us:  Call our Respiratory Therapist at [phone] for more information or if you have any questions.     The testing is performed at the [facility] location, please arrive at [id number] [facility] in [place], [place].          Dr. Quinonez has ordered :   nocturnal oximetry evaluation (testing and oxygen set up must be completed within 30 days of this office visit to qualify for oxygen if needed).     Indicated below you will find the contact information for the supplier you have approved for your Durable Medical Equipment (DME):    [facility]:  [phone] or [phone]    Please call your Durable Medical Equipment (DME) provider within the next couple of days to follow up on the above order.      If you have any questions, please call my office at [phone].    Thank you,     [facility]  Pulmonary Department   [id number]  [place] [address]   no

## 2025-03-26 NOTE — PATIENT PROFILE ADULT - MONEY FOR FOOD
Received call from patient requesting refill(s) of oxyCODONE (OXYCONTIN) 10 MG 12 hr tablet    Last dispensed from pharmacy on 09/29/22    Patient's last office/virtual visit by prescribing provider on 08/30/22  Next office/virtual appointment scheduled for 10/31/22    Last urine drug screen date 05/27/22  Current opioid agreement on file (completed within the last year) Yes Date of opioid agreement: 05/27/22    E-prescribe to pharmacy-[id number] - [place], [place] - [facility] [address] [facility] [address]    Will route to nursing [place] for review and preparation of prescription(s).          no

## 2025-03-26 NOTE — PHARMACOTHERAPY INTERVENTION NOTE - COMMENTS
Medication reconciliation completed.  Reviewed Medication list and confirmed med allergies with patient; confirmed with Dr. First Medross.

## 2025-03-26 NOTE — PATIENT PROFILE ADULT - FALL HARM RISK - RISK INTERVENTIONS

## 2025-03-26 NOTE — ED PROVIDER NOTE - OBJECTIVE STATEMENT
89 y/o female with a PMHx of HTN, CAD s/p stent placement, CHF, paroxsymal Afib s/p ablation, mitral regurgitation, Factor 5 Leiden mutation, COPD on 3L nasal canula and PEs presents to the ED c/o hematochezia and fecal impaction. Pt states the last time she had a BM was a week ago, however endorses flatulence today. As per daughter at bedside, pt had miralax and an enema yesterday. Pt reports drinking prune juice and stool softeners w/o relief. Daughter states pt was discharged from rehab 3 weeks ago and has a visiting nurse whom on Monday felt like pt wasn't fecally impacted. Pt reports bloody hemorrhoids and feeling uncomfortable due to this. Pt also endorses rectal pain, bleeding worsened when straining and decreased appetite (eating once a day). Pt denies abdominal pain, vomiting, fever, dysuria or every being manually disimpacted. Pt ambulates via wheelchair at baseline. 89 y/o female with a PMHx of HTN, CAD s/p stent placement, CHF, paroxsymal Afib s/p ablation, mitral regurgitation, Factor 5 Leiden mutation, COPD on 3L nasal canula and PEs presents to the ED c/o hematochezia and fecal impaction. Pt states the last time she had a BM was a week ago, however endorses flatulence today. As per daughter at bedside, pt had miralax and an enema yesterday. Pt reports drinking prune juice and stool softeners w/o relief. Daughter states pt was discharged from rehab 3 weeks ago and has a visiting nurse whom on Monday felt like pt wasn't fecally impacted. Pt reports bloody hemorrhoids and feeling uncomfortable due to this. Pt also endorses rectal pain, bleeding worsened when straining and decreased appetite (eating once a day). Pt denies abdominal pain, vomiting, fever, dysuria or every being manually disimpacted. Pt ambulates via walker at baseline.

## 2025-03-26 NOTE — ED PROVIDER NOTE - PHYSICAL EXAMINATION
GEN: Patient awake alert NAD.   HEENT: normocephalic, atraumatic, EOMI, no scleral icterus, moist MM  CARDIAC: RRR, S1, S2, no murmur.   PULM: CTA B/L no wheeze, rhonchi, rales. As pt may be initially 84 % O2 sat on room air, at baseline pt is on nasal canula and was 94% O2 sat in room  ABD: soft NT, ND, no rebound no guarding, no CVA tenderness.   MSK: Moving all extremities, no edema. 5/5 strength and full ROM in all extremities.    NEURO: A&Ox3, gait normal, no focal neurological deficits, CN 2-12 grossly intact  SKIN: warm, dry, no rash, 1+ pitting edema b/l legs GEN: Patient awake alert NAD.   HEENT: normocephalic, atraumatic, EOMI, no scleral icterus, moist MM  CARDIAC: RRR, S1, S2, no murmur.   PULM: CTA B/L no wheeze, rhonchi, rales.   ABD: soft NT, ND, no rebound no guarding, no CVA tenderness.   MSK: Moving all extremities, no edema. 5/5 strength and full ROM in all extremities.    NEURO: A&Ox3, no focal neurological deficits  SKIN: warm, dry, no rash, 1+ pitting edema b/l legs

## 2025-03-26 NOTE — H&P ADULT - NSHPLABSRESULTS_GEN_ALL_CORE
LABS: All Labs Reviewed:                        11.7   10.37 )-----------( 347      ( 26 Mar 2025 18:48 )             37.7     03-26    136  |  103  |  23  ----------------------------<  108[H]  4.5   |  32[H]  |  1.07    Ca    9.4      26 Mar 2025 18:48    TPro  7.7  /  Alb  3.3  /  TBili  0.5  /  DBili  x   /  AST  24  /  ALT  13  /  AlkPhos  45  03-26              Blood Culture:             < from: CT Abdomen and Pelvis No Cont (03.26.25 @ 18:18) >    BONES: Degenerative changes.    IMPRESSION:  Constipation and rectosigmoid fecal impaction.    Mild right hydroureteronephrosis without ureteral calculus. A   nonvisualized distal right ureter urothelial lesion cannot be excluded.    6 mm right lower lobe pulmonary nodule, not seen on the prior study.   Follow-up CT chest in 3 months is recommended.    Bilateral lower lobe, lingula and right middle lobe mosaic lung   attenuation and linear opacities again noted . Differential diagnosis   includes interstitial lung disease with superimposed air trapping,   alveolar edema or infiltrative disease.    Additional findings noted above.    < end of copied text >

## 2025-03-26 NOTE — ED PROVIDER NOTE - DATE/TIME 1
ED Upper Extremity Inj HPI





- General


Chief Complaint: Extremity Injury, Upper


Stated Complaint: LT HAND FINGER INJURY


Time Seen by Provider: 06/01/22 15:27


Source: patient


Mode of arrival: Ambulatory


Limitations: No Limitations





- History of Present Illness


Initial Comments: 





Patient is an 18-year-old that comes to the ER accompanied by her father after f

alling while attempting to grab the dog.  She landed on her left hand.  She is 

complaining of left hand pain.  This happened just prior to arrival.  She denies

any other injury.  She is neurovascularly intact.  Ambulatory to ER


MD Complaint: Injury to:: left


-: Sudden, hour(s)


Other Extremity Injury: Hand: Left


Other Injuries: none


Handedness: right


Place: home


Improves With: none


Context: fall


Associated Symptoms: denies other symptoms





- Related Data


                                  Previous Rx's











 Medication  Instructions  Recorded  Last Taken  Type


 


Acetaminophen/Codeine [Tylenol #3] 1 tab PO Q6H PRN #20 tab 05/02/15 Unknown Rx


 


Ibuprofen [Motrin] 600 mg PO Q8H PRN #40 tablet 05/02/15 Unknown Rx











                                    Allergies











Allergy/AdvReac Type Severity Reaction Status Date / Time


 


No Known Allergies Allergy   Verified 06/01/22 15:26














ED Review of Systems


ROS: 


Stated complaint: LT HAND FINGER INJURY


Other details as noted in HPI





Comment: All other systems reviewed and negative





ED Past Medical Hx





- Past Medical History


Previous Medical History?: No


Additional medical history: NONE





- Surgical History


Past Surgical History?: No


Additional Surgical History: NONE





- Family History


Family history: no significant





- Social History


Smoking Status: Never Smoker


Substance Use Type: None





- Medications


Home Medications: 


                                Home Medications











 Medication  Instructions  Recorded  Confirmed  Last Taken  Type


 


Acetaminophen/Codeine [Tylenol #3] 1 tab PO Q6H PRN #20 tab 05/02/15  Unknown Rx


 


Ibuprofen [Motrin] 600 mg PO Q8H PRN #40 tablet 05/02/15  Unknown Rx














ED Physical Exam





- General


Limitations: No Limitations


General appearance: alert, in no apparent distress





- Head


Head exam: Present: atraumatic, normocephalic





- Eye


Eye exam: Present: normal appearance





- ENT


ENT exam: Present: mucous membranes moist





- Neck


Neck exam: Present: normal inspection





- Respiratory


Respiratory exam: Present: normal lung sounds bilaterally.  Absent: respiratory 

distress





- Cardiovascular


Cardiovascular Exam: Present: regular rate, normal rhythm.  Absent: systolic 

murmur, diastolic murmur, rubs, gallop





- GI/Abdominal


GI/Abdominal exam: Present: soft, normal bowel sounds





- Extremities Exam


Extremities exam: Present: normal inspection





- Back Exam


Back exam: Present: normal inspection





- Neurological Exam


Neurological exam: Present: alert, oriented X3





- Psychiatric


Psychiatric exam: Present: normal affect, normal mood





- Skin


Skin exam: Present: warm, dry, intact, normal color.  Absent: rash





ED Course


                                   Vital Signs











  06/01/22





  15:23


 


Temperature 96.8 F L


 


Pulse Rate 87


 


Respiratory 18





Rate 


 


Blood Pressure 131/74





[Right] 


 


O2 Sat by Pulse 99





Oximetry 














ED Medical Decision Making





- Radiology Data


Radiology results: report reviewed, image reviewed





No fracture





- Medical Decision Making





Patient neurovascularly intact.





Normal vital signs.








X-ray normal.








Patient discharged home with discharge plan of care including diet, activity, 

medications and follow-up.  She is accompanied by her father who also verbalizes

 understanding of plan of care








                                   Vital Signs











  06/01/22





  15:23


 


Temperature 96.8 F L


 


Pulse Rate 87


 


Respiratory 18





Rate 


 


Blood Pressure 131/74





[Right] 


 


O2 Sat by Pulse 99





Oximetry 














- Differential Diagnosis


RO FX


Critical care attestation.: 


If time is entered above; I have spent that time in minutes in the direct care 

of this critically ill patient, excluding procedure time.








ED Disposition


Clinical Impression: 


Fall


Qualifiers:


 Encounter type: initial encounter Qualified Code(s): W19.XXXA - Unspecified 

fall, initial encounter





Hand pain


Qualifiers:


 Laterality: left Qualified Code(s): M79.642 - Pain in left hand





Disposition: 01 HOME / SELF CARE / HOMELESS


Is pt being admited?: No


Does the pt Need Aspirin: No


Condition: Stable


Additional Instructions: 


REST


ICE


ELEVATE HAND





OVER THE COUNTER MOTRIN OR TYLENOL FOR PAIN 





FOLLOW UP WITH ORTHO NEXT WEEK IF PAIN PERSISTS





DIET AND ACTIVITY AS TOLERATED





Referrals: 


TOMA MCADAMS MD [Staff Physician] - 3-5 Days


Forms:  Work/School Release Form(ED)


Time of Disposition: 15:57 26-Mar-2025 20:46

## 2025-03-26 NOTE — PATIENT PROFILE ADULT - NSPROPTRIGHTREPPHONE_GEN_A_NUR
[FreeTextEntry1] :  obstructive sleep apnea\par Impression cannot exclude a component of inflammatory airway disease\par COVID-19 positive\par COVID pneumonia\par Rule out a component of anxiety\par Rule out a viral myocarditis-pericarditis is a possible etiology of the component of shortness of breath based on COVID etiology- noted  cardiology consult\par Deconditioning likely a component due to the severity of her COVID infection\par Recommendations\par Patient also has a GI consultation noted\par Blood work including d-dimer as Off  aspirin\par Patient states she is scheduled with cardiology for an echocardiogram as an additional opinion tomorrow May 28, 2020- completed reviewed  and neg ECHO\par AUTO CPAP 6 -18 cm H20- f/u 1  month with data compliance review- f/u- post trial nasal mask\par  explained usage 70 %  greater than 4 hrs\par Based on COVID pneumonia  and recommendation CROWN program - f/u CT CHEST- June 2020 CT CHEST Clear lungs\par watch PFT\par 
[phone]

## 2025-03-26 NOTE — H&P ADULT - NSHPPHYSICALEXAM_GEN_ALL_CORE
ICU Vital Signs Last 24 Hrs  T(C): 36.5 (26 Mar 2025 19:53), Max: 36.5 (26 Mar 2025 19:53)  T(F): 97.7 (26 Mar 2025 19:53), Max: 97.7 (26 Mar 2025 19:53)  HR: 61 (26 Mar 2025 19:53) (61 - 61)  BP: 127/56 (26 Mar 2025 19:53) (127/56 - 127/56)  BP(mean): 75 (26 Mar 2025 19:53) (75 - 75)  ABP: --  ABP(mean): --  RR: 18 (26 Mar 2025 19:53) (18 - 18)  SpO2: 98% (26 Mar 2025 19:53) (84% - 98%)    O2 Parameters below as of 26 Mar 2025 19:53  Patient On (Oxygen Delivery Method): nasal cannula  O2 Flow (L/min): 3          PHYSICAL EXAM:    Constitutional: NAD, awake and alert  HEENT: PERR, EOMI, Normal Hearing, MMM  Neck: Soft and supple, No LAD, No JVD  Respiratory: Breath sounds are clear bilaterally, No wheezing, rales or rhonchi  Cardiovascular: S1 and S2, regular rate and rhythm, no Murmurs, gallops or rubs  Gastrointestinal: Bowel Sounds present, soft, nontender, nondistended, no guarding, no rebound  Extremities: No peripheral edema  Vascular: 2+ peripheral pulses  Neurological: A/O x 3, no focal deficits  Musculoskeletal: 5/5 strength b/l upper and lower extremities  Skin: No rashes

## 2025-03-26 NOTE — H&P ADULT - CONVERSATION DETAILS
All risks and benefits regarding CPR and mechanical ventilation were thoroughly discussed with patient/family. Discussion included but were not limited to the likelihood of successful ROSC in the event patient was found unresponsive and without a pulse and/or in respiratory arrest. Patient and/or family were allowed to ask pertinent questions regarding the MOLST form.  Patient and/or family were able to relay pertinent information regarding the MOLST form in layman's terms to this writer.    Time spent discussing Goals of care and MOLST form: 31 minutes    Full code

## 2025-03-26 NOTE — ED PROVIDER NOTE - CLINICAL SUMMARY MEDICAL DECISION MAKING FREE TEXT BOX
89 y/o female with a PMHx of HTN, CAD s/p stent placement, CHF, paroxsymal Afib s/p ablation, mitral regurgitation, Factor 5 Leiden mutation, COPD on 3L nasal canula and PEs presents to the ED c/o hematochezia and fecal impaction. Pt states the last time she had a BM was a week ago, however endorses flatulence today. As pt may be initially 84 % O2 sat on room air, at baseline pt is on nasal canula and was 94% O2 sat in room, Otherwise nonfocal physical exam.  Differential includes not limited to worsening constipation, electrolyte abnormality, low suspicion for SBO as patient is passing flatus, some concern for stercoral colitis.  Plan for labs, CT, consider manual disimpaction.  Reassess

## 2025-03-26 NOTE — H&P ADULT - HISTORY OF PRESENT ILLNESS
"89 y/o female with a PMHx of HTN, CAD s/p stent placement, CHF, paroxsymal Afib s/p ablation, mitral regurgitation, Factor 5 Leiden mutation, COPD on 3L nasal canula and PEs presents to the ED c/o hematochezia and fecal impaction. Pt states the last time she had a BM was a week ago, however endorses flatulence today. As per daughter at bedside, pt had miralax and an enema yesterday. Pt reports drinking prune juice and stool softeners w/o relief. Daughter states pt was discharged from rehab 3 weeks ago and has a visiting nurse whom on Monday felt like pt wasn't fecally impacted. Pt reports bloody hemorrhoids and feeling uncomfortable due to this. Pt also endorses rectal pain, bleeding worsened when straining and decreased appetite (eating once a day). Pt denies abdominal pain, vomiting, fever, dysuria or every being manually disimpacted. Pt ambulates via walker at baseline."      88 yo F with pmh Factor V, PE , chronic resp failure / copd on 3L NC, aib s/p ablation, CHF p/w 1 week of abd pain after not passing any stool despite usage of enemas and miralax / prune juice. Pt denies vomiting but has poor appetite and endorses rectal pain. She was evaluated outpt and was not thought to have impaction. + Flatus. PT was manually disimpacted in ED however ED provider felt there was fecal material that could not be evacuated.       CTAP:No evidence of   inflammation in the pericecal region. Large amount of stool throughout   the colon compatible with constipation. In addition, there is a distended   rectosigmoid colon, the rectum measuring 6.8 cm in diameter with   presacral edema.    < from: CT Abdomen and Pelvis No Cont (03.26.25 @ 18:18) >  IMPRESSION:  Constipation and rectosigmoid fecal impaction.    Mild right hydroureteronephrosis without ureteral calculus. A   nonvisualized distal right ureter urothelial lesion cannot be excluded.    6 mm right lower lobe pulmonary nodule, not seen on the prior study.   Follow-up CT chest in 3 months is recommended.    Bilateral lower lobe, lingula and right middle lobe mosaic lung   attenuation and linear opacities again noted . Differential diagnosis   includes interstitial lung disease with superimposed air trapping,   alveolar edema or infiltrative disease.    Additional findings noted above.    < end of copied text >          PAST MEDICAL/SURGICAL/FAMILY/SOCIAL HISTORY:    Past Medical, Past Surgical, and Family History:  PAST MEDICAL HISTORY:  Atrial fibrillation     CAD (coronary artery disease)     CHF (congestive heart failure)     COPD (chronic obstructive pulmonary disease)     Factor 5 Leiden mutation, heterozygous     HTN (hypertension)     Hypothyroid     Mitral regurgitation     Pulmonary emboli     Stented coronary artery.     PAST SURGICAL HISTORY:  Cyst of breast, unspecified laterality S/P excision    H/O cardiac radiofrequency ablation     H/O knee surgery     H/O: hysterectomy     S/P ablation of atrial fibrillation.     FAMILY HISTORY:  Father  Still living? Unknown  FHx: diabetes mellitus, Age at diagnosis: Age Unknown  FHx: heart disease, Age at diagnosis: Age Unknown.     Tobacco Usage:  · Tobacco Usage	non smoker  social: neg x 3

## 2025-03-26 NOTE — H&P ADULT - ASSESSMENT
"89 y/o female with a PMHx of HTN, CAD s/p stent placement, CHF, paroxsymal Afib s/p ablation, mitral regurgitation, Factor 5 Leiden mutation, COPD on 3L nasal canula and PEs presents to the ED c/o hematochezia and fecal impaction. Pt states the last time she had a BM was a week ago, however endorses flatulence today. As per daughter at bedside, pt had miralax and an enema yesterday. Pt reports drinking prune juice and stool softeners w/o relief. Daughter states pt was discharged from rehab 3 weeks ago and has a visiting nurse whom on Monday felt like pt wasn't fecally impacted. Pt reports bloody hemorrhoids and feeling uncomfortable due to this. Pt also endorses rectal pain, bleeding worsened when straining and decreased appetite (eating once a day). Pt denies abdominal pain, vomiting, fever, dysuria or every being manually disimpacted. Pt ambulates via walker at baseline."      89 yo F with pmh Factor V, PE , chronic resp failure / copd on 3L NC, aib s/p ablation, CHF p/w 1 week of abd pain after not passing any stool despite usage of enemas and miralax / prune juice. Pt denies vomiting but has poor appetite and endorses rectal pain. She was evaluated outpt and was not thought to have impaction. + Flatus. PT was manually disimpacted in ED however ED provider felt there was fecal material that could not be evacuated.       Constipation and rectal fecal impaction  - admit to medicine  - CT noted, + constipation/impaction, no SBO  - Alternating mineral oil enema with TW enema q4h x 3 (ex: Mineral oil enema, then TW enema then repeat mineral oil enema 4 hours later if no BM produced)  - FLD for now  - anti-emetics  - senna and high fiber diet plus daily miralax upon discharge  - pt states she witnessed hematochezia, however none seen by ED or this provider. H/H stable  - GI as outpt    Incidental Mild R hydro with ? renal stent  - patient passing urine without incident  - Scr: wnl  - urology as outpt      #6 mm right lower lobe pulmonary nodule,  - Follow-up CT chest in 3 months is recommended.  - Bilateral lower lobe, lingula and right middle lobe mosaic lung   attenuation and linear opacities again noted . Differential diagnosis   includes interstitial lung disease with superimposed air trapping,   alveolar edema or infiltrative disease. Pulmonary referral upon discharge for appropriate imaging and follow up    #Diastolic HF  #H/O Severe pulm htn  #RBBB  #COPD/chronic resp failure  H/o HTN / dyslipidemia / CVA / paroxysmal a-fib / CAD / PE / factor V leiden on eliquis  - resume home meds including doac. If hematochezia or anemia , stop doac    DVT px: eliquis    >75 min required for this admission    Full code  D/W ED provider

## 2025-03-26 NOTE — ED ADULT TRIAGE NOTE - CHIEF COMPLAINT QUOTE
PT presents to er with complaints of constipation and abd pain,  had bright red rectal bleeding this afternoon, takes Eliquis, last bowel movement was over a week ago, denies fevers, has a history of COPD and is on 2L NC continuos.

## 2025-03-26 NOTE — PATIENT PROFILE ADULT - HEALTH LITERACY
[FreeTextEntry1] : Pt comes in to establish care. Pt has a neobladder which was done at age 69. Pt had some burning sensation on urination that started 2 months ago. Pt went to PCP and was given cipro and is currently on it and feeling much better.  Pt finished course of cipro.
no

## 2025-03-26 NOTE — ED PROVIDER NOTE - PROGRESS NOTE DETAILS
Successful disimpaction with a large stool ball removed.  Patient to be admitted for aggressive bowel regimen.

## 2025-03-26 NOTE — ED ADULT NURSE NOTE - OBJECTIVE STATEMENT
pt ambulatory into the ed C/O constipation x1 wk unrelieved with OTC medications and an enema. Pt endorsing abdominal pain and hemmorhoid bleeds d/t constipation. denies vomiting, fevers or chills

## 2025-03-27 LAB
ANION GAP SERPL CALC-SCNC: 1 MMOL/L — LOW (ref 5–17)
BUN SERPL-MCNC: 20 MG/DL — SIGNIFICANT CHANGE UP (ref 7–23)
CALCIUM SERPL-MCNC: 9.1 MG/DL — SIGNIFICANT CHANGE UP (ref 8.5–10.1)
CHLORIDE SERPL-SCNC: 106 MMOL/L — SIGNIFICANT CHANGE UP (ref 96–108)
CO2 SERPL-SCNC: 35 MMOL/L — HIGH (ref 22–31)
CREAT SERPL-MCNC: 0.95 MG/DL — SIGNIFICANT CHANGE UP (ref 0.5–1.3)
EGFR: 57 ML/MIN/1.73M2 — LOW
EGFR: 57 ML/MIN/1.73M2 — LOW
GLUCOSE SERPL-MCNC: 98 MG/DL — SIGNIFICANT CHANGE UP (ref 70–99)
HCT VFR BLD CALC: 33.2 % — LOW (ref 34.5–45)
HGB BLD-MCNC: 10.3 G/DL — LOW (ref 11.5–15.5)
MCHC RBC-ENTMCNC: 28.1 PG — SIGNIFICANT CHANGE UP (ref 27–34)
MCHC RBC-ENTMCNC: 31 G/DL — LOW (ref 32–36)
MCV RBC AUTO: 90.7 FL — SIGNIFICANT CHANGE UP (ref 80–100)
NRBC # BLD AUTO: 0 K/UL — SIGNIFICANT CHANGE UP (ref 0–0)
NRBC # FLD: 0 K/UL — SIGNIFICANT CHANGE UP (ref 0–0)
NRBC BLD AUTO-RTO: 0 /100 WBCS — SIGNIFICANT CHANGE UP (ref 0–0)
PLATELET # BLD AUTO: 300 K/UL — SIGNIFICANT CHANGE UP (ref 150–400)
PMV BLD: 11.1 FL — SIGNIFICANT CHANGE UP (ref 7–13)
POTASSIUM SERPL-MCNC: 3.6 MMOL/L — SIGNIFICANT CHANGE UP (ref 3.5–5.3)
POTASSIUM SERPL-SCNC: 3.6 MMOL/L — SIGNIFICANT CHANGE UP (ref 3.5–5.3)
RBC # BLD: 3.66 M/UL — LOW (ref 3.8–5.2)
RBC # FLD: 16.7 % — HIGH (ref 10.3–14.5)
SODIUM SERPL-SCNC: 142 MMOL/L — SIGNIFICANT CHANGE UP (ref 135–145)
WBC # BLD: 7.52 K/UL — SIGNIFICANT CHANGE UP (ref 3.8–10.5)
WBC # FLD AUTO: 7.52 K/UL — SIGNIFICANT CHANGE UP (ref 3.8–10.5)

## 2025-03-27 PROCEDURE — 99233 SBSQ HOSP IP/OBS HIGH 50: CPT

## 2025-03-27 PROCEDURE — 99223 1ST HOSP IP/OBS HIGH 75: CPT

## 2025-03-27 RX ORDER — LACTULOSE 10 G/15ML
20 SOLUTION ORAL
Refills: 0 | Status: DISCONTINUED | OUTPATIENT
Start: 2025-03-27 | End: 2025-03-28

## 2025-03-27 RX ADMIN — Medication 3 MILLIGRAM(S): at 22:19

## 2025-03-27 RX ADMIN — Medication 25 MILLIGRAM(S): at 10:37

## 2025-03-27 RX ADMIN — ROPINIROLE 1 MILLIGRAM(S): 5 TABLET, FILM COATED ORAL at 22:12

## 2025-03-27 RX ADMIN — FUROSEMIDE 40 MILLIGRAM(S): 10 INJECTION INTRAMUSCULAR; INTRAVENOUS at 10:36

## 2025-03-27 RX ADMIN — Medication 40 MILLIGRAM(S): at 06:13

## 2025-03-27 RX ADMIN — FLUTICASONE PROPIONATE 1 SPRAY(S): 50 SPRAY, METERED NASAL at 06:13

## 2025-03-27 RX ADMIN — Medication 1 TABLET(S): at 10:05

## 2025-03-27 RX ADMIN — Medication 25 MICROGRAM(S): at 06:12

## 2025-03-27 RX ADMIN — Medication 1 DOSE(S): at 20:51

## 2025-03-27 RX ADMIN — Medication 2 TABLET(S): at 22:12

## 2025-03-27 RX ADMIN — Medication 1 DOSE(S): at 10:09

## 2025-03-27 RX ADMIN — GABAPENTIN 100 MILLIGRAM(S): 400 CAPSULE ORAL at 10:05

## 2025-03-27 RX ADMIN — METOPROLOL SUCCINATE 12.5 MILLIGRAM(S): 50 TABLET, EXTENDED RELEASE ORAL at 22:19

## 2025-03-27 RX ADMIN — GABAPENTIN 300 MILLIGRAM(S): 400 CAPSULE ORAL at 22:13

## 2025-03-27 RX ADMIN — APIXABAN 5 MILLIGRAM(S): 2.5 TABLET, FILM COATED ORAL at 10:05

## 2025-03-27 RX ADMIN — APIXABAN 5 MILLIGRAM(S): 2.5 TABLET, FILM COATED ORAL at 22:11

## 2025-03-27 RX ADMIN — Medication 81 MILLIGRAM(S): at 10:04

## 2025-03-27 RX ADMIN — LACTULOSE 20 GRAM(S): 10 SOLUTION ORAL at 22:11

## 2025-03-27 RX ADMIN — FLUTICASONE PROPIONATE 1 SPRAY(S): 50 SPRAY, METERED NASAL at 16:54

## 2025-03-27 RX ADMIN — METOPROLOL SUCCINATE 12.5 MILLIGRAM(S): 50 TABLET, EXTENDED RELEASE ORAL at 10:36

## 2025-03-27 RX ADMIN — POLYETHYLENE GLYCOL 3350 17 GRAM(S): 17 POWDER, FOR SOLUTION ORAL at 10:03

## 2025-03-27 NOTE — CONSULT NOTE ADULT - ASSESSMENT
Addendum 2:09 PM:  Reviewed case with Dr. Hall  Given normal creat, patient being asymptomatic no acute emergent uro surgical intervention.    Follow up with Urology outpatient for further management   Case discussed with Dr. Hall

## 2025-03-27 NOTE — CONSULT NOTE ADULT - SUBJECTIVE AND OBJECTIVE BOX
History of Present Illness:  CC; Constipation/Obstipation    HPI: Pt is an 89 y/o female with a PMHx of HTN, CAD s/p stent placement, CHF, paroxsymal Afib s/p ablation, mitral regurgitation, Factor 5 Leiden mutation, COPD on 3L nasal canula and PEs presents to the ED c/o hematochezia and fecal impaction. Pt states the last time she had a BM was a week ago, however endorses flatulence today. As per daughter at bedside, pt had miralax and an enema yesterday. Pt reports drinking prune juice and stool softeners w/o relief. Daughter states pt was discharged from rehab 3 weeks ago and has a visiting nurse whom on Monday felt like pt wasn't fecally impacted. Pt reports bloody hemorrhoids and feeling uncomfortable due to this. Pt also endorses rectal pain, bleeding worsened when straining and decreased appetite (eating once a day). Pt denies abdominal pain, vomiting, fever, dysuria or every being manually disimpacted. Pt ambulates via walker at baseline."    89 yo F with pmh Factor V, PE , chronic resp failure / copd on 3L NC, aib s/p ablation, CHF p/w 1 week of abd pain after not passing any stool despite usage of enemas and miralax / prune juice. Pt denies vomiting but has poor appetite and endorses rectal pain. She was evaluated outpt and was not thought to have impaction. + Flatus. PT was manually disimpacted in ED however ED provider felt there was fecal material that could not be evacuated.     Pt seen/examined at bedside-reports improved abdominal pain and distension after several bowel movements  Urology Consult requested for mild R hydronephrosis . Pt has had recent hematuria and is scheduled to see a Urologist next week as referred from her PMD      PAST MEDICAL/SURGICAL/FAMILY/SOCIAL HISTORY:    Past Medical, Past Surgical, and Family History:  PAST MEDICAL HISTORY:  Atrial fibrillation     CAD (coronary artery disease)     CHF (congestive heart failure)     COPD (chronic obstructive pulmonary disease)     Factor 5 Leiden mutation, heterozygous     HTN (hypertension)     Hypothyroid     Mitral regurgitation     Pulmonary emboli     Stented coronary artery.     PAST SURGICAL HISTORY:  Cyst of breast, unspecified laterality S/P excision    H/O cardiac radiofrequency ablation     H/O knee surgery     H/O: hysterectomy     S/P ablation of atrial fibrillation.     FAMILY HISTORY:  Father  Still living? Unknown  FHx: diabetes mellitus, Age at diagnosis: Age Unknown  FHx: heart disease, Age at diagnosis: Age Unknown.     Tobacco Usage:  · Tobacco Usage	non smoker  social: neg x 3      Allergies and Intolerances:        Allergies:  	Motrin: Drug, Other, Originally Entered as [Tachycardia] reaction to [Motrin]  	tadalafil: Drug, Muscle Pain  	Ceftin: Drug, Other (Moderate), Originally Entered as [Moderate see Comment: pt thinks this medication may have interacted poorly with her usual medications] reaction to [Ceftin]  	Macrodantin: Drug, Other, Originally Entered as [see Comment: ""PULMONARY EDEMA""--AS PER PT] reaction to [Macrodantin]  	sulfa drugs: Drug Category, Other, Originally Entered as [see Comment: ""DIDNT FEEL RIGHT""--AS PER PT] reaction to [Sulfa (Sulfonamide Antibiotics)]  	flu vaccines: Drug Category, Other, Originally Entered as [Eye swelling] reaction to [flu vaccine 12-13(18+)cell angelia]  	statins: Drug Category, Muscle Pain  	nisoldipine: Drug, Unknown  	Repatha: Drug, Unknown    Home Medications:   * Patient Currently Takes Medications as of 10-Feb-2025 15:23 documented in Structured Notes  · 	moxifloxacin 400 mg oral tablet: 1 tab(s) orally once a day  · 	predniSONE 20 mg oral tablet: 1 tab(s) orally once a day  · 	ocular lubricant preservative-free ophthalmic solution: 1 drop(s) to each affected eye 3 times a day As needed Dry Eyes  · 	polyethylene glycol 3350 oral powder for reconstitution: 17 gram(s) orally once a day  · 	senna leaf extract oral tablet: 2 tab(s) orally once a day (at bedtime)  · 	pantoprazole 40 mg oral delayed release tablet: 1 tab(s) orally once a day (before a meal)  · 	rOPINIRole 1 mg oral tablet: 1 tab(s) orally once a day (at bedtime)  · 	Metoprolol Tartrate 25 mg oral tablet: 0.5 tab(s) orally 2 times a day  · 	levothyroxine 25 mcg (0.025 mg) oral tablet: 1 tab(s) orally once a day  · 	famotidine 20 mg oral tablet: 1 tab(s) orally once a day (at bedtime)  · 	Lasix 40 mg oral tablet: 0.5 tab(s) orally once a day  · 	Aldactone 25 mg oral tablet: 1 tab(s) orally once a day  · 	aspirin 81 mg oral tablet, chewable: 1 tab(s) orally once a day  · 	Neurontin 300 mg oral capsule: 1 cap(s) orally once a day (at bedtime)  · 	Neurontin 100 mg oral capsule: 1 cap(s) orally once a day  · 	apixaban 5 mg oral tablet: 1 tab(s) orally 2 times a day  · 	Breztri Aerosphere 160 mcg-9 mcg-4.8 mcg/inh inhalation aerosol: 2 puff(s) inhaled 2 times a day  · 	Livalo 2 mg oral tablet: 1 tab(s) orally once a day  · 	Albuterol (Eqv-ProAir HFA) 90 mcg/inh inhalation aerosol: 2 puff(s) inhaled every 6 hours as needed for    Patient History:    Social History:  · Substance use	No           Vital Signs Last 24 Hrs  T(C): 36.7 (26 Mar 2025 22:45), Max: 36.7 (26 Mar 2025 22:45)  T(F): 98.1 (26 Mar 2025 22:45), Max: 98.1 (26 Mar 2025 22:45)  HR: 61 (26 Mar 2025 22:45) (61 - 65)  BP: 156/58 (26 Mar 2025 22:45) (127/56 - 156/58)  BP(mean): 77 (26 Mar 2025 21:43) (75 - 77)  RR: 18 (26 Mar 2025 22:45) (18 - 18)  SpO2: 99% (26 Mar 2025 22:45) (84% - 99%)    Parameters below as of 26 Mar 2025 22:45  Patient On (Oxygen Delivery Method): nasal cannula  O2 Flow (L/min): 3        PHYSICAL EXAM:    General:  NAD, resting in bed, AAOx3  HEENT: PERRL, EOMI  Respiratory: Breath sounds CTA B/L   Heart:  S1 and S2, RRR  Abdomen:  nondistended, + BS, Bowel Sounds,  soft, nontender, No CVA tenderness  Extremities: + edema B/L LE  Neurological: A/O x 3, no gross motor/sensory deficits appreciated focal deficits  Skin: No rashes noted        Labs and Results                        11.7   10.37 )-----------( 347      ( 26 Mar 2025 18:48 )             37.7     03-26    136  |  103  |  23  ----------------------------<  108[H]  4.5   |  32[H]  |  1.07    Ca    9.4      26 Mar 2025 18:48    TPro  7.7  /  Alb  3.3  /  TBili  0.5  /  DBili  x   /  AST  24  /  ALT  13  /  AlkPhos  45  03-26        < from: CT Abdomen and Pelvis No Cont (03.26.25 @ 18:18) >    BONES: Degenerative changes.    < from: CT Abdomen and Pelvis No Cont (03.26.25 @ 18:18) >    ACC: [id number] EXAM:  CT ABDOMEN AND PELVIS   ORDERED BY: WILBER BOLANOS     PROCEDURE DATE:  03/26/2025          INTERPRETATION:  CLINICAL INFORMATION: Evaluate for constipation.    COMPARISON: CT abdomen and pelvis 5/11/2018. CTA chest 2/1/2025.    CONTRAST/COMPLICATIONS:  IV Contrast: NONE  Oral Contrast: NONE  .    PROCEDURE:  CT of the Abdomen and Pelvis was performed.  Sagittal and coronal reformats were performed.    FINDINGS:  LOWER CHEST: Bilateral lower lobe, lingula and right middle lobe mosaic   lung attenuation and linear opacities again noted . 6 mm right lower lobe   pulmonary nodule, new since 2/1/2025. Cardiomegaly.    LIVER: Within normal limits.  BILE DUCTS: Normal caliber.  GALLBLADDER: Cholecystectomy.  SPLEEN: Within normal limits.  PANCREAS: Partially fatty-replaced.  ADRENALS: Within normal limits.  KIDNEYS/URETERS: Mild right hydroureteronephrosis without ureteral   calculus. Right renal scarring.    BLADDER: Within normal limits.  REPRODUCTIVE ORGANS: Hysterectomy. Fluid within the lower vagina.    BOWEL: No bowel obstruction. Appendix is not visualized. No evidence of   inflammation in the pericecal region. Large amount of stool throughout   the colon compatible with constipation. In addition, there is a distended   rectosigmoid colon, the rectum measuring 6.8 cm in diameter with   presacral edema. Tiny hiatal hernia.  PERITONEUM/RETROPERITONEUM: Within normal limits.  VESSELS: Atherosclerotic calcifications.  LYMPH NODES: No lymphadenopathy.  ABDOMINAL WALL: Small fat-containing umbilical hernia.  BONES: Degenerative changes.    IMPRESSION:  Constipation and rectosigmoid fecal impaction.    Mild right hydroureteronephrosis without ureteral calculus. A   nonvisualized distal right ureter urothelial lesion cannot be excluded.    6 mm right lower lobe pulmonary nodule, not seen on the prior study.   Follow-up CT chest in 3 months is recommended.    Bilateral lower lobe, lingula and right middle lobe mosaic lung   attenuation and linear opacities again noted . Differential diagnosis   includes interstitial lung disease with superimposed air trapping,   alveolar edema or infiltrative disease.    Additional findings noted above.    --- End of Report ---            KEYLA VIZCARRA MD; Attending Radiologist  This document has been electronically signed. Mar 26 2025  6:27PM    < end of copied text >             Assessment:  · Assessment	  "89 y/o female with a PMHx of HTN, CAD s/p stent placement, CHF, paroxsymal Afib s/p ablation, mitral regurgitation, Factor 5 Leiden mutation, COPD on 3L nasal canula and PEs presents to the ED c/o hematochezia and fecal impaction. Pt states the last time she had a BM was a week ago, however endorses flatulence today. As per daughter at bedside, pt had miralax and an enema yesterday. Pt reports drinking prune juice and stool softeners w/o relief. Daughter states pt was discharged from rehab 3 weeks ago and has a visiting nurse whom on Monday felt like pt wasn't fecally impacted. Pt reports bloody hemorrhoids and feeling uncomfortable due to this. Pt also endorses rectal pain, bleeding worsened when straining and decreased appetite (eating once a day). Pt denies abdominal pain, vomiting, fever, dysuria or every being manually disimpacted. Pt ambulates via walker at baseline."      89 yo F with pmh Factor V, PE , chronic resp failure / copd on 3L NC, aib s/p ablation, CHF p/w 1 week of abd pain after not passing any stool despite usage of enemas and miralax / prune juice. Pt denies vomiting but has poor appetite and endorses rectal pain. She was evaluated outpt and was not thought to have impaction. + Flatus. PT was manually disimpacted in ED however ED provider felt there was fecal material that could not be evacuated.         Plan:   - admit to [facility]  - CT noted, + constipation/impaction, no SBO  -Management of constipation per Medicine  -Urology consult ordered for incidental finding on CT A/P:  Mild right hydroureteronephrosis without ureteral calculus. A   nonvisualized distal right ureter urothelial lesion cannot be excluded.  - normal  Bun/Cr, no evidence of UTI,   -No acute urological intervention at this time  -Pt  with recent Hematuria, without urinary symptoms at this time , has an Appt with a Urologist next week as referred by her PMD   Will discuss above with  Dr Hall/Urology team

## 2025-03-28 ENCOUNTER — TRANSCRIPTION ENCOUNTER (OUTPATIENT)
Age: 89
End: 2025-03-28

## 2025-03-28 VITALS
HEART RATE: 70 BPM | DIASTOLIC BLOOD PRESSURE: 36 MMHG | OXYGEN SATURATION: 94 % | TEMPERATURE: 98 F | SYSTOLIC BLOOD PRESSURE: 115 MMHG | RESPIRATION RATE: 18 BRPM

## 2025-03-28 PROCEDURE — 99239 HOSP IP/OBS DSCHRG MGMT >30: CPT

## 2025-03-28 RX ORDER — POLYETHYLENE GLYCOL 3350 17 G/17G
17 POWDER, FOR SOLUTION ORAL
Qty: 510 | Refills: 0 | DISCHARGE
Start: 2025-03-28 | End: 2025-04-26

## 2025-03-28 RX ORDER — POLYETHYLENE GLYCOL 3350 17 G/17G
17 POWDER, FOR SOLUTION ORAL
Qty: 510 | Refills: 0
Start: 2025-03-28 | End: 2025-04-26

## 2025-03-28 RX ORDER — CLOTRIMAZOLE AND BETAMETHASONE DIPROPIONATE 10; .64 MG/G; MG/G
1 CREAM TOPICAL
Refills: 0 | DISCHARGE

## 2025-03-28 RX ADMIN — LACTULOSE 20 GRAM(S): 10 SOLUTION ORAL at 10:08

## 2025-03-28 RX ADMIN — Medication 1 TABLET(S): at 10:08

## 2025-03-28 RX ADMIN — FUROSEMIDE 40 MILLIGRAM(S): 10 INJECTION INTRAMUSCULAR; INTRAVENOUS at 10:09

## 2025-03-28 RX ADMIN — Medication 25 MICROGRAM(S): at 05:38

## 2025-03-28 RX ADMIN — METOPROLOL SUCCINATE 12.5 MILLIGRAM(S): 50 TABLET, EXTENDED RELEASE ORAL at 10:08

## 2025-03-28 RX ADMIN — Medication 1 DOSE(S): at 09:06

## 2025-03-28 RX ADMIN — Medication 81 MILLIGRAM(S): at 10:08

## 2025-03-28 RX ADMIN — Medication 25 MILLIGRAM(S): at 10:09

## 2025-03-28 RX ADMIN — APIXABAN 5 MILLIGRAM(S): 2.5 TABLET, FILM COATED ORAL at 10:08

## 2025-03-28 RX ADMIN — POLYETHYLENE GLYCOL 3350 17 GRAM(S): 17 POWDER, FOR SOLUTION ORAL at 10:09

## 2025-03-28 RX ADMIN — GABAPENTIN 100 MILLIGRAM(S): 400 CAPSULE ORAL at 10:08

## 2025-03-28 RX ADMIN — Medication 40 MILLIGRAM(S): at 06:35

## 2025-03-28 NOTE — DIETITIAN INITIAL EVALUATION ADULT - PERTINENT LABORATORY DATA
03-27    142  |  106  |  20  ----------------------------<  98  3.6   |  35[H]  |  0.95    Ca    9.1      27 Mar 2025 06:29    TPro  7.7  /  Alb  3.3  /  TBili  0.5  /  DBili  x   /  AST  24  /  ALT  13  /  AlkPhos  45  03-26  A1C with Estimated Average Glucose Result: 6.6 % (02-03-25 @ 06:08)  A1C with Estimated Average Glucose Result: 6.2 % (02-02-25 @ 06:54)  A1C with Estimated Average Glucose Result: 6.1 % (11-12-24 @ 07:39)

## 2025-03-28 NOTE — DISCHARGE NOTE NURSING/CASE MANAGEMENT/SOCIAL WORK - NSDCPEFALRISK_GEN_ALL_CORE
For information on Fall & Injury Prevention, visit: [email or web address] OR  [email or web address] OR  https://www.cdc.gov/steadi/patient.html

## 2025-03-28 NOTE — DIETITIAN INITIAL EVALUATION ADULT - ADD RECOMMEND
Advance diet to regular when medically feasible, low-fiber if GI tract sensitivity present  Pt receives Ensure plus, suggest continue with diet advance 1x day  MVI w/ minerals daily to ensure 100% RDA met   Record PO intake in EMR after each meal (flowsheet, nursing.)   Monitor bowel movements, if no BM for >3 days, consider implementing bowel regimen.   suggest Confirm Goals of Care regarding nutrition support. Will provide nutrition/ hydration within goals of care.   Monitor PO intake, tolerance, labs and weight.

## 2025-03-28 NOTE — DISCHARGE NOTE NURSING/CASE MANAGEMENT/SOCIAL WORK - FINANCIAL ASSISTANCE
[facility] provides services at a reduced cost to those who are determined to be eligible through [facility]’s financial assistance program. Information regarding [facility]’s financial assistance program can be found by going to [email or web address] or by calling [phone].

## 2025-03-28 NOTE — DISCHARGE NOTE PROVIDER - PROVIDER TOKENS
PROVIDER:[TOKEN:[[id number]],FOLLOWUP:[1 week]],PROVIDER:[TOKEN:[[id number]],FOLLOWUP:[2 weeks]],PROVIDER:[TOKEN:[[id number]],FOLLOWUP:[2 weeks]]

## 2025-03-28 NOTE — DISCHARGE NOTE PROVIDER - NSDCCPCAREPLAN_GEN_ALL_CORE_FT
PRINCIPAL DISCHARGE DIAGNOSIS  Diagnosis: Constipation  Assessment and Plan of Treatment: pleasae take regular and as directed bowel regimen medicine, follow up in gi office for further monitoring and management      SECONDARY DISCHARGE DIAGNOSES  Diagnosis: Abnormal finding on lung imaging  Assessment and Plan of Treatment: CT chest showing not clear result of abnormal finding. please follw up in pulmonary office for it;s management

## 2025-03-28 NOTE — DISCHARGE NOTE PROVIDER - HOSPITAL COURSE
88 y/o female with a PMHx of HTN, CAD s/p stent placement, CHF, paroxsymal Afib s/p ablation, mitral regurgitation, Factor 5 Leiden mutation, COPD on 3L nasal canula and PEs presents to the ED c/o hematochezia and fecal impaction. Pt states the last time she had a BM was a week ago, however endorses flatulence on day of admission. As per daughter at bedside, pt had miralax and an enema prior to coming here. Pt reports drinking prune juice and stool softeners w/o relief. Daughter states pt was discharged from rehab 3 weeks ago and has a visiting nurse whom on Monday felt like pt wasn't fecally impacted. Pt reports bloody hemorrhoids and feeling uncomfortable due to this. Pt also endorses rectal pain, bleeding worsened when straining and decreased appetite (eating once a day). Pt denies abdominal pain, vomiting, fever, dysuria or ever being manually disimpacted. Pt ambulates via walker at baseline."    #She was diagnosed with severe constipation was given aggressive bowel regimen with which pt has multiple bowel movement, feeling better and eating normally. At this point she is being discharged with bowel care regimen and follow up in GI office for further monitoring and management.    #For her CT scan pulmonary finding- discussed with pt and advised to follow up in pulmonary office for its management. referral given to pt     #For mild hydronephrosis- no intervention at this time, probably constipation itself- follow up in urology office for monitoring and management     Vital Signs Last 24 Hrs  T(C): 36.8 (28 Mar 2025 07:55), Max: 36.8 (28 Mar 2025 07:55)  T(F): 98.2 (28 Mar 2025 07:55), Max: 98.2 (28 Mar 2025 07:55)  HR: 67 (28 Mar 2025 09:09) (61 - 82)  BP: 124/51 (28 Mar 2025 07:55) (122/50 - 139/45)  BP(mean): --  RR: 18 (28 Mar 2025 07:55) (18 - 18)  SpO2: 100% (28 Mar 2025 07:55) (92% - 100%)    Parameters below as of 28 Mar 2025 07:55  Patient On (Oxygen Delivery Method): nasal cannula        General: WN/WD NAD  Head- Atraumatic, normocephalic   Neurology: A&Ox3, nonfocal, CN II to XII intact, power intact 5/5 in all muscle group  HEENT- PERRLA, moist muscous membrane  Neck-supple, no JVD  Respiratory: Air entry equal b/l, CTA   CVS:  S1S2, no murmurs, rubs or gallops  Abdominal: Soft, NT, ND +BS,   Genitourinary- voiding, non palpable bladder  Extremities: No edema, + peripheral pulses  Skin- no rash, no ulcer  Psychiatric- mood stable   LN- no lymphadenopathy     #time spent 45 minutes

## 2025-03-28 NOTE — DIETITIAN INITIAL EVALUATION ADULT - NAME AND PHONE
Tamara Crawford RDN, CDN, [facility]      [phone]   [email or web address]

## 2025-03-28 NOTE — DIETITIAN INITIAL EVALUATION ADULT - OTHER INFO
"88 y/o female with a PMHx of HTN, CAD s/p stent placement, CHF, paroxsymal Afib s/p ablation, mitral regurgitation, Factor 5 Leiden mutation, COPD on 3L nasal canula and PEs presents to the ED c/o hematochezia and fecal impaction. Pt states the last time she had a BM was a week ago, however endorses flatulence today. As per daughter at bedside, pt had miralax and an enema yesterday. Pt reports drinking prune juice and stool softeners w/o relief. Daughter states pt was discharged from rehab 3 weeks ago and has a visiting nurse whom on Monday felt like pt wasn't fecally impacted. Pt reports bloody hemorrhoids and feeling uncomfortable due to this. Pt also endorses rectal pain, bleeding worsened when straining and decreased appetite (eating once a day). Pt denies abdominal pain, vomiting, fever, dysuria or every being manually disimpacted. Pt ambulates via walker at baseline."    Admit dx is constipation  Pt is fecally impacted  Pt is upset and would like to go home  RD bedscale wt is 82 kg   180#   3/28  Previous RD bedscale wt is 84 kg   185# on 2/6/2025  NFPE reveals moderate muscle wasting, fat wasting   HgbA1c is 6.6 on 2/3/2025  Suggest maintain full liquid diet, advance to regular when medically feasible.  Pt receiving Ensure plus  suggest Confirm Goals of Care regarding nutrition support. Will provide nutrition/ hydration within goals of care.   Recommendations to follow in Plan/Intervention

## 2025-03-28 NOTE — DISCHARGE NOTE PROVIDER - NSDCMRMEDTOKEN_GEN_ALL_CORE_FT
Albuterol (Eqv-ProAir HFA) 90 mcg/inh inhalation aerosol: 2 puff(s) inhaled every 6 hours as needed for  Aldactone 25 mg oral tablet: 1 tab(s) orally once a day  apixaban 5 mg oral tablet: 1 tab(s) orally 2 times a day  aspirin 81 mg oral tablet, chewable: 1 tab(s) orally once a day  Breztri Aerosphere 160 mcg-9 mcg-4.8 mcg/inh inhalation aerosol: 2 puff(s) inhaled 2 times a day  fluticasone 50 mcg/inh nasal spray: 1 spray(s) in each nostril once a day (in the morning)  Lasix 40 mg oral tablet: 1 tab(s) orally once a day  levothyroxine 25 mcg (0.025 mg) oral tablet: 1 tab(s) orally once a day  Livalo 2 mg oral tablet: 1 tab(s) orally once a day  loratadine 10 mg oral tablet: 1 tab(s) orally once a day  Metoprolol Tartrate 25 mg oral tablet: 0.5 tab(s) orally 2 times a day in the afternoon and the evening  Multiple Vitamins oral tablet: 1 tab(s) orally once a day  Neurontin 100 mg oral capsule: 1 cap(s) orally once a day (in the morning)  Neurontin 300 mg oral capsule: 1 cap(s) orally once a day (at bedtime)  ocular lubricant preservative-free ophthalmic solution: 1 drop(s) to each affected eye 3 times a day As needed Dry Eyes  polyethylene glycol 3350 oral powder for reconstitution: 17 gram(s) orally once a day hold for diarrhea  rOPINIRole 1 mg oral tablet: 1 tab(s) orally once a day (at bedtime)  ubiquinone 300 mg oral capsule: 1 cap(s) orally once a day  Vitamin C 500 mg oral tablet: 1 tab(s) orally once a day

## 2025-03-28 NOTE — DIETITIAN INITIAL EVALUATION ADULT - ORAL INTAKE PTA/DIET HISTORY
Pt lives with daughter, reports that she eats one meal a day, has not been eating well since November.  She reports that she does not want to eat.  PO intake estimated < 75% ENN > one month.

## 2025-03-28 NOTE — DIETITIAN INITIAL EVALUATION ADULT - PERTINENT MEDS FT
MEDICATIONS  (STANDING):  apixaban 5 milliGRAM(s) Oral every 12 hours  aspirin  chewable 81 milliGRAM(s) Oral daily  fluticasone propionate 50 MICROgram(s)/spray Nasal Spray 1 Spray(s) Both Nostrils two times a day  fluticasone propionate/ salmeterol 100-50 MICROgram(s) Diskus 1 Dose(s) Inhalation two times a day  furosemide    Tablet 40 milliGRAM(s) Oral daily  gabapentin 100 milliGRAM(s) Oral daily  gabapentin 300 milliGRAM(s) Oral at bedtime  lactulose Syrup 20 Gram(s) Oral two times a day  levothyroxine 25 MICROGram(s) Oral daily  metoprolol tartrate 12.5 milliGRAM(s) Oral two times a day  multivitamin 1 Tablet(s) Oral daily  pantoprazole    Tablet 40 milliGRAM(s) Oral before breakfast  polyethylene glycol 3350 17 Gram(s) Oral daily  rOPINIRole 1 milliGRAM(s) Oral at bedtime  senna 2 Tablet(s) Oral at bedtime  spironolactone 25 milliGRAM(s) Oral daily    MEDICATIONS  (PRN):  acetaminophen     Tablet .. 650 milliGRAM(s) Oral every 6 hours PRN Temp greater or equal to 38C (100.4F), Mild Pain (1 - 3)  albuterol    90 MICROgram(s) HFA Inhaler 2 Puff(s) Inhalation every 6 hours PRN Bronchospasm  aluminum hydroxide/magnesium hydroxide/simethicone Suspension 30 milliLiter(s) Oral every 4 hours PRN Dyspepsia  artificial  tears Solution 1 Drop(s) Both EYES three times a day PRN Dry Eyes  melatonin 3 milliGRAM(s) Oral at bedtime PRN Insomnia  ondansetron Injectable 4 milliGRAM(s) IV Push every 8 hours PRN Nausea and/or Vomiting

## 2025-03-28 NOTE — DISCHARGE NOTE NURSING/CASE MANAGEMENT/SOCIAL WORK - PATIENT PORTAL LINK FT
You can access the FollowMyHealth Patient Portal offered by [facility] by registering at the following website: [email or web address]. By joining [employer]’s FollowMyHealth portal, you will also be able to view your health information using other applications (apps) compatible with our system.

## 2025-03-31 ENCOUNTER — NON-APPOINTMENT (OUTPATIENT)
Age: 89
End: 2025-03-31

## 2025-04-01 DIAGNOSIS — Z99.81 DEPENDENCE ON SUPPLEMENTAL OXYGEN: ICD-10-CM

## 2025-04-01 DIAGNOSIS — Z79.82 LONG TERM (CURRENT) USE OF ASPIRIN: ICD-10-CM

## 2025-04-01 DIAGNOSIS — Z90.710 ACQUIRED ABSENCE OF BOTH CERVIX AND UTERUS: ICD-10-CM

## 2025-04-01 DIAGNOSIS — Z79.890 HORMONE REPLACEMENT THERAPY: ICD-10-CM

## 2025-04-01 DIAGNOSIS — K64.9 UNSPECIFIED HEMORRHOIDS: ICD-10-CM

## 2025-04-01 DIAGNOSIS — Z88.8 ALLERGY STATUS TO OTHER DRUGS, MEDICAMENTS AND BIOLOGICAL SUBSTANCES: ICD-10-CM

## 2025-04-01 DIAGNOSIS — G47.00 INSOMNIA, UNSPECIFIED: ICD-10-CM

## 2025-04-01 DIAGNOSIS — Z79.01 LONG TERM (CURRENT) USE OF ANTICOAGULANTS: ICD-10-CM

## 2025-04-01 DIAGNOSIS — Z95.5 PRESENCE OF CORONARY ANGIOPLASTY IMPLANT AND GRAFT: ICD-10-CM

## 2025-04-01 DIAGNOSIS — Z88.2 ALLERGY STATUS TO SULFONAMIDES: ICD-10-CM

## 2025-04-01 DIAGNOSIS — Z79.51 LONG TERM (CURRENT) USE OF INHALED STEROIDS: ICD-10-CM

## 2025-04-01 DIAGNOSIS — K59.00 CONSTIPATION, UNSPECIFIED: ICD-10-CM

## 2025-04-01 DIAGNOSIS — D68.51 ACTIVATED PROTEIN C RESISTANCE: ICD-10-CM

## 2025-04-01 DIAGNOSIS — N13.30 UNSPECIFIED HYDRONEPHROSIS: ICD-10-CM

## 2025-04-01 DIAGNOSIS — J96.10 CHRONIC RESPIRATORY FAILURE, UNSPECIFIED WHETHER WITH HYPOXIA OR HYPERCAPNIA: ICD-10-CM

## 2025-04-01 DIAGNOSIS — Z86.711 PERSONAL HISTORY OF PULMONARY EMBOLISM: ICD-10-CM

## 2025-04-01 DIAGNOSIS — J44.9 CHRONIC OBSTRUCTIVE PULMONARY DISEASE, UNSPECIFIED: ICD-10-CM

## 2025-04-01 DIAGNOSIS — I11.0 HYPERTENSIVE HEART DISEASE WITH HEART FAILURE: ICD-10-CM

## 2025-04-01 DIAGNOSIS — R91.1 SOLITARY PULMONARY NODULE: ICD-10-CM

## 2025-04-01 DIAGNOSIS — I45.10 UNSPECIFIED RIGHT BUNDLE-BRANCH BLOCK: ICD-10-CM

## 2025-04-01 DIAGNOSIS — I50.30 UNSPECIFIED DIASTOLIC (CONGESTIVE) HEART FAILURE: ICD-10-CM

## 2025-04-01 DIAGNOSIS — I25.10 ATHEROSCLEROTIC HEART DISEASE OF NATIVE CORONARY ARTERY WITHOUT ANGINA PECTORIS: ICD-10-CM

## 2025-06-13 ENCOUNTER — INPATIENT (INPATIENT)
Facility: HOSPITAL | Age: 89
LOS: 4 days | Discharge: HOME HEALTH | DRG: 195 | End: 2025-06-18
Attending: HOSPITALIST | Admitting: STUDENT IN AN ORGANIZED HEALTH CARE EDUCATION/TRAINING PROGRAM
Payer: MEDICARE

## 2025-06-13 ENCOUNTER — RESULT REVIEW (OUTPATIENT)
Age: 89
End: 2025-06-13

## 2025-06-13 VITALS — WEIGHT: 188.5 LBS

## 2025-06-13 DIAGNOSIS — J18.9 PNEUMONIA, UNSPECIFIED ORGANISM: ICD-10-CM

## 2025-06-13 DIAGNOSIS — Z98.890 OTHER SPECIFIED POSTPROCEDURAL STATES: Chronic | ICD-10-CM

## 2025-06-13 DIAGNOSIS — Z90.710 ACQUIRED ABSENCE OF BOTH CERVIX AND UTERUS: Chronic | ICD-10-CM

## 2025-06-13 DIAGNOSIS — N60.09 SOLITARY CYST OF UNSPECIFIED BREAST: Chronic | ICD-10-CM

## 2025-06-13 LAB
ALBUMIN SERPL ELPH-MCNC: 3.3 G/DL — SIGNIFICANT CHANGE UP (ref 3.3–5)
ALP SERPL-CCNC: 53 U/L — SIGNIFICANT CHANGE UP (ref 40–120)
ALT FLD-CCNC: 15 U/L — SIGNIFICANT CHANGE UP (ref 12–78)
ANION GAP SERPL CALC-SCNC: 4 MMOL/L — LOW (ref 5–17)
APPEARANCE UR: CLEAR — SIGNIFICANT CHANGE UP
APTT BLD: 31.5 SEC — SIGNIFICANT CHANGE UP (ref 26.1–36.8)
AST SERPL-CCNC: 15 U/L — SIGNIFICANT CHANGE UP (ref 15–37)
BACTERIA # UR AUTO: NEGATIVE /HPF — SIGNIFICANT CHANGE UP
BASE EXCESS BLDV CALC-SCNC: 2.3 MMOL/L — SIGNIFICANT CHANGE UP (ref -2–3)
BASOPHILS # BLD AUTO: 0.05 K/UL — SIGNIFICANT CHANGE UP (ref 0–0.2)
BASOPHILS NFR BLD AUTO: 0.6 % — SIGNIFICANT CHANGE UP (ref 0–2)
BILIRUB SERPL-MCNC: 0.4 MG/DL — SIGNIFICANT CHANGE UP (ref 0.2–1.2)
BILIRUB UR-MCNC: NEGATIVE — SIGNIFICANT CHANGE UP
BUN SERPL-MCNC: 27 MG/DL — HIGH (ref 7–23)
CALCIUM SERPL-MCNC: 9.4 MG/DL — SIGNIFICANT CHANGE UP (ref 8.5–10.1)
CAST: 0 /LPF — SIGNIFICANT CHANGE UP (ref 0–4)
CHLORIDE SERPL-SCNC: 108 MMOL/L — SIGNIFICANT CHANGE UP (ref 96–108)
CO2 SERPL-SCNC: 28 MMOL/L — SIGNIFICANT CHANGE UP (ref 22–31)
COLOR SPEC: YELLOW — SIGNIFICANT CHANGE UP
CREAT SERPL-MCNC: 0.98 MG/DL — SIGNIFICANT CHANGE UP (ref 0.5–1.3)
DIFF PNL FLD: ABNORMAL
EGFR: 55 ML/MIN/1.73M2 — LOW
EGFR: 55 ML/MIN/1.73M2 — LOW
EOSINOPHIL # BLD AUTO: 0.4 K/UL — SIGNIFICANT CHANGE UP (ref 0–0.5)
EOSINOPHIL NFR BLD AUTO: 4.8 % — SIGNIFICANT CHANGE UP (ref 0–6)
FLUAV AG NPH QL: SIGNIFICANT CHANGE UP
FLUBV AG NPH QL: SIGNIFICANT CHANGE UP
GAS PNL BLDV: SIGNIFICANT CHANGE UP
GLUCOSE SERPL-MCNC: 108 MG/DL — HIGH (ref 70–99)
GLUCOSE UR QL: NEGATIVE MG/DL — SIGNIFICANT CHANGE UP
HCO3 BLDV-SCNC: 30 MMOL/L — HIGH (ref 22–29)
HCT VFR BLD CALC: 36.5 % — SIGNIFICANT CHANGE UP (ref 34.5–45)
HGB BLD-MCNC: 11.6 G/DL — SIGNIFICANT CHANGE UP (ref 11.5–15.5)
IMM GRANULOCYTES # BLD AUTO: 0.02 K/UL — SIGNIFICANT CHANGE UP (ref 0–0.07)
IMM GRANULOCYTES NFR BLD AUTO: 0.2 % — SIGNIFICANT CHANGE UP (ref 0–0.9)
INR BLD: 1.35 RATIO — HIGH (ref 0.85–1.16)
KETONES UR QL: NEGATIVE MG/DL — SIGNIFICANT CHANGE UP
LACTATE SERPL-SCNC: 1.1 MMOL/L — SIGNIFICANT CHANGE UP (ref 0.7–2)
LEUKOCYTE ESTERASE UR-ACNC: ABNORMAL
LYMPHOCYTES # BLD AUTO: 1.17 K/UL — SIGNIFICANT CHANGE UP (ref 1–3.3)
LYMPHOCYTES NFR BLD AUTO: 14.1 % — SIGNIFICANT CHANGE UP (ref 13–44)
MCHC RBC-ENTMCNC: 29.1 PG — SIGNIFICANT CHANGE UP (ref 27–34)
MCHC RBC-ENTMCNC: 31.8 G/DL — LOW (ref 32–36)
MCV RBC AUTO: 91.7 FL — SIGNIFICANT CHANGE UP (ref 80–100)
MONOCYTES # BLD AUTO: 0.86 K/UL — SIGNIFICANT CHANGE UP (ref 0–0.9)
MONOCYTES NFR BLD AUTO: 10.4 % — SIGNIFICANT CHANGE UP (ref 2–14)
NEUTROPHILS # BLD AUTO: 5.77 K/UL — SIGNIFICANT CHANGE UP (ref 1.8–7.4)
NEUTROPHILS NFR BLD AUTO: 69.9 % — SIGNIFICANT CHANGE UP (ref 43–77)
NITRITE UR-MCNC: NEGATIVE — SIGNIFICANT CHANGE UP
NRBC # BLD AUTO: 0 K/UL — SIGNIFICANT CHANGE UP (ref 0–0)
NRBC # FLD: 0 K/UL — SIGNIFICANT CHANGE UP (ref 0–0)
NRBC BLD AUTO-RTO: 0 /100 WBCS — SIGNIFICANT CHANGE UP (ref 0–0)
NT-PROBNP SERPL-SCNC: 1921 PG/ML — HIGH (ref 0–450)
PCO2 BLDV: 56 MMHG — HIGH (ref 39–42)
PH BLDV: 7.33 — SIGNIFICANT CHANGE UP (ref 7.32–7.43)
PH UR: 5.5 — SIGNIFICANT CHANGE UP (ref 5–8)
PLATELET # BLD AUTO: 272 K/UL — SIGNIFICANT CHANGE UP (ref 150–400)
PMV BLD: 11.7 FL — SIGNIFICANT CHANGE UP (ref 7–13)
PO2 BLDV: 41 MMHG — SIGNIFICANT CHANGE UP (ref 25–45)
POTASSIUM SERPL-MCNC: 3.7 MMOL/L — SIGNIFICANT CHANGE UP (ref 3.5–5.3)
POTASSIUM SERPL-SCNC: 3.7 MMOL/L — SIGNIFICANT CHANGE UP (ref 3.5–5.3)
PROT SERPL-MCNC: 7.1 GM/DL — SIGNIFICANT CHANGE UP (ref 6–8.3)
PROT UR-MCNC: SIGNIFICANT CHANGE UP MG/DL
PROTHROM AB SERPL-ACNC: 15.5 SEC — HIGH (ref 9.9–13.4)
RBC # BLD: 3.98 M/UL — SIGNIFICANT CHANGE UP (ref 3.8–5.2)
RBC # FLD: 15.2 % — HIGH (ref 10.3–14.5)
RBC CASTS # UR COMP ASSIST: 28 /HPF — HIGH (ref 0–4)
RSV RNA NPH QL NAA+NON-PROBE: SIGNIFICANT CHANGE UP
SAO2 % BLDV: 70 % — SIGNIFICANT CHANGE UP (ref 67–88)
SARS-COV-2 RNA SPEC QL NAA+PROBE: SIGNIFICANT CHANGE UP
SODIUM SERPL-SCNC: 140 MMOL/L — SIGNIFICANT CHANGE UP (ref 135–145)
SOURCE RESPIRATORY: SIGNIFICANT CHANGE UP
SP GR SPEC: 1.02 — SIGNIFICANT CHANGE UP (ref 1–1.03)
SQUAMOUS # UR AUTO: 6 /HPF — HIGH (ref 0–5)
TROPONIN I, HIGH SENSITIVITY RESULT: 22.55 NG/L — SIGNIFICANT CHANGE UP
UROBILINOGEN FLD QL: 0.2 MG/DL — SIGNIFICANT CHANGE UP (ref 0.2–1)
WBC # BLD: 8.27 K/UL — SIGNIFICANT CHANGE UP (ref 3.8–10.5)
WBC # FLD AUTO: 8.27 K/UL — SIGNIFICANT CHANGE UP (ref 3.8–10.5)
WBC UR QL: 6 /HPF — HIGH (ref 0–5)

## 2025-06-13 PROCEDURE — 99285 EMERGENCY DEPT VISIT HI MDM: CPT

## 2025-06-13 PROCEDURE — 99223 1ST HOSP IP/OBS HIGH 75: CPT

## 2025-06-13 PROCEDURE — 85027 COMPLETE CBC AUTOMATED: CPT

## 2025-06-13 PROCEDURE — 97116 GAIT TRAINING THERAPY: CPT | Mod: GP

## 2025-06-13 PROCEDURE — 97162 PT EVAL MOD COMPLEX 30 MIN: CPT | Mod: GP

## 2025-06-13 PROCEDURE — 97530 THERAPEUTIC ACTIVITIES: CPT | Mod: GP

## 2025-06-13 PROCEDURE — 83036 HEMOGLOBIN GLYCOSYLATED A1C: CPT

## 2025-06-13 PROCEDURE — 93970 EXTREMITY STUDY: CPT | Mod: 26

## 2025-06-13 PROCEDURE — 71045 X-RAY EXAM CHEST 1 VIEW: CPT | Mod: 26

## 2025-06-13 PROCEDURE — 80048 BASIC METABOLIC PNL TOTAL CA: CPT

## 2025-06-13 PROCEDURE — 80053 COMPREHEN METABOLIC PANEL: CPT

## 2025-06-13 PROCEDURE — 93970 EXTREMITY STUDY: CPT

## 2025-06-13 PROCEDURE — 83880 ASSAY OF NATRIURETIC PEPTIDE: CPT

## 2025-06-13 PROCEDURE — 71250 CT THORAX DX C-: CPT | Mod: 26

## 2025-06-13 PROCEDURE — 76376 3D RENDER W/INTRP POSTPROCES: CPT | Mod: 26

## 2025-06-13 PROCEDURE — 93306 TTE W/DOPPLER COMPLETE: CPT | Mod: 26

## 2025-06-13 PROCEDURE — 85652 RBC SED RATE AUTOMATED: CPT

## 2025-06-13 PROCEDURE — 86140 C-REACTIVE PROTEIN: CPT

## 2025-06-13 PROCEDURE — 82962 GLUCOSE BLOOD TEST: CPT

## 2025-06-13 PROCEDURE — 94640 AIRWAY INHALATION TREATMENT: CPT

## 2025-06-13 PROCEDURE — 71045 X-RAY EXAM CHEST 1 VIEW: CPT

## 2025-06-13 PROCEDURE — 93356 MYOCRD STRAIN IMG SPCKL TRCK: CPT

## 2025-06-13 PROCEDURE — 84145 PROCALCITONIN (PCT): CPT

## 2025-06-13 PROCEDURE — 93010 ELECTROCARDIOGRAM REPORT: CPT

## 2025-06-13 PROCEDURE — 80061 LIPID PANEL: CPT

## 2025-06-13 PROCEDURE — 71250 CT THORAX DX C-: CPT

## 2025-06-13 PROCEDURE — 36415 COLL VENOUS BLD VENIPUNCTURE: CPT

## 2025-06-13 RX ORDER — EZETIMIBE 10 MG/1
1 TABLET ORAL
Refills: 0 | DISCHARGE

## 2025-06-13 RX ORDER — FUROSEMIDE 10 MG/ML
1 INJECTION INTRAMUSCULAR; INTRAVENOUS
Refills: 0 | DISCHARGE

## 2025-06-13 RX ORDER — IPRATROPIUM BROMIDE AND ALBUTEROL SULFATE .5; 2.5 MG/3ML; MG/3ML
3 SOLUTION RESPIRATORY (INHALATION) EVERY 6 HOURS
Refills: 0 | Status: DISCONTINUED | OUTPATIENT
Start: 2025-06-13 | End: 2025-06-18

## 2025-06-13 RX ORDER — VANCOMYCIN HCL IN 5 % DEXTROSE 1.5G/250ML
1250 PLASTIC BAG, INJECTION (ML) INTRAVENOUS ONCE
Refills: 0 | Status: COMPLETED | OUTPATIENT
Start: 2025-06-13 | End: 2025-06-13

## 2025-06-13 RX ORDER — LIDOCAINE HYDROCHLORIDE 20 MG/ML
1 JELLY TOPICAL
Refills: 0 | DISCHARGE

## 2025-06-13 RX ORDER — ONDANSETRON HCL/PF 4 MG/2 ML
4 VIAL (ML) INJECTION EVERY 8 HOURS
Refills: 0 | Status: DISCONTINUED | OUTPATIENT
Start: 2025-06-13 | End: 2025-06-18

## 2025-06-13 RX ORDER — LEVOTHYROXINE SODIUM 300 MCG
25 TABLET ORAL DAILY
Refills: 0 | Status: DISCONTINUED | OUTPATIENT
Start: 2025-06-13 | End: 2025-06-18

## 2025-06-13 RX ORDER — BIOTIN 10 MG
1 TABLET ORAL
Refills: 0 | DISCHARGE

## 2025-06-13 RX ORDER — APIXABAN 2.5 MG/1
5 TABLET, FILM COATED ORAL
Refills: 0 | Status: DISCONTINUED | OUTPATIENT
Start: 2025-06-13 | End: 2025-06-18

## 2025-06-13 RX ORDER — ACETAMINOPHEN 500 MG/5ML
2 LIQUID (ML) ORAL
Refills: 0 | DISCHARGE

## 2025-06-13 RX ORDER — MAGNESIUM, ALUMINUM HYDROXIDE 200-200 MG
30 TABLET,CHEWABLE ORAL EVERY 4 HOURS
Refills: 0 | Status: DISCONTINUED | OUTPATIENT
Start: 2025-06-13 | End: 2025-06-18

## 2025-06-13 RX ORDER — MELATONIN 5 MG
3 TABLET ORAL AT BEDTIME
Refills: 0 | Status: DISCONTINUED | OUTPATIENT
Start: 2025-06-13 | End: 2025-06-18

## 2025-06-13 RX ORDER — BENZONATATE 100 MG
100 CAPSULE ORAL THREE TIMES A DAY
Refills: 0 | Status: DISCONTINUED | OUTPATIENT
Start: 2025-06-13 | End: 2025-06-18

## 2025-06-13 RX ORDER — TIZANIDINE 4 MG/1
1 TABLET ORAL
Refills: 0 | DISCHARGE

## 2025-06-13 RX ORDER — ACETAMINOPHEN 500 MG/5ML
650 LIQUID (ML) ORAL EVERY 6 HOURS
Refills: 0 | Status: DISCONTINUED | OUTPATIENT
Start: 2025-06-13 | End: 2025-06-18

## 2025-06-13 RX ORDER — SENNA 187 MG
1 TABLET ORAL
Refills: 0 | DISCHARGE

## 2025-06-13 RX ORDER — DEXTROMETHORPHAN HBR, GUAIFENESIN 200 MG/10ML
100 LIQUID ORAL EVERY 6 HOURS
Refills: 0 | Status: DISCONTINUED | OUTPATIENT
Start: 2025-06-13 | End: 2025-06-18

## 2025-06-13 RX ORDER — AZTREONAM 2 G/1
1000 INJECTION, POWDER, LYOPHILIZED, FOR SOLUTION INTRAMUSCULAR; INTRAVENOUS ONCE
Refills: 0 | Status: COMPLETED | OUTPATIENT
Start: 2025-06-13 | End: 2025-06-13

## 2025-06-13 RX ORDER — FUROSEMIDE 10 MG/ML
40 INJECTION INTRAMUSCULAR; INTRAVENOUS ONCE
Refills: 0 | Status: COMPLETED | OUTPATIENT
Start: 2025-06-13 | End: 2025-06-13

## 2025-06-13 RX ADMIN — APIXABAN 5 MILLIGRAM(S): 2.5 TABLET, FILM COATED ORAL at 21:02

## 2025-06-13 RX ADMIN — Medication 166.67 MILLIGRAM(S): at 13:20

## 2025-06-13 RX ADMIN — Medication 3 MILLIGRAM(S): at 21:02

## 2025-06-13 RX ADMIN — FUROSEMIDE 40 MILLIGRAM(S): 10 INJECTION INTRAMUSCULAR; INTRAVENOUS at 13:20

## 2025-06-13 RX ADMIN — Medication 100 MILLIGRAM(S): at 21:02

## 2025-06-13 RX ADMIN — Medication 500 MILLILITER(S): at 11:47

## 2025-06-13 RX ADMIN — AZTREONAM 50 MILLIGRAM(S): 2 INJECTION, POWDER, LYOPHILIZED, FOR SOLUTION INTRAMUSCULAR; INTRAVENOUS at 11:48

## 2025-06-13 NOTE — ED ADULT NURSE NOTE - OBJECTIVE STATEMENT
pt presents to the ED with c/o SOB which began yesterday. Pt is on 3L NC at baseline. pt placed on bedside monitors and O2. EKG complete.

## 2025-06-13 NOTE — H&P ADULT - ASSESSMENT
89-yo F w/PMHx of HTN, CAD CHF A-fib COPD on 3 L nasal cannula at home, PE on Eliquis came in worsening  shortness of breath even at rest. Reports she stop taking her lasix 3 days ago because she doesn't like to pee when she has to go out +  cough productive of clear sputum no chest pain no fevers no abdominal pain nausea or vomiting. Reports she moves with walker, has HHA at home. Feels weak now. On 3 L nc in ER    # Worsening Dyspnea  # ESTEVES /diastolic HF   # Severe pulmonary hypertension  # Hx of LE edema on Lasix  #RBBB  #COPD/chronic resp failure  # R pleural effusion   -  Patient on 3L NC ( at baseline)  - RVP negative   - Pro BNP 1921  - Lactate wnl  - TTE 11/2024  Left ventricular cavity is normal in size. Left ventricular systolic function is normal with an ejection fraction of 60 % Normal right ventricular systolic function Moderate to severe mitral regurgitation. Mild to moderate pulmonic regurgitation. Moderate tricuspid regurgitation. Severe pulmonary hypertension There is mild calcification of the mitral valve annulus.  s/p azethreonam , vanco and Lasix , 500 cc NS in ER   - afebrile, no leukocytosis   - CT CHEST ordered  - Will monitor off abx  - follow TTE   - LE doppler   - cardio and Pulm consulted  - Lasix 40mg IV daily      # Hx of 6 mm right lower lobe pulmonary nodule  - Follow-up CT chest       #  H/o HTN / dyslipidemia / CVA / paroxysmal a-fib / CAD / PE / factor V Leiden on Eliquis  - resume home meds including doac    # DVT pro  Eliquis BID     PT consulted  cardio and pulm consulted

## 2025-06-13 NOTE — PHARMACOTHERAPY INTERVENTION NOTE - COMMENTS
Medication reconciliation completed.  Patient was unable to provide medication information, spoke to daughter Tonia at bedside and they provided current medication list; confirmed with Dr. Nagel Med[facility].     Daughter Tonia confirms that pt has a stock-pile of most meds, scripts filled in March for 30 days pt still has full "big bottles" of.  Pt's list from home shows Lasix 40mg (last prescribed in December 2024) and Dr. First shows 20mg last filled March 2025 for 30 tabs, since pt has stock-piles unable to determine which dose pt is currently taking.    Pt has 2 scripts for Ropinirole, 1mg & 0.25mg.  Pt's home list shows 1mg but not 0.25, unable to confirm if 0.25 is NEW dose or to be added to 1mg.

## 2025-06-13 NOTE — ED PROVIDER NOTE - CLINICAL SUMMARY MEDICAL DECISION MAKING FREE TEXT BOX
89-year-old female history of hypertension CAD CHF A-fib COPD PE on Eliquis on 3 L nasal cannula at home presents to the emergency department for shortness of breath.  Patient states symptoms started over the past 2 days associated with cough productive of clear sputum no chest pain no fevers no abdominal pain nausea or vomiting.  Patient is here with daughter states that over the last week patient was feeling dehydrated so she cut her Lasix dose in half.  No weight gain or worsening leg swelling.  Exam with rales bilaterally 2+ lower extremity pitting edema.  Patient with worsening shortness of breath likely CHF from reducing Lasix dosing lower suspicion for infection such as pneumonia COPD exacerbation low suspicion for PE.  Will check labs EKG symptom control reassess 89-year-old female history of hypertension CAD CHF A-fib COPD PE on Eliquis on 3 L nasal cannula at home presents to the emergency department for shortness of breath.  Patient states symptoms started over the past 2 days associated with cough productive of clear sputum no chest pain no fevers no abdominal pain nausea or vomiting.  Patient is here with daughter states that over the last week patient was feeling dehydrated so she cut her Lasix dose in half.  No weight gain or worsening leg swelling.  Exam with rales bilaterally 2+ lower extremity pitting edema.  Patient with worsening shortness of breath likely CHF from reducing Lasix dosing lower suspicion for infection such as pneumonia COPD exacerbation low suspicion for PE.  Will check labs EKG symptom control reassess  1213All labs imaging reviewed by myself.  X-ray reveals pneumonia port score is 119.  Antibiotics ordered 500 cc of fluid given due to patient's history of fluid overload elevated proBNP and lung exam with rales.  Case discussed with attending hospitalist Dr. Pacheco who accepts

## 2025-06-13 NOTE — ED ADULT NURSE NOTE - NSFALLHARMRISKINTERV_ED_ALL_ED
Assistance OOB with selected safe patient handling equipment if applicable/Assistance with ambulation/Communicate risk of Fall with Harm to all staff, patient, and family/Monitor gait and stability/Provide visual cue: red socks, yellow wristband, yellow gown, etc/Reinforce activity limits and safety measures with patient and family/Bed in lowest position, wheels locked, appropriate side rails in place/Call bell, personal items and telephone in reach/Instruct patient to call for assistance before getting out of bed/chair/stretcher/Non-slip footwear applied when patient is off stretcher/[place] to call system/Physically safe environment - no spills, clutter or unnecessary equipment/Purposeful Proactive Rounding/Room/bathroom lighting operational, light cord in reach

## 2025-06-13 NOTE — PATIENT PROFILE ADULT - VISION (WITH CORRECTIVE LENSES IF THE PATIENT USUALLY WEARS THEM):
Right eye 25% vision/Partially impaired: cannot see medication labels or newsprint, but can see obstacles in path, and the surrounding layout; can count fingers at arm's length

## 2025-06-13 NOTE — ED PROVIDER NOTE - OBJECTIVE STATEMENT
89-year-old female history of hypertension CAD CHF A-fib COPD PE on Eliquis on 3 L nasal cannula at home presents to the emergency department for shortness of breath.  Patient states symptoms started over the past 2 days associated with cough productive of clear sputum no chest pain no fevers no abdominal pain nausea or vomiting.  Patient is here with daughter states that over the last week patient was feeling dehydrated so she cut her Lasix dose in half.  No weight gain or worsening leg swelling.  Exam with rales bilaterally 2+ lower extremity pitting edema.  Patient with worsening shortness of breath likely CHF from reducing Lasix dosing lower suspicion for infection such as pneumonia COPD exacerbation low suspicion for PE.  Will check labs EKG symptom control reassess

## 2025-06-13 NOTE — H&P ADULT - NSHPPHYSICALEXAM_GEN_ALL_CORE
Vital Signs Last 24 Hrs  T(C): 36.4 (13 Jun 2025 13:20), Max: 36.6 (13 Jun 2025 09:17)  T(F): 97.6 (13 Jun 2025 13:20), Max: 97.8 (13 Jun 2025 09:17)  HR: 58 (13 Jun 2025 13:20) (54 - 71)  BP: 153/72 (13 Jun 2025 13:20) (152/51 - 172/57)  BP(mean): 81 (13 Jun 2025 11:43) (81 - 84)  RR: 20 (13 Jun 2025 13:20) (18 - 20)  SpO2: 98% (13 Jun 2025 13:20) (98% - 99%)    Parameters below as of 13 Jun 2025 13:20  Patient On (Oxygen Delivery Method): nasal cannula  O2 Flow (L/min): 3     Constitutional: NAD AAOx3  	Eyes: PERRLA EOMI  	Head: Normocephalic atraumatic  	Mouth: MMM  	Cardiac: regular rate 2+ b/l LE swelling normal pulses  	Resp: rales b/l  	GI: Abd s/nt/nd  	Neuro: grossly normal and intact

## 2025-06-13 NOTE — ED PROVIDER NOTE - PHYSICAL EXAMINATION
Constitutional: NAD AAOx3  Eyes: PERRLA EOMI  Head: Normocephalic atraumatic  Mouth: MMM  Cardiac: regular rate 2+ b/l LE swelling normal pulses  Resp:rales b/l  GI: Abd s/nt/nd  Neuro: grossly normal and intact  Skin: No visible rashes

## 2025-06-13 NOTE — ED PROVIDER NOTE - CARE PLAN
1 Principal Discharge DX:	RML pneumonia  Secondary Diagnosis:	Shortness of breath  Secondary Diagnosis:	Cough

## 2025-06-13 NOTE — PATIENT PROFILE ADULT - FALL HARM RISK - RISK INTERVENTIONS

## 2025-06-13 NOTE — ED ADULT TRIAGE NOTE - CHIEF COMPLAINT QUOTE
Pt presents to ED c/o shortness of breath since yesterday w/ productive cough. Pt is normally on 3L NC w/ SpO2 currently 93-94% on room air. Pt is on eliquis.

## 2025-06-13 NOTE — H&P ADULT - NSTOBACCOSCREENHP_GEN_A_CS
AUTHORIZATION FOR RELEASE OF   CONFIDENTIAL INFORMATION    Dear [facility],    We are seeing Tonya Cohn, date of birth 1932, in the clinic at [facility]. Tere Hughes MD is the patient's PCP. Tonya Cohn has an outstanding lab/procedure at the time we reviewed her chart. In order to help keep her health information updated, she has authorized us to request the following medical record(s):        (  )  MAMMOGRAM                                      (  )  COLONOSCOPY      (  )  PAP SMEAR                                          (  )  OUTSIDE LAB RESULTS     (  )  DEXA SCAN                                          ( x ) EYE EXAM(diabetic) [id number]            (  )  FOOT EXAM                                          (  )  ENTIRE RECORD     (  )  OUTSIDE IMMUNIZATIONS                 (  )  _______________         Please fax records to [facility], [place], Laura A., MD,  [phone].     If you have any questions, please contact Wendy Garcia LPN [facility] at   [phone].     Patient Name: Tonya Cohn  : 1932  Patient Phone #: [phone]     
No

## 2025-06-13 NOTE — H&P ADULT - HISTORY OF PRESENT ILLNESS
89-yo F w/PMHx of HTN, CAD CHF A-fib COPD on 3 L nasal cannula at home, PE on Eliquis came in worsening  shortness of breath even at rest. Reports she stop taking her lasix 3 days ago because she doesn't like to pee when she has to go out +  cough productive of clear sputum no chest pain no fevers no abdominal pain nausea or vomiting. Reports she moves with walker, has HHA at home. Feels weak now. On 3 L nc in ER  Patient was in rehab as well this year.   Per Daughter over the last week patient was feeling dehydrated so she cut her Lasix dose in half.     Recently admitted 3/26 for Constipation and rectal fecal impaction    IN ED   UA large blood small LE WBC 6 RBC 28   RVP negative   Pro BNP 1921  bg 108   Lactate wnl  TTE 11/2024  Left ventricular cavity is normal in size. Left ventricular systolic function is normal with an ejection fraction of 60 % Normal right ventricular systolic function Moderate to severe mitral regurgitation. Mild to moderate pulmonic regurgitation. Moderate tricuspid regurgitation. Severe pulmonary hypertension There is mild calcification of the mitral valve annulus.  s/p azethreonam , vanco and Lasix , 500 cc NS in ER

## 2025-06-14 LAB
A1C WITH ESTIMATED AVERAGE GLUCOSE RESULT: 5.6 % — SIGNIFICANT CHANGE UP (ref 4–5.6)
ALBUMIN SERPL ELPH-MCNC: 3 G/DL — LOW (ref 3.3–5)
ALP SERPL-CCNC: 51 U/L — SIGNIFICANT CHANGE UP (ref 40–120)
ALT FLD-CCNC: 13 U/L — SIGNIFICANT CHANGE UP (ref 12–78)
ANION GAP SERPL CALC-SCNC: 2 MMOL/L — LOW (ref 5–17)
AST SERPL-CCNC: 15 U/L — SIGNIFICANT CHANGE UP (ref 15–37)
BILIRUB SERPL-MCNC: 0.5 MG/DL — SIGNIFICANT CHANGE UP (ref 0.2–1.2)
BUN SERPL-MCNC: 22 MG/DL — SIGNIFICANT CHANGE UP (ref 7–23)
CALCIUM SERPL-MCNC: 9.4 MG/DL — SIGNIFICANT CHANGE UP (ref 8.5–10.1)
CHLORIDE SERPL-SCNC: 106 MMOL/L — SIGNIFICANT CHANGE UP (ref 96–108)
CHOLEST SERPL-MCNC: 154 MG/DL — SIGNIFICANT CHANGE UP
CO2 SERPL-SCNC: 31 MMOL/L — SIGNIFICANT CHANGE UP (ref 22–31)
CREAT SERPL-MCNC: 0.9 MG/DL — SIGNIFICANT CHANGE UP (ref 0.5–1.3)
EGFR: 61 ML/MIN/1.73M2 — SIGNIFICANT CHANGE UP
EGFR: 61 ML/MIN/1.73M2 — SIGNIFICANT CHANGE UP
ESTIMATED AVERAGE GLUCOSE: 114 MG/DL — SIGNIFICANT CHANGE UP (ref 68–114)
GLUCOSE BLDC GLUCOMTR-MCNC: 103 MG/DL — HIGH (ref 70–99)
GLUCOSE SERPL-MCNC: 98 MG/DL — SIGNIFICANT CHANGE UP (ref 70–99)
HCT VFR BLD CALC: 36.1 % — SIGNIFICANT CHANGE UP (ref 34.5–45)
HDLC SERPL-MCNC: 53 MG/DL — SIGNIFICANT CHANGE UP
HGB BLD-MCNC: 11.3 G/DL — LOW (ref 11.5–15.5)
LDLC SERPL-MCNC: 85 MG/DL — SIGNIFICANT CHANGE UP
LIPID PNL WITH DIRECT LDL SERPL: 85 MG/DL — SIGNIFICANT CHANGE UP
MCHC RBC-ENTMCNC: 28.5 PG — SIGNIFICANT CHANGE UP (ref 27–34)
MCHC RBC-ENTMCNC: 31.3 G/DL — LOW (ref 32–36)
MCV RBC AUTO: 90.9 FL — SIGNIFICANT CHANGE UP (ref 80–100)
NONHDLC SERPL-MCNC: 101 MG/DL — SIGNIFICANT CHANGE UP
NRBC # BLD AUTO: 0 K/UL — SIGNIFICANT CHANGE UP (ref 0–0)
NRBC # FLD: 0 K/UL — SIGNIFICANT CHANGE UP (ref 0–0)
NRBC BLD AUTO-RTO: 0 /100 WBCS — SIGNIFICANT CHANGE UP (ref 0–0)
PLATELET # BLD AUTO: 262 K/UL — SIGNIFICANT CHANGE UP (ref 150–400)
PMV BLD: 11.4 FL — SIGNIFICANT CHANGE UP (ref 7–13)
POTASSIUM SERPL-MCNC: 3.4 MMOL/L — LOW (ref 3.5–5.3)
POTASSIUM SERPL-SCNC: 3.4 MMOL/L — LOW (ref 3.5–5.3)
PROT SERPL-MCNC: 6.6 GM/DL — SIGNIFICANT CHANGE UP (ref 6–8.3)
RBC # BLD: 3.97 M/UL — SIGNIFICANT CHANGE UP (ref 3.8–5.2)
RBC # FLD: 15.2 % — HIGH (ref 10.3–14.5)
SODIUM SERPL-SCNC: 139 MMOL/L — SIGNIFICANT CHANGE UP (ref 135–145)
TRIGL SERPL-MCNC: 87 MG/DL — SIGNIFICANT CHANGE UP
WBC # BLD: 7.73 K/UL — SIGNIFICANT CHANGE UP (ref 3.8–10.5)
WBC # FLD AUTO: 7.73 K/UL — SIGNIFICANT CHANGE UP (ref 3.8–10.5)

## 2025-06-14 PROCEDURE — 99233 SBSQ HOSP IP/OBS HIGH 50: CPT

## 2025-06-14 RX ORDER — DIPHENHYDRAMINE HCL 12.5MG/5ML
12.5 ELIXIR ORAL ONCE
Refills: 0 | Status: DISCONTINUED | OUTPATIENT
Start: 2025-06-14 | End: 2025-06-14

## 2025-06-14 RX ORDER — CALAMINE 8% AND ZINC OXIDE 8% 160 MG/ML
1 LOTION TOPICAL
Refills: 0 | Status: DISCONTINUED | OUTPATIENT
Start: 2025-06-14 | End: 2025-06-18

## 2025-06-14 RX ORDER — LIDOCAINE HYDROCHLORIDE 20 MG/ML
1 JELLY TOPICAL DAILY
Refills: 0 | Status: DISCONTINUED | OUTPATIENT
Start: 2025-06-14 | End: 2025-06-18

## 2025-06-14 RX ORDER — METOPROLOL SUCCINATE 50 MG/1
25 TABLET, EXTENDED RELEASE ORAL
Refills: 0 | Status: DISCONTINUED | OUTPATIENT
Start: 2025-06-14 | End: 2025-06-18

## 2025-06-14 RX ORDER — DIPHENHYDRAMINE HCL 12.5MG/5ML
12.5 ELIXIR ORAL ONCE
Refills: 0 | Status: COMPLETED | OUTPATIENT
Start: 2025-06-14 | End: 2025-06-14

## 2025-06-14 RX ADMIN — Medication 12.5 MILLIGRAM(S): at 16:06

## 2025-06-14 RX ADMIN — Medication 650 MILLIGRAM(S): at 08:13

## 2025-06-14 RX ADMIN — LIDOCAINE HYDROCHLORIDE 1 PATCH: 20 JELLY TOPICAL at 23:17

## 2025-06-14 RX ADMIN — Medication 100 MILLIGRAM(S): at 16:02

## 2025-06-14 RX ADMIN — Medication 25 MICROGRAM(S): at 05:24

## 2025-06-14 RX ADMIN — Medication 12.5 MILLIGRAM(S): at 23:18

## 2025-06-14 RX ADMIN — Medication 40 MILLIEQUIVALENT(S): at 10:54

## 2025-06-14 RX ADMIN — IPRATROPIUM BROMIDE AND ALBUTEROL SULFATE 3 MILLILITER(S): .5; 2.5 SOLUTION RESPIRATORY (INHALATION) at 11:16

## 2025-06-14 RX ADMIN — APIXABAN 5 MILLIGRAM(S): 2.5 TABLET, FILM COATED ORAL at 23:18

## 2025-06-14 RX ADMIN — Medication 100 MILLIGRAM(S): at 05:24

## 2025-06-14 RX ADMIN — METOPROLOL SUCCINATE 25 MILLIGRAM(S): 50 TABLET, EXTENDED RELEASE ORAL at 23:46

## 2025-06-14 RX ADMIN — CALAMINE 8% AND ZINC OXIDE 8% 1 APPLICATION(S): 160 LOTION TOPICAL at 20:22

## 2025-06-14 RX ADMIN — APIXABAN 5 MILLIGRAM(S): 2.5 TABLET, FILM COATED ORAL at 10:50

## 2025-06-14 RX ADMIN — Medication 100 MILLIGRAM(S): at 23:18

## 2025-06-14 NOTE — CONSULT NOTE ADULT - SUBJECTIVE AND OBJECTIVE BOX
Patient is a 89y old  Female who presents with a chief complaint of worsening  shortness of breath, ESTEVES (13 Jun 2025 13:37)      HPI:  89-yo F w/PMHx of HTN, CAD CHF A-fib COPD on 3 L nasal cannula at home, PE on Eliquis came in worsening  shortness of breath even at rest. Reports she stop taking her lasix 3 days ago because she doesn't like to pee when she has to go out +  cough productive of clear sputum no chest pain no fevers no abdominal pain nausea or vomiting. Reports she moves with walker, has HHA at home. Feels weak now. On 3 L nc in ER  Patient was in rehab as well this year.   Per Daughter over the last week patient was feeling dehydrated so she cut her Lasix dose in half.     Recently admitted 3/26 for Constipation and rectal fecal impaction    IN ED   UA large blood small LE WBC 6 RBC 28   RVP negative   Pro BNP 1921  bg 108   Lactate wnl  TTE 11/2024  Left ventricular cavity is normal in size. Left ventricular systolic function is normal with an ejection fraction of 60 % Normal right ventricular systolic function Moderate to severe mitral regurgitation. Mild to moderate pulmonic regurgitation. Moderate tricuspid regurgitation. Severe pulmonary hypertension There is mild calcification of the mitral valve annulus.  s/p azethreonam , vanco and Lasix , 500 cc NS in ER  (13 Jun 2025 13:37)      PAST MEDICAL & SURGICAL HISTORY:  Atrial fibrillation      CHF (congestive heart failure)      Pulmonary emboli      Factor 5 Leiden mutation, heterozygous      CAD (coronary artery disease)      Stented coronary artery      Hypothyroid      COPD (chronic obstructive pulmonary disease)      Mitral regurgitation      HTN (hypertension)      H/O: hysterectomy      H/O knee surgery      Cyst of breast, unspecified laterality  S/P excision      H/O cardiac radiofrequency ablation      S/P ablation of atrial fibrillation          MEDICATIONS  (STANDING):  apixaban 5 milliGRAM(s) Oral two times a day  benzonatate 100 milliGRAM(s) Oral three times a day  levothyroxine 25 MICROGram(s) Oral daily    MEDICATIONS  (PRN):  acetaminophen     Tablet .. 650 milliGRAM(s) Oral every 6 hours PRN Temp greater or equal to 38C (100.4F), Mild Pain (1 - 3)  albuterol/ipratropium for Nebulization 3 milliLiter(s) Nebulizer every 6 hours PRN Shortness of Breath and/or Wheezing  aluminum hydroxide/magnesium hydroxide/simethicone Suspension 30 milliLiter(s) Oral every 4 hours PRN Dyspepsia  guaiFENesin Oral Liquid (Sugar-Free) 100 milliGRAM(s) Oral every 6 hours PRN Cough  melatonin 3 milliGRAM(s) Oral at bedtime PRN Insomnia  ondansetron Injectable 4 milliGRAM(s) IV Push every 8 hours PRN Nausea and/or Vomiting      FAMILY HISTORY:  FHx: diabetes mellitus (Father)    FHx: heart disease (Father)        SOCIAL HISTORY:    REVIEW OF SYSTEMS:  CONSTITUTIONAL:    No fatigue, malaise, lethargy.  No fever or chills.  RESPIRATORY:  No cough.  No wheeze.  No hemoptysis.    CARDIOVASCULAR:  No chest pains.  No palpitations. No shortness of breath, No orthopnea or PND.  GASTROINTESTINAL:  No abdominal pain.  No nausea or vomiting.    GENITOURINARY:    No hematuria.    MUSCULOSKELETAL:  No musculoskeletal pain.  No joint swelling.  No arthritis.  NEUROLOGICAL:  No tingling or numbness or weakness.  PSYCHIATRIC:  No confusion  SKIN:  No rashes.            Vital Signs Last 24 Hrs  T(C): 36.4 (13 Jun 2025 23:07), Max: 36.6 (13 Jun 2025 09:17)  T(F): 97.6 (13 Jun 2025 23:07), Max: 97.8 (13 Jun 2025 09:17)  HR: 70 (13 Jun 2025 23:07) (54 - 71)  BP: 134/89 (13 Jun 2025 23:07) (134/89 - 172/57)  BP(mean): 81 (13 Jun 2025 11:43) (81 - 84)  RR: 18 (13 Jun 2025 23:07) (18 - 20)  SpO2: 93% (13 Jun 2025 23:07) (93% - 99%)    Parameters below as of 13 Jun 2025 23:07  Patient On (Oxygen Delivery Method): nasal cannula  O2 Flow (L/min): 3      PHYSICAL EXAM-    Constitutional:  no acute distress     Head: Head is normocephalic and atraumatic.      Neck:  No JVD.     Cardiovascular: Regular rate and rhythm without S3, S4. No murmurs or rubs are appreciated.      Respiratory: Breathsounds are normal. No rales. No wheezing.    Abdomen: Soft, nontender, nondistended with positive bowel sounds.      Extremity: No tenderness. No  pitting edema     Neurologic: The patient is alert and oriented.      Skin: No rash, no obvious lesions noted.      Psychiatric: The patient appears to be emotionally stable.      INTERPRETATION OF TELEMETRY:    ECG: Sinus rythm ,  no ST T changes.     I&O's Detail      LABS:                        11.3   7.73  )-----------( 262      ( 14 Jun 2025 06:19 )             36.1     06-14    139  |  106  |  22  ----------------------------<  98  3.4[L]   |  31  |  0.90    Ca    9.4      14 Jun 2025 06:19    TPro  6.6  /  Alb  3.0[L]  /  TBili  0.5  /  DBili  x   /  AST  15  /  ALT  13  /  AlkPhos  51  06-14        PT/INR - ( 13 Jun 2025 10:10 )   PT: 15.5 sec;   INR: 1.35 ratio         PTT - ( 13 Jun 2025 10:10 )  PTT:31.5 sec  Urinalysis Basic - ( 14 Jun 2025 06:19 )    Color: x / Appearance: x / SG: x / pH: x  Gluc: 98 mg/dL / Ketone: x  / Bili: x / Urobili: x   Blood: x / Protein: x / Nitrite: x   Leuk Esterase: x / RBC: x / WBC x   Sq Epi: x / Non Sq Epi: x / Bacteria: x      I&O's Summary    BNP  RADIOLOGY & ADDITIONAL STUDIES:  < from: TTE W or WO Ultrasound Enhancing Agent (06.13.25 @ 13:49) >   1. Mild left ventricular hypertrophy.   2. Left ventricular cavity is normal in size. Left ventricular systolic function is normal with an ejection fraction visually estimated at 55 to 60 %.   3. The left ventricular diastolic function is indeterminate.   4. Mildly enlarged right ventricular cavity size and normal right ventricular systolic function.   5. Normal left and right atrial size.   6. Mild to moderate mitral regurgitation. The mitral regurgitant jet is directed towards the free wall.   7. Moderate tricuspid regurgitation. The tricuspid regurgant jet is eccentric and directed towards the septum.   8. Estimated pulmonary artery systolic pressure is 68 mmHg, consistent with severe pulmonary hypertension.   9. Trace pulmonic regurgitation.    < end of copied text >   Patient is a 89y old  Female who presents with a chief complaint of worsening  shortness of breath, ESTEVES (13 Jun 2025 13:37)      HPI:  89-yo F w/PMHx of HTN, CAD CHF A-fib COPD on 3 L nasal cannula at home, PE on Eliquis came in worsening  shortness of breath even at rest. Reports she stop taking her lasix 3 days ago because she doesn't like to pee when she has to go out +  cough productive of clear sputum no chest pain no fevers no abdominal pain nausea or vomiting. Reports she moves with walker, has HHA at home. Feels weak now. On 3 L nc in ER  Patient was in rehab as well this year.   Per Daughter over the last week patient was feeling dehydrated so she cut her Lasix dose in half.     Recently admitted 3/26 for Constipation and rectal fecal impaction  6/14- Mrs Wilkins was seen and examined this am.  She states she did not sleep well  Still c/o SOB.    IN ED   UA large blood small LE WBC 6 RBC 28   RVP negative   Pro BNP 1921  bg 108   Lactate wnl  TTE 11/2024  Left ventricular cavity is normal in size. Left ventricular systolic function is normal with an ejection fraction of 60 % Normal right ventricular systolic function Moderate to severe mitral regurgitation. Mild to moderate pulmonic regurgitation. Moderate tricuspid regurgitation. Severe pulmonary hypertension There is mild calcification of the mitral valve annulus.  s/p azethreonam , vanco and Lasix , 500 cc NS in ER  (13 Jun 2025 13:37)      PAST MEDICAL & SURGICAL HISTORY:  Atrial fibrillation      CHF (congestive heart failure)      Pulmonary emboli      Factor 5 Leiden mutation, heterozygous      CAD (coronary artery disease)      Stented coronary artery      Hypothyroid      COPD (chronic obstructive pulmonary disease)      Mitral regurgitation      HTN (hypertension)      H/O: hysterectomy      H/O knee surgery      Cyst of breast, unspecified laterality  S/P excision      H/O cardiac radiofrequency ablation      S/P ablation of atrial fibrillation          MEDICATIONS  (STANDING):  apixaban 5 milliGRAM(s) Oral two times a day  benzonatate 100 milliGRAM(s) Oral three times a day  levothyroxine 25 MICROGram(s) Oral daily    MEDICATIONS  (PRN):  acetaminophen     Tablet .. 650 milliGRAM(s) Oral every 6 hours PRN Temp greater or equal to 38C (100.4F), Mild Pain (1 - 3)  albuterol/ipratropium for Nebulization 3 milliLiter(s) Nebulizer every 6 hours PRN Shortness of Breath and/or Wheezing  aluminum hydroxide/magnesium hydroxide/simethicone Suspension 30 milliLiter(s) Oral every 4 hours PRN Dyspepsia  guaiFENesin Oral Liquid (Sugar-Free) 100 milliGRAM(s) Oral every 6 hours PRN Cough  melatonin 3 milliGRAM(s) Oral at bedtime PRN Insomnia  ondansetron Injectable 4 milliGRAM(s) IV Push every 8 hours PRN Nausea and/or Vomiting      FAMILY HISTORY:  FHx: diabetes mellitus (Father)    FHx: heart disease (Father)        SOCIAL HISTORY: no recent smoking     REVIEW OF SYSTEMS:  CONSTITUTIONAL:    No fatigue, malaise, lethargy.  No fever or chills.  RESPIRATORY:  No cough.  No wheeze.  No hemoptysis.    CARDIOVASCULAR:  No chest pains.  No palpitations. c/o shortness of breath, No orthopnea or PND.  GASTROINTESTINAL:  No abdominal pain.  No nausea or vomiting.    GENITOURINARY:    No hematuria.    MUSCULOSKELETAL:  No musculoskeletal pain.  No joint swelling.  No arthritis.  NEUROLOGICAL:  No tingling or numbness or weakness.  PSYCHIATRIC:  No confusion  SKIN:  No rashes.            Vital Signs Last 24 Hrs  T(C): 36.4 (13 Jun 2025 23:07), Max: 36.6 (13 Jun 2025 09:17)  T(F): 97.6 (13 Jun 2025 23:07), Max: 97.8 (13 Jun 2025 09:17)  HR: 70 (13 Jun 2025 23:07) (54 - 71)  BP: 134/89 (13 Jun 2025 23:07) (134/89 - 172/57)  BP(mean): 81 (13 Jun 2025 11:43) (81 - 84)  RR: 18 (13 Jun 2025 23:07) (18 - 20)  SpO2: 93% (13 Jun 2025 23:07) (93% - 99%)    Parameters below as of 13 Jun 2025 23:07  Patient On (Oxygen Delivery Method): nasal cannula  O2 Flow (L/min): 3      PHYSICAL EXAM-    Constitutional:  no acute distress     Head: Head is normocephalic and atraumatic.      Neck:  No JVD.     Cardiovascular: Regular rate and rhythm without S3, S4. systolic murmur 2/6    Respiratory: Breathsounds are normal. No rales. No wheezing.    Abdomen: Soft, nontender, nondistended with positive bowel sounds.      Extremity: No tenderness. No  pitting edema     Neurologic: The patient is alert and oriented.      Skin: No rash, no obvious lesions noted.      Psychiatric: The patient appears to be emotionally stable.      INTERPRETATION OF TELEMETRY: Sinus rythm    ECG: Sinus rythm ,  T wave inversions in lateral leads. RBBB    I&O's Detail      LABS:                        11.3   7.73  )-----------( 262      ( 14 Jun 2025 06:19 )             36.1     06-14    139  |  106  |  22  ----------------------------<  98  3.4[L]   |  31  |  0.90    Ca    9.4      14 Jun 2025 06:19    TPro  6.6  /  Alb  3.0[L]  /  TBili  0.5  /  DBili  x   /  AST  15  /  ALT  13  /  AlkPhos  51  06-14        PT/INR - ( 13 Jun 2025 10:10 )   PT: 15.5 sec;   INR: 1.35 ratio         PTT - ( 13 Jun 2025 10:10 )  PTT:31.5 sec  Urinalysis Basic - ( 14 Jun 2025 06:19 )    Color: x / Appearance: x / SG: x / pH: x  Gluc: 98 mg/dL / Ketone: x  / Bili: x / Urobili: x   Blood: x / Protein: x / Nitrite: x   Leuk Esterase: x / RBC: x / WBC x   Sq Epi: x / Non Sq Epi: x / Bacteria: x      I&O's Summary    BNP  RADIOLOGY & ADDITIONAL STUDIES:  < from: TTE W or WO Ultrasound Enhancing Agent (06.13.25 @ 13:49) >   1. Mild left ventricular hypertrophy.   2. Left ventricular cavity is normal in size. Left ventricular systolic function is normal with an ejection fraction visually estimated at 55 to 60 %.   3. The left ventricular diastolic function is indeterminate.   4. Mildly enlarged right ventricular cavity size and normal right ventricular systolic function.   5. Normal left and right atrial size.   6. Mild to moderate mitral regurgitation. The mitral regurgitant jet is directed towards the free wall.   7. Moderate tricuspid regurgitation. The tricuspid regurgant jet is eccentric and directed towards the septum.   8. Estimated pulmonary artery systolic pressure is 68 mmHg, consistent with severe pulmonary hypertension.   9. Trace pulmonic regurgitation.    < end of copied text >

## 2025-06-14 NOTE — PHYSICAL THERAPY INITIAL EVALUATION ADULT - ADDITIONAL COMMENTS
Pt lives in a one level home, alone. Has [facility] home. Pt uses RW for ambulation, O2 dependent at home. Has HHA during the day, not at night.

## 2025-06-14 NOTE — PHYSICAL THERAPY INITIAL EVALUATION ADULT - PERTINENT HX OF CURRENT PROBLEM, REHAB EVAL
Pt admitted to  on 6/13/25 secondary to SOB, increased ESTEVES, PNA. H/o COPD, O2 dependent at home, CVA. Pt agreeable to PT.

## 2025-06-15 LAB
ANION GAP SERPL CALC-SCNC: 3 MMOL/L — LOW (ref 5–17)
BUN SERPL-MCNC: 18 MG/DL — SIGNIFICANT CHANGE UP (ref 7–23)
CALCIUM SERPL-MCNC: 9.4 MG/DL — SIGNIFICANT CHANGE UP (ref 8.5–10.1)
CHLORIDE SERPL-SCNC: 108 MMOL/L — SIGNIFICANT CHANGE UP (ref 96–108)
CO2 SERPL-SCNC: 28 MMOL/L — SIGNIFICANT CHANGE UP (ref 22–31)
CREAT SERPL-MCNC: 0.95 MG/DL — SIGNIFICANT CHANGE UP (ref 0.5–1.3)
CULTURE RESULTS: SIGNIFICANT CHANGE UP
EGFR: 57 ML/MIN/1.73M2 — LOW
EGFR: 57 ML/MIN/1.73M2 — LOW
GLUCOSE SERPL-MCNC: 101 MG/DL — HIGH (ref 70–99)
HCT VFR BLD CALC: 37.2 % — SIGNIFICANT CHANGE UP (ref 34.5–45)
HGB BLD-MCNC: 11.8 G/DL — SIGNIFICANT CHANGE UP (ref 11.5–15.5)
MCHC RBC-ENTMCNC: 28.6 PG — SIGNIFICANT CHANGE UP (ref 27–34)
MCHC RBC-ENTMCNC: 31.7 G/DL — LOW (ref 32–36)
MCV RBC AUTO: 90.1 FL — SIGNIFICANT CHANGE UP (ref 80–100)
NRBC # BLD AUTO: 0 K/UL — SIGNIFICANT CHANGE UP (ref 0–0)
NRBC # FLD: 0 K/UL — SIGNIFICANT CHANGE UP (ref 0–0)
NRBC BLD AUTO-RTO: 0 /100 WBCS — SIGNIFICANT CHANGE UP (ref 0–0)
PLATELET # BLD AUTO: 299 K/UL — SIGNIFICANT CHANGE UP (ref 150–400)
PMV BLD: 10.9 FL — SIGNIFICANT CHANGE UP (ref 7–13)
POTASSIUM SERPL-MCNC: 3.9 MMOL/L — SIGNIFICANT CHANGE UP (ref 3.5–5.3)
POTASSIUM SERPL-SCNC: 3.9 MMOL/L — SIGNIFICANT CHANGE UP (ref 3.5–5.3)
RBC # BLD: 4.13 M/UL — SIGNIFICANT CHANGE UP (ref 3.8–5.2)
RBC # FLD: 15.2 % — HIGH (ref 10.3–14.5)
SODIUM SERPL-SCNC: 139 MMOL/L — SIGNIFICANT CHANGE UP (ref 135–145)
SPECIMEN SOURCE: SIGNIFICANT CHANGE UP
WBC # BLD: 7.08 K/UL — SIGNIFICANT CHANGE UP (ref 3.8–10.5)
WBC # FLD AUTO: 7.08 K/UL — SIGNIFICANT CHANGE UP (ref 3.8–10.5)

## 2025-06-15 PROCEDURE — 99232 SBSQ HOSP IP/OBS MODERATE 35: CPT

## 2025-06-15 RX ORDER — FLUTICASONE PROPIONATE 50 UG/1
1 SPRAY, METERED NASAL EVERY 12 HOURS
Refills: 0 | Status: DISCONTINUED | OUTPATIENT
Start: 2025-06-15 | End: 2025-06-18

## 2025-06-15 RX ORDER — ASPIRIN 325 MG
81 TABLET ORAL DAILY
Refills: 0 | Status: DISCONTINUED | OUTPATIENT
Start: 2025-06-15 | End: 2025-06-18

## 2025-06-15 RX ORDER — POLYETHYLENE GLYCOL 3350 17 G/17G
17 POWDER, FOR SOLUTION ORAL DAILY
Refills: 0 | Status: DISCONTINUED | OUTPATIENT
Start: 2025-06-15 | End: 2025-06-18

## 2025-06-15 RX ORDER — GABAPENTIN 400 MG/1
300 CAPSULE ORAL AT BEDTIME
Refills: 0 | Status: DISCONTINUED | OUTPATIENT
Start: 2025-06-15 | End: 2025-06-18

## 2025-06-15 RX ORDER — EZETIMIBE 10 MG/1
10 TABLET ORAL DAILY
Refills: 0 | Status: DISCONTINUED | OUTPATIENT
Start: 2025-06-15 | End: 2025-06-18

## 2025-06-15 RX ORDER — SENNA 187 MG
2 TABLET ORAL AT BEDTIME
Refills: 0 | Status: DISCONTINUED | OUTPATIENT
Start: 2025-06-15 | End: 2025-06-18

## 2025-06-15 RX ORDER — DIPHENHYDRAMINE HCL 12.5MG/5ML
12.5 ELIXIR ORAL ONCE
Refills: 0 | Status: COMPLETED | OUTPATIENT
Start: 2025-06-15 | End: 2025-06-15

## 2025-06-15 RX ORDER — ROPINIROLE 5 MG/1
1 TABLET, FILM COATED ORAL AT BEDTIME
Refills: 0 | Status: DISCONTINUED | OUTPATIENT
Start: 2025-06-15 | End: 2025-06-18

## 2025-06-15 RX ADMIN — Medication 2 TABLET(S): at 21:10

## 2025-06-15 RX ADMIN — Medication 650 MILLIGRAM(S): at 21:10

## 2025-06-15 RX ADMIN — Medication 25 MICROGRAM(S): at 06:17

## 2025-06-15 RX ADMIN — IPRATROPIUM BROMIDE AND ALBUTEROL SULFATE 3 MILLILITER(S): .5; 2.5 SOLUTION RESPIRATORY (INHALATION) at 08:16

## 2025-06-15 RX ADMIN — Medication 100 MILLIGRAM(S): at 14:17

## 2025-06-15 RX ADMIN — FLUTICASONE PROPIONATE 1 SPRAY(S): 50 SPRAY, METERED NASAL at 21:11

## 2025-06-15 RX ADMIN — APIXABAN 5 MILLIGRAM(S): 2.5 TABLET, FILM COATED ORAL at 21:10

## 2025-06-15 RX ADMIN — Medication 3 MILLIGRAM(S): at 21:14

## 2025-06-15 RX ADMIN — METOPROLOL SUCCINATE 25 MILLIGRAM(S): 50 TABLET, EXTENDED RELEASE ORAL at 21:10

## 2025-06-15 RX ADMIN — LIDOCAINE HYDROCHLORIDE 1 PATCH: 20 JELLY TOPICAL at 21:11

## 2025-06-15 RX ADMIN — Medication 12.5 MILLIGRAM(S): at 03:55

## 2025-06-15 RX ADMIN — Medication 100 MILLIGRAM(S): at 21:09

## 2025-06-15 RX ADMIN — APIXABAN 5 MILLIGRAM(S): 2.5 TABLET, FILM COATED ORAL at 12:10

## 2025-06-15 RX ADMIN — ROPINIROLE 1 MILLIGRAM(S): 5 TABLET, FILM COATED ORAL at 21:10

## 2025-06-15 RX ADMIN — Medication 100 MILLIGRAM(S): at 06:17

## 2025-06-15 RX ADMIN — IPRATROPIUM BROMIDE AND ALBUTEROL SULFATE 3 MILLILITER(S): .5; 2.5 SOLUTION RESPIRATORY (INHALATION) at 14:48

## 2025-06-15 RX ADMIN — CALAMINE 8% AND ZINC OXIDE 8% 1 APPLICATION(S): 160 LOTION TOPICAL at 01:56

## 2025-06-15 RX ADMIN — GABAPENTIN 300 MILLIGRAM(S): 400 CAPSULE ORAL at 21:10

## 2025-06-16 ENCOUNTER — TRANSCRIPTION ENCOUNTER (OUTPATIENT)
Age: 89
End: 2025-06-16

## 2025-06-16 LAB
CRP SERPL-MCNC: 8.2 MG/L — HIGH (ref 0–5)
ERYTHROCYTE [SEDIMENTATION RATE] IN BLOOD: 20 MM/HR — SIGNIFICANT CHANGE UP (ref 0–20)
NT-PROBNP SERPL-SCNC: 691 PG/ML — HIGH (ref 0–450)
PROCALCITONIN SERPL-MCNC: 0.04 NG/ML — SIGNIFICANT CHANGE UP (ref 0.02–0.1)

## 2025-06-16 PROCEDURE — 99233 SBSQ HOSP IP/OBS HIGH 50: CPT

## 2025-06-16 RX ORDER — FUROSEMIDE 10 MG/ML
40 INJECTION INTRAMUSCULAR; INTRAVENOUS DAILY
Refills: 0 | Status: DISCONTINUED | OUTPATIENT
Start: 2025-06-16 | End: 2025-06-18

## 2025-06-16 RX ORDER — SPIRONOLACTONE 25 MG
25 TABLET ORAL DAILY
Refills: 0 | Status: DISCONTINUED | OUTPATIENT
Start: 2025-06-16 | End: 2025-06-18

## 2025-06-16 RX ADMIN — Medication 81 MILLIGRAM(S): at 10:36

## 2025-06-16 RX ADMIN — LIDOCAINE HYDROCHLORIDE 1 PATCH: 20 JELLY TOPICAL at 09:11

## 2025-06-16 RX ADMIN — ROPINIROLE 1 MILLIGRAM(S): 5 TABLET, FILM COATED ORAL at 21:26

## 2025-06-16 RX ADMIN — IPRATROPIUM BROMIDE AND ALBUTEROL SULFATE 3 MILLILITER(S): .5; 2.5 SOLUTION RESPIRATORY (INHALATION) at 16:48

## 2025-06-16 RX ADMIN — Medication 25 MICROGRAM(S): at 06:09

## 2025-06-16 RX ADMIN — LIDOCAINE HYDROCHLORIDE 1 PATCH: 20 JELLY TOPICAL at 09:00

## 2025-06-16 RX ADMIN — Medication 3 MILLIGRAM(S): at 21:27

## 2025-06-16 RX ADMIN — LIDOCAINE HYDROCHLORIDE 1 PATCH: 20 JELLY TOPICAL at 21:27

## 2025-06-16 RX ADMIN — Medication 100 MILLIGRAM(S): at 06:09

## 2025-06-16 RX ADMIN — METOPROLOL SUCCINATE 25 MILLIGRAM(S): 50 TABLET, EXTENDED RELEASE ORAL at 10:36

## 2025-06-16 RX ADMIN — APIXABAN 5 MILLIGRAM(S): 2.5 TABLET, FILM COATED ORAL at 21:26

## 2025-06-16 RX ADMIN — APIXABAN 5 MILLIGRAM(S): 2.5 TABLET, FILM COATED ORAL at 10:36

## 2025-06-16 RX ADMIN — FUROSEMIDE 40 MILLIGRAM(S): 10 INJECTION INTRAMUSCULAR; INTRAVENOUS at 10:37

## 2025-06-16 RX ADMIN — FLUTICASONE PROPIONATE 1 SPRAY(S): 50 SPRAY, METERED NASAL at 21:27

## 2025-06-16 RX ADMIN — Medication 100 MILLIGRAM(S): at 21:25

## 2025-06-16 RX ADMIN — EZETIMIBE 10 MILLIGRAM(S): 10 TABLET ORAL at 10:37

## 2025-06-16 RX ADMIN — METOPROLOL SUCCINATE 25 MILLIGRAM(S): 50 TABLET, EXTENDED RELEASE ORAL at 21:26

## 2025-06-16 RX ADMIN — LIDOCAINE HYDROCHLORIDE 1 PATCH: 20 JELLY TOPICAL at 21:26

## 2025-06-16 RX ADMIN — FLUTICASONE PROPIONATE 1 SPRAY(S): 50 SPRAY, METERED NASAL at 10:37

## 2025-06-16 RX ADMIN — Medication 2 TABLET(S): at 21:26

## 2025-06-16 RX ADMIN — GABAPENTIN 300 MILLIGRAM(S): 400 CAPSULE ORAL at 21:25

## 2025-06-16 NOTE — CONSULT NOTE ADULT - SUBJECTIVE AND OBJECTIVE BOX
Patient is a 89y old  Female who presents with a chief complaint of worsening  shortness of breath, ESTEVES (15 Antoine 2025 11:17)      HPI:  89-yo F w/PMHx of HTN, CAD CHF A-fib COPD on 3 L nasal cannula at home, PE on Eliquis came in worsening  shortness of breath even at rest. Reports she stop taking her lasix 3 days ago because she doesn't like to pee when she has to go out +  cough productive of clear sputum no chest pain no fevers no abdominal pain nausea or vomiting. Reports she moves with walker, has HHA at home. Feels weak now. On 3 L nc in ER  Patient was in rehab as well this year.   Per Daughter over the last week patient was feeling dehydrated so she cut her Lasix dose in half.   Recently admitted 3/26 for Constipation and rectal fecal impaction  RVP negative   Pro BNP 1921  TTE 11/2024  Left ventricular cavity is normal in size. Left ventricular systolic function is normal with an ejection fraction of 60 % Normal right ventricular systolic function Moderate to severe mitral regurgitation. Mild to moderate pulmonic regurgitation. Moderate tricuspid regurgitation. Severe pulmonary hypertension There is mild calcification of the mitral valve annulus.  Does not have signs of pneumonia       PAST MEDICAL & SURGICAL HISTORY:  Atrial fibrillation      CHF (congestive heart failure)      Pulmonary emboli      Factor 5 Leiden mutation, heterozygous      CAD (coronary artery disease)      Stented coronary artery      Hypothyroid      COPD (chronic obstructive pulmonary disease)      Mitral regurgitation      HTN (hypertension)      H/O: hysterectomy      H/O knee surgery      Cyst of breast, unspecified laterality  S/P excision      H/O cardiac radiofrequency ablation      S/P ablation of atrial fibrillation          PREVIOUS DIAGNOSTIC TESTING:      MEDICATIONS  (STANDING):  apixaban 5 milliGRAM(s) Oral two times a day  aspirin  chewable 81 milliGRAM(s) Oral daily  benzonatate 100 milliGRAM(s) Oral three times a day  ezetimibe 10 milliGRAM(s) Oral daily  fluticasone propionate 50 MICROgram(s)/spray Nasal Spray 1 Spray(s) Both Nostrils every 12 hours  furosemide   Injectable 40 milliGRAM(s) IV Push daily  gabapentin 300 milliGRAM(s) Oral at bedtime  levothyroxine 25 MICROGram(s) Oral daily  lidocaine   4% Patch 1 Patch Transdermal daily  lidocaine   4% Patch 1 Patch Transdermal daily  metoprolol tartrate 25 milliGRAM(s) Oral two times a day  rOPINIRole 1 milliGRAM(s) Oral at bedtime  senna 2 Tablet(s) Oral at bedtime    MEDICATIONS  (PRN):  acetaminophen     Tablet .. 650 milliGRAM(s) Oral every 6 hours PRN Temp greater or equal to 38C (100.4F), Mild Pain (1 - 3)  albuterol/ipratropium for Nebulization 3 milliLiter(s) Nebulizer every 6 hours PRN Shortness of Breath and/or Wheezing  aluminum hydroxide/magnesium hydroxide/simethicone Suspension 30 milliLiter(s) Oral every 4 hours PRN Dyspepsia  calamine/zinc oxide Lotion 1 Application(s) Topical two times a day PRN Rash  Diphenhydramine 2% Topical Analgesic Gel 1 Application(s) 1 Application(s) Topical four times a day PRN Rash/Itching  guaiFENesin Oral Liquid (Sugar-Free) 100 milliGRAM(s) Oral every 6 hours PRN Cough  melatonin 3 milliGRAM(s) Oral at bedtime PRN Insomnia  ondansetron Injectable 4 milliGRAM(s) IV Push every 8 hours PRN Nausea and/or Vomiting  polyethylene glycol 3350 17 Gram(s) Oral daily PRN for constipation      FAMILY HISTORY:  FHx: diabetes mellitus (Father)    FHx: heart disease (Father)        SOCIAL HISTORY:  ***    REVIEW OF SYSTEM:  Pertinent items are noted in HPI.      Vital Signs Last 24 Hrs  T(C): 36.3 (16 Jun 2025 00:21), Max: 36.4 (15 Antoine 2025 15:32)  T(F): 97.3 (16 Jun 2025 00:21), Max: 97.5 (15 Antoine 2025 15:32)  HR: 59 (16 Jun 2025 00:21) (59 - 73)  BP: 144/59 (16 Jun 2025 00:21) (138/59 - 152/66)  BP(mean): --  RR: 18 (15 Antoine 2025 15:32) (18 - 18)  SpO2: 100% (16 Jun 2025 00:21) (96% - 100%)    Parameters below as of 16 Jun 2025 00:21  Patient On (Oxygen Delivery Method): nasal cannula        I&O's Summary    15 Antoine 2025 07:01  -  16 Jun 2025 07:00  --------------------------------------------------------  IN: 0 mL / OUT: 850 mL / NET: -850 mL      PHYSICAL EXAM  General Appearance: cooperative, no acute distress,   HEENT: PERRL, conjunctiva clear, EOM's intact, non injected pharynx, no exudate, TM   normal  Neck: Supple, , no adenopathy, thyroid: not enlarged, no carotid bruit or JVD  Back: Symmetric, no  tenderness,no soft tissue tenderness  Lungs: diminished at the bases   Heart: Regular rate and rhythm, S1, S2 normal, no murmur, rub or gallop  Abdomen: Soft, non-tender, bowel sounds active , no hepatosplenomegaly  Extremities: no cyanosis or edema, no joint swelling  Skin: Skin color, texture normal, no rashes   Neurologic: Alert and oriented X3 , cranial nerves intact, sensory and motor normal,    ECG:    LABS:                          11.8   7.08  )-----------( 299      ( 15 Antoine 2025 07:25 )             37.2     06-15    139  |  108  |  18  ----------------------------<  101[H]  3.9   |  28  |  0.95    Ca    9.4      15 Antoine 2025 07:25          Lipid Panel  154  53  --  87        Urinalysis Basic - ( 15 Antoine 2025 07:25 )    Color: x / Appearance: x / SG: x / pH: x  Gluc: 101 mg/dL / Ketone: x  / Bili: x / Urobili: x   Blood: x / Protein: x / Nitrite: x   Leuk Esterase: x / RBC: x / WBC x   Sq Epi: x / Non Sq Epi: x / Bacteria: x            RADIOLOGY & ADDITIONAL STUDIES:

## 2025-06-16 NOTE — DISCHARGE NOTE NURSING/CASE MANAGEMENT/SOCIAL WORK - FINANCIAL ASSISTANCE
[facility] provides services at a reduced cost to those who are determined to be eligible through [facility]’s financial assistance program. Information regarding [facility]’s financial assistance program can be found by going to [email or web address] or by calling [phone].

## 2025-06-16 NOTE — DISCHARGE NOTE NURSING/CASE MANAGEMENT/SOCIAL WORK - NSDCVIVACCINE_GEN_ALL_CORE_FT
[Dear  ___] : Dear  [unfilled], [Consult Letter:] : I had the pleasure of evaluating your patient, [unfilled]. [Please see my note below.] : Please see my note below. [Consult Closing:] : Thank you very much for allowing me to participate in the care of this patient.  If you have any questions, please do not hesitate to contact me. [Sincerely,] : Sincerely, [FreeTextEntry3] : Anthony Adair MD FCCP\par Pulmonary/Critical Care/Sleep Medicine\par Department of Internal Medicine\par \par [employer] [facility] [employer]\par  No Vaccines Administered.

## 2025-06-16 NOTE — CONSULT NOTE ADULT - ASSESSMENT
1) Pulmonary Hypertension (CTEPH/HF)  2) Dyspnea  3) AF  4) Hypoxemia  5) ADHF    89-yo F w/PMHx of HTN, CAD CHF A-fib COPD on 3 L nasal cannula at home, PE on Eliquis came in worsening  shortness of breath even at rest. Reports she stop taking her lasix 3 days ago because she doesn't like to pee when she has to go out +  cough productive of clear sputum no chest pain no fevers no abdominal pain nausea or vomiting. Reports she moves with walker, has HHA at home. Feels weak now. On 3 L nc in ER  Pro BNP 1921  TTE 11/2024  Left ventricular cavity is normal in size. Left ventricular systolic function is normal with an ejection fraction of 60 % Normal right ventricular systolic function Moderate to severe mitral regurgitation. Mild to moderate pulmonic regurgitation. Moderate tricuspid regurgitation. Severe pulmonary hypertension There is mild calcification of the mitral valve annulus.  Does not have signs of pneumonia   CT chest shows mosaic attenuation/groundglass opacities / bilateral effusions and findings to suggest ADHF  PAP 74/20, PCWP 22mmHg. Patient was supposed to start Riociguat but had insurance issues  At this time, diuresis is imperative while monitoring I/O closely  Discussed with hospitalist  Follow up BNP/ESR/CRP/Procalcitonin  Will continue to monitor   
Shortness of breath- overall does not seem to be significantly volume overloaded.   She is being treated for pneumonia with aztreonam.  can watch volume status off diuretics for now till she gets better from pneumonia.    CAD- continue home meds    Afib- on elliquis  no major bleeds.    Severe pulmonary hypertension- she will need evaluation with right heart cath if not done already.  Will discuss with [facility] team monday.  Reviewed 2  D echocardiogram.    Constipation- close monitoring and laxatives as needed.     Other medical issues- Management per primary team.   Thank you for allowing me to participate in the care of this patient. Please feel free to contact me with any questions.

## 2025-06-16 NOTE — CONSULT NOTE ADULT - SUBJECTIVE AND OBJECTIVE BOX
Patient is a 89y old  Female who presents with a chief complaint of worsening  shortness of breath, ESTEVES (16 Jun 2025 12:08)    ________________________________  SKeegan DEL CASTILLO is a 89y year old Female with a past medical history of paroxysmal atrial fibrillation on anticoagulation status post ablation, hypoxic resp failure on home O2, CAD status post PCI to the LAD in 2017, with repeat coronary treatment 2022 showing patent stents, chronic diastolic heart failure, history of CVA, mitral valve regurgitation, history of PE with factor V Leiden, hypertension, hyperlipidemia, obesity, severe pulm hypertension without response to nitric oxide on right heart cath and COPD.    Admitted for shortness of breath even at rest. Reports she stop taking her lasix 3 days ago because she doesn't like to pee when she has to go out +  cough productive of clear sputum no chest pain no fevers no abdominal pain nausea or vomiting. Reports she moves with walker, has HHA at home. Feels weak now. On 3 L nc in ER  Patient was in rehab as well this year.   Per Daughter over the last week patient was feeling dehydrated so she cut her Lasix dose in half.     Recently admitted 3/26 for Constipation and rectal fecal impaction      Echocardiogram 6/13/25      1. Mild left ventricular hypertrophy.   2. Left ventricular cavity is normal in size. Left ventricular systolic function is normal with an ejection fraction visually estimated at 55 to 60 %.   3. The left ventricular diastolic function is indeterminate.   4. Mildly enlarged right ventricular cavity size and normal right ventricular systolic function.   5. Normal left and right atrial size.   6. Mild to moderate mitral regurgitation. The mitral regurgitant jet is directed towards the free wall.   7. Moderate tricuspid regurgitation. The tricuspid regurgant jet is eccentric and directed towards the septum.   8. Estimated pulmonary artery systolic pressure is 68 mmHg, consistent with severe pulmonary hypertension.   9. Trace pulmonic regurgitation.       ________________________________  Review of systems: A 10 point review of system has been performed, and is negative except for what has been mentioned in the above history of present illness.     PAST MEDICAL & SURGICAL HISTORY:  Atrial fibrillation      CHF (congestive heart failure)      Pulmonary emboli      Factor 5 Leiden mutation, heterozygous      CAD (coronary artery disease)      Stented coronary artery      Hypothyroid      COPD (chronic obstructive pulmonary disease)      Mitral regurgitation      HTN (hypertension)      H/O: hysterectomy      H/O knee surgery      Cyst of breast, unspecified laterality  S/P excision      H/O cardiac radiofrequency ablation      S/P ablation of atrial fibrillation         ALLERGIES:  Ceftin (Other (Moderate))  statins (Muscle Pain)  sulfa drugs (Other)  nisoldipine (Unknown)  Repatha (Unknown)  Macrodantin (Other)  tadalafil (Muscle Pain)  Motrin (Other)  flu vaccines (Other)    Home Medications:  Albuterol (Eqv-ProAir HFA) 90 mcg/inh inhalation aerosol: 2 puff(s) inhaled every 4 to 6 hours as needed for  shortness of breath and/or wheezing (13 Jun 2025 16:38)  Aldactone 25 mg oral tablet: 1 tab(s) orally once a day (13 Jun 2025 16:41)  apixaban 5 mg oral tablet: 1 tab(s) orally 2 times a day (13 Jun 2025 16:41)  aspirin 81 mg oral tablet, chewable: 1 tab(s) orally once a day (13 Jun 2025 16:41)  biotin 1000 mcg oral tablet: 1 tab(s) orally once a day (13 Jun 2025 16:48)  Breztri Aerosphere 160 mcg-9 mcg-4.8 mcg/inh inhalation aerosol: 2 puff(s) inhaled 2 times a day (13 Jun 2025 16:41)  ezetimibe 10 mg oral tablet: 1 tab(s) orally once a day (13 Jun 2025 16:49)  fluticasone 50 mcg/inh nasal spray: 1 spray(s) in each nostril once a day (in the morning) (13 Jun 2025 16:41)  furosemide: 20 OR 40mg once daily   ***pt&#x27;s list from home shows 40mg, 40mg last filled when pt left Rehab facility, 20mg last filled 3/3/25 and daughter confirms pt has stock-pile of most meds. Unsure if pt taking 40mg or 20 at home*** (13 Jun 2025 16:49)  levothyroxine 25 mcg (0.025 mg) oral tablet: 1 tab(s) orally once a day (13 Jun 2025 16:41)  lidocaine 5% topical film: Apply topically to affected area once a day as needed for knee pain (13 Jun 2025 16:49)  loratadine 10 mg oral tablet: 1 tab(s) orally once a day (13 Jun 2025 16:41)  Metoprolol Tartrate 25 mg oral tablet: 1 tab(s) orally 2 times a day (13 Jun 2025 16:40)  Neurontin 300 mg oral capsule: 1 cap(s) orally once a day (at bedtime) (13 Jun 2025 16:41)  polyethylene glycol 3350 oral powder for reconstitution: 17 gram(s) orally once a day as needed for  constipation hold for diarrhea (13 Jun 2025 16:41)  rOPINIRole 0.25 mg oral tablet: pt has both 1mg (last filled 3/3/25 but pt had stock-pile) and 0.25mg (last filled 6/3/25), daughter not sure if pt is getting both but states that pts Restless Leg had been getting worse recently (13 Jun 2025 16:44)  rOPINIRole 1 mg oral tablet: 1 tab(s) orally once a day (at bedtime) (13 Jun 2025 16:41)  senna (sennosides) 8.6 mg oral tablet: 1 tab(s) orally once a day (at bedtime) (13 Jun 2025 16:44)  tiZANidine 2 mg oral tablet: 1 tab(s) orally once a day (at bedtime) as needed for  severe pain (13 Jun 2025 16:49)  Tylenol Extra Strength 500 mg oral tablet: 2 tab(s) orally once a day (at bedtime) (13 Jun 2025 16:49)  ubiquinone 300 mg oral capsule: 1 cap(s) orally once a day (13 Jun 2025 16:41)    MEDICATIONS  (STANDING):  apixaban 5 milliGRAM(s) Oral two times a day  aspirin  chewable 81 milliGRAM(s) Oral daily  benzonatate 100 milliGRAM(s) Oral three times a day  ezetimibe 10 milliGRAM(s) Oral daily  fluticasone propionate 50 MICROgram(s)/spray Nasal Spray 1 Spray(s) Both Nostrils every 12 hours  furosemide   Injectable 40 milliGRAM(s) IV Push daily  gabapentin 300 milliGRAM(s) Oral at bedtime  levothyroxine 25 MICROGram(s) Oral daily  lidocaine   4% Patch 1 Patch Transdermal daily  lidocaine   4% Patch 1 Patch Transdermal daily  metoprolol tartrate 25 milliGRAM(s) Oral two times a day  rOPINIRole 1 milliGRAM(s) Oral at bedtime  senna 2 Tablet(s) Oral at bedtime  spironolactone 25 milliGRAM(s) Oral daily    MEDICATIONS  (PRN):  acetaminophen     Tablet .. 650 milliGRAM(s) Oral every 6 hours PRN Temp greater or equal to 38C (100.4F), Mild Pain (1 - 3)  albuterol/ipratropium for Nebulization 3 milliLiter(s) Nebulizer every 6 hours PRN Shortness of Breath and/or Wheezing  aluminum hydroxide/magnesium hydroxide/simethicone Suspension 30 milliLiter(s) Oral every 4 hours PRN Dyspepsia  calamine/zinc oxide Lotion 1 Application(s) Topical two times a day PRN Rash  Diphenhydramine 2% Topical Analgesic Gel 1 Application(s) 1 Application(s) Topical four times a day PRN Rash/Itching  guaiFENesin Oral Liquid (Sugar-Free) 100 milliGRAM(s) Oral every 6 hours PRN Cough  melatonin 3 milliGRAM(s) Oral at bedtime PRN Insomnia  ondansetron Injectable 4 milliGRAM(s) IV Push every 8 hours PRN Nausea and/or Vomiting  polyethylene glycol 3350 17 Gram(s) Oral daily PRN for constipation    Vital Signs Last 24 Hrs  T(C): 36.6 (16 Jun 2025 16:40), Max: 36.6 (16 Jun 2025 16:40)  T(F): 97.9 (16 Jun 2025 16:40), Max: 97.9 (16 Jun 2025 16:40)  HR: 54 (16 Jun 2025 17:01) (50 - 73)  BP: 135/50 (16 Jun 2025 16:40) (135/50 - 148/62)  BP(mean): --  RR: 18 (16 Jun 2025 16:40) (18 - 19)  SpO2: 97% (16 Jun 2025 16:40) (97% - 100%)    Parameters below as of 16 Jun 2025 16:40  Patient On (Oxygen Delivery Method): nasal cannula      I&O's Summary    15 Antoine 2025 07:01  -  16 Jun 2025 07:00  --------------------------------------------------------  IN: 0 mL / OUT: 850 mL / NET: -850 mL    16 Jun 2025 07:01  -  16 Jun 2025 20:05  --------------------------------------------------------  IN: 0 mL / OUT: 1350 mL / NET: -1350 mL      ________________________________  GENERAL APPEARANCE:  No acute distress  HEAD: normocephalic, atraumatic  NECK: supple, no jugular venous distention, no carotid bruit    HEART: Regular rate and rhythm, S1, S2 normal, 1/6 murmur    CHEST:  No anterior chest wall tenderness    LUNGS:  crackles    ABDOMEN: soft, nontender, nondistended, with positive bowel sounds appreciated  EXTREMITIES: no edema.   NEURO: awake, some confusion    SKIN:  Dry  ________________________________      ECG: Sinus Rhythm RBBB    LABS:                        11.8   7.08  )-----------( 299      ( 15 Antoine 2025 07:25 )             37.2             06-15    139  |  108  |  18  ----------------------------<  101[H]  3.9   |  28  |  0.95    Ca    9.4      15 Antoine 2025 07:25        Lipid Panel  Chl 154  HDL 53  LDL --  Trg 87    Urinalysis Basic - ( 15 Antoine 2025 07:25 )    Color: x / Appearance: x / SG: x / pH: x  Gluc: 101 mg/dL / Ketone: x  / Bili: x / Urobili: x   Blood: x / Protein: x / Nitrite: x   Leuk Esterase: x / RBC: x / WBC x   Sq Epi: x / Non Sq Epi: x / Bacteria: x          Urinalysis Basic - ( 15 Antoine 2025 07:25 )    Color: x / Appearance: x / SG: x / pH: x  Gluc: 101 mg/dL / Ketone: x  / Bili: x / Urobili: x   Blood: x / Protein: x / Nitrite: x   Leuk Esterase: x / RBC: x / WBC x   Sq Epi: x / Non Sq Epi: x / Bacteria: x             ________________________________    RADIOLOGY & ADDITIONAL STUDIES:   IMPRESSION:  Small bilateral pleural effusions and bilateral groundglass opacities,   probably pulmonary edema.    --- End of Report ---  ________________________________    ASSESSMENT:  Chronic diastolic heart failure, w/ acute   Paroxysmal atrial fibrillation status post ablation  History of CVA  History of CAD status post prior PCI to LAD in 2017 with cardiac cath in 2022 showing patent stent  Severe pulm hypertension with no response to nitric oxide  History of PE with factor V Leiden      PLAN:  Cont IV lasix  s/p Abx  Cont anticoagulation  Cont BB  Called dtr, no ans. VM left  ____________________________________________  (Dragon Dictation software used). Thank you for allowing me to participate in the care of your patient. Please contact me should any questions arise.    NEFTALI Forte DO, FACC  Office: [phone]

## 2025-06-16 NOTE — CONSULT NOTE ADULT - REASON FOR ADMISSION
worsening  shortness of breath, ESTEVES

## 2025-06-16 NOTE — DISCHARGE NOTE NURSING/CASE MANAGEMENT/SOCIAL WORK - PATIENT PORTAL LINK FT
You can access the FollowMyHealth Patient Portal offered by [facility] by registering at the following website: [email or web address]. By joining [employer]’s FollowMyHealth portal, you will also be able to view your health information using other applications (apps) compatible with our system.

## 2025-06-17 LAB — NT-PROBNP SERPL-SCNC: 594 PG/ML — HIGH (ref 0–450)

## 2025-06-17 PROCEDURE — 71045 X-RAY EXAM CHEST 1 VIEW: CPT | Mod: 26

## 2025-06-17 PROCEDURE — 99232 SBSQ HOSP IP/OBS MODERATE 35: CPT

## 2025-06-17 RX ADMIN — FLUTICASONE PROPIONATE 1 SPRAY(S): 50 SPRAY, METERED NASAL at 10:42

## 2025-06-17 RX ADMIN — Medication 650 MILLIGRAM(S): at 22:59

## 2025-06-17 RX ADMIN — Medication 25 MICROGRAM(S): at 05:21

## 2025-06-17 RX ADMIN — Medication 25 MILLIGRAM(S): at 10:42

## 2025-06-17 RX ADMIN — ROPINIROLE 1 MILLIGRAM(S): 5 TABLET, FILM COATED ORAL at 21:27

## 2025-06-17 RX ADMIN — FLUTICASONE PROPIONATE 1 SPRAY(S): 50 SPRAY, METERED NASAL at 21:28

## 2025-06-17 RX ADMIN — Medication 100 MILLIGRAM(S): at 21:36

## 2025-06-17 RX ADMIN — FUROSEMIDE 40 MILLIGRAM(S): 10 INJECTION INTRAMUSCULAR; INTRAVENOUS at 10:42

## 2025-06-17 RX ADMIN — LIDOCAINE HYDROCHLORIDE 1 PATCH: 20 JELLY TOPICAL at 21:30

## 2025-06-17 RX ADMIN — Medication 81 MILLIGRAM(S): at 10:42

## 2025-06-17 RX ADMIN — Medication 100 MILLIGRAM(S): at 13:56

## 2025-06-17 RX ADMIN — Medication 2 TABLET(S): at 21:27

## 2025-06-17 RX ADMIN — GABAPENTIN 300 MILLIGRAM(S): 400 CAPSULE ORAL at 21:27

## 2025-06-17 RX ADMIN — Medication 650 MILLIGRAM(S): at 23:30

## 2025-06-17 RX ADMIN — IPRATROPIUM BROMIDE AND ALBUTEROL SULFATE 3 MILLILITER(S): .5; 2.5 SOLUTION RESPIRATORY (INHALATION) at 21:04

## 2025-06-17 RX ADMIN — APIXABAN 5 MILLIGRAM(S): 2.5 TABLET, FILM COATED ORAL at 21:27

## 2025-06-17 RX ADMIN — METOPROLOL SUCCINATE 25 MILLIGRAM(S): 50 TABLET, EXTENDED RELEASE ORAL at 10:42

## 2025-06-17 RX ADMIN — Medication 100 MILLIGRAM(S): at 05:21

## 2025-06-17 RX ADMIN — LIDOCAINE HYDROCHLORIDE 1 PATCH: 20 JELLY TOPICAL at 21:28

## 2025-06-17 RX ADMIN — EZETIMIBE 10 MILLIGRAM(S): 10 TABLET ORAL at 10:42

## 2025-06-17 RX ADMIN — APIXABAN 5 MILLIGRAM(S): 2.5 TABLET, FILM COATED ORAL at 10:34

## 2025-06-17 RX ADMIN — Medication 3 MILLIGRAM(S): at 23:02

## 2025-06-17 NOTE — PROGRESS NOTE ADULT - TIME BILLING
Time spent  coordinating the patient's care. This includes reviewing documentation pertinent to this admission, results and imaging. Further tests, medications, and procedures have been ordered as indicated. Laboratory results and the plan of care were communicated to the patient. Discussed care plan with consultants including . Supporting documentation was completed and added to the patient's chart.

## 2025-06-18 ENCOUNTER — TRANSCRIPTION ENCOUNTER (OUTPATIENT)
Age: 89
End: 2025-06-18

## 2025-06-18 VITALS
TEMPERATURE: 97 F | SYSTOLIC BLOOD PRESSURE: 144 MMHG | DIASTOLIC BLOOD PRESSURE: 65 MMHG | RESPIRATION RATE: 18 BRPM | OXYGEN SATURATION: 97 % | HEART RATE: 62 BPM

## 2025-06-18 LAB
ANION GAP SERPL CALC-SCNC: 2 MMOL/L — LOW (ref 5–17)
BUN SERPL-MCNC: 27 MG/DL — HIGH (ref 7–23)
CALCIUM SERPL-MCNC: 9.4 MG/DL — SIGNIFICANT CHANGE UP (ref 8.5–10.1)
CHLORIDE SERPL-SCNC: 107 MMOL/L — SIGNIFICANT CHANGE UP (ref 96–108)
CO2 SERPL-SCNC: 31 MMOL/L — SIGNIFICANT CHANGE UP (ref 22–31)
CREAT SERPL-MCNC: 1.01 MG/DL — SIGNIFICANT CHANGE UP (ref 0.5–1.3)
CULTURE RESULTS: SIGNIFICANT CHANGE UP
CULTURE RESULTS: SIGNIFICANT CHANGE UP
EGFR: 53 ML/MIN/1.73M2 — LOW
EGFR: 53 ML/MIN/1.73M2 — LOW
GLUCOSE SERPL-MCNC: 93 MG/DL — SIGNIFICANT CHANGE UP (ref 70–99)
NT-PROBNP SERPL-SCNC: 523 PG/ML — HIGH (ref 0–450)
POTASSIUM SERPL-MCNC: 3.7 MMOL/L — SIGNIFICANT CHANGE UP (ref 3.5–5.3)
POTASSIUM SERPL-SCNC: 3.7 MMOL/L — SIGNIFICANT CHANGE UP (ref 3.5–5.3)
SODIUM SERPL-SCNC: 140 MMOL/L — SIGNIFICANT CHANGE UP (ref 135–145)
SPECIMEN SOURCE: SIGNIFICANT CHANGE UP
SPECIMEN SOURCE: SIGNIFICANT CHANGE UP

## 2025-06-18 PROCEDURE — 99239 HOSP IP/OBS DSCHRG MGMT >30: CPT

## 2025-06-18 RX ORDER — ROPINIROLE 5 MG/1
0 TABLET, FILM COATED ORAL
Refills: 0 | DISCHARGE

## 2025-06-18 RX ORDER — FUROSEMIDE 10 MG/ML
1 INJECTION INTRAMUSCULAR; INTRAVENOUS
Qty: 30 | Refills: 0
Start: 2025-06-18 | End: 2025-07-17

## 2025-06-18 RX ORDER — FUROSEMIDE 10 MG/ML
0 INJECTION INTRAMUSCULAR; INTRAVENOUS
Refills: 0 | DISCHARGE

## 2025-06-18 RX ADMIN — Medication 100 MILLIGRAM(S): at 05:16

## 2025-06-18 RX ADMIN — Medication 100 MILLIGRAM(S): at 13:52

## 2025-06-18 RX ADMIN — APIXABAN 5 MILLIGRAM(S): 2.5 TABLET, FILM COATED ORAL at 09:10

## 2025-06-18 RX ADMIN — FLUTICASONE PROPIONATE 1 SPRAY(S): 50 SPRAY, METERED NASAL at 09:11

## 2025-06-18 RX ADMIN — Medication 25 MILLIGRAM(S): at 09:09

## 2025-06-18 RX ADMIN — Medication 25 MICROGRAM(S): at 05:16

## 2025-06-18 RX ADMIN — IPRATROPIUM BROMIDE AND ALBUTEROL SULFATE 3 MILLILITER(S): .5; 2.5 SOLUTION RESPIRATORY (INHALATION) at 08:47

## 2025-06-18 RX ADMIN — METOPROLOL SUCCINATE 25 MILLIGRAM(S): 50 TABLET, EXTENDED RELEASE ORAL at 09:09

## 2025-06-18 RX ADMIN — EZETIMIBE 10 MILLIGRAM(S): 10 TABLET ORAL at 09:10

## 2025-06-18 RX ADMIN — IPRATROPIUM BROMIDE AND ALBUTEROL SULFATE 3 MILLILITER(S): .5; 2.5 SOLUTION RESPIRATORY (INHALATION) at 14:08

## 2025-06-18 RX ADMIN — FUROSEMIDE 40 MILLIGRAM(S): 10 INJECTION INTRAMUSCULAR; INTRAVENOUS at 09:10

## 2025-06-18 RX ADMIN — Medication 81 MILLIGRAM(S): at 09:09

## 2025-06-18 NOTE — DISCHARGE NOTE PROVIDER - NSDCMRMEDTOKEN_GEN_ALL_CORE_FT
Albuterol (Eqv-ProAir HFA) 90 mcg/inh inhalation aerosol: 2 puff(s) inhaled every 4 to 6 hours as needed for  shortness of breath and/or wheezing  Aldactone 25 mg oral tablet: 1 tab(s) orally once a day  apixaban 5 mg oral tablet: 1 tab(s) orally 2 times a day  aspirin 81 mg oral tablet, chewable: 1 tab(s) orally once a day  biotin 1000 mcg oral tablet: 1 tab(s) orally once a day  Breztri Aerosphere 160 mcg-9 mcg-4.8 mcg/inh inhalation aerosol: 2 puff(s) inhaled 2 times a day  ezetimibe 10 mg oral tablet: 1 tab(s) orally once a day  fluticasone 50 mcg/inh nasal spray: 1 spray(s) in each nostril once a day (in the morning)  Lasix 40 mg oral tablet: 1 tab(s) orally once a day  levothyroxine 25 mcg (0.025 mg) oral tablet: 1 tab(s) orally once a day  lidocaine 5% topical film: Apply topically to affected area once a day as needed for knee pain  loratadine 10 mg oral tablet: 1 tab(s) orally once a day  Metoprolol Tartrate 25 mg oral tablet: 1 tab(s) orally 2 times a day  Neurontin 300 mg oral capsule: 1 cap(s) orally once a day (at bedtime)  polyethylene glycol 3350 oral powder for reconstitution: 17 gram(s) orally once a day as needed for  constipation hold for diarrhea  rOPINIRole 1 mg oral tablet: 1 tab(s) orally once a day (at bedtime)  senna (sennosides) 8.6 mg oral tablet: 1 tab(s) orally once a day (at bedtime)  tiZANidine 2 mg oral tablet: 1 tab(s) orally once a day (at bedtime) as needed for  severe pain  Tylenol Extra Strength 500 mg oral tablet: 2 tab(s) orally once a day (at bedtime)  ubiquinone 300 mg oral capsule: 1 cap(s) orally once a day

## 2025-06-18 NOTE — PROGRESS NOTE ADULT - SUBJECTIVE AND OBJECTIVE BOX
The patient was seen and examined. No acute events overnight.  No chest pain.  Improving shortness of breath.  No palpitations.    Initial Consult HPI    ______________  SKeegan DEL CASTILLO is a 89y year old Female with a past medical history of paroxysmal atrial fibrillation on anticoagulation status post ablation, hypoxic resp failure on home O2, CAD status post PCI to the LAD in 2017, with repeat coronary treatment 2022 showing patent stents, chronic diastolic heart failure, history of CVA, mitral valve regurgitation, history of PE with factor V Leiden, hypertension, hyperlipidemia, obesity, severe pulm hypertension without response to nitric oxide on right heart cath and COPD.    Admitted for shortness of breath even at rest. Reports she stop taking her lasix 3 days ago because she doesn't like to pee when she has to go out +  cough productive of clear sputum no chest pain no fevers no abdominal pain nausea or vomiting. Reports she moves with walker, has HHA at home. Feels weak now. On 3 L nc in ER  Patient was in rehab as well this year.   Per Daughter over the last week patient was feeling dehydrated so she cut her Lasix dose in half.     Recently admitted 3/26 for Constipation and rectal fecal impaction      Echocardiogram 6/13/25      1. Mild left ventricular hypertrophy.   2. Left ventricular cavity is normal in size. Left ventricular systolic function is normal with an ejection fraction visually estimated at 55 to 60 %.   3. The left ventricular diastolic function is indeterminate.   4. Mildly enlarged right ventricular cavity size and normal right ventricular systolic function.   5. Normal left and right atrial size.   6. Mild to moderate mitral regurgitation. The mitral regurgitant jet is directed towards the free wall.   7. Moderate tricuspid regurgitation. The tricuspid regurgant jet is eccentric and directed towards the septum.   8. Estimated pulmonary artery systolic pressure is 68 mmHg, consistent with severe pulmonary hypertension.   9. Trace pulmonic regurgitation.       ________________________________  Review of systems: A 10 point review of system has been performed, and is negative except for what has been mentioned in the above history of present illness.     PAST MEDICAL & SURGICAL HISTORY:  Atrial fibrillation      CHF (congestive heart failure)      Pulmonary emboli      Factor 5 Leiden mutation, heterozygous      CAD (coronary artery disease)      Stented coronary artery      Hypothyroid      COPD (chronic obstructive pulmonary disease)      Mitral regurgitation      HTN (hypertension)      H/O: hysterectomy      H/O knee surgery      Cyst of breast, unspecified laterality  S/P excision      H/O cardiac radiofrequency ablation      S/P ablation of atrial fibrillation         ALLERGIES:  Ceftin (Other (Moderate))  statins (Muscle Pain)  sulfa drugs (Other)  nisoldipine (Unknown)  Repatha (Unknown)  Macrodantin (Other)  tadalafil (Muscle Pain)  Motrin (Other)  flu vaccines (Other)    Home Medications:  Albuterol (Eqv-ProAir HFA) 90 mcg/inh inhalation aerosol: 2 puff(s) inhaled every 4 to 6 hours as needed for  shortness of breath and/or wheezing (13 Jun 2025 16:38)  Aldactone 25 mg oral tablet: 1 tab(s) orally once a day (13 Jun 2025 16:41)  apixaban 5 mg oral tablet: 1 tab(s) orally 2 times a day (13 Jun 2025 16:41)  aspirin 81 mg oral tablet, chewable: 1 tab(s) orally once a day (13 Jun 2025 16:41)  biotin 1000 mcg oral tablet: 1 tab(s) orally once a day (13 Jun 2025 16:48)  Breztri Aerosphere 160 mcg-9 mcg-4.8 mcg/inh inhalation aerosol: 2 puff(s) inhaled 2 times a day (13 Jun 2025 16:41)  ezetimibe 10 mg oral tablet: 1 tab(s) orally once a day (13 Jun 2025 16:49)  fluticasone 50 mcg/inh nasal spray: 1 spray(s) in each nostril once a day (in the morning) (13 Jun 2025 16:41)  furosemide: 20 OR 40mg once daily   ***pt&#x27;s list from home shows 40mg, 40mg last filled when pt left Rehab facility, 20mg last filled 3/3/25 and daughter confirms pt has stock-pile of most meds. Unsure if pt taking 40mg or 20 at home*** (13 Jun 2025 16:49)  levothyroxine 25 mcg (0.025 mg) oral tablet: 1 tab(s) orally once a day (13 Jun 2025 16:41)  lidocaine 5% topical film: Apply topically to affected area once a day as needed for knee pain (13 Jun 2025 16:49)  loratadine 10 mg oral tablet: 1 tab(s) orally once a day (13 Jun 2025 16:41)  Metoprolol Tartrate 25 mg oral tablet: 1 tab(s) orally 2 times a day (13 Jun 2025 16:40)  Neurontin 300 mg oral capsule: 1 cap(s) orally once a day (at bedtime) (13 Jun 2025 16:41)  polyethylene glycol 3350 oral powder for reconstitution: 17 gram(s) orally once a day as needed for  constipation hold for diarrhea (13 Jun 2025 16:41)  rOPINIRole 0.25 mg oral tablet: pt has both 1mg (last filled 3/3/25 but pt had stock-pile) and 0.25mg (last filled 6/3/25), daughter not sure if pt is getting both but states that pts Restless Leg had been getting worse recently (13 Jun 2025 16:44)  rOPINIRole 1 mg oral tablet: 1 tab(s) orally once a day (at bedtime) (13 Jun 2025 16:41)  senna (sennosides) 8.6 mg oral tablet: 1 tab(s) orally once a day (at bedtime) (13 Jun 2025 16:44)  tiZANidine 2 mg oral tablet: 1 tab(s) orally once a day (at bedtime) as needed for  severe pain (13 Jun 2025 16:49)  Tylenol Extra Strength 500 mg oral tablet: 2 tab(s) orally once a day (at bedtime) (13 Jun 2025 16:49)  ubiquinone 300 mg oral capsule: 1 cap(s) orally once a day (13 Jun 2025 16:41)    MEDICATIONS  (STANDING):  apixaban 5 milliGRAM(s) Oral two times a day  aspirin  chewable 81 milliGRAM(s) Oral daily  benzonatate 100 milliGRAM(s) Oral three times a day  ezetimibe 10 milliGRAM(s) Oral daily  fluticasone propionate 50 MICROgram(s)/spray Nasal Spray 1 Spray(s) Both Nostrils every 12 hours  furosemide   Injectable 40 milliGRAM(s) IV Push daily  gabapentin 300 milliGRAM(s) Oral at bedtime  levothyroxine 25 MICROGram(s) Oral daily  lidocaine   4% Patch 1 Patch Transdermal daily  lidocaine   4% Patch 1 Patch Transdermal daily  metoprolol tartrate 25 milliGRAM(s) Oral two times a day  rOPINIRole 1 milliGRAM(s) Oral at bedtime  senna 2 Tablet(s) Oral at bedtime  spironolactone 25 milliGRAM(s) Oral daily    MEDICATIONS  (PRN):  acetaminophen     Tablet .. 650 milliGRAM(s) Oral every 6 hours PRN Temp greater or equal to 38C (100.4F), Mild Pain (1 - 3)  albuterol/ipratropium for Nebulization 3 milliLiter(s) Nebulizer every 6 hours PRN Shortness of Breath and/or Wheezing  aluminum hydroxide/magnesium hydroxide/simethicone Suspension 30 milliLiter(s) Oral every 4 hours PRN Dyspepsia  calamine/zinc oxide Lotion 1 Application(s) Topical two times a day PRN Rash  Diphenhydramine 2% Topical Analgesic Gel 1 Application(s) 1 Application(s) Topical four times a day PRN Rash/Itching  guaiFENesin Oral Liquid (Sugar-Free) 100 milliGRAM(s) Oral every 6 hours PRN Cough  melatonin 3 milliGRAM(s) Oral at bedtime PRN Insomnia  ondansetron Injectable 4 milliGRAM(s) IV Push every 8 hours PRN Nausea and/or Vomiting  polyethylene glycol 3350 17 Gram(s) Oral daily PRN for constipation      Vital Signs Last 24 Hrs  T(C): 36.2 (17 Jun 2025 16:16), Max: 36.8 (16 Jun 2025 21:35)  T(F): 97.2 (17 Jun 2025 16:16), Max: 98.3 (16 Jun 2025 21:35)  HR: 57 (17 Jun 2025 16:16) (54 - 63)  BP: 143/45 (17 Jun 2025 16:16) (124/41 - 153/50)  BP(mean): --  RR: 18 (17 Jun 2025 16:16) (18 - 19)  SpO2: 100% (17 Jun 2025 16:16) (97% - 100%)    Parameters below as of 17 Jun 2025 16:16  Patient On (Oxygen Delivery Method): nasal cannula  O2 Flow (L/min): 2    I&O's Summary    16 Jun 2025 07:01  -  17 Jun 2025 07:00  --------------------------------------------------------  IN: 250 mL / OUT: 1750 mL / NET: -1500 mL    17 Jun 2025 07:01  -  17 Jun 2025 16:44  --------------------------------------------------------  IN: 240 mL / OUT: 0 mL / NET: 240 mL      ________________________________  GENERAL APPEARANCE:  No acute distress  HEAD: normocephalic, atraumatic  NECK: supple, no jugular venous distention, no carotid bruit    HEART: Regular rate and rhythm, S1, S2 normal, 1/6 murmur    CHEST:  No anterior chest wall tenderness    LUNGS:  crackles    ABDOMEN: soft, nontender, nondistended, with positive bowel sounds appreciated  EXTREMITIES: no edema.   NEURO: awake, some confusion    SKIN:  Dry  ________________________________      ECG: Sinus Rhythm RBBB    LABS:               ________________________________    RADIOLOGY & ADDITIONAL STUDIES:   IMPRESSION:  Small bilateral pleural effusions and bilateral groundglass opacities,   probably pulmonary edema.    --- End of Report ---  ________________________________    ASSESSMENT:  Chronic diastolic heart failure, w/ acute   Paroxysmal atrial fibrillation status post ablation  History of CVA  History of CAD status post prior PCI to LAD in 2017 with cardiac cath in 2022 showing patent stent  Severe pulm hypertension with no response to nitric oxide  History of PE with factor V Leiden      PLAN:  Cont IV lasix today and will reassess tomm.  Cont anticoagulation  Cont BB  Daughter updated - no plan for R heart cath  ____________________________________________  (Dragon Dictation software used). Thank you for allowing me to participate in the care of your patient. Please contact me should any questions arise.    NEFTALI Forte DO, [facility]  Office: [phone]   
Patient is a 89y old  Female who presents with a chief complaint of worsening  shortness of breath, ESTEVES (15 Antoine 2025 11:17)      HPI:  89-yo F w/PMHx of HTN, CAD CHF A-fib COPD on 3 L nasal cannula at home, PE on Eliquis came in worsening  shortness of breath even at rest. Reports she stop taking her lasix 3 days ago because she doesn't like to pee when she has to go out +  cough productive of clear sputum no chest pain no fevers no abdominal pain nausea or vomiting. Reports she moves with walker, has HHA at home. Feels weak now. On 3 L nc in ER  Patient was in rehab as well this year.   Per Daughter over the last week patient was feeling dehydrated so she cut her Lasix dose in half.   Recently admitted 3/26 for Constipation and rectal fecal impaction  RVP negative   Pro BNP 1921  TTE 11/2024  Left ventricular cavity is normal in size. Left ventricular systolic function is normal with an ejection fraction of 60 % Normal right ventricular systolic function Moderate to severe mitral regurgitation. Mild to moderate pulmonic regurgitation. Moderate tricuspid regurgitation. Severe pulmonary hypertension There is mild calcification of the mitral valve annulus.  Does not have signs of pneumonia       PAST MEDICAL & SURGICAL HISTORY:  Atrial fibrillation      CHF (congestive heart failure)      Pulmonary emboli      Factor 5 Leiden mutation, heterozygous      CAD (coronary artery disease)      Stented coronary artery      Hypothyroid      COPD (chronic obstructive pulmonary disease)      Mitral regurgitation      HTN (hypertension)      H/O: hysterectomy      H/O knee surgery      Cyst of breast, unspecified laterality  S/P excision      H/O cardiac radiofrequency ablation      S/P ablation of atrial fibrillation          PREVIOUS DIAGNOSTIC TESTING:      MEDICATIONS  (STANDING):  apixaban 5 milliGRAM(s) Oral two times a day  aspirin  chewable 81 milliGRAM(s) Oral daily  benzonatate 100 milliGRAM(s) Oral three times a day  ezetimibe 10 milliGRAM(s) Oral daily  fluticasone propionate 50 MICROgram(s)/spray Nasal Spray 1 Spray(s) Both Nostrils every 12 hours  furosemide   Injectable 40 milliGRAM(s) IV Push daily  gabapentin 300 milliGRAM(s) Oral at bedtime  levothyroxine 25 MICROGram(s) Oral daily  lidocaine   4% Patch 1 Patch Transdermal daily  lidocaine   4% Patch 1 Patch Transdermal daily  metoprolol tartrate 25 milliGRAM(s) Oral two times a day  rOPINIRole 1 milliGRAM(s) Oral at bedtime  senna 2 Tablet(s) Oral at bedtime    MEDICATIONS  (PRN):  acetaminophen     Tablet .. 650 milliGRAM(s) Oral every 6 hours PRN Temp greater or equal to 38C (100.4F), Mild Pain (1 - 3)  albuterol/ipratropium for Nebulization 3 milliLiter(s) Nebulizer every 6 hours PRN Shortness of Breath and/or Wheezing  aluminum hydroxide/magnesium hydroxide/simethicone Suspension 30 milliLiter(s) Oral every 4 hours PRN Dyspepsia  calamine/zinc oxide Lotion 1 Application(s) Topical two times a day PRN Rash  Diphenhydramine 2% Topical Analgesic Gel 1 Application(s) 1 Application(s) Topical four times a day PRN Rash/Itching  guaiFENesin Oral Liquid (Sugar-Free) 100 milliGRAM(s) Oral every 6 hours PRN Cough  melatonin 3 milliGRAM(s) Oral at bedtime PRN Insomnia  ondansetron Injectable 4 milliGRAM(s) IV Push every 8 hours PRN Nausea and/or Vomiting  polyethylene glycol 3350 17 Gram(s) Oral daily PRN for constipation      FAMILY HISTORY:  FHx: diabetes mellitus (Father)    FHx: heart disease (Father)        SOCIAL HISTORY:  ***    REVIEW OF SYSTEM:  Pertinent items are noted in HPI.      Vital Signs Last 24 Hrs  T(C): 36.3 (16 Jun 2025 00:21), Max: 36.4 (15 Antoine 2025 15:32)  T(F): 97.3 (16 Jun 2025 00:21), Max: 97.5 (15 Antoine 2025 15:32)  HR: 59 (16 Jun 2025 00:21) (59 - 73)  BP: 144/59 (16 Jun 2025 00:21) (138/59 - 152/66)  BP(mean): --  RR: 18 (15 Antoine 2025 15:32) (18 - 18)  SpO2: 100% (16 Jun 2025 00:21) (96% - 100%)    Parameters below as of 16 Jun 2025 00:21  Patient On (Oxygen Delivery Method): nasal cannula        I&O's Summary    15 Antoine 2025 07:01  -  16 Jun 2025 07:00  --------------------------------------------------------  IN: 0 mL / OUT: 850 mL / NET: -850 mL      PHYSICAL EXAM  General Appearance: cooperative, no acute distress,   HEENT: PERRL, conjunctiva clear, EOM's intact, non injected pharynx, no exudate, TM   normal  Neck: Supple, , no adenopathy, thyroid: not enlarged, no carotid bruit or JVD  Back: Symmetric, no  tenderness,no soft tissue tenderness  Lungs: diminished at the bases   Heart: Regular rate and rhythm, S1, S2 normal, no murmur, rub or gallop  Abdomen: Soft, non-tender, bowel sounds active , no hepatosplenomegaly  Extremities: no cyanosis or edema, no joint swelling  Skin: Skin color, texture normal, no rashes   Neurologic: Alert and oriented X3 , cranial nerves intact, sensory and motor normal,    ECG:    LABS:                          11.8   7.08  )-----------( 299      ( 15 Antoine 2025 07:25 )             37.2     06-15    139  |  108  |  18  ----------------------------<  101[H]  3.9   |  28  |  0.95    Ca    9.4      15 Antoine 2025 07:25          Lipid Panel  154  53  --  87        Urinalysis Basic - ( 15 Antoine 2025 07:25 )    Color: x / Appearance: x / SG: x / pH: x  Gluc: 101 mg/dL / Ketone: x  / Bili: x / Urobili: x   Blood: x / Protein: x / Nitrite: x   Leuk Esterase: x / RBC: x / WBC x   Sq Epi: x / Non Sq Epi: x / Bacteria: x            RADIOLOGY & ADDITIONAL STUDIES:    
Patient is a 89y old  Female who presents with a chief complaint of worsening  shortness of breath, ESTEVES (15 Antoine 2025 11:17)      HPI:  89-yo F w/PMHx of HTN, CAD CHF A-fib COPD on 3 L nasal cannula at home, PE on Eliquis came in worsening  shortness of breath even at rest. Reports she stop taking her lasix 3 days ago because she doesn't like to pee when she has to go out +  cough productive of clear sputum no chest pain no fevers no abdominal pain nausea or vomiting. Reports she moves with walker, has HHA at home. Feels weak now. On 3 L nc in ER  Patient was in rehab as well this year.   Per Daughter over the last week patient was feeling dehydrated so she cut her Lasix dose in half.   Recently admitted 3/26 for Constipation and rectal fecal impaction  RVP negative   Pro BNP 1921  TTE 11/2024  Left ventricular cavity is normal in size. Left ventricular systolic function is normal with an ejection fraction of 60 % Normal right ventricular systolic function Moderate to severe mitral regurgitation. Mild to moderate pulmonic regurgitation. Moderate tricuspid regurgitation. Severe pulmonary hypertension There is mild calcification of the mitral valve annulus.  Does not have signs of pneumonia       PAST MEDICAL & SURGICAL HISTORY:  Atrial fibrillation      CHF (congestive heart failure)      Pulmonary emboli      Factor 5 Leiden mutation, heterozygous      CAD (coronary artery disease)      Stented coronary artery      Hypothyroid      COPD (chronic obstructive pulmonary disease)      Mitral regurgitation      HTN (hypertension)      H/O: hysterectomy      H/O knee surgery      Cyst of breast, unspecified laterality  S/P excision      H/O cardiac radiofrequency ablation      S/P ablation of atrial fibrillation          PREVIOUS DIAGNOSTIC TESTING:      MEDICATIONS  (STANDING):  apixaban 5 milliGRAM(s) Oral two times a day  aspirin  chewable 81 milliGRAM(s) Oral daily  benzonatate 100 milliGRAM(s) Oral three times a day  ezetimibe 10 milliGRAM(s) Oral daily  fluticasone propionate 50 MICROgram(s)/spray Nasal Spray 1 Spray(s) Both Nostrils every 12 hours  furosemide   Injectable 40 milliGRAM(s) IV Push daily  gabapentin 300 milliGRAM(s) Oral at bedtime  levothyroxine 25 MICROGram(s) Oral daily  lidocaine   4% Patch 1 Patch Transdermal daily  lidocaine   4% Patch 1 Patch Transdermal daily  metoprolol tartrate 25 milliGRAM(s) Oral two times a day  rOPINIRole 1 milliGRAM(s) Oral at bedtime  senna 2 Tablet(s) Oral at bedtime    MEDICATIONS  (PRN):  acetaminophen     Tablet .. 650 milliGRAM(s) Oral every 6 hours PRN Temp greater or equal to 38C (100.4F), Mild Pain (1 - 3)  albuterol/ipratropium for Nebulization 3 milliLiter(s) Nebulizer every 6 hours PRN Shortness of Breath and/or Wheezing  aluminum hydroxide/magnesium hydroxide/simethicone Suspension 30 milliLiter(s) Oral every 4 hours PRN Dyspepsia  calamine/zinc oxide Lotion 1 Application(s) Topical two times a day PRN Rash  Diphenhydramine 2% Topical Analgesic Gel 1 Application(s) 1 Application(s) Topical four times a day PRN Rash/Itching  guaiFENesin Oral Liquid (Sugar-Free) 100 milliGRAM(s) Oral every 6 hours PRN Cough  melatonin 3 milliGRAM(s) Oral at bedtime PRN Insomnia  ondansetron Injectable 4 milliGRAM(s) IV Push every 8 hours PRN Nausea and/or Vomiting  polyethylene glycol 3350 17 Gram(s) Oral daily PRN for constipation      FAMILY HISTORY:  FHx: diabetes mellitus (Father)    FHx: heart disease (Father)        SOCIAL HISTORY:  ***    REVIEW OF SYSTEM:  Pertinent items are noted in HPI.      Vital Signs Last 24 Hrs  T(C): 36.3 (16 Jun 2025 00:21), Max: 36.4 (15 Antoine 2025 15:32)  T(F): 97.3 (16 Jun 2025 00:21), Max: 97.5 (15 Antoine 2025 15:32)  HR: 59 (16 Jun 2025 00:21) (59 - 73)  BP: 144/59 (16 Jun 2025 00:21) (138/59 - 152/66)  BP(mean): --  RR: 18 (15 Antoine 2025 15:32) (18 - 18)  SpO2: 100% (16 Jun 2025 00:21) (96% - 100%)    Parameters below as of 16 Jun 2025 00:21  Patient On (Oxygen Delivery Method): nasal cannula        I&O's Summary    15 Antoine 2025 07:01  -  16 Jun 2025 07:00  --------------------------------------------------------  IN: 0 mL / OUT: 850 mL / NET: -850 mL      PHYSICAL EXAM  General Appearance: cooperative, no acute distress,   HEENT: PERRL, conjunctiva clear, EOM's intact, non injected pharynx, no exudate, TM   normal  Neck: Supple, , no adenopathy, thyroid: not enlarged, no carotid bruit or JVD  Back: Symmetric, no  tenderness,no soft tissue tenderness  Lungs: diminished at the bases   Heart: Regular rate and rhythm, S1, S2 normal, no murmur, rub or gallop  Abdomen: Soft, non-tender, bowel sounds active , no hepatosplenomegaly  Extremities: no cyanosis or edema, no joint swelling  Skin: Skin color, texture normal, no rashes   Neurologic: Alert and oriented X3 , cranial nerves intact, sensory and motor normal,    ECG:    LABS:                          11.8   7.08  )-----------( 299      ( 15 Antoine 2025 07:25 )             37.2     06-15    139  |  108  |  18  ----------------------------<  101[H]  3.9   |  28  |  0.95    Ca    9.4      15 Antoine 2025 07:25          Lipid Panel  154  53  --  87        Urinalysis Basic - ( 15 Antoine 2025 07:25 )    Color: x / Appearance: x / SG: x / pH: x  Gluc: 101 mg/dL / Ketone: x  / Bili: x / Urobili: x   Blood: x / Protein: x / Nitrite: x   Leuk Esterase: x / RBC: x / WBC x   Sq Epi: x / Non Sq Epi: x / Bacteria: x            RADIOLOGY & ADDITIONAL STUDIES:    
The patient was seen and examined.  Shortness of breath improved but still has some crackles on exam.  No chest discomfort.  Creatinine stable.    Initial Consult HPI    ________________________________  SKeegan DEL CASTILLO is a 89y year old Female with a past medical history of paroxysmal atrial fibrillation on anticoagulation status post ablation, hypoxic resp failure on home O2, CAD status post PCI to the LAD in 2017, with repeat coronary treatment 2022 showing patent stents, chronic diastolic heart failure, history of CVA, mitral valve regurgitation, history of PE with factor V Leiden, hypertension, hyperlipidemia, obesity, severe pulm hypertension without response to nitric oxide on right heart cath and COPD.    Admitted for shortness of breath even at rest. Reports she stop taking her lasix 3 days ago because she doesn't like to pee when she has to go out +  cough productive of clear sputum no chest pain no fevers no abdominal pain nausea or vomiting. Reports she moves with walker, has HHA at home. Feels weak now. On 3 L nc in ER  Patient was in rehab as well this year.   Per Daughter over the last week patient was feeling dehydrated so she cut her Lasix dose in half.     Recently admitted 3/26 for Constipation and rectal fecal impaction      Echocardiogram 6/13/25      1. Mild left ventricular hypertrophy.   2. Left ventricular cavity is normal in size. Left ventricular systolic function is normal with an ejection fraction visually estimated at 55 to 60 %.   3. The left ventricular diastolic function is indeterminate.   4. Mildly enlarged right ventricular cavity size and normal right ventricular systolic function.   5. Normal left and right atrial size.   6. Mild to moderate mitral regurgitation. The mitral regurgitant jet is directed towards the free wall.   7. Moderate tricuspid regurgitation. The tricuspid regurgant jet is eccentric and directed towards the septum.   8. Estimated pulmonary artery systolic pressure is 68 mmHg, consistent with severe pulmonary hypertension.   9. Trace pulmonic regurgitation.       ________________________________  Review of systems: A 10 point review of system has been performed, and is negative except for what has been mentioned in the above history of present illness.     PAST MEDICAL & SURGICAL HISTORY:  Atrial fibrillation      CHF (congestive heart failure)      Pulmonary emboli      Factor 5 Leiden mutation, heterozygous      CAD (coronary artery disease)      Stented coronary artery      Hypothyroid      COPD (chronic obstructive pulmonary disease)      Mitral regurgitation      HTN (hypertension)      H/O: hysterectomy      H/O knee surgery      Cyst of breast, unspecified laterality  S/P excision      H/O cardiac radiofrequency ablation      S/P ablation of atrial fibrillation         ALLERGIES:  Ceftin (Other (Moderate))  statins (Muscle Pain)  sulfa drugs (Other)  nisoldipine (Unknown)  Repatha (Unknown)  Macrodantin (Other)  tadalafil (Muscle Pain)  Motrin (Other)  flu vaccines (Other)    Home Medications:  Albuterol (Eqv-ProAir HFA) 90 mcg/inh inhalation aerosol: 2 puff(s) inhaled every 4 to 6 hours as needed for  shortness of breath and/or wheezing (13 Jun 2025 16:38)  Aldactone 25 mg oral tablet: 1 tab(s) orally once a day (13 Jun 2025 16:41)  apixaban 5 mg oral tablet: 1 tab(s) orally 2 times a day (13 Jun 2025 16:41)  aspirin 81 mg oral tablet, chewable: 1 tab(s) orally once a day (13 Jun 2025 16:41)  biotin 1000 mcg oral tablet: 1 tab(s) orally once a day (13 Jun 2025 16:48)  Breztri Aerosphere 160 mcg-9 mcg-4.8 mcg/inh inhalation aerosol: 2 puff(s) inhaled 2 times a day (13 Jun 2025 16:41)  ezetimibe 10 mg oral tablet: 1 tab(s) orally once a day (13 Jun 2025 16:49)  fluticasone 50 mcg/inh nasal spray: 1 spray(s) in each nostril once a day (in the morning) (13 Jun 2025 16:41)  furosemide: 20 OR 40mg once daily   ***pt&#x27;s list from home shows 40mg, 40mg last filled when pt left Rehab facility, 20mg last filled 3/3/25 and daughter confirms pt has stock-pile of most meds. Unsure if pt taking 40mg or 20 at home*** (13 Jun 2025 16:49)  levothyroxine 25 mcg (0.025 mg) oral tablet: 1 tab(s) orally once a day (13 Jun 2025 16:41)  lidocaine 5% topical film: Apply topically to affected area once a day as needed for knee pain (13 Jun 2025 16:49)  loratadine 10 mg oral tablet: 1 tab(s) orally once a day (13 Jun 2025 16:41)  Metoprolol Tartrate 25 mg oral tablet: 1 tab(s) orally 2 times a day (13 Jun 2025 16:40)  Neurontin 300 mg oral capsule: 1 cap(s) orally once a day (at bedtime) (13 Jun 2025 16:41)  polyethylene glycol 3350 oral powder for reconstitution: 17 gram(s) orally once a day as needed for  constipation hold for diarrhea (13 Jun 2025 16:41)  rOPINIRole 0.25 mg oral tablet: pt has both 1mg (last filled 3/3/25 but pt had stock-pile) and 0.25mg (last filled 6/3/25), daughter not sure if pt is getting both but states that pts Restless Leg had been getting worse recently (13 Jun 2025 16:44)  rOPINIRole 1 mg oral tablet: 1 tab(s) orally once a day (at bedtime) (13 Jun 2025 16:41)  senna (sennosides) 8.6 mg oral tablet: 1 tab(s) orally once a day (at bedtime) (13 Jun 2025 16:44)  tiZANidine 2 mg oral tablet: 1 tab(s) orally once a day (at bedtime) as needed for  severe pain (13 Jun 2025 16:49)  Tylenol Extra Strength 500 mg oral tablet: 2 tab(s) orally once a day (at bedtime) (13 Jun 2025 16:49)  ubiquinone 300 mg oral capsule: 1 cap(s) orally once a day (13 Jun 2025 16:41)     MEDICATIONS  (STANDING):  apixaban 5 milliGRAM(s) Oral two times a day  aspirin  chewable 81 milliGRAM(s) Oral daily  benzonatate 100 milliGRAM(s) Oral three times a day  ezetimibe 10 milliGRAM(s) Oral daily  fluticasone propionate 50 MICROgram(s)/spray Nasal Spray 1 Spray(s) Both Nostrils every 12 hours  furosemide   Injectable 40 milliGRAM(s) IV Push daily  gabapentin 300 milliGRAM(s) Oral at bedtime  levothyroxine 25 MICROGram(s) Oral daily  lidocaine   4% Patch 1 Patch Transdermal daily  lidocaine   4% Patch 1 Patch Transdermal daily  metoprolol tartrate 25 milliGRAM(s) Oral two times a day  rOPINIRole 1 milliGRAM(s) Oral at bedtime  senna 2 Tablet(s) Oral at bedtime  spironolactone 25 milliGRAM(s) Oral daily    MEDICATIONS  (PRN):  acetaminophen     Tablet .. 650 milliGRAM(s) Oral every 6 hours PRN Temp greater or equal to 38C (100.4F), Mild Pain (1 - 3)  albuterol/ipratropium for Nebulization 3 milliLiter(s) Nebulizer every 6 hours PRN Shortness of Breath and/or Wheezing  aluminum hydroxide/magnesium hydroxide/simethicone Suspension 30 milliLiter(s) Oral every 4 hours PRN Dyspepsia  calamine/zinc oxide Lotion 1 Application(s) Topical two times a day PRN Rash  Diphenhydramine 2% Topical Analgesic Gel 1 Application(s) 1 Application(s) Topical four times a day PRN Rash/Itching  guaiFENesin Oral Liquid (Sugar-Free) 100 milliGRAM(s) Oral every 6 hours PRN Cough  melatonin 3 milliGRAM(s) Oral at bedtime PRN Insomnia  ondansetron Injectable 4 milliGRAM(s) IV Push every 8 hours PRN Nausea and/or Vomiting  polyethylene glycol 3350 17 Gram(s) Oral daily PRN for constipation      Vital Signs Last 24 Hrs  T(C): 36.3 (18 Jun 2025 09:05), Max: 36.9 (17 Jun 2025 23:35)  T(F): 97.3 (18 Jun 2025 09:05), Max: 98.4 (17 Jun 2025 23:35)  HR: 62 (18 Jun 2025 09:05) (58 - 62)  BP: 144/65 (18 Jun 2025 09:05) (136/45 - 150/47)  BP(mean): --  RR: 18 (18 Jun 2025 09:05) (18 - 19)  SpO2: 97% (18 Jun 2025 09:05) (95% - 99%)    Parameters below as of 18 Jun 2025 09:05  Patient On (Oxygen Delivery Method): nasal cannula  O2 Flow (L/min): 2    I&O's Summary    17 Jun 2025 07:01  -  18 Jun 2025 07:00  --------------------------------------------------------  IN: 594 mL / OUT: 1000 mL / NET: -406 mL    18 Jun 2025 07:01  -  18 Jun 2025 16:45  --------------------------------------------------------  IN: 760 mL / OUT: 0 mL / NET: 760 mL        ________________________________  GENERAL APPEARANCE:  No acute distress  HEAD: normocephalic, atraumatic  NECK: supple, no jugular venous distention, no carotid bruit    HEART: Regular rate and rhythm, S1, S2 normal, 1/6 murmur    CHEST:  No anterior chest wall tenderness    LUNGS:  crackles improving    ABDOMEN: soft, nontender, nondistended, with positive bowel sounds appreciated  EXTREMITIES: no edema.   NEURO: awake, some confusion    SKIN:  Dry  ________________________________      ECG: Sinus Rhythm RBBB    LABS:                        11.8   7.08  )-----------( 299      ( 15 Antoine 2025 07:25 )             37.2             06-15    139  |  108  |  18  ----------------------------<  101[H]  3.9   |  28  |  0.95    Ca    9.4      15 Antoine 2025 07:25        Lipid Panel  Chl 154  HDL 53  LDL --  Trg 87    Urinalysis Basic - ( 15 Antoine 2025 07:25 )    Color: x / Appearance: x / SG: x / pH: x  Gluc: 101 mg/dL / Ketone: x  / Bili: x / Urobili: x   Blood: x / Protein: x / Nitrite: x   Leuk Esterase: x / RBC: x / WBC x   Sq Epi: x / Non Sq Epi: x / Bacteria: x          Urinalysis Basic - ( 15 Antoine 2025 07:25 )    Color: x / Appearance: x / SG: x / pH: x  Gluc: 101 mg/dL / Ketone: x  / Bili: x / Urobili: x   Blood: x / Protein: x / Nitrite: x   Leuk Esterase: x / RBC: x / WBC x   Sq Epi: x / Non Sq Epi: x / Bacteria: x             ________________________________    RADIOLOGY & ADDITIONAL STUDIES:   IMPRESSION:  Small bilateral pleural effusions and bilateral groundglass opacities,   probably pulmonary edema.    --- End of Report ---  ________________________________    ASSESSMENT:  Chronic diastolic heart failure, w/ acute   Paroxysmal atrial fibrillation status post ablation  History of CVA  History of CAD status post prior PCI to LAD in 2017 with cardiac cath in 2022 showing patent stent  Severe pulm hypertension with no response to nitric oxide  History of PE with factor V Leiden      PLAN:  Pt wishes to be DC> SOB improved.  Creatinine stable.  Continue IV diuretic, transition to oral later today with discharge and close outpatient follow-up.  Continue spironolactone.  Cont Zetia.   Continue beta-blocker.  No plan for right heart cath, unlikely .  Vasoreactivity negative in the past.     ____________________________________________  (Dragon Dictation software used). Thank you for allowing me to participate in the care of your patient. Please contact me should any questions arise.    NEFTALI Forte DO, [facility]  Office: [phone]   
HOSPITALIST ATTENDING PROGRESS NOTE    Chart and meds reviewed.      Subjective:  Patient seen and examined at the bedside. States that her breathing is improved this morning, but still having SOB.    All other systems reviewed and found to be negative with the exception of what has been described above.    MEDICATIONS  (STANDING):  apixaban 5 milliGRAM(s) Oral two times a day  benzonatate 100 milliGRAM(s) Oral three times a day  levothyroxine 25 MICROGram(s) Oral daily    MEDICATIONS  (PRN):  acetaminophen     Tablet .. 650 milliGRAM(s) Oral every 6 hours PRN Temp greater or equal to 38C (100.4F), Mild Pain (1 - 3)  albuterol/ipratropium for Nebulization 3 milliLiter(s) Nebulizer every 6 hours PRN Shortness of Breath and/or Wheezing  aluminum hydroxide/magnesium hydroxide/simethicone Suspension 30 milliLiter(s) Oral every 4 hours PRN Dyspepsia  calamine/zinc oxide Lotion 1 Application(s) Topical two times a day PRN Rash  diphenhydrAMINE Elixir 12.5 milliGRAM(s) Oral once PRN Rash and/or Itching  guaiFENesin Oral Liquid (Sugar-Free) 100 milliGRAM(s) Oral every 6 hours PRN Cough  melatonin 3 milliGRAM(s) Oral at bedtime PRN Insomnia  ondansetron Injectable 4 milliGRAM(s) IV Push every 8 hours PRN Nausea and/or Vomiting      PHYSICAL EXAM:  Gen: No acute distress  HEENT:  Normocephalic/Atraumatic  CV:  +S1, +S2, regular, no murmurs or rubs  RESP:   lungs diminished to auscultation bilaterally, no wheezing, rales, rhonchi  GI:  abdomen soft, non-tender, non-distended  EXT:  no clubbing, no cyanosis, no edema  NEURO:  No focal neurological deficits    LABS:                            11.3   7.73  )-----------( 262      ( 14 Jun 2025 06:19 )             36.1     06-14    139  |  106  |  22  ----------------------------<  98  3.4[L]   |  31  |  0.90    Ca    9.4      14 Jun 2025 06:19    TPro  6.6  /  Alb  3.0[L]  /  TBili  0.5  /  DBili  x   /  AST  15  /  ALT  13  /  AlkPhos  51  06-14        LIVER FUNCTIONS - ( 14 Jun 2025 06:19 )  Alb: 3.0 g/dL / Pro: 6.6 gm/dL / ALK PHOS: 51 U/L / ALT: 13 U/L / AST: 15 U/L / GGT: x           PT/INR - ( 13 Jun 2025 10:10 )   PT: 15.5 sec;   INR: 1.35 ratio         PTT - ( 13 Jun 2025 10:10 )  PTT:31.5 sec  Urinalysis Basic - ( 14 Jun 2025 06:19 )    Color: x / Appearance: x / SG: x / pH: x  Gluc: 98 mg/dL / Ketone: x  / Bili: x / Urobili: x   Blood: x / Protein: x / Nitrite: x   Leuk Esterase: x / RBC: x / WBC x   Sq Epi: x / Non Sq Epi: x / Bacteria: x              CULTURES:      Additional results/Imaging, I have personally reviewed:    Telemetry, personally reviewed:
sub: 100% on 3L. in nad, no overnight events. Discussed outisde records with Dr Sinclair. Discussed plan with Dr Rangel. No plans for RHC at this time  Dtr provided with updates on 6/15-6/16          PHYSICAL EXAM:  Gen: No acute distress  HEENT:  Normocephalic/Atraumatic  CV:  +S1, +S2, regular, no murmurs or rubs  RESP:   lungs diminished to auscultation bilaterally, no wheezing, rales, rhonchi  GI:  abdomen soft, non-tender, non-distended  EXT:  no clubbing, no cyanosis, no edema  NEURO:  No focal neurological deficits    LABS:                            11.3   7.73  )-----------( 262      ( 14 Jun 2025 06:19 )             36.1     06-14    139  |  106  |  22  ----------------------------<  98  3.4[L]   |  31  |  0.90    Ca    9.4      14 Jun 2025 06:19    TPro  6.6  /  Alb  3.0[L]  /  TBili  0.5  /  DBili  x   /  AST  15  /  ALT  13  /  AlkPhos  51  06-14        LIVER FUNCTIONS - ( 14 Jun 2025 06:19 )  Alb: 3.0 g/dL / Pro: 6.6 gm/dL / ALK PHOS: 51 U/L / ALT: 13 U/L / AST: 15 U/L / GGT: x           PT/INR - ( 13 Jun 2025 10:10 )   PT: 15.5 sec;   INR: 1.35 ratio         PTT - ( 13 Jun 2025 10:10 )  PTT:31.5 sec  Urinalysis Basic - ( 14 Jun 2025 06:19 )    Color: x / Appearance: x / SG: x / pH: x  Gluc: 98 mg/dL / Ketone: x  / Bili: x / Urobili: x   Blood: x / Protein: x / Nitrite: x   Leuk Esterase: x / RBC: x / WBC x   Sq Epi: x / Non Sq Epi: x / Bacteria: x              CULTURES:      Additional results/Imaging, I have personally reviewed:    Telemetry, personally reviewed:
sub: in nad, denies sob.           PHYSICAL EXAM:  Gen: No acute distress  HEENT:  Normocephalic/Atraumatic  CV:  +S1, +S2, regular, no murmurs or rubs  RESP:   lungs diminished to auscultation bilaterally, no wheezing, rales, rhonchi  GI:  abdomen soft, non-tender, non-distended  EXT:  no clubbing, no cyanosis, no edema  NEURO:  No focal neurological deficits    LABS:                            11.3   7.73  )-----------( 262      ( 14 Jun 2025 06:19 )             36.1     06-14    139  |  106  |  22  ----------------------------<  98  3.4[L]   |  31  |  0.90    Ca    9.4      14 Jun 2025 06:19    TPro  6.6  /  Alb  3.0[L]  /  TBili  0.5  /  DBili  x   /  AST  15  /  ALT  13  /  AlkPhos  51  06-14        LIVER FUNCTIONS - ( 14 Jun 2025 06:19 )  Alb: 3.0 g/dL / Pro: 6.6 gm/dL / ALK PHOS: 51 U/L / ALT: 13 U/L / AST: 15 U/L / GGT: x           PT/INR - ( 13 Jun 2025 10:10 )   PT: 15.5 sec;   INR: 1.35 ratio         PTT - ( 13 Jun 2025 10:10 )  PTT:31.5 sec  Urinalysis Basic - ( 14 Jun 2025 06:19 )    Color: x / Appearance: x / SG: x / pH: x  Gluc: 98 mg/dL / Ketone: x  / Bili: x / Urobili: x   Blood: x / Protein: x / Nitrite: x   Leuk Esterase: x / RBC: x / WBC x   Sq Epi: x / Non Sq Epi: x / Bacteria: x              CULTURES:      Additional results/Imaging, I have personally reviewed:    Telemetry, personally reviewed:
sub: 100% on 3L. in nad, no overnight events. Discussed outisde records with Dr Sinclair. Discussed plan with Dr Rangel. No plans for RHC at this time  Dtr provided with updates on 6/15-6/16 6/17: 98% on 2L, no o/n events          PHYSICAL EXAM:  Gen: No acute distress  HEENT:  Normocephalic/Atraumatic  CV:  +S1, +S2, regular, no murmurs or rubs  RESP:   lungs diminished to auscultation bilaterally, no wheezing, rales, rhonchi  GI:  abdomen soft, non-tender, non-distended  EXT:  no clubbing, no cyanosis, no edema  NEURO:  No focal neurological deficits    LABS:                            11.3   7.73  )-----------( 262      ( 14 Jun 2025 06:19 )             36.1     06-14    139  |  106  |  22  ----------------------------<  98  3.4[L]   |  31  |  0.90    Ca    9.4      14 Jun 2025 06:19    TPro  6.6  /  Alb  3.0[L]  /  TBili  0.5  /  DBili  x   /  AST  15  /  ALT  13  /  AlkPhos  51  06-14        LIVER FUNCTIONS - ( 14 Jun 2025 06:19 )  Alb: 3.0 g/dL / Pro: 6.6 gm/dL / ALK PHOS: 51 U/L / ALT: 13 U/L / AST: 15 U/L / GGT: x           PT/INR - ( 13 Jun 2025 10:10 )   PT: 15.5 sec;   INR: 1.35 ratio         PTT - ( 13 Jun 2025 10:10 )  PTT:31.5 sec  Urinalysis Basic - ( 14 Jun 2025 06:19 )    Color: x / Appearance: x / SG: x / pH: x  Gluc: 98 mg/dL / Ketone: x  / Bili: x / Urobili: x   Blood: x / Protein: x / Nitrite: x   Leuk Esterase: x / RBC: x / WBC x   Sq Epi: x / Non Sq Epi: x / Bacteria: x              CULTURES:      Additional results/Imaging, I have personally reviewed:    Telemetry, personally reviewed:

## 2025-06-18 NOTE — DISCHARGE NOTE PROVIDER - DISCHARGE DIET
Detail Level: Detailed DASH Diet Lesion Type: Cyst I&D Type: complicated Method: 11 blade Curette: No Anesthesia Type: 1% Xylocaine without epinephrine Anesthesia Volume In Cc: 0.4 Size Of Lesion In Cm (Optional But May Be Required For Some Insurances): 0 Wound Care: Petrolatum Dressing: dry sterile dressing Curette Text (Optional): After the contents were expressed a curette was used to partially remove the cyst wall. Epidermal Sutures: 4-0 Ethilon Epidermal Closure: simple interrupted Suture Text: The incision was partially closed with Consent was obtained and risks were reviewed including but not limited to delayed wound healing, infection, need for multiple I and D's, and pain. Render Postcare In Note?: Yes Post-Care Instructions: I reviewed with the patient in detail post-care instructions. Patient should keep wound covered and call the office should any redness, pain, swelling or worsening occur.

## 2025-06-18 NOTE — DISCHARGE NOTE PROVIDER - HOSPITAL COURSE
sub: 100% on 3L. in nad, no overnight events. Discussed outisde records with Dr Sinclair. Discussed plan with Dr Rangel. No plans for RHC at this time  Dtr provided with updates on 6/15-6/16 6/18: 98% on 2L, no o/n events  ICU Vital Signs Last 24 Hrs  T(C): 36.3 (18 Jun 2025 09:05), Max: 36.9 (17 Jun 2025 23:35)  T(F): 97.3 (18 Jun 2025 09:05), Max: 98.4 (17 Jun 2025 23:35)  HR: 62 (18 Jun 2025 09:05) (57 - 62)  BP: 144/65 (18 Jun 2025 09:05) (136/45 - 150/47)  BP(mean): --  ABP: --  ABP(mean): --  RR: 18 (18 Jun 2025 09:05) (18 - 19)  SpO2: 97% (18 Jun 2025 09:05) (95% - 100%)    O2 Parameters below as of 18 Jun 2025 09:05  Patient On (Oxygen Delivery Method): nasal cannula  O2 Flow (L/min): 2        PHYSICAL EXAM:  Gen: No acute distress  HEENT:  Normocephalic/Atraumatic  CV:  +S1, +S2, regular, no murmurs or rubs  RESP:   lungs diminished to auscultation bilaterally, no wheezing, rales, rhonchi  GI:  abdomen soft, non-tender, non-distended  EXT:  no clubbing, no cyanosis, no edema  NEURO:  No focal neurological deficits    89-yo F w/PMHx of HTN, CAD CHF A-fib COPD on 3 L nasal cannula at home, PE on Eliquis, CVA < 1 year ago came in worsening  shortness of breath even at rest. Reports she stop taking her lasix 3 days ago because she doesn't like to pee when she has to go out +  cough productive of clear sputum no chest pain no fevers no abdominal pain nausea or vomiting. Reports she moves with walker, has HHA at home. Feels weak now. On 3 L nc in ER    # Worsening Dyspnea  # acute decompensated Diastolic HF in setting of dietary indiscretion and missed doses of diuretics  # Severe pulmonary hypertension  # Hx of LE edema on Lasix  # RBBB  # COPD/chronic resp failure  - Patient on 3L NC ( at baseline)  - RVP negative   - Pro BNP 1921  - Lactate wnl  - follow up TTE notable for preserved LVEF, severe Pulm HTN   - Cardio: cont iv diuresis-> d/w pulm and cardiology svcs-> plan to recieve afternoon dose of iv lasix then transition to 40mg po qd with close o/p cardiology follow up. Dr Forte will have office reach out to dtr for appt.   - D/W pulm: recc diuresis -> once optimized, recc follow up with Dr Jordan for IV therapy for sever pulm HTN. Made dtr aware  - CT Chest: no pna, no further abx  - afebrile, no leukocytosis on admission  - s/p azethreonam , vanco on admission  - LE doppler negative for DVT  # Hx of 6 mm right lower lobe pulmonary nodule  - Follow-up CT chest w/o evidence of nodule--however study done without contrast  - F/u as outpt    #  H/o HTN / dyslipidemia / CVA / paroxysmal a-fib / CAD / PE / factor V Leiden on Eliquis  - resume home meds including doac  - CVA 10m ago      # DVT pro  Eliquis BID   D/W Dtr Tonia [phone] 6/15-6/16  dc to home  DC time: 55 min

## 2025-06-18 NOTE — PROGRESS NOTE ADULT - ASSESSMENT
1) Pulmonary Hypertension (CTEPH/HF)  2) Dyspnea  3) AF  4) Hypoxemia  5) ADHF    89-yo F w/PMHx of HTN, CAD CHF A-fib COPD on 3 L nasal cannula at home, PE on Eliquis came in worsening  shortness of breath even at rest. Reports she stop taking her lasix 3 days ago because she doesn't like to pee when she has to go out +  cough productive of clear sputum no chest pain no fevers no abdominal pain nausea or vomiting. Reports she moves with walker, has HHA at home. Feels weak now. On 3 L nc in ER  Pro BNP 1921  TTE 11/2024  Left ventricular cavity is normal in size. Left ventricular systolic function is normal with an ejection fraction of 60 % Normal right ventricular systolic function Moderate to severe mitral regurgitation. Mild to moderate pulmonic regurgitation. Moderate tricuspid regurgitation. Severe pulmonary hypertension There is mild calcification of the mitral valve annulus.  Does not have signs of pneumonia   CT chest shows mosaic attenuation/groundglass opacities / bilateral effusions and findings to suggest ADHF  PAP 74/20, PCWP 22mmHg. Patient was supposed to start Riociguat but had insurance issues  At this time, diuresis is imperative while monitoring I/O closely  Discussed with hospitalist  Reviewed BNP/ESR/CRP/Procalcitonin  Discussed with daughter   Will continue to monitor   
1) Pulmonary Hypertension (CTEPH/HF)  2) Dyspnea  3) AF  4) Hypoxemia  5) ADHF    89-yo F w/PMHx of HTN, CAD CHF A-fib COPD on 3 L nasal cannula at home, PE on Eliquis came in worsening  shortness of breath even at rest. Reports she stop taking her lasix 3 days ago because she doesn't like to pee when she has to go out +  cough productive of clear sputum no chest pain no fevers no abdominal pain nausea or vomiting. Reports she moves with walker, has HHA at home. Feels weak now. On 3 L nc in ER  Pro BNP 1921  TTE 11/2024  Left ventricular cavity is normal in size. Left ventricular systolic function is normal with an ejection fraction of 60 % Normal right ventricular systolic function Moderate to severe mitral regurgitation. Mild to moderate pulmonic regurgitation. Moderate tricuspid regurgitation. Severe pulmonary hypertension There is mild calcification of the mitral valve annulus.  Does not have signs of pneumonia   CT chest shows mosaic attenuation/groundglass opacities / bilateral effusions and findings to suggest ADHF  PAP 74/20, PCWP 22mmHg. Patient was supposed to start Riociguat but had insurance issues  At this time, diuresis is imperative while monitoring I/O closely  Discussed with hospitalist  Reviewed BNP/ESR/CRP/Procalcitonin  Discussed with daughter   Will continue to monitor   
89-yo F w/PMHx of HTN, CAD CHF A-fib COPD on 3 L nasal cannula at home, PE on Eliquis, CVA < 1 year ago came in worsening  shortness of breath even at rest. Reports she stop taking her lasix 3 days ago because she doesn't like to pee when she has to go out +  cough productive of clear sputum no chest pain no fevers no abdominal pain nausea or vomiting. Reports she moves with walker, has HHA at home. Feels weak now. On 3 L nc in ER    # Worsening Dyspnea  # acute decompensated Diastolic HF in setting of dietary indiscretion and missed doses of diuretics  # Severe pulmonary hypertension  # Hx of LE edema on Lasix  # RBBB  # COPD/chronic resp failure  - Patient on 3L NC ( at baseline)  - RVP negative   - Pro BNP 1921  - Lactate wnl  - follow up TTE notable for preserved LVEF, severe Pulm HTN   - Cardio: cont iv diuresis-> hopeful to rotate to po tomorrow and aim for dc  - D/W pulm: recc diuresis -> once optimized, recc follow up with Dr Jordan for IV therapy for sever pulm HTN  - CT Chest: no pna  - afebrile, no leukocytosis on admission  - s/p azethreonam , vanco and Lasix , 500 cc NS in ER-->Will monitor off abx as the patient does not appear to have a clear infectious etiology  - LE doppler negative for DVT      # Hx of 6 mm right lower lobe pulmonary nodule  - Follow-up CT chest w/o evidence of nodule--however study done without contrast  - F/u as outpt    #  H/o HTN / dyslipidemia / CVA / paroxysmal a-fib / CAD / PE / factor V Leiden on Eliquis  - resume home meds including doac  - CVA 10m ago      # DVT pro  Eliquis BID   D/W Dtr Tonia [phone] 6/15-6/16  Dispo: hopeful for dc in next 24-hours- made CM aware that aides require 24 hour notice     Time spent: 39 min spent during this encounter including but not limited to chart review, external record review,  labs/imaging (both on independent interpretation and official review by radiology), medication reconciliation,  coordination of care and discussion with consultants. (Not including goc discussion)  Further tests such as imaging and blood test, medications, and procedures have been ordered as indicated. Laboratory results and the plan of care were communicated to the patient and family.    Time spent was including but not limited to:  - Reviewing, and interpreting labs and testing.  - Independently obtaining a review of systems and performing a physical exam  - Reviewing consultant documentation/recommendations in addition to discussing plan of care with consultants.  - Counselling and educating patient and family regarding interpretation of aforementioned items and plan of care.      
89-yo F w/PMHx of HTN, CAD CHF A-fib COPD on 3 L nasal cannula at home, PE on Eliquis, CVA < 1 year ago came in worsening  shortness of breath even at rest. Reports she stop taking her lasix 3 days ago because she doesn't like to pee when she has to go out +  cough productive of clear sputum no chest pain no fevers no abdominal pain nausea or vomiting. Reports she moves with walker, has HHA at home. Feels weak now. On 3 L nc in ER    # Worsening Dyspnea  # acute deomcpensated Diastolic HF in setting of dietary indiscretion and missed doses of diuretics  # Severe pulmonary hypertension  # Hx of LE edema on Lasix  # RBBB  # COPD/chronic resp failure  - Patient on 3L NC ( at baseline)  - RVP negative   - Pro BNP 1921  - Lactate wnl  - follow up TTE notable for preserved LVEF, severe Pulm HTN   - Cardio: cont iv diuresis  - D/W pulm: recc diuresis -> once optimized, recc follow up with Dr Jordan for IV therapy for sever pulm HTN  - CT Chest: no pna  - afebrile, no leukocytosis on admission  - s/p azethreonam , vanco and Lasix , 500 cc NS in ER-->Will monitor off abx as the patient does not appear to have a clear infectious etiology  - LE doppler negative for DVT      # Hx of 6 mm right lower lobe pulmonary nodule  - Follow-up CT chest w/o evidence of nodule--however study done without contrast  - F/u as outpt    #  H/o HTN / dyslipidemia / CVA / paroxysmal a-fib / CAD / PE / factor V Leiden on Eliquis  - resume home meds including doac  - CVA 10m ago      # DVT pro  Eliquis BID   D/W Dtr Tonia [phone] 6/15-6/16  Dispo: hopeful for dc in next 24-48 hours, will need 24 hour notice to reinstate aides    Time spent:  57 min spent during this encounter including but not limited to chart review, external record review,  labs/imaging (both on independent interpretation and official review by radiology), medication reconciliation,  coordination of care and discussion with consultants. (Not including goc discussion)  Further tests such as imaging and blood test, medications, and procedures have been ordered as indicated. Laboratory results and the plan of care were communicated to the patient and family.    Time spent was including but not limited to:  - Reviewing, and interpreting labs and testing.  - Independently obtaining a review of systems and performing a physical exam  - Reviewing consultant documentation/recommendations in addition to discussing plan of care with consultants.  - Counselling and educating patient and family regarding interpretation of aforementioned items and plan of care.      
89-yo F w/PMHx of HTN, CAD CHF A-fib COPD on 3 L nasal cannula at home, PE on Eliquis came in worsening  shortness of breath even at rest. Reports she stop taking her lasix 3 days ago because she doesn't like to pee when she has to go out +  cough productive of clear sputum no chest pain no fevers no abdominal pain nausea or vomiting. Reports she moves with walker, has HHA at home. Feels weak now. On 3 L nc in ER    # Worsening Dyspnea  # Decompensated Diastolic HF   # Severe pulmonary hypertension  # Hx of LE edema on Lasix  # RBBB  # COPD/chronic resp failure  - Patient on 3L NC ( at baseline)  - RVP negative   - Pro BNP 1921  - Lactate wnl  - follow up TTE notable for preserved LVEF, severe Pulm HTN   - CT Chest notable for small b/l pleural effusions  - afebrile, no leukocytosis on admission  - s/p azethreonam , vanco and Lasix , 500 cc NS in ER-->Will monitor off abx as the patient does not appear to have a clear infectious etiology  - LE doppler negative for DVT  - Cardio and Pulm consulted-->RHC being recommended for 6/16, monitor off diuretics for now    # Hx of 6 mm right lower lobe pulmonary nodule  - Follow-up CT chest w/o evidence of nodule--however study done without contrast  - F/u as outpt    #  H/o HTN / dyslipidemia / CVA / paroxysmal a-fib / CAD / PE / factor V Leiden on Eliquis  - resume home meds including doac    # DVT pro  Eliquis BID   
89-yo F w/PMHx of HTN, CAD CHF A-fib COPD on 3 L nasal cannula at home, PE on Eliquis, CVA < 1 year ago came in worsening  shortness of breath even at rest. Reports she stop taking her lasix 3 days ago because she doesn't like to pee when she has to go out +  cough productive of clear sputum no chest pain no fevers no abdominal pain nausea or vomiting. Reports she moves with walker, has HHA at home. Feels weak now. On 3 L nc in ER    # Worsening Dyspnea  # Diastolic HF   # Severe pulmonary hypertension  # Hx of LE edema on Lasix  # RBBB  # COPD/chronic resp failure  - Patient on 3L NC ( at baseline)  - RVP negative   - Pro BNP 1921  - Lactate wnl  - follow up TTE notable for preserved LVEF, severe Pulm HTN   - Cardio: for RHC on 6/16, monitor off lasix. left message for cardiology, dtr and pt not consenting to RHC until they speak to cardiology/pulm  - CT Chest notable for small b/l pleural effusions. D/W Pulmonary Dr Sinclair  - afebrile, no leukocytosis on admission  - s/p azethreonam , vanco and Lasix , 500 cc NS in ER-->Will monitor off abx as the patient does not appear to have a clear infectious etiology  - LE doppler negative for DVT      # Hx of 6 mm right lower lobe pulmonary nodule  - Follow-up CT chest w/o evidence of nodule--however study done without contrast  - F/u as outpt    #  H/o HTN / dyslipidemia / CVA / paroxysmal a-fib / CAD / PE / factor V Leiden on Eliquis  - resume home meds including doac  - CVA 10m ago      # DVT pro  Eliquis BID   D/W Dtr Tonia [phone]

## 2025-06-18 NOTE — PROGRESS NOTE ADULT - REASON FOR ADMISSION
worsening  shortness of breath, ESTEVES

## 2025-06-18 NOTE — DISCHARGE NOTE PROVIDER - PROVIDER TOKENS
PROVIDER:[TOKEN:[[id number]],FOLLOWUP:[1 week]],PROVIDER:[TOKEN:[[id number]],FOLLOWUP:[1 week]]

## 2025-06-18 NOTE — DISCHARGE NOTE PROVIDER - CARE PROVIDER_API CALL
Zeny Forte  Internal Medicine  [address] [place] [address]  Phone: [phone]  Fax: [phone]  Follow Up Time: 1 week    Mario Sinclair  Pulmonary Disease  [address] [place] [address]  Phone: [phone]  Fax: [phone]  Follow Up Time: 1 week

## 2025-06-18 NOTE — DISCHARGE NOTE PROVIDER - NSDCCPCAREPLAN_GEN_ALL_CORE_FT
PRINCIPAL DISCHARGE DIAGNOSIS  Diagnosis: RML pneumonia  Assessment and Plan of Treatment:       SECONDARY DISCHARGE DIAGNOSES  Diagnosis: Shortness of breath  Assessment and Plan of Treatment:     Diagnosis: Cough  Assessment and Plan of Treatment:

## 2025-06-18 NOTE — DISCHARGE NOTE PROVIDER - CARE PROVIDERS DIRECT ADDRESSES
[email or web address]

## 2025-07-02 DIAGNOSIS — Z95.5 PRESENCE OF CORONARY ANGIOPLASTY IMPLANT AND GRAFT: ICD-10-CM

## 2025-07-02 DIAGNOSIS — J96.10 CHRONIC RESPIRATORY FAILURE, UNSPECIFIED WHETHER WITH HYPOXIA OR HYPERCAPNIA: ICD-10-CM

## 2025-07-02 DIAGNOSIS — I25.10 ATHEROSCLEROTIC HEART DISEASE OF NATIVE CORONARY ARTERY WITHOUT ANGINA PECTORIS: ICD-10-CM

## 2025-07-02 DIAGNOSIS — I26.99 OTHER PULMONARY EMBOLISM WITHOUT ACUTE COR PULMONALE: ICD-10-CM

## 2025-07-02 DIAGNOSIS — Z86.73 PERSONAL HISTORY OF TRANSIENT ISCHEMIC ATTACK (TIA), AND CEREBRAL INFARCTION WITHOUT RESIDUAL DEFICITS: ICD-10-CM

## 2025-07-02 DIAGNOSIS — Z88.7 ALLERGY STATUS TO SERUM AND VACCINE: ICD-10-CM

## 2025-07-02 DIAGNOSIS — Z79.82 LONG TERM (CURRENT) USE OF ASPIRIN: ICD-10-CM

## 2025-07-02 DIAGNOSIS — K59.00 CONSTIPATION, UNSPECIFIED: ICD-10-CM

## 2025-07-02 DIAGNOSIS — Z88.2 ALLERGY STATUS TO SULFONAMIDES: ICD-10-CM

## 2025-07-02 DIAGNOSIS — R91.1 SOLITARY PULMONARY NODULE: ICD-10-CM

## 2025-07-02 DIAGNOSIS — I11.0 HYPERTENSIVE HEART DISEASE WITH HEART FAILURE: ICD-10-CM

## 2025-07-02 DIAGNOSIS — I27.20 PULMONARY HYPERTENSION, UNSPECIFIED: ICD-10-CM

## 2025-07-02 DIAGNOSIS — I50.33 ACUTE ON CHRONIC DIASTOLIC (CONGESTIVE) HEART FAILURE: ICD-10-CM

## 2025-07-02 DIAGNOSIS — Z88.6 ALLERGY STATUS TO ANALGESIC AGENT: ICD-10-CM

## 2025-07-02 DIAGNOSIS — J44.9 CHRONIC OBSTRUCTIVE PULMONARY DISEASE, UNSPECIFIED: ICD-10-CM

## 2025-07-02 DIAGNOSIS — Z88.3 ALLERGY STATUS TO OTHER ANTI-INFECTIVE AGENTS: ICD-10-CM

## 2025-07-02 DIAGNOSIS — Z88.1 ALLERGY STATUS TO OTHER ANTIBIOTIC AGENTS: ICD-10-CM

## 2025-07-02 DIAGNOSIS — D68.51 ACTIVATED PROTEIN C RESISTANCE: ICD-10-CM

## 2025-07-02 DIAGNOSIS — Z90.710 ACQUIRED ABSENCE OF BOTH CERVIX AND UTERUS: ICD-10-CM

## 2025-07-02 DIAGNOSIS — Z79.01 LONG TERM (CURRENT) USE OF ANTICOAGULANTS: ICD-10-CM

## 2025-07-02 DIAGNOSIS — I48.0 PAROXYSMAL ATRIAL FIBRILLATION: ICD-10-CM

## 2025-07-02 DIAGNOSIS — Z88.8 ALLERGY STATUS TO OTHER DRUGS, MEDICAMENTS AND BIOLOGICAL SUBSTANCES: ICD-10-CM

## 2025-07-02 DIAGNOSIS — I45.10 UNSPECIFIED RIGHT BUNDLE-BRANCH BLOCK: ICD-10-CM

## 2025-07-02 DIAGNOSIS — J90 PLEURAL EFFUSION, NOT ELSEWHERE CLASSIFIED: ICD-10-CM

## 2025-07-06 NOTE — PATIENT PROFILE ADULT - PATIENT'S PREFERRED PRONOUN
Patient: Naheed Bronson Date: 2025   : 1973 Attending: Bharat Noyola MD   52 year old female Room: /A     Chief Complaint: JOSE MANUEL    Preoperative   Surgical Procedure: Aortic Valve Replacement   Tissue  Tricuspid Valve Replacement   Tissue    Chart reviewed     Vital 24 Hour Range Last Value   Temperature Temp  Min: 97.5 °F (36.4 °C)  Max: 98.4 °F (36.9 °C) 98.1 °F (36.7 °C) (25 1210)   Pulse Pulse  Min: 69  Max: 77 77 (25 1210)   Respiratory Resp  Min: 16  Max: 20 16 (25 1730)   Non-Invasive  Blood Pressure BP  Min: 120/64  Max: 142/46 (!) 142/46 (25 1210)   Pulse Oximetry SpO2  Min: 94 %  Max: 100 % 94 % (25 1210)     Oxygen: RA    Weight over the past 48 Hours:  Patient Vitals for the past 48 hrs:   Weight   25 124.7 kg (274 lb 14.6 oz)   25 0518 126 kg (277 lb 12.5 oz)   25 0456 123.3 kg (271 lb 13.2 oz)      Admit Weight:   Weight: 132.3 kg (291 lb 9.6 oz) (25 1046)  BMI (body mass index):   BMI (Calculated): 41.48 (25 0500)    Intake/Output:    Last Stool Occurrence:      Rhythm: Sinus rhythm    Laboratory Results:    Recent Labs   Lab 25  0327 25  1321 25  0552 25  0634 25  0513 25  2159 25  0623 25  0622   SODIUM 139  --  138 141   < >  --    < >  --    POTASSIUM 3.6 3.7 3.3* 3.6   < > 3.5   < >  --    CHLORIDE 102  --  100 101   < >  --    < >  --    CO2 34*  --  34* 34*   < >  --    < >  --    BUN 17  --  16 13   < >  --    < >  --    CREATININE 0.70  --  0.67 0.69   < >  --    < >  --    GLUCOSE 183*  --  180* 169*   < >  --    < >  --    MG  --   --  2.1 1.7  --  1.5*   < >  --    WBC 7.7  --  6.8  --   --   --   --  7.5   HGB 9.2*  --  8.9*  --   --   --   --  8.7*   HCT 30.3*  --  29.1*  --   --   --   --  27.8*     --  289  --   --   --   --  302    < > = values in this interval not displayed.       Recent Labs   Lab 25  0623   AST 12   GPT 24   ALKPT 60    BILIRUBIN 1.1*       No results found    Invalid input(s): \"HCRIBR\"    Recent Labs   Lab 07/01/25  1121   TSH 14.675*   T4FREE 1.0       Recent Labs   Lab 07/02/25  0954   ABIGAIL 100       Hemoglobin A1C (%)   Date Value   06/15/2025 9.7 (H)       MRSA: No results found for: \"MRSASC\"   STAPH:  No results found for: \"MRSASC\"    Carotid US:  No results found for this or any previous visit.    CT head and neck 3/3/25   CTA neck:  No evidence of significant extracranial vascular stenosis or  occlusion.  2.  CTA head:  No evidence of hemodynamically significant intracranial  stenosis, aneurysm, or vascular malformation.    MRI brain 7/6:   Sustained improvement in the rim-enhancing lesions of the right  parietal and left temporal abscesses.  2.  Evolving multifocal subacute infarcts without definite new infarct  identified. A punctate focus of diffusion restriction in the right corona  radiata is more conspicuous compared to prior, though favored to be  subacute and partially secured by artifact on prior exam.  3.  Sequela of chronic hypertensive microangiopathy. Moderate chronic  microvascular ischemic changes and multiple chronic lacunar infarcts.       Medications/Infusions:  Scheduled:   Current Facility-Administered Medications   Medication Dose Route Frequency Provider Last Rate Last Admin    insulin lispro (ADMELOG, HumaLOG) injection 18 Units  18 Units Subcutaneous TID  Bharat Noyola MD   18 Units at 07/06/25 0905    insulin glargine (LANTUS) injection 55 Units  55 Units Subcutaneous Nightly Citlaly Forbes MD   55 Units at 07/05/25 2234    insulin lispro (ADMELOG,HumaLOG) - Correction Dose   Subcutaneous TID  Citlaly Forbes MD   3 Units at 07/06/25 0906    insulin lispro (ADMELOG,HumaLOG) - Correction Dose   Subcutaneous Nightly Citlaly Forbes MD   3 Units at 07/04/25 2122    sodium chloride 0.9 % injection 10 mL  10 mL Injection 2 times per day Citlaly Forbes MD   10 mL at  07/06/25 0912    bumetanide (BUMEX) tablet 1 mg  1 mg Oral BID Annie Barron APNP   1 mg at 07/06/25 0911    potassium CHLORIDE (KLOR-CON M) olivia ER tablet 20 mEq  20 mEq Oral BID  Annie Barron APNP   20 mEq at 07/06/25 0911    metOLazone (ZAROXOLYN) tablet 2.5 mg  2.5 mg Oral Once per day on Monday Wednesday Friday Annie Braron APNP   2.5 mg at 07/04/25 0806    sodium chloride 0.9 % injection 2 mL  2 mL Intracatheter 2 times per day Amauri Bashir MD   2 mL at 07/06/25 0912    penicillin G potassium (PFIZERPEN) 24 Million Units in sodium chloride 0.9 % 600 mL total volume continuous infusion  24 Million Units Intravenous Q24H Yair Espinoza MD   24 Million Units at 07/05/25 1946    sodium chloride 0.9 % injection 2 mL  2 mL Intracatheter 2 times per day Citlaly Forbes MD   2 mL at 07/06/25 0912    enoxaparin (LOVENOX) injection 40 mg  40 mg Subcutaneous 2 times per day Citlaly Forbes MD        Potassium Replacement (Levels 3.6 - 4)   Does not apply See Admin Instructions Citlaly Forbes MD        Potassium Standard Replacement Protocol (Levels 3.5 and lower)   Does not apply See Admin Instructions Citlaly Forbes MD        Magnesium Standard Replacement Protocol   Does not apply See Admin Instructions Citlaly Forbes MD        aspirin (ECOTRIN) enteric coated tablet 81 mg  81 mg Oral Daily Citlaly Forbes MD   81 mg at 07/06/25 0911    atorvastatin (LIPITOR) tablet 40 mg  40 mg Oral Nightly Citlaly Forbes MD   40 mg at 07/05/25 2015    carvedilol (COREG) tablet 6.25 mg  6.25 mg Oral BID  Citlaly Forbes MD   6.25 mg at 07/06/25 0912    cyanocobalamin (Vitamin B-12) tablet 1,000 mcg  1,000 mcg Oral Daily Citlayl Forbes MD   1,000 mcg at 07/06/25 0911    escitalopram (LEXAPRO) tablet 10 mg  10 mg Oral Daily Citlaly Forbes MD   10 mg at 07/06/25 0911    ezetimibe (ZETIA) tablet 10 mg  10 mg Oral Daily  Citlaly Forbes MD   10 mg at 07/06/25 0912    gabapentin (NEURONTIN) capsule 200 mg  200 mg Oral 2 times per day Citlaly Forbes MD   200 mg at 07/06/25 0911    levothyroxine (SYNTHROID, LEVOTHROID) tablet 200 mcg  200 mcg Oral QAM AC Citlaly Forbes MD   200 mcg at 07/06/25 0535    budesonide-formoterol (SYMBICORT) 80-4.5 MCG/ACT inhaler 2 puff  2 puff Inhalation BID Resp Citlaly Forbes MD   2 puff at 07/04/25 1920    amLODIPine (NORVASC) tablet 5 mg  5 mg Oral Daily Citlaly Forbes MD   5 mg at 07/06/25 0911       Continuous Infusions:   Current Facility-Administered Medications   Medication Dose Route Frequency Provider Last Rate Last Admin     Echo 7/3/2025:   No left atrial appendage thrombus.    * Catheter tip noted in right atrium, deeply seated on some views, catheter  tip touching tricuspid valve leaflets intermittently.    * Thickened tricuspid valve with small mobile strands (cannot exclude small  vegetation on tricuspid valve).    * Abnormally thickened aortic valve, prominent thickening and mycotic  deformation of non-coronary cusp of aortic valve with leaflet perforation.    * Severe aortic valve regurgitation.    * Compared to prior DENITA from 5/12/25, the aortic valve thickening and  leaflet perforation with severe aortic valve regurgitation are new,  consistent with endocarditis of aortic valve.    STS Risk of Mortality:1.61%    Pre-op anemia consult:  If yes, order Ferritin level    Fraility Index  6 - moderately frail    New York Heart Association Classification  Class III: Comfortable at rest, symptomatic with less than ordinary activity     CHADSVASC Stroke Risk Points     Patient's CHADSVASC Score Total = 6    Patient's CHADSVASC Score Summary    Element Patient Score   Congestive Heart Failure 1   High blood pressure 1   Age 0    Diabetes 1   Stroke/TIA/Clot 2   Vascular disease 0   Sex 1       Assessment/Plan:  Endocarditis AV/TV   Plan for surgery 7/8 with  Dr. Kline    Plan for cardiac cath tomorrow with Dr. Wilfredo COLEMAN mandible with no acute abnormalities. No abscesses.   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 Neuro stroke following will need clearance for high heparinization with OR.  MRI today.   DM: per attending.   HFpEF: Coreg, bumex,   Group A strep bacteremia:  ID following on antibiotics.   Right foot wound: wound care following.     Discussed with or notes reviewed:  MD          For patient questions / concerns after 1700, please contact the on call FIFI via answering service.       Her/She

## 2025-07-30 ENCOUNTER — APPOINTMENT (OUTPATIENT)
Dept: HOME HEALTH SERVICES | Facility: HOME HEALTH | Age: 89
End: 2025-07-30
Payer: MEDICARE

## 2025-07-30 VITALS
OXYGEN SATURATION: 96 % | DIASTOLIC BLOOD PRESSURE: 60 MMHG | TEMPERATURE: 98.4 F | HEART RATE: 54 BPM | SYSTOLIC BLOOD PRESSURE: 130 MMHG | RESPIRATION RATE: 16 BRPM

## 2025-07-30 DIAGNOSIS — R91.1 SOLITARY PULMONARY NODULE: ICD-10-CM

## 2025-07-30 DIAGNOSIS — I10 ATHEROSCLEROTIC HEART DISEASE OF NATIVE CORONARY ARTERY W/OUT ANGINA PECTORIS: ICD-10-CM

## 2025-07-30 DIAGNOSIS — I25.10 ATHEROSCLEROTIC HEART DISEASE OF NATIVE CORONARY ARTERY W/OUT ANGINA PECTORIS: ICD-10-CM

## 2025-07-30 DIAGNOSIS — Z99.81 DEPENDENCE ON SUPPLEMENTAL OXYGEN: ICD-10-CM

## 2025-07-30 DIAGNOSIS — J44.9 CHRONIC OBSTRUCTIVE PULMONARY DISEASE, UNSPECIFIED: ICD-10-CM

## 2025-07-30 DIAGNOSIS — I10 ESSENTIAL (PRIMARY) HYPERTENSION: ICD-10-CM

## 2025-07-30 DIAGNOSIS — I48.20 CHRONIC ATRIAL FIBRILLATION, UNSP: ICD-10-CM

## 2025-07-30 DIAGNOSIS — I26.99 OTHER PULMONARY EMBOLISM W/OUT ACUTE COR PULMONALE: ICD-10-CM

## 2025-07-30 DIAGNOSIS — Z71.89 OTHER SPECIFIED COUNSELING: ICD-10-CM

## 2025-07-30 DIAGNOSIS — I50.9 HEART FAILURE, UNSPECIFIED: ICD-10-CM

## 2025-07-30 PROCEDURE — G0506: CPT | Mod: 2W

## 2025-07-30 PROCEDURE — 99344 HOME/RES VST NEW MOD MDM 60: CPT | Mod: 25,2W

## 2025-07-30 RX ORDER — GABAPENTIN 300 MG/1
300 CAPSULE ORAL
Refills: 0 | Status: ACTIVE | COMMUNITY
Start: 2025-07-30

## 2025-07-30 RX ORDER — EZETIMIBE 10 MG/1
10 TABLET ORAL
Qty: 90 | Refills: 3 | Status: ACTIVE | COMMUNITY
Start: 2025-07-30

## 2025-07-30 RX ORDER — ASPIRIN 81 MG/1
81 TABLET, DELAYED RELEASE ORAL DAILY
Qty: 90 | Refills: 3 | Status: ACTIVE | COMMUNITY
Start: 2025-07-30

## 2025-07-30 RX ORDER — SPIRONOLACTONE 25 MG/1
25 TABLET, FILM COATED ORAL
Refills: 0 | Status: ACTIVE | COMMUNITY
Start: 2025-07-30

## 2025-07-30 RX ORDER — BUDESONIDE, GLYCOPYRROLATE, AND FORMOTEROL FUMARATE 160; 9; 4.8 UG/1; UG/1; UG/1
160-9-4.8 AEROSOL, METERED RESPIRATORY (INHALATION) TWICE DAILY
Qty: 3 | Refills: 3 | Status: ACTIVE | COMMUNITY
Start: 2025-07-30

## 2025-07-30 RX ORDER — ROPINIROLE 0.25 MG/1
0.25 TABLET, FILM COATED ORAL
Refills: 0 | Status: ACTIVE | COMMUNITY
Start: 2025-07-30

## 2025-07-30 RX ORDER — APIXABAN 5 MG/1
5 TABLET, FILM COATED ORAL
Qty: 180 | Refills: 1 | Status: ACTIVE | COMMUNITY
Start: 2025-07-30

## 2025-07-30 RX ORDER — FUROSEMIDE 40 MG/1
40 TABLET ORAL DAILY
Refills: 0 | Status: ACTIVE | COMMUNITY
Start: 2025-07-30

## 2025-07-30 RX ORDER — LEVOTHYROXINE SODIUM 0.03 MG/1
25 TABLET ORAL
Qty: 90 | Refills: 3 | Status: ACTIVE | COMMUNITY
Start: 2025-07-30

## 2025-07-30 RX ORDER — ACETAMINOPHEN 500 MG/1
500 TABLET, COATED ORAL EVERY 8 HOURS
Qty: 180 | Refills: 1 | Status: ACTIVE | COMMUNITY
Start: 2025-07-30

## 2025-07-30 RX ORDER — METOPROLOL TARTRATE 25 MG/1
25 TABLET ORAL
Qty: 60 | Refills: 11 | Status: ACTIVE | COMMUNITY
Start: 2025-07-30

## 2025-07-30 RX ORDER — LORATADINE 10 MG/1
10 TABLET ORAL
Qty: 90 | Refills: 2 | Status: ACTIVE | COMMUNITY
Start: 2025-07-30

## 2025-07-30 RX ORDER — FLUTICASONE PROPIONATE 50 UG/1
50 SPRAY, METERED NASAL DAILY
Refills: 0 | Status: ACTIVE | COMMUNITY
Start: 2025-07-30

## 2025-07-30 RX ORDER — VITAMIN E (DL,TOCOPHERYL ACET) 180 MG
50 CAPSULE ORAL
Refills: 0 | Status: ACTIVE | COMMUNITY
Start: 2025-07-30

## 2025-07-30 RX ORDER — SENNOSIDES 8.6 MG
8.6 TABLET ORAL
Qty: 180 | Refills: 3 | Status: ACTIVE | COMMUNITY
Start: 2025-07-30

## 2025-08-29 ENCOUNTER — APPOINTMENT (OUTPATIENT)
Dept: HOME HEALTH SERVICES | Facility: HOME HEALTH | Age: 89
End: 2025-08-29
Payer: MEDICARE

## 2025-08-29 VITALS
TEMPERATURE: 97.3 F | DIASTOLIC BLOOD PRESSURE: 57 MMHG | RESPIRATION RATE: 16 BRPM | SYSTOLIC BLOOD PRESSURE: 109 MMHG | HEART RATE: 45 BPM | OXYGEN SATURATION: 98 %

## 2025-08-29 DIAGNOSIS — E03.9 HYPOTHYROIDISM, UNSPECIFIED: ICD-10-CM

## 2025-08-29 DIAGNOSIS — G25.81 RESTLESS LEGS SYNDROME: ICD-10-CM

## 2025-08-29 DIAGNOSIS — R00.1 BRADYCARDIA, UNSPECIFIED: ICD-10-CM

## 2025-08-29 DIAGNOSIS — Z99.81 DEPENDENCE ON SUPPLEMENTAL OXYGEN: ICD-10-CM

## 2025-08-29 DIAGNOSIS — R91.1 SOLITARY PULMONARY NODULE: ICD-10-CM

## 2025-08-29 DIAGNOSIS — I27.20 PULMONARY HYPERTENSION, UNSPECIFIED: ICD-10-CM

## 2025-08-29 DIAGNOSIS — I48.20 CHRONIC ATRIAL FIBRILLATION, UNSP: ICD-10-CM

## 2025-08-29 DIAGNOSIS — D68.51 ACTIVATED PROTEIN C RESISTANCE: ICD-10-CM

## 2025-08-29 DIAGNOSIS — Z86.73 PERSONAL HISTORY OF TRANSIENT ISCHEMIC ATTACK (TIA), AND CEREBRAL INFARCTION W/OUT RESIDUAL DEFICITS: ICD-10-CM

## 2025-08-29 DIAGNOSIS — J44.9 CHRONIC OBSTRUCTIVE PULMONARY DISEASE, UNSPECIFIED: ICD-10-CM

## 2025-08-29 DIAGNOSIS — I50.9 HEART FAILURE, UNSPECIFIED: ICD-10-CM

## 2025-08-29 DIAGNOSIS — I50.32 CHRONIC DIASTOLIC (CONGESTIVE) HEART FAILURE: ICD-10-CM

## 2025-08-29 DIAGNOSIS — Z86.711 PERSONAL HISTORY OF PULMONARY EMBOLISM: ICD-10-CM

## 2025-08-29 DIAGNOSIS — I10 ESSENTIAL (PRIMARY) HYPERTENSION: ICD-10-CM

## 2025-08-29 DIAGNOSIS — I25.10 ATHEROSCLEROTIC HEART DISEASE OF NATIVE CORONARY ARTERY W/OUT ANGINA PECTORIS: ICD-10-CM

## 2025-08-29 DIAGNOSIS — J30.9 ALLERGIC RHINITIS, UNSPECIFIED: ICD-10-CM

## 2025-08-29 DIAGNOSIS — I10 ATHEROSCLEROTIC HEART DISEASE OF NATIVE CORONARY ARTERY W/OUT ANGINA PECTORIS: ICD-10-CM

## 2025-08-29 DIAGNOSIS — D68.69 OTHER THROMBOPHILIA: ICD-10-CM

## 2025-08-29 DIAGNOSIS — I07.1 RHEUMATIC TRICUSPID INSUFFICIENCY: ICD-10-CM

## 2025-08-29 DIAGNOSIS — G62.9 POLYNEUROPATHY, UNSPECIFIED: ICD-10-CM

## 2025-08-29 DIAGNOSIS — E78.5 HYPERLIPIDEMIA, UNSPECIFIED: ICD-10-CM

## 2025-08-29 PROCEDURE — 99349 HOME/RES VST EST MOD MDM 40: CPT

## 2025-09-14 PROBLEM — B37.9 YEAST INFECTION: Status: ACTIVE | Noted: 2025-09-14

## 2025-09-18 ENCOUNTER — NON-APPOINTMENT (OUTPATIENT)
Age: 89
End: 2025-09-18

## 2025-09-18 ENCOUNTER — TRANSCRIPTION ENCOUNTER (OUTPATIENT)
Age: 89
End: 2025-09-18

## 2025-09-18 DIAGNOSIS — R39.9 UNSPECIFIED SYMPTOMS AND SIGNS INVOLVING THE GENITOURINARY SYSTEM: ICD-10-CM
